# Patient Record
Sex: FEMALE | Race: BLACK OR AFRICAN AMERICAN | Employment: OTHER | URBAN - METROPOLITAN AREA
[De-identification: names, ages, dates, MRNs, and addresses within clinical notes are randomized per-mention and may not be internally consistent; named-entity substitution may affect disease eponyms.]

---

## 2017-01-17 RX ORDER — CARVEDILOL 25 MG/1
TABLET ORAL
Qty: 60 TABLET | Refills: 4 | Status: SHIPPED | OUTPATIENT
Start: 2017-01-17 | End: 2017-06-19 | Stop reason: SDUPTHER

## 2017-02-06 ENCOUNTER — OFFICE VISIT (OUTPATIENT)
Dept: CARDIOLOGY CLINIC | Age: 82
End: 2017-02-06

## 2017-02-06 VITALS
OXYGEN SATURATION: 96 % | SYSTOLIC BLOOD PRESSURE: 130 MMHG | BODY MASS INDEX: 39.86 KG/M2 | HEART RATE: 90 BPM | DIASTOLIC BLOOD PRESSURE: 80 MMHG | HEIGHT: 66 IN | WEIGHT: 248 LBS

## 2017-02-06 DIAGNOSIS — I48.19 PERSISTENT ATRIAL FIBRILLATION (HCC): Primary | Chronic | ICD-10-CM

## 2017-02-06 DIAGNOSIS — I27.20 PULMONARY HTN (HCC): ICD-10-CM

## 2017-02-06 DIAGNOSIS — I10 ESSENTIAL HYPERTENSION: Chronic | ICD-10-CM

## 2017-02-06 DIAGNOSIS — I35.0 NONRHEUMATIC AORTIC VALVE STENOSIS: Chronic | ICD-10-CM

## 2017-02-06 DIAGNOSIS — I50.42 CHRONIC COMBINED SYSTOLIC AND DIASTOLIC HEART FAILURE (HCC): Chronic | ICD-10-CM

## 2017-02-06 PROCEDURE — G8400 PT W/DXA NO RESULTS DOC: HCPCS | Performed by: INTERNAL MEDICINE

## 2017-02-06 PROCEDURE — G8419 CALC BMI OUT NRM PARAM NOF/U: HCPCS | Performed by: INTERNAL MEDICINE

## 2017-02-06 PROCEDURE — 4040F PNEUMOC VAC/ADMIN/RCVD: CPT | Performed by: INTERNAL MEDICINE

## 2017-02-06 PROCEDURE — G8484 FLU IMMUNIZE NO ADMIN: HCPCS | Performed by: INTERNAL MEDICINE

## 2017-02-06 PROCEDURE — 1036F TOBACCO NON-USER: CPT | Performed by: INTERNAL MEDICINE

## 2017-02-06 PROCEDURE — 93000 ELECTROCARDIOGRAM COMPLETE: CPT | Performed by: INTERNAL MEDICINE

## 2017-02-06 PROCEDURE — G8427 DOCREV CUR MEDS BY ELIG CLIN: HCPCS | Performed by: INTERNAL MEDICINE

## 2017-02-06 PROCEDURE — 1090F PRES/ABSN URINE INCON ASSESS: CPT | Performed by: INTERNAL MEDICINE

## 2017-02-06 PROCEDURE — 99214 OFFICE O/P EST MOD 30 MIN: CPT | Performed by: INTERNAL MEDICINE

## 2017-02-06 PROCEDURE — 1123F ACP DISCUSS/DSCN MKR DOCD: CPT | Performed by: INTERNAL MEDICINE

## 2017-02-28 RX ORDER — MOEXIPRIL HCL 15 MG
TABLET ORAL
Qty: 60 TABLET | Refills: 4 | Status: SHIPPED | OUTPATIENT
Start: 2017-02-28 | End: 2017-07-28 | Stop reason: SDUPTHER

## 2017-04-17 RX ORDER — DILTIAZEM HYDROCHLORIDE 240 MG/1
CAPSULE, EXTENDED RELEASE ORAL
Qty: 90 CAPSULE | Refills: 3 | Status: SHIPPED | OUTPATIENT
Start: 2017-04-17 | End: 2018-04-03 | Stop reason: SDUPTHER

## 2017-06-19 RX ORDER — CARVEDILOL 25 MG/1
TABLET ORAL
Qty: 60 TABLET | Refills: 3 | Status: SHIPPED | OUTPATIENT
Start: 2017-06-19 | End: 2018-01-02 | Stop reason: SDUPTHER

## 2017-07-28 RX ORDER — MOEXIPRIL HCL 15 MG
TABLET ORAL
Qty: 60 TABLET | Refills: 3 | Status: SHIPPED | OUTPATIENT
Start: 2017-07-28 | End: 2017-11-24 | Stop reason: SDUPTHER

## 2017-08-07 ENCOUNTER — OFFICE VISIT (OUTPATIENT)
Dept: CARDIOLOGY CLINIC | Age: 82
End: 2017-08-07

## 2017-08-07 VITALS
BODY MASS INDEX: 41.78 KG/M2 | HEIGHT: 66 IN | SYSTOLIC BLOOD PRESSURE: 122 MMHG | DIASTOLIC BLOOD PRESSURE: 60 MMHG | OXYGEN SATURATION: 98 % | WEIGHT: 260 LBS | HEART RATE: 70 BPM

## 2017-08-07 DIAGNOSIS — I10 ESSENTIAL HYPERTENSION: Chronic | ICD-10-CM

## 2017-08-07 DIAGNOSIS — I48.19 PERSISTENT ATRIAL FIBRILLATION (HCC): Primary | Chronic | ICD-10-CM

## 2017-08-07 DIAGNOSIS — I35.0 NONRHEUMATIC AORTIC VALVE STENOSIS: Chronic | ICD-10-CM

## 2017-08-07 DIAGNOSIS — E78.2 MIXED HYPERLIPIDEMIA: ICD-10-CM

## 2017-08-07 DIAGNOSIS — I27.20 PULMONARY HTN (HCC): Chronic | ICD-10-CM

## 2017-08-07 DIAGNOSIS — I50.42 CHRONIC COMBINED SYSTOLIC AND DIASTOLIC HEART FAILURE (HCC): Chronic | ICD-10-CM

## 2017-08-07 PROCEDURE — 1036F TOBACCO NON-USER: CPT | Performed by: INTERNAL MEDICINE

## 2017-08-07 PROCEDURE — 1090F PRES/ABSN URINE INCON ASSESS: CPT | Performed by: INTERNAL MEDICINE

## 2017-08-07 PROCEDURE — G8427 DOCREV CUR MEDS BY ELIG CLIN: HCPCS | Performed by: INTERNAL MEDICINE

## 2017-08-07 PROCEDURE — 99214 OFFICE O/P EST MOD 30 MIN: CPT | Performed by: INTERNAL MEDICINE

## 2017-08-07 PROCEDURE — 4040F PNEUMOC VAC/ADMIN/RCVD: CPT | Performed by: INTERNAL MEDICINE

## 2017-08-07 PROCEDURE — G8400 PT W/DXA NO RESULTS DOC: HCPCS | Performed by: INTERNAL MEDICINE

## 2017-08-07 PROCEDURE — G8417 CALC BMI ABV UP PARAM F/U: HCPCS | Performed by: INTERNAL MEDICINE

## 2017-08-07 PROCEDURE — 1123F ACP DISCUSS/DSCN MKR DOCD: CPT | Performed by: INTERNAL MEDICINE

## 2017-08-14 ENCOUNTER — HOSPITAL ENCOUNTER (OUTPATIENT)
Dept: NON INVASIVE DIAGNOSTICS | Age: 82
Discharge: HOME | End: 2017-08-14
Attending: INTERNAL MEDICINE | Admitting: INTERNAL MEDICINE

## 2017-08-14 DIAGNOSIS — I35.0 NONRHEUMATIC AORTIC VALVE STENOSIS: ICD-10-CM

## 2017-08-14 LAB
LV EF: 35 %
LVEF MODALITY: NORMAL

## 2017-09-11 ENCOUNTER — TELEPHONE (OUTPATIENT)
Dept: CARDIOLOGY CLINIC | Age: 82
End: 2017-09-11

## 2017-09-11 DIAGNOSIS — I10 ESSENTIAL HYPERTENSION: Chronic | ICD-10-CM

## 2017-09-11 DIAGNOSIS — E78.2 MIXED HYPERLIPIDEMIA: Primary | ICD-10-CM

## 2017-09-11 DIAGNOSIS — I48.19 PERSISTENT ATRIAL FIBRILLATION (HCC): Chronic | ICD-10-CM

## 2017-09-11 DIAGNOSIS — E78.2 MIXED HYPERLIPIDEMIA: ICD-10-CM

## 2017-09-11 LAB
A/G RATIO: 0.9 (ref 1.1–2.2)
ALBUMIN SERPL-MCNC: 3.4 G/DL (ref 3.4–5)
ALP BLD-CCNC: 94 U/L (ref 40–129)
ALT SERPL-CCNC: 7 U/L (ref 10–40)
ANION GAP SERPL CALCULATED.3IONS-SCNC: 12 MMOL/L (ref 3–16)
AST SERPL-CCNC: 12 U/L (ref 15–37)
BILIRUB SERPL-MCNC: 0.5 MG/DL (ref 0–1)
BUN BLDV-MCNC: 30 MG/DL (ref 7–20)
CALCIUM SERPL-MCNC: 9 MG/DL (ref 8.3–10.6)
CHLORIDE BLD-SCNC: 101 MMOL/L (ref 99–110)
CHOLESTEROL, FASTING: 143 MG/DL (ref 0–199)
CO2: 29 MMOL/L (ref 21–32)
CREAT SERPL-MCNC: 1.5 MG/DL (ref 0.6–1.2)
GFR AFRICAN AMERICAN: 40
GFR NON-AFRICAN AMERICAN: 33
GLOBULIN: 3.8 G/DL
GLUCOSE FASTING: 91 MG/DL (ref 70–99)
HDLC SERPL-MCNC: 57 MG/DL (ref 40–60)
LDL CHOLESTEROL CALCULATED: 72 MG/DL
POTASSIUM SERPL-SCNC: 3.8 MMOL/L (ref 3.5–5.1)
SODIUM BLD-SCNC: 142 MMOL/L (ref 136–145)
T4 FREE: 2 NG/DL (ref 0.9–1.8)
TOTAL PROTEIN: 7.2 G/DL (ref 6.4–8.2)
TRIGLYCERIDE, FASTING: 70 MG/DL (ref 0–150)
TSH REFLEX: 0.06 UIU/ML (ref 0.27–4.2)
VLDLC SERPL CALC-MCNC: 14 MG/DL

## 2017-09-12 ENCOUNTER — OFFICE VISIT (OUTPATIENT)
Dept: CARDIOLOGY CLINIC | Age: 82
End: 2017-09-12

## 2017-09-12 VITALS
OXYGEN SATURATION: 98 % | HEART RATE: 84 BPM | HEIGHT: 66 IN | SYSTOLIC BLOOD PRESSURE: 122 MMHG | DIASTOLIC BLOOD PRESSURE: 80 MMHG | BODY MASS INDEX: 41.72 KG/M2 | WEIGHT: 259.6 LBS

## 2017-09-12 DIAGNOSIS — I10 ESSENTIAL HYPERTENSION: Chronic | ICD-10-CM

## 2017-09-12 DIAGNOSIS — I48.19 PERSISTENT ATRIAL FIBRILLATION (HCC): Primary | Chronic | ICD-10-CM

## 2017-09-12 DIAGNOSIS — I27.20 PULMONARY HTN (HCC): Chronic | ICD-10-CM

## 2017-09-12 DIAGNOSIS — I35.0 NONRHEUMATIC AORTIC VALVE STENOSIS: Chronic | ICD-10-CM

## 2017-09-12 DIAGNOSIS — E78.2 MIXED HYPERLIPIDEMIA: ICD-10-CM

## 2017-09-12 DIAGNOSIS — E05.90 HYPERTHYROIDISM: ICD-10-CM

## 2017-09-12 DIAGNOSIS — I50.42 CHRONIC COMBINED SYSTOLIC AND DIASTOLIC HEART FAILURE (HCC): Chronic | ICD-10-CM

## 2017-09-12 PROCEDURE — 99214 OFFICE O/P EST MOD 30 MIN: CPT | Performed by: INTERNAL MEDICINE

## 2017-09-12 PROCEDURE — 1123F ACP DISCUSS/DSCN MKR DOCD: CPT | Performed by: INTERNAL MEDICINE

## 2017-09-12 PROCEDURE — G8400 PT W/DXA NO RESULTS DOC: HCPCS | Performed by: INTERNAL MEDICINE

## 2017-09-12 PROCEDURE — 1090F PRES/ABSN URINE INCON ASSESS: CPT | Performed by: INTERNAL MEDICINE

## 2017-09-12 PROCEDURE — G8427 DOCREV CUR MEDS BY ELIG CLIN: HCPCS | Performed by: INTERNAL MEDICINE

## 2017-09-12 PROCEDURE — G8417 CALC BMI ABV UP PARAM F/U: HCPCS | Performed by: INTERNAL MEDICINE

## 2017-09-12 PROCEDURE — 1036F TOBACCO NON-USER: CPT | Performed by: INTERNAL MEDICINE

## 2017-09-12 PROCEDURE — 4040F PNEUMOC VAC/ADMIN/RCVD: CPT | Performed by: INTERNAL MEDICINE

## 2017-10-18 ENCOUNTER — HOSPITAL ENCOUNTER (OUTPATIENT)
Dept: OTHER | Age: 82
Discharge: HOME | End: 2017-10-18
Attending: INTERNAL MEDICINE | Admitting: INTERNAL MEDICINE

## 2017-10-18 DIAGNOSIS — M25.551 RIGHT HIP PAIN: ICD-10-CM

## 2017-11-24 RX ORDER — MOEXIPRIL HCL 15 MG
TABLET ORAL
Qty: 60 TABLET | Refills: 2 | Status: SHIPPED | OUTPATIENT
Start: 2017-11-24 | End: 2018-02-18 | Stop reason: SDUPTHER

## 2018-01-02 RX ORDER — CARVEDILOL 25 MG/1
TABLET ORAL
Qty: 60 TABLET | Refills: 2 | Status: SHIPPED | OUTPATIENT
Start: 2018-01-02 | End: 2018-04-16 | Stop reason: SDUPTHER

## 2018-02-21 RX ORDER — MOEXIPRIL HCL 15 MG
TABLET ORAL
Qty: 180 TABLET | Refills: 3 | Status: SHIPPED | OUTPATIENT
Start: 2018-02-21 | End: 2019-02-11 | Stop reason: SDUPTHER

## 2018-03-22 ENCOUNTER — OFFICE VISIT (OUTPATIENT)
Dept: CARDIOLOGY CLINIC | Age: 83
End: 2018-03-22

## 2018-03-22 VITALS
DIASTOLIC BLOOD PRESSURE: 64 MMHG | HEIGHT: 66 IN | SYSTOLIC BLOOD PRESSURE: 120 MMHG | BODY MASS INDEX: 42.11 KG/M2 | HEART RATE: 76 BPM | WEIGHT: 262 LBS | OXYGEN SATURATION: 98 %

## 2018-03-22 DIAGNOSIS — I48.19 PERSISTENT ATRIAL FIBRILLATION (HCC): Primary | Chronic | ICD-10-CM

## 2018-03-22 DIAGNOSIS — I10 ESSENTIAL HYPERTENSION: Chronic | ICD-10-CM

## 2018-03-22 DIAGNOSIS — I35.0 NONRHEUMATIC AORTIC VALVE STENOSIS: Chronic | ICD-10-CM

## 2018-03-22 DIAGNOSIS — I27.20 PULMONARY HTN (HCC): Chronic | ICD-10-CM

## 2018-03-22 DIAGNOSIS — I50.42 CHRONIC COMBINED SYSTOLIC AND DIASTOLIC HEART FAILURE (HCC): Chronic | ICD-10-CM

## 2018-03-22 DIAGNOSIS — E78.2 MIXED HYPERLIPIDEMIA: ICD-10-CM

## 2018-03-22 DIAGNOSIS — E11.9 TYPE 2 DIABETES MELLITUS WITHOUT COMPLICATION, WITHOUT LONG-TERM CURRENT USE OF INSULIN (HCC): ICD-10-CM

## 2018-03-22 PROCEDURE — 1090F PRES/ABSN URINE INCON ASSESS: CPT | Performed by: INTERNAL MEDICINE

## 2018-03-22 PROCEDURE — G8400 PT W/DXA NO RESULTS DOC: HCPCS | Performed by: INTERNAL MEDICINE

## 2018-03-22 PROCEDURE — 4040F PNEUMOC VAC/ADMIN/RCVD: CPT | Performed by: INTERNAL MEDICINE

## 2018-03-22 PROCEDURE — G8484 FLU IMMUNIZE NO ADMIN: HCPCS | Performed by: INTERNAL MEDICINE

## 2018-03-22 PROCEDURE — G8427 DOCREV CUR MEDS BY ELIG CLIN: HCPCS | Performed by: INTERNAL MEDICINE

## 2018-03-22 PROCEDURE — 1123F ACP DISCUSS/DSCN MKR DOCD: CPT | Performed by: INTERNAL MEDICINE

## 2018-03-22 PROCEDURE — 1036F TOBACCO NON-USER: CPT | Performed by: INTERNAL MEDICINE

## 2018-03-22 PROCEDURE — 99214 OFFICE O/P EST MOD 30 MIN: CPT | Performed by: INTERNAL MEDICINE

## 2018-03-22 PROCEDURE — G8417 CALC BMI ABV UP PARAM F/U: HCPCS | Performed by: INTERNAL MEDICINE

## 2018-03-23 DIAGNOSIS — I48.19 PERSISTENT ATRIAL FIBRILLATION (HCC): Primary | Chronic | ICD-10-CM

## 2018-03-26 ENCOUNTER — TELEPHONE (OUTPATIENT)
Dept: PHARMACY | Facility: CLINIC | Age: 83
End: 2018-03-26

## 2018-03-26 NOTE — TELEPHONE ENCOUNTER
Left message for patient to please call back to schedule an appointment in [facility] for Anticoagulation.     Grace Sam   Anticoagulation Service / Heart Failure  [phone]

## 2018-04-01 ENCOUNTER — HOSPITAL ENCOUNTER (OUTPATIENT)
Dept: OTHER | Age: 83
Discharge: HOME | End: 2018-04-30
Attending: INTERNAL MEDICINE | Admitting: INTERNAL MEDICINE

## 2018-04-03 RX ORDER — DILTIAZEM HYDROCHLORIDE 240 MG/1
CAPSULE, EXTENDED RELEASE ORAL
Qty: 90 CAPSULE | Refills: 3 | Status: SHIPPED | OUTPATIENT
Start: 2018-04-03 | End: 2019-04-01 | Stop reason: SDUPTHER

## 2018-04-06 ENCOUNTER — HOSPITAL ENCOUNTER (OUTPATIENT)
Dept: INFUSION THERAPY | Age: 83
Discharge: HOME | End: 2018-04-30
Admitting: INTERNAL MEDICINE

## 2018-04-06 ENCOUNTER — OFFICE VISIT (OUTPATIENT)
Dept: PHARMACY | Facility: CLINIC | Age: 83
End: 2018-04-06

## 2018-04-06 ENCOUNTER — ANTI-COAG VISIT (OUTPATIENT)
Dept: PHARMACY | Facility: CLINIC | Age: 83
End: 2018-04-06

## 2018-04-06 ENCOUNTER — HOSPITAL ENCOUNTER (OUTPATIENT)
Dept: OTHER | Age: 83
Discharge: HOME | End: 2018-04-06
Attending: INTERNAL MEDICINE | Admitting: INTERNAL MEDICINE

## 2018-04-06 VITALS
HEART RATE: 72 BPM | DIASTOLIC BLOOD PRESSURE: 81 MMHG | SYSTOLIC BLOOD PRESSURE: 157 MMHG | OXYGEN SATURATION: 94 % | WEIGHT: 265.6 LBS | BODY MASS INDEX: 42.87 KG/M2

## 2018-04-06 DIAGNOSIS — I48.19 PERSISTENT ATRIAL FIBRILLATION (HCC): ICD-10-CM

## 2018-04-06 DIAGNOSIS — I50.42 CHRONIC COMBINED SYSTOLIC AND DIASTOLIC HEART FAILURE (HCC): Primary | ICD-10-CM

## 2018-04-06 LAB — INTERNATIONAL NORMALIZATION RATIO, POC: 2.6

## 2018-04-06 RX ORDER — ASCORBIC ACID 500 MG
500 TABLET ORAL DAILY
COMMUNITY
End: 2021-07-29

## 2018-04-06 RX ORDER — CETIRIZINE HYDROCHLORIDE 10 MG/1
10 TABLET ORAL DAILY
Status: ON HOLD | COMMUNITY
End: 2021-04-29 | Stop reason: HOSPADM

## 2018-04-06 RX ORDER — WARFARIN SODIUM 5 MG/1
2.5 TABLET ORAL EVERY EVENING
Status: ON HOLD | COMMUNITY
End: 2021-04-29 | Stop reason: HOSPADM

## 2018-04-06 RX ORDER — FUROSEMIDE 40 MG/1
40 TABLET ORAL 2 TIMES DAILY
COMMUNITY
End: 2019-04-16

## 2018-04-06 RX ORDER — ACETAMINOPHEN 500 MG
1000 TABLET ORAL EVERY 6 HOURS PRN
COMMUNITY
End: 2021-03-26

## 2018-04-16 RX ORDER — CARVEDILOL 25 MG/1
TABLET ORAL
Qty: 180 TABLET | Refills: 3 | Status: SHIPPED | OUTPATIENT
Start: 2018-04-16 | End: 2019-05-07 | Stop reason: SDUPTHER

## 2018-05-01 ENCOUNTER — HOSPITAL ENCOUNTER (OUTPATIENT)
Dept: OTHER | Age: 83
Discharge: HOME | End: 2018-05-31
Attending: INTERNAL MEDICINE | Admitting: INTERNAL MEDICINE

## 2018-05-04 ENCOUNTER — ANTI-COAG VISIT (OUTPATIENT)
Dept: PHARMACY | Facility: CLINIC | Age: 83
End: 2018-05-04

## 2018-05-04 DIAGNOSIS — I48.19 PERSISTENT ATRIAL FIBRILLATION (HCC): ICD-10-CM

## 2018-05-04 LAB — INTERNATIONAL NORMALIZATION RATIO, POC: 2.7

## 2018-06-01 ENCOUNTER — ANTI-COAG VISIT (OUTPATIENT)
Dept: PHARMACY | Facility: CLINIC | Age: 83
End: 2018-06-01

## 2018-06-01 ENCOUNTER — HOSPITAL ENCOUNTER (OUTPATIENT)
Dept: OTHER | Age: 83
Discharge: HOME | End: 2018-06-30
Attending: INTERNAL MEDICINE | Admitting: INTERNAL MEDICINE

## 2018-06-01 DIAGNOSIS — I48.19 PERSISTENT ATRIAL FIBRILLATION (HCC): ICD-10-CM

## 2018-06-01 LAB — INTERNATIONAL NORMALIZATION RATIO, POC: 3.9

## 2018-06-29 ENCOUNTER — ANTI-COAG VISIT (OUTPATIENT)
Dept: PHARMACY | Facility: CLINIC | Age: 83
End: 2018-06-29

## 2018-06-29 DIAGNOSIS — I48.19 PERSISTENT ATRIAL FIBRILLATION (HCC): ICD-10-CM

## 2018-06-29 LAB — INR BLD: 1.9

## 2018-07-01 ENCOUNTER — HOSPITAL ENCOUNTER (OUTPATIENT)
Dept: OTHER | Age: 83
Discharge: HOME | End: 2018-07-31
Attending: INTERNAL MEDICINE | Admitting: INTERNAL MEDICINE

## 2018-07-27 ENCOUNTER — ANTI-COAG VISIT (OUTPATIENT)
Dept: PHARMACY | Facility: CLINIC | Age: 83
End: 2018-07-27

## 2018-07-27 DIAGNOSIS — I48.19 PERSISTENT ATRIAL FIBRILLATION (HCC): ICD-10-CM

## 2018-07-27 LAB — INR BLD: 2

## 2018-07-27 NOTE — PROGRESS NOTES
Ms. Cristofer Santana is a 80 y.o.  female with history of Afib who presents today for anticoagulation monitoring and adjustment. Patient verifies current dosing regimen  Patient denies s/s bleeding/bruising/swelling/SOB  No blood in urine or stool. No dietary changes. No changes in medication/OTC agents/Herbals. No change in alcohol use. No missed doses. No Procedures scheduled in the future at this time. Lab Results   Component Value Date    INR 2.00 07/27/2018    INR 1.90 06/29/2018    INR 3.9 06/01/2018       Pertinent findings: Patient appears well. Denies changes to medications or diet. Denies any issues with bruising or bleeding. INR is therapeutic today. Was slightly subtherapeutic at last visit due to ashish greens which she does not regularly eat. Will continue current dose and recheck in 3 weeks due to patient preference as they are going on vacation the following week. Warfarin dosing: Continue Warfarin 5mg daily       After visit summary printed and reviewed with patient.

## 2018-08-01 ENCOUNTER — HOSPITAL ENCOUNTER (OUTPATIENT)
Dept: OTHER | Age: 83
Discharge: HOME | End: 2018-08-31
Attending: INTERNAL MEDICINE | Admitting: INTERNAL MEDICINE

## 2018-08-15 ENCOUNTER — OFFICE VISIT (OUTPATIENT)
Dept: ENDOCRINOLOGY | Age: 83
End: 2018-08-15

## 2018-08-15 VITALS
OXYGEN SATURATION: 97 % | WEIGHT: 263 LBS | TEMPERATURE: 98.9 F | SYSTOLIC BLOOD PRESSURE: 171 MMHG | HEART RATE: 78 BPM | RESPIRATION RATE: 14 BRPM | DIASTOLIC BLOOD PRESSURE: 94 MMHG | BODY MASS INDEX: 43.82 KG/M2 | HEIGHT: 65 IN

## 2018-08-15 DIAGNOSIS — T14.8XXA FRACTURE: Primary | ICD-10-CM

## 2018-08-15 DIAGNOSIS — E05.90 HYPERTHYROIDISM: ICD-10-CM

## 2018-08-15 PROCEDURE — G8427 DOCREV CUR MEDS BY ELIG CLIN: HCPCS | Performed by: INTERNAL MEDICINE

## 2018-08-15 PROCEDURE — 1101F PT FALLS ASSESS-DOCD LE1/YR: CPT | Performed by: INTERNAL MEDICINE

## 2018-08-15 PROCEDURE — 99204 OFFICE O/P NEW MOD 45 MIN: CPT | Performed by: INTERNAL MEDICINE

## 2018-08-15 PROCEDURE — 1090F PRES/ABSN URINE INCON ASSESS: CPT | Performed by: INTERNAL MEDICINE

## 2018-08-15 PROCEDURE — G8417 CALC BMI ABV UP PARAM F/U: HCPCS | Performed by: INTERNAL MEDICINE

## 2018-08-15 PROCEDURE — 4040F PNEUMOC VAC/ADMIN/RCVD: CPT | Performed by: INTERNAL MEDICINE

## 2018-08-15 PROCEDURE — 1036F TOBACCO NON-USER: CPT | Performed by: INTERNAL MEDICINE

## 2018-08-15 PROCEDURE — G8400 PT W/DXA NO RESULTS DOC: HCPCS | Performed by: INTERNAL MEDICINE

## 2018-08-15 PROCEDURE — 1123F ACP DISCUSS/DSCN MKR DOCD: CPT | Performed by: INTERNAL MEDICINE

## 2018-08-16 ENCOUNTER — ANTI-COAG VISIT (OUTPATIENT)
Dept: PHARMACY | Facility: CLINIC | Age: 83
End: 2018-08-16

## 2018-08-16 DIAGNOSIS — I48.19 PERSISTENT ATRIAL FIBRILLATION (HCC): ICD-10-CM

## 2018-08-16 LAB — INR BLD: 1.9

## 2018-08-16 NOTE — PROGRESS NOTES
Ms. Amol Robles is a 80 y.o.  female with history of Afib who presents today for anticoagulation monitoring and adjustment. Patient verifies current dosing regimen  Patient denies s/s bleeding/bruising/swelling/SOB  No blood in urine or stool. No dietary changes. No changes in medication/OTC agents/Herbals. No change in alcohol use. No missed doses. No Procedures scheduled in the future at this time. Lab Results   Component Value Date    INR 1.90 08/16/2018    INR 2.00 07/27/2018    INR 1.90 06/29/2018       Pertinent findings: No changes or problems noted. Patient states that she had some spinach over the weekend. Her INR is subtherapeutic and has been hanging around this area for the past few visits. Will increase dose by 7.1% today and follow-up in 3 weeks to see how INR adjusts. Advised her to keep her vitamin K consistent from week to week. Warfarin dosing: NEW DOSE: Take warfarin 5mg daily, except 7.5 mg (one and a half tablets) on Thursdays. After visit summary printed and reviewed with patient.         Warfarin dose updated on patient home medication list: Yes

## 2018-08-29 ENCOUNTER — TELEPHONE (OUTPATIENT)
Dept: ENDOCRINOLOGY | Age: 83
End: 2018-08-29

## 2018-08-29 ENCOUNTER — HOSPITAL ENCOUNTER (OUTPATIENT)
Dept: ULTRASOUND IMAGING | Age: 83
Discharge: HOME | End: 2018-08-29
Attending: INTERNAL MEDICINE | Admitting: INTERNAL MEDICINE

## 2018-08-29 DIAGNOSIS — E05.90 THYROTOXICOSIS WITHOUT THYROID STORM: ICD-10-CM

## 2018-08-29 DIAGNOSIS — E05.90 HYPERTHYROIDISM: ICD-10-CM

## 2018-08-30 ENCOUNTER — TELEPHONE (OUTPATIENT)
Dept: ENDOCRINOLOGY | Age: 83
End: 2018-08-30

## 2018-09-01 ENCOUNTER — HOSPITAL ENCOUNTER (OUTPATIENT)
Dept: OTHER | Age: 83
Discharge: HOME | End: 2018-09-01
Attending: INTERNAL MEDICINE | Admitting: INTERNAL MEDICINE

## 2018-09-04 ENCOUNTER — HOSPITAL ENCOUNTER (OUTPATIENT)
Dept: NUCLEAR MEDICINE | Age: 83
Discharge: HOME | End: 2018-09-11
Attending: INTERNAL MEDICINE | Admitting: INTERNAL MEDICINE

## 2018-09-04 DIAGNOSIS — E05.90 THYROTOXICOSIS WITHOUT THYROID STORM: ICD-10-CM

## 2018-09-04 DIAGNOSIS — E05.90 HYPERTHYROIDISM: ICD-10-CM

## 2018-09-04 DIAGNOSIS — E05.80 OTHER THYROTOXICOSIS WITHOUT THYROTOXIC CRISIS OR STORM: ICD-10-CM

## 2018-09-05 ENCOUNTER — HOSPITAL ENCOUNTER (OUTPATIENT)
Dept: NUCLEAR MEDICINE | Age: 83
Discharge: HOME | End: 2018-09-05
Attending: INTERNAL MEDICINE | Admitting: INTERNAL MEDICINE

## 2018-09-05 DIAGNOSIS — E05.90 THYROTOXICOSIS WITHOUT THYROID STORM: ICD-10-CM

## 2018-09-07 DIAGNOSIS — E05.90 HYPERTHYROIDISM: ICD-10-CM

## 2018-09-07 LAB
T3 FREE: 2.8 PG/ML (ref 2.3–4.2)
T4 FREE: 1.8 NG/DL (ref 0.9–1.8)
TSH REFLEX: 0.19 UIU/ML (ref 0.27–4.2)

## 2018-09-08 LAB — TSH RECEPTOR AB: <0.9 IU/L

## 2018-09-11 ENCOUNTER — TELEPHONE (OUTPATIENT)
Dept: ENDOCRINOLOGY | Age: 83
End: 2018-09-11

## 2018-09-11 NOTE — TELEPHONE ENCOUNTER
Patient needs FNA prior to LEYVA. I called patient and discussed. Can we please schedule her.           Left message for patient to call and make appt

## 2018-09-11 NOTE — PROGRESS NOTES
9/18 TSH 0.19 FT4 1.8 FT3 2.8
Right thyroid lobe:  7.8 x 5.0 x 4.2 cm  Left thyroid lobe:  8.2 x 4.0 x 4.2 cm  Isthmus:  2 cm  Thyroid Gland:  Heterogeneous echotexture of the thyroid gland  Size: 47 x 46 x 40 mm  Location: Mid right lobe  NODULE: 2, 4th listed on the tech sheet  Size: 35 x 35 x 28 mm  NODULE: 3  Size: 32 x 30 x 26 mm  Location: Mid left lobe    Thyroid Scan:  Thyroid uptake at 24 hours measured 14.5%.  Normal range at 24 hours is   10-35%.     Activity within right and left thyroid lobes is diffusely heterogeneous. Areas of nodular increased and decreased activity are seen throughout right   and left thyroid lobes. Reviewed images right 4.6cm , isthmus 1.87cm and left 3.3cm nodule  Likely has toxic nodule. Will check TFT as uptake not that high    TSH 0.19 Free level normal  Discuss with radiology, the dominant large nodules are not hot. Scan does have appearance of toxic nodule, scattered area of hyperactivity. Will do FNA of the right and left dominant nodule.  Discussed with patient
lower neck  Respiratory: clear to auscultation bilaterally and breathing is unlabored  Cardiovascular: regular rate and rhythm, S1, S2, regular rate and rhythm, ES murmur  Skin: No obvious rashes or lesions present. Skin and hair texture normal  Psychiatric: Judgement and Insight:  judgement and insight appear normal  Neuro: Normal without focal findings, speech is normal normal, speech is spontaneous    Lab Review  Lab Results   Component Value Date    TSH 0.05 03/22/2018     No results found for: FREET4      Assessment: 1. Hyperthyroidism : She has it for years, mild, discussed possible causes. Will get thyroid scan. Discussed treatment with antithyroid medication and LEYVA. 2. Compression Fracture : Reports normal Dexa. She is followed by PCP     Plan: 1. Check TFT, Trab  2. Thyroid scan and USG  3. Treatment with LEYVA or Antithyroid medication based on results  4. F/u in 3 months after treatment

## 2018-09-12 ENCOUNTER — ANTI-COAG VISIT (OUTPATIENT)
Dept: PHARMACY | Facility: CLINIC | Age: 83
End: 2018-09-12

## 2018-09-12 LAB — INTERNATIONAL NORMALIZATION RATIO, POC: 2.2

## 2018-10-10 ENCOUNTER — ANTI-COAG VISIT (OUTPATIENT)
Dept: PHARMACY | Age: 83
End: 2018-10-10
Payer: MEDICARE

## 2018-10-10 DIAGNOSIS — I48.19 PERSISTENT ATRIAL FIBRILLATION (HCC): ICD-10-CM

## 2018-10-10 LAB — INTERNATIONAL NORMALIZATION RATIO, POC: 2.7

## 2018-10-10 PROCEDURE — 85610 PROTHROMBIN TIME: CPT

## 2018-10-10 PROCEDURE — 99211 OFF/OP EST MAY X REQ PHY/QHP: CPT

## 2018-10-16 ENCOUNTER — OFFICE VISIT (OUTPATIENT)
Dept: CARDIOLOGY CLINIC | Age: 83
End: 2018-10-16
Payer: MEDICARE

## 2018-10-16 VITALS
DIASTOLIC BLOOD PRESSURE: 80 MMHG | OXYGEN SATURATION: 94 % | SYSTOLIC BLOOD PRESSURE: 134 MMHG | HEIGHT: 66 IN | HEART RATE: 66 BPM | BODY MASS INDEX: 42.59 KG/M2 | WEIGHT: 265 LBS

## 2018-10-16 DIAGNOSIS — I50.42 CHRONIC COMBINED SYSTOLIC AND DIASTOLIC HEART FAILURE (HCC): Chronic | ICD-10-CM

## 2018-10-16 DIAGNOSIS — I10 ESSENTIAL HYPERTENSION: Chronic | ICD-10-CM

## 2018-10-16 DIAGNOSIS — I48.19 PERSISTENT ATRIAL FIBRILLATION (HCC): Primary | Chronic | ICD-10-CM

## 2018-10-16 DIAGNOSIS — I35.0 NONRHEUMATIC AORTIC VALVE STENOSIS: Chronic | ICD-10-CM

## 2018-10-16 DIAGNOSIS — E78.2 MIXED HYPERLIPIDEMIA: ICD-10-CM

## 2018-10-16 DIAGNOSIS — I27.20 PULMONARY HTN (HCC): Chronic | ICD-10-CM

## 2018-10-16 PROCEDURE — G8417 CALC BMI ABV UP PARAM F/U: HCPCS | Performed by: INTERNAL MEDICINE

## 2018-10-16 PROCEDURE — G8400 PT W/DXA NO RESULTS DOC: HCPCS | Performed by: INTERNAL MEDICINE

## 2018-10-16 PROCEDURE — 1101F PT FALLS ASSESS-DOCD LE1/YR: CPT | Performed by: INTERNAL MEDICINE

## 2018-10-16 PROCEDURE — 1036F TOBACCO NON-USER: CPT | Performed by: INTERNAL MEDICINE

## 2018-10-16 PROCEDURE — 1090F PRES/ABSN URINE INCON ASSESS: CPT | Performed by: INTERNAL MEDICINE

## 2018-10-16 PROCEDURE — G8427 DOCREV CUR MEDS BY ELIG CLIN: HCPCS | Performed by: INTERNAL MEDICINE

## 2018-10-16 PROCEDURE — 4040F PNEUMOC VAC/ADMIN/RCVD: CPT | Performed by: INTERNAL MEDICINE

## 2018-10-16 PROCEDURE — G8484 FLU IMMUNIZE NO ADMIN: HCPCS | Performed by: INTERNAL MEDICINE

## 2018-10-16 PROCEDURE — 1123F ACP DISCUSS/DSCN MKR DOCD: CPT | Performed by: INTERNAL MEDICINE

## 2018-10-16 PROCEDURE — 99214 OFFICE O/P EST MOD 30 MIN: CPT | Performed by: INTERNAL MEDICINE

## 2018-10-16 PROCEDURE — 93000 ELECTROCARDIOGRAM COMPLETE: CPT | Performed by: INTERNAL MEDICINE

## 2018-10-16 NOTE — PROGRESS NOTES
mEq by mouth daily.  lovastatin (MEVACOR) 10 MG tablet Take 10 mg by mouth nightly.  hydrALAZINE (APRESOLINE) 10 MG tablet Take 10 mg by mouth 2 times daily.  lansoprazole (PREVACID) 15 MG capsule Take 15 mg by mouth daily. No current facility-administered medications for this visit. Review of Systems:  · Constitutional: no unanticipated weight loss. There's been no change in energy level, sleep pattern, or activity level. No fevers, chills. · Eyes: No visual changes or diplopia. No scleral icterus. · ENT: No Headaches, hearing loss or vertigo. No mouth sores or sore throat. · Cardiovascular: as reviewed in HPI  · Respiratory: No cough or wheezing, no sputum production. No hematemesis. · Gastrointestinal: No abdominal pain, appetite loss, blood in stools. No change in bowel or bladder habits. · Genitourinary: No dysuria, trouble voiding, or hematuria. · Musculoskeletal:  No gait disturbance, no joint complaints. · Integumentary: No rash or pruritis. · Neurological: No headache, diplopia, change in muscle strength, numbness or tingling. · Psychiatric: No anxiety or depression. · Endocrine: No temperature intolerance. No excessive thirst, fluid intake, or urination. No tremor. · Hematologic/Lymphatic: No abnormal bruising or bleeding, blood clots or swollen lymph nodes. · Allergic/Immunologic: No nasal congestion or hives. Physical Exam:   /80 (Site: Left Upper Arm, Position: Sitting)   Pulse 66   Ht 5' 6\" (1.676 m)   Wt 265 lb (120.2 kg)   SpO2 94%   BMI 42.77 kg/m²    Constitutional: She is an obese female who is oriented to person, place, and time. In no acute distress. Head: Normocephalic and atraumatic. PERRL  Neck: Neck supple. No JVP or carotid bruit appreciated. No mass and no thyromegaly present. No lymphadenopathy present. Cardiovascular: Irreg irreg with a normal rate. S2 audible. Exam reveals no gallop and no friction rub. III/VI DANUTA.

## 2018-10-30 ENCOUNTER — PROCEDURE VISIT (OUTPATIENT)
Dept: ENDOCRINOLOGY | Age: 83
End: 2018-10-30
Payer: MEDICARE

## 2018-10-30 VITALS
HEART RATE: 70 BPM | SYSTOLIC BLOOD PRESSURE: 110 MMHG | DIASTOLIC BLOOD PRESSURE: 72 MMHG | RESPIRATION RATE: 16 BRPM | WEIGHT: 265 LBS | OXYGEN SATURATION: 99 % | BODY MASS INDEX: 42.77 KG/M2

## 2018-10-30 DIAGNOSIS — E04.2 MULTIPLE THYROID NODULES: ICD-10-CM

## 2018-10-30 DIAGNOSIS — E07.89 OTHER SPECIFIED DISORDERS OF THYROID: ICD-10-CM

## 2018-10-30 DIAGNOSIS — E05.90 HYPERTHYROIDISM: Primary | ICD-10-CM

## 2018-10-30 DIAGNOSIS — Z92.29 HISTORY OF COUMADIN THERAPY: ICD-10-CM

## 2018-10-30 PROCEDURE — 1123F ACP DISCUSS/DSCN MKR DOCD: CPT | Performed by: INTERNAL MEDICINE

## 2018-10-30 PROCEDURE — 1090F PRES/ABSN URINE INCON ASSESS: CPT | Performed by: INTERNAL MEDICINE

## 2018-10-30 PROCEDURE — 1036F TOBACCO NON-USER: CPT | Performed by: INTERNAL MEDICINE

## 2018-10-30 PROCEDURE — G8427 DOCREV CUR MEDS BY ELIG CLIN: HCPCS | Performed by: INTERNAL MEDICINE

## 2018-10-30 PROCEDURE — G8484 FLU IMMUNIZE NO ADMIN: HCPCS | Performed by: INTERNAL MEDICINE

## 2018-10-30 PROCEDURE — G8400 PT W/DXA NO RESULTS DOC: HCPCS | Performed by: INTERNAL MEDICINE

## 2018-10-30 PROCEDURE — 4040F PNEUMOC VAC/ADMIN/RCVD: CPT | Performed by: INTERNAL MEDICINE

## 2018-10-30 PROCEDURE — G8417 CALC BMI ABV UP PARAM F/U: HCPCS | Performed by: INTERNAL MEDICINE

## 2018-10-30 PROCEDURE — 1101F PT FALLS ASSESS-DOCD LE1/YR: CPT | Performed by: INTERNAL MEDICINE

## 2018-10-30 PROCEDURE — 99213 OFFICE O/P EST LOW 20 MIN: CPT | Performed by: INTERNAL MEDICINE

## 2018-10-30 NOTE — PROGRESS NOTES
Subjective:      81 y/o AAF who is here for thyroid evaluation. Interim:  Here for FNA  Taking coumadin last INR 2.7    Abnormal TFT were noted  She has hyperthyroidism for years    Mild, uncontrolled    She was on Synthroid in the past- 10 yrs ago , stopped it . Helped the goiter    She is not sure if she had a biopsy of thyroid    Labs:    3/18 TSH 0.05 FT4 2.0  9/17 TSH 0.06 FT4 2.0  6/14 TSH <0.01  2/10 TSH 0.01 FT4 1.89    Current complaints:low appetite, hoarse voice, no weight loss, tremors  History of obstructive symptoms:  difficulty swallowing No, changes in voice/hoarseness  No.    History of radiation to patient's neck:   No  Recent iodine exposure:  No  Family history includes no thyroid abnormalities. Family history of thyroid cancer:  No    Right thyroid lobe:  7.8 x 5.0 x 4.2 cm  Left thyroid lobe:  8.2 x 4.0 x 4.2 cm  Isthmus:  2 cm  Thyroid Gland:  Heterogeneous echotexture of the thyroid gland  Size: 47 x 46 x 40 mm  Location: Mid right lobe  NODULE: 2, 4th listed on the tech sheet  Size: 35 x 35 x 28 mm  NODULE: 3  Size: 32 x 30 x 26 mm  Location: Mid left lobe    Thyroid Scan:  Thyroid uptake at 24 hours measured 14.5%.  Normal range at 24 hours is   10-35%.     Activity within right and left thyroid lobes is diffusely heterogeneous. Areas of nodular increased and decreased activity are seen throughout right   and left thyroid lobes. Reviewed images right 4.6cm , isthmus 1.87cm and left 3.3cm nodule  Likely has toxic nodule. TSH 0.19 Free level normal Trab negative    Discuss with radiology, the dominant large nodules are not hot. Scan does have appearance of toxic nodule, scattered area of hyperactivity. Will do FNA of the right and left dominant nodule. Discussed with patient    She has a h/o CHF, atrial fibrillation    She has a h/o L1 compression fracture , on 5/16, had a fall  1.  Acute compression fracture of L1 with approximately 40% loss of height of   the vertebral

## 2018-11-07 ENCOUNTER — ANTI-COAG VISIT (OUTPATIENT)
Dept: PHARMACY | Age: 83
End: 2018-11-07
Payer: MEDICARE

## 2018-11-07 DIAGNOSIS — I48.91 ATRIAL FIBRILLATION, UNSPECIFIED TYPE (HCC): Primary | ICD-10-CM

## 2018-11-07 LAB
INR BLD: 2.52 (ref 0.86–1.14)
PROTHROMBIN TIME: 28.7 SEC (ref 9.8–13)

## 2018-11-07 PROCEDURE — 36415 COLL VENOUS BLD VENIPUNCTURE: CPT

## 2018-11-07 PROCEDURE — 99211 OFF/OP EST MAY X REQ PHY/QHP: CPT

## 2018-11-07 PROCEDURE — 85610 PROTHROMBIN TIME: CPT

## 2018-12-04 ENCOUNTER — ANTI-COAG VISIT (OUTPATIENT)
Dept: PHARMACY | Age: 83
End: 2018-12-04
Payer: MEDICARE

## 2018-12-04 LAB — INTERNATIONAL NORMALIZATION RATIO, POC: 1.3

## 2018-12-04 PROCEDURE — 99211 OFF/OP EST MAY X REQ PHY/QHP: CPT

## 2018-12-04 PROCEDURE — 85610 PROTHROMBIN TIME: CPT

## 2018-12-05 ENCOUNTER — OFFICE VISIT (OUTPATIENT)
Dept: ENDOCRINOLOGY | Age: 83
End: 2018-12-05
Payer: MEDICARE

## 2018-12-05 VITALS
DIASTOLIC BLOOD PRESSURE: 72 MMHG | RESPIRATION RATE: 16 BRPM | OXYGEN SATURATION: 98 % | WEIGHT: 246 LBS | HEART RATE: 81 BPM | SYSTOLIC BLOOD PRESSURE: 146 MMHG | BODY MASS INDEX: 39.71 KG/M2

## 2018-12-05 DIAGNOSIS — E04.2 MULTIPLE THYROID NODULES: Primary | ICD-10-CM

## 2018-12-05 PROCEDURE — 10022 PR FINE NEEDLE ASP;W/IMAGING GUIDANCE: CPT | Performed by: INTERNAL MEDICINE

## 2018-12-05 NOTE — PROGRESS NOTES
Ultrasound Guided Fine Needle Aspiration    Indication: Thyroid Nodule  :   Meme Garber  Assistant   Yesenia Marker  Procedure:  Procedure was explained to patient and consent was obtained. Skin was prepped in semi-sterile manner and local anasthesia (ethyl Chloride)was administered. right 4.7 cm nodule was biopsied under ultrasound guidance. 27-Gauge needle was used for aspiration under capillary technique. 5 passes were made. Three were used for cytolyte tube and 2 for genetic testing if needed. One pass made in isthmus 1.8cm nodule  4 passes made in left 3.5cm nodule, 2 for cytolyte and 2 for genetic testing if needed  Patient had no immediate complications.    Will call patient with results

## 2018-12-11 ENCOUNTER — TELEPHONE (OUTPATIENT)
Dept: ENDOCRINOLOGY | Age: 83
End: 2018-12-11

## 2018-12-12 ENCOUNTER — ANTI-COAG VISIT (OUTPATIENT)
Dept: PHARMACY | Age: 83
End: 2018-12-12
Payer: MEDICARE

## 2018-12-12 DIAGNOSIS — I48.19 PERSISTENT ATRIAL FIBRILLATION (HCC): ICD-10-CM

## 2018-12-12 LAB — INR BLD: 1.7

## 2018-12-12 PROCEDURE — 85610 PROTHROMBIN TIME: CPT

## 2018-12-12 PROCEDURE — 99211 OFF/OP EST MAY X REQ PHY/QHP: CPT

## 2018-12-12 NOTE — PROGRESS NOTES
Ms. Lisa Barriga is a 80 y.o.  female with history of Afib who presents today for anticoagulation monitoring and adjustment. Patient verifies current dosing regimen  Patient denies s/s bleeding/bruising/swelling/SOB  No blood in urine or stool. No dietary changes. No changes in medication/OTC agents/Herbals. No change in alcohol use. No missed doses. No Procedures scheduled in the future at this time. Lab Results   Component Value Date    INR 1.7 12/12/2018    INR 1.3 12/04/2018    INR 2.52 (H) 11/07/2018       Pertinent findings: Patient is 1 week post operation that required her to hold her warfarin for 4 days prior. Her INR the day of the procedure was 1.3 after holding it for 3 days. After restarting her warfarin for 6 days her INR was 1.7. Instructed the patient to take an extra 1/2 tablet with your normal 5 mg tablet (total of 7.5 mg). Then continue her normal warfarin regimen of 5mg (1 tablet) daily. Will check next INR in 4 weeks. Warfarin dosing:   TODAY take an extra 1/2 tablet with your normal 5 mg tablet (total of 7.5 mg)  Then   Continue warfarin 5mg(1 tablet) daily    After visit summary printed and reviewed with patient.       Warfarin dose updated on patient home medication list: Yes

## 2019-01-09 ENCOUNTER — ANTI-COAG VISIT (OUTPATIENT)
Dept: PHARMACY | Age: 84
End: 2019-01-09
Payer: MEDICARE

## 2019-01-09 DIAGNOSIS — I48.19 PERSISTENT ATRIAL FIBRILLATION (HCC): ICD-10-CM

## 2019-01-09 LAB — INR BLD: 3.1

## 2019-01-09 PROCEDURE — 99211 OFF/OP EST MAY X REQ PHY/QHP: CPT

## 2019-01-09 PROCEDURE — 85610 PROTHROMBIN TIME: CPT

## 2019-02-06 ENCOUNTER — ANTI-COAG VISIT (OUTPATIENT)
Dept: PHARMACY | Age: 84
End: 2019-02-06
Payer: MEDICARE

## 2019-02-06 LAB — INTERNATIONAL NORMALIZATION RATIO, POC: 2.9

## 2019-02-06 PROCEDURE — 99211 OFF/OP EST MAY X REQ PHY/QHP: CPT

## 2019-02-06 PROCEDURE — 85610 PROTHROMBIN TIME: CPT

## 2019-02-11 RX ORDER — MOEXIPRIL HCL 15 MG
TABLET ORAL
Qty: 60 TABLET | Refills: 2 | Status: SHIPPED | OUTPATIENT
Start: 2019-02-11 | End: 2019-05-07 | Stop reason: SDUPTHER

## 2019-03-07 ENCOUNTER — ANTI-COAG VISIT (OUTPATIENT)
Dept: PHARMACY | Age: 84
End: 2019-03-07
Payer: MEDICARE

## 2019-03-07 DIAGNOSIS — I48.19 PERSISTENT ATRIAL FIBRILLATION (HCC): ICD-10-CM

## 2019-03-07 LAB — INTERNATIONAL NORMALIZATION RATIO, POC: 3.2

## 2019-03-07 PROCEDURE — 99211 OFF/OP EST MAY X REQ PHY/QHP: CPT

## 2019-03-07 PROCEDURE — 85610 PROTHROMBIN TIME: CPT

## 2019-04-01 RX ORDER — DILTIAZEM HYDROCHLORIDE 240 MG/1
CAPSULE, EXTENDED RELEASE ORAL
Qty: 90 CAPSULE | Refills: 2 | Status: SHIPPED | OUTPATIENT
Start: 2019-04-01 | End: 2020-01-03

## 2019-04-04 ENCOUNTER — APPOINTMENT (OUTPATIENT)
Dept: PHARMACY | Age: 84
End: 2019-04-04
Payer: MEDICARE

## 2019-04-10 ENCOUNTER — ANTI-COAG VISIT (OUTPATIENT)
Dept: PHARMACY | Age: 84
End: 2019-04-10
Payer: MEDICARE

## 2019-04-10 LAB — INTERNATIONAL NORMALIZATION RATIO, POC: 2.3

## 2019-04-10 PROCEDURE — 85610 PROTHROMBIN TIME: CPT

## 2019-04-10 PROCEDURE — 99211 OFF/OP EST MAY X REQ PHY/QHP: CPT

## 2019-04-15 NOTE — PROGRESS NOTES
[address]      Cardiology Consult    Imer Herrera  1934 April 16, 2019    Primary Physician: Dr. Cj Riley  Reason for Visit: CHF and atrial fibrillation    CC: \"legs are more swollen\"     HPI:  The patient is 80 y.o. female with a past medical history significant for combined systolic and diastolic heart failure, HTN, and atrial fibrillation presents today for follow-up. She was admitted in 6/2013 for a HF exacerbation likely from poorly controlled blood pressure. While in the hospital she had an episode of atrial fibrillation which spontaneously converted to sinus rhythm. Then in sinus rhythm, she would have pauses of up to 3 seconds but no clear symptoms. She was hyperthyroid at the time. On follow-up at the office, she has been in atrial fibrillation since. Echocardiogram from 9/2015 showed that the EF had dropped to around 40-45% and 8/2017 dropped again to 35%. Today, she has no specific new cardiac complaints but notes her LE edema will wax and wane. She is accompanied by her daughter. She reports her breathing is comfortable. She notes shortness of breath in the mornings but improves with using her inhalers. Her daughter feels the LE edema is progressively worsening. She reports her INR remains therapeutic and she follows at the anticoagulation clinic. She denies any abnormal bruising or bleeding. She has a poor functional status and is in a wheelchair but uses a walker for ambulation. She denies exertional chest pain/pressure, dyspnea at rest, PND, orthopnea, palpitations, lightheadedness, worsening LE edema, and syncope. She is compliant with her medications. In general, she wants only conservative/medical therapy. She denies recurrent falls, excessive bruising, or unusual bleeding.      Past Medical History:   Diagnosis Date    Anemia     Chronic kidney disease     Combined systolic and diastolic congestive heart failure (HCC)     Diabetes mellitus ([facility] [address]     DVT (deep venous thrombosis) (HCC)     Hyperlipidemia     Hypertension     Osteoarthritis     PAF (paroxysmal atrial fibrillation) (HCC)     Pulmonary embolism (HCC)     Sarcoidosis     Thyroid disease     Hypothyroidism    Type II or unspecified type diabetes mellitus without mention of complication, not stated as uncontrolled      Past Surgical History:   Procedure Laterality Date    HYSTERECTOMY      KNEE SURGERY      bilateral total knees     Family History   Problem Relation Age of Onset    Heart Failure Mother     Cancer Brother     Asthma Neg Hx     Diabetes Neg Hx     Emphysema Neg Hx     Hypertension Neg Hx       Social History     Tobacco Use    Smoking status: Never Smoker    Smokeless tobacco: Never Used    Tobacco comment: Never smoked   Substance Use Topics    Alcohol use: No    Drug use: No     Allergies   Allergen Reactions    Bactrim [Sulfamethoxazole-Trimethoprim]     Levofloxacin     Pcn [Penicillins]     Sulfa Antibiotics      Current Outpatient Medications   Medication Sig Dispense Refill    CARTIA  MG extended release capsule TAKE ONE CAPSULE BY MOUTH DAILY 90 capsule 2    moexipril (UNIVASC) 15 MG tablet TAKE TWO TABLETS BY MOUTH ONCE NIGHTLY 60 tablet 2    carvedilol (COREG) 25 MG tablet TAKE ONE TABLET BY MOUTH TWICE A DAY WITH A MEAL 180 tablet 3    warfarin (COUMADIN) 5 MG tablet Take 5 mg by mouth daily Except 2.5mg every Thursday or as directed by [facility]mad[facility] [phone]      furosemide (LASIX) 40 MG tablet Take 40 mg by mouth 2 times daily      cetirizine (ZYRTEC) 10 MG tablet Take 10 mg by mouth daily      vitamin C (ASCORBIC ACID) 500 MG tablet Take 500 mg by mouth daily      acetaminophen (TYLENOL) 500 MG tablet Take 1,000 mg by mouth every 6 hours as needed for Pain      Calcium Carb-Cholecalciferol (CALTRATE 600+D) 600-800 MG-UNIT TABS Take 1 tablet by mouth daily       gabapentin (NEURONTIN) 100 MG capsule Take 100-200 mg by mouth bruit appreciated. No mass and no thyromegaly present. No lymphadenopathy present. Cardiovascular: Irreg irreg with a normal rate. S2 audible. Exam reveals no gallop and no friction rub. III/VI DANUTA. Pulmonary/Chest: Effort normal and breath sounds normal. No respiratory distress. She has no wheezes, rhonchi or rales. Abdominal: Soft, non-tender. Bowel sounds are normal. She exhibits no organomegaly, mass or bruit. Extremities: 1-2+ BLE edema. No cyanosis or clubbing. Pulses are 2+ radial bilaterally. B-TKR. Neurological: She is alert and oriented to person, place, and time. No gross cranial nerve deficit. Coordination normal.   Skin: Skin is warm and dry. There is no rash or diaphoresis. Psychiatric: She has a normal mood and affect. Her speech is normal and behavior is normal.     Lab Review:   Lab Results   Component Value Date    TRIG 70 07/18/2016    HDL 57 09/11/2017    HDL 55 09/09/2011    LDLCALC 72 09/11/2017    LABVLDL 14 09/11/2017      Lab Results   Component Value Date    BUN 33 03/22/2018    CREATININE 1.2 03/22/2018     EKG Interpretation: 7/10/13 Atrial fibrillation w RVR  8/7/14 Atrial fibrillation. Voltage criteria for LVH. Nonspecific ST-T abnormality. 9/28/15 Atrial fibrillation. Low voltage in precordial leads. Incomplete RBBB. Left axis -anterior fascicular block. Poor R-wave progression. 10/16/18 Atrial fibrillation. Left axis. LVH  with QRS widening and repolarization abnormality. 4/16/19 Atrial fibrillation. IVCD. Left anterior fascicular block. Poor R-wave progression. Nonspecific T-abnormality. Image Review:     ECHO 8/14/17  Summary  Normal left ventricular size and wall thickness. Global systolic function is moderate decreased with an estimated LVEF estimated at 35%. The right ventricle is not well visualized but appears to have normal size and mild to moderately reduced function. Severe biatrial enlargement appreciated. Moderate mitral regurgitation is present.   The aortic valve leaflets are calcified and restricted in appearance. The aortic valve area is calculated at 0.7 cm2 with a maximum pressure gradient of 34 mmHg and a mean pressure gradient of 17 mmHg. This is consistent with at least mild aortic stenosis but gradient but is probably underestimated due to left ventricular systolic dysfunction. Echo 9/20/15   Left ventricle size is normal. Ejection fraction is visually estimated to be 40-45% with diffuse hypokinesis. The aortic valve appears thickened/calcified with restricted movement and a mean pressure gradient of 18 mmHg. Trivial aortic regurgitation is present. The right ventricle is enlarged and moderately depressed. Biatrial enlargement. RVSP estimated at 45 mmHg. Holter 7/25/13   Sinus HR . 263 PVCs. 496 supraventricular ectopic beats. Angeli Ordonez 10/2015  There is no reversible ischemia or scar appreciated on this study. There is   a slight decrease in tracer uptake on the stress imaging that appears more   consistent with breast attenuation. Assessment/Plan:   1. Persistent atrial fibrillation. Rate controlled today. Continue Cardizem 240 mg and Coreg 25mg BID. Continue chronic anticoagulation. 2. Chronic combined systolic and diastolic heart failure. EF 35%. No ischemia noted on nuclear stress testing (2015) and patient is not interested in invasive testing. Worsening LE edema but denies any worsening shortness of breath and weight remains relatively stable. Pt with limited functional status partially related to orthopaedic issues. Will increase Lasix to 80 in the morning and 40 mg in the evening. Will repeat BMP in 2 weeks. If creatinine stable then, will increase Lasix again to 80mg BID. Continue medical management with ACE-I and B-blocker. 3. Pulmonary hypertension on echo. Multiple etiologies possible including prior multiple PE, RUTH, systolic/diastolic dysfunction. She is not interested in pursuing a sleep study.     4. Essential hypertension. Controlled. Goal BP <130/80 with CHF and advanced age. 5. Aortic stenosis. S2 seems audible. Mean gradient may be underestimated from reduced EF. Regardless, patient wants medical management only. 6. Hyperlipidemia. On statin. Patient to follow in 6 months. Thank you very much for allowing me to participate in the care of your patient. Please do not hesitate to contact me if you have any questions. Sincerely,  Lyndsay Owens. Kayce Cortes, [address] [place] [address]  Ph: [phone]  Fax: [phone]    This note was scribed in the presence of Dr Kayce Cortes MD by Jatin Gallardo RN. Physician Attestation: The scribes documentation has been prepared under my direction and personally reviewed by me in its entirety. I confirm that the note above accurately reflects all work, treatment, procedures, and medical decision making performed by me.

## 2019-04-16 ENCOUNTER — OFFICE VISIT (OUTPATIENT)
Dept: CARDIOLOGY CLINIC | Age: 84
End: 2019-04-16
Payer: MEDICARE

## 2019-04-16 VITALS
SYSTOLIC BLOOD PRESSURE: 124 MMHG | BODY MASS INDEX: 43.23 KG/M2 | DIASTOLIC BLOOD PRESSURE: 72 MMHG | WEIGHT: 269 LBS | OXYGEN SATURATION: 99 % | HEIGHT: 66 IN | HEART RATE: 68 BPM

## 2019-04-16 DIAGNOSIS — I50.42 CHRONIC COMBINED SYSTOLIC AND DIASTOLIC HEART FAILURE (HCC): ICD-10-CM

## 2019-04-16 DIAGNOSIS — I10 ESSENTIAL HYPERTENSION: ICD-10-CM

## 2019-04-16 DIAGNOSIS — I48.19 PERSISTENT ATRIAL FIBRILLATION (HCC): Primary | ICD-10-CM

## 2019-04-16 DIAGNOSIS — E78.2 MIXED HYPERLIPIDEMIA: ICD-10-CM

## 2019-04-16 DIAGNOSIS — I27.20 PULMONARY HTN (HCC): ICD-10-CM

## 2019-04-16 DIAGNOSIS — I35.0 NONRHEUMATIC AORTIC VALVE STENOSIS: ICD-10-CM

## 2019-04-16 PROCEDURE — G8427 DOCREV CUR MEDS BY ELIG CLIN: HCPCS | Performed by: INTERNAL MEDICINE

## 2019-04-16 PROCEDURE — G8417 CALC BMI ABV UP PARAM F/U: HCPCS | Performed by: INTERNAL MEDICINE

## 2019-04-16 PROCEDURE — 99214 OFFICE O/P EST MOD 30 MIN: CPT | Performed by: INTERNAL MEDICINE

## 2019-04-16 PROCEDURE — 1090F PRES/ABSN URINE INCON ASSESS: CPT | Performed by: INTERNAL MEDICINE

## 2019-04-16 PROCEDURE — 1123F ACP DISCUSS/DSCN MKR DOCD: CPT | Performed by: INTERNAL MEDICINE

## 2019-04-16 PROCEDURE — 4040F PNEUMOC VAC/ADMIN/RCVD: CPT | Performed by: INTERNAL MEDICINE

## 2019-04-16 PROCEDURE — G8400 PT W/DXA NO RESULTS DOC: HCPCS | Performed by: INTERNAL MEDICINE

## 2019-04-16 PROCEDURE — 93000 ELECTROCARDIOGRAM COMPLETE: CPT | Performed by: INTERNAL MEDICINE

## 2019-04-16 PROCEDURE — 1036F TOBACCO NON-USER: CPT | Performed by: INTERNAL MEDICINE

## 2019-04-16 RX ORDER — FUROSEMIDE 40 MG/1
TABLET ORAL
Qty: 90 TABLET | Refills: 3 | Status: SHIPPED | OUTPATIENT
Start: 2019-04-16 | End: 2019-08-15 | Stop reason: SDUPTHER

## 2019-04-16 NOTE — PATIENT INSTRUCTIONS
Patient Education        Learning About Heart Failure Zones  What are heart failure zones? Heart failure zones give you an easy way to see changes in your heart failure symptoms. They also tell you when you need to get help. Check every day to see which zone you are in. Green zone. You are doing well. This is where you want to be. · Your weight is stable. This means it is not going up or down. · You breathe easily. · You are sleeping well. You are able to lie flat without shortness of breath. · You can do your usual activities. Yellow zone. Be careful. Your symptoms are changing. Call your doctor. · You have new or increased shortness of breath. · You are dizzy or lightheaded, or you feel like you may faint. · You have sudden weight gain, such as more than 2 to 3 pounds in a day or 5 pounds in a week. (Your doctor may suggest a different range of weight gain.)  · You have increased swelling in your legs, ankles, or feet. · You are so tired or weak that you cannot do your usual activities. · You are not sleeping well. Shortness of breath wakes you up at night. You need extra pillows. Your doctor's name: ____________________________________________________________  Your doctor's contact information: _________________________________________________  Red zone. This is an emergency. Call 911. You have symptoms of sudden heart failure, such as:  · You have severe trouble breathing. · You cough up pink, foamy mucus. · You have a new irregular or fast heartbeat. You have symptoms of a heart attack. These may include:  · Chest pain or pressure, or a strange feeling in the chest.  · Sweating. · Shortness of breath. · Nausea or vomiting. · Pain, pressure, or a strange feeling in the back, neck, jaw, or upper belly or in one or both shoulders or arms. · Lightheadedness or sudden weakness. · A fast or irregular heartbeat.   If you have symptoms of a heart attack: After you call 911, the  may tell you to chew 1 adult-strength or 2 to 4 low-dose aspirin. Wait for an ambulance. Do not try to drive yourself. Follow-up care is a key part of your treatment and safety. Be sure to make and go to all appointments, and call your doctor if you are having problems. It's also a good idea to know your test results and keep a list of the medicines you take. Where can you learn more? Go to https://[employer]. org and sign in to your [employer] account. Enter T174 in the WHILL box to learn more about \"Learning About Heart Failure Zones. \"     If you do not have an account, please click on the \"Sign Up Now\" link. Current as of: July 22, 2018  Content Version: 11.9  © 3889-1389 [employer]. Care instructions adapted under license by [facility] ([facility]). If you have questions about a medical condition or this instruction, always ask your healthcare professional. [place] [address] any warranty or liability for your use of this information.

## 2019-05-02 DIAGNOSIS — I50.42 CHRONIC COMBINED SYSTOLIC AND DIASTOLIC HEART FAILURE (HCC): ICD-10-CM

## 2019-05-02 DIAGNOSIS — I27.20 PULMONARY HTN (HCC): ICD-10-CM

## 2019-05-02 LAB
ANION GAP SERPL CALCULATED.3IONS-SCNC: 13 MMOL/L (ref 3–16)
BUN BLDV-MCNC: 34 MG/DL (ref 7–20)
CALCIUM SERPL-MCNC: 8.7 MG/DL (ref 8.3–10.6)
CHLORIDE BLD-SCNC: 101 MMOL/L (ref 99–110)
CO2: 28 MMOL/L (ref 21–32)
CREAT SERPL-MCNC: 2 MG/DL (ref 0.6–1.2)
GFR AFRICAN AMERICAN: 29
GFR NON-AFRICAN AMERICAN: 24
GLUCOSE BLD-MCNC: 144 MG/DL (ref 70–99)
POTASSIUM SERPL-SCNC: 4 MMOL/L (ref 3.5–5.1)
SODIUM BLD-SCNC: 142 MMOL/L (ref 136–145)

## 2019-05-06 DIAGNOSIS — I50.42 CHRONIC COMBINED SYSTOLIC AND DIASTOLIC HEART FAILURE (HCC): Primary | ICD-10-CM

## 2019-05-07 RX ORDER — MOEXIPRIL HCL 15 MG
TABLET ORAL
Qty: 60 TABLET | Refills: 1 | Status: SHIPPED | OUTPATIENT
Start: 2019-05-07 | End: 2019-07-08 | Stop reason: SDUPTHER

## 2019-05-07 RX ORDER — CARVEDILOL 25 MG/1
TABLET ORAL
Qty: 180 TABLET | Refills: 2 | Status: SHIPPED | OUTPATIENT
Start: 2019-05-07 | End: 2020-01-31

## 2019-05-08 ENCOUNTER — APPOINTMENT (OUTPATIENT)
Dept: PHARMACY | Age: 84
End: 2019-05-08
Payer: MEDICARE

## 2019-05-10 ENCOUNTER — ANTI-COAG VISIT (OUTPATIENT)
Dept: PHARMACY | Age: 84
End: 2019-05-10
Payer: MEDICARE

## 2019-05-10 DIAGNOSIS — I50.42 CHRONIC COMBINED SYSTOLIC AND DIASTOLIC HEART FAILURE (HCC): ICD-10-CM

## 2019-05-10 DIAGNOSIS — I48.19 PERSISTENT ATRIAL FIBRILLATION (HCC): ICD-10-CM

## 2019-05-10 DIAGNOSIS — I48.91 ATRIAL FIBRILLATION, UNSPECIFIED TYPE (HCC): ICD-10-CM

## 2019-05-10 LAB
ANION GAP SERPL CALCULATED.3IONS-SCNC: 13 MMOL/L (ref 3–16)
BUN BLDV-MCNC: 28 MG/DL (ref 7–20)
CALCIUM SERPL-MCNC: 8.8 MG/DL (ref 8.3–10.6)
CHLORIDE BLD-SCNC: 105 MMOL/L (ref 99–110)
CO2: 26 MMOL/L (ref 21–32)
CREAT SERPL-MCNC: 1.4 MG/DL (ref 0.6–1.2)
GFR AFRICAN AMERICAN: 43
GFR NON-AFRICAN AMERICAN: 36
GLUCOSE BLD-MCNC: 97 MG/DL (ref 70–99)
INR BLD: 2.26 (ref 0.86–1.14)
INTERNATIONAL NORMALIZATION RATIO, POC: 2.2
POTASSIUM SERPL-SCNC: 4.8 MMOL/L (ref 3.5–5.1)
PROTHROMBIN TIME: 25.8 SEC (ref 9.8–13)
SODIUM BLD-SCNC: 144 MMOL/L (ref 136–145)

## 2019-05-10 PROCEDURE — 99211 OFF/OP EST MAY X REQ PHY/QHP: CPT

## 2019-05-10 PROCEDURE — 85610 PROTHROMBIN TIME: CPT

## 2019-05-10 NOTE — PROGRESS NOTES
Ms. Joe Conroy is a 80 y.o.  female with history of Afib who presents today for anticoagulation monitoring and adjustment. Patient verifies current dosing regimen. Patient denies s/s bleeding/bruising/swelling/SOB. No blood in urine or stool. No dietary changes. No change in alcohol use. No missed doses. No Procedures scheduled in the future at this time. Lab Results   Component Value Date    INR 2.2 05/10/2019    INR 2.3 04/10/2019    INR 3.2 03/07/2019       Patient appears well. She reports that her doctor increased her Furosemide dose for two weeks. This should not affect her INR. Coumadin Dose :   Continue : Warfarin 5mg(1 tablet) daily EXCEPT 2.5MG (1/2 TABLET) every Thursday. Return in 4 weeks. Patient reminded to call the Anticoagulation Clinic with any medication changes. Patient given instructions utilizing the teach back method. After visit summary printed and reviewed with patient. Warfarin dose updated.

## 2019-06-07 ENCOUNTER — ANTI-COAG VISIT (OUTPATIENT)
Dept: PHARMACY | Age: 84
End: 2019-06-07
Payer: MEDICARE

## 2019-06-07 DIAGNOSIS — I48.19 PERSISTENT ATRIAL FIBRILLATION (HCC): ICD-10-CM

## 2019-06-07 LAB — INTERNATIONAL NORMALIZATION RATIO, POC: 2.2

## 2019-06-07 PROCEDURE — 85610 PROTHROMBIN TIME: CPT

## 2019-06-07 PROCEDURE — 99211 OFF/OP EST MAY X REQ PHY/QHP: CPT

## 2019-06-07 NOTE — PROGRESS NOTES
Ms. Joe Conroy is a 80 y.o.  female with history of Afib who presents today for anticoagulation monitoring and adjustment. Patient verifies current dosing regimen  Patient denies s/s bleeding/bruising/swelling/SOB  No blood in urine or stool. No changes in medication/OTC agents/Herbals. No change in alcohol use. No missed doses. No Procedures scheduled in the future at this time. Lab Results   Component Value Date    INR 2.2 06/07/2019    INR 2.26 (H) 05/10/2019    INR 2.2 05/10/2019       Pertinent findings: Patient reports that she ate some turnips last night. Warfarin dosing:   Continue : Warfarin 5mg(1 tablet) daily EXCEPT 2.5MG (1/2 TABLET) every Thursday. After visit summary printed and reviewed with patient. Medications reviewed and updated on home medication list: Yes, patient denied any changes.   Warfarin dose updated on patient home medication list: Yes

## 2019-07-08 RX ORDER — MOEXIPRIL HCL 15 MG
TABLET ORAL
Qty: 60 TABLET | Refills: 1 | Status: SHIPPED | OUTPATIENT
Start: 2019-07-08 | End: 2019-09-03 | Stop reason: SDUPTHER

## 2019-07-12 ENCOUNTER — ANTI-COAG VISIT (OUTPATIENT)
Dept: PHARMACY | Age: 84
End: 2019-07-12
Payer: MEDICARE

## 2019-07-12 DIAGNOSIS — I48.19 PERSISTENT ATRIAL FIBRILLATION (HCC): ICD-10-CM

## 2019-07-12 LAB — INTERNATIONAL NORMALIZATION RATIO, POC: 2.2

## 2019-07-12 PROCEDURE — 85610 PROTHROMBIN TIME: CPT

## 2019-07-12 PROCEDURE — 99211 OFF/OP EST MAY X REQ PHY/QHP: CPT

## 2019-08-09 ENCOUNTER — ANTI-COAG VISIT (OUTPATIENT)
Dept: PHARMACY | Age: 84
End: 2019-08-09
Payer: MEDICARE

## 2019-08-09 DIAGNOSIS — I48.19 PERSISTENT ATRIAL FIBRILLATION (HCC): ICD-10-CM

## 2019-08-09 LAB — INTERNATIONAL NORMALIZATION RATIO, POC: 2.3

## 2019-08-09 PROCEDURE — 99211 OFF/OP EST MAY X REQ PHY/QHP: CPT

## 2019-08-09 PROCEDURE — 85610 PROTHROMBIN TIME: CPT

## 2019-08-15 RX ORDER — FUROSEMIDE 40 MG/1
TABLET ORAL
Qty: 90 TABLET | Refills: 2 | Status: SHIPPED | OUTPATIENT
Start: 2019-08-15 | End: 2019-10-17 | Stop reason: SDUPTHER

## 2019-09-04 RX ORDER — MOEXIPRIL HCL 15 MG
TABLET ORAL
Qty: 180 TABLET | Refills: 1 | Status: SHIPPED | OUTPATIENT
Start: 2019-09-04 | End: 2020-03-02

## 2019-09-11 ENCOUNTER — ANTI-COAG VISIT (OUTPATIENT)
Dept: PHARMACY | Age: 84
End: 2019-09-11
Payer: MEDICARE

## 2019-09-11 DIAGNOSIS — I48.19 PERSISTENT ATRIAL FIBRILLATION (HCC): ICD-10-CM

## 2019-09-11 LAB — INTERNATIONAL NORMALIZATION RATIO, POC: 2.8

## 2019-09-11 PROCEDURE — 99211 OFF/OP EST MAY X REQ PHY/QHP: CPT

## 2019-09-11 PROCEDURE — 85610 PROTHROMBIN TIME: CPT

## 2019-10-09 ENCOUNTER — APPOINTMENT (OUTPATIENT)
Dept: PHARMACY | Age: 84
End: 2019-10-09
Payer: MEDICARE

## 2019-10-14 ENCOUNTER — ANTI-COAG VISIT (OUTPATIENT)
Dept: PHARMACY | Age: 84
End: 2019-10-14
Payer: MEDICARE

## 2019-10-14 DIAGNOSIS — I48.19 PERSISTENT ATRIAL FIBRILLATION (HCC): ICD-10-CM

## 2019-10-14 LAB — INTERNATIONAL NORMALIZATION RATIO, POC: 2.4

## 2019-10-14 PROCEDURE — 85610 PROTHROMBIN TIME: CPT

## 2019-10-14 PROCEDURE — 99211 OFF/OP EST MAY X REQ PHY/QHP: CPT

## 2019-10-17 ENCOUNTER — OFFICE VISIT (OUTPATIENT)
Dept: CARDIOLOGY CLINIC | Age: 84
End: 2019-10-17
Payer: MEDICARE

## 2019-10-17 VITALS
SYSTOLIC BLOOD PRESSURE: 132 MMHG | HEIGHT: 66 IN | BODY MASS INDEX: 42.43 KG/M2 | WEIGHT: 264 LBS | HEART RATE: 74 BPM | DIASTOLIC BLOOD PRESSURE: 60 MMHG | OXYGEN SATURATION: 100 %

## 2019-10-17 DIAGNOSIS — I50.42 CHRONIC COMBINED SYSTOLIC AND DIASTOLIC HEART FAILURE (HCC): ICD-10-CM

## 2019-10-17 DIAGNOSIS — I10 ESSENTIAL HYPERTENSION: ICD-10-CM

## 2019-10-17 DIAGNOSIS — I48.19 PERSISTENT ATRIAL FIBRILLATION (HCC): Primary | ICD-10-CM

## 2019-10-17 DIAGNOSIS — E78.2 MIXED HYPERLIPIDEMIA: ICD-10-CM

## 2019-10-17 DIAGNOSIS — I35.0 NONRHEUMATIC AORTIC VALVE STENOSIS: ICD-10-CM

## 2019-10-17 DIAGNOSIS — I27.20 PULMONARY HTN (HCC): ICD-10-CM

## 2019-10-17 PROCEDURE — G8417 CALC BMI ABV UP PARAM F/U: HCPCS | Performed by: INTERNAL MEDICINE

## 2019-10-17 PROCEDURE — 99214 OFFICE O/P EST MOD 30 MIN: CPT | Performed by: INTERNAL MEDICINE

## 2019-10-17 PROCEDURE — 4040F PNEUMOC VAC/ADMIN/RCVD: CPT | Performed by: INTERNAL MEDICINE

## 2019-10-17 PROCEDURE — 93000 ELECTROCARDIOGRAM COMPLETE: CPT | Performed by: INTERNAL MEDICINE

## 2019-10-17 PROCEDURE — G8400 PT W/DXA NO RESULTS DOC: HCPCS | Performed by: INTERNAL MEDICINE

## 2019-10-17 PROCEDURE — G8427 DOCREV CUR MEDS BY ELIG CLIN: HCPCS | Performed by: INTERNAL MEDICINE

## 2019-10-17 PROCEDURE — G8484 FLU IMMUNIZE NO ADMIN: HCPCS | Performed by: INTERNAL MEDICINE

## 2019-10-17 PROCEDURE — 1036F TOBACCO NON-USER: CPT | Performed by: INTERNAL MEDICINE

## 2019-10-17 PROCEDURE — 1123F ACP DISCUSS/DSCN MKR DOCD: CPT | Performed by: INTERNAL MEDICINE

## 2019-10-17 PROCEDURE — 1090F PRES/ABSN URINE INCON ASSESS: CPT | Performed by: INTERNAL MEDICINE

## 2019-10-17 RX ORDER — FUROSEMIDE 80 MG
80 TABLET ORAL 2 TIMES DAILY
Qty: 60 TABLET | Refills: 3 | Status: SHIPPED | OUTPATIENT
Start: 2019-10-17 | End: 2020-03-02

## 2019-11-11 ENCOUNTER — TELEPHONE (OUTPATIENT)
Dept: CARDIOLOGY CLINIC | Age: 84
End: 2019-11-11

## 2019-11-11 DIAGNOSIS — I50.42 CHRONIC COMBINED SYSTOLIC AND DIASTOLIC HEART FAILURE (HCC): ICD-10-CM

## 2019-11-11 DIAGNOSIS — I48.19 PERSISTENT ATRIAL FIBRILLATION (HCC): ICD-10-CM

## 2019-11-11 DIAGNOSIS — I48.19 PERSISTENT ATRIAL FIBRILLATION (HCC): Primary | Chronic | ICD-10-CM

## 2019-11-11 LAB
ANION GAP SERPL CALCULATED.3IONS-SCNC: 12 MMOL/L (ref 3–16)
BUN BLDV-MCNC: 37 MG/DL (ref 7–20)
CALCIUM SERPL-MCNC: 8.8 MG/DL (ref 8.3–10.6)
CHLORIDE BLD-SCNC: 104 MMOL/L (ref 99–110)
CO2: 27 MMOL/L (ref 21–32)
CREAT SERPL-MCNC: 1.6 MG/DL (ref 0.6–1.2)
GFR AFRICAN AMERICAN: 37
GFR NON-AFRICAN AMERICAN: 31
GLUCOSE BLD-MCNC: 138 MG/DL (ref 70–99)
HCT VFR BLD CALC: 31 % (ref 36–48)
HEMOGLOBIN: 10.1 G/DL (ref 12–16)
INR BLD: 2.79 (ref 0.86–1.14)
MCH RBC QN AUTO: 27.9 PG (ref 26–34)
MCHC RBC AUTO-ENTMCNC: 32.7 G/DL (ref 31–36)
MCV RBC AUTO: 85.2 FL (ref 80–100)
PDW BLD-RTO: 15.9 % (ref 12.4–15.4)
PLATELET # BLD: 146 K/UL (ref 135–450)
PMV BLD AUTO: 8.3 FL (ref 5–10.5)
POTASSIUM SERPL-SCNC: 3.7 MMOL/L (ref 3.5–5.1)
PROTHROMBIN TIME: 31.8 SEC (ref 9.8–13)
RBC # BLD: 3.64 M/UL (ref 4–5.2)
SODIUM BLD-SCNC: 143 MMOL/L (ref 136–145)
WBC # BLD: 4.3 K/UL (ref 4–11)

## 2019-12-18 ENCOUNTER — ANTI-COAG VISIT (OUTPATIENT)
Dept: PHARMACY | Age: 84
End: 2019-12-18
Payer: MEDICARE

## 2019-12-18 LAB — INTERNATIONAL NORMALIZATION RATIO, POC: 2.4

## 2019-12-18 PROCEDURE — 99211 OFF/OP EST MAY X REQ PHY/QHP: CPT

## 2019-12-18 PROCEDURE — 85610 PROTHROMBIN TIME: CPT

## 2020-01-03 RX ORDER — DILTIAZEM HYDROCHLORIDE 240 MG/1
CAPSULE, EXTENDED RELEASE ORAL
Qty: 90 CAPSULE | Refills: 1 | Status: SHIPPED | OUTPATIENT
Start: 2020-01-03 | End: 2020-06-29

## 2020-01-16 ENCOUNTER — APPOINTMENT (OUTPATIENT)
Dept: PHARMACY | Age: 85
End: 2020-01-16
Payer: MEDICARE

## 2020-01-23 ENCOUNTER — ANTI-COAG VISIT (OUTPATIENT)
Dept: PHARMACY | Age: 85
End: 2020-01-23
Payer: MEDICARE

## 2020-01-23 LAB — INTERNATIONAL NORMALIZATION RATIO, POC: 3.9

## 2020-01-23 PROCEDURE — 85610 PROTHROMBIN TIME: CPT

## 2020-01-23 PROCEDURE — 99211 OFF/OP EST MAY X REQ PHY/QHP: CPT

## 2020-01-31 RX ORDER — CARVEDILOL 25 MG/1
TABLET ORAL
Qty: 180 TABLET | Refills: 5 | Status: ON HOLD
Start: 2020-01-31 | End: 2020-09-09 | Stop reason: HOSPADM

## 2020-02-20 ENCOUNTER — ANTI-COAG VISIT (OUTPATIENT)
Dept: PHARMACY | Age: 85
End: 2020-02-20
Payer: MEDICARE

## 2020-02-20 LAB — INTERNATIONAL NORMALIZATION RATIO, POC: 2.3

## 2020-02-20 PROCEDURE — 85610 PROTHROMBIN TIME: CPT

## 2020-02-20 PROCEDURE — 99211 OFF/OP EST MAY X REQ PHY/QHP: CPT

## 2020-02-20 NOTE — PROGRESS NOTES
Ms. Ana María Washburn is a 80 y.o.  female with history of persistent A-Fib who presents today for anticoagulation monitoring and adjustment. Patient verifies current dosing regimen  Patient denies s/s bleeding/bruising/swelling/SOB  No blood in urine or stool. No dietary changes. No changes in medication/OTC agents/Herbals. No change in alcohol use. No missed doses. No Procedures scheduled in the future at this time. Lab Results   Component Value Date    INR 2.3 02/20/2020    INR 3.9 01/23/2020    INR 2.4 12/18/2019       Pertinent findings:   Patient states doing well. No complaints regarding warfarin therapy. No change to warfarin weekly dosing. Next INR in 4 weeks. Warfarin dosing:   CONTINUE: Warfarin 5mg(1 tablet) daily EXCEPT 2.5MG (1/2 tablet) every Thursday. After visit summary printed and reviewed with patient.       Medications reviewed and updated on home medication list: Yes  Warfarin dose updated on patient home medication list: Yes

## 2020-03-02 RX ORDER — MOEXIPRIL HCL 15 MG
TABLET ORAL
Qty: 180 TABLET | Refills: 0 | Status: SHIPPED | OUTPATIENT
Start: 2020-03-02 | End: 2020-05-28

## 2020-03-02 RX ORDER — FUROSEMIDE 80 MG
TABLET ORAL
Qty: 60 TABLET | Refills: 2 | Status: SHIPPED | OUTPATIENT
Start: 2020-03-02 | End: 2020-04-23

## 2020-03-18 ENCOUNTER — TELEPHONE (OUTPATIENT)
Dept: PHARMACY | Age: 85
End: 2020-03-18

## 2020-04-13 ENCOUNTER — TELEPHONE (OUTPATIENT)
Dept: PHARMACY | Age: 85
End: 2020-04-13

## 2020-04-13 NOTE — TELEPHONE ENCOUNTER
Instructed patient's daughter to bring her to lab [address] for her inr check on 4/16/20. We will call her with results.     Standing order placed

## 2020-04-21 ENCOUNTER — TELEPHONE (OUTPATIENT)
Dept: PHARMACY | Age: 85
End: 2020-04-21

## 2020-04-21 NOTE — TELEPHONE ENCOUNTER
Spoke with Gianna Soto to reschedule their missed anticoagulation visit 4/16/20. She reports she didn't feel right going to the lab given the coronavirus pandemic. Any current signs or symptoms of bleeding: No    Any current medication / diet changes:  No    Any other changes that may affect your INR: No    Lab Results   Component Value Date    INR 2.3 02/20/2020    INR 3.9 01/23/2020    INR 2.4 12/18/2019    INR 2.79 (H) 11/11/2019    INR 2.4 10/14/2019    INR 2.8 09/11/2019       Patient on warfarin for the following diagnosis: Atrial Fibrillation    Patient continues to refuse to come to lab as recommended. Follow up plan: Will follow up via telephone in 2 weeks. Advised patients to call with any medication or diet changes or any other changes that may affect INR. Advised patient to call with any signs or symptoms of bleeding or if these are significant to please seek immediate medical attention.        [address] [place] [address]  Anticoagaultion Service  [phone]

## 2020-04-23 RX ORDER — FUROSEMIDE 80 MG
TABLET ORAL
Qty: 60 TABLET | Refills: 6 | Status: ON HOLD
Start: 2020-04-23 | End: 2020-09-11 | Stop reason: HOSPADM

## 2020-04-27 NOTE — PROGRESS NOTES
hydrALAZINE (APRESOLINE) 10 MG tablet Take 10 mg by mouth 2 times daily.  lansoprazole (PREVACID) 15 MG capsule Take 15 mg by mouth daily. No current facility-administered medications for this visit. Review of Systems:  · Constitutional: no unanticipated weight loss. There's been no change in energy level, sleep pattern, or activity level. No fevers, chills. · Eyes: No visual changes or diplopia. No scleral icterus. · ENT: No Headaches, hearing loss or vertigo. No mouth sores or sore throat. · Cardiovascular: as reviewed in HPI  · Respiratory: No cough or wheezing, no sputum production. No hematemesis. · Gastrointestinal: No abdominal pain, appetite loss, blood in stools. No change in bowel or bladder habits. · Genitourinary: No dysuria, trouble voiding, or hematuria. · Musculoskeletal:  No gait disturbance, no joint complaints. · Integumentary: No rash or pruritis. · Neurological: No headache, diplopia, change in muscle strength, numbness or tingling. · Psychiatric: No anxiety or depression. · Endocrine: No temperature intolerance. No excessive thirst, fluid intake, or urination. No tremor. · Hematologic/Lymphatic: No abnormal bruising or bleeding, blood clots or swollen lymph nodes. · Allergic/Immunologic: No nasal congestion or hives. Physical Exam:   Ht 5' 6\" (1.676 m)   LMP  (LMP Unknown)   BMI 42.61 kg/m²      No physical exam completed due to phone visit. Lab Review:   Lab Results   Component Value Date    TRIG 70 07/18/2016    HDL 57 09/11/2017    HDL 55 09/09/2011    LDLCALC 72 09/11/2017    LABVLDL 14 09/11/2017      Lab Results   Component Value Date    BUN 37 11/11/2019    CREATININE 1.6 11/11/2019     EKG Interpretation: 7/10/13 Atrial fibrillation w RVR  8/7/14 Atrial fibrillation. Voltage criteria for LVH. Nonspecific ST-T abnormality. 9/28/15 Atrial fibrillation. Low voltage in precordial leads. Incomplete RBBB. Left axis -anterior fascicular block.  Poor

## 2020-04-28 ENCOUNTER — OFFICE VISIT (OUTPATIENT)
Dept: CARDIOLOGY CLINIC | Age: 85
End: 2020-04-28
Payer: MEDICARE

## 2020-04-28 VITALS — BODY MASS INDEX: 42.61 KG/M2 | HEIGHT: 66 IN

## 2020-04-28 PROCEDURE — 99441 PR PHYS/QHP TELEPHONE EVALUATION 5-10 MIN: CPT | Performed by: INTERNAL MEDICINE

## 2020-05-28 RX ORDER — MOEXIPRIL HCL 15 MG
TABLET ORAL
Qty: 180 TABLET | Refills: 0 | Status: SHIPPED | OUTPATIENT
Start: 2020-05-28 | End: 2020-08-24

## 2020-06-03 ENCOUNTER — ANTI-COAG VISIT (OUTPATIENT)
Dept: PHARMACY | Age: 85
End: 2020-06-03
Payer: MEDICARE

## 2020-06-03 ENCOUNTER — TELEPHONE (OUTPATIENT)
Dept: PHARMACY | Age: 85
End: 2020-06-03

## 2020-06-03 DIAGNOSIS — I48.19 PERSISTENT ATRIAL FIBRILLATION (HCC): ICD-10-CM

## 2020-06-03 LAB
A/G RATIO: 1 (ref 1.1–2.2)
ALBUMIN SERPL-MCNC: 3.7 G/DL (ref 3.4–5)
ALP BLD-CCNC: 94 U/L (ref 40–129)
ALT SERPL-CCNC: 10 U/L (ref 10–40)
ANION GAP SERPL CALCULATED.3IONS-SCNC: 11 MMOL/L (ref 3–16)
AST SERPL-CCNC: 14 U/L (ref 15–37)
BILIRUB SERPL-MCNC: 0.4 MG/DL (ref 0–1)
BUN BLDV-MCNC: 42 MG/DL (ref 7–20)
CALCIUM SERPL-MCNC: 8.8 MG/DL (ref 8.3–10.6)
CHLORIDE BLD-SCNC: 101 MMOL/L (ref 99–110)
CHOLESTEROL, TOTAL: 153 MG/DL (ref 0–199)
CO2: 27 MMOL/L (ref 21–32)
CREAT SERPL-MCNC: 1.6 MG/DL (ref 0.6–1.2)
CREATININE URINE: 75.6 MG/DL (ref 28–259)
ESTIMATED AVERAGE GLUCOSE: 151.3 MG/DL
GFR AFRICAN AMERICAN: 37
GFR NON-AFRICAN AMERICAN: 31
GLOBULIN: 3.8 G/DL
GLUCOSE BLD-MCNC: 108 MG/DL (ref 70–99)
HBA1C MFR BLD: 6.9 %
HCT VFR BLD CALC: 31.2 % (ref 36–48)
HDLC SERPL-MCNC: 51 MG/DL (ref 40–60)
HEMOGLOBIN: 10 G/DL (ref 12–16)
INR BLD: 2.59 (ref 0.86–1.14)
LDL CHOLESTEROL CALCULATED: 84 MG/DL
MCH RBC QN AUTO: 27.7 PG (ref 26–34)
MCHC RBC AUTO-ENTMCNC: 32.2 G/DL (ref 31–36)
MCV RBC AUTO: 86 FL (ref 80–100)
MICROALBUMIN UR-MCNC: <1.2 MG/DL
MICROALBUMIN/CREAT UR-RTO: NORMAL MG/G (ref 0–30)
PDW BLD-RTO: 16.5 % (ref 12.4–15.4)
PLATELET # BLD: 165 K/UL (ref 135–450)
PMV BLD AUTO: 8.4 FL (ref 5–10.5)
POTASSIUM SERPL-SCNC: 3.5 MMOL/L (ref 3.5–5.1)
PROTHROMBIN TIME: 30.3 SEC (ref 10–13.2)
RBC # BLD: 3.63 M/UL (ref 4–5.2)
SODIUM BLD-SCNC: 139 MMOL/L (ref 136–145)
T3 TOTAL: 0.89 NG/ML (ref 0.8–2)
T4 TOTAL: 9.4 UG/DL (ref 4.5–10.9)
TOTAL PROTEIN: 7.5 G/DL (ref 6.4–8.2)
TRIGL SERPL-MCNC: 91 MG/DL (ref 0–150)
TSH SERPL DL<=0.05 MIU/L-ACNC: 0.11 UIU/ML (ref 0.27–4.2)
VLDLC SERPL CALC-MCNC: 18 MG/DL
WBC # BLD: 4.8 K/UL (ref 4–11)

## 2020-06-04 PROCEDURE — 99211 OFF/OP EST MAY X REQ PHY/QHP: CPT

## 2020-06-04 NOTE — PROGRESS NOTES
Ms. Larry Christensen is a 80 y.o.  female with history of Persistent A-Fib. Ms. Larry Christensen had an INR test today. Results were reviewed and appropriate warfarin management was completed. THIS VISIT WAS COMPLETED AS:  [x]   A VIRTUAL VISIT VIA TELEPHONE IN EFFORTS TO REDUCE THE SPREAD OF COVID-19.  []   A DRIVE-THRU VISIT IN EFFORTS TO REDUCE THE SPREAD OF COVID-19.  []   AN IN PERSON VISIT. PROTOCOLS WERE FOLLOWED WITH PRECAUTIONS TO REDUCE THE SPREAD OF COVID-19. Patient verifies current dosing regimen  Patient denies s/s bleeding/bruising/swelling/SOB  No blood in urine or stool. No dietary changes. No changes in medication/OTC agents/Herbals. No change in alcohol use. No missed doses. No Procedures scheduled in the future at this time. Lab Results   Component Value Date    INR 2.59 (H) 06/03/2020    INR 2.3 02/20/2020    INR 3.9 01/23/2020       Pertinent findings:   Patient states doing well. No complaints regarding warfarin therapy. No change to warfarin weekly dosing. Warfarin dosing:   CONTINUE: Warfarin 5mg(1 tablet) daily EXCEPT 2.5MG (1/2 tablet) every Thursday.     Next INR: 6 weeks -Lab 170 standing orders placed      Medications reviewed and updated on home medication list: No:     Warfarin dose updated on patient home medication list: No:        CLINICAL PHARMACY CONSULT: MED RECONCILIATION/REVIEW ADDENDUM    For Pharmacy Admin Tracking Only    PHSO: Yes  Total # of Interventions Recommended: 0      Total Interventions Accepted: 0  Time Spent (min): [address], Maximo, OBBY 6/4/2020  12:32 PM

## 2020-06-29 RX ORDER — DILTIAZEM HYDROCHLORIDE 240 MG/1
CAPSULE, EXTENDED RELEASE ORAL
Qty: 90 CAPSULE | Refills: 2 | Status: ON HOLD
Start: 2020-06-29 | End: 2020-09-09 | Stop reason: HOSPADM

## 2020-07-21 DIAGNOSIS — I48.19 PERSISTENT ATRIAL FIBRILLATION (HCC): ICD-10-CM

## 2020-07-21 LAB
INR BLD: 2.8 (ref 0.86–1.14)
PROTHROMBIN TIME: 32.8 SEC (ref 10–13.2)

## 2020-07-22 ENCOUNTER — ANTI-COAG VISIT (OUTPATIENT)
Dept: PHARMACY | Age: 85
End: 2020-07-22
Payer: MEDICARE

## 2020-07-22 PROCEDURE — 99211 OFF/OP EST MAY X REQ PHY/QHP: CPT

## 2020-07-22 PROCEDURE — 85610 PROTHROMBIN TIME: CPT

## 2020-07-22 NOTE — PROGRESS NOTES
Ms. Heather Burns is a 80 y.o.  female. Ms. Heather uBrns had an INR test today. Results were reviewed and appropriate warfarin management was completed. THIS VISIT WAS COMPLETED AS:   [x]    A VIRTUAL VISIT VIA TELEPHONE IN EFFORTS TO REDUCE THE SPREAD OF COVID-19.  []    A DRIVE-THRU VISIT IN EFFORTS TO REDUCE THE SPREAD OF COVID-19.  []    AN IN PERSON VISIT. PROTOCOLS WERE FOLLOWED WITH PRECAUTIONS TO REDUCE THE SPREAD OF COVID-19. Patient verifies current dosing regimen: Yes     Medications reviewed and updated on the patient 's home medication list: No: no change     Lab Results   Component Value Date    INR 2.80 (H) 2020    INR 2.59 (H) 2020    INR 2.3 2020               Anticoagulation Summary  As of 2020    INR goal:   2.0-3.0   TTR:   75.6 % (2.3 y)   INR used for dosin.80 (2020)   Warfarin maintenance plan:   2.5 mg (5 mg x 0.5) every Thu; 5 mg (5 mg x 1) all other days   Weekly warfarin total:   32.5 mg   Plan last modified: Torito Daly [facility] (3/7/2019)   Next INR check:   2020   Priority:   Maintenance   Target end date: Indefinite    Indications    Persistent atrial fibrillation [I48.19]             Anticoagulation Episode Summary     INR check location:       Preferred lab:       Send INR reminders to:   [facility] STAFF    Comments:   EPIC - declined FLU VAX 10/14/19      Anticoagulation Care Providers     Provider Role Specialty Phone number    Nolan Brown MD Referring Cardiology [phone]    Jerome Thomas MD  Internal Medicine [phone]          Warfarin plan:       Description    CONTINUE: Warfarin 5mg(1 tablet) daily EXCEPT 2.5MG (1/2 tablet) every Thursday. Keep the number of servings of Vitamin K (dark green vegetables) consistent from week to week. A green vegetable 1-2 times a week. A dark leafy vegetable only occasion.     Call with medications changes, especially antibiotics and steroids and including any over-the-counter medications or herbal products. Call if you stop any medications. Please arrive 15 minutes prior to your appointment           Reviewed AVS with patient / caregiver.       CLINICAL PHARMACY CONSULT: MED RECONCILIATION/REVIEW ADDENDUM    For Pharmacy Admin Tracking Only    PHSO: No  Total # of Interventions Recommended: 0    Total Interventions Accepted: 0  Time Spent (min): 15

## 2020-08-20 ENCOUNTER — ANTI-COAG VISIT (OUTPATIENT)
Dept: PHARMACY | Age: 85
End: 2020-08-20
Payer: MEDICARE

## 2020-08-20 DIAGNOSIS — I48.19 PERSISTENT ATRIAL FIBRILLATION (HCC): ICD-10-CM

## 2020-08-20 LAB
INR BLD: 2.61 (ref 0.86–1.14)
PROTHROMBIN TIME: 30.6 SEC (ref 10–13.2)

## 2020-08-21 PROCEDURE — 85610 PROTHROMBIN TIME: CPT

## 2020-08-21 PROCEDURE — 99211 OFF/OP EST MAY X REQ PHY/QHP: CPT

## 2020-08-21 NOTE — PROGRESS NOTES
Ms. Genaro Ashford is a 80 y.o.  female. Ms. Genaro Ashford had an INR test today. Results were reviewed and appropriate warfarin management was completed. THIS VISIT WAS COMPLETED AS:   [x]    A VIRTUAL VISIT VIA TELEPHONE IN EFFORTS TO REDUCE THE SPREAD OF COVID-19.  []    A DRIVE-THRU VISIT IN EFFORTS TO REDUCE THE SPREAD OF COVID-19.  []    AN IN PERSON VISIT. PROTOCOLS WERE FOLLOWED WITH PRECAUTIONS TO REDUCE THE SPREAD OF COVID-19. Patient verifies current dosing regimen: Yes     Warfarin medication reviewed and updated on the patient 's home medication list: No: no change   All other medications reviewed and updated on the patient 's home medication list: No: no change     Lab Results   Component Value Date    INR 2.61 (H) 2020    INR 2.80 (H) 2020    INR 2.59 (H) 2020           Anticoagulation Summary  As of 2020    INR goal:   2.0-3.0   TTR:   76.5 % (2.3 y)   INR used for dosin.61 (2020)   Warfarin maintenance plan:   2.5 mg (5 mg x 0.5) every Thu; 5 mg (5 mg x 1) all other days   Weekly warfarin total:   32.5 mg   Plan last modified: Akash Greco, [facility] (3/7/2019)   Next INR check:   2020   Priority:   Maintenance   Target end date: Indefinite    Indications    Persistent atrial fibrillation [I48.19]             Anticoagulation Episode Summary     INR check location:       Preferred lab:       Send INR reminders to:   [facility] STAFF    Comments:   EPIC - declined FLU VAX 10/14/19      Anticoagulation Care Providers     Provider Role Specialty Phone number    Patricia Mancera MD Referring Cardiology [phone]    Ryan Kim MD  Internal Medicine [phone]          Warfarin plan:       Description    CONTINUE: Warfarin 5mg(1 tablet) daily EXCEPT 2.5MG (1/2 tablet) every Thursday. Keep the number of servings of Vitamin K (dark green vegetables) consistent from week to week. A green vegetable 1-2 times a week. A dark leafy vegetable only occasion. Call with medications changes, especially antibiotics and steroids and including any over-the-counter medications or herbal products. Call if you stop any medications. Please arrive 15 minutes prior to your appointment           Reviewed AVS with patient / caregiver.       CLINICAL PHARMACY CONSULT: MED RECONCILIATION/REVIEW ADDENDUM    For Pharmacy Admin Tracking Only    PHSO: No  Total # of Interventions Recommended: 0    Total Interventions Accepted: 0  Time Spent (min): 15

## 2020-08-24 RX ORDER — MOEXIPRIL HCL 15 MG
TABLET ORAL
Qty: 180 TABLET | Refills: 1 | Status: ON HOLD
Start: 2020-08-24 | End: 2020-09-09 | Stop reason: HOSPADM

## 2020-09-03 ENCOUNTER — HOSPITAL ENCOUNTER (OUTPATIENT)
Dept: GENERAL RADIOLOGY | Age: 85
Discharge: HOME | DRG: 682 | End: 2020-09-03
Payer: MEDICARE

## 2020-09-03 ENCOUNTER — HOSPITAL ENCOUNTER (OUTPATIENT)
Age: 85
Discharge: HOME | DRG: 682 | End: 2020-09-03
Payer: MEDICARE

## 2020-09-03 LAB
A/G RATIO: 0.9 (ref 1.1–2.2)
ALBUMIN SERPL-MCNC: 3.4 G/DL (ref 3.4–5)
ALP BLD-CCNC: 83 U/L (ref 40–129)
ALT SERPL-CCNC: 12 U/L (ref 10–40)
ANION GAP SERPL CALCULATED.3IONS-SCNC: 11 MMOL/L (ref 3–16)
AST SERPL-CCNC: 14 U/L (ref 15–37)
BASOPHILS ABSOLUTE: 0 K/UL (ref 0–0.2)
BASOPHILS RELATIVE PERCENT: 0.2 %
BILIRUB SERPL-MCNC: 0.5 MG/DL (ref 0–1)
BUN BLDV-MCNC: 58 MG/DL (ref 7–20)
CALCIUM SERPL-MCNC: 8.9 MG/DL (ref 8.3–10.6)
CHLORIDE BLD-SCNC: 89 MMOL/L (ref 99–110)
CO2: 25 MMOL/L (ref 21–32)
CREAT SERPL-MCNC: 2.9 MG/DL (ref 0.6–1.2)
EOSINOPHILS ABSOLUTE: 0.1 K/UL (ref 0–0.6)
EOSINOPHILS RELATIVE PERCENT: 1.4 %
GFR AFRICAN AMERICAN: 19
GFR NON-AFRICAN AMERICAN: 15
GLOBULIN: 3.9 G/DL
GLUCOSE BLD-MCNC: 113 MG/DL (ref 70–99)
HCT VFR BLD CALC: 27.5 % (ref 36–48)
HEMOGLOBIN: 9.1 G/DL (ref 12–16)
LYMPHOCYTES ABSOLUTE: 0.9 K/UL (ref 1–5.1)
LYMPHOCYTES RELATIVE PERCENT: 18.4 %
MCH RBC QN AUTO: 28.4 PG (ref 26–34)
MCHC RBC AUTO-ENTMCNC: 33.2 G/DL (ref 31–36)
MCV RBC AUTO: 85.6 FL (ref 80–100)
MONOCYTES ABSOLUTE: 0.5 K/UL (ref 0–1.3)
MONOCYTES RELATIVE PERCENT: 10.2 %
NEUTROPHILS ABSOLUTE: 3.5 K/UL (ref 1.7–7.7)
NEUTROPHILS RELATIVE PERCENT: 69.8 %
PDW BLD-RTO: 15.6 % (ref 12.4–15.4)
PLATELET # BLD: 168 K/UL (ref 135–450)
PMV BLD AUTO: 7.9 FL (ref 5–10.5)
POTASSIUM SERPL-SCNC: 4.4 MMOL/L (ref 3.5–5.1)
RBC # BLD: 3.22 M/UL (ref 4–5.2)
SODIUM BLD-SCNC: 125 MMOL/L (ref 136–145)
TOTAL PROTEIN: 7.3 G/DL (ref 6.4–8.2)
TSH SERPL DL<=0.05 MIU/L-ACNC: 0.08 UIU/ML (ref 0.27–4.2)
WBC # BLD: 5 K/UL (ref 4–11)

## 2020-09-03 PROCEDURE — 84443 ASSAY THYROID STIM HORMONE: CPT

## 2020-09-03 PROCEDURE — 80053 COMPREHEN METABOLIC PANEL: CPT

## 2020-09-03 PROCEDURE — 85025 COMPLETE CBC W/AUTO DIFF WBC: CPT

## 2020-09-03 PROCEDURE — 36415 COLL VENOUS BLD VENIPUNCTURE: CPT

## 2020-09-03 PROCEDURE — 71046 X-RAY EXAM CHEST 2 VIEWS: CPT

## 2020-09-04 ENCOUNTER — HOSPITAL ENCOUNTER (INPATIENT)
Age: 85
LOS: 7 days | Discharge: HOME HEALTH | DRG: 682 | End: 2020-09-11
Attending: EMERGENCY MEDICINE | Admitting: INTERNAL MEDICINE
Payer: MEDICARE

## 2020-09-04 ENCOUNTER — APPOINTMENT (OUTPATIENT)
Dept: GENERAL RADIOLOGY | Age: 85
DRG: 682 | End: 2020-09-04
Payer: MEDICARE

## 2020-09-04 PROBLEM — I35.0 NONRHEUMATIC AORTIC (VALVE) STENOSIS: Status: ACTIVE | Noted: 2017-02-06

## 2020-09-04 PROBLEM — N17.9 AKI (ACUTE KIDNEY INJURY) (HCC): Status: ACTIVE | Noted: 2020-09-04

## 2020-09-04 PROBLEM — N17.9 ACUTE KIDNEY INJURY SUPERIMPOSED ON CHRONIC KIDNEY DISEASE (HCC): Status: ACTIVE | Noted: 2020-09-04

## 2020-09-04 PROBLEM — N18.9 ACUTE KIDNEY INJURY SUPERIMPOSED ON CHRONIC KIDNEY DISEASE (HCC): Status: ACTIVE | Noted: 2020-09-04

## 2020-09-04 PROBLEM — E87.1 HYPONATREMIA: Status: ACTIVE | Noted: 2020-09-04

## 2020-09-04 PROBLEM — I50.40 COMBINED SYSTOLIC AND DIASTOLIC CONGESTIVE HEART FAILURE (HCC): Chronic | Status: ACTIVE | Noted: 2020-09-04

## 2020-09-04 PROBLEM — J96.01 ACUTE RESPIRATORY FAILURE WITH HYPOXIA (HCC): Status: ACTIVE | Noted: 2020-09-04

## 2020-09-04 PROBLEM — N18.9 ANEMIA DUE TO CHRONIC KIDNEY DISEASE: Status: ACTIVE | Noted: 2020-09-04

## 2020-09-04 PROBLEM — D63.1 ANEMIA DUE TO CHRONIC KIDNEY DISEASE: Status: ACTIVE | Noted: 2020-09-04

## 2020-09-04 LAB
A/G RATIO: 0.8 (ref 1.1–2.2)
ALBUMIN SERPL-MCNC: 3.3 G/DL (ref 3.4–5)
ALP BLD-CCNC: 87 U/L (ref 40–129)
ALT SERPL-CCNC: 12 U/L (ref 10–40)
ANION GAP SERPL CALCULATED.3IONS-SCNC: 14 MMOL/L (ref 3–16)
AST SERPL-CCNC: 17 U/L (ref 15–37)
BASOPHILS ABSOLUTE: 0 K/UL (ref 0–0.2)
BASOPHILS RELATIVE PERCENT: 0.5 %
BILIRUB SERPL-MCNC: 0.5 MG/DL (ref 0–1)
BILIRUBIN URINE: NEGATIVE
BLOOD, URINE: NEGATIVE
BUN BLDV-MCNC: 59 MG/DL (ref 7–20)
C DIFF TOXIN/ANTIGEN: NORMAL
CALCIUM SERPL-MCNC: 8.9 MG/DL (ref 8.3–10.6)
CHLORIDE BLD-SCNC: 85 MMOL/L (ref 99–110)
CLARITY: CLEAR
CO2: 24 MMOL/L (ref 21–32)
COLOR: YELLOW
CREAT SERPL-MCNC: 2.6 MG/DL (ref 0.6–1.2)
CREATININE URINE: 22.8 MG/DL (ref 28–259)
EKG ATRIAL RATE: 53 BPM
EKG DIAGNOSIS: NORMAL
EKG Q-T INTERVAL: 402 MS
EKG QRS DURATION: 134 MS
EKG QTC CALCULATION (BAZETT): 440 MS
EKG R AXIS: -41 DEGREES
EKG T AXIS: 97 DEGREES
EKG VENTRICULAR RATE: 72 BPM
EOSINOPHILS ABSOLUTE: 0 K/UL (ref 0–0.6)
EOSINOPHILS RELATIVE PERCENT: 0.6 %
GFR AFRICAN AMERICAN: 21
GFR NON-AFRICAN AMERICAN: 17
GLOBULIN: 4 G/DL
GLUCOSE BLD-MCNC: 119 MG/DL (ref 70–99)
GLUCOSE BLD-MCNC: 122 MG/DL (ref 70–99)
GLUCOSE BLD-MCNC: 167 MG/DL (ref 70–99)
GLUCOSE URINE: NEGATIVE MG/DL
HCT VFR BLD CALC: 27.6 % (ref 36–48)
HEMOGLOBIN: 9.1 G/DL (ref 12–16)
INR BLD: 4.34 (ref 0.86–1.14)
KETONES, URINE: NEGATIVE MG/DL
LEUKOCYTE ESTERASE, URINE: NEGATIVE
LYMPHOCYTES ABSOLUTE: 0.8 K/UL (ref 1–5.1)
LYMPHOCYTES RELATIVE PERCENT: 14.9 %
MCH RBC QN AUTO: 28.5 PG (ref 26–34)
MCHC RBC AUTO-ENTMCNC: 33 G/DL (ref 31–36)
MCV RBC AUTO: 86.4 FL (ref 80–100)
MICROSCOPIC EXAMINATION: NORMAL
MONOCYTES ABSOLUTE: 0.7 K/UL (ref 0–1.3)
MONOCYTES RELATIVE PERCENT: 11.6 %
NEUTROPHILS ABSOLUTE: 4.1 K/UL (ref 1.7–7.7)
NEUTROPHILS RELATIVE PERCENT: 72.4 %
NITRITE, URINE: NEGATIVE
PDW BLD-RTO: 15.7 % (ref 12.4–15.4)
PERFORMED ON: ABNORMAL
PERFORMED ON: ABNORMAL
PH UA: 5 (ref 5–8)
PLATELET # BLD: 173 K/UL (ref 135–450)
PMV BLD AUTO: 7.8 FL (ref 5–10.5)
POTASSIUM SERPL-SCNC: 4.3 MMOL/L (ref 3.5–5.1)
PRO-BNP: 6759 PG/ML (ref 0–449)
PROTEIN UA: NEGATIVE MG/DL
PROTHROMBIN TIME: 51.1 SEC (ref 10–13.2)
RBC # BLD: 3.19 M/UL (ref 4–5.2)
SARS-COV-2, NAAT: NOT DETECTED
SODIUM BLD-SCNC: 123 MMOL/L (ref 136–145)
SODIUM URINE: 68 MMOL/L
SPECIFIC GRAVITY UA: 1.01 (ref 1–1.03)
TOTAL PROTEIN: 7.3 G/DL (ref 6.4–8.2)
TROPONIN: 0.02 NG/ML
UREA NITROGEN, UR: 180 MG/DL (ref 800–1666)
URINE REFLEX TO CULTURE: NORMAL
URINE TYPE: NORMAL
UROBILINOGEN, URINE: 0.2 E.U./DL
WBC # BLD: 5.7 K/UL (ref 4–11)

## 2020-09-04 PROCEDURE — 85610 PROTHROMBIN TIME: CPT

## 2020-09-04 PROCEDURE — 87324 CLOSTRIDIUM AG IA: CPT

## 2020-09-04 PROCEDURE — 2060000000 HC ICU INTERMEDIATE R&B

## 2020-09-04 PROCEDURE — 87449 NOS EACH ORGANISM AG IA: CPT

## 2020-09-04 PROCEDURE — 85025 COMPLETE CBC W/AUTO DIFF WBC: CPT

## 2020-09-04 PROCEDURE — 36415 COLL VENOUS BLD VENIPUNCTURE: CPT

## 2020-09-04 PROCEDURE — 84300 ASSAY OF URINE SODIUM: CPT

## 2020-09-04 PROCEDURE — 81003 URINALYSIS AUTO W/O SCOPE: CPT

## 2020-09-04 PROCEDURE — 93010 ELECTROCARDIOGRAM REPORT: CPT | Performed by: INTERNAL MEDICINE

## 2020-09-04 PROCEDURE — 94761 N-INVAS EAR/PLS OXIMETRY MLT: CPT

## 2020-09-04 PROCEDURE — 84540 ASSAY OF URINE/UREA-N: CPT

## 2020-09-04 PROCEDURE — 99285 EMERGENCY DEPT VISIT HI MDM: CPT

## 2020-09-04 PROCEDURE — 83036 HEMOGLOBIN GLYCOSYLATED A1C: CPT

## 2020-09-04 PROCEDURE — 71045 X-RAY EXAM CHEST 1 VIEW: CPT

## 2020-09-04 PROCEDURE — 83880 ASSAY OF NATRIURETIC PEPTIDE: CPT

## 2020-09-04 PROCEDURE — 93005 ELECTROCARDIOGRAM TRACING: CPT | Performed by: EMERGENCY MEDICINE

## 2020-09-04 PROCEDURE — 2580000003 HC RX 258: Performed by: INTERNAL MEDICINE

## 2020-09-04 PROCEDURE — 82570 ASSAY OF URINE CREATININE: CPT

## 2020-09-04 PROCEDURE — U0002 COVID-19 LAB TEST NON-CDC: HCPCS

## 2020-09-04 PROCEDURE — 6370000000 HC RX 637 (ALT 250 FOR IP): Performed by: INTERNAL MEDICINE

## 2020-09-04 PROCEDURE — 99223 1ST HOSP IP/OBS HIGH 75: CPT | Performed by: INTERNAL MEDICINE

## 2020-09-04 PROCEDURE — 84484 ASSAY OF TROPONIN QUANT: CPT

## 2020-09-04 PROCEDURE — 80053 COMPREHEN METABOLIC PANEL: CPT

## 2020-09-04 RX ORDER — GABAPENTIN 100 MG/1
100 CAPSULE ORAL NIGHTLY
Status: DISCONTINUED | OUTPATIENT
Start: 2020-09-04 | End: 2020-09-11 | Stop reason: HOSPADM

## 2020-09-04 RX ORDER — INDAPAMIDE 2.5 MG/1
2 TABLET, FILM COATED ORAL DAILY
Status: ON HOLD | COMMUNITY
Start: 2020-09-02 | End: 2020-09-09 | Stop reason: HOSPADM

## 2020-09-04 RX ORDER — TRAMADOL HYDROCHLORIDE 50 MG/1
100 TABLET ORAL EVERY 6 HOURS PRN
Status: DISCONTINUED | OUTPATIENT
Start: 2020-09-04 | End: 2020-09-11 | Stop reason: HOSPADM

## 2020-09-04 RX ORDER — WARFARIN SODIUM 5 MG/1
5 TABLET ORAL DAILY
Status: DISCONTINUED | OUTPATIENT
Start: 2020-09-04 | End: 2020-09-04

## 2020-09-04 RX ORDER — ACETAMINOPHEN 500 MG
1000 TABLET ORAL EVERY 6 HOURS PRN
Status: DISCONTINUED | OUTPATIENT
Start: 2020-09-04 | End: 2020-09-11 | Stop reason: HOSPADM

## 2020-09-04 RX ORDER — DEXTROSE MONOHYDRATE 50 MG/ML
100 INJECTION, SOLUTION INTRAVENOUS PRN
Status: DISCONTINUED | OUTPATIENT
Start: 2020-09-04 | End: 2020-09-11 | Stop reason: HOSPADM

## 2020-09-04 RX ORDER — LOPERAMIDE HYDROCHLORIDE 2 MG/1
2 CAPSULE ORAL 4 TIMES DAILY PRN
Status: DISCONTINUED | OUTPATIENT
Start: 2020-09-04 | End: 2020-09-11 | Stop reason: HOSPADM

## 2020-09-04 RX ORDER — INSULIN GLARGINE 100 [IU]/ML
0.25 INJECTION, SOLUTION SUBCUTANEOUS NIGHTLY
Status: DISCONTINUED | OUTPATIENT
Start: 2020-09-04 | End: 2020-09-06

## 2020-09-04 RX ORDER — CARVEDILOL 25 MG/1
1 TABLET ORAL 2 TIMES DAILY WITH MEALS
Status: CANCELLED | OUTPATIENT
Start: 2020-09-04

## 2020-09-04 RX ORDER — PROMETHAZINE HYDROCHLORIDE 25 MG/1
12.5 TABLET ORAL EVERY 6 HOURS PRN
Status: DISCONTINUED | OUTPATIENT
Start: 2020-09-04 | End: 2020-09-11 | Stop reason: HOSPADM

## 2020-09-04 RX ORDER — POLYETHYLENE GLYCOL 3350 17 G/17G
17 POWDER, FOR SOLUTION ORAL DAILY PRN
Status: DISCONTINUED | OUTPATIENT
Start: 2020-09-04 | End: 2020-09-11 | Stop reason: HOSPADM

## 2020-09-04 RX ORDER — ONDANSETRON 2 MG/ML
4 INJECTION INTRAMUSCULAR; INTRAVENOUS EVERY 6 HOURS PRN
Status: DISCONTINUED | OUTPATIENT
Start: 2020-09-04 | End: 2020-09-11 | Stop reason: HOSPADM

## 2020-09-04 RX ORDER — GABAPENTIN 100 MG/1
100-200 CAPSULE ORAL NIGHTLY
Status: CANCELLED | OUTPATIENT
Start: 2020-09-04

## 2020-09-04 RX ORDER — HYDRALAZINE HYDROCHLORIDE 10 MG/1
10 TABLET, FILM COATED ORAL 2 TIMES DAILY
Status: DISCONTINUED | OUTPATIENT
Start: 2020-09-04 | End: 2020-09-10

## 2020-09-04 RX ORDER — TRAMADOL HYDROCHLORIDE 50 MG/1
50 TABLET ORAL EVERY 6 HOURS PRN
Status: DISCONTINUED | OUTPATIENT
Start: 2020-09-04 | End: 2020-09-11 | Stop reason: HOSPADM

## 2020-09-04 RX ORDER — SODIUM CHLORIDE 9 MG/ML
INJECTION, SOLUTION INTRAVENOUS CONTINUOUS
Status: DISCONTINUED | OUTPATIENT
Start: 2020-09-04 | End: 2020-09-04

## 2020-09-04 RX ORDER — POTASSIUM CHLORIDE 1500 MG/1
20 TABLET, EXTENDED RELEASE ORAL DAILY
COMMUNITY
Start: 2020-06-28 | End: 2021-03-26

## 2020-09-04 RX ORDER — LATANOPROST 50 UG/ML
1 SOLUTION/ DROPS OPHTHALMIC NIGHTLY
Status: DISCONTINUED | OUTPATIENT
Start: 2020-09-04 | End: 2020-09-11 | Stop reason: HOSPADM

## 2020-09-04 RX ORDER — LATANOPROST 50 UG/ML
1 SOLUTION/ DROPS OPHTHALMIC NIGHTLY
COMMUNITY
Start: 2020-07-07

## 2020-09-04 RX ORDER — PANTOPRAZOLE SODIUM 40 MG/1
40 TABLET, DELAYED RELEASE ORAL DAILY
COMMUNITY
Start: 2020-08-23

## 2020-09-04 RX ORDER — DIAZEPAM 2 MG/1
2 TABLET ORAL EVERY 6 HOURS PRN
Status: DISCONTINUED | OUTPATIENT
Start: 2020-09-04 | End: 2020-09-11 | Stop reason: HOSPADM

## 2020-09-04 RX ORDER — DEXTROSE MONOHYDRATE 25 G/50ML
12.5 INJECTION, SOLUTION INTRAVENOUS PRN
Status: DISCONTINUED | OUTPATIENT
Start: 2020-09-04 | End: 2020-09-11 | Stop reason: HOSPADM

## 2020-09-04 RX ORDER — CLINDAMYCIN HYDROCHLORIDE 150 MG/1
150 CAPSULE ORAL 3 TIMES DAILY
Status: ON HOLD | COMMUNITY
Start: 2020-09-01 | End: 2020-09-09 | Stop reason: HOSPADM

## 2020-09-04 RX ORDER — NICOTINE POLACRILEX 4 MG
15 LOZENGE BUCCAL PRN
Status: DISCONTINUED | OUTPATIENT
Start: 2020-09-04 | End: 2020-09-11 | Stop reason: HOSPADM

## 2020-09-04 RX ORDER — POTASSIUM CHLORIDE 20 MEQ/1
20 TABLET, EXTENDED RELEASE ORAL 2 TIMES DAILY WITH MEALS
Status: DISCONTINUED | OUTPATIENT
Start: 2020-09-04 | End: 2020-09-11 | Stop reason: HOSPADM

## 2020-09-04 RX ORDER — ACETAMINOPHEN 650 MG/1
650 SUPPOSITORY RECTAL EVERY 6 HOURS PRN
Status: DISCONTINUED | OUTPATIENT
Start: 2020-09-04 | End: 2020-09-11 | Stop reason: HOSPADM

## 2020-09-04 RX ORDER — SODIUM CHLORIDE 0.9 % (FLUSH) 0.9 %
10 SYRINGE (ML) INJECTION PRN
Status: DISCONTINUED | OUTPATIENT
Start: 2020-09-04 | End: 2020-09-11 | Stop reason: HOSPADM

## 2020-09-04 RX ORDER — ACETAMINOPHEN 325 MG/1
650 TABLET ORAL EVERY 6 HOURS PRN
Status: DISCONTINUED | OUTPATIENT
Start: 2020-09-04 | End: 2020-09-04 | Stop reason: SDUPTHER

## 2020-09-04 RX ORDER — PANTOPRAZOLE SODIUM 40 MG/1
40 TABLET, DELAYED RELEASE ORAL
Status: DISCONTINUED | OUTPATIENT
Start: 2020-09-05 | End: 2020-09-11 | Stop reason: HOSPADM

## 2020-09-04 RX ORDER — FUROSEMIDE 10 MG/ML
40 INJECTION INTRAMUSCULAR; INTRAVENOUS 2 TIMES DAILY
Status: DISCONTINUED | OUTPATIENT
Start: 2020-09-04 | End: 2020-09-04

## 2020-09-04 RX ORDER — ATORVASTATIN CALCIUM 10 MG/1
10 TABLET, FILM COATED ORAL NIGHTLY
Status: DISCONTINUED | OUTPATIENT
Start: 2020-09-04 | End: 2020-09-11 | Stop reason: HOSPADM

## 2020-09-04 RX ORDER — LOPERAMIDE HCL 1 MG/7.5ML
2 SOLUTION ORAL 4 TIMES DAILY PRN
Status: DISCONTINUED | OUTPATIENT
Start: 2020-09-04 | End: 2020-09-04 | Stop reason: ALTCHOICE

## 2020-09-04 RX ORDER — ONDANSETRON 4 MG/1
4 TABLET, FILM COATED ORAL EVERY 6 HOURS PRN
COMMUNITY
Start: 2020-09-01 | End: 2021-02-07

## 2020-09-04 RX ORDER — CARVEDILOL 6.25 MG/1
6.25 TABLET ORAL 2 TIMES DAILY WITH MEALS
Status: DISCONTINUED | OUTPATIENT
Start: 2020-09-04 | End: 2020-09-07

## 2020-09-04 RX ORDER — SODIUM CHLORIDE 0.9 % (FLUSH) 0.9 %
10 SYRINGE (ML) INJECTION EVERY 12 HOURS SCHEDULED
Status: DISCONTINUED | OUTPATIENT
Start: 2020-09-04 | End: 2020-09-11 | Stop reason: HOSPADM

## 2020-09-04 RX ORDER — CETIRIZINE HYDROCHLORIDE 10 MG/1
10 TABLET ORAL DAILY
Status: DISCONTINUED | OUTPATIENT
Start: 2020-09-05 | End: 2020-09-08

## 2020-09-04 RX ADMIN — GABAPENTIN 100 MG: 100 CAPSULE ORAL at 21:12

## 2020-09-04 RX ADMIN — LOPERAMIDE HYDROCHLORIDE 2 MG: 2 CAPSULE ORAL at 21:12

## 2020-09-04 RX ADMIN — HYDRALAZINE HYDROCHLORIDE 10 MG: 10 TABLET, FILM COATED ORAL at 21:12

## 2020-09-04 RX ADMIN — POTASSIUM CHLORIDE 20 MEQ: 1500 TABLET, EXTENDED RELEASE ORAL at 17:48

## 2020-09-04 RX ADMIN — ATORVASTATIN CALCIUM 10 MG: 10 TABLET, FILM COATED ORAL at 21:12

## 2020-09-04 RX ADMIN — LATANOPROST 1 DROP: 50 SOLUTION OPHTHALMIC at 21:21

## 2020-09-04 RX ADMIN — CARVEDILOL 6.25 MG: 6.25 TABLET, FILM COATED ORAL at 17:48

## 2020-09-04 RX ADMIN — INSULIN GLARGINE 31 UNITS: 100 INJECTION, SOLUTION SUBCUTANEOUS at 21:12

## 2020-09-04 RX ADMIN — SODIUM CHLORIDE, PRESERVATIVE FREE 10 ML: 5 INJECTION INTRAVENOUS at 21:12

## 2020-09-04 ASSESSMENT — PAIN SCALES - GENERAL
PAINLEVEL_OUTOF10: 0
PAINLEVEL_OUTOF10: 0

## 2020-09-04 ASSESSMENT — ENCOUNTER SYMPTOMS
COUGH: 0
NAUSEA: 0
DIARRHEA: 1
SHORTNESS OF BREATH: 0
VOMITING: 0

## 2020-09-04 NOTE — ED NOTES
ED SBAR report provider to Milan Muro, [address] [facility]. Patient to be transported to Room [facility] via stretcher by transport tech. Patient transported with bedside cardiac monitor. IV site clean, dry, and intact. MEWS score and pain assessed as 0 and documented. Updated patient and family on plan of care.        Ward Montgomery RN  09/04/20 [id number]

## 2020-09-04 NOTE — CONSULTS
Nephrology ([facility]) Consult Note    Maile Romero is a 80 y.o. female whom we were asked to see for hyponatremia and DAVION on CKD. She presents with not feeling well with nausea and diarrhea and malaise. She has chronic edema, which was increased recently, and per Dr. Mayela Ramírez note, her weight is up 5 lbs from 10/19. Past Medical History:  CSDHF  DM2  HTN  HLP  OA  PAF  sarcoidosis      Review of System:  Otherwise unremarkable    Allergies:  Bactrim [sulfamethoxazole-trimethoprim]; Levofloxacin; Pcn [penicillins]; and Sulfa antibiotics    Medications:  Current medications reviewed. Social History:  no tobacco  Family Medical History:  Negative for kidney disase    Physical Exam:  Blood pressure (!) 136/92, pulse 61, temperature 97.5 °F (36.4 °C), temperature source Oral, resp. rate 18, height 5' 6\" (1.676 m), weight 269 lb 2.9 oz (122.1 kg), SpO2 98 %, not currently breastfeeding. General:  NAD, A+Ox3, ill-appearing, obese body habitus  HEENT:  deferred due to pandemic  Neck:  deferred  Chest:  deferred  CV:  deferred  Abdomen:  deferred  Extremities:  1+ peripheral edema  Neurological:  deferred  Lymphatics:  deferred  Skin:  No rash, no jaundice  Psychiatric:  moderate insight and judgement, good recall    Laboratory Studies:  Lab Results   Component Value Date/Time     (L) 09/04/2020 11:57 AM    K 4.3 09/04/2020 11:57 AM    CL 85 (L) 09/04/2020 11:57 AM    CO2 24 09/04/2020 11:57 AM    BUN 59 (H) 09/04/2020 11:57 AM    CREATININE 2.6 (H) 09/04/2020 11:57 AM    CALCIUM 8.9 09/04/2020 11:57 AM    MG 2.0 06/26/2013 04:29 AM     Lab Results   Component Value Date/Time    WBC 5.7 09/04/2020 11:57 AM    HGB 9.1 (L) 09/04/2020 11:57 AM    HCT 27.6 (L) 09/04/2020 11:57 AM     09/04/2020 11:57 AM       Assessment/Plan:  Reviewed old records and labs.     1) hyponatremia   - assessed as hypervolemic hyponatremia   - check urine osmolality   - will d/c IVF    2) CSDHF   -

## 2020-09-04 NOTE — ED PROVIDER NOTES
light-headedness. Psychiatric/Behavioral: Negative for behavioral problems and confusion. Except as noted above the remainder of the review of systems was reviewed and negative. PAST MEDICAL HISTORY         Diagnosis Date    Anemia     Chronic kidney disease     Combined systolic and diastolic congestive heart failure (HCC)     Diabetes mellitus (HCC)     DVT (deep venous thrombosis) (HCC)     Hyperlipidemia     Hypertension     Osteoarthritis     PAF [facility]oxysmal atrial fibrillation) [facility]     Pulmonary embolism (HCC)     Sarcoidosis     Thyroid disease     Hypothyroidism    Type II or unspecified type diabetes mellitus without mention of complication, not stated as uncontrolled        SURGICAL HISTORY           Procedure Laterality Date    HYSTERECTOMY      KNEE SURGERY      bilateral total knees       CURRENT MEDICATIONS     [unfilled]    ALLERGIES     Bactrim [sulfamethoxazole-trimethoprim]; Levofloxacin; Pcn [penicillins]; and Sulfa antibiotics    FAMILY HISTORY           Problem Relation Age of Onset    Heart Failure Mother     Cancer Brother     Asthma Neg Hx     Diabetes Neg Hx     Emphysema Neg Hx     Hypertension Neg Hx      Family Status   Relation Name Status    Mother  (Not Specified)    Brother  (Not Specified)    Neg Hx  (Not Specified)        SOCIAL HISTORY      reports that she has never smoked. She has never used smokeless tobacco. She reports that she does not drink alcohol or use drugs. PHYSICAL EXAM    (up to 7 for level 4, 8 or more for level 5)     ED Triage Vitals [09/04/20 1143]   Enc Vitals Group      BP (!) 118/49      Pulse 70      Resp 24      Temp 98.4 °F (36.9 °C)      Temp Source Oral      SpO2 (!) 85 %      Weight 295 lb 6.7 oz (134 kg)      Height 5' 6\" (1.676 m)      Head Circumference       Peak Flow       Pain Score       Pain Loc       Pain Edu? Excl. in [address]? Physical Exam  Vitals signs reviewed.    Constitutional: Appearance: She is well-developed. HENT:      Head: Normocephalic and atraumatic. Neck:      Musculoskeletal: Normal range of motion and neck supple. Cardiovascular:      Rate and Rhythm: Normal rate. Rhythm irregular. Pulmonary:      Effort: Pulmonary effort is normal. No respiratory distress. Abdominal:      Palpations: Abdomen is soft. Tenderness: There is no abdominal tenderness. There is no guarding or rebound. Musculoskeletal:      Comments: Spontaneously moving all 4 extremities   Skin:     General: Skin is warm. Neurological:      General: No focal deficit present. Mental Status: She is alert and oriented to person, place, and time.    Psychiatric:         Mood and Affect: Mood normal.         Behavior: Behavior normal.           DIAGNOSTIC RESULTS     EKG: All EKG's are interpreted by the Emergency Department Physician who either signs or Co-signs this chart in the absence of a cardiologist.    RADIOLOGY:   Non-plain film images such as CT, Ultrasound and MRI are read by the radiologist. Reinaldo Dobbins radiographic images are visualized and preliminarilyinterpreted by the emergency physician with the below findings:    Interpretation per the Radiologist below,if available at the time of this note:    XR CHEST PORTABLE   Final Result   Interval increase in pulmonary vascular markings and interstitial markings   development of blunting of right costophrenic angle suspicious for developing   effusion               LABS:  Labs Reviewed   CBC WITH AUTO DIFFERENTIAL - Abnormal; Notable for the following components:       Result Value    RBC 3.19 (*)     Hemoglobin 9.1 (*)     Hematocrit 27.6 (*)     RDW 15.7 (*)     Lymphocytes Absolute 0.8 (*)     All other components within normal limits    Narrative:     Performed at:  [place] [address]   Phone [phone]   COMPREHENSIVE METABOLIC PANEL - Abnormal; Notable for the following components:    Sodium 123 (*)     Chloride 85 (*)     Glucose 167 (*)     BUN 59 (*)     CREATININE 2.6 (*)     GFR Non- 17 (*)     GFR  21 (*)     Alb 3.3 (*)     Albumin/Globulin Ratio 0.8 (*)     All other components within normal limits    Narrative:     Performed at:  [address] [place] [facility]   Phone [phone]   TROPONIN - Abnormal; Notable for the following components:    Troponin 0.02 (*)     All other components within normal limits    Narrative:     Performed at:  [address] [place] [facility]   Phone [phone]   BRAIN NATRIURETIC PEPTIDE - Abnormal; Notable for the following components:    Pro-BNP 6,759 (*)     All other components within normal limits    Narrative:     Performed at:  [address] [place] [facility]   Phone [phone]   PROTIME-INR - Abnormal; Notable for the following components:    Protime 51.1 (*)     INR 4.34 (*)     All other components within normal limits    Narrative:     Performed at:  [place] [address]   Phone [phone]   POCT GLUCOSE - Abnormal; Notable for the following components:    POC Glucose 119 (*)     All other components within normal limits    Narrative:     Performed at:  [address] [place] [address]   Phone [phone]   C DIFF TOXIN/ANTIGEN    Narrative:     ORDER#: [id number]                          ORDERED BY: JUDITH [address] [place] [address] Drive: Stool                              COLLECTED:  09/04/20 13:36  ANTIBIOTICS AT JERRELL.:                      RECEIVED :  09/04/20 13:47  Collect White vial (sterile container)  Performed at:  [facility]  [address] [place] [address] Phone [phone]   URINE RT REFLEX TO CULTURE    Narrative:     Performed at:  [facility]  [address] [place] [address]   Phone [phone]    Narrative:     Performed at:  [facility]  [address] [place] [address]   Phone [phone]   BLOOD OCCULT STOOL DIAGNOSTIC   HEMOGLOBIN A1C   POCT GLUCOSE   POCT GLUCOSE       All other labs were within normal range or not returned as of this dictation. EMERGENCY DEPARTMENT COURSE and DIFFERENTIAL DIAGNOSIS/MDM:   Vitals:    Vitals:    09/04/20 1359 09/04/20 1414 09/04/20 1615 09/04/20 1625   BP: 130/73 116/62  (!) 136/92   Pulse: 62 59  61   Resp: 21 18  18   Temp:    97.5 °F (36.4 °C)   TempSrc:    Oral   SpO2: 100% 100%  98%   Weight:   269 lb 2.9 oz (122.1 kg)    Height:   5' 6\" (1.676 m)        MDM     Patient presents ED with HPI noted above. She is hemodynamically stable, afebrile and nontoxic-appearing. She is hypoxic with oxygen saturation of 100% on room air. Physical exam as above. Basic labs obtained. CBC showed no leukocytosis. No anemia with hemoglobin of 9.1. Hemoglobin stable from yesterday, previous hemoglobin on 6/3/2020 was 10.0. CMP showed DAVION with a creatinine of 2.6 previous creatinine in 6/2020 was 1.6, GFR 17. BUN 59, patient given IV fluid in the ED. Patient with hyponatremia with a sodium of 123, glucose 167. Given diarrhea C. difficile obtained and negative. Also consider COVID, this was obtained and negative as well. Urine showed no infection. Chest x-ray showed interval increase in pulmonary vascular markings interstitial markings, findings suspicious for developing effusion. No distinct infection identified. Abdomen soft nontender throughout. No further imaging or work-up indicated emergently in the ED. Given chest x-ray findings troponin and BNP obtained. Troponin elevated 0.02, BNP 6759.   EKG per my attending. Patient be admitted for DAVION and hyponatremia. Dr. Cristiano Cruz kindly admitted patient. The patient tolerated their visit well. They were seen and evaluated by the attending physician, Dr. González Armas who agreed with the assessment and plan. The patient and / or the family were informed of the results of any tests, a time was given to answer questions, a plan was proposed and they agreed with plan. CONSULTS:  IP CONSULT TO PRIMARY CARE PROVIDER  IP CONSULT TO CARDIOLOGY  IP CONSULT TO NEPHROLOGY  IP CONSULT TO CASE MANAGEMENT  IP CONSULT TO HEART FAILURE NURSE/COORDINATOR  IP CONSULT TO SOCIAL WORK  IP CONSULT TO PHARMACY    PROCEDURES:  Procedures    FINAL IMPRESSION      1. Hyponatremia    2. Acute renal insufficiency    3.  Acute on chronic congestive heart failure, unspecified heart failure type [facility])          DISPOSITION/PLAN   [unfilled]    PATIENT REFERRED TO:  Patrice Meyesr MD  [address] [place], [place] [address] [phone]            DISCHARGE MEDICATIONS:  Current Discharge Medication List          (Please note that portions of this note were completed with a voice recognition program.  Efforts were made to edit the dictations but occasionally words are mis-transcribed.)    [address] [facility]          [address] [place] [address]  09/04/20 4017

## 2020-09-04 NOTE — CONSULTS
Clinical Pharmacy Note  Warfarin Consult    Ventura Hodgkin is a 80 y.o. female receiving warfarin managed by pharmacy. Warfarin Indication: Afib  Target INR range: 2-3   Dose prior to admission: 5 mg daily, EXCEPT 2.5 mg every Thursday    Current warfarin drug-drug interactions: none    Recent Labs     09/03/20  1248 09/04/20  1157   HGB 9.1* 9.1*   HCT 27.5* 27.6*   INR  --  4.34*       Assessment/Plan:    No Warfarin tonight. INR = 4.34. Daily PT/INR until stable within therapeutic range. Thank you for the consult. Will continue to follow.   Heather Temple [facility],9/4/2020,5:42 PM

## 2020-09-04 NOTE — ED TRIAGE NOTES
Patient admitted to ED via EMS with complaints of SOB with exertion. Patient states that it has been going on for awhile, along with her BLE edema. Patient's BLE are extremely swollen and weeping. Patient went to her PCP who changed her medications and increased her Lasix. History of CHF, diabetes, PE, and CKD. Patient was 82-84% on room air upon arrival. With 3L/min via nasal cannula, patient increased to 100%. Other VS are WNL. Patient is alert and oriented x4.

## 2020-09-04 NOTE — PROGRESS NOTES
Aware of HF RN consult. Pt was just arriving to floor. Cardiology consult completed and indicates pt wants medical management only. Echo is pending. HF measures have been implemented : daily weights, I/O, and sodium / fluid restricted diet. HF careplan is current. HF instructions have been added to AVS.  A 7 day follow up appt was arranged in case pt would discharge over the long holiday weekend. HF RN will complete full consult if pt remains in house over the weekend.

## 2020-09-04 NOTE — ED PROVIDER NOTES
[facility]  eMERGENCY dEPARTMENT eNCOUnter   Physician Attestation    Pt Name: Tiffani Leal  MRN: [id number]  Jodeegfsoila 1934  Date of evaluation: 9/4/20        Physician Note:    I havepersonally performed and/or participated in the history, exam and medical decision making and agree with all pertinent clinical information. I have also reviewed and agree with the past medical, family and social historyunless otherwise noted. I have personally performed a face to face diagnostic evaluation onthis patient. I have reviewed the mid-levels findings and agree. History: This is an 22-year-old female presents with generalized weakness and swelling. Patient first became ill last week with dry heaves nausea and anorexia. Also started having diarrhea. She saw her primary care provider who changed some of her medications and added clindamycin for possible infection. She had outpatient labs yesterday that showed that she was in acute renal insufficiency and hyponatremic. She was told to come to the hospital by the primary care provider      Physical Exam  Constitutional:       Appearance: She is well-developed. She is not diaphoretic. HENT:      Head: Normocephalic and atraumatic. Right Ear: External ear normal.      Left Ear: External ear normal.   Eyes:      General: No scleral icterus. Right eye: No discharge. Left eye: No discharge. Neck:      Musculoskeletal: Normal range of motion. Thyroid: No thyromegaly. Vascular: No JVD. Trachea: No tracheal deviation. Cardiovascular:      Rate and Rhythm: Normal rate. Rhythm irregularly irregular. Heart sounds: No murmur. No friction rub. No gallop. Pulmonary:      Effort: Pulmonary effort is normal. No respiratory distress. Breath sounds: No stridor. Decreased breath sounds present. No wheezing or rales. Abdominal:      General: There is no distension. Palpations: Abdomen is soft. Tenderness: There is no abdominal tenderness. There is no guarding or rebound. Musculoskeletal:         General: No tenderness. Right lower le+ Pitting Edema present. Left lower le+ Pitting Edema present. Skin:     General: Skin is warm and dry. Findings: No rash (On exposed body surfaces). Neurological:      Mental Status: She is alert and oriented to person, place, and time. Coordination: Coordination normal.   Psychiatric:         Behavior: Behavior normal.         Thought Content: Thought content normal.       EKG visualized preliminary interpreted by myself shows atrial fibrillation with a controlled ventricular spots of 72. Left axis deviation of -41 with a left bundle branch block. These are unchanged compared to previous    1. Hyponatremia    2. Acute renal insufficiency    3.  Acute on chronic congestive heart failure, unspecified heart failure type [facility])          DISPOSITION/PLAN  PATIENT REFERRED TO:  Monse Navarrete MD  [place] [id number] [facility] [address] [facility]          DISCHARGE MEDICATIONS:  New Prescriptions    No medications on file         MD Nayeli Ayers MD  [phone]

## 2020-09-04 NOTE — CONSULTS
knee surgery. Social History:   reports that she has never smoked. She has never used smokeless tobacco. She reports that she does not drink alcohol or use drugs. Family History:  family history includes Cancer in her brother; Heart Failure in her mother. Home Medications:  Were reviewed and are listed in nursing record and/or below  Prior to Admission medications    Medication Sig Start Date End Date Taking?  Authorizing Provider   indapamide (LOZOL) 2.5 MG tablet 2 tablets daily 9/2/20  Yes Historical Provider, MD   clindamycin (CLEOCIN) 150 MG capsule Take 150 mg by mouth 3 times daily 9/1/20  Yes Historical Provider, MD   latanoprost (XALATAN) 0.005 % ophthalmic solution Place 1 drop into the right eye nightly 7/7/20  Yes Historical Provider, MD   KLOR-CON M20 20 MEQ extended release tablet Take 20 mEq by mouth daily 6/28/20  Yes Historical Provider, MD   pantoprazole (PROTONIX) 40 MG tablet Take 40 mg by mouth daily 8/23/20  Yes Historical Provider, MD   ondansetron (ZOFRAN) 4 MG tablet Take 4 mg by mouth every 6 hours as needed 9/1/20  Yes Historical Provider, MD   moexipril (UNIVASC) 15 MG tablet TAKE TWO TABLETS BY MOUTH ONCE NIGHTLY 8/24/20  Yes Jonathan Hernandez MD   CARTIA  MG extended release capsule TAKE ONE CAPSULE BY MOUTH DAILY 6/29/20  Yes Jonathan Hernandez MD   furosemide (LASIX) 80 MG tablet TAKE ONE TABLET BY MOUTH TWICE A DAY 4/23/20  Yes Jonathan Hernandez MD   carvedilol (COREG) 25 MG tablet TAKE ONE TABLET BY MOUTH TWICE A DAY WITH MEALS 1/31/20  Yes Jonathan Hernandez MD   warfarin (COUMADIN) 5 MG tablet Take 5 mg by mouth daily Except 2.5mg every Thursday or as directed by [facility] [phone]   Yes Historical Provider, MD   cetirizine (ZYRTEC) 10 MG tablet Take 10 mg by mouth daily   Yes Historical Provider, MD   vitamin C (ASCORBIC ACID) 500 MG tablet Take 500 mg by mouth daily   Yes Historical Provider, MD   acetaminophen (TYLENOL) 500 MG tablet Take 1,000 mg by mg, Q6H PRN  diazePAM (VALIUM) tablet 2 mg, Q6H PRN  carvedilol (COREG) tablet 6.25 mg, BID WC  gabapentin (NEURONTIN) capsule 100 mg, Nightly        Allergies:  Bactrim [sulfamethoxazole-trimethoprim]; Levofloxacin; Pcn [penicillins]; and Sulfa antibiotics     Review of Systems:   · Constitutional: no unanticipated weight loss. There's been no change in energy level, sleep pattern, or activity level. No fevers, chills. · Eyes: No visual changes or diplopia. No scleral icterus. · ENT: No Headaches, hearing loss or vertigo. No mouth sores or sore throat. · Cardiovascular: as reviewed in HPI  · Respiratory: No cough or wheezing, no sputum production. No hemoptysis. · Gastrointestinal: No abdominal pain, appetite loss, blood in stools. No change in bowel or bladder habits. · Genitourinary: No dysuria, trouble voiding, or hematuria. · Musculoskeletal:  No gait disturbance, no joint complaints. · Integumentary: No rash or pruritis. · Neurological: No headache, diplopia, change in muscle strength, numbness or tingling. · Psychiatric: No anxiety or depression. · Endocrine: No temperature intolerance. No excessive thirst, fluid intake, or urination. No tremor. · Hematologic/Lymphatic: No abnormal bruising or bleeding, blood clots or swollen lymph nodes. · Allergic/Immunologic: No nasal congestion or hives. Objective:   PHYSICAL EXAM:    Vitals:    09/04/20 1414   BP: 116/62   Pulse: 59   Resp: 18   Temp: 97.5   SpO2: 100% on 3 liters    Weight: 295 lb 6.7 oz (134 kg)       General Appearance:  Alert, cooperative, no distress, appears stated age. Obese. Head:  Normocephalic, without obvious abnormality, atraumatic. Eyes:  Pupils equal and round. No scleral icterus. Mouth: Moist mucosa, no pharyngeal erythema. Nose: Nares normal. No drainage or sinus tenderness. Neck: Supple, symmetrical, trachea midline. No adenopathy. No tenderness/mass/nodules. No carotid bruit or elevated JVD.    Lungs: Clear to auscultation bilaterally, respirations unlabored. No wheeze, rales, or rhonchi. Chest Wall:  No tenderness or deformity. Heart:  Regular rate. S2 audible. III/VI DANUTA. Abdomen:   Soft, non-tender, bowel sounds active. Musculoskeletal: No muscle wasting or digital clubbing. Extremities: Extremities normal, atraumatic. No cyanosis. 2+ BLE edema. Pulses: 2+ radial and carotid pulses, symmetric. Skin: No rashes or lesions. Pysch: Normal mood and affect. Alert and oriented x 4. Neurologic: Normal gross motor and sensory exam.       Labs     CBC:   Lab Results   Component Value Date    WBC 5.7 2020    RBC 3.19 2020    HGB 9.1 2020    HCT 27.6 2020    MCV 86.4 2020    RDW 15.7 2020     2020     CMP:  Lab Results   Component Value Date     2020    K 4.3 2020    CL 85 2020    CO2 24 2020    BUN 59 2020    CREATININE 2.6 2020    GFRAA 21 2020    GFRAA 51 2012    AGRATIO 0.8 2020    LABGLOM 17 2020    GLUCOSE 167 2020    PROT 7.3 2020    PROT 7.5 2012    CALCIUM 8.9 2020    BILITOT 0.5 2020    ALKPHOS 87 2020    AST 17 2020    ALT 12 2020     PT/INR:  No results found for: PTINR  HgBA1c:  Lab Results   Component Value Date    LABA1C 6.9 2020     Lab Results   Component Value Date    TROPONINI 0.02 (H) 2020       Cardiac Data     EK2020. Atrial fibrillation. Left axis deviation. Left bundle branch block. Echo: 2017  Normal left ventricular size and wall thickness. Global systolic function is moderate decreased with an estimated LVEF estimated at 35%. The right ventricle is not well visualized but appears to have normal size and mild to moderately reduced function. Severe biatrial enlargement appreciated. Moderate mitral regurgitation is present.   The aortic valve leaflets are calcified and restricted in appearance. The aortic valve area is calculated at 0.7 cm2 with a maximum pressure gradient of 34 mmHg and a mean pressure gradient of 17 mmHg. This is consistent with at least mild aortic stenosis but gradient but is probably underestimated due to left ventricular systolic dysfunction. Assessment and Plan   1) Chronic combined systolic and diastolic heart failure. EF 35% in 2017. NYHA III. No ischemia noted on nuclear stress testing (2015) and patient is not interested in invasive testing. Pt with limited functional status partially related to orthopaedic issues and edema in part related to venous stasis. Intravascular volume has been difficult to assess in the past with resulting DAVION by titrating diuretics. No ACE-I/ARB/Aldactone with DAVION. Continue B-blocker. Patient not an ICD candidate with advanced age. 2) Persistent atrial fibrillation. Rate controlled today. Continue CCB and beta-blocker. Continue chronic anticoagulation and INR remains therapeutic. 3) DAVION on CKD. Nephrology consulted. Previous attempts at diuresis have resulted in DAVION although she has significant lower extremity edema. Currently IV fluid and IV Lasix ordered. Will opt for gentle hydration overnight to see if creatinine improves and discontinue Lasix.    4) Pulmonary hypertension on echo. Multiple etiologies possible including prior multiple PE, RUTH, systolic/diastolic dysfunction. She is not interested in pursuing a sleep study.    5) Essential hypertension. Controlled. Goal BP <130/80 with CHF and advanced age.      6) Aortic stenosis. S2 seems audible. Mean gradient may be underestimated from reduced EF. Regardless, patient wants medical management only.       Overall, the problems requiring hospitalization are high in severity. Thank you for allowing us to participate in the care of Colleen Liaou. Homer Umanzor.  Rica Benjamin, [address] [place] [address] Road  9/4/2020 4:07 PM

## 2020-09-04 NOTE — ED NOTES
Bed: A-17  Expected date:   Expected time:   Means of arrival: [facility]  Comments:  RAO Olvera RN  09/04/20 0895

## 2020-09-05 LAB
ANION GAP SERPL CALCULATED.3IONS-SCNC: 11 MMOL/L (ref 3–16)
BUN BLDV-MCNC: 59 MG/DL (ref 7–20)
CALCIUM SERPL-MCNC: 9 MG/DL (ref 8.3–10.6)
CHLORIDE BLD-SCNC: 90 MMOL/L (ref 99–110)
CO2: 28 MMOL/L (ref 21–32)
CREAT SERPL-MCNC: 2.7 MG/DL (ref 0.6–1.2)
ESTIMATED AVERAGE GLUCOSE: 151.3 MG/DL
FERRITIN: 480 NG/ML (ref 15–150)
FOLATE: 13.37 NG/ML (ref 4.78–24.2)
GFR AFRICAN AMERICAN: 20
GFR NON-AFRICAN AMERICAN: 17
GLUCOSE BLD-MCNC: 115 MG/DL (ref 70–99)
GLUCOSE BLD-MCNC: 123 MG/DL (ref 70–99)
GLUCOSE BLD-MCNC: 147 MG/DL (ref 70–99)
GLUCOSE BLD-MCNC: 61 MG/DL (ref 70–99)
GLUCOSE BLD-MCNC: 70 MG/DL (ref 70–99)
HBA1C MFR BLD: 6.9 %
HCT VFR BLD CALC: 25.6 % (ref 36–48)
HEMOGLOBIN: 8.6 G/DL (ref 12–16)
INR BLD: 3.94 (ref 0.86–1.14)
IRON SATURATION: 25 % (ref 15–50)
IRON: 37 UG/DL (ref 37–145)
LV EF: 55 %
LVEF MODALITY: NORMAL
MCH RBC QN AUTO: 28.7 PG (ref 26–34)
MCHC RBC AUTO-ENTMCNC: 33.5 G/DL (ref 31–36)
MCV RBC AUTO: 85.7 FL (ref 80–100)
PDW BLD-RTO: 15.5 % (ref 12.4–15.4)
PERFORMED ON: ABNORMAL
PERFORMED ON: NORMAL
PLATELET # BLD: 161 K/UL (ref 135–450)
PMV BLD AUTO: 7.9 FL (ref 5–10.5)
POTASSIUM REFLEX MAGNESIUM: 4.4 MMOL/L (ref 3.5–5.1)
PROTHROMBIN TIME: 46.3 SEC (ref 10–13.2)
RBC # BLD: 2.99 M/UL (ref 4–5.2)
SODIUM BLD-SCNC: 129 MMOL/L (ref 136–145)
TOTAL IRON BINDING CAPACITY: 147 UG/DL (ref 260–445)
TRANSFERRIN: 129 MG/DL (ref 200–360)
VITAMIN B-12: 635 PG/ML (ref 211–911)
WBC # BLD: 5.4 K/UL (ref 4–11)

## 2020-09-05 PROCEDURE — 6370000000 HC RX 637 (ALT 250 FOR IP): Performed by: INTERNAL MEDICINE

## 2020-09-05 PROCEDURE — 97166 OT EVAL MOD COMPLEX 45 MIN: CPT

## 2020-09-05 PROCEDURE — 83540 ASSAY OF IRON: CPT

## 2020-09-05 PROCEDURE — 85610 PROTHROMBIN TIME: CPT

## 2020-09-05 PROCEDURE — 97530 THERAPEUTIC ACTIVITIES: CPT

## 2020-09-05 PROCEDURE — 6360000002 HC RX W HCPCS: Performed by: INTERNAL MEDICINE

## 2020-09-05 PROCEDURE — 85027 COMPLETE CBC AUTOMATED: CPT

## 2020-09-05 PROCEDURE — 99232 SBSQ HOSP IP/OBS MODERATE 35: CPT | Performed by: INTERNAL MEDICINE

## 2020-09-05 PROCEDURE — 2060000000 HC ICU INTERMEDIATE R&B

## 2020-09-05 PROCEDURE — 97116 GAIT TRAINING THERAPY: CPT

## 2020-09-05 PROCEDURE — 97162 PT EVAL MOD COMPLEX 30 MIN: CPT

## 2020-09-05 PROCEDURE — 2580000003 HC RX 258: Performed by: INTERNAL MEDICINE

## 2020-09-05 PROCEDURE — 36415 COLL VENOUS BLD VENIPUNCTURE: CPT

## 2020-09-05 PROCEDURE — 82728 ASSAY OF FERRITIN: CPT

## 2020-09-05 PROCEDURE — 80048 BASIC METABOLIC PNL TOTAL CA: CPT

## 2020-09-05 PROCEDURE — 82607 VITAMIN B-12: CPT

## 2020-09-05 PROCEDURE — 93306 TTE W/DOPPLER COMPLETE: CPT

## 2020-09-05 PROCEDURE — 84466 ASSAY OF TRANSFERRIN: CPT

## 2020-09-05 PROCEDURE — 82746 ASSAY OF FOLIC ACID SERUM: CPT

## 2020-09-05 RX ORDER — TRIAMCINOLONE ACETONIDE 1 MG/G
CREAM TOPICAL DAILY
Status: DISCONTINUED | OUTPATIENT
Start: 2020-09-05 | End: 2020-09-11 | Stop reason: HOSPADM

## 2020-09-05 RX ORDER — ISOSORBIDE MONONITRATE 30 MG/1
30 TABLET, EXTENDED RELEASE ORAL DAILY
Status: DISCONTINUED | OUTPATIENT
Start: 2020-09-05 | End: 2020-09-07

## 2020-09-05 RX ORDER — FUROSEMIDE 10 MG/ML
60 INJECTION INTRAMUSCULAR; INTRAVENOUS 2 TIMES DAILY
Status: DISCONTINUED | OUTPATIENT
Start: 2020-09-05 | End: 2020-09-06

## 2020-09-05 RX ORDER — MULTIVIT-MIN/FERROUS GLUCONATE 9 MG/15 ML
15 LIQUID (ML) ORAL DAILY
Status: DISCONTINUED | OUTPATIENT
Start: 2020-09-05 | End: 2020-09-05

## 2020-09-05 RX ORDER — FUROSEMIDE 10 MG/ML
100 INJECTION INTRAMUSCULAR; INTRAVENOUS 2 TIMES DAILY
Status: DISCONTINUED | OUTPATIENT
Start: 2020-09-05 | End: 2020-09-05

## 2020-09-05 RX ORDER — VIT B COMPLEX/ZINC/MANGANESE 3.6-.75/15
15 LIQUID (ML) ORAL DAILY
Status: DISCONTINUED | OUTPATIENT
Start: 2020-09-05 | End: 2020-09-06 | Stop reason: SDUPTHER

## 2020-09-05 RX ADMIN — CARVEDILOL 6.25 MG: 6.25 TABLET, FILM COATED ORAL at 18:18

## 2020-09-05 RX ADMIN — DIAZEPAM 2 MG: 2 TABLET ORAL at 20:38

## 2020-09-05 RX ADMIN — FUROSEMIDE 60 MG: 10 INJECTION, SOLUTION INTRAMUSCULAR; INTRAVENOUS at 18:18

## 2020-09-05 RX ADMIN — GABAPENTIN 100 MG: 100 CAPSULE ORAL at 20:38

## 2020-09-05 RX ADMIN — ISOSORBIDE MONONITRATE 30 MG: 30 TABLET, EXTENDED RELEASE ORAL at 13:11

## 2020-09-05 RX ADMIN — TRIAMCINOLONE ACETONIDE: 1 CREAM TOPICAL at 15:06

## 2020-09-05 RX ADMIN — INSULIN GLARGINE 31 UNITS: 100 INJECTION, SOLUTION SUBCUTANEOUS at 20:38

## 2020-09-05 RX ADMIN — FUROSEMIDE 100 MG: 10 INJECTION, SOLUTION INTRAMUSCULAR; INTRAVENOUS at 11:35

## 2020-09-05 RX ADMIN — POTASSIUM CHLORIDE 20 MEQ: 1500 TABLET, EXTENDED RELEASE ORAL at 10:27

## 2020-09-05 RX ADMIN — CETIRIZINE HYDROCHLORIDE 10 MG: 10 TABLET, FILM COATED ORAL at 10:27

## 2020-09-05 RX ADMIN — POTASSIUM CHLORIDE 20 MEQ: 1500 TABLET, EXTENDED RELEASE ORAL at 18:18

## 2020-09-05 RX ADMIN — LATANOPROST 1 DROP: 50 SOLUTION OPHTHALMIC at 20:38

## 2020-09-05 RX ADMIN — ATORVASTATIN CALCIUM 10 MG: 10 TABLET, FILM COATED ORAL at 20:38

## 2020-09-05 RX ADMIN — SODIUM CHLORIDE, PRESERVATIVE FREE 10 ML: 5 INJECTION INTRAVENOUS at 20:38

## 2020-09-05 RX ADMIN — PANTOPRAZOLE SODIUM 40 MG: 40 TABLET, DELAYED RELEASE ORAL at 06:03

## 2020-09-05 RX ADMIN — HYDRALAZINE HYDROCHLORIDE 10 MG: 10 TABLET, FILM COATED ORAL at 20:38

## 2020-09-05 RX ADMIN — SODIUM CHLORIDE, PRESERVATIVE FREE 10 ML: 5 INJECTION INTRAVENOUS at 10:28

## 2020-09-05 RX ADMIN — Medication 15 ML: at 15:07

## 2020-09-05 ASSESSMENT — PAIN SCALES - GENERAL: PAINLEVEL_OUTOF10: 0

## 2020-09-05 NOTE — PROGRESS NOTES
Patient given lasix as ordered by Dr. Kwabena Huntley. Patient's son is at bedside. He has been updated on pt's status. Also spoke to patient's daughter earlier in the shift and gave her an update as well.

## 2020-09-05 NOTE — PROGRESS NOTES
Nephrology ([facility]) Progress Note    CC: DAVION on CKD    Subjective:    HPI:  Breathing comfortably. No CP. Serum sodium level better. O > I.  ROS:  In bed. No fever. [address]:  medications reivewed. Objective:  Blood pressure 113/66, pulse 87, temperature 98 °F (36.7 °C), temperature source Oral, resp. rate 17, height 5' 6\" (1.676 m), weight 264 lb 5.3 oz (119.9 kg), SpO2 90 %, not currently breastfeeding. Intake/Output Summary (Last 24 hours) at 9/5/2020 1556  Last data filed at 9/5/2020 1523  Gross per 24 hour   Intake 500 ml   Output 3900 ml   Net -3400 ml     General:  NAD, A+Ox3  Chest:  deferred due to pandemic  CVS:  deferred  Abdominal:  deferred  Extremities:  1+ edema  Skin:  no rash    Labs:  Renal panel:  Lab Results   Component Value Date/Time     (L) 09/05/2020 05:55 AM    K 4.4 09/05/2020 05:55 AM    CO2 28 09/05/2020 05:55 AM    BUN 59 (H) 09/05/2020 05:55 AM    CREATININE 2.7 (H) 09/05/2020 05:55 AM    CALCIUM 9.0 09/05/2020 05:55 AM    MG 2.0 06/26/2013 04:29 AM     CBC:  Lab Results   Component Value Date/Time    WBC 5.4 09/05/2020 05:55 AM    HGB 8.6 (L) 09/05/2020 05:55 AM    HCT 25.6 (L) 09/05/2020 05:55 AM     09/05/2020 05:55 AM       Assessment/Plan:  Reviewed old records and labs.     1) hyponatremia              - assessed as hypervolemic hyponatremia              - no urine osmolality   - better     [facility]              - discussed concept of fluid balance              - fluid restriction 1.5 liters/day              - I appreciate the lasix 100 mg IV bid started; however, I think the patient will diurese on her own like she was yesterday when I saw her, and I really do think that she is drinking way more fluid than her daughter thought she was, and her problem is likely her fluid intake is exceeding her kidney's capacity for urine production (without diuretics), so will decrease lasix to 60 mg IV bid as she is also starting to develop contraction alkalosis     3) DAVION on CKD              - baseline 06/03/20 Cr 1.6              - ddx:  CRS              - renal function stable     4) anemia              - iron studies adequate              - will consider ZARINA     5) pulmonary HTN              - suspect RUTH and obesity-related hypoventilation syndrome              - patient is not interested in sleep study

## 2020-09-05 NOTE — PLAN OF CARE
Problem: OXYGENATION/RESPIRATORY FUNCTION  Goal: Patient will maintain patent airway  Outcome: Ongoing  Goal: Patient will achieve/maintain normal respiratory rate/effort  Description: Respiratory rate and effort will be within normal limits for the patient  Outcome: Ongoing     Problem: HEMODYNAMIC STATUS  Goal: Patient has stable vital signs and fluid balance  Outcome: Ongoing     Problem: FLUID AND ELECTROLYTE IMBALANCE  Goal: Fluid and electrolyte balance are achieved/maintained  Outcome: Ongoing

## 2020-09-05 NOTE — PROGRESS NOTES
Bilateral lower extremities wrapped with kerlix and ace wraps. Patient tolerated well. Patient remains up in chair with legs elevated. Patient's son is in room visiting. Call light within reach. Will monitor.

## 2020-09-05 NOTE — PROGRESS NOTES
Cardiology Progress Note     Admit Date: 2020     Reason for follow up: dyspnea, Cr elevated (2.6 from baseline 1.6)    HPI and Interval History:   Patient seen and examined. Clinical notes reviewed. Telemetry reviewed - Afib 70's bpm. No new complaint today. No major events overnight. Denies having angina. Dyspnea unchanged. Review of System:  All other systems reviewed except for that noted above. Pertinent negatives and positives are:     · General: negative for fever, chills   · Ophthalmic ROS: negative for - eye pain or loss of vision  · ENT ROS: negative for - headaches, sore throat   · Respiratory: negative for - cough, sputum  · Cardiovascular: Reviewed in HPI  · Gastrointestinal: negative for - abdominal pain, diarrhea, N/V  · Hematology: negative for - bleeding, blood clots, bruising or jaundice  · Genito-Urinary:  negative for - Dysuria or incontinence  · Musculoskeletal: negative for - Joint swelling, muscle pain  · Neurological: negative for - confusion, dizziness, headaches   · Psychiatric: No anxiety, no depression. · Dermatological: negative for - rash      Physical Examination:  Vitals:    20 0745   BP: 96/60   Pulse: 72   Resp: 18   Temp: 99.2 °F (37.3 °C)   SpO2: 90%        Intake/Output Summary (Last 24 hours) at 2020 1023  Last data filed at 2020 5293  Gross per 24 hour   Intake 380 ml   Output 2175 ml   Net -1795 ml     In: 380 [P.O.:380]  Out: 2175    Wt Readings from Last 3 Encounters:   20 264 lb 5.3 oz (119.9 kg)   10/17/19 264 lb (119.7 kg)   19 269 lb (122 kg)     Temp  Av.2 °F (36.8 °C)  Min: 97.5 °F (36.4 °C)  Max: 99.2 °F (37.3 °C)  Pulse  Av.4  Min: 56  Max: 82  BP  Min: 96/60  Max: 147/79  SpO2  Av.8 %  Min: 85 %  Max: 100 %    · Telemetry: afib 70's bpm   · Constitutional: Alert. Oriented to person, place, and time. No distress. · Head: Normocephalic and atraumatic.    · Mouth/Throat: Lips appear moist. Oropharynx is clear and moist.  · Eyes: Conjunctivae normal. EOM are normal.   · Neck: Neck supple. No lymphadenopathy. No rigidity. 17cm JVD present. · Cardiovascular: Normal rate, regular rhythm. Normal S1&S2. Carotid pulse 2+ bilaterally. · Pulmonary/Chest: Bilateral respiratory sounds present. No respiratory accessory muscle use. No wheezes, No rhonchi. Bibasilar rales. · Abdominal: Soft. Normal bowel sounds present. No distension, No tenderness. No splenomegaly. No hernia. · Musculoskeletal: No tenderness. 1+ pitting BLE edema    · Lymphadenopathy: Has no cervical adenopathy. · Neurological: Alert and oriented. Cranial nerve II-XII grossly intact, No gross deficit to touch. · Skin: Skin is warm and dry. No rash, lesions, ulcerations noted. · Psychiatric: No anxiety nor agitation. Labs, diagnostic and imaging results reviewed. Reviewed. Recent Labs     09/03/20  1248 09/04/20  1157 09/05/20  0555   * 123* 129*   K 4.4 4.3 4.4   CL 89* 85* 90*   CO2 25 24 28   BUN 58* 59* 59*   CREATININE 2.9* 2.6* 2.7*     Recent Labs     09/03/20  1248 09/04/20  1157 09/05/20  0555   WBC 5.0 5.7 5.4   HGB 9.1* 9.1* 8.6*   HCT 27.5* 27.6* 25.6*   MCV 85.6 86.4 85.7    173 161     Lab Results   Component Value Date    TROPONINI 0.02 09/04/2020     Estimated Creatinine Clearance: 20 mL/min (A) (based on SCr of 2.7 mg/dL (H)).    Lab Results   Component Value Date    BNP 54 07/31/2013     06/24/2013     06/23/2013     Lab Results   Component Value Date    PROTIME 46.3 09/05/2020    PROTIME 51.1 09/04/2020    PROTIME 30.6 08/20/2020    PROTIME 26.4 12/14/2009    INR 3.94 09/05/2020    INR 4.34 09/04/2020    INR 2.61 08/20/2020     Lab Results   Component Value Date    CHOL 153 06/03/2020    HDL 51 06/03/2020    HDL 55 09/09/2011    TRIG 91 06/03/2020       Scheduled Meds:   cetirizine  10 mg Oral Daily    hydrALAZINE  10 mg Oral BID    pantoprazole  40 mg Oral QAM AC    atorvastatin  10 mg Oral Nightly    Nonrheumatic aortic (valve) stenosis [I35.0] 02/06/2017       Assessment and Plan:     Systolic and diastolic CHF, acute on chronic  -hypervolemic  -also manifesting cardiorenal syndrome  -will aggressively diurese and closely monitor Cr. Will anticipate that Cr will transiently increase but would also anticipate down-trending of Cr to her baseline of 1.6 after 1-2 days.  -Lasix 100mg IV BID  -strict I/O, daily weights  -monitor and replete electrolytes as needed    Will follow with you. Thank you for allowing me to participate in the care of this patient. If you have any questions, please do not hesitate to contact me.     Bambi Mcneil MD, MS, [address] [facility]  Cardiac Electrophysiology  [address]  [address] [facility], [address] [place] [id number]  [phone]

## 2020-09-05 NOTE — PROGRESS NOTES
Physical Therapy    Facility/Department: [address] PROGRESSIVE CARE  Initial Assessment  If patient discharges prior to next session this note will serve as a discharge summary. Please see below for the latest assessment towards goals. NAME: Lucio Landeros  : 1934  MRN: [id number]    Date of Service: 2020    Discharge Recommendations:  Patient would benefit from continued therapy after discharge, 3-5 sessions per week, Continue to assess pending progress   Lucio Landeros scored a  on the AM-PAC short mobility form. Current research shows that an AM-PAC score of 17 or less is typically not associated with a discharge to the patient's home setting. Based on the patient's AM-PAC score and their current functional mobility deficits, it is recommended that the patient have 3-5 sessions per week of Physical Therapy at d/c to increase the patient's independence. Please see assessment section for further patient specific details. PT Equipment Recommendations  Other: will monitor    Assessment   Assessment: The pt is an 81 yo female who came to the ED due to abnormal labs. The pt recently saw her PCP due to having nausea and vomiting; was started on an abx and increased Lasix. The pt reports she lives on the 1st floor of a house with her  but that she has 10 children who come over to assist with homemaking. The pt reports she was indep in ambulation with a wheeled walker and with self-care PTA. PMHx: anemia, CKD, CHF,DM, DVT, HTN, OA, a-fib, PE, sarcoidosis,DM,thyroid disease, knee sx    Today, the pt presents with significantly decreased B LE strength; she required mod A for bed mobility, min/mod A of 2 for sit <> stand transfers and was only able to take a couple steps from the bed > chair with a wlaker with min/mod A of 1. She demonstrated difficulty advancing B feet to take a step with the R foot being the worse. The pt was unable to walk further today.  Discussed d/c plans with the pt and her son and their goal is for the pt to go home with family asisst. The pt is agreeable to home PT. After today's eval, the pt appears to be functioning well below her baseline and would benefit from con't skilled moderate PT services at d/c. Will con't to follow. Prognosis: Good  Decision Making: Medium Complexity  History: see above  Exam: see above  Clinical Presentation: evolving  PT Education: PT Role;General Safety  Barriers to Learning: vision  REQUIRES PT FOLLOW UP: Yes  Activity Tolerance  Activity Tolerance: Patient limited by fatigue       Patient Diagnosis(es): The primary encounter diagnosis was Hyponatremia. Diagnoses of Acute renal insufficiency and Acute on chronic congestive heart failure, unspecified heart failure type [facility]) were also pertinent to this visit. has a past medical history of Anemia, Chronic kidney disease, Combined systolic and diastolic congestive heart failure ([address] Diabetes mellitus ([address] DVT (deep venous thrombosis) ([facility] [address] Hyperlipidemia, Hypertension, Osteoarthritis, PAF (paroxysmal atrial fibrillation) ([address] Pulmonary embolism ([address] Sarcoidosis, Thyroid disease, and Type II or unspecified type diabetes mellitus without mention of complication, not stated as uncontrolled. has a past surgical history that includes Hysterectomy and knee surgery. 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 Delano Alvarez on 9-4-2020: The pt is an 81 yo female who came to the ED due to abnormal labs.  The pt recently saw her PCP due to having nausea and vomiting; was started on an abx and increased Lasix. PMHx: anemia, CKD, CHF,DM, DVT, HTN, OA, a-fib, PE, sarcoidosis,DM,thyroid disease, knee sx  Response To Previous Treatment: Not applicable  Family / Caregiver Present: Yes(son)  Referring Practitioner: Freddie Conte MD  Referral Date : 09/04/20  Diagnosis: CHF, DAVION  Follows Commands: Within Functional Limits  Subjective  Subjective: the pt was found to be in the bed; she was agreeable to therapy and denied pain  Pain Screening  Patient Currently in Pain: Denies          Orientation  Orientation  Overall Orientation Status: Within Functional Limits  Social/Functional History  Social/Functional History  Lives With: Spouse  Type of Home: House  Home Layout: Two level, Able to Live on Main level with bedroom/bathroom, Laundry in basement  Home Access: Stairs to enter with rails  Entrance Stairs - Number of Steps: 1 + 1 with rail, pt does not go to other levels of house  Bathroom Shower/Tub: Walk-in shower, Shower chair with back  [employer]: Bedside commode(RTS over comfort height toilet)  Bathroom Equipment: Grab bars in shower, Shower chair  Bathroom Accessibility: (leaves walker outside bathroom)  Home Equipment: Rolling walker, [address] Help From: Family  ADL Assistance: Independent  Homemaking Assistance: (family assist with all household tasks)  Ambulation Assistance: Independent(with RW, w/c when going to doctors)  Transfer Assistance: Independent  Active : No  Patient's  Info: daughter drives  Occupation: Retired  IADL Comments: sleeps in regular bed  Additional Comments: 10 children- someone stops over in morning and evenings for meals.  Pt denies falls  Cognition   Cognition  Overall Cognitive Status: Exceptions  Insights: Decreased awareness of deficits    Objective  AROM RLE (degrees)  RLE General AROM: ankle DF and knee extension functiona; knee flexion to 90 and limited hip flexion  AROM LLE (degrees)  LLE General AROM: ankle DF and knee extension functiona; knee flexion to 90 and limited hip flexion  Strength RLE  Comment: hip flexion 3-/5, knee extemsion and ankle DF 3/5  Strength LLE  Comment: hip flexion 3-/5, knee extemsion and ankle DF 3/5        Bed mobility  Supine to Sit: Moderate assistance(HOB elevated, use of bed rail)  Sit to Supine: Unable to assess(pt in chair at end of session)  Transfers  Sit to Stand: Minimal Assistance; Moderate Assistance;2 Person Assistance(min A of 1 and mod A of 1 from the EOB and mod A of 2 from the chair with rocking method)  Stand to sit: Minimal Assistance(the pt plopped into the chair without control)  Ambulation  Ambulation?: Yes  More Ambulation?: Yes  Ambulation 1  Surface: level tile  Device: Rolling Walker  Assistance: Minimal assistance; Moderate assistance;2 Person assistance  Quality of Gait: very limited B foot clearance with the R being the worse; B limited step length with increased difficulty advancing the R foot, needing weight shift assist to advance the R foot and walker assist as she tends to push it too far ahead  Distance: 4-5 small steps from bed > chair     Balance  Sitting - Static: Good;-  Sitting - Dynamic: Fair  Standing - Static: Fair;+(with walker)  Standing - Dynamic: Fair;-(with walker)  Comments: the pt sat on the EOB with CGA with a frequent lean to the R; static standing at the walker with at least min A        Plan   Plan  Times per week: 3-5x/week  Current Treatment Recommendations: Functional Mobility Training  Safety Devices  Type of devices: Call light within reach, Patient at risk for falls, Left in chair, Chair alarm in place, Nurse notified, Gait belt(LETICIA Thompson in the room at end of session)      AM-PAC Score  AM-PAC Inpatient Mobility Raw Score : 12 (09/05/20 1315)  AM-PAC Inpatient T-Scale Score : 35.33 (09/05/20 1315)  Mobility Inpatient CMS 0-100% Score: 68.66 (09/05/20 1315)  Mobility Inpatient CMS G-Code Modifier : CL (09/05/20 1315) Goals  Short term goals  Time Frame for Short term goals: upon d/c  Short term goal 1: Bed mobility with min A  Short term goal 2: Transfers sit <> stand with min A  Short term goal 3: Ambulate with wheeled walker 10 feet with min A  Patient Goals   Patient goals : to go home       Therapy Time   Individual Concurrent Group Co-treatment   Time In 1240         Time Out 1318         Minutes 38         Timed Code Treatment Minutes: 23 Minutes     Electronically signed by Chantell Soto, PT [id number] on 9/5/2020 at 1:29 PM

## 2020-09-05 NOTE — PROGRESS NOTES
Occupational Therapy   Occupational Therapy Initial Assessment  Date: 2020   Patient Name: Whitley Loera  MRN: [id number]     : 1934    Date of Service: 2020    Discharge Recommendations:  Patient would benefit from continued therapy after discharge, 3-5 sessions per week  OT Equipment Recommendations  Other: defer to [address] scored a 13/24 on the AM-PAC ADL Inpatient form. Current research shows that an AM-PAC score of 17 or less is typically not associated with a discharge to the patient's home setting. Based on the patient's AM-PAC score and their current ADL deficits, it is recommended that the patient have 3-5 sessions per week of Occupational Therapy at d/c to increase the patient's independence. Please see assessment section for further patient specific details. If patient discharges prior to next session this note will serve as a discharge summary. Please see below for the latest assessment towards goals. Assessment   Performance deficits / Impairments: Decreased functional mobility ; Decreased safe awareness;Decreased balance;Decreased ADL status; Decreased endurance;Decreased high-level IADLs;Decreased strength  Assessment: 81 y/o female admitted  with nausea, diarrhea, and malaise. Pt was sent to hospital after PCP visit revealed abnormal lab values. Pt being treated for hyponatremia, DAVION, and CHF. PTA pt lives at home with spouse and was independent with ADLs and functional mobility with RW. Pt has 10 children who assist with household tasks and stop by daily. Today, pt required min/mod A x 2 to stand and take steps to chair with RW. Pt with difficulty weight shifting to advance foot and easily fatigued with transfer. Anticipate pt will require max A for LB ADLs at this time. Pt is functioning below baseline and will benefit from skilled therapy. Pt is hopeful she can return home at discharge.   Prognosis: Good  Decision Making: Medium Complexity  Assistance / Modification: RW  OT Education: OT Role;Plan of Care  REQUIRES OT FOLLOW UP: Yes  Activity Tolerance  Activity Tolerance: Patient Tolerated treatment well  Safety Devices  Safety Devices in place: Yes  Type of devices: Gait belt;Call light within reach; Chair alarm in place; Left in chair;Nurse notified           Patient Diagnosis(es): The primary encounter diagnosis was Hyponatremia. Diagnoses of Acute renal insufficiency and Acute on chronic congestive heart failure, unspecified heart failure type [facility]) were also pertinent to this visit. has a past medical history of Anemia, Chronic kidney disease, Combined systolic and diastolic congestive heart failure ([facility] Diabetes mellitus ([facility] DVT (deep venous thrombosis) ([facility] Hyperlipidemia, Hypertension, Osteoarthritis, PAF (paroxysmal atrial fibrillation) ([facility] Pulmonary embolism ([facility] Sarcoidosis, Thyroid disease, and Type II or unspecified type diabetes mellitus without mention of complication, not stated as uncontrolled. has a past surgical history that includes Hysterectomy and knee surgery. Restrictions  Restrictions/Precautions  Restrictions/Precautions: Fall Risk  Position Activity Restriction  Other position/activity restrictions: general diet with fluid restriction 1500mL    Subjective   General  Chart Reviewed: Yes  Patient assessed for rehabilitation services?: Yes  Additional Pertinent Hx: 81 y/o female admitted 9/4 with nausea, diarrhea, and malaise. Pt was sent to hospital after PCP visit revealed abnormal lab values. Pt being treated for hyponatremia, DAVION, and CHF. Family / Caregiver Present: Yes(son)  Referring Practitioner: Sara Fisher MD  Subjective  Subjective: Pt seen bedside and agreeable to therapy.   General Comment  Comments: Per RN ok for therapy    Social/Functional History  Social/Functional History  Lives With: Spouse  Type of Home: House  Home Layout: Two level, Able to Live on Main level with bedroom/bathroom, Laundry in basement  Home Access: Stairs to enter with rails  Entrance Stairs - Number of Steps: 1 + 1 with rail, pt does not go to other levels of house  Bathroom Shower/Tub: Walk-in shower, Shower chair with back  [employer]: Bedside commode(RTS over comfort height toilet)  Bathroom Equipment: Grab bars in shower, Shower chair  Bathroom Accessibility: (leaves walker outside bathroom)  Home Equipment: Rolling walker, [employer]  Receives Help From: Family  ADL Assistance: Independent  Homemaking Assistance: (family assist with all household tasks)  Ambulation Assistance: Independent(with RW, w/c when going to doctors)  Transfer Assistance: Independent  Active : No  Patient's  Info: daughter drives  Occupation: Retired  IADL Comments: sleeps in regular bed  Additional Comments: 10 children- someone stops over in morning and evenings for meals. 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 Required assist to weightshift to advance foot and steer RW.      ADL  Toileting: Dependent/Total(weeks)  Additional Comments: Anticipate pt will require max A for LB bathing/dressing, min A for UB bathing/dressing and grooming based on balance, strength and ROM observed        Bed mobility  Supine to Sit: Moderate assistance(HOB elevated, use of bed rail)  Sit to Supine: (pt in chair at end of session)  Transfers  Sit to stand: 2 Person assistance  Stand to sit: 2 Person assistance  Transfer Comments: min/mod A x 2 to stand from slightly elevated bed to RW. Pt required mod A x 2 to stand from chair with rocking. Cognition  Overall Cognitive Status: Exceptions  Insights: Decreased awareness of deficits  Perception  Overall Perceptual Status: WFL     Sensation  Overall Sensation Status: WFL        LUE AROM (degrees)  LUE AROM : WFL  LUE General AROM: slight decreased shoulder ROM but functional  Left Hand AROM (degrees)  Left Hand AROM: WFL  RUE AROM (degrees)  RUE AROM : WFL  Right Hand AROM (degrees)  Right Hand AROM: WFL        Plan   Plan  Times per week: 3-5  Current Treatment Recommendations: Strengthening, Endurance Training, Self-Care / ADL, Balance Training, Gait Training, Functional Mobility Training    AM-PAC Score  AM-PAC Inpatient Daily Activity Raw Score: 13 (09/05/20 1318)  AM-PAC Inpatient ADL T-Scale Score : 32.03 (09/05/20 1318)  ADL Inpatient CMS 0-100% Score: 63.03 (09/05/20 1318)  ADL Inpatient CMS G-Code Modifier : CL (09/05/20 1318)    Goals  Short term goals  Time Frame for Short term goals: Prior to DC: Short term goal 1: Pt will complete ADL transfers/mobility with SBA  Short term goal 2: Pt will tolerate standing > 5 min for functional task with SBA  Short term goal 3: Pt will complete toileting with SBA  Short term goal 4: Pt will complete LB dressing with SBA  Short term goal 5: Pt will complete bed mobility with SBA  Long term goals  Time Frame for Long term goals : stgs=ltgs  Patient Goals   Patient goals : to return home       Therapy Time   Individual Concurrent Group Co-treatment   Time In 1240         Time Out 1318         Minutes 38         Timed Code Treatment Minutes: 23 Minutes     This note to serve as OT d/c summary if pt is d/c-ed prior to next therapy session.     Corby Dan, OTR/L

## 2020-09-05 NOTE — H&P
[address] [address]                              HISTORY AND PHYSICAL    PATIENT NAME: Eduardo Chavez                    :        1934  MED REC NO:   [id number]                          ROOM:       5275  ACCOUNT NO:   [de-identified]                           ADMIT DATE: 2020  PROVIDER:     Rosa Isela Hernández MD    CHIEF COMPLAINT:  Fatigue. HISTORY OF PRESENT ILLNESS:  The patient is a chronically ill  26-year-old -American  female who lives in her own home  with her elderly  and is looked after by family. The patient  presents with a history of malaise, increased lower leg swelling,  decreased appetite, diarrhea for about four to five days following  admission. The patient was seen in the office two days prior to this  admission. Outpatient lab work revealed hyponatremia with a sodium of  125, BUN was elevated to 59, creatinine was elevated to 2.7. The  patient's usual BUN is in the 30s and creatinine around 1.6 to 1.7. The  patient was also anemic with a hemoglobin about 9. The patient was  begun on oral clindamycin in case she had respiratory infection. The  patient's family contacted me on the day of admission. The patient was  too weak to even turn over in bed. Consequently, 911 was called. She  was brought to the emergency room at [facility].  Emergency room  evaluation revealed sodium 123, BUN 59, creatinine 2.6 which were  elevated values in this patient. ProBNP was elevated to 6759. Troponin  was slightly elevated to 0.02. Albumin was low at 3.3. Liver enzymes  were normal.  Random glucose was 167. A1c was 6.9%. White count 5900,  hemoglobin 9.1, hematocrit 27.6. INR was elevated to 4.34 with the  patient being on 5 mg of warfarin daily. EKG showed her chronic atrial  fibrillation with a ventricular rate of 53 as well as left axis  deviation.   Because of the multiplicity of her problems as well as her  generalized weakness, admission was felt necessary. I was contacted,  agreed to admit the patient to PCU on a monitored bed with appropriate  consultation. PAST MEDICAL HISTORY:  The patient has an extensive past medical history  including chronic atrial fibrillation, previous DVT, chronic warfarin  use, hypertension, type 2 diabetes mellitus, chronic kidney disease  stage III, chronic congestive heart failure, chronic anemia mostly due  to chronic kidney disease, previous left total knee replacement,  generalized osteoarthritis. REVIEW OF SYSTEMS:  The patient denies fever, chills. She denies any  signs of upper or lower gastrointestinal or urinary bleeding. She  admits to some chronic generalized osteoarthritic pain. No new  nephrotoxic drugs. No falls with significant injury. FAMILY HISTORY:  Positive for hypertension, type 2 diabetes mellitus,  chronic kidney disease, and osteoarthritis. SOCIAL HISTORY:  The patient is , lives in her own home with her  elderly . Does not smoke or consume alcohol. No illicit drugs. She is looked after by family. PHYSICAL EXAMINATION:  VITAL SIGNS:  Following admission, temperature 99.2, respirations 18,  pulse 72, blood pressure 96/60. GENERAL:  The patient is a well-developed, well-nourished overweight  -American female lying comfortably in bed in no acute distress. She denies chest pain, palpitation, shortness of breath. SKIN:  Some chronic stasis dermatitis of both lower legs. HEENT:  Head was normal.  Eyes showed intraocular lens implants. She  wore dentures. NECK:  Arthritic stiffness. Thyroid revealed her chronic goiter about  two to three times normal size. LUNGS:  Clear to auscultation and percussion. CARDIAC:  Examination revealed an irregularly regular rate and rhythm  with a grade II aortic systolic murmur. ABDOMEN:  No mass, organomegaly or tenderness.   Bowel sounds were  normal.  PELVIC/RECTAL/BREASTS:  Exams were deferred. EXTREMITIES:  Stasis dermatitis of both lower legs. Left total knee  replacement. Warm pulseless feet and 1+ bilateral foot and lower leg  edema. NEUROLOGICAL:  Examination with the patient lying in bed revealed that  she was generally weak, required assistance to sit up. Did not appear  to have any focal weakness. Station and gait could not be tested. IMPRESSION:  1. Congestive heart failure. 2.  Hypervolemic hyponatremia. 3.  Diarrhea of undetermined etiology. 4.  Chronic kidney disease stage IV. 5.  Acute kidney injury. 6.  Dependent edema. 7.  Stasis dermatitis. 8.  Nontoxic goiter. 9.  Hypertension. 10.  Type 2 diabetes mellitus, controlled. 11.  Anemia. PLAN:  Diuresis. Fluid restriction. Cardiology consultation. Nephrology consultation. At the present time, the patient is agreeable  to begin dialysis if Nephrology thinks this is indicated. Anticipated  length of stay greater than two midnights. Plan was discussed with the  patient and her son who was visiting. They are in agreement with the  plan.         Jayro Benson MD    D: 09/05/2020 11:55:29       T: 09/05/2020 12:08:03     [email or web address]  Job#: [id number]     Doc#: [id number]    CC:

## 2020-09-05 NOTE — PLAN OF CARE
Problem: OXYGENATION/RESPIRATORY FUNCTION  Goal: Patient will maintain patent airway  9/5/2020 1618 by Jayson Hidalgo RN  Outcome: Ongoing  Note: Patient's airway is patent. Patient does have a moist cough, occasionally productive. Cough is strong. Problem: HEMODYNAMIC STATUS  Goal: Patient has stable vital signs and fluid balance  9/5/2020 1618 by Jayson Hidalgo RN  Outcome: Ongoing  Note: VSS. Labs being monitored. Weights checked daily. Patient continues to have edema to bilateral lower extremities. Bilateral lower extremities are wrapped with kerlix and ace wraps. Problem: FLUID AND ELECTROLYTE IMBALANCE  Goal: Fluid and electrolyte balance are achieved/maintained  9/5/2020 1618 by Jayson Hidalgo RN  Outcome: Ongoing  Note: Labs being monitored. I/O being monitored. Weights checked daily. Problem: ACTIVITY INTOLERANCE/IMPAIRED MOBILITY  Goal: Mobility/activity is maintained at optimum level for patient  9/5/2020 1618 by Jayson Hidalgo RN  Outcome: Ongoing  Note: Patient is working with therapy and is able to get up to the chair with therapy. Problem: Skin Integrity:  Goal: Will show no infection signs and symptoms  Description: Will show no infection signs and symptoms  9/5/2020 1618 by Jayson Hidalgo RN  Outcome: Ongoing  Note: Skin assessment completed every shift. Pt assessed for incontinence, appropriate barrier cream applied prn. Pt encouraged to turn/rotate every 2 hours. Assistance provided if pt unable to do so themselves. Problem: Skin Integrity:  Goal: Absence of new skin breakdown  Description: Absence of new skin breakdown  9/5/2020 1618 by Jayson Hidalgo RN  Outcome: Ongoing  Note: No signs of skin breakdown. Problem: Falls - Risk of:  Goal: Will remain free from falls  Description: Will remain free from falls  9/5/2020 1618 by Jayson Hidalgo RN  Outcome: Ongoing  Note: Fall risk assessment completed every shift. All precautions in place.  Pt has call light

## 2020-09-05 NOTE — CONSULTS
Clinical Pharmacy Note  Warfarin Consult    Jessica Orona is a 80 y.o. female receiving warfarin managed by pharmacy. Warfarin Indication: Afib  Target INR range: 2-3   Dose prior to admission: 5 mg daily, EXCEPT 2.5 mg every Thursday    Current warfarin drug-drug interactions: none    Recent Labs     09/03/20  1248 09/04/20  1157 09/05/20  0555   HGB 9.1* 9.1* 8.6*   HCT 27.5* 27.6* 25.6*   INR  --  4.34* 3.94*       Assessment/Plan:    Hold warfarin tonight for INR of 3.94. INR elevated on admission, possibly due to diarrhea from antibiotic or exacerbation of CHF. INR has been stable over past few months. Daily PT/INR until stable within therapeutic range. Thank you for the consult. Will continue to follow.   Tyra Kelsey RPH,9/5/2020,10:20 AM

## 2020-09-06 LAB
ALBUMIN SERPL-MCNC: 3.2 G/DL (ref 3.4–5)
ANION GAP SERPL CALCULATED.3IONS-SCNC: 12 MMOL/L (ref 3–16)
BASOPHILS ABSOLUTE: 0 K/UL (ref 0–0.2)
BASOPHILS RELATIVE PERCENT: 0.5 %
BUN BLDV-MCNC: 60 MG/DL (ref 7–20)
CALCIUM SERPL-MCNC: 9.3 MG/DL (ref 8.3–10.6)
CHLORIDE BLD-SCNC: 90 MMOL/L (ref 99–110)
CO2: 31 MMOL/L (ref 21–32)
CREAT SERPL-MCNC: 2.9 MG/DL (ref 0.6–1.2)
EOSINOPHILS ABSOLUTE: 0 K/UL (ref 0–0.6)
EOSINOPHILS RELATIVE PERCENT: 0.5 %
GFR AFRICAN AMERICAN: 19
GFR NON-AFRICAN AMERICAN: 15
GLUCOSE BLD-MCNC: 135 MG/DL (ref 70–99)
GLUCOSE BLD-MCNC: 138 MG/DL (ref 70–99)
GLUCOSE BLD-MCNC: 166 MG/DL (ref 70–99)
GLUCOSE BLD-MCNC: 43 MG/DL (ref 70–99)
GLUCOSE BLD-MCNC: 75 MG/DL (ref 70–99)
HCT VFR BLD CALC: 28 % (ref 36–48)
HEMOGLOBIN: 9.3 G/DL (ref 12–16)
INR BLD: 2.12 (ref 0.86–1.14)
LYMPHOCYTES ABSOLUTE: 1 K/UL (ref 1–5.1)
LYMPHOCYTES RELATIVE PERCENT: 15.5 %
MCH RBC QN AUTO: 28.3 PG (ref 26–34)
MCHC RBC AUTO-ENTMCNC: 33.1 G/DL (ref 31–36)
MCV RBC AUTO: 85.5 FL (ref 80–100)
MONOCYTES ABSOLUTE: 0.8 K/UL (ref 0–1.3)
MONOCYTES RELATIVE PERCENT: 12.6 %
NEUTROPHILS ABSOLUTE: 4.6 K/UL (ref 1.7–7.7)
NEUTROPHILS RELATIVE PERCENT: 70.9 %
PDW BLD-RTO: 16.1 % (ref 12.4–15.4)
PERFORMED ON: ABNORMAL
PERFORMED ON: NORMAL
PHOSPHORUS: 3.9 MG/DL (ref 2.5–4.9)
PLATELET # BLD: 212 K/UL (ref 135–450)
PMV BLD AUTO: 7.2 FL (ref 5–10.5)
POTASSIUM SERPL-SCNC: 3.6 MMOL/L (ref 3.5–5.1)
PROTHROMBIN TIME: 24.8 SEC (ref 10–13.2)
RBC # BLD: 3.27 M/UL (ref 4–5.2)
SODIUM BLD-SCNC: 133 MMOL/L (ref 136–145)
WBC # BLD: 6.4 K/UL (ref 4–11)

## 2020-09-06 PROCEDURE — 99232 SBSQ HOSP IP/OBS MODERATE 35: CPT | Performed by: INTERNAL MEDICINE

## 2020-09-06 PROCEDURE — 2580000003 HC RX 258: Performed by: INTERNAL MEDICINE

## 2020-09-06 PROCEDURE — 6370000000 HC RX 637 (ALT 250 FOR IP): Performed by: INTERNAL MEDICINE

## 2020-09-06 PROCEDURE — 85025 COMPLETE CBC W/AUTO DIFF WBC: CPT

## 2020-09-06 PROCEDURE — 6360000002 HC RX W HCPCS: Performed by: INTERNAL MEDICINE

## 2020-09-06 PROCEDURE — 2060000000 HC ICU INTERMEDIATE R&B

## 2020-09-06 PROCEDURE — 80069 RENAL FUNCTION PANEL: CPT

## 2020-09-06 PROCEDURE — 36415 COLL VENOUS BLD VENIPUNCTURE: CPT

## 2020-09-06 PROCEDURE — 85610 PROTHROMBIN TIME: CPT

## 2020-09-06 PROCEDURE — 6370000000 HC RX 637 (ALT 250 FOR IP)

## 2020-09-06 RX ORDER — WARFARIN SODIUM 2.5 MG/1
2.5 TABLET ORAL
Status: COMPLETED | OUTPATIENT
Start: 2020-09-06 | End: 2020-09-06

## 2020-09-06 RX ORDER — INSULIN GLARGINE 100 [IU]/ML
15 INJECTION, SOLUTION SUBCUTANEOUS NIGHTLY
Status: DISCONTINUED | OUTPATIENT
Start: 2020-09-06 | End: 2020-09-07

## 2020-09-06 RX ORDER — PEDIATRIC MULTIPLE VITAMIN LIQ
5 LIQUID ORAL DAILY
Status: DISCONTINUED | OUTPATIENT
Start: 2020-09-06 | End: 2020-09-09 | Stop reason: SDUPTHER

## 2020-09-06 RX ADMIN — ISOSORBIDE MONONITRATE 30 MG: 30 TABLET, EXTENDED RELEASE ORAL at 10:00

## 2020-09-06 RX ADMIN — CARVEDILOL 6.25 MG: 6.25 TABLET, FILM COATED ORAL at 17:43

## 2020-09-06 RX ADMIN — INSULIN GLARGINE 15 UNITS: 100 INJECTION, SOLUTION SUBCUTANEOUS at 21:19

## 2020-09-06 RX ADMIN — FUROSEMIDE 60 MG: 10 INJECTION, SOLUTION INTRAMUSCULAR; INTRAVENOUS at 10:00

## 2020-09-06 RX ADMIN — ACETAMINOPHEN 1000 MG: 500 TABLET, FILM COATED ORAL at 21:26

## 2020-09-06 RX ADMIN — CETIRIZINE HYDROCHLORIDE 10 MG: 10 TABLET, FILM COATED ORAL at 10:00

## 2020-09-06 RX ADMIN — ATORVASTATIN CALCIUM 10 MG: 10 TABLET, FILM COATED ORAL at 21:28

## 2020-09-06 RX ADMIN — WARFARIN SODIUM 2.5 MG: 2.5 TABLET ORAL at 17:43

## 2020-09-06 RX ADMIN — POTASSIUM CHLORIDE 20 MEQ: 1500 TABLET, EXTENDED RELEASE ORAL at 17:43

## 2020-09-06 RX ADMIN — POTASSIUM CHLORIDE 20 MEQ: 1500 TABLET, EXTENDED RELEASE ORAL at 08:40

## 2020-09-06 RX ADMIN — CARVEDILOL 6.25 MG: 6.25 TABLET, FILM COATED ORAL at 08:40

## 2020-09-06 RX ADMIN — SODIUM CHLORIDE, PRESERVATIVE FREE 10 ML: 5 INJECTION INTRAVENOUS at 10:01

## 2020-09-06 RX ADMIN — PANTOPRAZOLE SODIUM 40 MG: 40 TABLET, DELAYED RELEASE ORAL at 08:40

## 2020-09-06 RX ADMIN — LATANOPROST 1 DROP: 50 SOLUTION OPHTHALMIC at 21:26

## 2020-09-06 RX ADMIN — HYDRALAZINE HYDROCHLORIDE 10 MG: 10 TABLET, FILM COATED ORAL at 10:00

## 2020-09-06 RX ADMIN — TRIAMCINOLONE ACETONIDE: 1 CREAM TOPICAL at 10:01

## 2020-09-06 RX ADMIN — Medication 5 ML: at 10:00

## 2020-09-06 RX ADMIN — GABAPENTIN 100 MG: 100 CAPSULE ORAL at 21:28

## 2020-09-06 RX ADMIN — SODIUM CHLORIDE, PRESERVATIVE FREE 10 ML: 5 INJECTION INTRAVENOUS at 21:20

## 2020-09-06 ASSESSMENT — PAIN SCALES - GENERAL
PAINLEVEL_OUTOF10: 7
PAINLEVEL_OUTOF10: 0

## 2020-09-06 ASSESSMENT — PAIN DESCRIPTION - PROGRESSION: CLINICAL_PROGRESSION: GRADUALLY WORSENING

## 2020-09-06 ASSESSMENT — PAIN DESCRIPTION - ONSET: ONSET: ON-GOING

## 2020-09-06 ASSESSMENT — PAIN DESCRIPTION - ORIENTATION: ORIENTATION: MID

## 2020-09-06 ASSESSMENT — PAIN DESCRIPTION - DESCRIPTORS: DESCRIPTORS: ACHING

## 2020-09-06 ASSESSMENT — PAIN DESCRIPTION - PAIN TYPE: TYPE: ACUTE PAIN

## 2020-09-06 ASSESSMENT — PAIN - FUNCTIONAL ASSESSMENT: PAIN_FUNCTIONAL_ASSESSMENT: ACTIVITIES ARE NOT PREVENTED

## 2020-09-06 ASSESSMENT — PAIN DESCRIPTION - FREQUENCY: FREQUENCY: CONTINUOUS

## 2020-09-06 ASSESSMENT — PAIN DESCRIPTION - LOCATION: LOCATION: HEAD

## 2020-09-06 NOTE — PLAN OF CARE
Problem: Urinary Elimination:  Goal: Signs and symptoms of infection will decrease  Description: Signs and symptoms of infection will decrease  Outcome: Ongoing  Note: Patient has weeks catheter in place to monitor accurate urine output. Patient is recieving large amounts of IV lasix so monitoring output is important. Problem: Urinary Elimination:  Goal: Complications related to the disease process, condition or treatment will be avoided or minimized  Description: Complications related to the disease process, condition or treatment will be avoided or minimized  Outcome: Ongoing  Note: Porfirio Clark

## 2020-09-06 NOTE — PROGRESS NOTES
Cardiology Progress Note     Admit Date: 2020     Reason for follow up: sys and diast CHF, acute on chronic    HPI and Interval History:   Patient seen and examined. Clinical notes reviewed. Telemetry reviewed - Afib 80's. No new complaint today. No major events overnight. Denies having angina, shortness of breath, dyspnea on exertion, Orthopnea, PND at the time of this visit. Able to lie flat and breathe comfortably. Review of System:  All other systems reviewed except for that noted above. Pertinent negatives and positives are:     · General: negative for fever, chills   · Ophthalmic ROS: negative for - eye pain or loss of vision  · ENT ROS: negative for - headaches, sore throat   · Respiratory: negative for - cough, sputum  · Cardiovascular: Reviewed in HPI  · Gastrointestinal: negative for - abdominal pain, diarrhea, N/V  · Hematology: negative for - bleeding, blood clots, bruising or jaundice  · Genito-Urinary:  negative for - Dysuria or incontinence  · Musculoskeletal: negative for - Joint swelling, muscle pain  · Neurological: negative for - confusion, dizziness, headaches   · Psychiatric: No anxiety, no depression. · Dermatological: negative for - rash      Physical Examination:  Vitals:    20 0819   BP: 107/65   Pulse: 71   Resp: 18   Temp: 97.6 °F (36.4 °C)   SpO2: 97%        Intake/Output Summary (Last 24 hours) at 2020 0955  Last data filed at 2020 0705  Gross per 24 hour   Intake 540 ml   Output 4775 ml   Net -4235 ml     In: 420 [P.O.:420]  Out: 3800    Wt Readings from Last 3 Encounters:   20 256 lb 13.4 oz (116.5 kg)   10/17/19 264 lb (119.7 kg)   19 269 lb (122 kg)     Temp  Av.2 °F (36.8 °C)  Min: 97.6 °F (36.4 °C)  Max: 98.7 °F (37.1 °C)  Pulse  Av.3  Min: 62  Max: 87  BP  Min: 102/59  Max: 130/78  SpO2  Av.3 %  Min: 90 %  Max: 97 %    · Telemetry: afib 80's bpm    · Constitutional: Alert. Oriented to person, place, and time. No distress. · Head: Normocephalic and atraumatic. · Mouth/Throat: Lips appear moist. Oropharynx is clear and moist.  · Eyes: Conjunctivae normal. EOM are normal.   · Neck: Neck supple. No lymphadenopathy. No rigidity. 16cm JVD present. · Cardiovascular: Normal rate, irregular rhythm. Normal S1&S2. Carotid pulse 2+ bilaterally. · Pulmonary/Chest: Bilateral respiratory sounds present. No respiratory accessory muscle use. No wheezes, No rhonchi. + bibasilar rales. · Abdominal: Soft. Normal bowel sounds present. No distension, No tenderness. No splenomegaly. No hernia. · Musculoskeletal: No tenderness. 2+ pitting BLE edema    · Lymphadenopathy: Has no cervical adenopathy. · Neurological: Alert and oriented. Cranial nerve II-XII grossly intact, No gross deficit to touch. · Skin: Skin is warm and dry. No rash, lesions, ulcerations noted. · Psychiatric: No anxiety nor agitation. Labs, diagnostic and imaging results reviewed. Reviewed. Recent Labs     09/04/20 1157 09/05/20 0555 09/06/20  0553   * 129* 133*   K 4.3 4.4 3.6   CL 85* 90* 90*   CO2 24 28 31   PHOS  --   --  3.9   BUN 59* 59* 60*   CREATININE 2.6* 2.7* 2.9*     Recent Labs     09/04/20 1157 09/05/20  0555 09/06/20  0553   WBC 5.7 5.4 6.4   HGB 9.1* 8.6* 9.3*   HCT 27.6* 25.6* 28.0*   MCV 86.4 85.7 85.5    161 212     Lab Results   Component Value Date    TROPONINI 0.02 09/04/2020     Estimated Creatinine Clearance: 18 mL/min (A) (based on SCr of 2.9 mg/dL (H)).    Lab Results   Component Value Date    BNP 54 07/31/2013     06/24/2013     06/23/2013     Lab Results   Component Value Date    PROTIME 24.8 09/06/2020    PROTIME 46.3 09/05/2020    PROTIME 51.1 09/04/2020    PROTIME 26.4 12/14/2009    INR 2.12 09/06/2020    INR 3.94 09/05/2020    INR 4.34 09/04/2020     Lab Results   Component Value Date    CHOL 153 06/03/2020    HDL 51 06/03/2020    HDL 55 09/09/2011    TRIG 91 06/03/2020       Scheduled Meds:   multivitamin 5 mL Oral Daily    triamcinolone   Topical Daily    isosorbide mononitrate  30 mg Oral Daily    furosemide  60 mg Intravenous BID    cetirizine  10 mg Oral Daily    hydrALAZINE  10 mg Oral BID    pantoprazole  40 mg Oral QAM AC    atorvastatin  10 mg Oral Nightly    potassium chloride  20 mEq Oral BID WC    sodium chloride flush  10 mL Intravenous 2 times per day    insulin glargine  0.25 Units/kg Subcutaneous Nightly    insulin lispro  0-6 Units Subcutaneous TID WC    insulin lispro  0-3 Units Subcutaneous Nightly    carvedilol  6.25 mg Oral BID WC    gabapentin  100 mg Oral Nightly    latanoprost  1 drop Right Eye Nightly    warfarin (COUMADIN) daily dosing (placeholder)   Other RX Placeholder     Continuous Infusions:   dextrose       PRN Meds:acetaminophen, sodium chloride flush, [DISCONTINUED] acetaminophen **OR** acetaminophen, polyethylene glycol, promethazine **OR** ondansetron, glucose, dextrose, glucagon (rDNA), dextrose, traMADol **OR** traMADol, diazePAM, loperamide     Patient Active Problem List    Diagnosis Date Noted    Acute kidney injury superimposed on chronic kidney disease ([facility] 09/04/2020    Hyponatremia 09/04/2020    Combined systolic and diastolic congestive heart failure ([facility] 09/04/2020    Anemia due to chronic kidney disease 09/04/2020    Acute respiratory failure with hypoxia ([facility] 09/04/2020    [facility] 09/04/2020    Hyperthyroidism 08/15/2018    Nonrheumatic aortic (valve) stenosis 02/06/2017    Primary localized osteoarthrosis of shoulder region 01/21/2015    Pulmonary HTN ([facility] 02/18/2014    Hyperlipidemia 02/18/2014    Chronic combined systolic and diastolic heart failure (HCC)     HTN (hypertension)     Persistent atrial fibrillation       Active Hospital Problems    Diagnosis Date Noted    Acute kidney injury superimposed on chronic kidney disease ([facility] [N17.9, N18.9] 09/04/2020    Hyponatremia [E87.1] 09/04/2020    Combined systolic and diastolic congestive heart failure ([facility] [I50.40] 09/04/2020    Anemia due to chronic kidney disease [N18.9, D63.1] 09/04/2020    Acute respiratory failure with hypoxia ([facility] [J96.01] 09/04/2020    DAVION (acute kidney injury) ([facility] [N17.9] 09/04/2020    Nonrheumatic aortic (valve) stenosis [I35.0] 02/06/2017       Assessment and Plan:     Systolic and diastolic CHF, acute on chronic  -hypervolemic but diuresed well over the last 24hrs (4355cc net negative)  -cardiorenal syndrome and Creatinine slightly increased to 2.9 today from 2.7 yesterday. Would anticipate that as CHF resolves, creatinine should down-trend. Obviously hypervolemic still and needs further aggressive diuresis. -cont Lasix 100mg IV BID  -strict I/O, daily wts  -monitor and replete electrolytes PRN    Will follow with you. Thank you for allowing me to participate in the care of this patient. If you have any questions, please do not hesitate to contact me.     Claude Racer, MD, MS, [facility], [facility]  Cardiac Electrophysiology  [address]  [address] [facility], [facility] [place] [address]  [phone]

## 2020-09-06 NOTE — PLAN OF CARE
Problem: OXYGENATION/RESPIRATORY FUNCTION  Goal: Patient will maintain patent airway  Outcome: Ongoing  Note: Patient's airway is patent. Occasional cough noted, no sputum seen. Problem: OXYGENATION/RESPIRATORY FUNCTION  Goal: Patient will achieve/maintain normal respiratory rate/effort  Description: Respiratory rate and effort will be within normal limits for the patient  Outcome: Ongoing  Note: Respirations easy even no distress. Patient said she does become short of breath with activity but this nurse has not noted shortness of breath. Problem: HEMODYNAMIC STATUS  Goal: Patient has stable vital signs and fluid balance  Outcome: Ongoing  Note: VSS. Labs being monitored. Weights checked daily. Patient has shown a significant decrease in weight indicating good diurisis. Problem: FLUID AND ELECTROLYTE IMBALANCE  Goal: Fluid and electrolyte balance are achieved/maintained  Outcome: Ongoing  Note: I/O being monitored. Labs being monitored. Weights checked daily. Patient continues to have edema but it is much less than yesterday. Problem: ACTIVITY INTOLERANCE/IMPAIRED MOBILITY  Goal: Mobility/activity is maintained at optimum level for patient  Outcome: Ongoing  Note: Patient has tolerated being transfered from bed to bathroom with stedy. Problem: Skin Integrity:  Goal: Will show no infection signs and symptoms  Description: Will show no infection signs and symptoms  Outcome: Ongoing  Note: Skin assessment completed every shift. Pt assessed for incontinence, appropriate barrier cream applied prn. Pt encouraged to turn/rotate every 2 hours. Assistance provided if pt unable to do so themselves. Problem: Skin Integrity:  Goal: Absence of new skin breakdown  Description: Absence of new skin breakdown  Outcome: Ongoing  Note: No signs of new skin breakdown noted.      Problem: Falls - Risk of:  Goal: Will remain free from falls  Description: Will remain free from falls  Outcome: Ongoing  Note: Fall risk assessment completed every shift. All precautions in place. Pt has call light within reach at all times. Room clear of clutter. Pt aware to call for assistance when getting up. Problem: Falls - Risk of:  Goal: Absence of physical injury  Description: Absence of physical injury  Outcome: Ongoing  Note: No signs of physical injury noted.

## 2020-09-06 NOTE — PROGRESS NOTES
Nephrology ([facility]) Progress Note    CC: DAVION on CKD    Subjective:    HPI:  Breathing comfortably. No CP. Serum sodium level better. O > I. Renal function worse. ROS:  In bed. No fever. [address]:  medications reivewed. Objective:  Blood pressure 108/69, pulse 93, temperature 97.5 °F (36.4 °C), temperature source Oral, resp. rate 16, height 5' 6\" (1.676 m), weight 256 lb 13.4 oz (116.5 kg), SpO2 95 %, not currently breastfeeding. Intake/Output Summary (Last 24 hours) at 9/6/2020 1605  Last data filed at 9/6/2020 1257  Gross per 24 hour   Intake 540 ml   Output 3550 ml   Net -3010 ml     General:  NAD, A+Ox3, obese   Chest:  deferred due to pandemic  CVS:  deferred  Abdominal:  deferred  Extremities:  1+ edema  Skin:  no rash    Labs:  Renal panel:  Lab Results   Component Value Date/Time     (L) 09/06/2020 05:53 AM    K 3.6 09/06/2020 05:53 AM    K 4.4 09/05/2020 05:55 AM    CO2 31 09/06/2020 05:53 AM    BUN 60 (H) 09/06/2020 05:53 AM    CREATININE 2.9 (H) 09/06/2020 05:53 AM    CALCIUM 9.3 09/06/2020 05:53 AM    PHOS 3.9 09/06/2020 05:53 AM    MG 2.0 06/26/2013 04:29 AM     CBC:  Lab Results   Component Value Date/Time    WBC 6.4 09/06/2020 05:53 AM    HGB 9.3 (L) 09/06/2020 05:53 AM    HCT 28.0 (L) 09/06/2020 05:53 AM     09/06/2020 05:53 AM       Assessment/Plan:  Reviewed old records and labs.     1) hyponatremia              - assessed as hypervolemic hyponatremia              - no urine osmolality   - better   - goal Na > = 130     2) CSDF              - discussed concept of fluid balance              - fluid restriction 1.5 liters/day              - will hold lasix as renal function and contraction alkalosis worse, and *I* will decide about diuretics tomorrow     3) DAVINO on CKD              - baseline 06/03/20 Cr 1.6              - ddx:  CRS              - renal function worsre     4) anemia              - iron studies adequate              - will consider ZARINA     5) pulmonary HTN              - suspect RUTH and obesity-related hypoventilation syndrome              - patient is not interested in sleep study

## 2020-09-06 NOTE — PLAN OF CARE
Problem: OXYGENATION/RESPIRATORY FUNCTION  Goal: Patient will maintain patent airway  9/6/2020 0043 by Jodie Simon RN  Outcome: Ongoing  9/5/2020 1618 by Jay Jay Leyva RN  Outcome: Ongoing  Note: Patient's airway is patent. Patient does have a moist cough, occasionally productive. Cough is strong. Problem: HEMODYNAMIC STATUS  Goal: Patient has stable vital signs and fluid balance  9/6/2020 0043 by Jodie Simon RN  Outcome: Ongoing  9/5/2020 1618 by Jay Jay Leyva RN  Outcome: Ongoing  Note: VSS. Labs being monitored. Weights checked daily. Patient continues to have edema to bilateral lower extremities. Bilateral lower extremities are wrapped with kerlix and ace wraps. Problem: FLUID AND ELECTROLYTE IMBALANCE  Goal: Fluid and electrolyte balance are achieved/maintained  9/6/2020 0043 by Jodie Simon RN  Outcome: Ongoing  9/5/2020 1618 by Jay Jay Leyva RN  Outcome: Ongoing  Note: Labs being monitored. I/O being monitored. Weights checked daily. Problem: ACTIVITY INTOLERANCE/IMPAIRED MOBILITY  Goal: Mobility/activity is maintained at optimum level for patient  9/6/2020 0043 by Jodie Simon RN  Outcome: Ongoing  9/5/2020 1618 by Jay Jay Leyva RN  Outcome: Ongoing  Note: Patient is working with therapy and is able to get up to the chair with therapy.

## 2020-09-07 LAB
ALBUMIN SERPL-MCNC: 3 G/DL (ref 3.4–5)
ANION GAP SERPL CALCULATED.3IONS-SCNC: 9 MMOL/L (ref 3–16)
BASOPHILS ABSOLUTE: 0 K/UL (ref 0–0.2)
BASOPHILS RELATIVE PERCENT: 0.6 %
BILIRUBIN URINE: NEGATIVE
BLOOD, URINE: ABNORMAL
BUN BLDV-MCNC: 58 MG/DL (ref 7–20)
CALCIUM SERPL-MCNC: 9.2 MG/DL (ref 8.3–10.6)
CHLORIDE BLD-SCNC: 91 MMOL/L (ref 99–110)
CLARITY: ABNORMAL
CO2: 34 MMOL/L (ref 21–32)
COLOR: ABNORMAL
COMMENT UA: ABNORMAL
CREAT SERPL-MCNC: 2.1 MG/DL (ref 0.6–1.2)
EOSINOPHILS ABSOLUTE: 0.1 K/UL (ref 0–0.6)
EOSINOPHILS RELATIVE PERCENT: 1.6 %
GFR AFRICAN AMERICAN: 27
GFR NON-AFRICAN AMERICAN: 22
GLUCOSE BLD-MCNC: 110 MG/DL (ref 70–99)
GLUCOSE BLD-MCNC: 140 MG/DL (ref 70–99)
GLUCOSE BLD-MCNC: 201 MG/DL (ref 70–99)
GLUCOSE BLD-MCNC: 53 MG/DL (ref 70–99)
GLUCOSE BLD-MCNC: 54 MG/DL (ref 70–99)
GLUCOSE BLD-MCNC: 64 MG/DL (ref 70–99)
GLUCOSE BLD-MCNC: 83 MG/DL (ref 70–99)
GLUCOSE BLD-MCNC: 88 MG/DL (ref 70–99)
GLUCOSE URINE: NEGATIVE MG/DL
HCT VFR BLD CALC: 28.1 % (ref 36–48)
HEMOGLOBIN: 9.3 G/DL (ref 12–16)
INR BLD: 1.68 (ref 0.86–1.14)
KETONES, URINE: NEGATIVE MG/DL
LEUKOCYTE ESTERASE, URINE: ABNORMAL
LYMPHOCYTES ABSOLUTE: 1.6 K/UL (ref 1–5.1)
LYMPHOCYTES RELATIVE PERCENT: 24.5 %
MCH RBC QN AUTO: 28.2 PG (ref 26–34)
MCHC RBC AUTO-ENTMCNC: 32.9 G/DL (ref 31–36)
MCV RBC AUTO: 85.5 FL (ref 80–100)
MICROSCOPIC EXAMINATION: YES
MONOCYTES ABSOLUTE: 0.8 K/UL (ref 0–1.3)
MONOCYTES RELATIVE PERCENT: 12.3 %
NEUTROPHILS ABSOLUTE: 3.9 K/UL (ref 1.7–7.7)
NEUTROPHILS RELATIVE PERCENT: 61 %
NITRITE, URINE: NEGATIVE
PDW BLD-RTO: 15.9 % (ref 12.4–15.4)
PERFORMED ON: ABNORMAL
PERFORMED ON: NORMAL
PERFORMED ON: NORMAL
PH UA: 7.5 (ref 5–8)
PHOSPHORUS: 3.5 MG/DL (ref 2.5–4.9)
PLATELET # BLD: 237 K/UL (ref 135–450)
PMV BLD AUTO: 7.7 FL (ref 5–10.5)
POTASSIUM SERPL-SCNC: 3.9 MMOL/L (ref 3.5–5.1)
PROTEIN UA: 100 MG/DL
PROTHROMBIN TIME: 19.6 SEC (ref 10–13.2)
RBC # BLD: 3.28 M/UL (ref 4–5.2)
RBC UA: >100 /HPF (ref 0–4)
SODIUM BLD-SCNC: 134 MMOL/L (ref 136–145)
SPECIFIC GRAVITY UA: 1.01 (ref 1–1.03)
URINE REFLEX TO CULTURE: ABNORMAL
URINE TYPE: ABNORMAL
UROBILINOGEN, URINE: 0.2 E.U./DL
WBC # BLD: 6.5 K/UL (ref 4–11)
WBC UA: ABNORMAL /HPF (ref 0–5)

## 2020-09-07 PROCEDURE — 85610 PROTHROMBIN TIME: CPT

## 2020-09-07 PROCEDURE — 85025 COMPLETE CBC W/AUTO DIFF WBC: CPT

## 2020-09-07 PROCEDURE — 94760 N-INVAS EAR/PLS OXIMETRY 1: CPT

## 2020-09-07 PROCEDURE — 6370000000 HC RX 637 (ALT 250 FOR IP): Performed by: INTERNAL MEDICINE

## 2020-09-07 PROCEDURE — 2060000000 HC ICU INTERMEDIATE R&B

## 2020-09-07 PROCEDURE — 6360000002 HC RX W HCPCS: Performed by: INTERNAL MEDICINE

## 2020-09-07 PROCEDURE — 99232 SBSQ HOSP IP/OBS MODERATE 35: CPT | Performed by: INTERNAL MEDICINE

## 2020-09-07 PROCEDURE — 80069 RENAL FUNCTION PANEL: CPT

## 2020-09-07 PROCEDURE — 81001 URINALYSIS AUTO W/SCOPE: CPT

## 2020-09-07 PROCEDURE — 36415 COLL VENOUS BLD VENIPUNCTURE: CPT

## 2020-09-07 PROCEDURE — 2580000003 HC RX 258: Performed by: INTERNAL MEDICINE

## 2020-09-07 RX ORDER — FUROSEMIDE 10 MG/ML
120 INJECTION INTRAMUSCULAR; INTRAVENOUS 2 TIMES DAILY
Status: DISCONTINUED | OUTPATIENT
Start: 2020-09-07 | End: 2020-09-09

## 2020-09-07 RX ORDER — CARVEDILOL 12.5 MG/1
12.5 TABLET ORAL 2 TIMES DAILY WITH MEALS
Status: DISCONTINUED | OUTPATIENT
Start: 2020-09-07 | End: 2020-09-11 | Stop reason: HOSPADM

## 2020-09-07 RX ORDER — WARFARIN SODIUM 5 MG/1
5 TABLET ORAL
Status: COMPLETED | OUTPATIENT
Start: 2020-09-07 | End: 2020-09-07

## 2020-09-07 RX ADMIN — POTASSIUM CHLORIDE 20 MEQ: 1500 TABLET, EXTENDED RELEASE ORAL at 16:39

## 2020-09-07 RX ADMIN — GABAPENTIN 100 MG: 100 CAPSULE ORAL at 21:08

## 2020-09-07 RX ADMIN — CARVEDILOL 12.5 MG: 12.5 TABLET, FILM COATED ORAL at 16:39

## 2020-09-07 RX ADMIN — HYDRALAZINE HYDROCHLORIDE 10 MG: 10 TABLET, FILM COATED ORAL at 21:08

## 2020-09-07 RX ADMIN — SODIUM CHLORIDE, PRESERVATIVE FREE 10 ML: 5 INJECTION INTRAVENOUS at 08:33

## 2020-09-07 RX ADMIN — INSULIN LISPRO 1 UNITS: 100 INJECTION, SOLUTION INTRAVENOUS; SUBCUTANEOUS at 21:07

## 2020-09-07 RX ADMIN — CETIRIZINE HYDROCHLORIDE 10 MG: 10 TABLET, FILM COATED ORAL at 08:32

## 2020-09-07 RX ADMIN — WARFARIN SODIUM 5 MG: 5 TABLET ORAL at 18:27

## 2020-09-07 RX ADMIN — CARVEDILOL 6.25 MG: 6.25 TABLET, FILM COATED ORAL at 08:32

## 2020-09-07 RX ADMIN — TRIAMCINOLONE ACETONIDE: 1 CREAM TOPICAL at 09:45

## 2020-09-07 RX ADMIN — ATORVASTATIN CALCIUM 10 MG: 10 TABLET, FILM COATED ORAL at 21:08

## 2020-09-07 RX ADMIN — SODIUM CHLORIDE, PRESERVATIVE FREE 10 ML: 5 INJECTION INTRAVENOUS at 21:08

## 2020-09-07 RX ADMIN — PANTOPRAZOLE SODIUM 40 MG: 40 TABLET, DELAYED RELEASE ORAL at 05:29

## 2020-09-07 RX ADMIN — FUROSEMIDE 120 MG: 10 INJECTION, SOLUTION INTRAMUSCULAR; INTRAVENOUS at 16:38

## 2020-09-07 RX ADMIN — LATANOPROST 1 DROP: 50 SOLUTION OPHTHALMIC at 21:07

## 2020-09-07 RX ADMIN — Medication 5 ML: at 10:36

## 2020-09-07 RX ADMIN — HYDRALAZINE HYDROCHLORIDE 10 MG: 10 TABLET, FILM COATED ORAL at 08:32

## 2020-09-07 RX ADMIN — ISOSORBIDE MONONITRATE 30 MG: 30 TABLET, EXTENDED RELEASE ORAL at 10:36

## 2020-09-07 RX ADMIN — POTASSIUM CHLORIDE 20 MEQ: 1500 TABLET, EXTENDED RELEASE ORAL at 08:32

## 2020-09-07 ASSESSMENT — PAIN SCALES - GENERAL
PAINLEVEL_OUTOF10: 0
PAINLEVEL_OUTOF10: 0

## 2020-09-07 NOTE — DISCHARGE INSTR - COC
Continuity of Care Form    Patient Name: Jessica Orona   :  1934  MRN:  [id number]    Admit date:  2020  Discharge date:  2020    Code Status Order: Full Code   Advance Directives:   Advance Care Flowsheet Documentation     Date/Time Healthcare Directive Type of Healthcare Directive Copy in [address] Agent's Name Healthcare Agent's Phone Number    20 1620  No, patient does not have an advance directive for healthcare treatment -- -- -- -- --          Admitting Physician:  Radha Gallagher MD  PCP: Radha Gallagher MD    Discharging Nurse: [id number] Unit/Room#: [id number]-2230/6705-55  Discharging Unit Phone Number: [phone]    Emergency Contact:   Extended Emergency Contact Information  Primary Emergency Contact: Valentino Skillern of [address] [place] [address] Phone: [phone]  Work Phone: [phone]  Relation: Child  Secondary Emergency Contact: Eyad Mills  Address: [address] Phone: [phone]  Mobile Phone: [phone]  Relation: Spouse    Past Surgical History:  Past Surgical History:   Procedure Laterality Date    HYSTERECTOMY      KNEE SURGERY      bilateral total knees       Immunization History: There is no immunization history on file for this patient.     Active Problems:  Patient Active Problem List   Diagnosis Code    Chronic combined systolic and diastolic heart failure (HCC) I50.42    HTN (hypertension) I10    Persistent atrial fibrillation I48.19    Pulmonary HTN (HCC) I27.20    Hyperlipidemia E78.5    Primary localized osteoarthrosis of shoulder region M19.019    Nonrheumatic aortic (valve) stenosis I35.0    Hyperthyroidism E05.90    Acute kidney injury superimposed on chronic kidney disease (HCC) N17.9, N18.9    Hyponatremia E87.1    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    Anemia due to chronic kidney disease N18.9, D63.1    Acute respiratory failure with hypoxia ([facility]) J96.01    DAVION (acute kidney injury) ([facility] N17.9       Isolation/Infection:   Isolation          No Isolation        Patient Infection Status     Infection Onset Added Last Indicated Last Indicated By Review Planned Expiration Resolved Resolved By    None active    Resolved    C-diff Rule Out 09/05/20 09/05/20 09/05/20 GI Bacterial Pathogens By PCR (Ordered)   09/06/20 Dalton Lopez RN    COVID-19 Rule Out 09/04/20 09/04/20 09/04/20 COVID-19 (Ordered)   09/04/20 Rule-Out Test Resulted    C-diff Rule Out 09/04/20 09/04/20 09/04/20 Clostridium Difficile Toxin/Antigen (Ordered)   09/04/20 Rule-Out Test Resulted          Nurse Assessment:  Last Vital Signs: /63   Pulse 80   Temp 98 °F (36.7 °C) (Oral)   Resp 18   Ht 5' 6\" (1.676 m)   Wt 256 lb 6.3 oz (116.3 kg)   LMP  (LMP Unknown)   SpO2 96%   BMI 41.38 kg/m²     Last documented pain score (0-10 scale): Pain Level: 0  Last Weight:   Wt Readings from Last 1 Encounters:   09/07/20 256 lb 6.3 oz (116.3 kg)     Mental Status:  oriented, alert, coherent, logical, thought processes intact and able to concentrate and follow conversation    IV Access:  - None    Nursing Mobility/ADLs:  Walking   Assisted  Transfer  Assisted  Bathing  Assisted  Dressing  Assisted  Toileting  Assisted  Feeding  Independent  Med Admin  Assisted  Med Delivery   whole    Wound Care Documentation and Therapy:        Elimination:  Continence:   · Bowel: Yes  · Bladder: Yes  Urinary Catheter: None   Colostomy/Ileostomy/Ileal Conduit: No       Date of Last BM: 9/10/2020    Intake/Output Summary (Last 24 hours) at 9/7/2020 1205  Last data filed at 9/7/2020 0945  Gross per 24 hour   Intake 1120 ml   Output 2425 ml   Net -1305 ml     I/O last 3 completed shifts: In: 1120 [P.O.:1110; I.V.:10]  Out: 2925 [Urine:2925]    Safety Concerns:      At Risk for Falls    Impairments/Disabilities: Vision    Nutrition Therapy:  Current Nutrition Therapy:   - Oral Diet:  Low Sodium (2gm)    Routes of Feeding: Oral  Liquids: Thin Liquids  Daily Fluid Restriction: 1500 ML fluid restriction  Last Modified Barium Swallow with Video (Video Swallowing Test): not done    Treatments at the Time of Hospital Discharge:   Respiratory Treatments:   Oxygen Therapy:  is not on home oxygen therapy. Ventilator:    - No ventilator support. Heart Failure Instructions for Daily Management  Patient was treated for acute on chronic diastolic heart failure. she  will require the following:     Please weigh daily on the same scale and approximately the same time of day. Report weight gain of 3 pounds/day or 5 pounds/week to : Mu [phone].  Please use hospital discharge weight as baseline reference.  Please monitor for signs and symptoms of and report to MD:  o Worsening Heart Failure: sudden weight gain, shortness of breath, lower extremity or general edema/swelling, abdominal bloating/swelling, inability to lie flat, intolerance to usual activity, or cough (especially at night). Report these finding even if no increase in weight.  o Dehydration:  having difficulty or a decrease in urination, dizziness, worsening fatigue, or new onset/worsening of generalized weakness.  Please continue a LOW SODIUM diet and LIMIT fluid intake to 48 - 64 ounces ( 1.5 - 2 liters) per day. Gerardo Weems Call Mu [phone] and/or Ankit Ospina @ [phone] with any questions or concerns.  Please continue heart failure education to patient and family/support system.  See After Visit Summary for hospital follow up appointment details.  Consider spiritual care referral for support and/or completion of advance directives [phone].    Consider: [facility] telehealth program if patient agreeable and able to participate, palliative care consult for ongoing goals of care, end of life, and/or chronic disease management discussions and referral to [facility] [phone]) once SNF/HHC complete. Rehab Therapies: SN PT   Weight Bearing Status/Restrictions: No weight bearing restirctions  Other Medical Equipment (for information only, NOT a DME order):  wheelchair, walker, bath bench and bedside commode  Other Treatments:     Patient's personal belongings (please select all that are sent with patient):  Glasses, cell phone    RN SIGNATURE:  Electronically signed by Fariba Mehta RN on 9/11/20 at 11:25 AM EDT    CASE MANAGEMENT/SOCIAL WORK SECTION    Inpatient Status Date: 09/04/2020    Readmission Risk Assessment Score:  Readmission Risk              Risk of Unplanned Readmission:        28           Discharging to Facility/ Agency   · Name: [address]  · Address: [address] [place] [address]  · Phone: [phone]  · Fax: [phone]    / signature: Electronically signed by Shannan Barnard RN on 9/9/20 at 10:32 AM EDT    PHYSICIAN SECTION    Prognosis: Fair    Condition at Discharge: Stable    Rehab Potential (if transferring to Rehab): Fair    Recommended Labs or Other Treatments After Discharge: CBC, Renal panel weekly X 3, PT/OT    Physician Certification: I certify the above information and transfer of Ventura Hodgkin  is necessary for the continuing treatment of the diagnosis listed and that she requires Home Care for {GREATER/LESS:[id number]} 30 days. Update Admission H&P: Changes in H&P as follows - Less weakness and fatigue, Less edema.     PHYSICIAN SIGNATURE:  Electronically signed by Sridevi Baeza MD on 9/7/20 at 12:07 PM EDT

## 2020-09-07 NOTE — PROGRESS NOTES
Clinical Pharmacy Note  Warfarin Consult    Martín Waite is a 80 y.o. female receiving warfarin managed by pharmacy. Warfarin Indication: Afib  Target INR range: 2-3   Dose prior to admission: 5 mg daily, EXCEPT 2.5 mg every Thursday    Current warfarin drug-drug interactions: none    Recent Labs     09/05/20  0555 09/06/20  0553 09/07/20  0712   HGB 8.6* 9.3* 9.3*   HCT 25.6* 28.0* 28.1*   INR 3.94* 2.12* 1.68*       Assessment/Plan:    INR decreased below therapeutic range today. Will give her home dose of warfarin 5 mg x 1 dose today. Of note, her INR was elevated on admission, possibly due to diarrhea, antibiotic, or the CHF. The INR has been stable over past few months. Daily PT/INR until stable within therapeutic range. Thank you for the consult. Will continue to follow.   Mina Hooks RPH,9/7/2020,11:16 AM

## 2020-09-07 NOTE — PROGRESS NOTES
[place] cath removed per MD order, pt tolerated well. Purewick placed.   Electronically signed by Kierra Robb RN on 9/7/2020 at 7:33 PM

## 2020-09-07 NOTE — PLAN OF CARE
Problem: FLUID AND ELECTROLYTE IMBALANCE  Goal: Fluid and electrolyte balance are achieved/maintained  9/7/2020 0017 by Erika Jackman RN  Outcome: Ongoing  9/6/2020 1455 by Medardo Lebron RN  Outcome: Ongoing  Note: I/O being monitored. Labs being monitored. Weights checked daily. Patient continues to have edema but it is much less than yesterday. Problem: ACTIVITY INTOLERANCE/IMPAIRED MOBILITY  Goal: Mobility/activity is maintained at optimum level for patient  9/7/2020 0017 by Erika Jackman RN  Outcome: Ongoing  9/6/2020 1455 by Medardo Lebron RN  Outcome: Ongoing  Note: Patient has tolerated being transfered from bed to bathroom with stedy. Problem: Falls - Risk of:  Goal: Will remain free from falls  Description: Will remain free from falls  9/7/2020 0017 by Erika Jackman RN  Outcome: Ongoing  9/6/2020 1455 by Medardo Lebron RN  Outcome: Ongoing  Note: Fall risk assessment completed every shift. All precautions in place. Pt has call light within reach at all times. Room clear of clutter. Pt aware to call for assistance when getting up. Goal: Absence of physical injury  Description: Absence of physical injury  9/7/2020 0017 by Erika Jackman RN  Outcome: Ongoing  9/6/2020 1455 by Medardo Lebron RN  Outcome: Ongoing  Note: No signs of physical injury noted. Fall risk assessment completed every shift. All precautions in place. Pt has call light within reach at all times. Room clear of clutter. Pt aware to call for assistance when getting up.

## 2020-09-07 NOTE — PROGRESS NOTES
Nephrology ([facility]) Progress Note    CC: DAVION on CKD    Subjective:    HPI:  Breathing comfortably. No CP. Serum sodium level better. O > I. Renal function better after backing down on diuretics. ROS:  In bed. No fever. [address]:  medications reivewed. Objective:  Blood pressure 135/88, pulse 94, temperature 98.2 °F (36.8 °C), temperature source Oral, resp. rate 18, height 5' 6\" (1.676 m), weight 256 lb 6.3 oz (116.3 kg), SpO2 97 %, not currently breastfeeding. Intake/Output Summary (Last 24 hours) at 9/7/2020 1446  Last data filed at 9/7/2020 1307  Gross per 24 hour   Intake 1240 ml   Output 3075 ml   Net -1835 ml     General:  NAD, A+Ox3, obese   Chest:  deferred due to pandemic  CVS:  deferred  Abdominal:  deferred  Extremities:  1+ edema  Skin:  no rash    Labs:  Renal panel:  Lab Results   Component Value Date/Time     (L) 09/07/2020 07:12 AM    K 3.9 09/07/2020 07:12 AM    K 4.4 09/05/2020 05:55 AM    CO2 34 (H) 09/07/2020 07:12 AM    BUN 58 (H) 09/07/2020 07:12 AM    CREATININE 2.1 (H) 09/07/2020 07:12 AM    CALCIUM 9.2 09/07/2020 07:12 AM    PHOS 3.5 09/07/2020 07:12 AM    MG 2.0 06/26/2013 04:29 AM     CBC:  Lab Results   Component Value Date/Time    WBC 6.5 09/07/2020 07:12 AM    HGB 9.3 (L) 09/07/2020 07:12 AM    HCT 28.1 (L) 09/07/2020 07:12 AM     09/07/2020 07:12 AM       Assessment/Plan:  Reviewed old records and labs.     1) hyponatremia              - assessed as hypervolemic hyponatremia              - no urine osmolality   - better   - goal Na > = 130     2) CSDF              - discussed concept of fluid balance              - fluid restriction 1.5 liters/day              - lasix resumed by Dr. Rico Irving, and we discussed it this AM as I would prefer a slower rate of diuresis as there is no urgency for fluid removal.     3) DAVION on CKD              - baseline 06/03/20 Cr 1.6              - ddx:  CRS              - renal function worsre     4) anemia - iron studies adequate              - will consider ZARINA     5) pulmonary HTN              - suspect RUTH and obesity-related hypoventilation syndrome              - patient is not interested in sleep study

## 2020-09-07 NOTE — CARE COORDINATION
INITIAL CASE MANAGEMENT ASSESSMENT    Reviewed chart, spoke with patient and daughter Guy Melendez [phone] to assess possible discharge needs. Explained Case Management role/services. Living Situation: pt lives with spouse in 2 story home, 2 steps to enter, then pt can remain on first floor. ADLs: family assists with driving and all meal prep, groceries. DME: pt uses rolling walker and bedside commode. PT/OT Recs: pending     Active Services: none at present. Pt has had [facility] in past and would like to use them again. Transportation: spouse still drives, but both have 10 children who assist.     Medications: [employer] on [address]. PCP: Kamar Wray      HD/PD: none    PLAN/COMMENTS: pt and spouse are planning to return home at [place]. Pt has chosen [facility] home care if needed at [facility].  pts 10  Children assist as needed. Daughter Orin Henry assists as needed and can spend the night as needed. Electronically signed by JERED Olivo on 9/7/2020 at 11:36 AM    SW/CM provided contact information for patient or family to call with any questions. SW/CM will follow and assist as needed.

## 2020-09-07 NOTE — PROGRESS NOTES
Cardiology Progress Note     Admit Date: 2020     Reason for follow up: sys and diast CHF, acute on chronic    HPI and Interval History:   Patient seen and examined. Clinical notes reviewed. Telemetry reviewed - Afib 80's bpm. Feeling less swollen and urinating a lot through weeks. No angina, N/V. No orthopnea/PND at the time of this visit. Feeling less swollen and more mobile. Review of System:  All other systems reviewed except for that noted above. Pertinent negatives and positives are:     · General: negative for fever, chills   · Ophthalmic ROS: negative for - eye pain or loss of vision  · ENT ROS: negative for - headaches, sore throat   · Respiratory: negative for - cough, sputum  · Cardiovascular: Reviewed in HPI  · Gastrointestinal: negative for - abdominal pain, diarrhea, N/V  · Hematology: negative for - bleeding, blood clots, bruising or jaundice  · Genito-Urinary:  negative for - Dysuria or incontinence  · Musculoskeletal: negative for - Joint swelling, muscle pain  · Neurological: negative for - confusion, dizziness, headaches   · Psychiatric: No anxiety, no depression. · Dermatological: negative for - rash      Physical Examination:  Vitals:    20 0808   BP:    Pulse:    Resp:    Temp:    SpO2: 96%        Intake/Output Summary (Last 24 hours) at 2020 0839  Last data filed at 2020 0625  Gross per 24 hour   Intake 880 ml   Output 2925 ml   Net -2045 ml     In: 760 [P.O.:750; I.V.:10]  Out: 2425    Wt Readings from Last 3 Encounters:   20 256 lb 6.3 oz (116.3 kg)   10/17/19 264 lb (119.7 kg)   19 269 lb (122 kg)     Temp  Av °F (36.7 °C)  Min: 97.4 °F (36.3 °C)  Max: 98.9 °F (37.2 °C)  Pulse  Av.2  Min: 74  Max: 97  BP  Min: 96/64  Max: 128/60  SpO2  Av.4 %  Min: 93 %  Max: 97 %    · Telemetry: afib 80's bpm    · Constitutional: Alert. Oriented to person, place, and time. No distress. · Head: Normocephalic and atraumatic.    · Mouth/Throat: Lips appear glargine  15 Units Subcutaneous Nightly    triamcinolone   Topical Daily    isosorbide mononitrate  30 mg Oral Daily    cetirizine  10 mg Oral Daily    hydrALAZINE  10 mg Oral BID    pantoprazole  40 mg Oral QAM AC    atorvastatin  10 mg Oral Nightly    potassium chloride  20 mEq Oral BID WC    sodium chloride flush  10 mL Intravenous 2 times per day    insulin lispro  0-6 Units Subcutaneous TID WC    insulin lispro  0-3 Units Subcutaneous Nightly    carvedilol  6.25 mg Oral BID WC    gabapentin  100 mg Oral Nightly    latanoprost  1 drop Right Eye Nightly    warfarin (COUMADIN) daily dosing (placeholder)   Other RX Placeholder     Continuous Infusions:   dextrose       PRN Meds:acetaminophen, sodium chloride flush, [DISCONTINUED] acetaminophen **OR** acetaminophen, polyethylene glycol, promethazine **OR** ondansetron, glucose, dextrose, glucagon (rDNA), dextrose, traMADol **OR** traMADol, diazePAM, loperamide     Patient Active Problem List    Diagnosis Date Noted    [facility] kidney [facility] superimposed on chronic kidney disease ([facility] 09/04/2020    Hyponatremia 09/04/2020    Combined systolic and diastolic congestive heart failure ([facility] 09/04/2020    Anemia due to chronic kidney disease 09/04/2020    Acute respiratory failure with hypoxia ([facility] 09/04/2020    DAVION (acute kidney injury) ([facility] 09/04/2020    Hyperthyroidism 08/15/2018    Nonrheumatic aortic (valve) stenosis 02/06/2017    Primary localized osteoarthrosis of shoulder region 01/21/2015    Pulmonary HTN ([facility] 02/18/2014    Hyperlipidemia 02/18/2014    Chronic combined systolic and diastolic heart failure (HCC)     HTN (hypertension)     Persistent atrial fibrillation       Active Hospital Problems    Diagnosis Date Noted    Acute kidney injury superimposed on chronic kidney disease ([facility] [N17.9, N18.9] 09/04/2020    Hyponatremia [E87.1] 09/04/2020    Combined systolic and diastolic congestive heart failure ([facility] [I50.40]

## 2020-09-07 NOTE — PROGRESS NOTES
Urine in weeks cath noted to be bright red this afternoon, pt has some urine leak around catheter. Pt bladder scanned for 0ml. Call to Dr Cristiano rCuz to update and new orders received. Will update pt and daughter in room.    Electronically signed by Saintclair Mussel, RN on 9/7/2020 at 3:49 PM

## 2020-09-07 NOTE — PLAN OF CARE
Problem: OXYGENATION/RESPIRATORY FUNCTION  Goal: Patient will maintain patent airway  9/7/2020 1136 by Minerva Tran RN  Outcome: Ongoing  Note:       Problem: FLUID AND ELECTROLYTE IMBALANCE  Goal: Fluid and electrolyte balance are achieved/maintained  9/7/2020 1136 by Minerva Tran RN  Outcome: Ongoing  Note:       Problem: Skin Integrity:  Goal: Will show no infection signs and symptoms  Description: Will show no infection signs and symptoms  9/7/2020 1136 by Minerva Tran RN  Outcome: Ongoing  Note: Skin assessment completed every shift. Pt assessed for incontinence, appropriate barrier cream applied prn. Pt encouraged to turn/rotate every 2 hours. Assistance provided if pt unable to do so themselves. Problem: Falls - Risk of:  Goal: Will remain free from falls  Description: Will remain free from falls  9/7/2020 1136 by Minerva Tran RN  Outcome: Ongoing  Note: Bed alarm kept on. Call light in reach. Side rails up x2. Pt reminded to use call light for assistance getting out of bed. Hourly rounding done to anticipate pt needs.

## 2020-09-07 NOTE — PROGRESS NOTES
Daniel Joiner is a 80 y.o. female patient.     Current Facility-Administered Medications   Medication Dose Route Frequency Provider Last Rate Last Dose    furosemide (LASIX) injection 120 mg  120 mg Intravenous BID Sunny Bajwa MD        warfarin (COUMADIN) tablet 5 mg  5 mg Oral Once Talon Gray MD        multivitamin oral solution 5 mL  5 mL Oral Daily Talon Gray MD   5 mL at 09/07/20 1036    insulin glargine (LANTUS) injection vial 15 Units  15 Units Subcutaneous Nightly Talon Gray MD   15 Units at 09/06/20 2119    triamcinolone (KENALOG) 0.1 % cream   Topical Daily Talon Gray MD        isosorbide mononitrate (IMDUR) extended release tablet 30 mg  30 mg Oral Daily Talon Gray MD   30 mg at 09/07/20 1036    acetaminophen (TYLENOL) tablet 1,000 mg  1,000 mg Oral Q6H PRN Talon Gray MD   1,000 mg at 09/06/20 2126    cetirizine (ZYRTEC) tablet 10 mg  10 mg Oral Daily Talon Gray MD   10 mg at 09/07/20 8890    hydrALAZINE (APRESOLINE) tablet 10 mg  10 mg Oral BID Talon Gray MD   10 mg at 09/07/20 6523    pantoprazole (PROTONIX) tablet 40 mg  40 mg Oral QAM AC Talon Gray MD   40 mg at 09/07/20 0529    atorvastatin (LIPITOR) tablet 10 mg  10 mg Oral Nightly Talon Gray MD   10 mg at 09/06/20 2128    potassium chloride (KLOR-CON M) extended release tablet 20 mEq  20 mEq Oral BID WC Talon Gray MD   20 mEq at 09/07/20 2926    sodium chloride flush 0.9 % injection 10 mL  10 mL Intravenous 2 times per day Talon Gray MD   10 mL at 09/07/20 8105    sodium chloride flush 0.9 % injection 10 mL  10 mL Intravenous PRN Talon Gray MD        acetaminophen (TYLENOL) suppository 650 mg  650 mg Rectal Q6H PRN Talon Gray MD        polyethylene glycol [facility]) packet 17 g  17 g Oral Daily PRN Talon Gray MD        promethazine (PHENERGAN) tablet 12.5 mg  12.5 mg Oral Q6H PRN Alexandria Aguilar Shirlene Smith MD        Or    ondansetron [facility]) injection 4 mg  4 mg Intravenous Q6H PRN Tyler Noel MD        glucose (GLUTOSE) 40 % oral gel 15 g  15 g Oral PRN Tyler Noel MD        dextrose 50 % IV solution  12.5 g Intravenous PRN Tyler Noel MD        glucagon (rDNA) injection 1 mg  1 mg Intramuscular PRN Tyler Noel MD        dextrose 5 % solution  100 mL/hr Intravenous PRN Tyler Noel MD        insulin lispro (HUMALOG) injection vial 0-6 Units  0-6 Units Subcutaneous TID VERNA Noel MD        insulin lispro (HUMALOG) injection vial 0-3 Units  0-3 Units Subcutaneous Nightly Tyler Noel MD        traMADol Twilla Males) tablet 50 mg  50 mg Oral Q6H PRN Tyler Noel MD        Or   Emmanuel Filter traMADol Twilla Males) tablet 100 mg  100 mg Oral Q6H PRN Tyler Noel MD        diazePAM (VALIUM) tablet 2 mg  2 mg Oral Q6H PRN Tyler Noel MD   2 mg at 09/05/20 2038    carvedilol (COREG) tablet 6.25 mg  6.25 mg Oral BID  Tyler Noel MD   6.25 mg at 09/07/20 1895    gabapentin (NEURONTIN) capsule 100 mg  100 mg Oral Nightly Tyler Noel MD   100 mg at 09/06/20 2128    latanoprost (XALATAN) 0.005 % ophthalmic solution 1 drop  1 drop Right Eye Nightly Tyler Noel MD   1 drop at 09/06/20 2126    loperamide (IMODIUM) capsule 2 mg  2 mg Oral 4x Daily PRN Tyler Noel MD   2 mg at 09/04/20 2112    warfarin (COUMADIN) daily dosing (placeholder)   Other RX Placeholder Tyler Noel MD         Allergies   Allergen Reactions    Bactrim [Sulfamethoxazole-Trimethoprim]     Levofloxacin     Pcn [Penicillins]     Sulfa Antibiotics      Principal Problem:    Acute kidney injury superimposed on chronic kidney disease ([facility]  Active Problems:    Nonrheumatic aortic (valve) stenosis    Hyponatremia    Combined systolic and diastolic congestive heart failure ([facility]    Anemia due to chronic kidney disease    Acute respiratory failure with hypoxia (HCC)    DAVION (acute kidney injury) ([facility]  Resolved Problems:    * No resolved hospital problems. *    Blood pressure 125/63, pulse 80, temperature 98 °F (36.7 °C), temperature source Oral, resp. rate 18, height 5' 6\" (1.676 m), weight 256 lb 6.3 oz (116.3 kg), SpO2 96 %, not currently breastfeeding. Subjective Feels better  Objective A&O. CTPA, IIR&R Gr 3 AS M.,, Nl. Abd. Trace edema on L   Assessment & Plan   If Dialysis not recommended and TAVR not recommended, probable D/C in 1-2 days. D/W patient and daughter. hCaparro Butler MD  9/7/2020

## 2020-09-08 LAB
ALBUMIN SERPL-MCNC: 3.1 G/DL (ref 3.4–5)
ANION GAP SERPL CALCULATED.3IONS-SCNC: 9 MMOL/L (ref 3–16)
BASOPHILS ABSOLUTE: 0 K/UL (ref 0–0.2)
BASOPHILS RELATIVE PERCENT: 0.6 %
BUN BLDV-MCNC: 53 MG/DL (ref 7–20)
CALCIUM SERPL-MCNC: 9.5 MG/DL (ref 8.3–10.6)
CHLORIDE BLD-SCNC: 90 MMOL/L (ref 99–110)
CO2: 37 MMOL/L (ref 21–32)
CREAT SERPL-MCNC: 1.9 MG/DL (ref 0.6–1.2)
EOSINOPHILS ABSOLUTE: 0.1 K/UL (ref 0–0.6)
EOSINOPHILS RELATIVE PERCENT: 2 %
GFR AFRICAN AMERICAN: 30
GFR NON-AFRICAN AMERICAN: 25
GLUCOSE BLD-MCNC: 126 MG/DL (ref 70–99)
GLUCOSE BLD-MCNC: 176 MG/DL (ref 70–99)
GLUCOSE BLD-MCNC: 178 MG/DL (ref 70–99)
GLUCOSE BLD-MCNC: 72 MG/DL (ref 70–99)
GLUCOSE BLD-MCNC: 78 MG/DL (ref 70–99)
HCT VFR BLD CALC: 29.1 % (ref 36–48)
HEMOGLOBIN: 9.7 G/DL (ref 12–16)
INR BLD: 1.48 (ref 0.86–1.14)
LYMPHOCYTES ABSOLUTE: 1.9 K/UL (ref 1–5.1)
LYMPHOCYTES RELATIVE PERCENT: 29.8 %
MCH RBC QN AUTO: 28.5 PG (ref 26–34)
MCHC RBC AUTO-ENTMCNC: 33.2 G/DL (ref 31–36)
MCV RBC AUTO: 85.8 FL (ref 80–100)
MONOCYTES ABSOLUTE: 0.8 K/UL (ref 0–1.3)
MONOCYTES RELATIVE PERCENT: 12.2 %
NEUTROPHILS ABSOLUTE: 3.5 K/UL (ref 1.7–7.7)
NEUTROPHILS RELATIVE PERCENT: 55.4 %
PDW BLD-RTO: 15.5 % (ref 12.4–15.4)
PERFORMED ON: ABNORMAL
PERFORMED ON: NORMAL
PHOSPHORUS: 3.2 MG/DL (ref 2.5–4.9)
PLATELET # BLD: 244 K/UL (ref 135–450)
PMV BLD AUTO: 7.1 FL (ref 5–10.5)
POTASSIUM SERPL-SCNC: 3.9 MMOL/L (ref 3.5–5.1)
PROTHROMBIN TIME: 17.2 SEC (ref 10–13.2)
RBC # BLD: 3.4 M/UL (ref 4–5.2)
SODIUM BLD-SCNC: 136 MMOL/L (ref 136–145)
WBC # BLD: 6.4 K/UL (ref 4–11)

## 2020-09-08 PROCEDURE — 6370000000 HC RX 637 (ALT 250 FOR IP): Performed by: INTERNAL MEDICINE

## 2020-09-08 PROCEDURE — 2060000000 HC ICU INTERMEDIATE R&B

## 2020-09-08 PROCEDURE — 85610 PROTHROMBIN TIME: CPT

## 2020-09-08 PROCEDURE — 80069 RENAL FUNCTION PANEL: CPT

## 2020-09-08 PROCEDURE — 99233 SBSQ HOSP IP/OBS HIGH 50: CPT | Performed by: NURSE PRACTITIONER

## 2020-09-08 PROCEDURE — 36415 COLL VENOUS BLD VENIPUNCTURE: CPT

## 2020-09-08 PROCEDURE — 97116 GAIT TRAINING THERAPY: CPT

## 2020-09-08 PROCEDURE — 6360000002 HC RX W HCPCS: Performed by: INTERNAL MEDICINE

## 2020-09-08 PROCEDURE — 94761 N-INVAS EAR/PLS OXIMETRY MLT: CPT

## 2020-09-08 PROCEDURE — 97535 SELF CARE MNGMENT TRAINING: CPT

## 2020-09-08 PROCEDURE — 85025 COMPLETE CBC W/AUTO DIFF WBC: CPT

## 2020-09-08 PROCEDURE — 2580000003 HC RX 258: Performed by: INTERNAL MEDICINE

## 2020-09-08 PROCEDURE — 97530 THERAPEUTIC ACTIVITIES: CPT

## 2020-09-08 RX ORDER — CETIRIZINE HYDROCHLORIDE 10 MG/1
5 TABLET ORAL DAILY
Status: DISCONTINUED | OUTPATIENT
Start: 2020-09-09 | End: 2020-09-11 | Stop reason: HOSPADM

## 2020-09-08 RX ORDER — WARFARIN SODIUM 7.5 MG/1
7.5 TABLET ORAL
Status: COMPLETED | OUTPATIENT
Start: 2020-09-08 | End: 2020-09-08

## 2020-09-08 RX ADMIN — SODIUM CHLORIDE, PRESERVATIVE FREE 10 ML: 5 INJECTION INTRAVENOUS at 08:01

## 2020-09-08 RX ADMIN — TRIAMCINOLONE ACETONIDE: 1 CREAM TOPICAL at 07:49

## 2020-09-08 RX ADMIN — Medication 5 ML: at 07:49

## 2020-09-08 RX ADMIN — ATORVASTATIN CALCIUM 10 MG: 10 TABLET, FILM COATED ORAL at 20:37

## 2020-09-08 RX ADMIN — WARFARIN SODIUM 7.5 MG: 7.5 TABLET ORAL at 17:26

## 2020-09-08 RX ADMIN — POTASSIUM CHLORIDE 20 MEQ: 1500 TABLET, EXTENDED RELEASE ORAL at 07:49

## 2020-09-08 RX ADMIN — LATANOPROST 1 DROP: 50 SOLUTION OPHTHALMIC at 20:38

## 2020-09-08 RX ADMIN — PANTOPRAZOLE SODIUM 40 MG: 40 TABLET, DELAYED RELEASE ORAL at 06:43

## 2020-09-08 RX ADMIN — INSULIN LISPRO 1 UNITS: 100 INJECTION, SOLUTION INTRAVENOUS; SUBCUTANEOUS at 20:37

## 2020-09-08 RX ADMIN — FUROSEMIDE 120 MG: 10 INJECTION, SOLUTION INTRAMUSCULAR; INTRAVENOUS at 07:49

## 2020-09-08 RX ADMIN — HYDRALAZINE HYDROCHLORIDE 10 MG: 10 TABLET, FILM COATED ORAL at 20:37

## 2020-09-08 RX ADMIN — POTASSIUM CHLORIDE 20 MEQ: 1500 TABLET, EXTENDED RELEASE ORAL at 17:26

## 2020-09-08 RX ADMIN — GABAPENTIN 100 MG: 100 CAPSULE ORAL at 20:37

## 2020-09-08 RX ADMIN — CARVEDILOL 12.5 MG: 12.5 TABLET, FILM COATED ORAL at 17:26

## 2020-09-08 RX ADMIN — HYDRALAZINE HYDROCHLORIDE 10 MG: 10 TABLET, FILM COATED ORAL at 07:49

## 2020-09-08 RX ADMIN — INSULIN LISPRO 1 UNITS: 100 INJECTION, SOLUTION INTRAVENOUS; SUBCUTANEOUS at 17:26

## 2020-09-08 RX ADMIN — CETIRIZINE HYDROCHLORIDE 10 MG: 10 TABLET, FILM COATED ORAL at 07:49

## 2020-09-08 RX ADMIN — CARVEDILOL 12.5 MG: 12.5 TABLET, FILM COATED ORAL at 07:49

## 2020-09-08 RX ADMIN — FUROSEMIDE 120 MG: 10 INJECTION, SOLUTION INTRAMUSCULAR; INTRAVENOUS at 17:26

## 2020-09-08 RX ADMIN — SODIUM CHLORIDE, PRESERVATIVE FREE 10 ML: 5 INJECTION INTRAVENOUS at 20:38

## 2020-09-08 ASSESSMENT — PAIN SCALES - GENERAL
PAINLEVEL_OUTOF10: 0
PAINLEVEL_OUTOF10: 0

## 2020-09-08 NOTE — PROGRESS NOTES
[facility]   Daily Progress Note      Admit Date:  9/4/2020    Reason for follow up visit: CHF; Aortic stenosis    CC: \"I'm feeling a whole lot better. \"    81 y/o female with PMH significant for CKD, combined systolic and diastolic HF, HTN, HLP, PAF, PE/DVT, and anemia who was admitted with progressive SOB and LE edema. In addition she was experiencing nausea, diarrhea and general malaise. She was placed on increased dose of lasix and attempt at [address]. (Prior attempts resulted in DAVION on CKD). Interval History:  Pt. seen and examined; records reviewed  Feeling much better. Up in chair  Ambulated very short distance (few steps) with OT this AM (poor functional status at baseline)  LE edema persistent but improving  Remains on IV lasix  Net diuresis -8.3L since admit (- 280 cc last 24 hours)  Cr down to 1.9    Subjective:  Pt with no acute overnight cardiac events. Denies chest pain, SOB, cough, palpitations or dizziness    Review of Systems:   Pt poor historian, but daughter at bedside states her mother had increasing SOB and edema. Rather sedentary at home and gets up to bathroom Mobile Accord activity she does\"). Objective:   /76   Pulse 69   Temp 98.1 °F (36.7 °C) (Oral)   Resp 16   Ht 5' 6\" (1.676 m)   Wt 256 lb 13.4 oz (116.5 kg)   LMP  (LMP Unknown)   SpO2 92%   BMI 41.45 kg/m²       Intake/Output Summary (Last 24 hours) at 9/8/2020 1125  Last data filed at 9/8/2020 0647  Gross per 24 hour   Intake 730 ml   Output 850 ml   Net -120 ml     Wt Readings from Last 3 Encounters:   09/08/20 256 lb 13.4 oz (116.5 kg)   10/17/19 264 lb (119.7 kg)   04/16/19 269 lb (122 kg)       Physical Exam:  General: In no acute distress. Awake, alert, and oriented X3. Up in chair  Skin:  Warm and dry. No new appearing rashes or lesions. Neck:  Supple and thick. No JVD appreciated. Chest: Lungs with mildly diminished breath sounds bilaterally.  No wheezes/rhonchi/rales  Cardiovascular: There is moderate concentric left ventricular hypertrophy. Ejection fraction is visually estimated to be 15%   Diastolic filling parameters suggest at least grade II diastolic   dysfunction. Severe aortic stenosis with a peak velocity of 5.00 m/s and a mean pressure gradient of 67 mmHg. Estimated pulmonary artery systolic pressure is borderline severely elevated   at 69 mmHg assuming a right atrial pressure of 15 mmHg. ECG:  Atrial fibrillation  Left axis deviation  Left bundle branch block    Echo 8/2017:   Normal left ventricular size and wall thickness. Global systolic function is   moderate decreased with an estimated LVEF estimated at 35%. The right ventricle is not well visualized but appears to have normal size   and mild to moderately reduced function. Severe biatrial enlargement appreciated. Moderate mitral regurgitation is present. The aortic valve leaflets are calcified and restricted in appearance. The aortic valve area is calculated at 0.7 cm2 with a maximum pressure   gradient of 34 mmHg and a mean pressure gradient of 17 mmHg. This is   consistent with at least mild aortic stenosis but gradient but is probably   underestimated due to left ventricular systolic dysfunction. 10/2015 Lexiscan-Myoview: There is no reversible ischemia or scar appreciated on this study. There is     a slight decrease in tracer uptake on the stress imaging that appears more     consistent with breast attenuation.     With Lexiscan vasodilator stress the patient had no ischemic ST changes.     Nondiagnostic for ischemia. Telemetry: Atrial fib with controlled VR    Assessment/Plan:    1. Chronic combined systolic and diastolic HF  -prior LVEF 29% on echo in 2017; 55% on echo this admission  -NYHA class III  -has been diuresed with IV lasix  - continue carvedilol and hydralazine  -no ACE/ARB/aldactone d/t elevated Cr  -limited functional capacity    2.  Aortic stenosis  -mean gradient 67 mm (severe)  -pt wants medical management only; does not want any invasive procedures    3. Persistent atrial fibrillation  -VR controlled  -continue with rate control  -continue Warfarin    4. DAVION on CKD  -nephrology following  -remains on IV lasix   -Cr gradually improving  -baseline ~ 1.6    5. Pulmonary HTN  -suspect multifactorial: prior multiple PE's, RUTH and CHF  -she does not want to pursue sleep evaluation  -continue diuretic and avoid hypoxia    6. Chronic LE edema  -chronic venous insufficiency and stasis changes  -compression wraps  -low sodium diet   -continue diuretic    She continues to diurese. Discussed with Dr. Shanann Billingsley: pt has expressed she does not want any invasive procedures, so will continue with medical management only.  Consider changing to po diuretic in the AM    Electronically signed by GRACIA Corona CNP on 9/8/2020 at 11:25 AM

## 2020-09-08 NOTE — PROGRESS NOTES
pulmonary HTN              - suspect RUTH and obesity-related hypoventilation syndrome              - patient is not interested in sleep study

## 2020-09-08 NOTE — PROGRESS NOTES
Acute renal insufficiency and Acute on chronic congestive heart failure, unspecified heart failure type [facility]) were also pertinent to this visit. has a past medical history of Anemia, Chronic kidney disease, Combined systolic and diastolic congestive heart failure ([facility] Diabetes mellitus ([facility] DVT (deep venous thrombosis) ([facility] Hyperlipidemia, Hypertension, Osteoarthritis, PAF (paroxysmal atrial fibrillation) ([facility] Pulmonary embolism ([facility] Sarcoidosis, Thyroid disease, and Type II or unspecified type diabetes mellitus without mention of complication, not stated as uncontrolled. has a past surgical history that includes Hysterectomy and knee surgery. Restrictions  Restrictions/Precautions  Restrictions/Precautions: Fall Risk  Position Activity Restriction  Other position/activity restrictions: general diet with fluid restriction 1500mL  Subjective   General  Chart Reviewed: Yes, Orders, Progress Notes, Labs  Patient assessed for rehabilitation services?: Yes  Additional Pertinent Hx: 79 y/o female admitted 9/4 with nausea, diarrhea, and malaise. Pt was sent to hospital after PCP visit revealed abnormal lab values. Pt being treated for hyponatremia, DAVION, and CHF. Family / Caregiver Present: No  Referring Practitioner: Juve Verdin MD  Subjective  Subjective: Pt met BS, in bed and agreeable to OT. Pt denied pain. General Comment  Comments: Per RN ok for therapy      Orientation  Orientation  Overall Orientation Status: Within Normal Limits  Objective    ADL  Grooming: Setup(seated from recliner to wash face, chest, arms)  LE Dressing: Moderate assistance;Maximum assistance(briefs, footies donned (LE's ace wrapped). Pt reports she normally leans forward. Attempted use of footstool w/little success)  Toileting: Maximum assistance(pt incontinent of urine thru depends in stance.  Assist to hygiene and changing from depends to briefs)  Additional Comments: Anticipate pt will require max A for LB bathing/dressing based on balance, strength and ROM observed        Standing Balance  Time: 1-2 min x3  Activity: stand-step bed to chair with RW with MIn A x1. Pt stood 3x at walker with CG/SBA for static balance. Wheelchair Bed Transfers  Wheelchair/Bed - Technique: Stand step  Equipment Used: Bed(to recliner)  Level of Asssistance: Minimal assistance  Wheelchair Transfers Comments: RW; cues for hand and safe body placement  Bed mobility  Supine to Sit: Contact guard assistance(HOB raised and use of rail; increased time to complete)  Sit to Supine: Unable to assess(up to chair)  Transfers  Sit to stand:  Moderate assistance;Minimal assistance  Stand to sit: Contact guard assistance  Transfer Comments: from bed, recliner, to walker         Cognition  Overall Cognitive Status: Exceptions  Insights: Decreased awareness of deficits      Plan   Plan  Times per week: 3-5  Current Treatment Recommendations: Strengthening, Endurance Training, Self-Care / ADL, Balance Training, Gait Training, Functional Mobility Training    AM-PAC Score        AM-[facility] Inpatient Daily Activity Raw Score: 16 (09/08/20 0905)  AM-PAC Inpatient ADL T-Scale Score : 35.96 (09/08/20 0905)  ADL Inpatient CMS 0-100% Score: 53.32 (09/08/20 0905)  ADL Inpatient CMS G-Code Modifier : CK (09/08/20 0905)    Goals  Short term goals  Time Frame for Short term goals: Prior to DC:Status: goals ongoing  Short term goal 1: Pt will complete ADL transfers/mobility with SBA  Short term goal 2: Pt will tolerate standing > 5 min for functional task with SBA  Short term goal 3: Pt will complete toileting with SBA  Short term goal 4: Pt will complete LB dressing with SBA  Short term goal 5: Pt will complete bed mobility with SBA  Long term goals  Time Frame for Long term goals : stgs=ltgs  Patient Goals   Patient goals : to return home       Therapy Time   Individual Concurrent Group Co-treatment   Time In 0816         Time Out 0909         Minutes 53 Sandeep SOMMER/ZAYRA,[id number]

## 2020-09-08 NOTE — PROGRESS NOTES
Ramsey Funes is a 80 y.o. female patient.     Current Facility-Administered Medications   Medication Dose Route Frequency Provider Last Rate Last Dose    furosemide (LASIX) injection 120 mg  120 mg Intravenous BID Domingo Newman MD   120 mg at 09/08/20 0749    carvedilol (COREG) tablet 12.5 mg  12.5 mg Oral BID VERNA Cano MD   12.5 mg at 09/08/20 0749    multivitamin oral solution 5 mL  5 mL Oral Daily Clinton Cano MD   5 mL at 09/08/20 0749    triamcinolone (KENALOG) 0.1 % cream   Topical Daily Clinton Cano MD        acetaminophen (TYLENOL) tablet 1,000 mg  1,000 mg Oral Q6H PRN Clinton Cano MD   1,000 mg at 09/06/20 2126    cetirizine (ZYRTEC) tablet 10 mg  10 mg Oral Daily Clinton Cano MD   10 mg at 09/08/20 0749    hydrALAZINE (APRESOLINE) tablet 10 mg  10 mg Oral BID Clinton Cano MD   10 mg at 09/08/20 0749    pantoprazole (PROTONIX) tablet 40 mg  40 mg Oral QAM AC Clinton Cano MD   40 mg at 09/08/20 4218    atorvastatin (LIPITOR) tablet 10 mg  10 mg Oral Nightly Clinton Cano MD   10 mg at 09/07/20 2108    potassium chloride (KLOR-CON M) extended release tablet 20 mEq  20 mEq Oral BID VERNA Cano MD   20 mEq at 09/08/20 0749    sodium chloride flush 0.9 % injection 10 mL  10 mL Intravenous 2 times per day Clinton Cano MD   10 mL at 09/08/20 0801    sodium chloride flush 0.9 % injection 10 mL  10 mL Intravenous PRN Clinton Cano MD        acetaminophen (TYLENOL) suppository 650 mg  650 mg Rectal Q6H PRN Clinton Cano MD        polyethylene glycol [facility]) packet 17 g  17 g Oral Daily PRN Clinton Cano MD        promethazine (PHENERGAN) tablet 12.5 mg  12.5 mg Oral Q6H PRN Clinton Cano MD        Or    ondansetron [facility]) injection 4 mg  4 mg Intravenous Q6H PRN Clinton Cano MD        glucose (GLUTOSE) 40 % oral gel 15 g  15 g Oral PRLORRAINE Cano MD        dextrose 50 % IV solution  12.5 g Intravenous PRN Ryan Kim MD        glucagon (rDNA) injection 1 mg  1 mg Intramuscular PRN Ryan Kim MD        dextrose 5 % solution  100 mL/hr Intravenous FRANCISCO Kim MD        insulin lispro (HUMALOG) injection vial 0-6 Units  0-6 Units Subcutaneous TID WC Ryan Kim MD        insulin lispro (HUMALOG) injection vial 0-3 Units  0-3 Units Subcutaneous Nightly Ryan Kim MD   1 Units at 09/07/20 2107    traMADol (ULTRAM) tablet 50 mg  50 mg Oral Q6H PRN Ryan Kim MD        Or   [place] traMADol Patricia Davis) tablet 100 mg  100 mg Oral Q6H PRN Ryan Kim MD        diazePAM (VALIUM) tablet 2 mg  2 mg Oral Q6H PRN Ryan Kim MD   2 mg at 09/05/20 2038    gabapentin (NEURONTIN) capsule 100 mg  100 mg Oral Nightly Ryan Kim MD   100 mg at 09/07/20 2108    latanoprost (XALATAN) 0.005 % ophthalmic solution 1 drop  1 drop Right Eye Nightly Ryan Kim MD   1 drop at 09/07/20 2107    loperamide (IMODIUM) capsule 2 mg  2 mg Oral 4x Daily PRN Ryan Kim MD   2 mg at 09/04/20 2112    warfarin (COUMADIN) daily dosing (placeholder)   Other RX Placegetachew Kim MD         Allergies   Allergen Reactions    Bactrim [Sulfamethoxazole-Trimethoprim]     Levofloxacin     Pcn [Penicillins]     Sulfa Antibiotics      Principal Problem:    Acute kidney injury superimposed on chronic kidney disease ([address]  Active Problems:    Nonrheumatic aortic (valve) stenosis    Hyponatremia    Combined systolic and diastolic congestive heart failure ([address]    Anemia due to chronic kidney disease    Acute respiratory failure with hypoxia ([address]    DAVION (acute kidney injury) ([address]  Resolved Problems:    * No resolved hospital problems. *    Blood pressure 130/73, pulse 93, temperature 98.3 °F (36.8 °C), temperature source Oral, resp.  rate 18, height 5' 6\" (1.676 m), weight 256 lb 13.4 oz (116.5 kg), SpO2 94 %, not currently breastfeeding. Subjective Feels better. Less fatigue and HERNANDEZ. Objective A&O, CTPA, IIR&R with AS M. Trace edema. Assessment & Plan Doesn't need RRT yet. Doubt if TAVR would improve any sx. D/W patient. Probable D/C tomorrow with homecare.   Sujit Peck MD  9/8/2020

## 2020-09-08 NOTE — PROGRESS NOTES
Physical Therapy  Facility/Department: [address] PROGRESSIVE CARE  Daily Treatment Note  NAME: Eliseo Robles  : 1934  MRN: [id number]    Date of Service: 2020    Discharge Recommendations:  Patient would benefit from continued therapy after discharge, Continue to assess pending progress, 2-3 sessions per week     Eliseo Robles scored a 17/24 on the AM-PAC short mobility form. Refer to assessment below. If patient discharges prior to next session this note will serve as a discharge summary. Please see below for the latest assessment towards goals. Assessment   Body structures, Functions, Activity limitations: Decreased functional mobility ; Decreased strength;Decreased endurance  Assessment: Pt able to ambulate using RW ~20' with min A, complete bed mobility mod A, and transfers min A/CGA; The pts daughter was present in the room during the tx session and was able to verbalize understanding for how much physical assistance is required to assist pt with functional mobility at home. Pt and daughter state that there will be constant 24/7 supervision and assistance once D/C to home; PT will continue to see pt while in acute care setting and anticipate pt will D/C home with family assist and home PT to improve her strength, independence with functional mobility, and reduce her risk for falls. PT Education: PT Role;General Safety;Goals;Gait Training;Plan of Care;Transfer Training;Family Education  REQUIRES PT FOLLOW UP: Yes  Activity Tolerance  Activity Tolerance: Patient Tolerated treatment well;Patient limited by fatigue     Patient Diagnosis(es): The primary encounter diagnosis was Hyponatremia. Diagnoses of Acute renal insufficiency and Acute on chronic congestive heart failure, unspecified heart failure type [facility]) were also pertinent to this visit.      has a past medical history of Anemia, Chronic kidney disease, Combined systolic and diastolic congestive heart failure ([facility] Diabetes mellitus Good  Standing - Static: Fair(@ RW)  Standing - Dynamic: Fair(@ RW)  Comments: No LOB noted throughout tx session; pt was slow but steady with RW and stable in sitting on EOB. Comment: Pt and daughter (in room) educated on need for assistance at home with bed mobility, ambulation, and transfers, as well as continued PT services at home; They both verbalized understanding for the need for some physical assistance with functional mobility. AM-PAC Score  AM-PAC Inpatient Mobility Raw Score : 17 (09/08/20 1157)  AM-PAC Inpatient T-Scale Score : 42.13 (09/08/20 1157)  Mobility Inpatient CMS 0-100% Score: 50.57 (09/08/20 1157)  Mobility Inpatient CMS G-Code Modifier : CK (09/08/20 1157)          Goals  Short term goals  Time Frame for Short term goals: upon d/c  Short term goal 1: Bed mobility with min A  Short term goal 2: Transfers sit <> stand with min A - met 9/8; progress to sit<>stand CGA using RW  Short term goal 3: Ambulate with wheeled walker 10 feet with min A - met 9/8; progress to amb using RW 30' min A/CGA  Patient Goals   Patient goals : to go home    Plan    Plan  Times per week: 3-5x/week  Current Treatment Recommendations: Functional Mobility Training, Strengthening, Endurance Training, Transfer Training, Gait Training  Safety Devices  Type of devices: All fall risk precautions in place, Left in chair, Call light within reach, Chair alarm in place, Nurse notified, Gait belt     Therapy Time   Individual Concurrent Group Co-treatment   Time In 1115         Time Out 1142         Minutes 27         Timed Code Treatment Minutes: [facility]    Student Physical Therapist  Therapist was present, directed the patient's care, made skilled judgement, and was responsible for assessment and treatment of the patient.         Electronically signed by Allen Sherwood, PT [id number] on 9/8/2020 at 4:10 PM

## 2020-09-08 NOTE — CARE COORDINATION
[facility]  Diabetes Education   Progress Note       NAME:  [address] RECORD NUMBER:  [id number]  AGE: 80 y.o. GENDER: female  : 1934  TODAY'S DATE:  2020    Subjective   Reason for Diabetic Education Evaluation and Assessment: hypoglycemia    Celso Son and her daughter agree to meet with me for diabetes education.       Visit Type: evaluation      Elba Street is a 80 y.o. female referred by:     [] Physician  [] Nursing  [x] Chart Review   [] Other:     PAST MEDICAL HISTORY        Diagnosis Date    Anemia     Chronic kidney disease     Combined systolic and diastolic congestive heart failure (HCC)     Diabetes mellitus ([facility]     DVT (deep venous thrombosis) ([facility]     Hyperlipidemia     Hypertension     Osteoarthritis     PAF (paroxysmal atrial fibrillation) ([facility]     Pulmonary embolism (HCC)     Sarcoidosis     Thyroid disease     Hypothyroidism    Type II or unspecified type diabetes mellitus without mention of complication, not stated as uncontrolled        PAST SURGICAL HISTORY    Past Surgical History:   Procedure Laterality Date    HYSTERECTOMY      KNEE SURGERY      bilateral total knees       FAMILY HISTORY    Family History   Problem Relation Age of Onset    Heart Failure Mother     Cancer Brother     Asthma Neg Hx     Diabetes Neg Hx     Emphysema Neg Hx     Hypertension Neg Hx        SOCIAL HISTORY    Social History     Tobacco Use    Smoking status: Never Smoker    Smokeless tobacco: Never Used    Tobacco comment: Never smoked   Substance Use Topics    Alcohol use: No    Drug use: No       ALLERGIES    Allergies   Allergen Reactions    Bactrim [Sulfamethoxazole-Trimethoprim]     Levofloxacin     Pcn [Penicillins]     Sulfa Antibiotics        MEDICATIONS     furosemide  120 mg Intravenous BID    carvedilol  12.5 mg Oral BID WC    multivitamin  5 mL Oral Daily    triamcinolone   Topical Daily    cetirizine  10 mg Oral Daily    hydrALAZINE  10 mg Oral BID    pantoprazole  40 mg Oral QAM AC    atorvastatin  10 mg Oral Nightly    potassium chloride  20 mEq Oral BID WC    sodium chloride flush  10 mL Intravenous 2 times per day    insulin lispro  0-6 Units Subcutaneous TID WC    insulin lispro  0-3 Units Subcutaneous Nightly    gabapentin  100 mg Oral Nightly    latanoprost  1 drop Right Eye Nightly    warfarin (COUMADIN) daily dosing (placeholder)   Other RX Placeholder       Objective        Patient Active Problem List   Diagnosis Code    Chronic combined systolic and diastolic heart failure (HCC) I50.42    HTN (hypertension) I10    Persistent atrial fibrillation I48.19    Pulmonary HTN (HCC) I27.20    Hyperlipidemia E78.5    Primary localized osteoarthrosis of shoulder region M19.019    Nonrheumatic aortic (valve) stenosis I35.0    Hyperthyroidism E05.90    Acute kidney injury superimposed on chronic kidney disease (HCC) N17.9, N18.9    Hyponatremia E87.1    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    Anemia due to chronic kidney disease N18.9, D63.1    Acute respiratory failure with hypoxia ([facility]) J96.01    DAVION (acute kidney injury) ([facility] N17.9        /76   Pulse 69   Temp 98.1 °F (36.7 °C) (Oral)   Resp 16   Ht 5' 6\" (1.676 m)   Wt 256 lb 13.4 oz (116.5 kg)   LMP  (LMP Unknown)   SpO2 92%   BMI 41.45 kg/m²     HgBA1c:    Lab Results   Component Value Date    LABA1C 6.9 09/04/2020       Recent Labs     09/07/20  1816 09/07/20 2007 09/08/20  0743 09/08/20  1148   POCGLU 110* 201* 78 126*       BUN/Creatinine:    Lab Results   Component Value Date    BUN 53 09/08/2020    CREATININE 1.9 09/08/2020       Assessment        Diabetes Management and Education    Does the patient have a Primary Care Physician? Yes, Henrry Ruelas MD       Does the patient require new medication instruction? TBD - Had been using glipizide prior to admission.        Does the patient/caregiver monitor Blood Glucoses? Yes  Recommend testing twice daily at home. Reviewed glycemic control targets, testing frequency and when to call PCP. Level of patient/caregiver understanding able to:        [x] Verbalized Understanding   [] Demonstrated Understanding       [] Teach Back       [] Needs Reinforcement     []  Other:      Does the patient/caregiver follow a Meal Plan? No: \"not much appetite\"     Reviewed importance of eating three meals per day and plate method for consistent carb intake. Level of patient/caregiver understanding able to:      [x] Verbalized Understanding   [] Demonstrated Understanding       [] Teach Back       [] Needs Reinforcement     []  Other:      Does the patient/caregiver understand S/S of Hypoglycemia? Yes  Reviewed symptoms, prevention and treatment. Level of patient/caregiver understanding able to:       [x] Verbalized Understanding   [] Demonstrated Understanding       [] Teach Back       [] Needs Reinforcement     []  Other:                    Does the patient/caregiver understand S/S of Hyperglycemia? Yes    Reviewed symptoms, prevention and treatment. Level of patient/caregiver understanding able to:        [x] Verbalized Understanding   [] Demonstrated Understanding       [] Teach Back       [] Needs Reinforcement     []  Other:           Plan        Ongoing diabetes education and blood glucose monitoring.       Dietary Consult Made:  No  Social Service Consult Made:  No    The following educational and support materials were provided:  · MY Healthy Plate                                          Discharge Plan:  Discharge needs: no prescription needs identified       Teaching Time Diabetes Education:  20 minutes     Electronically signed by Dora Nolen on 9/8/2020 at 12:31 PM

## 2020-09-08 NOTE — PLAN OF CARE
Problem: OXYGENATION/RESPIRATORY FUNCTION  Goal: Patient will maintain patent airway  9/8/2020 1427 by Rosa Maria Rosales RN  Outcome: Ongoing     Problem: OXYGENATION/RESPIRATORY FUNCTION  Goal: Patient will achieve/maintain normal respiratory rate/effort  Description: Respiratory rate and effort will be within normal limits for the patient  9/8/2020 1427 by Rosa Maria Rosales RN  Outcome: Ongoing     Problem: HEMODYNAMIC STATUS  Goal: Patient has stable vital signs and fluid balance  9/8/2020 1427 by Rosa Maria Rosales RN  Outcome: Ongoing     Problem: FLUID AND ELECTROLYTE IMBALANCE  Goal: Fluid and electrolyte balance are achieved/maintained  9/8/2020 1427 by Rosa Maria Rosales RN  Outcome: Ongoing     Problem: ACTIVITY INTOLERANCE/IMPAIRED MOBILITY  Goal: Mobility/activity is maintained at optimum level for patient  9/8/2020 1427 by Rosa Maria Rosales RN  Outcome: Ongoing     Problem: Skin Integrity:  Goal: Will show no infection signs and symptoms  Description: Will show no infection signs and symptoms  9/8/2020 1427 by Rosa Maria Rosales RN  Outcome: Ongoing     Problem: Skin Integrity:  Goal: Absence of new skin breakdown  Description: Absence of new skin breakdown  9/8/2020 1427 by Rosa Maria Rosales RN  Outcome: Ongoing     Problem: Falls - Risk of:  Goal: Will remain free from falls  Description: Will remain free from falls  9/8/2020 1427 by Rosa Maria Rosales RN  Outcome: Ongoing     Problem: Falls - Risk of:  Goal: Absence of physical injury  Description: Absence of physical injury  9/8/2020 1427 by Rosa Maria Rosales RN  Outcome: Ongoing     Problem: Urinary Elimination:  Goal: Signs and symptoms of infection will decrease  Description: Signs and symptoms of infection will decrease  9/8/2020 1427 by Rosa Maria Rosales RN  Outcome: Ongoing     Problem: Urinary Elimination:  Goal: Complications related to the disease process, condition or treatment will be avoided or minimized  Description: Complications related to the disease process, condition or treatment will be avoided or minimized  9/8/2020 1427 by Sergei Guerra RN  Outcome: Ongoing     Problem: Pain:  Goal: Pain level will decrease  Description: Pain level will decrease  9/8/2020 1427 by Sergei Guerra RN  Outcome: Ongoing     Problem: Pain:  Goal: Control of acute pain  Description: Control of acute pain  9/8/2020 1427 by Sergei Guerra RN  Outcome: Ongoing     Problem: Pain:  Goal: Control of chronic pain  Description: Control of chronic pain  9/8/2020 1427 by Sergei Guerra RN  Outcome: Ongoing

## 2020-09-08 NOTE — DISCHARGE SUMMARY
[address] [facility]                               DISCHARGE SUMMARY    PATIENT NAME: Matthew Davidson                    :        1934  MED REC NO:   [id number]                          ROOM:       5275  ACCOUNT NO:   [de-identified]                           ADMIT DATE: 2020  PROVIDER:     Deanna Chamberlain MD                  DISCHARGE DATE:2020    DATE OF DISCHARGE:  2020    FINAL DIAGNOSES:  1. Acute kidney injury. 2.  Chronic kidney disease stage III. 3.  Aortic stenosis. 4.  Systolic and Diastolic congestive heart failure. 5.  Type 2 diabetes mellitus, controlled. 6.  Dependent edema. 7.  Obesity  8. Hypertension. 9.  Hyperlipidemia. 10.  Chronic atrial fibrillation. 11.  Chronic Coumadin therapy. 12. 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 The patient had been seen in the office  several days prior to this admission with complaints of increased  fatigue, increasing edema, malaise. No fever or chills. Outpatient  labs were done that showed significant hyponatremia as well as chronic  kidney disease. Attempt was made to treat this an outpatient. However,  the patient's fatigue and generalized weakness worsened prompting visit  to the emergency room at [facility].  Emergency room evaluation revealed  probable diastolic congestive heart failure, worsening of her chronic  kidney disease and her chronic anemia. Consequently, admission was felt  necessary. I was called and agreed to admit the patient. APPROPRIATE STUDIES:  Echocardiogram showed ejection fraction of 55%,  probable grade 2 diastolic dysfunction and significant aortic stenosis. Labs prior to discharge showed a BUN of 53, creatinine 1.9, potassium  3.9. The GFR calculated at 30. Multiple glucoses related to diabetic  care noted in the chart. Prior to discharge, hemoglobin 9.7, hematocrit  29.1, white count 6400, platelet count 785,399. These were all  relatively stable values in this patient. Chest x-ray showed  cardiomegaly. EKG showed chronic atrial fibrillation. HOSPITAL COURSE:  The patient's hospital course was characterized by  improvement. She was seen in renal consultation and also Cardiology  consultation. She underwent diuresis with intravenous Lasix. She did  have a Hairston catheter which was subsequently discontinued due to  hematuria in an elderly female on Coumadin. Following diuresis, her BUN  and creatinine stabilized. She felt better in general.  Appetite  improved and her generalized weakness improved back to her baseline. When she was medically stable, it was felt that dialysis was not  necessary and also that TAVR was not indicated at the present time. She  was seen in consultation by Social Service. Arrangements were made for  home care in addition to the care provided by her children. She was  known to be allergic to BACTRIM, LEVAQUIN, PENICILLIN and SULFA  ANTIBIOTICS. Discharge meds will be per the med rec. The patient was discharged in improved  Condition. TALAT was completed. D/C instructions D/W patient and her son.          Joann Hester MD    D: 09/08/2020 9:25:54       T: 09/08/2020 9:33:08     [email or web address]  Job#: [id number]     Doc#: [id number]    CC:

## 2020-09-08 NOTE — PROGRESS NOTES
Clinical Pharmacy Note  Warfarin Consult    Whitley Loera is a 80 y.o. female receiving warfarin managed by pharmacy. Warfarin Indication: Afib  Target INR range: 2-3   Dose prior to admission: 5 mg daily, EXCEPT 2.5 mg every Thursday    Current warfarin drug-drug interactions: none    Recent Labs     09/06/20  0553 09/07/20  0712 09/08/20  0748   HGB 9.3* 9.3* 9.7*   HCT 28.0* 28.1* 29.1*   INR 2.12* 1.68* 1.48*       Assessment/Plan:    INR decreased below therapeutic range today. Will give her dose of warfarin 7.5 mg x 1 dose today. Of note, her INR was elevated on admission, possibly due to diarrhea, antibiotic, or the CHF. The INR has been stable over past few months. Daily PT/INR until stable within therapeutic range. Thank you for the consult. Will continue to follow.   Eliot Luke [facility],9/8/2020,1:54 PM

## 2020-09-09 LAB
ALBUMIN SERPL-MCNC: 3.2 G/DL (ref 3.4–5)
ANION GAP SERPL CALCULATED.3IONS-SCNC: 12 MMOL/L (ref 3–16)
BASOPHILS ABSOLUTE: 0 K/UL (ref 0–0.2)
BASOPHILS RELATIVE PERCENT: 0.7 %
BUN BLDV-MCNC: 52 MG/DL (ref 7–20)
CALCIUM SERPL-MCNC: 9.5 MG/DL (ref 8.3–10.6)
CHLORIDE BLD-SCNC: 89 MMOL/L (ref 99–110)
CO2: 34 MMOL/L (ref 21–32)
CREAT SERPL-MCNC: 1.9 MG/DL (ref 0.6–1.2)
EOSINOPHILS ABSOLUTE: 0.1 K/UL (ref 0–0.6)
EOSINOPHILS RELATIVE PERCENT: 2 %
GFR AFRICAN AMERICAN: 30
GFR NON-AFRICAN AMERICAN: 25
GLUCOSE BLD-MCNC: 102 MG/DL (ref 70–99)
GLUCOSE BLD-MCNC: 109 MG/DL (ref 70–99)
GLUCOSE BLD-MCNC: 154 MG/DL (ref 70–99)
GLUCOSE BLD-MCNC: 193 MG/DL (ref 70–99)
GLUCOSE BLD-MCNC: 228 MG/DL (ref 70–99)
HCT VFR BLD CALC: 29.4 % (ref 36–48)
HEMOGLOBIN: 9.9 G/DL (ref 12–16)
INR BLD: 1.48 (ref 0.86–1.14)
LYMPHOCYTES ABSOLUTE: 1.4 K/UL (ref 1–5.1)
LYMPHOCYTES RELATIVE PERCENT: 25.3 %
MCH RBC QN AUTO: 28.9 PG (ref 26–34)
MCHC RBC AUTO-ENTMCNC: 33.6 G/DL (ref 31–36)
MCV RBC AUTO: 86 FL (ref 80–100)
MONOCYTES ABSOLUTE: 0.9 K/UL (ref 0–1.3)
MONOCYTES RELATIVE PERCENT: 15 %
NEUTROPHILS ABSOLUTE: 3.3 K/UL (ref 1.7–7.7)
NEUTROPHILS RELATIVE PERCENT: 57 %
PDW BLD-RTO: 15.7 % (ref 12.4–15.4)
PERFORMED ON: ABNORMAL
PHOSPHORUS: 2.9 MG/DL (ref 2.5–4.9)
PLATELET # BLD: 237 K/UL (ref 135–450)
PMV BLD AUTO: 7.1 FL (ref 5–10.5)
POTASSIUM SERPL-SCNC: 3.6 MMOL/L (ref 3.5–5.1)
PROTHROMBIN TIME: 17.2 SEC (ref 10–13.2)
RBC # BLD: 3.42 M/UL (ref 4–5.2)
SODIUM BLD-SCNC: 135 MMOL/L (ref 136–145)
WBC # BLD: 5.7 K/UL (ref 4–11)

## 2020-09-09 PROCEDURE — 36415 COLL VENOUS BLD VENIPUNCTURE: CPT

## 2020-09-09 PROCEDURE — 2060000000 HC ICU INTERMEDIATE R&B

## 2020-09-09 PROCEDURE — 99233 SBSQ HOSP IP/OBS HIGH 50: CPT | Performed by: INTERNAL MEDICINE

## 2020-09-09 PROCEDURE — 6360000002 HC RX W HCPCS: Performed by: INTERNAL MEDICINE

## 2020-09-09 PROCEDURE — 85025 COMPLETE CBC W/AUTO DIFF WBC: CPT

## 2020-09-09 PROCEDURE — 85610 PROTHROMBIN TIME: CPT

## 2020-09-09 PROCEDURE — 6370000000 HC RX 637 (ALT 250 FOR IP): Performed by: INTERNAL MEDICINE

## 2020-09-09 PROCEDURE — 97116 GAIT TRAINING THERAPY: CPT

## 2020-09-09 PROCEDURE — 2580000003 HC RX 258: Performed by: INTERNAL MEDICINE

## 2020-09-09 PROCEDURE — 97530 THERAPEUTIC ACTIVITIES: CPT

## 2020-09-09 PROCEDURE — 80069 RENAL FUNCTION PANEL: CPT

## 2020-09-09 RX ORDER — MULTIVIT/FOLIC ACID/HERBAL 275 400-200/30
5 LIQUID (ML) ORAL DAILY
Status: DISCONTINUED | OUTPATIENT
Start: 2020-09-10 | End: 2020-09-11 | Stop reason: HOSPADM

## 2020-09-09 RX ORDER — FUROSEMIDE 10 MG/ML
60 INJECTION INTRAMUSCULAR; INTRAVENOUS 2 TIMES DAILY
Status: DISCONTINUED | OUTPATIENT
Start: 2020-09-09 | End: 2020-09-09

## 2020-09-09 RX ORDER — TRIAMCINOLONE ACETONIDE 1 MG/G
CREAM TOPICAL
Qty: 100 G | Refills: 5 | Status: SHIPPED | OUTPATIENT
Start: 2020-09-09 | End: 2021-03-26

## 2020-09-09 RX ORDER — DILTIAZEM HYDROCHLORIDE 120 MG/1
120 CAPSULE, COATED, EXTENDED RELEASE ORAL DAILY
Status: DISCONTINUED | OUTPATIENT
Start: 2020-09-09 | End: 2020-09-10

## 2020-09-09 RX ORDER — CARVEDILOL 12.5 MG/1
12.5 TABLET ORAL 2 TIMES DAILY WITH MEALS
Qty: 60 TABLET | Refills: 3 | Status: ON HOLD | OUTPATIENT
Start: 2020-09-09 | End: 2021-02-12 | Stop reason: SDUPTHER

## 2020-09-09 RX ORDER — FUROSEMIDE 40 MG/1
80 TABLET ORAL 2 TIMES DAILY
Status: DISCONTINUED | OUTPATIENT
Start: 2020-09-10 | End: 2020-09-10

## 2020-09-09 RX ORDER — WARFARIN SODIUM 7.5 MG/1
7.5 TABLET ORAL
Status: COMPLETED | OUTPATIENT
Start: 2020-09-09 | End: 2020-09-09

## 2020-09-09 RX ADMIN — HYDRALAZINE HYDROCHLORIDE 10 MG: 10 TABLET, FILM COATED ORAL at 09:29

## 2020-09-09 RX ADMIN — WARFARIN SODIUM 7.5 MG: 7.5 TABLET ORAL at 17:53

## 2020-09-09 RX ADMIN — CETIRIZINE HYDROCHLORIDE 5 MG: 10 TABLET, FILM COATED ORAL at 09:29

## 2020-09-09 RX ADMIN — INSULIN LISPRO 1 UNITS: 100 INJECTION, SOLUTION INTRAVENOUS; SUBCUTANEOUS at 17:53

## 2020-09-09 RX ADMIN — Medication 5 ML: at 09:32

## 2020-09-09 RX ADMIN — INSULIN LISPRO 1 UNITS: 100 INJECTION, SOLUTION INTRAVENOUS; SUBCUTANEOUS at 23:01

## 2020-09-09 RX ADMIN — SODIUM CHLORIDE, PRESERVATIVE FREE 10 ML: 5 INJECTION INTRAVENOUS at 23:02

## 2020-09-09 RX ADMIN — INSULIN LISPRO 1 UNITS: 100 INJECTION, SOLUTION INTRAVENOUS; SUBCUTANEOUS at 12:56

## 2020-09-09 RX ADMIN — PANTOPRAZOLE SODIUM 40 MG: 40 TABLET, DELAYED RELEASE ORAL at 05:57

## 2020-09-09 RX ADMIN — GABAPENTIN 100 MG: 100 CAPSULE ORAL at 22:56

## 2020-09-09 RX ADMIN — FUROSEMIDE 120 MG: 10 INJECTION, SOLUTION INTRAMUSCULAR; INTRAVENOUS at 09:29

## 2020-09-09 RX ADMIN — POTASSIUM CHLORIDE 20 MEQ: 1500 TABLET, EXTENDED RELEASE ORAL at 09:29

## 2020-09-09 RX ADMIN — TRIAMCINOLONE ACETONIDE: 1 CREAM TOPICAL at 09:50

## 2020-09-09 RX ADMIN — CARVEDILOL 12.5 MG: 12.5 TABLET, FILM COATED ORAL at 09:29

## 2020-09-09 RX ADMIN — HYDRALAZINE HYDROCHLORIDE 10 MG: 10 TABLET, FILM COATED ORAL at 22:56

## 2020-09-09 RX ADMIN — CARVEDILOL 12.5 MG: 12.5 TABLET, FILM COATED ORAL at 17:53

## 2020-09-09 RX ADMIN — ATORVASTATIN CALCIUM 10 MG: 10 TABLET, FILM COATED ORAL at 22:56

## 2020-09-09 RX ADMIN — DILTIAZEM HYDROCHLORIDE 120 MG: 120 CAPSULE, COATED, EXTENDED RELEASE ORAL at 12:07

## 2020-09-09 RX ADMIN — POTASSIUM CHLORIDE 20 MEQ: 1500 TABLET, EXTENDED RELEASE ORAL at 17:53

## 2020-09-09 RX ADMIN — SODIUM CHLORIDE, PRESERVATIVE FREE 10 ML: 5 INJECTION INTRAVENOUS at 09:50

## 2020-09-09 ASSESSMENT — PAIN SCALES - GENERAL
PAINLEVEL_OUTOF10: 0

## 2020-09-09 NOTE — DISCHARGE INSTR - DIET

## 2020-09-09 NOTE — CONSULTS
[facility]  HEART FAILURE PROGRAM      NAME:  [address] RECORD NUMBER:  [id number]  AGE: 80 y.o.    GENDER: female  : 1934  TODAY'S DATE:  2020    Subjective:     VISIT TYPE: evaluation     ADMITTING PHYSICIAN:  Verner Austria, MD    PAST MEDICAL HISTORY        Diagnosis Date    Anemia     Chronic kidney disease     Combined systolic and diastolic congestive heart failure (HCC)     Diabetes mellitus ([address]     DVT (deep venous thrombosis) (HCC)     Hyperlipidemia     Hypertension     Osteoarthritis     PAF (paroxysmal atrial fibrillation) (HCC)     Pulmonary embolism (HCC)     Sarcoidosis     Thyroid disease     Hypothyroidism    Type II or unspecified type diabetes mellitus without mention of complication, not stated as uncontrolled        SOCIAL HISTORY    Social History     Tobacco Use    Smoking status: Never Smoker    Smokeless tobacco: Never Used    Tobacco comment: Never smoked   Substance Use Topics    Alcohol use: No    Drug use: No       ALLERGIES    Allergies   Allergen Reactions    Bactrim [Sulfamethoxazole-Trimethoprim]     Levofloxacin     Pcn [Penicillins]     Sulfa Antibiotics        MEDICATIONS  Scheduled Meds:   [START ON 9/10/2020] furosemide  80 mg Oral BID    dilTIAZem  120 mg Oral Daily    cetirizine  5 mg Oral Daily    carvedilol  12.5 mg Oral BID WC    multivitamin  5 mL Oral Daily    triamcinolone   Topical Daily    hydrALAZINE  10 mg Oral BID    pantoprazole  40 mg Oral QAM AC    atorvastatin  10 mg Oral Nightly    potassium chloride  20 mEq Oral BID WC    sodium chloride flush  10 mL Intravenous 2 times per day    insulin lispro  0-6 Units Subcutaneous TID WC    insulin lispro  0-3 Units Subcutaneous Nightly    gabapentin  100 mg Oral Nightly    latanoprost  1 drop Right Eye Nightly    warfarin (COUMADIN) daily dosing (placeholder)   Other RX Placeholder       ADMIT DATE: 2020      Objective: ADMISSION DIAGNOSIS:   DAVION (acute kidney injury) ([facility] [N17.9]  DAVION (acute kidney injury) ([facility] [N17.9]     /78   Pulse 76   Temp 98.8 °F (37.1 °C) (Oral)   Resp 20   Ht 5' 6\" (1.676 m)   Wt 245 lb 6 oz (111.3 kg)   LMP  (LMP Unknown)   SpO2 95%   BMI 39.60 kg/m²     ADMIT:  Weight: 295 lb 6.7 oz (134 kg)    TODAY: Weight: 245 lb 6 oz (111.3 kg)    Wt Readings from Last 25 Encounters:   09/09/20 245 lb 6 oz (111.3 kg)   10/17/19 264 lb (119.7 kg)   04/16/19 269 lb (122 kg)   12/05/18 246 lb (111.6 kg)   10/30/18 265 lb (120.2 kg)   10/16/18 265 lb (120.2 kg)   08/15/18 263 lb (119.3 kg)   04/06/18 265 lb 9.6 oz (120.5 kg)   03/22/18 262 lb (118.8 kg)   09/12/17 259 lb 9.6 oz (117.8 kg)   08/07/17 260 lb (117.9 kg)   02/06/17 248 lb (112.5 kg)   07/07/16 229 lb (103.9 kg)   06/29/16 231 lb 0.7 oz (104.8 kg)   06/08/16 231 lb 0.7 oz (104.8 kg)   05/11/16 231 lb (104.8 kg)   04/26/16 234 lb (106.1 kg)   02/22/16 227 lb 9.6 oz (103.2 kg)   11/09/15 270 lb (122.5 kg)   10/12/15 269 lb 9.6 oz (122.3 kg)   09/28/15 256 lb (116.1 kg)   03/05/15 248 lb 6.4 oz (112.7 kg)   03/03/15 264 lb (119.7 kg)   01/20/15 264 lb (119.7 kg)   08/07/14 274 lb (124.3 kg)          Intake/Output Summary (Last 24 hours) at 9/9/2020 1029  Last data filed at 9/9/2020 0414  Gross per 24 hour   Intake 420 ml   Output 801 ml   Net -381 ml       LABS  BMP:   Lab Results   Component Value Date     09/09/2020    K 3.6 09/09/2020    K 4.4 09/05/2020    CL 89 09/09/2020    CO2 34 09/09/2020    BUN 52 09/09/2020    LABALBU 3.2 09/09/2020    CREATININE 1.9 09/09/2020    CALCIUM 9.5 09/09/2020    GFRAA 30 09/09/2020    GFRAA 51 12/20/2012    LABGLOM 25 09/09/2020    GLUCOSE 102 09/09/2020     CBC:   Recent Labs     09/07/20  0712 09/08/20  0748 09/09/20  0627   WBC 6.5 6.4 5.7   HGB 9.3* 9.7* 9.9*   HCT 28.1* 29.1* 29.4*   MCV 85.5 85.8 86.0    244 237     BNP:   Lab Results   Component Value Date    BNP 54 07/31/2013 ECHOCARDIOGRAM: 09/25/2020  Summary   Suboptimal image quality. Patient appears to be in atrial fibrillation. Left ventricular cavity size is normal.   There is moderate concentric left ventricular hypertrophy. Ejection fraction is visually estimated to be 13%   Diastolic filling parameters suggest at least grade II diastolic   dysfunction. Severe aortic stenosis with a peak velocity of 5.00 m/s and a mean pressure   gradient of 67 mmHg. Estimated pulmonary artery systolic pressure is borderline severely elevated   at 69 mmHg assuming a right atrial pressure of 15 mmHg. Assessment:     CONSULTS:   IP CONSULT TO PRIMARY CARE PROVIDER  IP CONSULT TO CARDIOLOGY  IP CONSULT TO NEPHROLOGY  IP CONSULT TO CASE MANAGEMENT  IP CONSULT TO HEART FAILURE NURSE/COORDINATOR  IP CONSULT TO SOCIAL WORK  IP CONSULT TO PHARMACY  IP CONSULT TO HOME CARE NEEDS  IP CONSULT TO PALLIATIVE CARE    Patient has a CARDIOLOGY CONSULT: Yes      Patient taking an ACEI/ARB:  No: CKD       Patient taking a BETA BLOCKER:  Yes    SCALE AVAILABLE:  Yes     EDUCATION STATUS: Patient and Patient's daughter present / granddaughter Chandler Palma joined conversation by phone  [x]  Provided both written and verbal education on Heart Failure signs/symptoms. [x]  Provided instructions on daily medications. [x]  Provided instructions to monitor and record weight daily. [x]  Provided instructions to call if weight increases 3 lbs in one day or 5 lbs in one week. [x]  Received verbal acknowledgment/understanding of Heart Failure related causes. [x]  Provided instructions on how to maintain a low sodium diet. []  Provided recommendations for smoking cessation programs  [x]  Provided recommendations on activity and exercise    [x]  Other: HF RN consult received from Dr Clare Sung as part of HF order set. Chart reviewed. Pt presented to ED via EMS at the direction of her PCP d/t abnormal labs showing DAVION and hyponatremia.   Pt c/o SOB, generalized weakness, and edema. Pt carries a history of combined systolic / diastolic HF / aortic stenosis and follows with [facility]. She was last seen in office by Dr Temi Tinsley on 4/28/2020. At that time she felt well and continued with her wishes of conservative care. No weight was obtained at that visit. Pt was 264# at a prior visit Oct 2019. ProBNP on admission was 6759 with Cr 2.6. CXR showed increased interstitial markings and suspected developing effusion. Weight was 295# (ED) and 269# (floor). Pt was treated with IV bolus Lasix. Cardiology was consulted. Pt has diuresed well -- she is negative 9 liters to date. I met with patient in her room. She is up to chair with legs elevated. Bedside RN was just finishing wrapping legs with ACE bandages. Legs remain edematous but, per daughter, they are greatly improved. Pt is on room air and appears comfortable at rest and with light conversation. She reports she is feeling much better. Pt is aware of her HF diagnosis and \"valve trouble\". We discussed her echo results. While I was present, Dr Temi Tinsley rounded and attempted to discuss goals of care with patient / family. Pt's daughter called granddaughter, Vikas Young, who is a pediatric anesthesiologist, to discuss case. Vikas Hector had many questions and voiced concern of pt being discharged today. Dr Temi Tinsley attempted to explain HR is fairly well controlled and he would be restarting medication that had been previously held. He explained the intent of not overshooting BP parameters as that could be detrimental in severe AS. Pt's daughter also asked if pt could stay one additional night \"to be more stable\". Message was relayed to PCP / attending who cancelled the discharge for today. Pt reports she has a scale at home but does not routinely weigh herself. We discussed the importance of weight monitoring as a early s/sx of HF flare. She is agreeable to start doing so.   I provided weight log and corresponding AHA HF zones sheet. We discussed the 3/5 rule and s/sx of worsening HF. I urged pt / family to report concerns EARLY to most easily address issues. Daughter states her mother Rony Chakraborty wants to bother the doctors\". We discussed small issues being able to be fixed usually over the phone. I urged pt to not delay notifying office as it increases her risk of being admitted to hospital.  She voices understanding. Pt admits to being very sedentary. Her only activity, per family, is going to the bathroom and getting in and out of bed. Pt's meals are prepared for her and brought to her chair. Dr Xuan Garduno attempted to explain earlier pt would have to be a good candidate to consider work up / treatment such as TAVR. Daughter reports pt's  / her father had TAVR at age 80. They feel pt would be appropriate but I cautioned that all minimally invasive surgeries carry the risk to become an \"open\" surgery. Pt / daughter agrees pt would not be a good candidate for traditional open heart surgery in her current status. Pt was noncommital to treatment plan with Dr Xuan Garduno. He urged pt to discuss with family and reminded her she would have to be willing to participate in therapy, etc to improve her candidacy. She agrees to discuss and let him know at upcoming visits. We discussed the rationale of sodium and fluid restriction. Pt does not follow any particular diet at this time. Pt reports she like \"a good breakfast\" and shares it generally consists of eggs, grits, toast and \"once slice of so thin you can see through it lewis\" or country ham.  She admits she likely gets the bulk of her sodium intake with morning meal.  She reports she typically skips lunch and then \"eats whatever they fix\". Daughter states they do not use added salt as pt's  also has heart disease. She states they typically grill or bake meats and generally have two sides.   She states they raise a garden and enjoy fresh vegetables and purchase frozen vegetables in the off season. We reviewed how to read a food label and I encouraged them to try to avoid known high sodium foods (ham) if pt is in or leaning toward yellow zone. They voice understanding. Pt states Dr Xin Shafer has discussed a sleep study in the past but she has declined. She seems to still lean toward very conservative care but family is leaning toward intervention. Pt / daughter was familiar with medications rationale. She reports she is always compliant with meds. Praise given. Pt / family are aware of 7 day follow up requirement. They voice no barrier to attending 9/16 appt arranged for pt. HF RNs will continue to follow and assist with transition of care at discharge. CURRENT DIET: DIET GENERAL; Low Sodium (2 GM); Daily Fluid Restriction: 1500 ml    EDUCATIONAL PACKETS PROVIDED- PRINTED FROM [employer]. Titles and material given:  Yes   [x]  What is Heart Failure? [x]  Heart Failure: Warning Signs of a Flare-Up  [x]  Heart Failure: Making Changes to Your Diet  [x]  Heart Failure: Medications to Help Your Heart   [x]  Other: weight log, AHA HF zones sheet, [employer], sodium Content in Foods, [employer] HF Nutrition guide    PATIENT/CAREGIVER TEACHING:    Level of patient/caregiver understanding able to:   [x] Verbalize understanding   [] Demonstrate understanding       [x] Teach back        [x] Needs reinforcement     []  Other:      TEACHING TIME:  70 minutes       Plan:       DISCHARGE PLAN:  Placement for patient upon discharge: home with support   Hospice Care:  no  Code Status: Full Code  Discharge appointment scheduled: Yes     RECOMMENDATIONS:   [x]  Encourage to call Heart Failure Resource Line with any questions or concerns. [x]  Educate further Ms. Mills's on fluid restriction 48 oz- 64 oz during inpatient stay so she can understand how to measure intake at home. [x]  Continue to educate on S/S of Heart Failure.   [x] Emphasize daily weights, diet, and if changes, to call Heart Failure Resource Line  [x]  Other: Wellness center referral : pt declines -- states she feels she does not need that. She is aware she could enroll at any time and is eligible for infusion services there if needed.               Electronically signed by Katya Myrick RN, BSN CHFN  on 9/9/2020 at 10:29 AM

## 2020-09-09 NOTE — PROGRESS NOTES
Referring Physician: Dr. Bernardo Muro  Reason for Consultation: CHF  Chief Complaint: Nausea, diarrhea, and felt sick    Subjective:   History of Present Illness:  [address] is a 80 y.o. patient who presented to the hospital secondary to abnormal labs from PCP office visit. For the past week, the patient has generally been feeling unwell. She complained of nausea, diarrhea, and generalized malaise. The patient has a relatively poor functional status at baseline but is able to ambulate. She has chronic lower extremity edema which waxes and wanes. The edema was worse approximately a week ago but also improved over the last few days. She has chronic dyspnea on exertion but denies acute changes in the shortness of breath. The patient denies associated chest pains, PND, or orthopnea. Patient reports compliance to her medications. The patient saw her PCP for the nausea and diarrhea. Patient's daughter is also present bedside. Per their report, the PCP started on antibiotic and increased her Lasix. Patient is well-known to me from multiple office visits. Her last encounter was a phone interview secondary to COVID-19 restrictions. Her last an office appointment was in October 2019. Her weight at that time was 264 pounds. Her admission bed weight was 269 pounds. She has chronic lower extremity edema and prior attempts at increased diuresis resulted in DAVION. Interval History:  No acute overnight cardiac events. Care discussed with family bedside and over phone. Grand-daughter over phone has concerns about patient being discharged. The patient in general is feeling better since time of admission. She is less short of breath. LE edema improving. Denies associated chest pain.      Past Medical History:   has a past medical history of Anemia, Chronic kidney disease, Combined systolic and diastolic congestive heart failure ([address] Diabetes mellitus ([address] DVT (deep venous thrombosis) ([address] Hyperlipidemia, Hypertension, Osteoarthritis, PAF (paroxysmal atrial fibrillation) ([facility] Pulmonary embolism ([facility] Sarcoidosis, Thyroid disease, and Type II or unspecified type diabetes mellitus without mention of complication, not stated as uncontrolled. Surgical History:   has a past surgical history that includes Hysterectomy and knee surgery. Social History:   reports that she has never smoked. She has never used smokeless tobacco. She reports that she does not drink alcohol or use drugs. Family History:  family history includes Cancer in her brother; Heart Failure in her mother. Home Medications:  Were reviewed and are listed in nursing record and/or below  Prior to Admission medications    Medication Sig Start Date End Date Taking? Authorizing Provider   carvedilol (COREG) 12.5 MG tablet Take 1 tablet by mouth 2 times daily (with meals) 9/9/20  Yes Radha Gallagher MD   triamcinolone (KENALOG) 0.1 % cream Apply topically 2 times daily.  9/9/20  Yes Radha Gallagher MD   latanoprost (XALATAN) 0.005 % ophthalmic solution Place 1 drop into the right eye nightly 7/7/20  Yes Historical Provider, MD   KLOR-CON M20 20 MEQ extended release tablet Take 20 mEq by mouth daily 6/28/20  Yes Historical Provider, MD   pantoprazole (PROTONIX) 40 MG tablet Take 40 mg by mouth daily 8/23/20  Yes Historical Provider, MD   ondansetron (ZOFRAN) 4 MG tablet Take 4 mg by mouth every 6 hours as needed 9/1/20  Yes Historical Provider, MD   furosemide (LASIX) 80 MG tablet TAKE ONE TABLET BY MOUTH TWICE A DAY 4/23/20  Yes Carmen Richmond MD   warfarin (COUMADIN) 5 MG tablet Take 5 mg by mouth daily Except 2.5mg every Thursday or as directed by [facility] [phone]   Yes Historical Provider, MD   cetirizine (ZYRTEC) 10 MG tablet Take 10 mg by mouth daily   Yes Historical Provider, MD   vitamin C (ASCORBIC ACID) 500 MG tablet Take 500 mg by mouth daily   Yes Historical Provider, MD   acetaminophen (TYLENOL) 500 MG tablet Take 1,000 mg by mouth every 6 hours as needed for Pain   Yes Historical Provider, MD   Calcium Carb-Cholecalciferol (CALTRATE 600+D) 600-800 MG-UNIT TABS Take 1 tablet by mouth daily    Yes Historical Provider, MD   gabapentin (NEURONTIN) 100 MG capsule Take 200 mg by mouth nightly. Yes Historical Provider, MD   glipiZIDE (GLUCOTROL) 5 MG tablet Take 5 mg by mouth 2 times daily (before meals). Yes Historical Provider, MD   lovastatin (MEVACOR) 10 MG tablet Take 10 mg by mouth nightly. Yes Historical Provider, MD   hydrALAZINE (APRESOLINE) 10 MG tablet Take 10 mg by mouth 2 times daily.    Yes Historical Provider, MD        CURRENT Medications:  cetirizine (ZYRTEC) tablet 5 mg, Daily  furosemide (LASIX) injection 120 mg, BID  carvedilol (COREG) tablet 12.5 mg, BID WC  multivitamin oral solution 5 mL, Daily  triamcinolone (KENALOG) 0.1 % cream, Daily  acetaminophen (TYLENOL) tablet 1,000 mg, Q6H PRN  hydrALAZINE (APRESOLINE) tablet 10 mg, BID  pantoprazole (PROTONIX) tablet 40 mg, QAM AC  atorvastatin (LIPITOR) tablet 10 mg, Nightly  potassium chloride (KLOR-CON M) extended release tablet 20 mEq, BID WC  sodium chloride flush 0.9 % injection 10 mL, 2 times per day  sodium chloride flush 0.9 % injection 10 mL, PRN  acetaminophen (TYLENOL) suppository 650 mg, Q6H PRN  polyethylene glycol (GLYCOLAX) packet 17 g, Daily PRN  promethazine (PHENERGAN) tablet 12.5 mg, Q6H PRN    Or  ondansetron (ZOFRAN) injection 4 mg, Q6H PRN  glucose (GLUTOSE) 40 % oral gel 15 g, PRN  dextrose 50 % IV solution, PRN  glucagon (rDNA) injection 1 mg, PRN  dextrose 5 % solution, PRN  insulin lispro (HUMALOG) injection vial 0-6 Units, TID WC  insulin lispro (HUMALOG) injection vial 0-3 Units, Nightly  traMADol (ULTRAM) tablet 50 mg, Q6H PRN    Or  traMADol (ULTRAM) tablet 100 mg, Q6H PRN  diazePAM (VALIUM) tablet 2 mg, Q6H PRN  gabapentin (NEURONTIN) capsule 100 mg, Nightly  latanoprost (XALATAN) 0.005 % ophthalmic to auscultation bilaterally, respirations unlabored. No wheeze, rales, or rhonchi. Chest Wall:  No tenderness or deformity. Heart:  Irreg irreg and mildly tachycardic ~110. III/VI DANUTA. Abdomen:   Soft, non-tender, bowel sounds active. Musculoskeletal: No muscle wasting or digital clubbing. Extremities: Extremities normal, atraumatic. No cyanosis. 1+ BLE edema. ACE wraps. Pulses: 2+ radial and carotid pulses, symmetric. Skin: No rashes or lesions. Pysch: Normal mood and affect. Alert and oriented x 4. Neurologic: Moves all extremities. Follows commands. Labs     CBC:   Lab Results   Component Value Date    WBC 5.7 2020    RBC 3.42 2020    HGB 9.9 2020    HCT 29.4 2020    MCV 86.0 2020    RDW 15.7 2020     2020     CMP:  Lab Results   Component Value Date     2020    K 3.6 2020    K 4.4 2020    CL 89 2020    CO2 34 2020    BUN 52 2020    CREATININE 1.9 2020    GFRAA 30 2020    GFRAA 51 2012    AGRATIO 0.8 2020    LABGLOM 25 2020    GLUCOSE 102 2020    PROT 7.3 2020    PROT 7.5 2012    CALCIUM 9.5 2020    BILITOT 0.5 2020    ALKPHOS 87 2020    AST 17 2020    ALT 12 2020     PT/INR:  No results found for: PTINR  HgBA1c:  Lab Results   Component Value Date    LABA1C 6.9 2020     Lab Results   Component Value Date    TROPONINI 0.02 (H) 2020       Cardiac Data     EK2020. Atrial fibrillation. Left axis deviation. Left bundle branch block. Echo: 2017  Normal left ventricular size and wall thickness. Global systolic function is moderate decreased with an estimated LVEF estimated at 35%. The right ventricle is not well visualized but appears to have normal size and mild to moderately reduced function. Severe biatrial enlargement appreciated. Moderate mitral regurgitation is present.   The aortic valve leaflets are calcified and restricted in appearance. The aortic valve area is calculated at 0.7 cm2 with a maximum pressure gradient of 34 mmHg and a mean pressure gradient of 17 mmHg. This is consistent with at least mild aortic stenosis but gradient but is probably underestimated due to left ventricular systolic dysfunction. Echo: 9/5/20  Suboptimal image quality. Patient appears to be in atrial fibrillation. Left ventricular cavity size is normal. There is moderate concentric left ventricular hypertrophy. Ejection fraction is visually estimated to be 55%. Diastolic filling parameters suggest at least grade II diastolic dysfunction. Severe aortic stenosis with a peak velocity of 5.00 m/s and a mean pressure  gradient of 67 mmHg. Estimated pulmonary artery systolic pressure is borderline severely elevated at 69 mmHg assuming a right atrial pressure of 15 mmHg. Assessment and Plan   1) Acute on chronic diastolic heart failure. EF 35% in 2017 but improved to 55%. NYHA III. No ischemia noted on nuclear stress testing (2015) and patient was not interested in invasive testing. Continue B-blocker. No ACE-I/ARB/Aldactone with DAVION on CKD but was tolerating an ACE-I as an outpatient. Will continue to re-address. Patient not an ICD candidate with advanced age and as EF improved. Received IV lasix today but will transition to po for tomorrow. 2) Persistent atrial fibrillation. Rate elevated at times over past day. Continue beta-blocker. Will restart low dose Cardizem CD. Continue chronic anticoagulation. 3) DAVION on CKD. Nephrology following.        4) Pulmonary hypertension on echo. Multiple etiologies possible including prior multiple PE, RUTH, systolic/diastolic dysfunction, AS. She was not interested in pursuing a sleep study.    5) Essential hypertension. Controlled. Goal BP <130/80 with CHF and advanced age.      6) Aortic stenosis, severe.  Patient has refused invasive studies in the past and has requested essentially comfort care/medical management only but now she seems less certain. 7) Code status: currently listed as full code but DNR/DNI-CCA would be more appropriate. Will continue to readdress as outpatient. Many times patient has voiced her wishes for conservative care. Now I feel the patient may be feeling some degree of pressure from family and I did not want to push the issue.      Overall, the problems requiring hospitalization are high in severity. Will continue to follow while patient hospitalized. Thank you for allowing us to participate in the care of Jesus Zelaya. Cata Olvera.  Gianni Espinoza, [address] [place] [address]  9/9/2020 9:40 AM

## 2020-09-09 NOTE — PROGRESS NOTES
Physician Progress Note      PATIENTAdmichelle Chamorro  CSN #:                  [id number]  :                       1934  ADMIT DATE:       2020 11:39 AM  [address] [place] DATE:  RESPONDING  PROVIDER #:        Yamilex Polk MD          QUERY TEXT:    Dear Dr Cheryle Guidry,  Pt admitted with DAVION. Noted documentation of Systolic and diastolic CHF,   acute on chronic by ordered Cardiology consultant. If possible, please   document in progress notes and discharge summary:    The medical record reflects the following:  Risk Factors: ckd, chf, htn,  Clinical Indicators: Documentation per Cardio consult of Systolic and   diastolic CHF, acute on chronic, also manifesting cardiorenal syndrome. [id number],  CXR-Interval increase in pulmonary vascular markings and   interstitial markings  effusion . Documentation per H&P- Congestive heart failure. Treatment: lasiv iv bid, strict I/o, daily wts, Cardio consult, monitor labs   ,fluid restriction, 2gm na diet    Thank You, Bobo Head Work RN CDS CRCR  [email or web address]  [phone]  Options provided:  -- Acute on Chronic Systolic and Diastolic CHF confirmed POA  -- Acute Diastolic and Systolic CHF ruled out  -- Chronic Diastolic and Systolic CHF confirmed  -- Other - I will add my own diagnosis  -- Disagree - Not applicable / Not valid  -- Disagree - Clinically unable to determine / Unknown  -- Refer to Clinical Documentation Reviewer    PROVIDER RESPONSE TEXT:    The diagnosis of Acute on Chronic Systolic and Diastolic CHF was confirmed as   POA. Query created by:  Kamilla Tran Ask on 2020 9:15 AM      Electronically signed by:  Yamilex Polk MD 2020 7:37 AM

## 2020-09-09 NOTE — PROGRESS NOTES
Dr. Lulu Burnett said it was okay to dc the discharge on the patient as she is running a little tachy and not feeling safe to go home.

## 2020-09-09 NOTE — PROGRESS NOTES
Pt and family are hesistant for patient to be discharged today as they feel that she needs an extra day here with her heart rate still a little high and she still isnt feeling completely back to herself. Paged sent to dr Manjeet Camarena. Pt also back to bed to take a nap.

## 2020-09-09 NOTE — PROGRESS NOTES
Clinical Pharmacy Note  Warfarin Consult    Derek Graves is a 80 y.o. female receiving warfarin managed by pharmacy. Warfarin Indication: Afib  Target INR range: 2-3   Dose prior to admission: 5 mg daily, EXCEPT 2.5 mg every Thursday    Current warfarin drug-drug interactions: none    Recent Labs     09/07/20  0712 09/08/20  0748 09/09/20  0627   HGB 9.3* 9.7* 9.9*   HCT 28.1* 29.1* 29.4*   INR 1.68* 1.48* 1.48*       Assessment/Plan:    INR decreased below therapeutic range today. Will give her dose of warfarin 7.5 mg x 1 dose today. Of note, her INR was elevated on admission, possibly due to diarrhea, antibiotic, or the CHF. The INR has been stable over past few months. Daily PT/INR until stable within therapeutic range. Thank you for the consult. Will continue to follow.   Geni Vallecillo RPH,9/9/2020,1:07 PM

## 2020-09-09 NOTE — PROGRESS NOTES
Physical Therapy  Facility/Department: [address] PROGRESSIVE CARE  Daily Treatment Note  NAME: Heather Burns  : 1934  MRN: [id number]    Date of Service: 2020    Discharge Recommendations:  Patient would benefit from continued therapy after discharge, Continue to assess pending progress, 2-3 sessions per week, S Level 3   PT Equipment Recommendations  Equipment Needed: No    Assessment   Body structures, Functions, Activity limitations: Decreased functional mobility ; Decreased strength;Decreased endurance  Assessment: Pt demonstrating improvement with transfers and ambulation this session. Pt able to ambulate 25' with CGA and RW. Continue to recommend 24hr supervision and home PT at discharge. Pt's family reports they are able to provide the necessary assist and supervision. Prognosis: Good  PT Education: PT Role;General Safety;Goals;Gait Training;Plan of Care;Transfer Training;Family Education  REQUIRES PT FOLLOW UP: Yes  Activity Tolerance  Activity Tolerance: Patient Tolerated treatment well     Patient Diagnosis(es): The primary encounter diagnosis was Hyponatremia. Diagnoses of Acute renal insufficiency and Acute on chronic congestive heart failure, unspecified heart failure type [facility]) were also pertinent to this visit. has a past medical history of Anemia, Chronic kidney disease, Combined systolic and diastolic congestive heart failure ([address] Diabetes mellitus ([address] DVT (deep venous thrombosis) ([address] Hyperlipidemia, Hypertension, Osteoarthritis, PAF (paroxysmal atrial fibrillation) ([address] Pulmonary embolism ([address] Sarcoidosis, Thyroid disease, and Type II or unspecified type diabetes mellitus without mention of complication, not stated as uncontrolled. has a past surgical history that includes Hysterectomy and knee surgery.     Restrictions  Restrictions/Precautions  Restrictions/Precautions: Fall Risk  Position Activity Restriction  Other position/activity restrictions: general diet with fluid restriction 1500mL  Subjective   General  Chart Reviewed: Yes  Additional Pertinent Hx: Per cardiology consult of Dr. Sonia Camejo on 9-4-2020: The pt is an 81 yo female who came to the ED due to abnormal labs. The pt recently saw her PCP due to having nausea and vomiting; was started on an abx and increased Lasix. PMHx: anemia, CKD, CHF,DM, DVT, HTN, OA, a-fib, PE, sarcoidosis,DM,thyroid disease, knee sx  Family / Caregiver Present: Yes(daughter)  Referring Practitioner: Patience Barrios MD  Subjective  Subjective: Pt supine in bed upon arrival. Agreeable to therapy. Reports needing to use the bathroom. No c/o pain. Objective   Bed mobility  Supine to Sit: Stand by assistance(cues for technique)  Sit to Supine: Unable to assess(up in chair at end of session)  Transfers  Sit to Stand: Contact guard assistance;Minimal Assistance(CGA from EOB; min A from commode; cues for hand placement)  Stand to sit: Contact guard assistance  Ambulation  Ambulation?: Yes  Ambulation 1  Surface: level tile  Device: Rolling Walker  Assistance: Contact guard assistance  Quality of Gait: decreased shania, shortened step lengths bilaterally, cues for keeping TARIQ within RW and upright posture  Distance: 10' and 25'  Comments: pt fatigued at end of ambulation distance     Balance  Posture: Good  Sitting - Static: Good  Sitting - Dynamic: Good  Standing - Static: Fair(with RW)  Standing - Dynamic: Fair(with RW)            Comment: Educated patient's daughter on pt's current level of function, assist level, recommendations for equipment and assist at home. Also educated on continued therapy at home.  Verbalized understanding    AM-PAC Score  AM-PAC Inpatient Mobility Raw Score : 18 (09/09/20 0922)  AM-PAC Inpatient T-Scale Score : 43.63 (09/09/20 0922)  Mobility Inpatient CMS 0-100% Score: 46.58 (09/09/20 0846)  Mobility Inpatient CMS G-Code Modifier : CK (09/09/20 2032)        Goals  Short term goals  Time Frame for Short term goals: upon d/c  Short term goal 1: Bed mobility with min A  Short term goal 2: Transfers sit <> stand with min A - met 9/8; progress to sit<>stand CGA using RW  Short term goal 3: Ambulate with wheeled walker 10 feet with min A - met 9/8; progress to amb using RW 30' min A/CGA  Patient Goals   Patient goals : to go home    Plan    Plan  Times per week: 3-5x/week  Current Treatment Recommendations: Functional Mobility Training, Strengthening, Endurance Training, Transfer Training, Gait Training  Safety Devices  Type of devices:  All fall risk precautions in place, Left in chair, Call light within reach, Chair alarm in place, Nurse notified, Gait belt     Therapy Time   Individual Concurrent Group Co-treatment   Time In 0840         Time Out 0919         Minutes 39         Timed Code Treatment Minutes: [facility],   [facility]

## 2020-09-09 NOTE — PROGRESS NOTES
Nephrology ([facility]) Progress Note    CC: DAVION on CKD    Subjective:    HPI:  Breathing comfortably. Has been tachycardic  Voiding OK  No CP. Net negative 8.7 L so far  ROS:  In bed. No fever. [address]:  medications reivewed. Daughter at bedside    Objective:  Blood pressure 112/70, pulse (!) 42, temperature 98.9 °F (37.2 °C), temperature source Oral, resp. rate 20, height 5' 6\" (1.676 m), weight 245 lb 6 oz (111.3 kg), SpO2 95 %, not currently breastfeeding. Intake/Output Summary (Last 24 hours) at 9/9/2020 1228  Last data filed at 9/9/2020 0414  Gross per 24 hour   Intake 420 ml   Output 801 ml   Net -381 ml     General:  NAD, A+Ox3, obese   Chest:  Diminished BS at bases  CVS:  Irregular tachycardic  Abdominal:  Soft non tender  Extremities: trace edema Ace wraps in place  Skin:  no rash    Labs:  Renal panel:  Lab Results   Component Value Date/Time     (L) 09/09/2020 06:27 AM    K 3.6 09/09/2020 06:27 AM    K 4.4 09/05/2020 05:55 AM    CO2 34 (H) 09/09/2020 06:27 AM    BUN 52 (H) 09/09/2020 06:27 AM    CREATININE 1.9 (H) 09/09/2020 06:27 AM    CALCIUM 9.5 09/09/2020 06:27 AM    PHOS 2.9 09/09/2020 06:27 AM    MG 2.0 06/26/2013 04:29 AM     CBC:  Lab Results   Component Value Date/Time    WBC 5.7 09/09/2020 06:27 AM    HGB 9.9 (L) 09/09/2020 06:27 AM    HCT 29.4 (L) 09/09/2020 06:27 AM     09/09/2020 06:27 AM       Assessment/Plan:  Reviewed old records and labs.     1) hyponatremia              - assessed as hypervolemic hyponatremia              - improved 135 meq today     2) CSDHF              - fluid restriction 1.5 liters/day appears well diuresed now               - lasix changed to PO      3) DAVION on CKD              - baseline 06/03/20 Cr 1.6              - ddx:  CRS              - renal function stable Cr 1.9 mg Monitor Agree with oral Lasix     4) anemia              - iron studies adequate              - HGB stable at 9.9 gm     5) pulmonary HTN              - suspect RUTH and obesity-related hypoventilation syndrome              - patient is not interested in sleep study    6) A fib cardiology following Cardizem started

## 2020-09-09 NOTE — CARE COORDINATION
Spoke with Sarah with [facility], she will check availability and call back if they can accept. Electronically signed by Noam Reece RN on 9/9/2020 at 9:05 AM     Spoke with Danielle Humphreys from [facility], they cannot accept the patient's insurance. She will try to find a provider that can accept her insurance for Sonja Currie. She will update CM. Electronically signed by Noam Reece RN on 9/9/2020 at 10:15 AM     Received a call from Danielle Humphreys with [facility], [facility] can accept the patient. Brandon Bailey with [employer] will pull the patient's information and orders from [employer].     Electronically signed by Noam Reece RN on 9/9/2020 at 10:30 AM

## 2020-09-09 NOTE — PROGRESS NOTES
Intravenous PRN Fransico Nixon MD        glucagon (rDNA) injection 1 mg  1 mg Intramuscular PRN Fransico Nixon MD        dextrose 5 % solution  100 mL/hr Intravenous PRN Fransico Nixon MD        insulin lispro (HUMALOG) injection vial 0-6 Units  0-6 Units Subcutaneous TID WC Fransico Nixon MD   1 Units at 09/08/20 1726    insulin lispro (HUMALOG) injection vial 0-3 Units  0-3 Units Subcutaneous Nightly Fransico Nixon MD   1 Units at 09/08/20 2037    traMADol (ULTRAM) tablet 50 mg  50 mg Oral Q6H PRN Fransico Nixon MD        Or   Fay Langton traMADol Klarissa Settles) tablet 100 mg  100 mg Oral Q6H PRN Fransico Nixon MD        diazePAM (VALIUM) tablet 2 mg  2 mg Oral Q6H PRN Fransico Nixon MD   2 mg at 09/05/20 2038    gabapentin (NEURONTIN) capsule 100 mg  100 mg Oral Nightly Fransico Nixon MD   100 mg at 09/08/20 2037    latanoprost (XALATAN) 0.005 % ophthalmic solution 1 drop  1 drop Right Eye Nightly Fransico Nixon MD   1 drop at 09/08/20 2038    loperamide (IMODIUM) capsule 2 mg  2 mg Oral 4x Daily PRN Fransico Nixon MD   2 mg at 09/04/20 2112    warfarin (COUMADIN) daily dosing (placeholder)   Other RX Placeholder Fransico Nixon MD         Allergies   Allergen Reactions    Bactrim [Sulfamethoxazole-Trimethoprim]     Levofloxacin     Pcn [Penicillins]     Sulfa Antibiotics      Principal Problem:    Acute kidney injury superimposed on chronic kidney disease ([address]  Active Problems:    Nonrheumatic aortic (valve) stenosis    Hyponatremia    Combined systolic and diastolic congestive heart failure ([address]    Anemia due to chronic kidney disease    Acute respiratory failure with hypoxia ([address]    DAVION (acute kidney injury) ([address]  Resolved Problems:    * No resolved hospital problems. *    Blood pressure 131/78, pulse 76, temperature 98.8 °F (37.1 °C), temperature source Oral, resp.  rate 20, height 5' 6\" (1.676 m), weight 245 lb 6 oz (111.3 kg), SpO2 95 %, not currently breastfeeding. Subjective Feels the same. Stable HERNANDEZ. Objective A&O, CTPA, IIR&R with M, no edema. Walking with her walker. Assessment & Plan OK for D/C to home with PO meds and homecare. D/W patient.     Sridevi Baeza MD  9/9/2020

## 2020-09-09 NOTE — PLAN OF CARE
Problem: OXYGENATION/RESPIRATORY FUNCTION  Goal: Patient will maintain patent airway  Outcome: Ongoing     Problem: OXYGENATION/RESPIRATORY FUNCTION  Goal: Patient will achieve/maintain normal respiratory rate/effort  Description: Respiratory rate and effort will be within normal limits for the patient  Outcome: Ongoing     Problem: HEMODYNAMIC STATUS  Goal: Patient has stable vital signs and fluid balance  Outcome: Ongoing     Problem: FLUID AND ELECTROLYTE IMBALANCE  Goal: Fluid and electrolyte balance are achieved/maintained  Outcome: Ongoing     Problem: ACTIVITY INTOLERANCE/IMPAIRED MOBILITY  Goal: Mobility/activity is maintained at optimum level for patient  Outcome: Ongoing     Problem: Skin Integrity:  Goal: Will show no infection signs and symptoms  Description: Will show no infection signs and symptoms  Outcome: Ongoing     Problem: Falls - Risk of:  Goal: Will remain free from falls  Description: Will remain free from falls  Outcome: Ongoing     Problem: Falls - Risk of:  Goal: Absence of physical injury  Description: Absence of physical injury  Outcome: Ongoing     Problem: Urinary Elimination:  Goal: Signs and symptoms of infection will decrease  Description: Signs and symptoms of infection will decrease  Outcome: Ongoing     Problem: Urinary Elimination:  Goal: Signs and symptoms of infection will decrease  Description: Signs and symptoms of infection will decrease  Outcome: Ongoing

## 2020-09-10 LAB
ALBUMIN SERPL-MCNC: 3.2 G/DL (ref 3.4–5)
ANION GAP SERPL CALCULATED.3IONS-SCNC: 12 MMOL/L (ref 3–16)
BASOPHILS ABSOLUTE: 0 K/UL (ref 0–0.2)
BASOPHILS RELATIVE PERCENT: 0.6 %
BILIRUBIN URINE: NEGATIVE
BLOOD, URINE: ABNORMAL
BUN BLDV-MCNC: 51 MG/DL (ref 7–20)
CALCIUM SERPL-MCNC: 9.1 MG/DL (ref 8.3–10.6)
CHLORIDE BLD-SCNC: 89 MMOL/L (ref 99–110)
CLARITY: ABNORMAL
CO2: 35 MMOL/L (ref 21–32)
COLOR: ABNORMAL
COMMENT UA: ABNORMAL
CREAT SERPL-MCNC: 2 MG/DL (ref 0.6–1.2)
EOSINOPHILS ABSOLUTE: 0.1 K/UL (ref 0–0.6)
EOSINOPHILS RELATIVE PERCENT: 1.6 %
EPITHELIAL CELLS, UA: ABNORMAL /HPF (ref 0–5)
GFR AFRICAN AMERICAN: 29
GFR NON-AFRICAN AMERICAN: 24
GLUCOSE BLD-MCNC: 129 MG/DL (ref 70–99)
GLUCOSE BLD-MCNC: 136 MG/DL (ref 70–99)
GLUCOSE BLD-MCNC: 162 MG/DL (ref 70–99)
GLUCOSE BLD-MCNC: 168 MG/DL (ref 70–99)
GLUCOSE BLD-MCNC: 216 MG/DL (ref 70–99)
GLUCOSE URINE: NEGATIVE MG/DL
HCT VFR BLD CALC: 30.7 % (ref 36–48)
HEMOGLOBIN: 10.1 G/DL (ref 12–16)
INR BLD: 1.71 (ref 0.86–1.14)
KETONES, URINE: NEGATIVE MG/DL
LEUKOCYTE ESTERASE, URINE: ABNORMAL
LYMPHOCYTES ABSOLUTE: 1.3 K/UL (ref 1–5.1)
LYMPHOCYTES RELATIVE PERCENT: 21.2 %
MAGNESIUM: 1.5 MG/DL (ref 1.8–2.4)
MCH RBC QN AUTO: 28.5 PG (ref 26–34)
MCHC RBC AUTO-ENTMCNC: 33.1 G/DL (ref 31–36)
MCV RBC AUTO: 86.1 FL (ref 80–100)
MICROSCOPIC EXAMINATION: YES
MONOCYTES ABSOLUTE: 0.9 K/UL (ref 0–1.3)
MONOCYTES RELATIVE PERCENT: 14.1 %
NEUTROPHILS ABSOLUTE: 3.8 K/UL (ref 1.7–7.7)
NEUTROPHILS RELATIVE PERCENT: 62.5 %
NITRITE, URINE: NEGATIVE
PDW BLD-RTO: 15.9 % (ref 12.4–15.4)
PERFORMED ON: ABNORMAL
PH UA: 7 (ref 5–8)
PHOSPHORUS: 3.1 MG/DL (ref 2.5–4.9)
PLATELET # BLD: 211 K/UL (ref 135–450)
PMV BLD AUTO: 7 FL (ref 5–10.5)
POTASSIUM SERPL-SCNC: 3.5 MMOL/L (ref 3.5–5.1)
PROTEIN UA: 100 MG/DL
PROTHROMBIN TIME: 20 SEC (ref 10–13.2)
RBC # BLD: 3.57 M/UL (ref 4–5.2)
RBC UA: >100 /HPF (ref 0–4)
SODIUM BLD-SCNC: 136 MMOL/L (ref 136–145)
SPECIFIC GRAVITY UA: 1.02 (ref 1–1.03)
URINE TYPE: ABNORMAL
UROBILINOGEN, URINE: 0.2 E.U./DL
WBC # BLD: 6.1 K/UL (ref 4–11)
WBC UA: >100 /HPF (ref 0–5)

## 2020-09-10 PROCEDURE — 97530 THERAPEUTIC ACTIVITIES: CPT

## 2020-09-10 PROCEDURE — 97116 GAIT TRAINING THERAPY: CPT

## 2020-09-10 PROCEDURE — 87077 CULTURE AEROBIC IDENTIFY: CPT

## 2020-09-10 PROCEDURE — 36415 COLL VENOUS BLD VENIPUNCTURE: CPT

## 2020-09-10 PROCEDURE — 97535 SELF CARE MNGMENT TRAINING: CPT

## 2020-09-10 PROCEDURE — 94760 N-INVAS EAR/PLS OXIMETRY 1: CPT

## 2020-09-10 PROCEDURE — 6370000000 HC RX 637 (ALT 250 FOR IP): Performed by: INTERNAL MEDICINE

## 2020-09-10 PROCEDURE — 2060000000 HC ICU INTERMEDIATE R&B

## 2020-09-10 PROCEDURE — 87186 SC STD MICRODIL/AGAR DIL: CPT

## 2020-09-10 PROCEDURE — 2580000003 HC RX 258: Performed by: INTERNAL MEDICINE

## 2020-09-10 PROCEDURE — 97110 THERAPEUTIC EXERCISES: CPT

## 2020-09-10 PROCEDURE — 81001 URINALYSIS AUTO W/SCOPE: CPT

## 2020-09-10 PROCEDURE — 85025 COMPLETE CBC W/AUTO DIFF WBC: CPT

## 2020-09-10 PROCEDURE — 83735 ASSAY OF MAGNESIUM: CPT

## 2020-09-10 PROCEDURE — 99232 SBSQ HOSP IP/OBS MODERATE 35: CPT | Performed by: NURSE PRACTITIONER

## 2020-09-10 PROCEDURE — 80069 RENAL FUNCTION PANEL: CPT

## 2020-09-10 PROCEDURE — 87086 URINE CULTURE/COLONY COUNT: CPT

## 2020-09-10 PROCEDURE — 85610 PROTHROMBIN TIME: CPT

## 2020-09-10 RX ORDER — WARFARIN SODIUM 5 MG/1
5 TABLET ORAL
Status: COMPLETED | OUTPATIENT
Start: 2020-09-10 | End: 2020-09-10

## 2020-09-10 RX ORDER — DILTIAZEM HYDROCHLORIDE 120 MG/1
120 CAPSULE, COATED, EXTENDED RELEASE ORAL EVERY 12 HOURS
Status: DISCONTINUED | OUTPATIENT
Start: 2020-09-10 | End: 2020-09-11 | Stop reason: HOSPADM

## 2020-09-10 RX ORDER — NITROFURANTOIN 25; 75 MG/1; MG/1
100 CAPSULE ORAL 2 TIMES DAILY
Status: DISCONTINUED | OUTPATIENT
Start: 2020-09-10 | End: 2020-09-11 | Stop reason: HOSPADM

## 2020-09-10 RX ORDER — FUROSEMIDE 20 MG/1
20 TABLET ORAL 2 TIMES DAILY
Status: DISCONTINUED | OUTPATIENT
Start: 2020-09-10 | End: 2020-09-11 | Stop reason: HOSPADM

## 2020-09-10 RX ORDER — LANOLIN ALCOHOL/MO/W.PET/CERES
400 CREAM (GRAM) TOPICAL DAILY
Status: DISCONTINUED | OUTPATIENT
Start: 2020-09-10 | End: 2020-09-11 | Stop reason: HOSPADM

## 2020-09-10 RX ORDER — FUROSEMIDE 40 MG/1
40 TABLET ORAL 2 TIMES DAILY
Status: DISCONTINUED | OUTPATIENT
Start: 2020-09-10 | End: 2020-09-10

## 2020-09-10 RX ADMIN — ATORVASTATIN CALCIUM 10 MG: 10 TABLET, FILM COATED ORAL at 21:21

## 2020-09-10 RX ADMIN — TRIAMCINOLONE ACETONIDE: 1 CREAM TOPICAL at 09:40

## 2020-09-10 RX ADMIN — POTASSIUM CHLORIDE 20 MEQ: 1500 TABLET, EXTENDED RELEASE ORAL at 17:11

## 2020-09-10 RX ADMIN — GABAPENTIN 100 MG: 100 CAPSULE ORAL at 21:22

## 2020-09-10 RX ADMIN — NITROFURANTOIN MONOHYDRATE/MACROCRYSTALLINE 100 MG: 25; 75 CAPSULE ORAL at 21:22

## 2020-09-10 RX ADMIN — CARVEDILOL 12.5 MG: 12.5 TABLET, FILM COATED ORAL at 09:36

## 2020-09-10 RX ADMIN — POTASSIUM CHLORIDE 20 MEQ: 1500 TABLET, EXTENDED RELEASE ORAL at 09:37

## 2020-09-10 RX ADMIN — SODIUM CHLORIDE, PRESERVATIVE FREE 10 ML: 5 INJECTION INTRAVENOUS at 21:21

## 2020-09-10 RX ADMIN — CETIRIZINE HYDROCHLORIDE 5 MG: 10 TABLET, FILM COATED ORAL at 09:31

## 2020-09-10 RX ADMIN — Medication 400 MG: at 18:46

## 2020-09-10 RX ADMIN — INSULIN LISPRO 2 UNITS: 100 INJECTION, SOLUTION INTRAVENOUS; SUBCUTANEOUS at 13:22

## 2020-09-10 RX ADMIN — INSULIN LISPRO 1 UNITS: 100 INJECTION, SOLUTION INTRAVENOUS; SUBCUTANEOUS at 21:21

## 2020-09-10 RX ADMIN — DILTIAZEM HYDROCHLORIDE 120 MG: 120 CAPSULE, COATED, EXTENDED RELEASE ORAL at 21:21

## 2020-09-10 RX ADMIN — Medication 5 ML: at 09:43

## 2020-09-10 RX ADMIN — DILTIAZEM HYDROCHLORIDE 120 MG: 120 CAPSULE, COATED, EXTENDED RELEASE ORAL at 09:31

## 2020-09-10 RX ADMIN — SODIUM CHLORIDE, PRESERVATIVE FREE 10 ML: 5 INJECTION INTRAVENOUS at 09:43

## 2020-09-10 RX ADMIN — LATANOPROST 1 DROP: 50 SOLUTION OPHTHALMIC at 00:05

## 2020-09-10 RX ADMIN — FUROSEMIDE 20 MG: 20 TABLET ORAL at 17:11

## 2020-09-10 RX ADMIN — NITROFURANTOIN MONOHYDRATE/MACROCRYSTALLINE 100 MG: 25; 75 CAPSULE ORAL at 16:15

## 2020-09-10 RX ADMIN — FUROSEMIDE 80 MG: 40 TABLET ORAL at 09:37

## 2020-09-10 RX ADMIN — WARFARIN SODIUM 5 MG: 5 TABLET ORAL at 17:11

## 2020-09-10 RX ADMIN — LATANOPROST 1 DROP: 50 SOLUTION OPHTHALMIC at 21:21

## 2020-09-10 RX ADMIN — PANTOPRAZOLE SODIUM 40 MG: 40 TABLET, DELAYED RELEASE ORAL at 05:52

## 2020-09-10 ASSESSMENT — PAIN SCALES - GENERAL
PAINLEVEL_OUTOF10: 0
PAINLEVEL_OUTOF10: 0

## 2020-09-10 NOTE — PROGRESS NOTES
Nahum Corado MD        glucagon (rDNA) injection 1 mg  1 mg Intramuscular PRN Nahum Corado MD        dextrose 5 % solution  100 mL/hr Intravenous PRN Nahum Corado MD        insulin lispro (HUMALOG) injection vial 0-6 Units  0-6 Units Subcutaneous TID WC Nahum Corado MD   1 Units at 09/09/20 1753    insulin lispro (HUMALOG) injection vial 0-3 Units  0-3 Units Subcutaneous Nightly Nahum Corado MD   1 Units at 09/09/20 2301    traMADol (ULTRAM) tablet 50 mg  50 mg Oral Q6H PRN Nahum Corado MD        Or   Shellie Smith traMADol Jettie Victor Manuel) tablet 100 mg  100 mg Oral Q6H PRN Nahum Corado MD        diazePAM (VALIUM) tablet 2 mg  2 mg Oral Q6H PRN Nahum Corado MD   2 mg at 09/05/20 2038    gabapentin (NEURONTIN) capsule 100 mg  100 mg Oral Nightly Nahum Corado MD   100 mg at 09/09/20 2256    latanoprost (XALATAN) 0.005 % ophthalmic solution 1 drop  1 drop Right Eye Nightly Nahum Corado MD   1 drop at 09/10/20 0005    loperamide (IMODIUM) capsule 2 mg  2 mg Oral 4x Daily PRN Nahum Corado MD   2 mg at 09/04/20 2112    warfarin (COUMADIN) daily dosing (placeholder)   Other RX Placeholder Nahum Corado MD         Allergies   Allergen Reactions    Bactrim [Sulfamethoxazole-Trimethoprim]     Levofloxacin     Pcn [Penicillins]     Sulfa Antibiotics      Principal Problem:    Acute kidney injury superimposed on chronic kidney disease ([address]  Active Problems:    Nonrheumatic aortic (valve) stenosis    Hyponatremia    Combined systolic and diastolic congestive heart failure ([address]    Anemia due to chronic kidney disease    Acute respiratory failure with hypoxia ([address]    DAVION (acute kidney injury) ([address]  Resolved Problems:    * No resolved hospital problems. *    Blood pressure 109/81, pulse 147, temperature 99.4 °F (37.4 °C), temperature source Oral, resp.  rate 18, height 5' 6\" (1.676 m), weight 245 lb 13 oz (111.5 kg), SpO2 90 %, not currently breastfeeding. Subjective Feels ok , no CP, SOB or palp. Objective A&O, CTPA, IIR&R at 100, no edema. Temp 99+ will check U/A. Assessment & Plan  Will D/C dischage per cardiology to improve rate control with diltiazem. D/W patient and her daughter.   Opal Villeda MD  9/10/2020

## 2020-09-10 NOTE — PLAN OF CARE
Problem: OXYGENATION/RESPIRATORY FUNCTION  Goal: Patient will maintain patent airway  Outcome: Ongoing  Goal: Patient will achieve/maintain normal respiratory rate/effort  Description: Respiratory rate and effort will be within normal limits for the patient  Outcome: Ongoing     Problem: HEMODYNAMIC STATUS  Goal: Patient has stable vital signs and fluid balance  Outcome: Ongoing     Problem: FLUID AND ELECTROLYTE IMBALANCE  Goal: Fluid and electrolyte balance are achieved/maintained  Outcome: Ongoing     Problem: ACTIVITY INTOLERANCE/IMPAIRED MOBILITY  Goal: Mobility/activity is maintained at optimum level for patient  Outcome: Ongoing     Problem: Skin Integrity:  Goal: Will show no infection signs and symptoms  Description: Will show no infection signs and symptoms  Outcome: Ongoing  Goal: Absence of new skin breakdown  Description: Absence of new skin breakdown  Outcome: Ongoing     Problem: Falls - Risk of:  Goal: Will remain free from falls  Description: Will remain free from falls  Outcome: Ongoing  Goal: Absence of physical injury  Description: Absence of physical injury  Outcome: Ongoing     Problem: Urinary Elimination:  Goal: Signs and symptoms of infection will decrease  Description: Signs and symptoms of infection will decrease  Outcome: Ongoing  Goal: Complications related to the disease process, condition or treatment will be avoided or minimized  Description: Complications related to the disease process, condition or treatment will be avoided or minimized  Outcome: Ongoing     Problem: Pain:  Goal: Pain level will decrease  Description: Pain level will decrease  Outcome: Ongoing  Goal: Control of acute pain  Description: Control of acute pain  Outcome: Ongoing  Goal: Control of chronic pain  Description: Control of chronic pain  Outcome: Ongoing

## 2020-09-10 NOTE — PROGRESS NOTES
Nephrology ([facility]) Progress Note    CC: DAVION on CKD    Subjective:    HPI:  Breathing comfortably. Has been tachycardic this AM  Notes mild dysuria Running low grade fever today   BP running lower  No CP. Net negative 8.7 L so far  ROS:  In bed. No fever. [address]:  medications reivewed. Daughter at bedside    Objective:  Blood pressure 95/66, pulse 100, temperature 99.2 °F (37.3 °C), temperature source Oral, resp. rate 18, height 5' 6\" (1.676 m), weight 245 lb 13 oz (111.5 kg), SpO2 98 %, not currently breastfeeding. Intake/Output Summary (Last 24 hours) at 9/10/2020 1432  Last data filed at 9/10/2020 1112  Gross per 24 hour   Intake 420 ml   Output 300 ml   Net 120 ml     General:  NAD, A+Ox3, obese   Chest:  Diminished BS at bases  CVS:  Irregular tachycardic  Abdominal:  Soft non tender  Extremities: trace edema Ace wraps in place  Skin:  no rash    Labs:  Renal panel:  Lab Results   Component Value Date/Time     09/10/2020 06:11 AM    K 3.5 09/10/2020 06:11 AM    K 4.4 09/05/2020 05:55 AM    CO2 35 (H) 09/10/2020 06:11 AM    BUN 51 (H) 09/10/2020 06:11 AM    CREATININE 2.0 (H) 09/10/2020 06:11 AM    CALCIUM 9.1 09/10/2020 06:11 AM    PHOS 3.1 09/10/2020 06:11 AM    MG 1.50 (L) 09/10/2020 06:11 AM     CBC:  Lab Results   Component Value Date/Time    WBC 6.1 09/10/2020 06:12 AM    HGB 10.1 (L) 09/10/2020 06:12 AM    HCT 30.7 (L) 09/10/2020 06:12 AM     09/10/2020 06:12 AM       Assessment/Plan:  Reviewed old records and labs.     1) hyponatremia              - assessed as hypervolemic hyponatremia              - improved 136 meq today     2) CSDHF              - fluid restriction 1.5 liters/day appears well diuresed now               - lasix changed to PO , decrease dose to 20 mg bid Running low BP today      3) DAVION on CKD              - baseline 06/03/20 Cr 1.6              - ddx:  CRS              - renal function stable Cr 2  mg Decrease Lasix dose Check urine CX start Macrobid ( Allergic to PCN, Levaquin and Bactrim)     4) anemia              - iron studies adequate              - HGB stable at 10.1 gm     5) pulmonary HTN              - suspect RUTH and obesity-related hypoventilation syndrome              - patient is not interested in sleep study    6) A fib cardiology following Cardizem Remains tachycardic

## 2020-09-10 NOTE — PROGRESS NOTES
Physical Therapy  Facility/Department: [address] PROGRESSIVE CARE  Daily Treatment Note  NAME: Tiffani Leal  : 1934  MRN: [id number]    Date of Service: 9/10/2020    Discharge Recommendations:  Continue to assess pending progress   PT Equipment Recommendations  Other: Will monitor for potential equipt needs. Tiffani Leal scored a 18/24 on the AM-PAC short mobility form. Current research shows that an AM-PAC score of 18 or greater is typically associated with a discharge to the patient's home setting. Based on the patient's AM-PAC score and their current functional mobility deficits, it is recommended that the patient have 2-3 sessions per week of Physical Therapy at d/c to increase the patient's independence. At this time, this patient demonstrates the endurance and safety to discharge home with Home PT Evaluation  and a follow up treatment frequency of 2-3x/wk. Please see assessment section for further patient specific details. If patient discharges prior to next session this note will serve as a discharge summary. Please see below for the latest assessment towards goals. Assessment   Body structures, Functions, Activity limitations: Decreased functional mobility ; Decreased strength;Decreased endurance  Assessment: Pt regulo Transfers/Amb Short distances with Walker (25' x 2) although cont Close CGA/Min assist.  Cues and assist needed to ensure safe functional mobility. Pt/dtr acknowledge need for ongoing hands on assist upon d/c and are aware of ongoing fall concerns/risks. Pt/family report ongoing 24/7 assist/support available upon d/c. Pt would benefit from cont PT Services upon d/c. Prognosis: Good  Decision Making: Medium Complexity  Patient Education: Role of PT, POC, Need to call for assist, Safe use of Walker.   REQUIRES PT FOLLOW UP: Yes  Activity Tolerance  Activity Tolerance: Patient Tolerated treatment well;Patient limited by endurance     Patient Diagnosis(es): The primary encounter diagnosis was Hyponatremia. Diagnoses of Acute renal insufficiency and Acute on chronic congestive heart failure, unspecified heart failure type [facility]) were also pertinent to this visit. has a past medical history of Anemia, Chronic kidney disease, Combined systolic and diastolic congestive heart failure ([address] Diabetes mellitus ([facility] [address] DVT (deep venous thrombosis) ([facility] [address] Hyperlipidemia, Hypertension, Osteoarthritis, PAF (paroxysmal atrial fibrillation) ([facility] [address] Pulmonary embolism ([facility] [address] Sarcoidosis, Thyroid disease, and Type II or unspecified type diabetes mellitus without mention of complication, not stated as uncontrolled. has a past surgical history that includes Hysterectomy and knee surgery. Restrictions  Restrictions/Precautions  Restrictions/Precautions: Fall Risk  Position Activity Restriction  Other position/activity restrictions: Fluid Restrictions 1500 ml/day. Subjective   General  Chart Reviewed: Yes  Additional Pertinent Hx: Per cardiology consult of Dr. Paramjit Groves on 9-4-2020: The pt is an 81 yo female who came to the ED due to abnormal labs. The pt recently saw her PCP due to having nausea and vomiting; was started on an abx and increased Lasix. PMHx: anemia, CKD, CHF,DM, DVT, HTN, OA, a-fib, PE, sarcoidosis,DM,thyroid disease, knee sx  Response To Previous Treatment: Patient with no complaints from previous session. Family / Caregiver Present: Yes(Dtr.)  Referring Practitioner: Dr. Jeet Olivarez  Subjective  Subjective: Pt agreeable to PT Rx. Requests use of bathroom.           Orientation  Orientation  Overall Orientation Status: Within Functional Limits  Cognition   Cognition  Overall Cognitive Status: Exceptions  Safety Judgement: Decreased awareness of need for safety  Problem Solving: Assistance required to identify errors made;Assistance required to correct errors made  Insights: Decreased awareness of deficits  Sequencing: Requires cues for some  Objective   Bed mobility  Supine to Sit: Stand by assistance(HOB elevated. Use of Bedrail. Slow/effort.)  Transfers  Sit to Stand: Contact guard assistance;Minimal Assistance(With Walker. Cues for safe hand placement. CGA from Chair/EOB. Min assist from Toilet (lower surface). )  Stand to sit: Contact guard assistance(With Walker. Cues for safe hand placement.)  Ambulation  Ambulation?: Yes  Ambulation 1  Surface: level tile  Device: Rolling Walker  Distance: Pt amb to/from bathroom 25' x 2 with Rolling Walker Close CGA/Min assist due to increase assist for changes in direction and in prep transitional mvts. Flexed posture, very diminished step length/clearance. Cues for more upright posture and to remain close/safe distance to Carley Sayer. Increase cues as transition to chair. Tendency to reach for armrest/bedrail prior directly in front/close to chair. Comment: Assist with pericare and don/doff clean depends. Dtr present and given suggestions re assist level and proper/safe functional mobility with use of Walker. AM-PAC Score  -PAC Inpatient Mobility Raw Score : 18 (09/10/20 1424)  -PAC Inpatient T-Scale Score : 43.63 (09/10/20 1424)  Mobility Inpatient CMS 0-100% Score: 46.58 (09/10/20 1424)  Mobility Inpatient CMS G-Code Modifier : CK (09/10/20 1424)          Goals  Short term goals  Time Frame for Short term goals: upon d/c  Short term goal 1: Bed mobility with min A  Short term goal 2: Transfers sit <> stand with min A - met 9/8; progress to sit<>stand CGA using RW  Short term goal 3: Ambulate with wheeled walker 10 feet with min A - met 9/8; progress to amb using RW 30' min A/CGA  Patient Goals   Patient goals : to go home    Plan    Plan  Times per week: 3-5x/week while in acute care setting.   Current Treatment Recommendations: Functional Mobility Training, Transfer Training, Gait Training, Safety Education & Training, Patient/Caregiver Education & Training  Safety Devices  Type of devices: Call light

## 2020-09-10 NOTE — PROGRESS NOTES
notified(up to chair d/t bed malfunction-to be replaced soon)         Patient Diagnosis(es): The primary encounter diagnosis was Hyponatremia. Diagnoses of Acute renal insufficiency and Acute on chronic congestive heart failure, unspecified heart failure type [facility]) were also pertinent to this visit. has a past medical history of Anemia, Chronic kidney disease, Combined systolic and diastolic congestive heart failure ([facility] [address] Diabetes mellitus ([facility] [address] DVT (deep venous thrombosis) ([facility] [address] Hyperlipidemia, Hypertension, Osteoarthritis, PAF (paroxysmal atrial fibrillation) ([facility] [address] Pulmonary embolism ([facility] [address] Sarcoidosis, Thyroid disease, and Type II or unspecified type diabetes mellitus without mention of complication, not stated as uncontrolled. has a past surgical history that includes Hysterectomy and knee surgery. Restrictions  Restrictions/Precautions  Restrictions/Precautions: Fall Risk  Position Activity Restriction  Other position/activity restrictions: Fluid Restrictions 1500 ml/day. Subjective   General  Chart Reviewed: Yes, Orders, Progress Notes, Labs  Patient assessed for rehabilitation services?: Yes  Additional Pertinent Hx: 81 y/o female admitted 9/4 with nausea, diarrhea, and malaise. Pt was sent to hospital after PCP visit revealed abnormal lab values. Pt being treated for hyponatremia, DAVION, and CHF. Family / Caregiver Present: Yes(daughter)  Referring Practitioner: Brandt Melgoza MD  Subjective  Subjective: Pt met BS, in bed. Pt agreeable to OT. Pt denied pain  General Comment  Comments: Per RN ok for therapy      Orientation  Orientation  Overall Orientation Status: Within Normal Limits  Objective    ADL  Grooming: Setup(washed hands seated, with bath wipe)  LE Dressing: Moderate assistance;Maximum assistance(to don depends)  Toileting: Moderate assistance;Maximum assistance(voided urine. Pt managed perineal hygiene while seated (pt unable to reach buttocks to trial wiping).  Assist to manage depends)        Balance  Sitting Balance: Stand by assistance(at EOB for BUE ex's)  Standing Balance  Time: 1-3 min  Activity: ADL's, CG/SBA. Pt amb short distance between bed, BR (recliner broght close to BR door), with CGA, cues for safety (feet in walker, not prematurely reach for chair, grab bar etc.)  Toilet Transfers  Toilet - Technique: Ambulating  Equipment Used: Grab bars  Toilet Transfer: Moderate assistance  Toilet Transfers Comments: RW; cues for hand placement. Pt uses higher RTS at home.   Wheelchair Bed Transfers  Wheelchair/Bed - Technique: Ambulating  Equipment Used: Bed(recliner)  Level of Asssistance: Contact guard assistance  Bed mobility  Supine to Sit: Stand by assistance(HOB fully raised, use of rail)  Sit to Supine: Unable to assess(up to chair)         Cognition  Overall Cognitive Status: Exceptions  Safety Judgement: Decreased awareness of need for safety  Problem Solving: Assistance required to identify errors made;Assistance required to correct errors made  Insights: Decreased awareness of deficits  Sequencing: Requires cues for some            Type of ROM/Therapeutic Exercise  Type of ROM/Therapeutic Exercise: AROM  Comment: BUE  Exercises  Shoulder Flexion: x10  Horizontal ABduction: x10  Horizontal ADduction: x10  Elbow Flexion: x10  Elbow Extension: x10         Plan   Plan  Times per week: 3-5  Current Treatment Recommendations: Strengthening, Endurance Training, Self-Care / ADL, Balance Training, Gait Training, Functional Mobility Training    AM-[facility] Score        -[facility] Inpatient Daily Activity Raw Score: 16 (09/10/20 1428)  AM-PAC Inpatient ADL T-Scale Score : 35.96 (09/10/20 1428)  ADL Inpatient CMS 0-100% Score: 53.32 (09/10/20 1428)  ADL Inpatient CMS G-Code Modifier : CK (09/10/20 1428)    Goals  Short term goals  Time Frame for Short term goals: Prior to DC:Status: goals ongoing  Short term goal 1: Pt will complete ADL transfers/mobility with SBA  Short term goal 2: Pt will

## 2020-09-10 NOTE — PROGRESS NOTES
Clinical Pharmacy Note  Warfarin Consult    Leesa Rod is a 80 y.o. female receiving warfarin managed by pharmacy. Warfarin Indication: Afib  Target INR range: 2-3   Dose prior to admission: 5 mg daily, EXCEPT 2.5 mg every Thursday    Current warfarin drug-drug interactions: none    Recent Labs     09/08/20  0748 09/09/20  0627 09/10/20  0611 09/10/20  0612   HGB 9.7* 9.9*  --  10.1*   HCT 29.1* 29.4*  --  30.7*   INR 1.48* 1.48* 1.71*  --        Assessment/Plan:  Will give Warfarin 5mg tonight due to low INR. Of note, her INR was elevated on admission, possibly due to diarrhea, antibiotic, or the CHF. The INR has been stable over past few months. Daily PT/INR until stable within therapeutic range. Thank you for the consult. Will continue to follow.   Paloma Navarro [facility],9/10/2020,11:14 AM

## 2020-09-10 NOTE — PLAN OF CARE
Problem: OXYGENATION/RESPIRATORY FUNCTION  Goal: Patient will maintain patent airway  9/10/2020 1656 by Melani Myles RN  Outcome: Ongoing  Note: Patient's airway is patent. No cough noted. Problem: OXYGENATION/RESPIRATORY FUNCTION  Goal: Patient will achieve/maintain normal respiratory rate/effort  Description: Respiratory rate and effort will be within normal limits for the patient  9/10/2020 1656 by Melani Myles RN  Outcome: Ongoing  Note: Respirations easy even no distress. No shortness of breath noted. Patient denies shortness of breath. Problem: HEMODYNAMIC STATUS  Goal: Patient has stable vital signs and fluid balance  9/10/2020 1656 by Melani Myles RN  Outcome: Ongoing  Note: VSS. Labs being monitored. Edema to bilateral lower extremities is improved from when this nurse took care of her over the weekend. Problem: FLUID AND ELECTROLYTE IMBALANCE  Goal: Fluid and electrolyte balance are achieved/maintained  9/10/2020 1656 by Melani Myles RN  Outcome: Ongoing  Note: Labs being monitored. Weight checked daily. Attempting to monitor I/O it can be difficulty because pt can be incontinent. Problem: ACTIVITY INTOLERANCE/IMPAIRED MOBILITY  Goal: Mobility/activity is maintained at optimum level for patient  9/10/2020 1656 by Melani Myles RN  Outcome: Ongoing  Note: Patient has been up to the chair several times this shift. She was able to ambulate from the chair to the bathroom and tolerated fairly well. Problem: Skin Integrity:  Goal: Will show no infection signs and symptoms  Description: Will show no infection signs and symptoms  9/10/2020 1656 by Melani Myles RN  Outcome: Ongoing  Note: Skin assessment completed every shift. Pt assessed for incontinence, appropriate barrier cream applied prn. Pt encouraged to turn/rotate every 2 hours. Assistance provided if pt unable to do so themselves.         Problem: Skin Integrity:  Goal: Absence of new skin breakdown  Description: Absence of new skin breakdown  9/10/2020 1656 by Kelly Zuñiga RN  Outcome: Ongoing  Note: No signs of skin breakdown noted. Problem: Falls - Risk of:  Goal: Will remain free from falls  Description: Will remain free from falls  9/10/2020 1656 by Kelly Zuñiga RN  Outcome: Ongoing  Note: Fall risk assessment completed every shift. All precautions in place. Pt has call light within reach at all times. Room clear of clutter. Pt aware to call for assistance when getting up. Patient makes no attempts to get up on her own. Problem: Falls - Risk of:  Goal: Absence of physical injury  Description: Absence of physical injury  9/10/2020 1656 by Kelly Zuñiga RN  Outcome: Ongoing  Note: No signs of physical injury noted. Problem: Pain:  Goal: Pain level will decrease  Description: Pain level will decrease  9/10/2020 1656 by Kelly Zuñiga RN  Outcome: Ongoing  Note: patient has had no complaints of pain this shift. Problem: Pain:  Goal: Control of acute pain  Description: Control of acute pain  9/10/2020 1656 by Kelly Zuñiga RN  Outcome: Ongoing  Note: Pain/discomfort being managed with PRN analgesics per MD orders. Pt able to express presence and absence of pain and rate pain appropriately using numerical scale. Problem: Pain:  Goal: Control of chronic pain  Description: Control of chronic pain  9/10/2020 1656 by Kelly Zuñiga RN  Outcome: Ongoing  Note: .        Problem: Urinary Elimination:  Goal: Signs and symptoms of infection will decrease  Description: Signs and symptoms of infection will decrease  9/10/2020 1656 by Kelly Zuñiga RN  Outcome: Completed     Problem: Urinary Elimination:  Goal: Complications related to the disease process, condition or treatment will be avoided or minimized  Description: Complications related to the disease process, condition or treatment will be avoided or minimized  9/10/2020 1656 by Kelly Zuñiga RN  Outcome: Completed

## 2020-09-10 NOTE — PROGRESS NOTES
pulses    Medications:    furosemide  80 mg Oral BID    dilTIAZem  120 mg Oral Daily    multivitamin+  5 mL Oral Daily    cetirizine  5 mg Oral Daily    carvedilol  12.5 mg Oral BID WC    triamcinolone   Topical Daily    hydrALAZINE  10 mg Oral BID    pantoprazole  40 mg Oral QAM AC    atorvastatin  10 mg Oral Nightly    potassium chloride  20 mEq Oral BID WC    sodium chloride flush  10 mL Intravenous 2 times per day    insulin lispro  0-6 Units Subcutaneous TID WC    insulin lispro  0-3 Units Subcutaneous Nightly    gabapentin  100 mg Oral Nightly    latanoprost  1 drop Right Eye Nightly    warfarin (COUMADIN) daily dosing (placeholder)   Other RX Placeholder      dextrose       acetaminophen, sodium chloride flush, [DISCONTINUED] acetaminophen **OR** acetaminophen, polyethylene glycol, promethazine **OR** ondansetron, glucose, dextrose, glucagon (rDNA), dextrose, traMADol **OR** traMADol, diazePAM, loperamide    Lab Data:  CBC:   Recent Labs     09/08/20  0748 09/09/20  0627 09/10/20  0612   WBC 6.4 5.7 6.1   HGB 9.7* 9.9* 10.1*    237 211     BMP:    Recent Labs     09/08/20  0748 09/09/20  0627 09/10/20  0611    135* 136   K 3.9 3.6 3.5   CO2 37* 34* 35*   BUN 53* 52* 51*   CREATININE 1.9* 1.9* 2.0*     LIVR: No results for input(s): AST, ALT in the last 72 hours.   INR:    Recent Labs     09/08/20  0748 09/09/20 0627 09/10/20  0611   INR 1.48* 1.48* 1.71*     9/4/2020 Echo:  Suboptimal image quality.   Patient appears to be in atrial fibrillation.   Left ventricular cavity size is normal.   There is moderate concentric left ventricular hypertrophy.   Ejection fraction is visually estimated to be 07%   Diastolic filling parameters suggest at least grade II diastolic   dysfunction.   Severe aortic stenosis with a peak velocity of 5.00 m/s and a mean pressure gradient of 67 mmHg.   Estimated pulmonary artery systolic pressure is borderline severely elevated   at 69 mmHg assuming a right atrial pressure of 15 mmHg.     ECG:  Atrial fibrillation  Left axis deviation  Left bundle branch block     Echo 8/2017:   Normal left ventricular size and wall thickness. Global systolic function is   moderate decreased with an estimated LVEF estimated at 35%.   The right ventricle is not well visualized but appears to have normal size   and mild to moderately reduced function.   Severe biatrial enlargement appreciated.   Moderate mitral regurgitation is present.   The aortic valve leaflets are calcified and restricted in appearance.   The aortic valve area is calculated at 0.7 cm2 with a maximum pressure   gradient of 34 mmHg and a mean pressure gradient of 17 mmHg. This is   consistent with at least mild aortic stenosis but gradient but is probably   underestimated due to left ventricular systolic dysfunction.     10/2015 Lexiscan-Myoview: There is no reversible ischemia or scar appreciated on this study. There is     a slight decrease in tracer uptake on the stress imaging that appears more     consistent with breast attenuation.     With Lexiscan vasodilator stress the patient had no ischemic ST changes.     Nondiagnostic for ischemia. Telemetry: Atrial fib with increased VR    Assessment/Plan:    1. Acute on chronic diastolic HF  -prior LVEF 24% on echo in 2017; 55% on echo this admission  -NYHA class III  -continue carvedilol  -continue lasix  -no ACE/ARB/aldactone d/t elevated Cr     2. Aortic stenosis  -mean gradient 67 mm (severe)  -will continue with conservative medical management for now     3. Persistent atrial fibrillation  -increased VR  -continue carvedilol and diltiazem added for rate control  -BP soft, so will monitor for hypotension   -continue Warfarin     4. DAVION on CKD  -nephrology following  -now on po lasix  -Cr today 2.0  -baseline ~ 1.6     5.  Pulmonary HTN  -suspect multifactorial: prior multiple PE's, RUTH and CHF  -she does not want to pursue sleep evaluation  -continue

## 2020-09-10 NOTE — PROGRESS NOTES
Called Dr. Oscar Jose office and spoke to Amos Bennett. Left message informing her of urinalysis and that Dr. Sherly Valentine ordered macrobid.

## 2020-09-11 VITALS
OXYGEN SATURATION: 90 % | RESPIRATION RATE: 18 BRPM | TEMPERATURE: 98.2 F | HEART RATE: 89 BPM | WEIGHT: 244.27 LBS | HEIGHT: 66 IN | BODY MASS INDEX: 39.26 KG/M2 | DIASTOLIC BLOOD PRESSURE: 64 MMHG | SYSTOLIC BLOOD PRESSURE: 110 MMHG

## 2020-09-11 LAB
ALBUMIN SERPL-MCNC: 2.9 G/DL (ref 3.4–5)
ANION GAP SERPL CALCULATED.3IONS-SCNC: 13 MMOL/L (ref 3–16)
BASOPHILS ABSOLUTE: 0 K/UL (ref 0–0.2)
BASOPHILS RELATIVE PERCENT: 0.4 %
BUN BLDV-MCNC: 46 MG/DL (ref 7–20)
CALCIUM SERPL-MCNC: 9.1 MG/DL (ref 8.3–10.6)
CHLORIDE BLD-SCNC: 92 MMOL/L (ref 99–110)
CO2: 31 MMOL/L (ref 21–32)
CREAT SERPL-MCNC: 2 MG/DL (ref 0.6–1.2)
EOSINOPHILS ABSOLUTE: 0.1 K/UL (ref 0–0.6)
EOSINOPHILS RELATIVE PERCENT: 1.4 %
GFR AFRICAN AMERICAN: 29
GFR NON-AFRICAN AMERICAN: 24
GLUCOSE BLD-MCNC: 118 MG/DL (ref 70–99)
GLUCOSE BLD-MCNC: 121 MG/DL (ref 70–99)
GLUCOSE BLD-MCNC: 248 MG/DL (ref 70–99)
HCT VFR BLD CALC: 30.3 % (ref 36–48)
HEMOGLOBIN: 9.9 G/DL (ref 12–16)
INR BLD: 2 (ref 0.86–1.14)
LYMPHOCYTES ABSOLUTE: 1.9 K/UL (ref 1–5.1)
LYMPHOCYTES RELATIVE PERCENT: 20.6 %
MCH RBC QN AUTO: 28.5 PG (ref 26–34)
MCHC RBC AUTO-ENTMCNC: 32.8 G/DL (ref 31–36)
MCV RBC AUTO: 87.1 FL (ref 80–100)
MONOCYTES ABSOLUTE: 0.9 K/UL (ref 0–1.3)
MONOCYTES RELATIVE PERCENT: 9.4 %
NEUTROPHILS ABSOLUTE: 6.3 K/UL (ref 1.7–7.7)
NEUTROPHILS RELATIVE PERCENT: 68.2 %
PDW BLD-RTO: 15.7 % (ref 12.4–15.4)
PERFORMED ON: ABNORMAL
PERFORMED ON: ABNORMAL
PHOSPHORUS: 3.2 MG/DL (ref 2.5–4.9)
PLATELET # BLD: 192 K/UL (ref 135–450)
PMV BLD AUTO: 6.9 FL (ref 5–10.5)
POTASSIUM SERPL-SCNC: 3.5 MMOL/L (ref 3.5–5.1)
PROTHROMBIN TIME: 23.4 SEC (ref 10–13.2)
RBC # BLD: 3.48 M/UL (ref 4–5.2)
SODIUM BLD-SCNC: 136 MMOL/L (ref 136–145)
WBC # BLD: 9.2 K/UL (ref 4–11)

## 2020-09-11 PROCEDURE — 6370000000 HC RX 637 (ALT 250 FOR IP): Performed by: INTERNAL MEDICINE

## 2020-09-11 PROCEDURE — 97530 THERAPEUTIC ACTIVITIES: CPT

## 2020-09-11 PROCEDURE — 2580000003 HC RX 258: Performed by: INTERNAL MEDICINE

## 2020-09-11 PROCEDURE — 36415 COLL VENOUS BLD VENIPUNCTURE: CPT

## 2020-09-11 PROCEDURE — 85610 PROTHROMBIN TIME: CPT

## 2020-09-11 PROCEDURE — 94760 N-INVAS EAR/PLS OXIMETRY 1: CPT

## 2020-09-11 PROCEDURE — 97116 GAIT TRAINING THERAPY: CPT

## 2020-09-11 PROCEDURE — 85025 COMPLETE CBC W/AUTO DIFF WBC: CPT

## 2020-09-11 PROCEDURE — 80069 RENAL FUNCTION PANEL: CPT

## 2020-09-11 PROCEDURE — 99232 SBSQ HOSP IP/OBS MODERATE 35: CPT | Performed by: NURSE PRACTITIONER

## 2020-09-11 RX ORDER — FUROSEMIDE 20 MG/1
20 TABLET ORAL 2 TIMES DAILY
Qty: 60 TABLET | Refills: 3 | Status: ON HOLD | OUTPATIENT
Start: 2020-09-11 | End: 2021-02-12 | Stop reason: HOSPADM

## 2020-09-11 RX ORDER — LANOLIN ALCOHOL/MO/W.PET/CERES
400 CREAM (GRAM) TOPICAL DAILY
Qty: 30 TABLET | Refills: 0 | Status: SHIPPED | OUTPATIENT
Start: 2020-09-11 | End: 2020-10-12

## 2020-09-11 RX ORDER — DILTIAZEM HYDROCHLORIDE 120 MG/1
120 CAPSULE, COATED, EXTENDED RELEASE ORAL EVERY 12 HOURS
Qty: 30 CAPSULE | Refills: 3 | Status: ON HOLD | OUTPATIENT
Start: 2020-09-11 | End: 2021-02-12 | Stop reason: HOSPADM

## 2020-09-11 RX ORDER — NITROFURANTOIN 25; 75 MG/1; MG/1
100 CAPSULE ORAL 2 TIMES DAILY
Qty: 20 CAPSULE | Refills: 0 | Status: SHIPPED | OUTPATIENT
Start: 2020-09-11 | End: 2020-09-16 | Stop reason: ALTCHOICE

## 2020-09-11 RX ADMIN — PANTOPRAZOLE SODIUM 40 MG: 40 TABLET, DELAYED RELEASE ORAL at 06:06

## 2020-09-11 RX ADMIN — INSULIN LISPRO 2 UNITS: 100 INJECTION, SOLUTION INTRAVENOUS; SUBCUTANEOUS at 12:59

## 2020-09-11 RX ADMIN — CETIRIZINE HYDROCHLORIDE 5 MG: 10 TABLET, FILM COATED ORAL at 10:08

## 2020-09-11 RX ADMIN — SODIUM CHLORIDE, PRESERVATIVE FREE 10 ML: 5 INJECTION INTRAVENOUS at 10:07

## 2020-09-11 RX ADMIN — Medication 5 ML: at 10:58

## 2020-09-11 RX ADMIN — TRIAMCINOLONE ACETONIDE: 1 CREAM TOPICAL at 11:00

## 2020-09-11 RX ADMIN — DILTIAZEM HYDROCHLORIDE 120 MG: 120 CAPSULE, COATED, EXTENDED RELEASE ORAL at 10:08

## 2020-09-11 RX ADMIN — FUROSEMIDE 20 MG: 20 TABLET ORAL at 10:08

## 2020-09-11 RX ADMIN — POTASSIUM CHLORIDE 20 MEQ: 1500 TABLET, EXTENDED RELEASE ORAL at 10:07

## 2020-09-11 RX ADMIN — Medication 400 MG: at 10:07

## 2020-09-11 RX ADMIN — NITROFURANTOIN MONOHYDRATE/MACROCRYSTALLINE 100 MG: 25; 75 CAPSULE ORAL at 10:07

## 2020-09-11 ASSESSMENT — PAIN SCALES - GENERAL
PAINLEVEL_OUTOF10: 0
PAINLEVEL_OUTOF10: 0

## 2020-09-11 NOTE — PROGRESS NOTES
Physical Therapy  Facility/Department: [address] PROGRESSIVE CARE  Daily Treatment Note  NAME: Aquilino Abdullahi  : 1934  MRN: [id number]    Date of Service: 2020    Discharge Recommendations:  24 hour supervision or assist, Home with Home health PT, S Level 1, Patient would benefit from continued therapy after discharge   PT Equipment Recommendations  Equipment Needed: No     Aquilino Abdullahi scored a 18/24 on the AM-PAC short mobility form. Current research shows that an AM-PAC score of 18 or greater is typically associated with a discharge to the patient's home setting. Based on the patient's AM-PAC score and their current functional mobility deficits, it is recommended that the patient have 2-3 sessions per week of Physical Therapy at d/c to increase the patient's independence. At this time, this patient demonstrates the endurance and safety to discharge home with home PT and a follow up treatment frequency of 2-3x/wk. Please see assessment section for further patient specific details. If patient discharges prior to next session this note will serve as a discharge summary. Please see below for the latest assessment towards goals. HOME HEALTH CARE: LEVEL 1 STANDARD     -Initial home health evaluation to occur within 24-48 hours, in patient home    -Home health agency to establish plan of care for patient over 60 day period    -Medication Reconciliation    -PCP Visit scheduled within seven days of discharge    -PT/OT to evaluate with goal of regaining prior level of functioning    -OT to evaluate if patient has [address] needs for personal care       Assessment   Body structures, Functions, Activity limitations: Decreased functional mobility ; Decreased strength;Decreased endurance  Assessment: Today, pt required up to [place] for transfers and able to ambulate short distances with CGA.   Family present and reports they can assist pt with transfers upon d/c.  Pt/family report ongoing  assist/support available upon d/c. Pt would benefit from cont PT Services upon d/c. Recommend home health PT to improve safety and independence with functional mobility. Prognosis: Good  PT Education: PT Role;General Safety;Goals;Gait Training;Plan of Care;Transfer Training;Family Education  REQUIRES PT FOLLOW UP: Yes  Activity Tolerance  Activity Tolerance: Patient Tolerated treatment well;Patient limited by endurance     Patient Diagnosis(es): The primary encounter diagnosis was Hyponatremia. Diagnoses of Acute renal insufficiency and Acute on chronic congestive heart failure, unspecified heart failure type [facility]) were also pertinent to this visit. has a past medical history of Anemia, Chronic kidney disease, Combined systolic and diastolic congestive heart failure ([facility] Diabetes mellitus ([facility] [address] DVT (deep venous thrombosis) ([facility] [address] Hyperlipidemia, Hypertension, Osteoarthritis, PAF (paroxysmal atrial fibrillation) ([facility] [address] Pulmonary embolism ([facility] [address] Sarcoidosis, Thyroid disease, and Type II or unspecified type diabetes mellitus without mention of complication, not stated as uncontrolled. has a past surgical history that includes Hysterectomy and knee surgery. Restrictions  Restrictions/Precautions  Restrictions/Precautions: Fall Risk  Position Activity Restriction  Other position/activity restrictions: Fluid Restrictions 1500 ml/day.      Social/Functional History  Lives With: Spouse  Type of Home: House  Home Layout: Two level, Able to Live on Main level with bedroom/bathroom, Laundry in basement  Home Access: Stairs to enter with rails  Entrance Stairs - Number of Steps: 1 + 1 with rail, pt does not go to other levels of house  Bathroom Shower/Tub: Walk-in shower, Shower chair with back  [employer]: Bedside commode(RTS over comfort height toilet)  Bathroom Equipment: Grab bars in shower, Shower chair  Bathroom Accessibility: (leaves walker outside bathroom)  Home Equipment: Rolling walker, [address] Help From: Family  ADL Assistance: Independent  Homemaking Assistance: (family assist with all household tasks)  Ambulation Assistance: Independent(with RW, w/c when going to doctors)  Transfer Assistance: Independent  Active : No  Patient's  Info: daughter drives  Occupation: Retired  IADL Comments: sleeps in regular bed  Additional Comments: 10 children- someone stops over in morning and evenings for meals. Pt denies falls    Subjective   General  Chart Reviewed: Yes  Additional Pertinent Hx: Per cardiology consult of Dr. Kim Britton on 9-4-2020: The pt is an 79 yo female who came to the ED due to abnormal labs. The pt recently saw her PCP due to having nausea and vomiting; was started on an abx and increased Lasix. PMHx: anemia, CKD, CHF,DM, DVT, HTN, OA, a-fib, PE, sarcoidosis,DM,thyroid disease, knee sx  Response To Previous Treatment: Patient with no complaints from previous session. Family / Caregiver Present: Yes  Referring Practitioner: Dr. Alber Brenner  Subjective  Subjective: Pt agreeable to PT Rx. Requests use of bathroom and requesting to get dressed because she is leaving today.                 Objective   Bed mobility  Comment: not tested- pt up in chair at beginning and end of session     Transfers  Sit to Stand: Minimal Assistance  Stand to sit: Contact guard assistance;Minimal Assistance  Comment: cues for hand placement and to rock for momentum for sit to stand transfer     Ambulation  Ambulation?: Yes  More Ambulation?: No  Ambulation 1  Surface: level tile  Device: Rolling Walker  Assistance: Contact guard assistance  Quality of Gait: decreased shania, shortened step lengths bilaterally, cues for keeping TARIQ within RW and upright posture  Distance: 25'x2 to/from restroom  Stairs/Curb  Stairs?: No     Balance  Posture: Fair(forward flexed posture)  Comments: pt stood at sink washing hands with SBA-CGA for balance and was CGA for standing balance while she performed her own pericare            Other Activities: Other (see comment)  Comment: pt used restroom during session and urinated; performed her own pericare standing; assisted pt with changing out of hospital gown and brief and then assisted with donning her own clothing to prepare for d/c            AM-PAC Score  AM-PAC Inpatient Mobility Raw Score : 18 (09/11/20 1344)  AM-PAC Inpatient T-Scale Score : 43.63 (09/11/20 1344)  Mobility Inpatient CMS 0-100% Score: 46.58 (09/11/20 1344)  Mobility Inpatient CMS G-Code Modifier : CK (09/11/20 1344)          Goals  Short term goals  Time Frame for Short term goals: upon d/c (goals ongoing as of 9/11)  Short term goal 1: Bed mobility with min A  Short term goal 2: Transfers sit <> stand with min A - met 9/8; progress to sit<>stand CGA using RW  Short term goal 3: Ambulate with wheeled walker 10 feet with min A - met 9/8; progress to amb using RW 30' min A/CGA  Patient Goals   Patient goals : to go home    Plan    Plan  Times per week: 3-5x/week while in acute care setting.   Current Treatment Recommendations: Functional Mobility Training, Transfer Training, Gait Training, Safety Education & Training, Patient/Caregiver Education & Training  Safety Devices  Type of devices: Call light within reach, Gait belt, Left in chair, Nurse notified(LETICIA Thompson notified; no chair alarm on at beginning of session; did not put chair alarm on at end of session but family in the room)  Restraints  Initially in place: No     Therapy Time   Individual Concurrent Group Co-treatment   Time In 1305         Time Out 1345         Minutes 40         Timed Code Treatment Minutes: 40 Minutes         Electronically signed by Herrera Fry on 9/11/2020 at 1:47 PM

## 2020-09-11 NOTE — PROGRESS NOTES
Nephrology ([facility]) Progress Note    CC: DAVION on CKD    Subjective:    HPI:  Breathing comfortably. BP stable HR controlled  No CP. Net negative 8.7 L so far  ROS:  In bed. [address]:  medications reivewed. Family  at bedside    Objective:  Blood pressure 110/64, pulse 89, temperature 98.2 °F (36.8 °C), temperature source Oral, resp. rate 18, height 5' 6\" (1.676 m), weight 244 lb 4.3 oz (110.8 kg), SpO2 90 %, not currently breastfeeding. Intake/Output Summary (Last 24 hours) at 9/11/2020 1414  Last data filed at 9/11/2020 1005  Gross per 24 hour   Intake 832 ml   Output 800 ml   Net 32 ml     General:  NAD, A+Ox3, obese   Chest:  Diminished BS at bases  CVS:  Irregular   Abdominal:  Soft non tender  Extremities: trace edema Ace wraps in place  Skin:  no rash    Labs:  Renal panel:  Lab Results   Component Value Date/Time     09/11/2020 04:37 AM    K 3.5 09/11/2020 04:37 AM    K 4.4 09/05/2020 05:55 AM    CO2 31 09/11/2020 04:37 AM    BUN 46 (H) 09/11/2020 04:37 AM    CREATININE 2.0 (H) 09/11/2020 04:37 AM    CALCIUM 9.1 09/11/2020 04:37 AM    PHOS 3.2 09/11/2020 04:37 AM    MG 1.50 (L) 09/10/2020 06:11 AM     CBC:  Lab Results   Component Value Date/Time    WBC 9.2 09/11/2020 04:37 AM    HGB 9.9 (L) 09/11/2020 04:37 AM    HCT 30.3 (L) 09/11/2020 04:37 AM     09/11/2020 04:37 AM       Assessment/Plan:  Reviewed old records and labs.     1) hyponatremia              - assessed as hypervolemic hyponatremia              - improved  Stable at 136 meq today     2) CSDHF              - fluid restriction 1.5 liters/day appears well diuresed now               - lasix changed to PO 20 mg bid on discharge     3) DAVION on CKD              - baseline 06/03/20 Cr 1.6              - ddx:  CRS              - renal function stable Cr 2  mg Decreased Lasix dose URINE cx PENDING  ( Allergic to PCN, Levaquin and Bactrim)     4) anemia              - iron studies adequate              - HGB stable at 9.9 gm     5) pulmonary HTN              - suspect RUTH and obesity-related hypoventilation syndrome              - patient is not interested in sleep study    6) A fib rate controlled    OK for discharge from renal SP Follow up in office 1-2 weeks

## 2020-09-11 NOTE — PLAN OF CARE
Problem: OXYGENATION/RESPIRATORY FUNCTION  Goal: Patient will maintain patent airway  9/11/2020 0210 by Eduardo Wells RN  Outcome: Ongoing  9/10/2020 1656 by Caitlin Patel RN  Outcome: Ongoing  Note: Patient's airway is patent. No cough noted. Goal: Patient will achieve/maintain normal respiratory rate/effort  Description: Respiratory rate and effort will be within normal limits for the patient  9/11/2020 0210 by Eduardo Wells RN  Outcome: Ongoing  9/10/2020 1656 by Caitlin Patel RN  Outcome: Ongoing  Note: Respirations easy even no distress. No shortness of breath noted. Patient denies shortness of breath. Problem: HEMODYNAMIC STATUS  Goal: Patient has stable vital signs and fluid balance  9/11/2020 0210 by Eduardo Wells RN  Outcome: Ongoing  9/10/2020 1656 by Caitlin Patel RN  Outcome: Ongoing  Note: VSS. Labs being monitored. Edema to bilateral lower extremities is improved from when this nurse took care of her over the weekend. Problem: FLUID AND ELECTROLYTE IMBALANCE  Goal: Fluid and electrolyte balance are achieved/maintained  9/11/2020 0210 by Eduardo Wells RN  Outcome: Ongoing  9/10/2020 1656 by Caitlin Patel RN  Outcome: Ongoing  Note: Labs being monitored. Weight checked daily. Attempting to monitor I/O it can be difficulty because pt can be incontinent. Problem: ACTIVITY INTOLERANCE/IMPAIRED MOBILITY  Goal: Mobility/activity is maintained at optimum level for patient  9/11/2020 0210 by Eduardo Wells RN  Outcome: Ongoing  9/10/2020 1656 by Caitlin Patel RN  Outcome: Ongoing  Note: Patient has been up to the chair several times this shift. She was able to ambulate from the chair to the bathroom and tolerated fairly well.      Problem: Skin Integrity:  Goal: Will show no infection signs and symptoms  Description: Will show no infection signs and symptoms  9/11/2020 0210 by Eduardo Wells RN  Outcome: Ongoing  9/10/2020 1656 by Caitlin Patel RN  Outcome: Ongoing  Note: Skin assessment completed every shift. Pt assessed for incontinence, appropriate barrier cream applied prn. Pt encouraged to turn/rotate every 2 hours. Assistance provided if pt unable to do so themselves. Goal: Absence of new skin breakdown  Description: Absence of new skin breakdown  9/11/2020 0210 by Thermon Councilman, RN  Outcome: Ongoing  Note: Skin assessment completed every shift. Pt assessed for incontinence, appropriate barrier cream applied prn. Pt encouraged to turn/rotate every 2 hours. Assistance provided if pt unable to do so themselves. 9/10/2020 1656 by Rhianna Aquino RN  Outcome: Ongoing  Note: No signs of skin breakdown noted. Problem: Falls - Risk of:  Goal: Will remain free from falls  Description: Will remain free from falls  9/11/2020 0210 by Thermon Councilman, RN  Outcome: Ongoing  9/10/2020 1656 by Rhianna Aquino RN  Outcome: Ongoing  Note: Fall risk assessment completed every shift. All precautions in place. Pt has call light within reach at all times. Room clear of clutter. Pt aware to call for assistance when getting up. Patient makes no attempts to get up on her own. Goal: Absence of physical injury  Description: Absence of physical injury  9/11/2020 0210 by Thermon Councilman, RN  Outcome: Ongoing  Note: No falls this shift. PT in bed with non-skid footwear on and alarm applied. Belongings and call light are placed within reach. PT calls appropriately for needs and is rounded on frequently. Bed placed in lowest position with siderails up and wheels locked. Will continue to monitor. 9/10/2020 1656 by Rhianna Aquino RN  Outcome: Ongoing  Note: No signs of physical injury noted. Problem: Pain:  Goal: Pain level will decrease  Description: Pain level will decrease  9/11/2020 0210 by Thermon Councilman, RN  Outcome: Ongoing  9/10/2020 1656 by Rhianna Aquino RN  Outcome: Ongoing  Note: patient has had no complaints of pain this shift.   Goal: Control of acute pain  Description: Control of acute pain  9/11/2020 0210 by Madhu Peterson RN  Outcome: Ongoing  9/10/2020 1656 by Rj Her RN  Outcome: Ongoing  Note: Pain/discomfort being managed with PRN analgesics per MD orders. Pt able to express presence and absence of pain and rate pain appropriately using numerical scale. Goal: Control of chronic pain  Description: Control of chronic pain  9/11/2020 0210 by Madhu Peterson RN  Outcome: Ongoing  Note: Pain/discomfort being managed with PRN analgesics per MD orders. Pt able to express presence and absence of pain and rate pain appropriately using numerical scale. 9/10/2020 1656 by Rj Her RN  Outcome: Ongoing  Note: Arnold Gurrola

## 2020-09-11 NOTE — PROGRESS NOTES
Laurie Root is a 80 y.o. female patient.     Current Facility-Administered Medications   Medication Dose Route Frequency Provider Last Rate Last Dose    dilTIAZem (CARDIZEM CD) extended release capsule 120 mg  120 mg Oral Q12H Fausto Velasco MD   120 mg at 09/10/20 2121    furosemide (LASIX) tablet 20 mg  20 mg Oral BID Caitie Phan MD   20 mg at 09/10/20 1711    nitrofurantoin (macrocrystal-monohydrate) (MACROBID) capsule 100 mg  100 mg Oral BID Caitie Phan MD   100 mg at 09/10/20 2122    magnesium oxide (MAG-OX) tablet 400 mg  400 mg Oral Daily Fausto Velasco MD   400 mg at 09/10/20 1846    multivitamin+ solution 5 mL  5 mL Oral Daily Fausto Velasco MD   5 mL at 09/10/20 7631    cetirizine (ZYRTEC) tablet 5 mg  5 mg Oral Daily Fausto Velasco MD   5 mg at 09/10/20 0931    carvedilol (COREG) tablet 12.5 mg  12.5 mg Oral BID  Caitie Phan MD   12.5 mg at 09/10/20 3382    triamcinolone (KENALOG) 0.1 % cream   Topical Daily Fausto Velasco MD        acetaminophen (TYLENOL) tablet 1,000 mg  1,000 mg Oral Q6H PRN Fausto Velasco MD   1,000 mg at 09/06/20 2126    pantoprazole (PROTONIX) tablet 40 mg  40 mg Oral QAM AC Fausto Velasco MD   40 mg at 09/11/20 0606    atorvastatin (LIPITOR) tablet 10 mg  10 mg Oral Nightly Fausto Velasco MD   10 mg at 09/10/20 2121    potassium chloride (KLOR-CON M) extended release tablet 20 mEq  20 mEq Oral BID  Fausto Velasco MD   20 mEq at 09/10/20 1711    sodium chloride flush 0.9 % injection 10 mL  10 mL Intravenous 2 times per day Fausto Velasco MD   10 mL at 09/10/20 2121    sodium chloride flush 0.9 % injection 10 mL  10 mL Intravenous PRN Fausto Velasco MD        acetaminophen (TYLENOL) suppository 650 mg  650 mg Rectal Q6H PRN Fausto Velasco MD        polyethylene glycol [facility]) packet 17 g  17 g Oral Daily PRN Fausto Velasco MD        promethazine (PHENERGAN) tablet 12.5 mg  12.5 mg Oral Q6H PRN Millie Martinez MD        Or    ondansetron [facility]) injection 4 mg  4 mg Intravenous Q6H PRN Millie Martinez MD        glucose (GLUTOSE) 40 % oral gel 15 g  15 g Oral PRN Millie Martinez MD        dextrose 50 % IV solution  12.5 g Intravenous PRN Millie Martinez MD        glucagon (rDNA) injection 1 mg  1 mg Intramuscular PRN Millie Martinez MD        dextrose 5 % solution  100 mL/hr Intravenous PRN Millie Martinez MD        insulin lispro (HUMALOG) injection vial 0-6 Units  0-6 Units Subcutaneous TID WC Millie Martinez MD   2 Units at 09/10/20 1322    insulin lispro (HUMALOG) injection vial 0-3 Units  0-3 Units Subcutaneous Nightly Millie Martinez MD   1 Units at 09/10/20 2121    traMADol (ULTRAM) tablet 50 mg  50 mg Oral Q6H PRN Millie Martinez MD        Or   Shankar Lamas traMADol [facility]) tablet 100 mg  100 mg Oral Q6H PRN Millie Martinez MD        diazePAM (VALIUM) tablet 2 mg  2 mg Oral Q6H PRN Millie Martinez MD   2 mg at 09/05/20 2038    gabapentin (NEURONTIN) capsule 100 mg  100 mg Oral Nightly Millie Martinez MD   100 mg at 09/10/20 2122    latanoprost (XALATAN) 0.005 % ophthalmic solution 1 drop  1 drop Right Eye Nightly Millie Martinez MD   1 drop at 09/10/20 2121    loperamide (IMODIUM) capsule 2 mg  2 mg Oral 4x Daily PRN Millie Martinez MD   2 mg at 09/04/20 2112    warfarin (COUMADIN) daily dosing (placeholder)   Other RX Placeholder Millie Martinez MD         Allergies   Allergen Reactions    Bactrim [Sulfamethoxazole-Trimethoprim]     Levofloxacin     Pcn [Penicillins]     Sulfa Antibiotics      Principal Problem:    Acute kidney injury superimposed on chronic kidney disease ([address]  Active Problems:    Nonrheumatic aortic (valve) stenosis    Hyponatremia    Combined systolic and diastolic congestive heart failure ([address]    Anemia due to chronic kidney disease    Acute respiratory failure with hypoxia ([address]    DAVION (acute kidney injury) [facility])  Resolved Problems:    * No resolved hospital problems. *    Blood pressure 110/64, pulse 89, temperature 98.2 °F (36.8 °C), temperature source Oral, resp. rate 18, height 5' 6\" (1.676 m), weight 244 lb 4.3 oz (110.8 kg), SpO2 90 %, not currently breastfeeding. Subjective Feels fine. No CP, palp. Or increased HERNANDEZ  Objective A&O,  CTPA, IIR&R at 100, no edema, no fev er. ASSESSMENT: Ok for D/C to home with homecare. D/W patient and her son. farhad Douglas MD  9/11/2020

## 2020-09-11 NOTE — CONSULTS
M20 20 MEQ extended release tablet Take 20 mEq by mouth daily      pantoprazole (PROTONIX) 40 MG tablet Take 40 mg by mouth daily      ondansetron (ZOFRAN) 4 MG tablet Take 4 mg by mouth every 6 hours as needed      warfarin (COUMADIN) 5 MG tablet Take 5 mg by mouth daily Except 2.5mg every Thursday or as directed by [facility] Coumadin Service [phone]      cetirizine (ZYRTEC) 10 MG tablet Take 10 mg by mouth daily      vitamin C (ASCORBIC ACID) 500 MG tablet Take 500 mg by mouth daily      acetaminophen (TYLENOL) 500 MG tablet Take 1,000 mg by mouth every 6 hours as needed for Pain      Calcium Carb-Cholecalciferol (CALTRATE 600+D) 600-800 MG-UNIT TABS Take 1 tablet by mouth daily       gabapentin (NEURONTIN) 100 MG capsule Take 200 mg by mouth nightly.  glipiZIDE (GLUCOTROL) 5 MG tablet Take 5 mg by mouth 2 times daily (before meals).  lovastatin (MEVACOR) 10 MG tablet Take 10 mg by mouth nightly. Objective         /64   Pulse 89   Temp 98.2 °F (36.8 °C) (Oral)   Resp 18   Ht 5' 6\" (1.676 m)   Wt 244 lb 4.3 oz (110.8 kg)   LMP  (LMP Unknown)   SpO2 90%   BMI 39.43 kg/m²     Code Status: Full Code    Advanced Directives: ***     Assessment        Management and Education    Persons available for education: ***       [] Self     [] Caregiver       [] Spouse       [] Other Family Member   []  Other    Spiritual History:  notified: {CAMILLE/NO:73974}    Does the patient have a Primary Care Physician?   {Yes No N/A:[id number]}     SUBJECTIVE: The initial comprehensive symptom assessment is as follows:     Symptom Ratings:     Pain  0 (none) - 10 (worst ever)       Current     Worst in last 24 h     Best in last 24 h     Nausea {Yes/No/Not asked:9010}    Anorexia {Yes/No/Not asked:9010}    Anxiety {Yes/No/Not asked:9010}    Fatigue {Yes/No/Not asked:9010}    Weakness {Yes/No/Not asked:9010}    Dyspnea {Yes/No/Not asked:9010}    Insomnia {Yes/No/Not asked:9010}    Fever {Yes/No/Not asked:9010}    Constipation {Yes/No/Not asked:9010} Date of last BM: ***   Diarrhea {Yes/No/Not asked:9010}    Vomiting {Yes/No/Not asked:9010}            Palliative Performance Scale:  60% ? Ambulation reduced; Significant disease; Can't do hobbies/housework; intake normal or reduced; occasional assist; LOC full/confusion  50% ? Mainly sit/lie; Extensive disease; Can't do any work; Considerable assist; intake normal or reduced; LOC full/confusion  40% ? Mainly in bed; Extensive disease; Mainly assist; intake normal or reduced; occasional assist; LOC full/confusion  30% ? Bed Bound; Extensive disease; Total care; intake reduced; LOC full/confusion  20% ? Bed Bound; Extensive disease; Total care; intake minimal; Drowsy/coma  10% ? Bed Bound; Extensive disease; Total care; Mouth care only; Drowsy/coma  0 ? Death    Palliative Performance Scale 2 weeks prior to hospitalization: %      Level of patient/caregiver understanding able to:      ***  [] Verbalize Understanding   [] Demonstrate Understanding       [] Teach Back       [] Needs Reinforcement     []  Other:      Teaching Time:  {NUMBERS 0-10:26385}hours  {NUMBERS; 1-100 BY 10:67801} minutes     Plan        Social Service Consult Made:  {Yes No Y/J:[id number]}  Assistance filling out Living Will/HPOA:  {Yes No J/Y:[id number]}      Discharge Plan:  {PC plan:21334}    Discharge Environment:  [] Hospice Consult Agency:  [] Inpatient Hospice    [] Home with Hospice Care   [] ECF with Hospice  [] ECF skilled care with Hospice to follow   [] Other:    Discussion:    I will continue to follow Ms. Mills's care as needed. Thank you for allowing me to participate in the care of Ms. Zeke Villarreal .      Electronically signed by Aaron Gutierrez RN, [address] [place] [address] [place] on 9/11/2020 at 12:19 PM  [address] [place] [address]  Office: [phone]

## 2020-09-11 NOTE — PROGRESS NOTES
Pharmacy Heart Failure Medication Reconciliation Note    Pt discharged from [facility] today after admission for acute kidney injury. Erica Celyaa has a diagnosis of diastolic heart failure (last EF = 55% on 9-5-20). Pertinent Labs:  BMP:   Lab Results   Component Value Date     09/11/2020    K 3.5 09/11/2020    K 4.4 09/05/2020    CL 92 09/11/2020    CO2 31 09/11/2020    BUN 46 09/11/2020    CREATININE 2.0 09/11/2020     BNP:   Lab Results   Component Value Date    PROBNP 6,759 09/04/2020       Patient taking an ACEI / ARB / Entresto:  No: kidney fxn     Patient taking a BETA BLOCKER:  Yes  Patient taking a LOOP DIURETIC: Yes  Patient taking a ALDOSTERONE RECEPTOR ANTAGONIST: No: kidney fxn    Patient has a diagnosis of Atrial Fibrillation: Yes. If yes, patient is on appropriate anticoagulation: Yes    Patient has a diagnosis of type 2 diabetes:  Yes. If yes, patient prescribed the following anti-hyperglycemic medication at discharge: Glipizide      Corrections to discharge medications include:  None        Discharge Medications:         Medication List      START taking these medications    magnesium oxide 400 (240 Mg) MG tablet  Commonly known as:  MAG-OX  Take 1 tablet by mouth daily     nitrofurantoin (macrocrystal-monohydrate) 100 MG capsule  Commonly known as:  MACROBID  Take 1 capsule by mouth 2 times daily for 10 days  Notes to patient:  Take in the morning and at night     triamcinolone 0.1 % cream  Commonly known as:  KENALOG  Apply topically 2 times daily. Notes to patient:  Apply to bilateral lower legs in the morning and at night        CHANGE how you take these medications    carvedilol 12.5 MG tablet  Commonly known as:  COREG  Take 1 tablet by mouth 2 times daily (with meals)  What changed:    · medication strength  · See the new instructions.   Notes to patient:  Take with breakfast and dinner     dilTIAZem 120 MG extended release capsule  Commonly known as:  CARDIZEM CD  Take 1 capsule by mouth every 12 hours  What changed:    · medication strength  · See the new instructions. furosemide 20 MG tablet  Commonly known as:  LASIX  Take 1 tablet by mouth 2 times daily  What changed:    · medication strength  · See the new instructions. Notes to patient:  Take in the morning and dinner time        CONTINUE taking these medications    acetaminophen 500 MG tablet  Commonly known as:  TYLENOL     Caltrate 600+D 600-800 MG-UNIT Tabs  Generic drug:  Calcium Carb-Cholecalciferol     cetirizine 10 MG tablet  Commonly known as:  ZYRTEC     gabapentin 100 MG capsule  Commonly known as:  NEURONTIN     glipiZIDE 5 MG tablet  Commonly known as:  GLUCOTROL  Notes to patient:  Take in the morning before breakfast and then again before dinner     Klor-Con M20 20 MEQ extended release tablet  Generic drug:  potassium chloride     latanoprost 0.005 % ophthalmic solution  Commonly known as:  XALATAN     lovastatin 10 MG tablet  Commonly known as:  MEVACOR     ondansetron 4 MG tablet  Commonly known as:  ZOFRAN     pantoprazole 40 MG tablet  Commonly known as:  PROTONIX     vitamin C 500 MG tablet  Commonly known as:  ASCORBIC ACID     warfarin 5 MG tablet  Commonly known as:  COUMADIN  Take as directed. If you are unsure how to take this medication, talk to your nurse or doctor.         STOP taking these medications    clindamycin 150 MG capsule  Commonly known as:  CLEOCIN     hydrALAZINE 10 MG tablet  Commonly known as:  APRESOLINE     indapamide 2.5 MG tablet  Commonly known as:  LOZOL     KCL-20 PO     moexipril 15 MG tablet  Commonly known as:  UNIVASC           Where to Get Your Medications      These medications were sent to [facility] [address], [address]  [place] [facility], [address] [address]    Phone:  [phone]   · carvedilol 12.5 MG tablet  · dilTIAZem 120 MG extended release capsule  · furosemide 20 MG tablet  · magnesium oxide 400 (240 Mg) MG tablet  · nitrofurantoin (macrocrystal-monohydrate) 100 MG capsule  · triamcinolone 0.1 % cream         Vesna Melendez, [address] [facility]  Heart Failure Discharge Medication Reconciliation Program  [phone]

## 2020-09-11 NOTE — PROGRESS NOTES
Clinical Pharmacy Note  Warfarin Consult    Benedict Mims is a 80 y.o. female receiving warfarin managed by pharmacy. Warfarin Indication: Afib  Target INR range: 2-3   Dose prior to admission: 5 mg daily, EXCEPT 2.5 mg every Thursday    Current warfarin drug-drug interactions: none    Recent Labs     09/09/20  0627 09/10/20  0611 09/10/20  0612 09/11/20  0437   HGB 9.9*  --  10.1* 9.9*   HCT 29.4*  --  30.7* 30.3*   INR 1.48* 1.71*  --  2.00*       Assessment/Plan:  Patient going home on same dose as PTA   Of note, her INR was elevated on admission, possibly due to diarrhea, antibiotic, or the CHF. The INR has been stable over past few months. Daily PT/INR until stable within therapeutic range. Thank you for the consult. Will continue to follow.   Isa Roper [facility],9/11/2020,1:39 PM

## 2020-09-11 NOTE — PROGRESS NOTES
[address]   Daily Progress Note      Admit Date:  9/4/2020    Reason for follow up visit:  CHF; Aortic stenosis    CC: \"I feel okay. I'm ready to go home if you think I'm ready. \"    81 y/o female with PMH significant for CKD, combined systolic and diastolic HF, HTN, HLP, PAF, PE/DVT, and anemia who was admitted with progressive SOB and LE edema. In addition she was experiencing nausea, diarrhea and general malaise.  She was placed on increased dose of lasix and attempt at diuresing. (Prior attempts resulted in DAVION on CKD). Developed tachycardia with -140's and medications adjusted with improved VR. Interval History:  Pt. seen and examined; records reviewed  VR improved today  (VR 60's during sleep; 's during waking hours)  Now on BB + CCB for rate control  BP stable. Wt today 244# (wt loss ~ 20-25#)  Net diuresis -8.5L since admission  Cr 2.0  K+ 3.5 today  Hgb stable at 9.9  Denies chest pain. SOB much improved    Subjective:  Pt with no acute overnight cardiac events. Denies chest pain, SOB, cough, palpitations or dizziness  Poor historian    Review of Systems:   Lia Clemens is a poor historian and not obtained    Objective:   /64   Pulse 89   Temp 98.2 °F (36.8 °C) (Oral)   Resp 18   Ht 5' 6\" (1.676 m)   Wt 244 lb 4.3 oz (110.8 kg)   LMP  (LMP Unknown)   SpO2 90%   BMI 39.43 kg/m²       Intake/Output Summary (Last 24 hours) at 9/11/2020 0928  Last data filed at 9/11/2020 0901  Gross per 24 hour   Intake 832 ml   Output 1100 ml   Net -268 ml     Wt Readings from Last 3 Encounters:   09/11/20 244 lb 4.3 oz (110.8 kg)   10/17/19 264 lb (119.7 kg)   04/16/19 269 lb (122 kg)       Physical Exam:  General: In no acute distress. Awake, alert, and oriented X3. Resting comfortably in bed  Skin:  Warm and dry. No new appearing rashes or lesions. Neck:  Supple. + mild JVD  Chest:Lungs clear to auscultation.  No wheezes/rhonchi/rales  Cardiovascular:  Irreg; normal S1 and S2; III/VI AS murmur radiates to carotids   Abdomen:  Obese, soft, nontender, nondistended, +bowel sounds. Extremities:Trace bilateral ankle and very lower pretibial edema; chronic stasis changes noted bilaterally; 2+ bilateral radial pulses and 1+ bilateral DP/PT pulses. Cap refill brisk. Medications:    dilTIAZem  120 mg Oral Q12H    furosemide  20 mg Oral BID    nitrofurantoin (macrocrystal-monohydrate)  100 mg Oral BID    magnesium oxide  400 mg Oral Daily    multivitamin+  5 mL Oral Daily    cetirizine  5 mg Oral Daily    carvedilol  12.5 mg Oral BID WC    triamcinolone   Topical Daily    pantoprazole  40 mg Oral QAM AC    atorvastatin  10 mg Oral Nightly    potassium chloride  20 mEq Oral BID WC    sodium chloride flush  10 mL Intravenous 2 times per day    insulin lispro  0-6 Units Subcutaneous TID WC    insulin lispro  0-3 Units Subcutaneous Nightly    gabapentin  100 mg Oral Nightly    latanoprost  1 drop Right Eye Nightly    warfarin (COUMADIN) daily dosing (placeholder)   Other RX Placeholder      dextrose       acetaminophen, sodium chloride flush, [DISCONTINUED] acetaminophen **OR** acetaminophen, polyethylene glycol, promethazine **OR** ondansetron, glucose, dextrose, glucagon (rDNA), dextrose, traMADol **OR** traMADol, diazePAM, loperamide    Lab Data:  CBC:   Recent Labs     09/09/20  0627 09/10/20  0612 09/11/20  0437   WBC 5.7 6.1 9.2   HGB 9.9* 10.1* 9.9*    211 192     BMP:    Recent Labs     09/09/20  0627 09/10/20  0611 09/11/20  0437   * 136 136   K 3.6 3.5 3.5   CO2 34* 35* 31   BUN 52* 51* 46*   CREATININE 1.9* 2.0* 2.0*     LIVR: No results for input(s): AST, ALT in the last 72 hours.   INR:    Recent Labs     09/09/20  0627 09/10/20  0611 09/11/20  0437   INR 1.48* 1.71* 2.00*       9/4/2020 Echo:  Suboptimal image quality.   Patient appears to be in atrial fibrillation.   Left ventricular cavity size is normal.   There is moderate concentric left ventricular as outpt     3. Persistent atrial fibrillation  -VR much better controlled  -no further significant bradycardia or tachycardia noted  -continue carvedilol and diltiazem  -continue warfarin  -maintain K+ > 4   -[id number] Vasc score: 7     4. DAVION on CKD  -nephrology following  -back on po lasix  -Cr 2.0 and stable  -baseline ~ 1.6  -suspect we will need to accept increase to keep her compensated     5. Pulmonary HTN  -suspect multifactorial: prior multiple PE's, RUTH and CHF  -continue diuretic and hypoxia  -does not want to pursue sleep evaluation     6. Chronic LE edema  -chronic venous insufficiency and stasis changes  -improved  -continue to monitor given addition of Diltiazem  -continue diuretic and compression wraps  -low sodium diet and elevation      Stable and okay for discharge from cardiac standpoint    Follow up with cardiology on 2020 @ 2 PM with D. Enzweiler, CNP    Emphasized and discussed low-fat/low sodium diet, monitoring of daily weights, fluid restriction, worsening signs and symptoms of heart failure and when to call, and the importance of regular activity.     Discharge Cardiac Meds:  Atorvastatin 10 mg nightly  Carvedilol 12.5 mg BID  Diltiazem 120 mg BID  Lasix 20 mg BID  Mag Ox 400 mg daily  Klor Con 20 Meq BID  Warfarin as directed (followed by PCP)    Electronically signed by Katy Apgar, APRN - CNP on 2020 at 9:28 AM

## 2020-09-11 NOTE — PROGRESS NOTES
Call placed to Dr. Vani Griffin to verify if pt can be discharged from his stand point. Explained to office staff that her PCP has discharged her but wanted to verify with him. Waiting for return call.

## 2020-09-11 NOTE — CARE COORDINATION
D/C order noted. Per patient's nurse, she is waiting for nephrology clearance. [facility] can accept the patient and orders are in chart. Will call tomoguides once D/C is finalized for today.     Electronically signed by Milton Casarez RN on 9/11/2020 at 9:41 AM

## 2020-09-12 ENCOUNTER — CARE COORDINATION (OUTPATIENT)
Dept: CASE MANAGEMENT | Age: 85
End: 2020-09-12

## 2020-09-12 LAB
ORGANISM: ABNORMAL
ORGANISM: ABNORMAL
URINE CULTURE, ROUTINE: ABNORMAL

## 2020-09-12 NOTE — CARE COORDINATION
[facility] Transitions Initial Follow Up Call    Call within 2 business days of discharge: Yes    Patient:  Sangita Yee Patient :  1934  MRN:  <[id number]>   Reason for Admission:  covid 19 monitoring   Discharge Date:  20  RARS:  [place]      CTC attempt to reach Pt regarding recent hospital discharge. CTC unable to leave voice recording with call back number requesting a call back / no answer. Follow up appointments:    Future Appointments   Date Time Provider Radha Lopezi   2020  2:00 PM GRACIA Menard - CNP [facility]   2020  4:00 PM [facility] [address]   10/30/2020 10:30 AM Dinora Austin MD [facility]       Sheala V. Isa Kayser, BSN, RN  Care Transition Coordinator  Contact Number:  [phone]

## 2020-09-14 ENCOUNTER — CARE COORDINATION (OUTPATIENT)
Dept: CASE MANAGEMENT | Age: 85
End: 2020-09-14

## 2020-09-14 NOTE — CARE COORDINATION
Patient contacted regarding recent discharge and COVID-19 risk. Discussed COVID-19 related testing which was available at this time. Test results were negative. Patient informed of results, if available? Yes     Care Transition Nurse/ Ambulatory Care Manager contacted the patient by telephone to perform post discharge assessment. Verified name and  with patient as identifiers. Patient has following risk factors of: heart failure. CTN/ACM reviewed discharge instructions, medical action plan and red flags related to discharge diagnosis. Reviewed and educated them on any new and changed medications related to discharge diagnosis. Advised obtaining a 90-day supply of all daily and as-needed medications. Education provided regarding infection prevention, and signs and symptoms of COVID-19 and when to seek medical attention with patient who verbalized understanding. Discussed exposure protocols and quarantine from [address] you at higher risk for severe illness  and given an opportunity for questions and concerns. The patient agrees to contact  PCP office for questions related to their healthcare. CTN/ACM provided contact information for future reference. From CDC: Are you at higher risk for severe illness?  Wash your hands often.  Avoid close contact (6 feet, which is about two arm lengths) with people who are sick.  Put distance between yourself and other people if COVID-19 is spreading in your community.  Clean and disinfect frequently touched surfaces.  Avoid all cruise travel and non-essential air travel.  Call your healthcare professional if you have concerns about COVID-19 and your underlying condition or if you are sick. Patient, Duglas Babin stated she is doing good, stated no SOB, no edema and weight this morning was 242.6#. She stated has not done BG this morning but last night it was 113. She stated follows low sodium diet and fluid restrictions.   She is aware of appt

## 2020-09-16 ENCOUNTER — OFFICE VISIT (OUTPATIENT)
Dept: CARDIOLOGY CLINIC | Age: 85
End: 2020-09-16
Payer: MEDICARE

## 2020-09-16 VITALS
HEART RATE: 64 BPM | WEIGHT: 244 LBS | BODY MASS INDEX: 39.21 KG/M2 | DIASTOLIC BLOOD PRESSURE: 88 MMHG | HEIGHT: 66 IN | SYSTOLIC BLOOD PRESSURE: 130 MMHG | TEMPERATURE: 97.1 F | OXYGEN SATURATION: 92 %

## 2020-09-16 DIAGNOSIS — I50.33 ACUTE ON CHRONIC DIASTOLIC HF (HEART FAILURE) (HCC): ICD-10-CM

## 2020-09-16 DIAGNOSIS — I48.19 PERSISTENT ATRIAL FIBRILLATION (HCC): ICD-10-CM

## 2020-09-16 LAB
ANION GAP SERPL CALCULATED.3IONS-SCNC: 11 MMOL/L (ref 3–16)
BUN BLDV-MCNC: 43 MG/DL (ref 7–20)
CALCIUM SERPL-MCNC: 9.6 MG/DL (ref 8.3–10.6)
CHLORIDE BLD-SCNC: 97 MMOL/L (ref 99–110)
CO2: 32 MMOL/L (ref 21–32)
CREAT SERPL-MCNC: 2 MG/DL (ref 0.6–1.2)
GFR AFRICAN AMERICAN: 29
GFR NON-AFRICAN AMERICAN: 24
GLUCOSE BLD-MCNC: 124 MG/DL (ref 70–99)
INR BLD: 2.84 (ref 0.86–1.14)
POTASSIUM SERPL-SCNC: 4.8 MMOL/L (ref 3.5–5.1)
PROTHROMBIN TIME: 33.3 SEC (ref 10–13.2)
SODIUM BLD-SCNC: 140 MMOL/L (ref 136–145)

## 2020-09-16 PROCEDURE — 99214 OFFICE O/P EST MOD 30 MIN: CPT | Performed by: NURSE PRACTITIONER

## 2020-09-16 NOTE — PROGRESS NOTES
[address]  Office Visit    Colleen Khan  1934 September 16, 2020    CC:   Chief Complaint   Patient presents with    Follow-Up from Hospital     edema bilateral     HPI:  The patient is 80 y.o. female with a past medical history significant for CKD, combined systolic and diastolic HF, HTN, HLP, PAF, PE/DVT and anemia who is here for hospital follow up. She was hospitalized from 9/4/2020-9/11/2020 after presenting with progressive SOB and LE edema. She was placed on IV lasix and diuresed. Developed tachycardia with -140's and meds adjusted. Net diuresis -8.5L and wt at discharge 244#. Overall feeling well since discharge. SOB much improved. Edema much improved (has some chronic LE edema). Wt. Stable at home. Monitoring sodium and fluid intake closely. Denies chest pain/discomfort,  orthopnea/PND, cough, palpitations, dizziness, syncope or claudication. Trying to increase her activity (limited mobility). Review of Systems:  Constitutional: Denies  fatigue,  night sweats or fever. + improving weakness  HEENT: Denies new visual changes, ringing in ears, nosebleeds, nasal congestion  Respiratory: Denies new or change in SOB, PND, orthopnea or cough. Cardiovascular: see HPI  GI: Denies N/V, diarrhea, constipation, abdominal pain, change in bowel habits, melena or hematochezia  : Denies urinary frequency, urgency, incontinence, hematuria or dysuria. Skin: Denies rash, hives, or cyanosis  Musculoskeletal: Denies joint or muscle aches/pain  Neurological: Denies syncope or TIA-like symptoms.   Psychiatric: Denies anxiety, insomnia or depression     Past Medical History:   Diagnosis Date    Anemia     Chronic kidney disease     Combined systolic and diastolic congestive heart failure (HCC)     Diabetes mellitus (HCC)     DVT (deep venous thrombosis) (HCC)     Hyperlipidemia     Hypertension     Osteoarthritis     [facility]par[facility] [address]     Pulmonary embolism [facility] [address]     Sarcoidosis     Thyroid disease     Hypothyroidism    Type II or unspecified type diabetes mellitus without mention of complication, not stated as uncontrolled      Past Surgical History:   Procedure Laterality Date    HYSTERECTOMY      KNEE SURGERY      bilateral total knees     Family History   Problem Relation Age of Onset    Heart Failure Mother     Cancer Brother     Asthma Neg Hx     Diabetes Neg Hx     Emphysema Neg Hx     Hypertension Neg Hx      Social History     Tobacco Use    Smoking status: Never Smoker    Smokeless tobacco: Never Used    Tobacco comment: Never smoked   Substance Use Topics    Alcohol use: No    Drug use: No       Allergies   Allergen Reactions    Bactrim [Sulfamethoxazole-Trimethoprim]     Levofloxacin     Pcn [Penicillins]     Sulfa Antibiotics      Current Outpatient Medications   Medication Sig Dispense Refill    CIPROFLOXACIN PO Take by mouth daily      dilTIAZem (CARDIZEM CD) 120 MG extended release capsule Take 1 capsule by mouth every 12 hours 30 capsule 3    furosemide (LASIX) 20 MG tablet Take 1 tablet by mouth 2 times daily 60 tablet 3    magnesium oxide (MAG-OX) 400 (240 Mg) MG tablet Take 1 tablet by mouth daily 30 tablet 0    carvedilol (COREG) 12.5 MG tablet Take 1 tablet by mouth 2 times daily (with meals) 60 tablet 3    triamcinolone (KENALOG) 0.1 % cream Apply topically 2 times daily.  100 g 5    latanoprost (XALATAN) 0.005 % ophthalmic solution Place 1 drop into the right eye nightly      KLOR-CON M20 20 MEQ extended release tablet Take 20 mEq by mouth daily      pantoprazole (PROTONIX) 40 MG tablet Take 40 mg by mouth daily      ondansetron (ZOFRAN) 4 MG tablet Take 4 mg by mouth every 6 hours as needed      warfarin (COUMADIN) 5 MG tablet Take 5 mg by mouth daily Except 2.5mg every Thursday or as directed by [facility]madin [facility] [phone]      cetirizine (ZYRTEC) 10 MG tablet Take 10 mg by mouth daily 46 09/11/2020    CREATININE 2.0 09/11/2020    GLUCOSE 118 09/11/2020    CALCIUM 9.1 09/11/2020      Lab Results   Component Value Date    WBC 9.2 09/11/2020    HGB 9.9 (L) 09/11/2020    HCT 30.3 (L) 09/11/2020    MCV 87.1 09/11/2020     09/11/2020 9/4/2020 Echo:  Suboptimal image quality.   Patient appears to be in atrial fibrillation.   Left ventricular cavity size is normal.   There is moderate concentric left ventricular hypertrophy.   Ejection fraction is visually estimated to be 95%   Diastolic filling parameters suggest at least grade II diastolic   dysfunction.   Severe aortic stenosis with a peak velocity of 5.00 m/s and a mean pressure gradient of 67 mmHg.   Estimated pulmonary artery systolic pressure is borderline severely elevated   at 69 mmHg assuming a right atrial pressure of 15 mmHg.     Echo 8/2017:  Normal left ventricular size and wall thickness. Global systolic function is  moderate decreased with an estimated LVEF estimated at 35%.   The right ventricle is not well visualized but appears to have normal size  and mild to moderately reduced function.   Severe biatrial enlargement appreciated.   Moderate mitral regurgitation is present.   The aortic valve leaflets are calcified and restricted in appearance.   The aortic valve area is calculated at 0.7 cm2 with a maximum pressure  gradient of 34 mmHg and a mean pressure gradient of 17 mmHg. This is  consistent with at least mild aortic stenosis but gradient but is probably   underestimated due to left ventricular systolic dysfunction.     10/2015 Lexiscan-Myoview: There is no reversible ischemia or scar appreciated on this study. There is     a slight decrease in tracer uptake on the stress imaging that appears more     consistent with breast attenuation.     With Lexiscan vasodilator stress the patient had no ischemic ST changes.     Nondiagnostic for ischemia.          Assessment:    1.  Acute on chronic diastolic HF (heart failure) [facility])  -currently much better compensated; NYHA class II-III  -initial LVEF 35% and improved to 55% on echo in 9/2020  -continue carvedilol and lasix  -not on ACE/ARB/aldactone d/t elevated Cr  -not an ICD candidate given LVEF    2. Nonrheumatic aortic valve stenosis  -mean gradient 67mm (severe)  -continue medical management for now  -she wants to discuss with Dr. Shanique Woodruff further at her follow up visit in October 2020    3. Persistent atrial fibrillation  -VR controlled on BB and CCB  -on chronic anticoagulation with warfarin  -[id number] Vasc score 7    4. Chronic venous insufficiency  -chronic LE edema  -chronic venous insufficiency and stasis changes  -wears compression wraps  -continue diuretic  -continue low sodium diet     5. Pulmonary HTN  -suspect multifactorial and secondary to prior multiple PE's, RUTH (declines sleep eval) and CHF  -continue diuretic  -avoid hypoxia    6. DAVION on CKD  -last Cr 2.0  -will follow up Cr  -baseline Cr ~ 1.6  -accept some increase in Cr to keep her compensated       Plan:  Continue carvedilol, diltiazem, lasix, Mag Ox, statin and warfarin  Emphasized and discussed low-fat/low sodium diet, monitoring of daily weights, fluid restriction, worsening signs and symptoms of heart failure and when to call, and the importance of regular exercise and activity. Check BMP  Approximately 25-30 minutes spent with patient and > 50% of time spent on pt education, answering questions and coordinating care. Follow up with Dr. Shanique Woodruff in October 2020 as scheduled or sooner if needed    Return for as scheduled with Dr. Shanique Woodruff in OCtober 2020. Thanks for allowing me to participate in the care of this patient.       SUSHANT Roque  [facility], [address] [place] [address] [address] [facility] [address], [address] [address]  Office: [phone]  Fax: [phone]      Electronically signed by GRACIA Mahajan CNP on 9/16/2020 at 2:16 PM

## 2020-09-17 ENCOUNTER — ANTI-COAG VISIT (OUTPATIENT)
Dept: PHARMACY | Age: 85
End: 2020-09-17
Payer: MEDICARE

## 2020-09-17 PROCEDURE — 99211 OFF/OP EST MAY X REQ PHY/QHP: CPT | Performed by: PHARMACIST

## 2020-09-17 RX ORDER — CIPROFLOXACIN 500 MG/1
1 TABLET, FILM COATED ORAL 2 TIMES DAILY
Status: ON HOLD | COMMUNITY
Start: 2020-09-14 | End: 2021-02-13 | Stop reason: HOSPADM

## 2020-09-17 NOTE — PROGRESS NOTES
Rufina Calvo  Ms. Rehan De Leon is a 80 y.o.  female. Ms. Rehan De Leon had an INR test today. Results were reviewed and appropriate warfarin management was completed. THIS VISIT WAS COMPLETED AS:   [x]    A VIRTUAL VISIT VIA TELEPHONE IN EFFORTS TO REDUCE THE SPREAD OF COVID-19.  []    A DRIVE-THRU VISIT IN EFFORTS TO REDUCE THE SPREAD OF COVID-19.  []    AN IN PERSON VISIT. PROTOCOLS WERE FOLLOWED WITH PRECAUTIONS TO REDUCE THE SPREAD OF COVID-19. Patient verifies current dosing regimen: Yes     Warfarin medication reviewed and updated on the patient 's home medication list: Yes   All other medications reviewed and updated on the patient 's home medication list: Yes     Lab Results   Component Value Date    INR 2.84 (H) 2020    INR 2.00 (H) 2020    INR 1.71 (H) 09/10/2020       Patient Findings     Positives:   Hospital admission    Negatives:   Signs/symptoms of thrombosis, Signs/symptoms of bleeding, Laboratory test error suspected, Change in health, Change in alcohol use, Change in activity, Upcoming invasive procedure, Emergency department visit, Upcoming dental procedure, Missed doses, Extra doses, Change in medications, Change in diet/appetite, Bruising, Other complaints    Comments:   Discharge home on Cipro (last dose 20)          Anticoagulation Summary  As of 2020    INR goal:   2.0-3.0   TTR:   75.5 % (2.4 y)   INR used for dosin.84 (2020)   Warfarin maintenance plan:   2.5 mg (5 mg x 0.5) every Thu; 5 mg (5 mg x 1) all other days   Weekly warfarin total:   32.5 mg   Plan last modified: [facility] (3/7/2019)   Next INR check:   2020   Priority:   Maintenance   Target end date:    Indefinite    Indications    Persistent atrial fibrillation [I48.19]             Anticoagulation Episode Summary     INR check location:       Preferred lab:       Send INR reminders to:   [facility]    Comments:   [facility] - declined FLU VAX 10/14/19      Anticoagulation Care Providers     Provider Role Specialty Phone number    Jose Juan Maldonado MD Referring Cardiology [phone]    Cr Archer MD  Internal Medicine [phone]          Warfarin plan:     Description    CONTINUE: Warfarin 5mg(1 tablet) daily EXCEPT 2.5MG (1/2 tablet) every Thursday. Keep the number of servings of Vitamin K (dark green vegetables) consistent from week to week. A green vegetable 1-2 times a week. A dark leafy vegetable only occasion. Call with medications changes, especially antibiotics and steroids and including any over-the-counter medications or herbal products. Call if you stop any medications. Please arrive 15 minutes prior to your appointment           Reviewed AVS with patient / caregiver.

## 2020-10-06 ENCOUNTER — TELEPHONE (OUTPATIENT)
Dept: PHARMACY | Age: 85
End: 2020-10-06

## 2020-10-06 NOTE — TELEPHONE ENCOUNTER
Spoke to patient's daughter regarding INR that was due last week. She states that Chava Osorio has other labs due and will go to the lab sometime in the next week. We will watch for results.     [address]  Anticoagulation Service  [phone]

## 2020-10-12 NOTE — PROGRESS NOTES
C-Difficile admission screening and protocol:     * Admitted with diarrhea? no     *Prior history of C-Diff. In last 3 months? yes     *Antibiotic use in the past 6-8 weeks? Yes for fluid      *Prior hospitalization or nursing home in the last month?    no

## 2020-10-12 NOTE — PROGRESS NOTES
[facility] [facility] time___0625_________        Surgery time____________  1747  Take the following medications with a sip of water: carvedilol, Diltiazem, pantoprazole     Do not eat or drink anything after 12:00 midnight prior to your surgery. This includes water chewing gum, mints and ice chips. You may brush your teeth and gargle the morning of your surgery, but do not swallow the water     Please see your family doctor/pediatrician for a history and physical and/or concerning medications. Bring any test results/reports from your physicians office. If you are under the care of a heart doctor or specialist doctor, please be aware that you may be asked to them for clearance    You may be asked to stop blood thinners such as Coumadin, Plavix, Fragmin, Lovenox, etc., or any anti-inflammatories such as:  Aspirin, Ibuprofen, Advil, Naproxen prior to your surgery. We also ask that you stop any OTC medications such as fish oil, vitamin E, glucosamine, garlic, Multivitamins, COQ 10, etc.    We ask that you do not smoke 24 hours prior to surgery  We ask that you do not  drink any alcoholic beverages 24 hours prior to surgery      You must make arrangements for a responsible adult to take you home after your surgery. For your safety you will not be allowed to leave alone or drive yourself home. Your surgery will be cancelled if you do not have a ride home. Also for your safety, it is strongly suggested that someone stay with you the first 24 hours after your surgery. A parent or legal guardian must accompany a child scheduled for surgery and plan to stay at the hospital until the child is discharged. Please do not bring other children with you. For your comfort, please wear simple loose fitting clothing to the hospital.  Please do not bring valuables. Wear short sleeve button down shirt or loose fitting shirt. Bring eye drops or ointment.     Do not wear any make-up or nail polish on your fingers or toes      For your safety, please do not wear any jewelry or body piercing's on the day of surgery. All jewelry must be removed. If you have dentures, they will be removed before going to operating room. For your convenience, we will provide you with a container. If you wear contact lenses or glasses, they will be removed, please bring a case for them. If you have a living will and a durable power of  for healthcare, please bring in a copy. As part of our patient safety program to minimize surgical site infections, we ask you to do the following:    · Please notify your surgeon if you develop any illness between         now and the  day of your surgery. · This includes a cough, cold, fever, sore throat, nausea,         or vomiting, and diarrhea, etc.  ·  Please notify your surgeon if you experience dizziness, shortness         of breath or blurred vision between now and the time of your surgery. Do not shave your operative site 96 hours prior to surgery. For face and neck surgery, men may use an electric razor 48 hours   prior to surgery. You may shower the night before surgery or the morning of   your surgery with an antibacterial soap. You will need to bring a photo ID and insurance card    [facility] has an onsite pharmacy, would you like to utilize our pharmacy     If you will be staying overnight and use a C-pap machine, please bring   your C-pap to hospital     Our goal is to provide you with excellent care, therefore, visitors will be limited to two(2) in the room at a time so that we may focus on providing this care for you. Please contact pre-admission testing if you have any further questions. [facility] phone number:  [phone]  Please note these are generalized instructions for all surgical cases, you may be provided with more specific instructions according to your surgery.

## 2020-10-14 ENCOUNTER — ANESTHESIA EVENT (OUTPATIENT)
Dept: SURGERY | Age: 85
End: 2020-10-14
Payer: MEDICARE

## 2020-10-15 ENCOUNTER — PREP FOR PROCEDURE (OUTPATIENT)
Dept: OPHTHALMOLOGY | Age: 85
End: 2020-10-15

## 2020-10-15 RX ORDER — TETRACAINE HYDROCHLORIDE 5 MG/ML
1 SOLUTION OPHTHALMIC ONCE
Status: CANCELLED | OUTPATIENT
Start: 2020-10-15 | End: 2020-10-15

## 2020-10-15 RX ORDER — SODIUM CHLORIDE 0.9 % (FLUSH) 0.9 %
10 SYRINGE (ML) INJECTION PRN
Status: CANCELLED | OUTPATIENT
Start: 2020-10-15

## 2020-10-15 RX ORDER — SODIUM CHLORIDE 0.9 % (FLUSH) 0.9 %
10 SYRINGE (ML) INJECTION EVERY 12 HOURS SCHEDULED
Status: CANCELLED | OUTPATIENT
Start: 2020-10-15

## 2020-10-15 NOTE — PROGRESS NOTES
[address] [facility]  [phone]        Pre-Op Phone Call:     Patient Name: Live Green     Telephone Information:   Mobile [phone]     Home phone:  [phone]    Surgery Time:    7:55 AM     Arrival Time:  3328     Left extended Message:  Yes     Message left with:     Recent change in health status:  No     Advised of transportation/ policy:  Yes     NPO policy reviewed:  Yes Dorothy Mendoza daughter     Roseanna Ospina to take morning heart/blood pressure medications with sips of water morning of surgery? Yes     Instructed to bring eye drops, photo identification, and insurance card day of surgery? Yes     Advised to wear short sleeved button down shirt (no T-shirt underneath):  Yes     Advised not to wear jewelry, hairpins, or pantyhose day of surgery? Yes     Advised not to wear make-up and to wash face day of surgery?   Yes    Remarks:        Electronically signed by:  Angie Baugh RN at 10/15/2020 2:41 PM

## 2020-10-16 ENCOUNTER — HOSPITAL ENCOUNTER (OUTPATIENT)
Age: 85
Setting detail: OUTPATIENT SURGERY
Discharge: HOME | End: 2020-10-16
Attending: OPHTHALMOLOGY | Admitting: OPHTHALMOLOGY
Payer: MEDICARE

## 2020-10-16 ENCOUNTER — ANESTHESIA (OUTPATIENT)
Dept: SURGERY | Age: 85
End: 2020-10-16
Payer: MEDICARE

## 2020-10-16 VITALS
HEART RATE: 75 BPM | BODY MASS INDEX: 41.14 KG/M2 | HEIGHT: 66 IN | TEMPERATURE: 96.5 F | OXYGEN SATURATION: 95 % | SYSTOLIC BLOOD PRESSURE: 148 MMHG | DIASTOLIC BLOOD PRESSURE: 86 MMHG | WEIGHT: 256 LBS | RESPIRATION RATE: 20 BRPM

## 2020-10-16 VITALS
DIASTOLIC BLOOD PRESSURE: 86 MMHG | SYSTOLIC BLOOD PRESSURE: 156 MMHG | RESPIRATION RATE: 16 BRPM | OXYGEN SATURATION: 94 %

## 2020-10-16 LAB
GLUCOSE BLD-MCNC: 127 MG/DL (ref 70–99)
GLUCOSE BLD-MCNC: 135 MG/DL (ref 70–99)
PERFORMED ON: ABNORMAL
PERFORMED ON: ABNORMAL

## 2020-10-16 PROCEDURE — 7100000010 HC PHASE II RECOVERY - FIRST 15 MIN: Performed by: OPHTHALMOLOGY

## 2020-10-16 PROCEDURE — 2709999900 HC NON-CHARGEABLE SUPPLY: Performed by: OPHTHALMOLOGY

## 2020-10-16 PROCEDURE — 3600000014 HC SURGERY LEVEL 4 ADDTL 15MIN: Performed by: OPHTHALMOLOGY

## 2020-10-16 PROCEDURE — 3700000000 HC ANESTHESIA ATTENDED CARE: Performed by: OPHTHALMOLOGY

## 2020-10-16 PROCEDURE — 2580000003 HC RX 258: Performed by: ANESTHESIOLOGY

## 2020-10-16 PROCEDURE — 6370000000 HC RX 637 (ALT 250 FOR IP): Performed by: OPHTHALMOLOGY

## 2020-10-16 PROCEDURE — 3700000001 HC ADD 15 MINUTES (ANESTHESIA): Performed by: OPHTHALMOLOGY

## 2020-10-16 PROCEDURE — 3600000004 HC SURGERY LEVEL 4 BASE: Performed by: OPHTHALMOLOGY

## 2020-10-16 PROCEDURE — 2500000003 HC RX 250 WO HCPCS: Performed by: OPHTHALMOLOGY

## 2020-10-16 PROCEDURE — V2632 POST CHMBR INTRAOCULAR LENS: HCPCS | Performed by: OPHTHALMOLOGY

## 2020-10-16 PROCEDURE — 7100000011 HC PHASE II RECOVERY - ADDTL 15 MIN: Performed by: OPHTHALMOLOGY

## 2020-10-16 DEVICE — LENS INTOCU +17.5 DIOPT L13MM DIA6MM 0DEG HAPTIC ANG A: Type: IMPLANTABLE DEVICE | Site: EYE | Status: FUNCTIONAL

## 2020-10-16 RX ORDER — ONDANSETRON 2 MG/ML
4 INJECTION INTRAMUSCULAR; INTRAVENOUS
Status: DISCONTINUED | OUTPATIENT
Start: 2020-10-16 | End: 2020-10-16 | Stop reason: HOSPADM

## 2020-10-16 RX ORDER — SODIUM CHLORIDE 0.9 % (FLUSH) 0.9 %
10 SYRINGE (ML) INJECTION EVERY 12 HOURS SCHEDULED
Status: DISCONTINUED | OUTPATIENT
Start: 2020-10-16 | End: 2020-10-16 | Stop reason: SDUPTHER

## 2020-10-16 RX ORDER — PHENYLEPHRINE HCL 2.5 %
1 DROPS OPHTHALMIC (EYE) SEE ADMIN INSTRUCTIONS
Status: DISCONTINUED | OUTPATIENT
Start: 2020-10-16 | End: 2020-10-16 | Stop reason: HOSPADM

## 2020-10-16 RX ORDER — BALANCED SALT SOLUTION 6.4; .75; .48; .3; 3.9; 1.7 MG/ML; MG/ML; MG/ML; MG/ML; MG/ML; MG/ML
SOLUTION OPHTHALMIC
Status: COMPLETED | OUTPATIENT
Start: 2020-10-16 | End: 2020-10-16

## 2020-10-16 RX ORDER — SODIUM CHLORIDE 0.9 % (FLUSH) 0.9 %
10 SYRINGE (ML) INJECTION PRN
Status: DISCONTINUED | OUTPATIENT
Start: 2020-10-16 | End: 2020-10-16 | Stop reason: HOSPADM

## 2020-10-16 RX ORDER — TETRACAINE HYDROCHLORIDE 5 MG/ML
SOLUTION OPHTHALMIC
Status: COMPLETED | OUTPATIENT
Start: 2020-10-16 | End: 2020-10-16

## 2020-10-16 RX ORDER — TETRACAINE HYDROCHLORIDE 5 MG/ML
1 SOLUTION OPHTHALMIC SEE ADMIN INSTRUCTIONS
Status: DISCONTINUED | OUTPATIENT
Start: 2020-10-16 | End: 2020-10-16 | Stop reason: HOSPADM

## 2020-10-16 RX ORDER — SODIUM CHLORIDE 0.9 % (FLUSH) 0.9 %
10 SYRINGE (ML) INJECTION EVERY 12 HOURS SCHEDULED
Status: DISCONTINUED | OUTPATIENT
Start: 2020-10-16 | End: 2020-10-16 | Stop reason: HOSPADM

## 2020-10-16 RX ORDER — LIDOCAINE HYDROCHLORIDE 10 MG/ML
INJECTION, SOLUTION EPIDURAL; INFILTRATION; INTRACAUDAL; PERINEURAL
Status: COMPLETED | OUTPATIENT
Start: 2020-10-16 | End: 2020-10-16

## 2020-10-16 RX ORDER — TROPICAMIDE 10 MG/ML
1 SOLUTION/ DROPS OPHTHALMIC SEE ADMIN INSTRUCTIONS
Status: DISCONTINUED | OUTPATIENT
Start: 2020-10-16 | End: 2020-10-16 | Stop reason: HOSPADM

## 2020-10-16 RX ORDER — SODIUM CHLORIDE 0.9 % (FLUSH) 0.9 %
10 SYRINGE (ML) INJECTION PRN
Status: DISCONTINUED | OUTPATIENT
Start: 2020-10-16 | End: 2020-10-16 | Stop reason: SDUPTHER

## 2020-10-16 RX ORDER — BRIMONIDINE TARTRATE 2 MG/ML
SOLUTION/ DROPS OPHTHALMIC
Status: COMPLETED | OUTPATIENT
Start: 2020-10-16 | End: 2020-10-16

## 2020-10-16 RX ORDER — SODIUM CHLORIDE 9 MG/ML
INJECTION, SOLUTION INTRAVENOUS CONTINUOUS
Status: DISCONTINUED | OUTPATIENT
Start: 2020-10-16 | End: 2020-10-16 | Stop reason: HOSPADM

## 2020-10-16 RX ORDER — OFLOXACIN 3 MG/ML
SOLUTION/ DROPS OPHTHALMIC
Status: COMPLETED | OUTPATIENT
Start: 2020-10-16 | End: 2020-10-16

## 2020-10-16 RX ORDER — CYCLOPENTOLATE HYDROCHLORIDE 10 MG/ML
1 SOLUTION/ DROPS OPHTHALMIC SEE ADMIN INSTRUCTIONS
Status: DISCONTINUED | OUTPATIENT
Start: 2020-10-16 | End: 2020-10-16 | Stop reason: HOSPADM

## 2020-10-16 RX ORDER — KETOROLAC TROMETHAMINE 5 MG/ML
1 SOLUTION OPHTHALMIC SEE ADMIN INSTRUCTIONS
Status: DISCONTINUED | OUTPATIENT
Start: 2020-10-16 | End: 2020-10-16 | Stop reason: HOSPADM

## 2020-10-16 RX ADMIN — TROPICAMIDE 1 DROP: 10 SOLUTION/ DROPS OPHTHALMIC at 06:55

## 2020-10-16 RX ADMIN — POVIDONE-IODINE 0.1 ML: 5 SOLUTION OPHTHALMIC at 06:55

## 2020-10-16 RX ADMIN — KETOROLAC TROMETHAMINE 1 DROP: 5 SOLUTION/ DROPS OPHTHALMIC at 07:23

## 2020-10-16 RX ADMIN — TETRACAINE HYDROCHLORIDE 1 DROP: 5 SOLUTION OPHTHALMIC at 06:55

## 2020-10-16 RX ADMIN — PHENYLEPHRINE HYDROCHLORIDE 1 DROP: 25 SOLUTION/ DROPS OPHTHALMIC at 06:55

## 2020-10-16 RX ADMIN — SODIUM CHLORIDE: 9 INJECTION, SOLUTION INTRAVENOUS at 07:10

## 2020-10-16 RX ADMIN — PHENYLEPHRINE HYDROCHLORIDE 1 DROP: 25 SOLUTION/ DROPS OPHTHALMIC at 07:00

## 2020-10-16 RX ADMIN — CYCLOPENTOLATE HYDROCHLORIDE 1 DROP: 10 SOLUTION OPHTHALMIC at 07:00

## 2020-10-16 RX ADMIN — TROPICAMIDE 1 DROP: 10 SOLUTION/ DROPS OPHTHALMIC at 07:00

## 2020-10-16 RX ADMIN — KETOROLAC TROMETHAMINE 1 DROP: 5 SOLUTION/ DROPS OPHTHALMIC at 07:18

## 2020-10-16 RX ADMIN — CYCLOPENTOLATE HYDROCHLORIDE 1 DROP: 10 SOLUTION OPHTHALMIC at 06:55

## 2020-10-16 RX ADMIN — SODIUM CHLORIDE: 9 INJECTION, SOLUTION INTRAVENOUS at 07:18

## 2020-10-16 ASSESSMENT — PAIN SCALES - GENERAL
PAINLEVEL_OUTOF10: 0
PAINLEVEL_OUTOF10: 0

## 2020-10-16 ASSESSMENT — PAIN - FUNCTIONAL ASSESSMENT: PAIN_FUNCTIONAL_ASSESSMENT: 0-10

## 2020-10-16 NOTE — OP NOTE
found.  The patient had ofloxacin and Alphagan solutions placed on the eye. The patient went to the PACU in excellent condition, having tolerated the procedure well.

## 2020-10-16 NOTE — ANESTHESIA PRE PROCEDURE
[facility] Department of Anesthesiology  Pre-Anesthesia Evaluation/Consultation       Name:  Tim Deluca  : 1934  Age:  80 y.o.                                            MRN:  [id number]  Date: 10/16/2020           Surgeon: Surgeon(s):  Dandre Topete MD    Procedure: Procedure(s):  PHACOEMULSIFICATION WITH INTRAOCULAR LENS IMPLANT - LEFT EYE     Allergies   Allergen Reactions    Naproxen Itching    Bactrim [Sulfamethoxazole-Trimethoprim]     Levofloxacin     Pcn [Penicillins]     Sulfa Antibiotics      Patient Active Problem List   Diagnosis    Chronic combined systolic and diastolic heart failure ([address]    HTN (hypertension)    Persistent atrial fibrillation (HCC)    Pulmonary HTN ([address]    Hyperlipidemia    Primary localized osteoarthrosis of shoulder region    Nonrheumatic aortic (valve) stenosis    Hyperthyroidism    Acute kidney injury superimposed on chronic kidney disease ([address]    Hyponatremia    Combined systolic and diastolic congestive heart failure (HCC)    Anemia due to chronic kidney disease    Acute respiratory failure with hypoxia ([address]    DAVION (acute kidney injury) ([address]     Past Medical History:   Diagnosis Date    Anemia     Chronic kidney disease     Combined systolic and diastolic congestive heart failure ([address]     Diabetes mellitus ([address]     DVT (deep venous thrombosis) (HCC)     Hyperlipidemia     Hypertension     Osteoarthritis     PAF (paroxysmal atrial fibrillation) ([address]     Pulmonary embolism ([address]     Sarcoidosis     in remission     Thyroid disease     Hypothyroidism    Type II or unspecified type diabetes mellitus without mention of complication, not stated as uncontrolled      Past Surgical History:   Procedure Laterality Date    HYSTERECTOMY      JOINT REPLACEMENT Bilateral     TKT    JOINT REPLACEMENT Bilateral     THR    KNEE SURGERY      bilateral total knees     Social History     Tobacco Use    Smoking status: Never Smoker    Smokeless tobacco: Never Used    Tobacco comment: Never smoked   Substance Use Topics    Alcohol use: No    Drug use: No     Medications  No current facility-administered medications on file prior to encounter. Current Outpatient Medications on File Prior to Encounter   Medication Sig Dispense Refill    dilTIAZem (CARDIZEM CD) 120 MG extended release capsule Take 1 capsule by mouth every 12 hours 30 capsule 3    furosemide (LASIX) 20 MG tablet Take 1 tablet by mouth 2 times daily 60 tablet 3    carvedilol (COREG) 12.5 MG tablet Take 1 tablet by mouth 2 times daily (with meals) 60 tablet 3    latanoprost (XALATAN) 0.005 % ophthalmic solution Place 1 drop into the right eye nightly      KLOR-CON M20 20 MEQ extended release tablet Take 20 mEq by mouth daily      pantoprazole (PROTONIX) 40 MG tablet Take 40 mg by mouth daily      ondansetron (ZOFRAN) 4 MG tablet Take 4 mg by mouth every 6 hours as needed      warfarin (COUMADIN) 5 MG tablet Take 5 mg by mouth daily Except 2.5mg every Thursday or as directed by [facility] Coumadin Service [phone]      cetirizine (ZYRTEC) 10 MG tablet Take 10 mg by mouth daily      vitamin C (ASCORBIC ACID) 500 MG tablet Take 500 mg by mouth daily      Calcium Carb-Cholecalciferol (CALTRATE 600+D) 600-800 MG-UNIT TABS Take 1 tablet by mouth daily       gabapentin (NEURONTIN) 100 MG capsule Take 200 mg by mouth nightly.  glipiZIDE (GLUCOTROL) 5 MG tablet Take 5 mg by mouth 2 times daily (before meals).  lovastatin (MEVACOR) 10 MG tablet Take 10 mg by mouth nightly.  ciprofloxacin (CIPRO) 500 MG tablet Take 1 tablet by mouth 2 times daily Indications: no longer taken       triamcinolone (KENALOG) 0.1 % cream Apply topically 2 times daily.  100 g 5    acetaminophen (TYLENOL) 500 MG tablet Take 1,000 mg by mouth every 6 hours as needed for Pain       Current Facility-Administered Medications   Medication Dose Route Frequency Provider Last Rate Last Dose  0.9 % sodium chloride infusion   Intravenous Continuous Ron Jerez MD        sodium chloride flush 0.9 % injection 10 mL  10 mL Intravenous 2 times per day Ron Jerez MD        sodium chloride flush 0.9 % injection 10 mL  10 mL Intravenous PRN Ron Jerez MD        povidone-iodine 5 % ophthalmic solution 0.1 mL  1 drop Left Eye Once Khadijah Burrows MD        tetracaine (TETRAVISC) 0.5 % ophthalmic solution 1 drop  1 drop Left Eye See Admin Instructions Khadijah Burrows MD        cyclopentolate (CYCLOGYL) 1 % ophthalmic solution 1 drop  1 drop Left Eye See Admin Instructions Khadijah Burrows MD        phenylephrine (MYDFRIN) 2.5 % ophthalmic solution 1 drop  1 drop Left Eye See Admin Instructions Khadijah Burrows MD        tropicamide (MYDRIACYL) 1 % ophthalmic solution 1 drop  1 drop Left Eye See Admin Instructions Khadijah Burrows MD        ketorolac (ACULAR) 0.5 % ophthalmic solution 1 drop  1 drop Left Eye See Admin Instructions Khadijah Burrows MD         Vital Signs (Current)   Vitals:    10/12/20 1655 10/16/20 0700   Temp:  97.6 °F (36.4 °C)   TempSrc:  Temporal   Weight: 244 lb (110.7 kg)    Height: 5' 6\" (1.676 m)                                           BP Readings from Last 3 Encounters:   20 130/88   20 110/64   10/17/19 132/60     Vital Signs Statistics (for past 48 hrs)     Temp  Av.6 °F (36.4 °C)  Min: 97.6 °F (36.4 °C)   Min taken time: 10/16/20 0700  Max: 97.6 °F (36.4 °C)   Max taken time: 10/16/20 0700  BP Readings from Last 3 Encounters:   20 130/88   20 110/64   10/17/19 132/60       BMI  Body mass index is 39.38 kg/m². Estimated body mass index is 39.38 kg/m² as calculated from the following:    Height as of this encounter: 5' 6\" (1.676 m). Weight as of this encounter: 244 lb (110.7 kg).     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                 Abdominal:   (+) obese,         Vascular:   + PE. Anesthesia Plan      MAC     ASA 4       Induction: intravenous. MIPS: Prophylactic antiemetics administered. Anesthetic plan and risks discussed with patient. Plan discussed with CRNA. Attending anesthesiologist reviewed and agrees with Pre Eval content              This pre-anesthesia assessment may be used as a history and physical.    DOS STAFF ADDENDUM:    Pt seen and examined, chart reviewed (including anesthesia, drug and allergy history). No interval changes to history and physical examination. Anesthetic plan, risks, benefits, alternatives, and personnel involved discussed with patient. Patient verbalized an understanding and agrees to proceed.       Harley Barrientos MD  October 16, 2020  7:02 AM

## 2020-10-16 NOTE — ANESTHESIA POSTPROCEDURE EVALUATION
Department of Anesthesiology  Postprocedure Note    Patient: Qiana Cole  MRN: [id number]  YOB: 1934  Date of evaluation: 10/16/2020  Time:  8:39 AM     Procedure Summary     Date:  10/16/20 Room / Location:  [facility]    Anesthesia Start:  7342 Anesthesia Stop:  3439    Procedure:  PHACOEMULSIFICATION WITH INTRAOCULAR LENS IMPLANT - LEFT EYE (Left Eye) Diagnosis:       Combined forms of age-related cataract of left eye      (CATARACT LEFT EYE)    Surgeon:  Demetria Hanson MD Responsible Provider:  Arpita Mott MD    Anesthesia Type:  MAC ASA Status:  4          Anesthesia Type: MAC    Michelle Phase I: Michelle Score: 10    Michelle Phase II: Michelle Score: 10    Last vitals: Reviewed and per EMR flowsheets.        Anesthesia Post Evaluation    Patient location during evaluation: bedside  Patient participation: complete - patient participated  Level of consciousness: awake  Pain score: 0  Airway patency: patent  Nausea & Vomiting: no nausea and no vomiting  Complications: no  Cardiovascular status: blood pressure returned to baseline  Respiratory status: acceptable  Hydration status: euvolemic

## 2020-10-28 NOTE — PROGRESS NOTES
[address]      Cardiology Consult        Qiana Cole  1934 October 30, 2020    Primary Physician: Dr. Jody Opitz  Reason for Visit: CHF and atrial fibrillation    CC: \"I feel pretty good. \"     HPI:  The patient is 80 y.o. female with a past medical history significant for combined systolic and diastolic heart failure, HTN, aortic stenosis, and atrial fibrillation presents today for management of CHF and aortic stenosis. She was admitted in 6/2013 for a HF exacerbation likely from poorly controlled blood pressure. While in the hospital she had an episode of atrial fibrillation which spontaneously converted to sinus rhythm. Then in sinus rhythm, she would have pauses of up to 3 seconds but no clear symptoms. She was hyperthyroid at the time. On follow-up at the office, she has been in atrial fibrillation since. Echocardiogram from 9/2015 showed that the EF had dropped to around 40-45% and 8/2017 dropped again to 35%. She was admitted 9/4/20 with worsening shortness of breath and LE edema. She was diuresed with IV lasix. Her repeat echo showed an improved EF but progression of the aortic stenosis to severe. In general, she had previously endorsed wishes for her therapies to be conservative and symptom based. However, she would be interested in hearing more information about TAVR. Today, she is accompanied with family and presents in a wheelchair. Her functional status is limited. She denies any new cardiac complaints and states she is \"good. \" She has chronic dyspnea on exertion but denies any acute or progressive changes. She denies associated chest pains or syncope. She has chronic lower extremity edema but feels the swelling improved after nephrology increased her diuretic dose. She denies recurrent falls, excessive bruising, or unusual bleeding. She reports medication compliance and is tolerating. She continues to follow in the [facility].      Past Medical History: Diagnosis Date    Anemia     Chronic kidney disease     Combined systolic and diastolic congestive heart failure (HCC)     Diabetes mellitus ([facility]     DVT (deep venous thrombosis) (HCC)     Hyperlipidemia     Hypertension     Osteoarthritis     PAF [facility]oxysmal atrial fibrillation) ([facility]     Pulmonary embolism (HCC)     Sarcoidosis     in remission     Thyroid disease     Hypothyroidism    Type II or unspecified type diabetes mellitus without mention of complication, not stated as uncontrolled      Past Surgical History:   Procedure Laterality Date    HYSTERECTOMY      INTRACAPSULAR CATARACT EXTRACTION Left 10/16/2020    PHACOEMULSIFICATION WITH INTRAOCULAR LENS IMPLANT - LEFT EYE performed by Dandre Topete MD at [address] [place] [address] Bilateral     TKT    JOINT REPLACEMENT Bilateral     THR    KNEE SURGERY      bilateral total knees     Family History   Problem Relation Age of Onset    Heart Failure Mother     Cancer Brother     Asthma Neg Hx     Diabetes Neg Hx     Emphysema Neg Hx     Hypertension Neg Hx       Social History     Tobacco Use    Smoking status: Never Smoker    Smokeless tobacco: Never Used    Tobacco comment: Never smoked   Substance Use Topics    Alcohol use: No    Drug use: No     Allergies   Allergen Reactions    Naproxen Itching    Bactrim [Sulfamethoxazole-Trimethoprim]     Levofloxacin     Pcn [Penicillins]     Sulfa Antibiotics      Current Outpatient Medications   Medication Sig Dispense Refill    ciprofloxacin (CIPRO) 500 MG tablet Take 1 tablet by mouth 2 times daily Indications: no longer taken       dilTIAZem (CARDIZEM CD) 120 MG extended release capsule Take 1 capsule by mouth every 12 hours 30 capsule 3    furosemide (LASIX) 20 MG tablet Take 1 tablet by mouth 2 times daily (Patient taking differently: Take 40 mg by mouth 2 times daily ) 60 tablet 3    carvedilol (COREG) 12.5 MG tablet Take 1 tablet by mouth 2 times daily (with meals) 60 tablet 3    triamcinolone (KENALOG) 0.1 % cream Apply topically 2 times daily. 100 g 5    latanoprost (XALATAN) 0.005 % ophthalmic solution Place 1 drop into the right eye nightly      KLOR-CON M20 20 MEQ extended release tablet Take 20 mEq by mouth daily      pantoprazole (PROTONIX) 40 MG tablet Take 40 mg by mouth daily      ondansetron (ZOFRAN) 4 MG tablet Take 4 mg by mouth every 6 hours as needed      warfarin (COUMADIN) 5 MG tablet Take 5 mg by mouth daily Except 2.5mg every Thursday or as directed by [facility] [phone]      cetirizine (ZYRTEC) 10 MG tablet Take 10 mg by mouth daily      vitamin C (ASCORBIC ACID) 500 MG tablet Take 500 mg by mouth daily      acetaminophen (TYLENOL) 500 MG tablet Take 1,000 mg by mouth every 6 hours as needed for Pain      Calcium Carb-Cholecalciferol (CALTRATE 600+D) 600-800 MG-UNIT TABS Take 1 tablet by mouth daily       gabapentin (NEURONTIN) 100 MG capsule Take 200 mg by mouth nightly.  glipiZIDE (GLUCOTROL) 5 MG tablet Take 5 mg by mouth 2 times daily (before meals).  lovastatin (MEVACOR) 10 MG tablet Take 10 mg by mouth nightly. No current facility-administered medications for this visit. Review of Systems:  · Constitutional: no unanticipated weight loss. There's been no change in energy level, sleep pattern, or activity level. No fevers, chills. · Eyes: No visual changes or diplopia. No scleral icterus. · ENT: No Headaches, hearing loss or vertigo. No mouth sores or sore throat. · Cardiovascular: as reviewed in HPI  · Respiratory: No cough or wheezing, no sputum production. No hematemesis. · Gastrointestinal: No abdominal pain, appetite loss, blood in stools. No change in bowel or bladder habits. · Genitourinary: No dysuria, trouble voiding, or hematuria. · Musculoskeletal:  No gait disturbance, no joint complaints. · Integumentary: No rash or pruritis.   · Neurological: No headache, diplopia, change in muscle strength, numbness or tingling. · Psychiatric: No anxiety or depression. · Endocrine: No temperature intolerance. No excessive thirst, fluid intake, or urination. No tremor. · Hematologic/Lymphatic: No abnormal bruising or bleeding, blood clots or swollen lymph nodes. · Allergic/Immunologic: No nasal congestion or hives. Physical Exam:   /76 (Site: Left Upper Arm, Position: Sitting, Cuff Size: Medium Adult)   Pulse 77   Temp 99.1 °F (37.3 °C)   Ht 5' 6\" (1.676 m)   Wt 261 lb (118.4 kg) Comment: with shoes  LMP  (LMP Unknown)   SpO2 99%   BMI 42.13 kg/m²      Physical Exam:   Constitutional: The patient is an obese female who is oriented to person, place, and time. In no acute distress. Elderly. In wheelchair. Head: Normocephalic and atraumatic. Pupils equal and round. Neck: Neck supple. No JVP or carotid bruit appreciated. No mass and no thyromegaly present. No lymphadenopathy present. Cardiovascular: Normal rate. Exam reveals no gallop and no friction rub. III/VI DANUTA. Pulmonary/Chest: Effort normal and breath sounds normal. No respiratory distress. No wheezes, rhonchi or rales. Abdominal: Soft, non-tender. Bowel sounds are normal. Exhibits no organomegaly, mass or bruit. Extremities: Trace-1+ BLE edema. No cyanosis or clubbing. Pulses are 2+ radial and carotid bilaterally. Neurological: No gross cranial nerve deficit. Coordination normal.   Skin: Skin is warm and dry. There is no rash or diaphoresis. Psychiatric: Patient has a normal mood and affect. Speech is normal and behavior is normal.     Lab Review:   Lab Results   Component Value Date    TRIG 91 06/03/2020    HDL 51 06/03/2020    HDL 55 09/09/2011    LDLCALC 84 06/03/2020    LABVLDL 18 06/03/2020      Lab Results   Component Value Date    BUN 43 09/16/2020    CREATININE 2.0 09/16/2020     EKG Interpretation: 7/10/13 Atrial fibrillation w RVR  8/7/14 Atrial fibrillation. Voltage criteria for LVH. Nonspecific ST-T abnormality. 9/28/15 Atrial fibrillation. Low voltage in precordial leads. Incomplete RBBB. Left axis -anterior fascicular block. Poor R-wave progression. 10/16/18 Atrial fibrillation. Left axis. LVH  with QRS widening and repolarization abnormality. 4/16/19 Atrial fibrillation. IVCD. Left anterior fascicular block. Poor R-wave progression. Nonspecific T-abnormality. 10/17/19 Atrial fibrillation. IVCD. Left anterior fascicular block. Poor R-wave progression. Nonspecific T-abnormality    Image Review:     Holter 7/25/13   Sinus HR . 263 PVCs. 496 supraventricular ectopic beats. [facility] 10/2015  There is no reversible ischemia or scar appreciated on this study. There is   a slight decrease in tracer uptake on the stress imaging that appears more   consistent with breast attenuation. Echo 9/20/15   Left ventricle size is normal. Ejection fraction is visually estimated to be 40-45% with diffuse hypokinesis. The aortic valve appears thickened/calcified with restricted movement and a mean pressure gradient of 18 mmHg. Trivial aortic regurgitation is present. The right ventricle is enlarged and moderately depressed. Biatrial enlargement. RVSP estimated at 45 mmHg. Echo 8/14/17  Normal left ventricular size and wall thickness. Global systolic function is moderate decreased with an estimated LVEF estimated at 35%. The right ventricle is not well visualized but appears to have normal size and mild to moderately reduced function. Severe biatrial enlargement appreciated. Moderate mitral regurgitation is present. The aortic valve leaflets are calcified and restricted in appearance. The aortic valve area is calculated at 0.7 cm2 with a maximum pressure gradient of 34 mmHg and a mean pressure gradient of 17 mmHg. This is consistent with at least mild aortic stenosis but gradient but is probably underestimated due to left ventricular systolic dysfunction.     Echo 9/5/20  Suboptimal image quality. Patient appears to be in atrial fibrillation. Left ventricular cavity size is normal.  There is moderate concentric left ventricular hypertrophy. Ejection fraction is visually estimated to be 43%  Diastolic filling parameters suggest at least grade II diastolic dysfunction. Severe aortic stenosis with a peak velocity of 5.00 m/s and a mean pressure gradient of 67 mmHg. Estimated pulmonary artery systolic pressure is borderline severely elevated at 69 mmHg assuming a right atrial pressure of 15 mmHg. Assessment/Plan:   1. Severe aortic stenosis. Symptomatic with CHF. Discussed treatment options including conservative management or referral for TAVR. She previously endorsed her wishes for conservative therapies but now feels that she did not understand the extent of her condition. Will refer to Dr. Ai Gauthier to further discuss TAVR. If felt to be a reasonable candidate, the patient understands she will require additional testing. 2. Chronic combined systolic and diastolic heart failure. EF 35%, repeat echo showed improved EF at 55%. Continue medical management with B-blocker and Lasix. No ACE-I/ARB/Aldactone with CKD. Diuretics managed per nephrology. 3. Persistent atrial fibrillation. Continue rate control strategy with Cardizem 120 mg and Coreg 12.5 mg BID. Continue chronic anticoagulation and INR remains therapeutic. Managed per anticoagulation clinic. 4. Pulmonary hypertension on echo. Multiple etiologies possible including prior multiple PE, RUTH, systolic/diastolic dysfunction. She is not interested in pursuing a sleep study. 5. Essential hypertension. Controlled. Goal BP <130/80. Continue current therapies. 6. Hyperlipidemia. Continue statin. 7. CKD stage IV. Baseline creatinine 1.8-2. Following with nephrology. Patient to follow up with Dr. Ai Gauthier     Thank you very much for allowing me to participate in the care of your patient.  Please do not hesitate to contact me if you have any questions. Sincerely,  Israel Gregory. Kristopher Roche, [address] [facility], [address] [place] [address] [place] [facility]  Ph: [phone]  Fax: [phone]    This note was scribed in the presence of Dr Kristopher Roche MD by Mckenzie Carey RN. Physician Attestation: The scribes documentation has been prepared under my direction and personally reviewed by me in its entirety. I confirm that the note above accurately reflects all work, treatment, procedures, and medical decision making performed by me. All portions of the note including but not limited to the chief complaint, history of present illness, physical exam, assessment and plan/medical decision making were personally reviewed, edited, and updated on the day of the visit.

## 2020-10-28 NOTE — PATIENT INSTRUCTIONS
Patient Education        Aortic Valve Stenosis: Care Instructions  Your Care Instructions     Having aortic valve stenosis means that the valve between your heart and the large blood vessel that carries blood to the body (aorta) has narrowed. That forces the heart to pump harder to get enough blood through the valve. As stenosis gets worse, the valve gets narrower. This can cause symptoms. Symptoms include chest pain, dizziness, fainting, or shortness of breath. If you have mild or moderate stenosis and don't have symptoms, you may not need treatment now. Your doctor will check your heart regularly. You may need treatment if the stenosis gets worse. With severe stenosis, you may need to have the valve replaced. Follow-up care is a key part of your treatment and safety. Be sure to make and go to all appointments, and call your doctor if you are having problems. It's also a good idea to know your test results and keep a list of the medicines you take. How can you care for yourself at home? · Be safe with medicines. Take your medicines exactly as prescribed. Call your doctor if you think you are having a problem with your medicine. You will get more details on the specific medicines your doctor prescribes. · Eat a heart-healthy diet. Limit how much sodium you eat. · Be active. Ask your doctor what type and level of exercise is safe for you. · Do not smoke. Smoking can make aortic valve stenosis worse. If you need help quitting, talk to your doctor about stop-smoking programs and medicines. · Stay at a healthy weight. Lose weight if you need to. · Avoid colds and flu. Get a pneumococcal vaccine shot. If you have had one before, ask your doctor if you need another dose. Get a flu vaccine every year. · Take care of your teeth and gums. Get regular dental checkups. Good dental health is important because bacteria can spread from infected teeth and gums to the heart valves. When should you call for help?    Call 911 anytime you think you may need emergency care. For example, call if:    · You passed out (lost consciousness).     · You have symptoms of a heart attack. These may include:  ? Chest pain or pressure, or a strange feeling in the chest.  ? Sweating. ? Shortness of breath. ? Nausea or vomiting. ? Pain, pressure, or a strange feeling in the back, neck, jaw, or upper belly or in one or both shoulders or arms. ? Lightheadedness or sudden weakness. ? A fast or irregular heartbeat. After you call 911, the  may tell you to chew 1 adult-strength or 2 to 4 low-dose aspirin. Wait for an ambulance. Do not try to drive yourself. Call your doctor now or seek immediate medical care if:    · You develop new symptoms of aortic valve stenosis, such as chest pain, dizziness, or shortness of breath.     · You have new or increased shortness of breath.     · You are dizzy or lightheaded, or you feel like you may faint.     · You have sudden weight gain, such as more than 2 to 3 pounds in a day or 5 pounds in a week. (Your doctor may suggest a different range of weight gain.)     · You have increased swelling in your legs, ankles, or feet. Watch closely for changes in your health, and be sure to contact your doctor if:    · You have trouble making healthy lifestyle changes.     · You want more information about healthy lifestyle changes. Where can you learn more? Go to [email or web address]. org and sign in to your [employer] account. Enter O826 in the [facility] box to learn more about \"Aortic Valve Stenosis: Care Instructions. \"     If you do not have an account, please click on the \"Sign Up Now\" link. Current as of: December 16, 2019               Content Version: 12.6  © 5454-9476 [employer]. Care instructions adapted under license by [facility] ([facility]).  If you have questions about a medical condition or this instruction, always ask your healthcare professional. [employer] disclaims any warranty or liability for your use of this information.

## 2020-10-30 ENCOUNTER — OFFICE VISIT (OUTPATIENT)
Dept: CARDIOLOGY CLINIC | Age: 85
End: 2020-10-30
Payer: MEDICARE

## 2020-10-30 VITALS
HEIGHT: 66 IN | HEART RATE: 77 BPM | WEIGHT: 261 LBS | OXYGEN SATURATION: 99 % | SYSTOLIC BLOOD PRESSURE: 138 MMHG | BODY MASS INDEX: 41.95 KG/M2 | TEMPERATURE: 99.1 F | DIASTOLIC BLOOD PRESSURE: 76 MMHG

## 2020-10-30 DIAGNOSIS — I48.19 PERSISTENT ATRIAL FIBRILLATION (HCC): ICD-10-CM

## 2020-10-30 LAB
INR BLD: 3.31 (ref 0.86–1.14)
PROTHROMBIN TIME: 38.9 SEC (ref 10–13.2)

## 2020-10-30 PROCEDURE — 99215 OFFICE O/P EST HI 40 MIN: CPT | Performed by: INTERNAL MEDICINE

## 2020-10-30 PROCEDURE — 93000 ELECTROCARDIOGRAM COMPLETE: CPT | Performed by: INTERNAL MEDICINE

## 2020-11-18 ENCOUNTER — ANTI-COAG VISIT (OUTPATIENT)
Dept: PHARMACY | Age: 85
End: 2020-11-18
Payer: MEDICARE

## 2020-11-18 DIAGNOSIS — I48.19 PERSISTENT ATRIAL FIBRILLATION (HCC): ICD-10-CM

## 2020-11-18 LAB
INR BLD: 2.91 (ref 0.86–1.14)
PROTHROMBIN TIME: 34.1 SEC (ref 10–13.2)

## 2020-11-18 PROCEDURE — 99211 OFF/OP EST MAY X REQ PHY/QHP: CPT

## 2020-11-18 NOTE — PROGRESS NOTES
Ms. Gerardo Moreland is a 80 y.o.  female. Ms. Gerardo Moreland had an INR test today. Results were reviewed and appropriate warfarin management was completed. THIS VISIT WAS COMPLETED AS:   [x]    A VIRTUAL VISIT VIA TELEPHONE IN EFFORTS TO REDUCE THE SPREAD OF COVID-19.  []    A DRIVE-THRU VISIT IN EFFORTS TO REDUCE THE SPREAD OF COVID-19.  []    AN IN PERSON VISIT. PROTOCOLS WERE FOLLOWED WITH PRECAUTIONS TO REDUCE THE SPREAD OF COVID-19. Patient verifies current dosing regimen: Yes     Warfarin medication reviewed and updated on the patient 's home medication list: Yes   All other medications reviewed and updated on the patient 's home medication list: No: no changes     Lab Results   Component Value Date    INR 2.91 (H) 2020    INR 3.31 (H) 10/30/2020    INR 2.84 (H) 2020       Patient Findings     Negatives:   Signs/symptoms of bleeding, Change in medications, Change in diet/appetite, Bruising          Anticoagulation Summary  As of 2020    INR goal:   2.0-3.0   TTR:   72.5 % (2.6 y)   INR used for dosin.91 (2020)   Warfarin maintenance plan:   2.5 mg (5 mg x 0.5) every Thu; 5 mg (5 mg x 1) all other days   Weekly warfarin total:   32.5 mg   Plan last modified: Boris Person, [facility] (3/7/2019)   Next INR check:   2020   Priority:   Maintenance   Target end date: Indefinite    Indications    Persistent atrial fibrillation (HCC) [I48.19]             Anticoagulation Episode Summary     INR check location:       Preferred lab:       Send INR reminders to:   [facility]    Comments:   EPIC       Anticoagulation Care Providers     Provider Role Specialty Phone number    Eli Baugh MD Referring Cardiology [phone]    Maddie Holm MD  Internal Medicine [phone]          Warfarin plan:   No changes    Description    CONTINUE: Warfarin 5mg(1 tablet) daily EXCEPT 2.5MG (1/2 tablet) every Thursday.     Keep the number of servings of Vitamin K (dark green vegetables) consistent from week to week. A green vegetable 1-2 times a week. A dark leafy vegetable only occasion. Call with medications changes, especially antibiotics and steroids and including any over-the-counter medications or herbal products. Call if you stop any medications. Please arrive 15 minutes prior to your appointment           Reviewed AVS with patient / caregiver.       CLINICAL PHARMACY CONSULT: MED RECONCILIATION/REVIEW ADDENDUM    For Pharmacy Admin Tracking Only    PHSO: No  Total # of Interventions Recommended: 0  - Maintenance Safety Lab Monitoring #: 1  Total Interventions Accepted: 0  Time Spent (min): 15

## 2020-12-16 ENCOUNTER — APPOINTMENT (OUTPATIENT)
Dept: PHARMACY | Age: 85
End: 2020-12-16
Payer: MEDICARE

## 2020-12-18 DIAGNOSIS — I48.19 PERSISTENT ATRIAL FIBRILLATION (HCC): ICD-10-CM

## 2020-12-18 LAB
INR BLD: 3.78 (ref 0.86–1.14)
PROTHROMBIN TIME: 44.4 SEC (ref 10–13.2)

## 2020-12-28 DIAGNOSIS — I48.19 PERSISTENT ATRIAL FIBRILLATION (HCC): ICD-10-CM

## 2020-12-28 LAB
INR BLD: 1.82 (ref 0.86–1.14)
PROTHROMBIN TIME: 21.2 SEC (ref 10–13.2)

## 2020-12-29 ENCOUNTER — ANTI-COAG VISIT (OUTPATIENT)
Dept: PHARMACY | Age: 85
End: 2020-12-29
Payer: MEDICARE

## 2020-12-29 PROCEDURE — 99211 OFF/OP EST MAY X REQ PHY/QHP: CPT

## 2020-12-29 NOTE — PROGRESS NOTES
Ms. Carlos Randhawa is a 80 y.o.  female. Ms. Carlos Randhawa had an INR test today. Results were reviewed and appropriate warfarin management was completed. THIS VISIT WAS COMPLETED AS:   [x]    A VIRTUAL VISIT VIA TELEPHONE IN EFFORTS TO REDUCE THE SPREAD OF COVID-19.  []    A DRIVE-THRU VISIT IN EFFORTS TO REDUCE THE SPREAD OF COVID-19.  []    AN IN PERSON VISIT. PROTOCOLS WERE FOLLOWED WITH PRECAUTIONS TO REDUCE THE SPREAD OF COVID-19. Patient verifies current dosing regimen: Yes     Warfarin medication reviewed and updated on the patient 's home medication list: Yes   All other medications reviewed and updated on the patient 's home medication list: No: no changes     Lab Results   Component Value Date    INR 1.82 (H) 2020    INR 3.78 (H) 2020    INR 2.91 (H) 2020           Anticoagulation Summary  As of 2020    INR goal:  2.0-3.0   TTR:  70.4 % (2.7 y)   INR used for dosin.82 (2020)   Warfarin maintenance plan:  2.5 mg (5 mg x 0.5) every Thu; 5 mg (5 mg x 1) all other days   Weekly warfarin total:  32.5 mg   Plan last modified:  Didi William, [facility] (2020)   Next INR check:  2021   Priority:  Maintenance   Target end date: Indefinite    Indications    Persistent atrial fibrillation (HCC) [I48.19]             Anticoagulation Episode Summary     INR check location:      Preferred lab:      Send INR reminders to:  [facility]    Comments:  EPIC. .... okay to give instructions to daughter Ingris Desir Providers     Provider Role Specialty Phone number    Jayne Adams MD Referring Cardiology [phone]    Tatianna De Anda MD  Internal Medicine [phone]          Warfarin plan:   Last INR was elevated likely due to decreased appetite at that time. She is eating much better per her daughter. I will take her back to her regular maintenance dose which has worked very well for her.      Description NEW DOSE: Warfarin 5mg(1 tablet) daily EXCEPT 2.5MG (1/2 tablet) every Thursday. Keep the number of servings of Vitamin K (dark green vegetables) consistent from week to week. A green vegetable 1-2 times a week. A dark leafy vegetable only occasion. Call with medications changes, especially antibiotics and steroids and including any over-the-counter medications or herbal products. Call if you stop any medications. Please arrive 15 minutes prior to your appointment           Reviewed AVS with patient / caregiver.       CLINICAL PHARMACY CONSULT: MED RECONCILIATION/REVIEW ADDENDUM    For Pharmacy Admin Tracking Only    PHSO (orange banner): No  Total # of Interventions Recommended (warfarin changes): 1  (warfarin changes) - Increased Dose #: 1  (#1 for reviewing INR) - Maintenance Safety Lab Monitoring #: 1  Total Interventions Accepted (warfarin related): 1  Time Spent (min) (round up): 15

## 2020-12-30 PROCEDURE — 99211 OFF/OP EST MAY X REQ PHY/QHP: CPT

## 2021-01-20 ENCOUNTER — TELEPHONE (OUTPATIENT)
Dept: PHARMACY | Age: 86
End: 2021-01-20

## 2021-01-20 NOTE — TELEPHONE ENCOUNTER
Ms. Vincent Mohan, Ms. Dariela Bradshaw daughter, called and is upset that they received a charge for the services we provided for warfarin management for her mother Brigida Sarkar on 12/18/2020. She reports she was charged for ~$300 dollars, her insurance paid a portion and they are being billed for the remainder which is ~$70. Ms. Maldonado had refused to come into our center due to the coronavirus pandemic. At that time, we advised that the alternative was to go to the lab and we could manage the warfarin therapy over the telephone. They agreed to this. I believe that the bill she received is for two visits in December. 12/18 and 12/29. She denies that she had a visit 12/29. I advised that I show documentation of a visit 12/29 where we provided assessment and adjusted her warfarin dose. She was concerned we charged for a lab fee. I advised that we did not. I tried to explain that we provide a service for managing the warfarin which includes assessment of diet and medication changes which can impact her INR. We also assess any problems such as signs and symptoms of bleeding or clotting which can be a concern with warfarin therapy managment especially when the INR is not in goal range. I advised that we have documentation of assessing these factors as well as adjusting her warfarin dose at both of these visits. She feels that we did not provide these services. I advised that I know this is a different process than usual and is due to the coronavirus pandemic that we are experiencing. I apologized for the change and confusion. She advised that she didn't have a problem with our services and the fees when they come into the center for a face to face visit. I offered to make her next appointment here in the outpatient center. She declined and her mother refuses. I could hear her in the back ground refusing this option.      I advised that an alternative would be to go to the lab and have her results sent to her

## 2021-02-01 ENCOUNTER — ANTI-COAG VISIT (OUTPATIENT)
Dept: PHARMACY | Age: 86
End: 2021-02-01

## 2021-02-01 NOTE — PROGRESS NOTES
Ms. Leopold Fetch refuses follow up with our center at this time. Refuses face to face visit due to COVID-19 concerns. Refuses virtual visit due to cost.     She is welcome to return at any time. She was discharged.

## 2021-02-07 ENCOUNTER — APPOINTMENT (OUTPATIENT)
Dept: GENERAL RADIOLOGY | Age: 86
DRG: 319 | End: 2021-02-07
Payer: MEDICARE

## 2021-02-07 ENCOUNTER — HOSPITAL ENCOUNTER (INPATIENT)
Age: 86
LOS: 6 days | Discharge: HOME | DRG: 319 | End: 2021-02-13
Attending: EMERGENCY MEDICINE | Admitting: INTERNAL MEDICINE
Payer: MEDICARE

## 2021-02-07 DIAGNOSIS — I50.43 CHF (CONGESTIVE HEART FAILURE), NYHA CLASS III, ACUTE ON CHRONIC, COMBINED (HCC): ICD-10-CM

## 2021-02-07 DIAGNOSIS — I50.9 ACUTE CONGESTIVE HEART FAILURE, UNSPECIFIED HEART FAILURE TYPE (HCC): Primary | ICD-10-CM

## 2021-02-07 DIAGNOSIS — E87.1 HYPONATREMIA: ICD-10-CM

## 2021-02-07 DIAGNOSIS — I35.0 SEVERE AORTIC STENOSIS: ICD-10-CM

## 2021-02-07 DIAGNOSIS — I50.43 ACUTE ON CHRONIC COMBINED SYSTOLIC AND DIASTOLIC HF (HEART FAILURE) (HCC): ICD-10-CM

## 2021-02-07 DIAGNOSIS — I48.19 PERSISTENT ATRIAL FIBRILLATION (HCC): Chronic | ICD-10-CM

## 2021-02-07 PROBLEM — E11.65 UNCONTROLLED TYPE 2 DIABETES MELLITUS WITH HYPERGLYCEMIA (HCC): Status: ACTIVE | Noted: 2021-02-07

## 2021-02-07 LAB
A/G RATIO: 0.9 (ref 1.1–2.2)
ALBUMIN SERPL-MCNC: 3.7 G/DL (ref 3.4–5)
ALP BLD-CCNC: 74 U/L (ref 40–129)
ALT SERPL-CCNC: 13 U/L (ref 10–40)
ANION GAP SERPL CALCULATED.3IONS-SCNC: 12 MMOL/L (ref 3–16)
AST SERPL-CCNC: 23 U/L (ref 15–37)
BACTERIA: ABNORMAL /HPF
BASE EXCESS VENOUS: 3.2 MMOL/L
BASOPHILS ABSOLUTE: 0.1 K/UL (ref 0–0.2)
BASOPHILS RELATIVE PERCENT: 0.6 %
BILIRUB SERPL-MCNC: 0.9 MG/DL (ref 0–1)
BILIRUBIN URINE: NEGATIVE
BLOOD, URINE: NEGATIVE
BUN BLDV-MCNC: 24 MG/DL (ref 7–20)
CALCIUM SERPL-MCNC: 9.1 MG/DL (ref 8.3–10.6)
CARBOXYHEMOGLOBIN: 1.6 %
CHLORIDE BLD-SCNC: 88 MMOL/L (ref 99–110)
CLARITY: ABNORMAL
CO2: 24 MMOL/L (ref 21–32)
COLOR: YELLOW
COMMENT UA: ABNORMAL
CREAT SERPL-MCNC: 1.5 MG/DL (ref 0.6–1.2)
EOSINOPHILS ABSOLUTE: 0.1 K/UL (ref 0–0.6)
EOSINOPHILS RELATIVE PERCENT: 1 %
EPITHELIAL CELLS, UA: 9 /HPF (ref 0–5)
GFR AFRICAN AMERICAN: 40
GFR NON-AFRICAN AMERICAN: 33
GLOBULIN: 4.1 G/DL
GLUCOSE BLD-MCNC: 289 MG/DL (ref 70–99)
GLUCOSE URINE: NEGATIVE MG/DL
HCO3 VENOUS: 29 MMOL/L (ref 23–29)
HCT VFR BLD CALC: 34.7 % (ref 36–48)
HEMOGLOBIN: 11.1 G/DL (ref 12–16)
HYALINE CASTS: ABNORMAL /LPF (ref 0–2)
INR BLD: 2.57 (ref 0.86–1.14)
KETONES, URINE: NEGATIVE MG/DL
LEUKOCYTE ESTERASE, URINE: NEGATIVE
LYMPHOCYTES ABSOLUTE: 1.6 K/UL (ref 1–5.1)
LYMPHOCYTES RELATIVE PERCENT: 16.7 %
MCH RBC QN AUTO: 28.6 PG (ref 26–34)
MCHC RBC AUTO-ENTMCNC: 32.1 G/DL (ref 31–36)
MCV RBC AUTO: 89 FL (ref 80–100)
METHEMOGLOBIN VENOUS: 0.3 %
MICROSCOPIC EXAMINATION: YES
MONOCYTES ABSOLUTE: 0.7 K/UL (ref 0–1.3)
MONOCYTES RELATIVE PERCENT: 7.7 %
MUCUS: ABNORMAL /LPF
NEUTROPHILS ABSOLUTE: 7 K/UL (ref 1.7–7.7)
NEUTROPHILS RELATIVE PERCENT: 74 %
NITRITE, URINE: NEGATIVE
O2 CONTENT, VEN: 9 ML/DL
O2 SAT, VEN: 56 %
O2 THERAPY: ABNORMAL
PCO2, VEN: 50.3 MMHG (ref 40–50)
PDW BLD-RTO: 18.7 % (ref 12.4–15.4)
PH UA: 5.5 (ref 5–8)
PH VENOUS: 7.37 (ref 7.35–7.45)
PLATELET # BLD: 160 K/UL (ref 135–450)
PMV BLD AUTO: 8.4 FL (ref 5–10.5)
PO2, VEN: 33 MMHG
POTASSIUM REFLEX MAGNESIUM: 4.7 MMOL/L (ref 3.5–5.1)
PRO-BNP: ABNORMAL PG/ML (ref 0–449)
PROTEIN UA: 30 MG/DL
PROTHROMBIN TIME: 30.1 SEC (ref 10–13.2)
RBC # BLD: 3.9 M/UL (ref 4–5.2)
RBC UA: 4 /HPF (ref 0–4)
SARS-COV-2, NAAT: NOT DETECTED
SODIUM BLD-SCNC: 124 MMOL/L (ref 136–145)
SPECIFIC GRAVITY UA: 1.01 (ref 1–1.03)
TCO2 CALC VENOUS: 31 MMOL/L
TOTAL PROTEIN: 7.8 G/DL (ref 6.4–8.2)
TROPONIN: <0.01 NG/ML
URINE REFLEX TO CULTURE: ABNORMAL
URINE TYPE: ABNORMAL
UROBILINOGEN, URINE: 1 E.U./DL
WBC # BLD: 9.4 K/UL (ref 4–11)
WBC UA: 1 /HPF (ref 0–5)

## 2021-02-07 PROCEDURE — 96374 THER/PROPH/DIAG INJ IV PUSH: CPT

## 2021-02-07 PROCEDURE — U0002 COVID-19 LAB TEST NON-CDC: HCPCS

## 2021-02-07 PROCEDURE — 83880 ASSAY OF NATRIURETIC PEPTIDE: CPT

## 2021-02-07 PROCEDURE — 81001 URINALYSIS AUTO W/SCOPE: CPT

## 2021-02-07 PROCEDURE — 6360000002 HC RX W HCPCS: Performed by: PHYSICIAN ASSISTANT

## 2021-02-07 PROCEDURE — 84484 ASSAY OF TROPONIN QUANT: CPT

## 2021-02-07 PROCEDURE — P9612 CATHETERIZE FOR URINE SPEC: HCPCS

## 2021-02-07 PROCEDURE — 99285 EMERGENCY DEPT VISIT HI MDM: CPT

## 2021-02-07 PROCEDURE — 85610 PROTHROMBIN TIME: CPT

## 2021-02-07 PROCEDURE — 82803 BLOOD GASES ANY COMBINATION: CPT

## 2021-02-07 PROCEDURE — 93005 ELECTROCARDIOGRAM TRACING: CPT | Performed by: PHYSICIAN ASSISTANT

## 2021-02-07 PROCEDURE — 71045 X-RAY EXAM CHEST 1 VIEW: CPT

## 2021-02-07 PROCEDURE — 36415 COLL VENOUS BLD VENIPUNCTURE: CPT

## 2021-02-07 PROCEDURE — 80053 COMPREHEN METABOLIC PANEL: CPT

## 2021-02-07 PROCEDURE — 85025 COMPLETE CBC W/AUTO DIFF WBC: CPT

## 2021-02-07 PROCEDURE — 2060000000 HC ICU INTERMEDIATE R&B

## 2021-02-07 RX ORDER — ATORVASTATIN CALCIUM 10 MG/1
10 TABLET, FILM COATED ORAL NIGHTLY
Status: DISCONTINUED | OUTPATIENT
Start: 2021-02-08 | End: 2021-02-13 | Stop reason: HOSPADM

## 2021-02-07 RX ORDER — PANTOPRAZOLE SODIUM 40 MG/1
40 TABLET, DELAYED RELEASE ORAL DAILY
Status: DISCONTINUED | OUTPATIENT
Start: 2021-02-08 | End: 2021-02-13 | Stop reason: HOSPADM

## 2021-02-07 RX ORDER — GABAPENTIN 100 MG/1
200 CAPSULE ORAL NIGHTLY
Status: DISCONTINUED | OUTPATIENT
Start: 2021-02-08 | End: 2021-02-13 | Stop reason: HOSPADM

## 2021-02-07 RX ORDER — DILTIAZEM HYDROCHLORIDE 120 MG/1
120 CAPSULE, COATED, EXTENDED RELEASE ORAL 2 TIMES DAILY
Status: DISCONTINUED | OUTPATIENT
Start: 2021-02-08 | End: 2021-02-11

## 2021-02-07 RX ORDER — LATANOPROST 50 UG/ML
1 SOLUTION/ DROPS OPHTHALMIC NIGHTLY
Status: DISCONTINUED | OUTPATIENT
Start: 2021-02-08 | End: 2021-02-13 | Stop reason: HOSPADM

## 2021-02-07 RX ORDER — LOSARTAN POTASSIUM 25 MG/1
25 TABLET ORAL DAILY
Status: DISCONTINUED | OUTPATIENT
Start: 2021-02-08 | End: 2021-02-10

## 2021-02-07 RX ORDER — ACETAMINOPHEN 325 MG/1
650 TABLET ORAL EVERY 6 HOURS PRN
Status: DISCONTINUED | OUTPATIENT
Start: 2021-02-07 | End: 2021-02-10 | Stop reason: SDUPTHER

## 2021-02-07 RX ORDER — DEXTROSE MONOHYDRATE 50 MG/ML
100 INJECTION, SOLUTION INTRAVENOUS PRN
Status: DISCONTINUED | OUTPATIENT
Start: 2021-02-07 | End: 2021-02-13 | Stop reason: HOSPADM

## 2021-02-07 RX ORDER — LANOLIN ALCOHOL/MO/W.PET/CERES
3 CREAM (GRAM) TOPICAL NIGHTLY PRN
Status: DISCONTINUED | OUTPATIENT
Start: 2021-02-08 | End: 2021-02-13 | Stop reason: HOSPADM

## 2021-02-07 RX ORDER — ACETAMINOPHEN 650 MG/1
650 SUPPOSITORY RECTAL EVERY 6 HOURS PRN
Status: DISCONTINUED | OUTPATIENT
Start: 2021-02-07 | End: 2021-02-13 | Stop reason: HOSPADM

## 2021-02-07 RX ORDER — CETIRIZINE HYDROCHLORIDE 10 MG/1
10 TABLET ORAL DAILY
Status: DISCONTINUED | OUTPATIENT
Start: 2021-02-08 | End: 2021-02-13 | Stop reason: HOSPADM

## 2021-02-07 RX ORDER — SODIUM CHLORIDE 0.9 % (FLUSH) 0.9 %
10 SYRINGE (ML) INJECTION PRN
Status: DISCONTINUED | OUTPATIENT
Start: 2021-02-07 | End: 2021-02-10 | Stop reason: SDUPTHER

## 2021-02-07 RX ORDER — ACETAMINOPHEN 500 MG
1000 TABLET ORAL EVERY 6 HOURS PRN
Status: DISCONTINUED | OUTPATIENT
Start: 2021-02-07 | End: 2021-02-10 | Stop reason: SDUPTHER

## 2021-02-07 RX ORDER — PROMETHAZINE HYDROCHLORIDE 25 MG/1
12.5 TABLET ORAL EVERY 6 HOURS PRN
Status: DISCONTINUED | OUTPATIENT
Start: 2021-02-07 | End: 2021-02-13 | Stop reason: HOSPADM

## 2021-02-07 RX ORDER — TRIAMCINOLONE ACETONIDE 1 MG/G
CREAM TOPICAL 2 TIMES DAILY
Status: DISCONTINUED | OUTPATIENT
Start: 2021-02-09 | End: 2021-02-13 | Stop reason: HOSPADM

## 2021-02-07 RX ORDER — PROCHLORPERAZINE MALEATE 5 MG/1
5 TABLET ORAL EVERY 6 HOURS PRN
COMMUNITY
End: 2021-03-26

## 2021-02-07 RX ORDER — PROCHLORPERAZINE MALEATE 5 MG/1
5 TABLET ORAL EVERY 6 HOURS PRN
Status: DISCONTINUED | OUTPATIENT
Start: 2021-02-07 | End: 2021-02-13 | Stop reason: HOSPADM

## 2021-02-07 RX ORDER — GLIPIZIDE 5 MG/1
5 TABLET ORAL
Status: DISCONTINUED | OUTPATIENT
Start: 2021-02-08 | End: 2021-02-10

## 2021-02-07 RX ORDER — POTASSIUM CHLORIDE 20 MEQ/1
20 TABLET, EXTENDED RELEASE ORAL 2 TIMES DAILY WITH MEALS
Status: DISCONTINUED | OUTPATIENT
Start: 2021-02-08 | End: 2021-02-12

## 2021-02-07 RX ORDER — NICOTINE POLACRILEX 4 MG
15 LOZENGE BUCCAL PRN
Status: DISCONTINUED | OUTPATIENT
Start: 2021-02-07 | End: 2021-02-13 | Stop reason: HOSPADM

## 2021-02-07 RX ORDER — FUROSEMIDE 10 MG/ML
80 INJECTION INTRAMUSCULAR; INTRAVENOUS ONCE
Status: COMPLETED | OUTPATIENT
Start: 2021-02-07 | End: 2021-02-07

## 2021-02-07 RX ORDER — CARVEDILOL 12.5 MG/1
12.5 TABLET ORAL 2 TIMES DAILY WITH MEALS
Status: DISCONTINUED | OUTPATIENT
Start: 2021-02-08 | End: 2021-02-09

## 2021-02-07 RX ORDER — ONDANSETRON 2 MG/ML
4 INJECTION INTRAMUSCULAR; INTRAVENOUS EVERY 6 HOURS PRN
Status: DISCONTINUED | OUTPATIENT
Start: 2021-02-07 | End: 2021-02-10 | Stop reason: SDUPTHER

## 2021-02-07 RX ORDER — CIPROFLOXACIN 250 MG/1
250 TABLET, FILM COATED ORAL EVERY 12 HOURS SCHEDULED
Status: DISCONTINUED | OUTPATIENT
Start: 2021-02-08 | End: 2021-02-13 | Stop reason: HOSPADM

## 2021-02-07 RX ORDER — WARFARIN SODIUM 5 MG/1
5 TABLET ORAL EVERY EVENING
Status: DISCONTINUED | OUTPATIENT
Start: 2021-02-08 | End: 2021-02-08

## 2021-02-07 RX ORDER — POLYETHYLENE GLYCOL 3350 17 G/17G
17 POWDER, FOR SOLUTION ORAL DAILY PRN
Status: DISCONTINUED | OUTPATIENT
Start: 2021-02-07 | End: 2021-02-13 | Stop reason: HOSPADM

## 2021-02-07 RX ORDER — SODIUM CHLORIDE 0.9 % (FLUSH) 0.9 %
10 SYRINGE (ML) INJECTION EVERY 12 HOURS SCHEDULED
Status: DISCONTINUED | OUTPATIENT
Start: 2021-02-08 | End: 2021-02-10 | Stop reason: SDUPTHER

## 2021-02-07 RX ORDER — FUROSEMIDE 10 MG/ML
80 INJECTION INTRAMUSCULAR; INTRAVENOUS 3 TIMES DAILY
Status: DISCONTINUED | OUTPATIENT
Start: 2021-02-08 | End: 2021-02-09

## 2021-02-07 RX ORDER — DEXTROSE MONOHYDRATE 25 G/50ML
12.5 INJECTION, SOLUTION INTRAVENOUS PRN
Status: DISCONTINUED | OUTPATIENT
Start: 2021-02-07 | End: 2021-02-13 | Stop reason: HOSPADM

## 2021-02-07 RX ORDER — INSULIN GLARGINE 100 [IU]/ML
0.15 INJECTION, SOLUTION SUBCUTANEOUS NIGHTLY
Status: DISCONTINUED | OUTPATIENT
Start: 2021-02-08 | End: 2021-02-09

## 2021-02-07 RX ADMIN — FUROSEMIDE 80 MG: 10 INJECTION, SOLUTION INTRAMUSCULAR; INTRAVENOUS at 21:05

## 2021-02-07 ASSESSMENT — PAIN SCALES - GENERAL: PAINLEVEL_OUTOF10: 0

## 2021-02-07 NOTE — ED TRIAGE NOTES
Pt arrives to the ER via EMS with complaints of SOB. Pt states that she started to feel short of breath about 2-3 days ago. Pt states it has not worsened but it has not got better. Pt states bilateral leg swelling as well. NAD noted, respirations even and easy, skin warm and dry.

## 2021-02-07 NOTE — ED PROVIDER NOTES
Attending Supervisory Note/Shared Visit   I have personally performed a face to face diagnostic evaluation on this patient. I have reviewed the mid-levels findings and agree. History and Exam by me shows an alert black female appears mildly dyspneic. She presents with a several day history of increased difficulty breathing, both with exertion and at rest.  She states it \"comes and goes\". She denies chest pain. She admits to some increasing leg edema over the last several days. He does have a history of congestive heart failure. Heart: Bradycardic, irregularly irregular, no murmurs or gallops noted. Lungs: Breath sounds significantly decreased in the bases bilaterally, some rales in the mid lung field. No wheezes. Tachypneic. No retractions. Abdomen: Obese, soft, nontender. No masses organomegaly. Musculoskeletal: 3+ pitting bilateral lower extremity edema below the knees including the ankles and feet. Skin: Warm and dry, good turgor. No cyanosis or diaphoresis. EKG: Atrial fibrillation with a slow ventricular response, rate of 47, left ventricular hypertrophy, nonspecific ST-T wave changes. Rhythm strip shows atrial fibrillation with a rate of 47, QRS of 118 ms with no other ectopy as interpreted by me. This compared to a EKG dated 10/30/2020, the atrial fibrillation is not new, the slow rate is new. Lab reviewed. Urinalysis shows 4+ bacteria, 4 red cells, 1 white cell. COVID-19 negative. Venous blood gas shows a pH 7.37 with a PCO2 of 50. H&H of 11.1 and 34.7. White blood cell count 9400 with 74 neutrophils and 17 lymphs. Sodium 124 with a potassium of 4.7. BUN of 24 with a creatinine of 1.5. Glucose of 289. Liver enzymes are normal.  Troponin less than 0.01. BNP of 17,075. PT of 30.1 with an INR of 2.57. Chest x-ray: Stable cardiomegaly with mild central pulmonary congestion which is more prominent.   Hazy right basilar atelectasis or infiltrate and small right pleural effusion which is more apparent. Patient's clinical exam is consistent with congestive heart failure. Her chest x-ray findings with her elevated BNP of 17,075 are consistent with congestive heart failure. Her Covid test is negative. Clinically I do not think she has pneumonia. She is on 40 of Lasix 2 times daily. She was given 80 mg of IV Lasix in the emergency department. Her primary care physician was consulted for admission.     Diagnosis: Acute congestive heart failure  Hyponatremia    Disposition: Admit    (Please note that portions of this note were completed with a voice recognition program.  Efforts were made to edit the dictations but occasionally words are mis-transcribed.)    Roxanne Adler MD  Attending Emergency Physician        Em Armando MD  02/07/21 2116       Em Armando MD  02/08/21 0004

## 2021-02-07 NOTE — ED PROVIDER NOTES
[address]       Pt Name: Josue Dumont  MRN: [id number]  [place] 1934  Date of evaluation: 2/7/2021  Provider: LUCINA Coyle    Shared Visit or Autonomous Visit:  I have seen and evaluated this patient with my supervising physician Rina Cruz MD.      [address] [place] [address]       Chief Complaint   Patient presents with    Shortness of Breath     For a few days, pt states it has not got any better       HISTORY OF PRESENT ILLNESS  (Location/Symptom, Timing/Onset, Context/Setting, Quality, Duration, Modifying Factors, Severity.)   Josue Dumont is a 80 y.o. female who presents to the emergency department with a complaint of shortness of breath. She says for 2 or 3 days she has had shortness of breath with physical activity or ambulation, not so much at rest, not particularly worse lying down. She says she has a mild nonproductive cough, but no cold symptoms or fever. She denies chest pain or abdominal pain. She says her appetite has been a little bit reduced over the past few days, but no vomiting or diarrhea. Denies any known exposure to COVID-19. Says she has been having some dysuria over the past few days, called her primary care doctor yesterday and was prescribed an antibiotic. She also reports that she has had some increasing swelling in the legs bilaterally, and she does have some chronic edema there, with a history of CHF, but the swelling has been worsening. Denies any numbness. No other complaints. Nursing Notes were reviewed and I agree. REVIEW OF SYSTEMS    (2-9 systems for level 4, 10 or more for level 5)     Constitutional:  Negative for fever, chills, fatigue and unexpected weight change. HENT:  Negative for congestion, ear pain, facial swelling, rhinorrhea, sneezing, sore throat and trouble swallowing. Respiratory: Positive for shortness of breath with exertion.   Negative for cough, wheezing and stridor. Cardiovascular: Positive for bilateral leg edema. Negative for chest pain, palpitations. Gastrointestinal:  Negative for nausea, vomiting, abdominal pain, diarrhea, constipation and blood in stool. Genitourinary:  Negative for dysuria, urgency, hematuria, flank pain, vaginal bleeding, vaginal discharge and pelvic pain. Musculoskeletal:  Negative for myalgias, arthralgias, neck pain and neck stiffness. Neurological:  Negative for dizziness, seizures, syncope, speech difficulty, focal weakness, numbness and headache. Except as noted above the remainder of the review of systems was reviewed and negative.        PAST MEDICAL HISTORY         Diagnosis Date    Anemia     Chronic kidney disease     Combined systolic and diastolic congestive heart failure (HCC)     Diabetes mellitus [facility]     D[facility]deep venous thrombosis) ([facility])     Hyperlipidemia     Hypertension     Osteoarthritis     PAF ([facility]oxysmal atrial fibrillation) ([facility]     Pulmonary embolism ([facility])     Sarcoidosis     in remission     Thyroid disease     Hypothyroidism    Type II or unspecified type diabetes mellitus without mention of complication, not stated as uncontrolled        SURGICAL HISTORY           Procedure Laterality Date    HYSTERECTOMY      INTRACAPSULAR CATARACT EXTRACTION Left 10/16/2020    PHACOEMULSIFICATION WITH INTRAOCULAR LENS IMPLANT - LEFT EYE performed by Javed Kern MD at [address] [place] [address] Bilateral     TKT    JOINT REPLACEMENT Bilateral     THR    KNEE SURGERY      bilateral total knees       CURRENT MEDICATIONS       Previous Medications    ACETAMINOPHEN (TYLENOL) 500 MG TABLET    Take 1,000 mg by mouth every 6 hours as needed for Pain    CALCIUM CARB-CHOLECALCIFEROL (CALTRATE 600+D) 600-800 MG-UNIT TABS    Take 1 tablet by mouth daily     CARVEDILOL (COREG) 12.5 MG TABLET    Take 1 tablet by mouth 2 times daily (with meals)    CETIRIZINE (ZYRTEC) 10 MG TABLET    Take 10 mg by mouth daily    CIPROFLOXACIN (CIPRO) 500 MG TABLET    Take 1 tablet by mouth 2 times daily     DILTIAZEM (CARDIZEM CD) 120 MG EXTENDED RELEASE CAPSULE    Take 1 capsule by mouth every 12 hours    FUROSEMIDE (LASIX) 20 MG TABLET    Take 1 tablet by mouth 2 times daily    GABAPENTIN (NEURONTIN) 100 MG CAPSULE    Take 200 mg by mouth nightly. GLIPIZIDE (GLUCOTROL) 5 MG TABLET    Take 5 mg by mouth 2 times daily (before meals). KLOR-CON M20 20 MEQ EXTENDED RELEASE TABLET    Take 20 mEq by mouth daily    LATANOPROST (XALATAN) 0.005 % OPHTHALMIC SOLUTION    Place 1 drop into the right eye nightly    LOVASTATIN (MEVACOR) 10 MG TABLET    Take 10 mg by mouth nightly. PANTOPRAZOLE (PROTONIX) 40 MG TABLET    Take 40 mg by mouth daily    PROCHLORPERAZINE (COMPAZINE) 5 MG TABLET    Take 5 mg by mouth every 6 hours as needed for Nausea    TRIAMCINOLONE (KENALOG) 0.1 % CREAM    Apply topically 2 times daily. VITAMIN C (ASCORBIC ACID) 500 MG TABLET    Take 500 mg by mouth daily    WARFARIN (COUMADIN) 5 MG TABLET    Take 5 mg by mouth every evening Except 2.5mg every Thursday or as directed by [facility]mad[facility] [phone]       ALLERGIES     Naproxen, Bactrim [sulfamethoxazole-trimethoprim], Levofloxacin, Pcn [penicillins], and Sulfa antibiotics    FAMILY HISTORY           Problem Relation Age of Onset    Heart Failure Mother     Cancer Brother     Asthma Neg Hx     Diabetes Neg Hx     Emphysema Neg Hx     Hypertension Neg Hx      Family Status   Relation Name Status    Mother      Brother  (Not Specified)    Father      Neg Hx  (Not Specified)        SOCIAL HISTORY      reports that she has never smoked. She has never used smokeless tobacco. She reports that she does not drink alcohol or use drugs.     PHYSICAL EXAM    (up to 7 for level 4, 8 or more for level 5)     ED Triage Vitals [21 1731]   BP Temp Temp Source Pulse Resp SpO2 Height Weight   -- 98.7 °F (37.1 °C) Oral 57 18 94 % 5' 6\" (1.676 m) 274 lb 1.6 oz (124.3 kg)       Constitutional:  Appearing well-developed and well-nourished. No distress. HENT:  Normocephalic and atraumatic. Conjunctivae and EOM are normal. Pupils are equal, round, and reactive to light. Cardiovascular: Bradycardic. Rhythm irregularly irregular. Normal heart sounds and intact distal pulses. Pulmonary/Chest: Tachypneic, with shallow breaths. Effort normal. No respiratory distress. No wheezes or rales. Abdominal:  Soft. Bowel sounds are normal. No distension, mass, tenderness, rebound or guarding. Musculoskeletal: Plus pitting edema noted to the lower legs, ankles and feet bilaterally. Normal range of motion. Sensation to light touch grossly intact and capillary refill <3 seconds in the lower extremities bilaterally. Neurological:  Alert and oriented to person, place, and time. No cranial nerve deficit. Skin:  Skin is warm and dry. Not diaphoretic. Psychiatric:  Normal mood, affect, behavior, judgment and thought content. DIAGNOSTIC RESULTS     RADIOLOGY:     Interpretation per the Radiologist below, if available at the time of this note:    XR CHEST PORTABLE   Final Result   Stable cardiomegaly with mild central pulmonary congestion which is more   prominent      Hazy right basilar atelectasis or infiltrate and a small right pleural   effusion which is more apparent.              LABS:  Labs Reviewed   CBC WITH AUTO DIFFERENTIAL - Abnormal; Notable for the following components:       Result Value    RBC 3.90 (*)     Hemoglobin 11.1 (*)     Hematocrit 34.7 (*)     RDW 18.7 (*)     All other components within normal limits    Narrative:     Performed at:  [address] [address]   Phone [phone]   COMPREHENSIVE METABOLIC PANEL W/ REFLEX TO MG FOR LOW K - Abnormal; Notable for the following components:    Sodium 124 (*)     Chloride 88 (*)     Glucose 289 (*)     BUN 24 (*)     CREATININE 1.5 (*)     GFR Non- 33 (*)     GFR African American 40 (*)     Albumin/Globulin Ratio 0.9 (*)     All other components within normal limits    Narrative:     Performed at:  [address] [facility] [employer] [place]   Phone [phone]   BRAIN NATRIURETIC PEPTIDE - Abnormal; Notable for the following components:    Pro-BNP 17,075 (*)     All other components within normal limits    Narrative:     Performed at:  [address] [facility] [employer]   Phone [phone]   URINE RT REFLEX TO CULTURE - Abnormal; Notable for the following components:    Clarity, UA CLOUDY (*)     Protein, UA 30 (*)     All other components within normal limits    Narrative:     Performed at:  [address] [facility] [employer]   Phone [phone]   PROTIME-INR - Abnormal; Notable for the following components:    Protime 30.1 (*)     INR 2.57 (*)     All other components within normal limits    Narrative:     Performed at:  [address] [facility] [employer]   Phone [phone]   BLOOD GAS, VENOUS - Abnormal; Notable for the following components:    pCO2, Eliel 50.3 (*)     All other components within normal limits    Narrative:     Performed at:  [address] [employer]   Phone [phone]   MICROSCOPIC URINALYSIS - Abnormal; Notable for the following components:    Hyaline Casts, UA 11-20 (*)     Mucus, UA 2+ (*)     Bacteria, UA 4+ (*)     Epithelial Cells, UA 9 (*)     All other components within normal limits    Narrative:     Performed at:  [address] [employer]   Phone [phone]   TROPONIN    Narrative:     Performed at:  [facility]  [address] [place] [address]   Phone [phone]    Narrative:     ORDER WAS CANCELLED 02/07/2021 18:06, Cancelled: Sent to Reference Laboratory. Performed at:  [facility]  [address] [place], [place] [address]   Phone [phone]       All other labs were within normal range or not returned as of this dictation. EMERGENCY DEPARTMENT COURSE and DIFFERENTIAL DIAGNOSIS/MDM:   Vitals:    Vitals:    02/07/21 1840 02/07/21 1854 02/07/21 1909 02/07/21 2009   BP: (!) 149/74 (!) 149/73 (!) 143/97 (!) 154/99   Pulse: (!) 49 56 62 62   Resp: (!) 32 30 (!) 32 30   Temp:       TempSrc:       SpO2: 95% 93% 90% 93%   Weight:       Height:           The patient's condition in the ED was stable, the patient was afebrile and nontoxic in appearance, and the patient's physical exam was unremarkable. She was tachypneic but oxygenating adequately on room air. Chest x-ray showed no acute findings, and EKG was nonconcerning. Lab work-up was notable for a highly elevated BNP at greater than 17,000, however, and low serum sodium at 124. Patient is chronic kidney disease, but creatinine, GFR, BUN were all slightly better than baseline. Patient is having exacerbation of CHF, and I consulted her primary care physician, who agreed to admit the patient. PROCEDURES:  None    FINAL IMPRESSION      1. Acute congestive heart failure, unspecified heart failure type [facility])          DISPOSITION/PLAN   DISPOSITION Admitted 02/07/2021 09:35:50 PM      PATIENT REFERRED TO:  No follow-up provider specified.     DISCHARGE MEDICATIONS:  New Prescriptions    No medications on file       (Please note that portions of this note were completed with a voice recognition program.  Efforts were made to edit the dictations but occasionally words are mis-transcribed.)    Mikki Ribera, [address] [place] [address]  02/07/21 7055

## 2021-02-08 LAB
ANION GAP SERPL CALCULATED.3IONS-SCNC: 10 MMOL/L (ref 3–16)
BUN BLDV-MCNC: 25 MG/DL (ref 7–20)
CALCIUM SERPL-MCNC: 9.2 MG/DL (ref 8.3–10.6)
CHLORIDE BLD-SCNC: 91 MMOL/L (ref 99–110)
CHOLESTEROL, TOTAL: 139 MG/DL (ref 0–199)
CO2: 31 MMOL/L (ref 21–32)
CREAT SERPL-MCNC: 1.5 MG/DL (ref 0.6–1.2)
EKG ATRIAL RATE: 20 BPM
EKG DIAGNOSIS: NORMAL
EKG Q-T INTERVAL: 442 MS
EKG QRS DURATION: 118 MS
EKG QTC CALCULATION (BAZETT): 391 MS
EKG R AXIS: -42 DEGREES
EKG T AXIS: 73 DEGREES
EKG VENTRICULAR RATE: 47 BPM
ESTIMATED AVERAGE GLUCOSE: 168.6 MG/DL
GFR AFRICAN AMERICAN: 40
GFR NON-AFRICAN AMERICAN: 33
GLUCOSE BLD-MCNC: 126 MG/DL (ref 70–99)
GLUCOSE BLD-MCNC: 126 MG/DL (ref 70–99)
GLUCOSE BLD-MCNC: 129 MG/DL (ref 70–99)
GLUCOSE BLD-MCNC: 147 MG/DL (ref 70–99)
GLUCOSE BLD-MCNC: 150 MG/DL (ref 70–99)
GLUCOSE BLD-MCNC: 187 MG/DL (ref 70–99)
HBA1C MFR BLD: 7.5 %
HDLC SERPL-MCNC: 61 MG/DL (ref 40–60)
INR BLD: 2.61 (ref 0.86–1.14)
LDL CHOLESTEROL CALCULATED: 66 MG/DL
LV EF: 43 %
LVEF MODALITY: NORMAL
MAGNESIUM: 1.7 MG/DL (ref 1.8–2.4)
PERFORMED ON: ABNORMAL
POTASSIUM SERPL-SCNC: 4.2 MMOL/L (ref 3.5–5.1)
PROTHROMBIN TIME: 30.6 SEC (ref 10–13.2)
SODIUM BLD-SCNC: 132 MMOL/L (ref 136–145)
T4 FREE: 2 NG/DL (ref 0.9–1.8)
TRIGL SERPL-MCNC: 62 MG/DL (ref 0–150)
TSH SERPL DL<=0.05 MIU/L-ACNC: 0.91 UIU/ML (ref 0.27–4.2)
VLDLC SERPL CALC-MCNC: 12 MG/DL

## 2021-02-08 PROCEDURE — 97162 PT EVAL MOD COMPLEX 30 MIN: CPT | Performed by: PHYSICAL THERAPIST

## 2021-02-08 PROCEDURE — 6360000002 HC RX W HCPCS: Performed by: INTERNAL MEDICINE

## 2021-02-08 PROCEDURE — 99223 1ST HOSP IP/OBS HIGH 75: CPT | Performed by: INTERNAL MEDICINE

## 2021-02-08 PROCEDURE — 36415 COLL VENOUS BLD VENIPUNCTURE: CPT

## 2021-02-08 PROCEDURE — 97166 OT EVAL MOD COMPLEX 45 MIN: CPT

## 2021-02-08 PROCEDURE — 83036 HEMOGLOBIN GLYCOSYLATED A1C: CPT

## 2021-02-08 PROCEDURE — 80048 BASIC METABOLIC PNL TOTAL CA: CPT

## 2021-02-08 PROCEDURE — 6370000000 HC RX 637 (ALT 250 FOR IP): Performed by: INTERNAL MEDICINE

## 2021-02-08 PROCEDURE — 93306 TTE W/DOPPLER COMPLETE: CPT

## 2021-02-08 PROCEDURE — 97530 THERAPEUTIC ACTIVITIES: CPT | Performed by: PHYSICAL THERAPIST

## 2021-02-08 PROCEDURE — 84439 ASSAY OF FREE THYROXINE: CPT

## 2021-02-08 PROCEDURE — 93010 ELECTROCARDIOGRAM REPORT: CPT | Performed by: INTERNAL MEDICINE

## 2021-02-08 PROCEDURE — 83735 ASSAY OF MAGNESIUM: CPT

## 2021-02-08 PROCEDURE — 97535 SELF CARE MNGMENT TRAINING: CPT

## 2021-02-08 PROCEDURE — 80061 LIPID PANEL: CPT

## 2021-02-08 PROCEDURE — 84443 ASSAY THYROID STIM HORMONE: CPT

## 2021-02-08 PROCEDURE — 85610 PROTHROMBIN TIME: CPT

## 2021-02-08 PROCEDURE — 2060000000 HC ICU INTERMEDIATE R&B

## 2021-02-08 PROCEDURE — 2580000003 HC RX 258: Performed by: INTERNAL MEDICINE

## 2021-02-08 PROCEDURE — 97530 THERAPEUTIC ACTIVITIES: CPT

## 2021-02-08 PROCEDURE — 97116 GAIT TRAINING THERAPY: CPT | Performed by: PHYSICAL THERAPIST

## 2021-02-08 RX ORDER — WARFARIN SODIUM 5 MG/1
5 TABLET ORAL ONCE
Status: COMPLETED | OUTPATIENT
Start: 2021-02-08 | End: 2021-02-08

## 2021-02-08 RX ORDER — WARFARIN SODIUM 2.5 MG/1
2.5 TABLET ORAL
Status: DISCONTINUED | OUTPATIENT
Start: 2021-02-08 | End: 2021-02-08

## 2021-02-08 RX ORDER — MAGNESIUM SULFATE IN WATER 40 MG/ML
2000 INJECTION, SOLUTION INTRAVENOUS ONCE
Status: COMPLETED | OUTPATIENT
Start: 2021-02-08 | End: 2021-02-08

## 2021-02-08 RX ADMIN — POTASSIUM CHLORIDE 20 MEQ: 1500 TABLET, EXTENDED RELEASE ORAL at 17:10

## 2021-02-08 RX ADMIN — INSULIN LISPRO 1 UNITS: 100 INJECTION, SOLUTION INTRAVENOUS; SUBCUTANEOUS at 01:03

## 2021-02-08 RX ADMIN — CARVEDILOL 12.5 MG: 12.5 TABLET, FILM COATED ORAL at 08:54

## 2021-02-08 RX ADMIN — INSULIN GLARGINE 19 UNITS: 100 INJECTION, SOLUTION SUBCUTANEOUS at 20:33

## 2021-02-08 RX ADMIN — LOSARTAN POTASSIUM 25 MG: 25 TABLET, FILM COATED ORAL at 08:54

## 2021-02-08 RX ADMIN — FUROSEMIDE 80 MG: 10 INJECTION, SOLUTION INTRAMUSCULAR; INTRAVENOUS at 20:28

## 2021-02-08 RX ADMIN — CIPROFLOXACIN HYDROCHLORIDE 250 MG: 250 TABLET, FILM COATED ORAL at 08:54

## 2021-02-08 RX ADMIN — FUROSEMIDE 80 MG: 10 INJECTION, SOLUTION INTRAMUSCULAR; INTRAVENOUS at 08:54

## 2021-02-08 RX ADMIN — LATANOPROST 1 DROP: 50 SOLUTION OPHTHALMIC at 20:28

## 2021-02-08 RX ADMIN — GABAPENTIN 200 MG: 100 CAPSULE ORAL at 20:29

## 2021-02-08 RX ADMIN — PANTOPRAZOLE SODIUM 40 MG: 40 TABLET, DELAYED RELEASE ORAL at 08:54

## 2021-02-08 RX ADMIN — Medication 10 ML: at 20:29

## 2021-02-08 RX ADMIN — GLIPIZIDE 5 MG: 5 TABLET ORAL at 06:33

## 2021-02-08 RX ADMIN — ATORVASTATIN CALCIUM 10 MG: 10 TABLET, FILM COATED ORAL at 20:29

## 2021-02-08 RX ADMIN — GABAPENTIN 200 MG: 100 CAPSULE ORAL at 01:00

## 2021-02-08 RX ADMIN — DILTIAZEM HYDROCHLORIDE 120 MG: 120 CAPSULE, COATED, EXTENDED RELEASE ORAL at 20:29

## 2021-02-08 RX ADMIN — GLIPIZIDE 5 MG: 5 TABLET ORAL at 17:10

## 2021-02-08 RX ADMIN — WARFARIN SODIUM 5 MG: 5 TABLET ORAL at 01:00

## 2021-02-08 RX ADMIN — POTASSIUM CHLORIDE 20 MEQ: 1500 TABLET, EXTENDED RELEASE ORAL at 08:54

## 2021-02-08 RX ADMIN — LATANOPROST 1 DROP: 50 SOLUTION OPHTHALMIC at 01:33

## 2021-02-08 RX ADMIN — CARVEDILOL 12.5 MG: 12.5 TABLET, FILM COATED ORAL at 17:10

## 2021-02-08 RX ADMIN — Medication 10 ML: at 08:54

## 2021-02-08 RX ADMIN — ATORVASTATIN CALCIUM 10 MG: 10 TABLET, FILM COATED ORAL at 01:00

## 2021-02-08 RX ADMIN — INSULIN GLARGINE 19 UNITS: 100 INJECTION, SOLUTION SUBCUTANEOUS at 01:03

## 2021-02-08 RX ADMIN — CIPROFLOXACIN HYDROCHLORIDE 250 MG: 250 TABLET, FILM COATED ORAL at 20:29

## 2021-02-08 RX ADMIN — FUROSEMIDE 80 MG: 10 INJECTION, SOLUTION INTRAMUSCULAR; INTRAVENOUS at 13:02

## 2021-02-08 RX ADMIN — MAGNESIUM SULFATE HEPTAHYDRATE 2000 MG: 40 INJECTION, SOLUTION INTRAVENOUS at 13:59

## 2021-02-08 RX ADMIN — DILTIAZEM HYDROCHLORIDE 120 MG: 120 CAPSULE, COATED, EXTENDED RELEASE ORAL at 01:00

## 2021-02-08 RX ADMIN — DILTIAZEM HYDROCHLORIDE 120 MG: 120 CAPSULE, COATED, EXTENDED RELEASE ORAL at 08:54

## 2021-02-08 RX ADMIN — CETIRIZINE HYDROCHLORIDE 10 MG: 10 TABLET, FILM COATED ORAL at 08:54

## 2021-02-08 RX ADMIN — INSULIN LISPRO 1 UNITS: 100 INJECTION, SOLUTION INTRAVENOUS; SUBCUTANEOUS at 17:10

## 2021-02-08 ASSESSMENT — PAIN SCALES - GENERAL
PAINLEVEL_OUTOF10: 0
PAINLEVEL_OUTOF10: 0

## 2021-02-08 NOTE — ACP (ADVANCE CARE PLANNING)
wishes  [] New Advance Directive completed  [] Portable Do Not Rescitate prepared for Provider review and signature  [] POLST/POST/MOLST/MOST prepared for Provider review and signature      Follow-up plan:    [] Schedule follow-up conversation to continue planning  [] Referred individual to Provider for additional questions/concerns   [] Advised patient/agent/surrogate to review completed ACP document and update if needed with changes in condition, patient preferences or care setting    [x] This note routed to one or more involved healthcare providers  Deepak Farr [facility]  [phone]  Electronically signed by Priscila Abraham on 2/8/2021 at 4:29 PM

## 2021-02-08 NOTE — PROGRESS NOTES
Clinical Pharmacy Note  Warfarin Consult    Laura Pendleton is a 80 y.o. female receiving warfarin managed by pharmacy. Warfarin Indication: Afib  Target INR range: 2-3   Dose prior to admission: 5mg daily except 2.5 mg Thurs    Current warfarin drug-drug interactions: Cipro    Recent Labs     02/07/21  1729 02/08/21  0549   HGB 11.1*  --    HCT 34.7*  --    INR 2.57* 2.61*       Assessment/Plan:    Warfarin 2.5 mg tonight. Daily PT/INR until stable within therapeutic range. Thank you for the consult. Will continue to follow.     Maria E Villanueva, PharmD  2/8/2021 8:29 AM

## 2021-02-08 NOTE — PROGRESS NOTES
Pt arrived to room 5130 via stretcher from ED and transfered to bed. Telemetry activated and confirmed with [facility]. Telemetry box number 7 assigned. VSS. Patient oriented to room and use of call light. Call light and personal items within reach. Admission and assessment initiated. Fall assessment completed and fall contract signed. POC and education initiated and reviewed with patient. Denied further needs or questions at this time. Will continue to monitor.

## 2021-02-08 NOTE — PROGRESS NOTES
4 Eyes Skin Assessment     NAME:  Kee Lot OF BIRTH:  1934  MEDICAL RECORD NUMBER:  [id number]    The patient is being assess for  Admission    I agree that 2 RN's have performed a thorough Head to Toe Skin Assessment on the patient. ALL assessment sites listed below have been assessed. Areas assessed by both nurses:    Head, Face, Ears, Shoulders, Back, Chest, Arms, Elbows, Hands, Sacrum. Buttock, Coccyx, Ischium and Legs. Feet and Heels        Does the Patient have a Wound?  No noted wound(s)       J Carlos Prevention initiated:  Yes   Wound Care Orders initiated:  No    Pressure Injury (Stage 3,4, Unstageable, DTI, NWPT, and Complex wounds) if present place consult order under [de-identified] No    New and Established Ostomies if present place consult order under : No      Nurse 1 eSignature: Electronically signed by Ovidio Arellano RN on 2/8/21 at 2:00 AM EST    **SHARE this note so that the co-signing nurse is able to place an eSignature**    Nurse 2 eSignature: Electronically signed by Travis Miranda RN on 2/8/21 at 6:49 AM EST

## 2021-02-08 NOTE — CONSULTS
[facility]  Cardiology Consult    Terrell Gonzalez  1934 February 8, 2021        CC: Shortness of breath      Subjective:     History of Present Illness:    Terrell Gonzalez is a 80 y.o. patient with a PMH significant for anemia chronic kidney disease heart failure diabetes mellitus presented with complaints of shortness of breath. History available from the chart patient is very somnolent unable to give much history. Complains of shortness of breath 2 to 3 days in onset no Covid exposure. No cough with sputum production noted dry cough has been present. No fever chills or rigors       Past Medical History:   has a past medical history of Anemia, Chronic kidney disease, Combined systolic and diastolic congestive heart failure ([address] Diabetes mellitus ([address] DVT (deep venous thrombosis) ([address] Hyperlipidemia, Hypertension, Osteoarthritis, PAF (paroxysmal atrial fibrillation) ([address] Pulmonary embolism ([address] Sarcoidosis, Thyroid disease, and Type II or unspecified type diabetes mellitus without mention of complication, not stated as uncontrolled. Surgical History:   has a past surgical history that includes Hysterectomy; knee surgery; joint replacement (Bilateral); joint replacement (Bilateral); and Intracapsular cataract extraction (Left, 10/16/2020). Social History:   reports that she has never smoked. She has never used smokeless tobacco. She reports that she does not drink alcohol or use drugs. Family History:  family history includes Cancer in her brother; Heart Failure in her mother. Home Medications:  Were reviewed and are listed in nursing record and/or below  Prior to Admission medications    Medication Sig Start Date End Date Taking?  Authorizing Provider   prochlorperazine (COMPAZINE) 5 MG tablet Take 5 mg by mouth every 6 hours as needed for Nausea   Yes Historical Provider, MD   ciprofloxacin (CIPRO) 500 MG tablet Take 1 tablet by mouth 2 times daily  9/14/20  Yes Historical Provider, MD   dilTIAZem (CARDIZEM CD) 120 MG extended release capsule Take 1 capsule by mouth every 12 hours 9/11/20  Yes Olman Washburn MD   furosemide (LASIX) 20 MG tablet Take 1 tablet by mouth 2 times daily  Patient taking differently: Take 40 mg by mouth 2 times daily  9/11/20  Yes Olman Washburn MD   carvedilol (COREG) 12.5 MG tablet Take 1 tablet by mouth 2 times daily (with meals) 9/9/20  Yes Olman Washburn MD   latanoprost (XALATAN) 0.005 % ophthalmic solution Place 1 drop into the right eye nightly 7/7/20  Yes Historical Provider, MD   KLOR-CON M20 20 MEQ extended release tablet Take 20 mEq by mouth daily 6/28/20  Yes Historical Provider, MD   pantoprazole (PROTONIX) 40 MG tablet Take 40 mg by mouth daily 8/23/20  Yes Historical Provider, MD   warfarin (COUMADIN) 5 MG tablet Take 5 mg by mouth every evening Except 2.5mg every Thursday or as directed by [facility] Coumadin [facility] [phone]   Yes Historical Provider, MD   cetirizine (ZYRTEC) 10 MG tablet Take 10 mg by mouth daily   Yes Historical Provider, MD   vitamin C (ASCORBIC ACID) 500 MG tablet Take 500 mg by mouth daily   Yes Historical Provider, MD   acetaminophen (TYLENOL) 500 MG tablet Take 1,000 mg by mouth every 6 hours as needed for Pain   Yes Historical Provider, MD   Calcium Carb-Cholecalciferol (CALTRATE 600+D) 600-800 MG-UNIT TABS Take 1 tablet by mouth daily    Yes Historical Provider, MD   gabapentin (NEURONTIN) 100 MG capsule Take 200 mg by mouth nightly. Yes Historical Provider, MD   glipiZIDE (GLUCOTROL) 5 MG tablet Take 5 mg by mouth 2 times daily (before meals). Yes Historical Provider, MD   lovastatin (MEVACOR) 10 MG tablet Take 10 mg by mouth nightly. Yes Historical Provider, MD   triamcinolone (KENALOG) 0.1 % cream Apply topically 2 times daily.  9/9/20   Olman Washburn MD        CURRENT Medications:      warfarin (COUMADIN) daily dosing (placeholder), RX Placeholder      warfarin (COUMADIN) tablet 2.5 mg, Once      acetaminophen (TYLENOL) tablet 1,000 mg, Q6H PRN      carvedilol (COREG) tablet 12.5 mg, BID WC      cetirizine (ZYRTEC) tablet 10 mg, Daily      dilTIAZem (CARDIZEM CD) extended release capsule 120 mg, BID      ciprofloxacin (CIPRO) tablet 250 mg, 2 times per day      gabapentin (NEURONTIN) capsule 200 mg, Nightly      glipiZIDE (GLUCOTROL) tablet 5 mg, BID AC      potassium chloride (KLOR-CON M) extended release tablet 20 mEq, BID WC      latanoprost (XALATAN) 0.005 % ophthalmic solution 1 drop, Nightly      atorvastatin (LIPITOR) tablet 10 mg, Nightly      pantoprazole (PROTONIX) tablet 40 mg, Daily      prochlorperazine (COMPAZINE) tablet 5 mg, Q6H PRN      [START ON 2/9/2021] triamcinolone (KENALOG) 0.1 % cream, BID      sodium chloride flush 0.9 % injection 10 mL, 2 times per day      sodium chloride flush 0.9 % injection 10 mL, PRN      promethazine (PHENERGAN) tablet 12.5 mg, Q6H PRN    Or      ondansetron (ZOFRAN) injection 4 mg, Q6H PRN      polyethylene glycol (GLYCOLAX) packet 17 g, Daily PRN      acetaminophen (TYLENOL) tablet 650 mg, Q6H PRN    Or      acetaminophen (TYLENOL) suppository 650 mg, Q6H PRN      furosemide (LASIX) injection 80 mg, TID      perflutren lipid microspheres (DEFINITY) injection 1.65 mg, ONCE PRN      losartan (COZAAR) tablet 25 mg, Daily      melatonin tablet 3 mg, Nightly PRN      glucose (GLUTOSE) 40 % oral gel 15 g, PRN      dextrose 50 % IV solution, PRN      glucagon (rDNA) injection 1 mg, PRN      dextrose 5 % solution, PRN      insulin glargine (LANTUS) injection vial 19 Units, Nightly      insulin lispro (HUMALOG) injection vial 0-6 Units, TID WC      insulin lispro (HUMALOG) injection vial 0-3 Units, Nightly        Allergies:  Naproxen, Bactrim [sulfamethoxazole-trimethoprim], Levofloxacin, Pcn [penicillins], and Sulfa antibiotics           Review of Systems: SEE HPI   · Not available      Objective:     PHYSICAL EXAM:      Vitals:    02/08/21 1025   BP:    Pulse:    Resp:    Temp:    SpO2: 91%    Weight: 259 lb 0.7 oz (117.5 kg)       General Appearance:   Mildly somnolent. Head:  Normocephalic, without obvious abnormality, atraumatic. Eyes:  Pupils equal and round. No scleral icterus. Mouth: Moist mucosa, no pharyngeal erythema. Nose: Nares normal. No drainage or sinus tenderness. Neck: Supple, symmetrical, trachea midline. No adenopathy. No tenderness/mass/nodules. No carotid bruit or elevated JVD. Lungs:   Respiratory Effort: Normal   Auscultation: Clear to auscultation bilaterally, respirations unlabored. No wheeze, rales   Chest Wall:  No tenderness or deformity. Cardiovascular:    Pulses  Palpation: normal   Ascultation: Regular rate, S1/ S2 soft. 3/6 murmur, rub, or gallop. 2+ radial and pedal pulses, symmetric  Carotid  Femoral   Abdomen and Gastrointestinal:   Soft, non-tender, bowel sounds active. Liver and Spleen  Masses   Musculoskeletal: No muscle wasting  Back  Gait   Extremities: Extremities normal, atraumatic. No cyanosis or edema. No cyanosis clubbing       Skin: Inspection and palpation performed, no rashes or lesions.        Neurologic:  Somnolent      Labs     All labs have been reviewed    Lab Results   Component Value Date    WBC 9.4 02/07/2021    RBC 3.90 02/07/2021    HGB 11.1 02/07/2021    HCT 34.7 02/07/2021    MCV 89.0 02/07/2021    RDW 18.7 02/07/2021     02/07/2021     Lab Results   Component Value Date     02/08/2021    K 4.2 02/08/2021    K 4.7 02/07/2021    CL 91 02/08/2021    CO2 31 02/08/2021    BUN 25 02/08/2021    CREATININE 1.5 02/08/2021    GFRAA 40 02/08/2021    GFRAA 51 12/20/2012    AGRATIO 0.9 02/07/2021    LABGLOM 33 02/08/2021    GLUCOSE 147 02/08/2021    PROT 7.8 02/07/2021    PROT 7.5 12/20/2012    CALCIUM 9.2 02/08/2021    BILITOT 0.9 02/07/2021    ALKPHOS 74 02/07/2021    AST 23 02/07/2021    ALT 13 02/07/2021     No results found for: PTINR  Lab Results   Component Value Date    LABA1C 7.5 02/08/2021     Lab Results   Component Value Date    TROPONINI <0.01 02/07/2021       Cardiac, Vascular and Imaging Data all Personally Reviewed in Detail by Myself      EKG: Atrial fibrillation with slow ventricular responseLeft axis deviationLeft ventricular hypertrophy with QRS wideningST & T wave abnormality, consider lateral ischemia or digitalis effectAbnormal ECGWhen compared with ECG of 04-SEP-2020 12:04,Vent. rate has decreased BY  25 BPMLeft bundle branch block is no longer Present    Echocardiogram: Suboptimal image quality. Patient appears to be in atrial fibrillation. Left ventricular cavity size is normal.   There is moderate concentric left ventricular hypertrophy. Ejection fraction is visually estimated to be 56%   Diastolic filling parameters suggest at least grade II diastolic   dysfunction. Severe aortic stenosis with a peak velocity of 5.00 m/s and a mean pressure   gradient of 67 mmHg. Estimated pulmonary artery systolic pressure is borderline severely elevated   at 69 mmHg assuming a right atrial pressure of 15 mmHg. Chest x-ray  Stable cardiomegaly with mild central pulmonary congestion which is more   prominent       Hazy right basilar atelectasis or infiltrate and a small right pleural   effusion which is more apparent. Assessment and Plan     -Severe aortic valve stenosis. Will need to repeat echocardiogram.  Will consider BAV after I have reviewed echocardiogram       Acute diastolic heart failure with acute hypoxemic respiratory failure. Continue diuretic therapy. Persistent atrial fibrillation continue rate control. Hold warfarin for now. Chronic diastolic heart failure. Thank you for allowing us to participate in the care of Jeff Severino. Please do not hesitate to contact me if you have any questions.     Alanis Laurent MD, MPH    [facility], [address] [place] [address]

## 2021-02-08 NOTE — CONSULTS
Clinical Pharmacy Note  Warfarin Consult    Pranay Rowley is a 80 y.o. female receiving warfarin managed by pharmacy. Patient being bridged with none. Warfarin Indication: Afib  Target INR range: 2-3   Dose prior to admission: 5mg daily except 2.5mg Thurs    Current warfarin drug-drug interactions: Cipro    Recent Labs     02/07/21  1729   HGB 11.1*   HCT 34.7*   INR 2.57*       Assessment/Plan:    Warfarin 5 mg tonight. Daily PT/INR until stable within therapeutic range. Thank you for the consult. Will continue to follow.     Shellie Hawk, PharmD  2/8/2021 12:46 AM

## 2021-02-08 NOTE — CONSULTS
[address]                                                                        [address] [address]                                                                 Peri Barnett                                                         Phone Number: [phone]                                                           Fax Number: [phone]                                                             Nephrology Consult Note    Chief Complaint: Shortness of breath/CHF    Reason for Consultation: Volume overload / CKD IV - follows with my partner Dr Verenice Vega on an outpatient basis - Baseline creatinine ~ 1.6 mg/dl. Last seen in the office 1/5/2021    History of Present Illness: Ms Denisse Castro is an 81 yo morbidly obese female with a past medical history significant for A Fib, Pulmonary Hypertension, Diastolic CHF, Hypertension, DM, and Hyperlipidemia that presented to the ED with shortness of breath. CXR revealed mild pulmonary congestion. She was admitted with a working diagnosis of acute decompensated HF. Creatinine stable at baseline. Patients daughter stated mother was having urinary hesitancy prior to admission but now that weeks is in symptom has improved. Patient stated that she was probably consuming more salt than she should    Subjective:    Resting in recliner; NAD; breathing improving    ROS: + worsening LE edema; + shortness of breath - both are improving; + urinary hesitancy;  All other ROS negative    Scheduled Meds:   warfarin (COUMADIN) daily dosing (placeholder)   Other RX Placeholder    warfarin  2.5 mg Oral Once    carvedilol  12.5 mg Oral BID WC    cetirizine  10 mg Oral Daily    dilTIAZem  120 mg Oral BID    ciprofloxacin  250 mg Oral 2 times per day    gabapentin  200 mg Oral Nightly    glipiZIDE  5 mg Oral BID AC    potassium chloride  20 mEq Oral BID WC    swelling, no visible deformity  Neurological: CN intact, no focal motor neurological deficit  Psych: normal affect; AAO x 3  : weeks in place; no hematuria noted    Allergies:   Allergies   Allergen Reactions    Naproxen Itching    Bactrim [Sulfamethoxazole-Trimethoprim]     Levofloxacin     Pcn [Penicillins]     Sulfa Antibiotics       Past Medical History:   Diagnosis Date    Anemia     Chronic kidney disease     Combined systolic and diastolic congestive heart failure (HCC)     Diabetes mellitus (HCC)     DVT (deep venous thrombosis) (HCC)     Hyperlipidemia     Hypertension     Osteoarthritis     PAF (paroxysmal atrial fibrillation) (HCC)     Pulmonary embolism (HCC)     Sarcoidosis     in remission     Thyroid disease     Hypothyroidism    Type II or unspecified type diabetes mellitus without mention of complication, not stated as uncontrolled      Past Surgical History:   Procedure Laterality Date    HYSTERECTOMY      INTRACAPSULAR CATARACT EXTRACTION Left 10/16/2020    PHACOEMULSIFICATION WITH INTRAOCULAR LENS IMPLANT - LEFT EYE performed by Harley Renae [facility] Bilateral     TKT    JOINT REPLACEMENT Bilateral     THR    KNEE SURGERY      bilateral total knees     Family History   Problem Relation Age of Onset    Heart Failure Mother     Cancer Brother     Asthma Neg Hx     Diabetes Neg Hx     Emphysema Neg Hx     Hypertension Neg Hx      Social History     Socioeconomic History    Marital status:      Spouse name: Not on file    Number of children: Not on file    Years of education: Not on file    Highest education level: Not on file   Occupational History    Not on file   Social Needs    Financial resource strain: Not on file    Food insecurity     Worry: Not on file     Inability: Not on file    Transportation needs     Medical: Not on file     Non-medical: Not on file   Tobacco Use    Smoking status: Never Smoker    Smokeless tobacco: Never Used    Tobacco comment: Never smoked   Substance and Sexual Activity    Alcohol use: No    Drug use: No    Sexual activity: Yes     Partners: Male   Lifestyle    Physical activity     Days per week: Not on file     Minutes per session: Not on file    Stress: Not on file   Relationships    Social connections     Talks on phone: Not on file     Gets together: Not on file     Attends Holiness service: Not on file     Active member of club or organization: Not on file     Attends meetings of clubs or organizations: Not on file     Relationship status: Not on file    Intimate partner violence     Fear of current or ex partner: Not on file     Emotionally abused: Not on file     Physically abused: Not on file     Forced sexual activity: Not on file   Other Topics Concern    Not on file   Social History Narrative    Not on file         LAB DATA:    CBC:   Lab Results   Component Value Date    WBC 9.4 02/07/2021    RBC 3.90 02/07/2021    HGB 11.1 02/07/2021    HCT 34.7 02/07/2021    MCV 89.0 02/07/2021    MCH 28.6 02/07/2021    MCHC 32.1 02/07/2021    RDW 18.7 02/07/2021     02/07/2021    MPV 8.4 02/07/2021     BMP:    Lab Results   Component Value Date     02/08/2021    K 4.2 02/08/2021    K 4.7 02/07/2021    CL 91 02/08/2021    CO2 31 02/08/2021    BUN 25 02/08/2021    CREATININE 1.5 02/08/2021    CALCIUM 9.2 02/08/2021    GFRAA 40 02/08/2021    GFRAA 51 12/20/2012    LABGLOM 33 02/08/2021    GLUCOSE 147 02/08/2021     Ionized Calcium:  No results found for: IONCA  Magnesium:    Lab Results   Component Value Date    MG 1.70 02/08/2021     Phosphorus:    Lab Results   Component Value Date    PHOS 3.9 01/04/2021     U/A:    Lab Results   Component Value Date    COLORU YELLOW 02/07/2021    PHUR 5.5 02/07/2021    WBCUA 1 02/07/2021    RBCUA 4 02/07/2021    MUCUS 2+ 02/07/2021    BACTERIA 4+ 02/07/2021    CLARITYU CLOUDY 02/07/2021    SPECGRAV 1.013 02/07/2021    LEUKOCYTESUR Negative 02/07/2021    UROBILINOGEN 1.0 02/07/2021    BILIRUBINUR Negative 02/07/2021    BLOODU Negative 02/07/2021    GLUCOSEU Negative 02/07/2021         IMPRESSION/RECOMMENDATIONS:      Active Problems:    CHF (congestive heart failure), NYHA class III, acute on chronic, combined ([facility]    Uncontrolled type 2 diabetes mellitus with hyperglycemia (HCC)    CHF (congestive heart failure), NYHA class I, acute on chronic, combined ([facility]  Resolved Problems:    * No resolved hospital problems. *    1. CKD - creatinine stable   - below previously reported baseline  - avoid exposure to Nephrotoxic agents    2. Diastolic CHF - continue current dose of Lasix  - monitor kidney function as we diurese    3. Morbid obesity    4. HTN - controlled  - Avoid Hypotension    5. Hypomagnesemia - replace    6. Anemia CKD - Hgb at target    7.  CKD-MBD - monitor phosphorus

## 2021-02-08 NOTE — FLOWSHEET NOTE
02/08/21 1649   Encounter Summary   Services provided to: Patient and family together   Referral/Consult From: Nurse   Support System Spouse; Children   Place of Christianity [facility]   Contact Catholic No   Continue Visiting   (2/8 Support - AD discussion, blessing RADHA)   Complexity of Encounter Low   Length of Encounter 15 minutes   Routine   Type Initial   Assessment Calm;Coping   Intervention Active listening;Explored feelings, thoughts, concerns;[place]   Outcome Engaged in conversation   Advance Directives (For Healthcare)   Healthcare Directive No, patient does not have an advance directive for healthcare treatment   Advance Directives Documents given;Documents explained;Pt. not interested at this time

## 2021-02-08 NOTE — H&P
[address]                              HISTORY AND PHYSICAL    PATIENT NAME: Pastor Rush                    :        1934  MED REC NO:   [id number]                          ROOM:       8618  ACCOUNT NO:   [de-identified]                           ADMIT DATE: 2021  PROVIDER:     Karena Sandoval MD    CHIEF COMPLAINT:  Could not breathe. HISTORY OF PRESENT ILLNESS:  The patient is a chronically ill  55-year-old [employer] female, retired housewife who lives in her  own home with her  and receives assistance from her children. The patient came to the emergency room on  with a history of  progressive dyspnea on exertion and shortness of breath at rest  associated with increased foot and lower leg swelling. This was going  on for about three or four days prior to this admission. The patient  had been compliant with her usual cardiovascular medication. The  patient had no ischemic-sounding chest pain. Emergency room evaluation  revealed her chronic atrial fibrillation. Chest x-ray showed some  pulmonary vascular congestion. Chemistry profile showed BUN 24,  creatinine of 1.5 which were slightly better than the patient's usual  values, random glucose was 209. ProBNP was markedly elevated to over  17,000. Troponin was normal.  Albumin was 3.7. White count 9400,  hemoglobin 11.1, hematocrit 34.7. ProTime with the patient on Coumadin  was 30.1, INR was 2.57. Swab was negative for COVID-19. Urinalysis  showed 4+ bacteria. Venous blood gases showed a pH of 7.37, pCO2 of  50.2, pO2 of 33. These were venous. EKG showed her chronic atrial  fibrillation with a ventricular rate of 47. The patient was begun on  oxygen in the emergency room, begun on intravenous Lasix. Admission was  felt necessary for inpatient treatment and Cardiology consultation.   The  patient and family were agreeable to the admission. The patient was  admitted to PCU on a monitored bed. PAST MEDICAL HISTORY:  Pertinent for chronic kidney disease, stage III;  mild anemia from chronic kidney disease, previous systolic, diastolic  congestive heart failure, current hyponatremia, known aortic valvular  stenosis, hypothyroidism, generalized osteoarthritis, hypertension. PAST SURGICAL HISTORY:  Cataract extraction, intraocular lens implants,  hysterectomy, bilateral knee replacements. SOCIAL HISTORY:  The patient is , lives with her elderly . Does not smoke or consume alcohol. No illicit drugs. Has extensive  family that provides assistance to her. FAMILY HISTORY:  Positive for high blood pressure and diabetes. REVIEW OF SYSTEMS:  The patient denies fever, chills. No upper or lower  GI bleeding. The patient several days ago thought she had a urinary  tract infection by symptoms and was begun on oral Cipro by telephone. PHYSICAL EXAMINATION:  GENERAL:  Following admission, the patient is a well-developed,  overweight black female lying in bed. She appears to be dyspneic at  rest.  VITAL SIGNS:  Temp 97.8, respirations 16, pulse 59, blood pressure  149/90, pulse ox on 2 liters of nasal oxygen was 99. Weight was 259  pounds. HEENT:  She was alert, conversant, cooperative. She had intraocular  lens implants. She had dentures. Mucous membranes were normal.  NECK:  Thyroid was slightly enlarged and nodular-feeling. Carotids were  2+ without bruits. LUNGS:  Examination of the lungs revealed some generalized decreased  breath sounds but lungs sounded clear to auscultation and percussion. CARDIOLOGY:  Examination revealed an irregularly, irregular heart rhythm  and she had a grade 3 aortic systolic murmur. ABDOMEN:  Showed no mass, organomegaly or tenderness. Bowel sounds were  present. PELVIC/RECTAL/BREASTS:  Exams were deferred.   EXTREMITIES:  Revealed about 2+ brawny edema of both feet and lower  legs. No venous cords were felt. She had bilateral total knee  replacements. There were 1+ dorsalis pedis pulses. NEUROLOGICAL:  Examination with the patient lying in bed revealed some  decreased sensation in both feet. She did not appear to have any focal  weakness but was somewhat generally weak. Station and gait could not be  tested. IMPRESSION:  1. Increased systolic, diastolic congestive heart failure. 2.  Atrial fibrillation with controlled rate. 3.  Hemodynamically significant aortic stenosis. 4.  Probable urinary tract infection. 5.  Type 2 diabetes mellitus, uncontrolled. 6.  Hypertension. 7.  Generalized osteoarthritis. 8.  Chronic kidney disease stage III. 9.  Hyponatremia. 10.  Chronic Coumadin therapy. 11.  Status post bilateral total knee replacements. PLAN:  Intravenous Lasix to induce diuresis. Cardiology consult. Echocardiogram.  We will continue her Cipro for probable urinary tract  infection. We will continue her oral diabetic agents and cover with  basal and mealtime insulin as needed. Plan was discussed with the  patient. She is in agreement. Anticipated length of stay is greater  than two midnights. It is anticipated the patient will be able to  return to the home setting with home care at the time of discharge.         Silvia Ureña MD    D: 02/08/2021 8:46:28       T: 02/08/2021 8:53:20     [email or web address]  Job#: [id number]     Doc#: [id number]    CC:

## 2021-02-08 NOTE — PROGRESS NOTES
Patient's daughter at bedside. Daughter expressed concern about patient's bladder habits at home. Daughter stated that sometimes patient urinates at home and it seems like she needs to urinate more and is unable.  Daughter is also concerned about patient's kidney function and is requesting to have her Nephrologist Dr. Paloma Buck see her while she is in the hospital.     Electronically signed by River Pina RN on 2/8/2021 at 12:08 PM

## 2021-02-08 NOTE — PLAN OF CARE
Problem: Falls - Risk of:  Goal: Will remain free from falls  Description: Will remain free from falls  Outcome: Ongoing  Fall assessment completed and fall contract signed. Fall precautions in place. Non skid socks on feet, call light and personal items within reach, bed alarm activated, and bed in the lowest position. Pt educated to call for help before getting out of bed. Pt verbalized understanding. Will continue to monitor.      Problem: Falls - Risk of:  Goal: Absence of physical injury  Description: Absence of physical injury  Outcome: Ongoing     Problem: Infection:  Goal: Will remain free from infection  Description: Will remain free from infection  Outcome: Ongoing     Problem: Safety:  Goal: Free from accidental physical injury  Description: Free from accidental physical injury  Outcome: Ongoing     Problem: Safety:  Goal: Free from intentional harm  Description: Free from intentional harm  Outcome: Ongoing     Problem: Daily Care:  Goal: Daily care needs are met  Description: Daily care needs are met  Outcome: Ongoing     Problem: Pain:  Goal: Patient's pain/discomfort is manageable  Description: Patient's pain/discomfort is manageable  Outcome: Ongoing     Problem: Skin Integrity:  Goal: Skin integrity will stabilize  Description: Skin integrity will stabilize  Outcome: Ongoing     Problem: Discharge Planning:  Goal: Patients continuum of care needs are met  Description: Patients continuum of care needs are met  Outcome: Ongoing

## 2021-02-08 NOTE — CARE COORDINATION
DISCHARGE PLAN: Home with HC. Dgsiddhartha has a [facility] list and wanted to talk with her brother who is a SW at [facility] before making a [facility] decision. ___________________________________  Met w/pt and pts Kaia boyd to address barriers to dc. HOME: Pt resides in a two story home with her spouse,   1+1 IZAIAH. Pt and spouse are able to live on the main level of the home. DME/O2: 3 in 1 commode, grab bar in the shower, shower chair, rolling walker, w/c. Denied the need for additional DME at this time. ACTIVE SERVICES: Kaia Brito stated that this pt has been active with [facility] in the past but is currently not active with any HC at this time. Kaia Brito stated that this pt has 10 children, 7 of which live in the area. Kaia Brito stated that she and her siblings assist with all household tasks and pt is always with someone at home. TRANSPORTATION: Kaia Brito and pts other Tari boyd do most of the transportation but all children are available to help as needed. Family will transport pt home at d/c. PHARMACY: denies difficulty obtaining/taking meds. Pt uses [facility] in [facility]. PCP: Mary Leone    DEMOGRAPHICS: verified address/phone number as correct    INSURANCE: [employer] Medicare    HD/PD: No    THERAPY RECS    PHYSICAL THERAPY  \"Date of Service: 2/8/2021     Discharge Recommendations:  24 hour supervision or assist, 2-3 sessions per week, S Level 1   PT Equipment Recommendations  Equipment Needed: No Lavenia Bernheim scored a 15/24 on the AM-PAC short mobility form. Current research shows that an AM-PAC score of 18 or greater is typically associated with a discharge to the patient's home setting.  Based on the patient's AM-PAC score and their current functional mobility deficits, it is recommended that the patient have 2-3 sessions per week of Physical Therapy at d/c to increase the patient's independence.  At this time, this patient demonstrates the endurance and safety to discharge home with home PT and a follow up treatment frequency of 2-3x/wk. Please see assessment section for further patient specific details.     HOME HEALTH CARE: LEVEL 1 STANDARD\"    OCCUPATIONAL THERAPY  \"Date of Service: 2/8/2021     Discharge Recommendations:  24 hour supervision or assist, Home with Home health OT  OT Equipment Recommendations  Equipment Needed: Floresita Herring scored a 15/24 on the AM-PAC ADL Inpatient form. Current research shows that an AM-PAC score of 18 or greater is typically associated with a discharge to the patient's home setting. Based on the patient's AM-PAC score, and their current ADL deficits, it is recommended that the patient have 2-3 sessions per week of Occupational Therapy at d/c to increase the patient's independence.  At this time, this patient demonstrates the endurance and safety to discharge home with home therapy and a follow up treatment frequency of 2-3x/wk. Please see assessment section for further patient specific details. \"    SW explained to pt and dgtr that [facility] does not contract with [employer]. Emiliano Navarro stated that her brother is a  at [facility] and she will talk with him about other options. SNF lift left for review. The Plan for Transition of Care is related to the following treatment goals: \"Going home. \"    The Patient and dgtr were provided with a choice of provider and agrees   with the discharge plan. [x] Yes [] No    Freedom of choice list was provided with basic dialogue that supports the patient's individualized plan of care/goals, treatment preferences and shares the quality data associated with the providers. [x] Yes [] No    Discharge planning team will remain available for needs. Please consult for any specifics not addressed in this note.     Yaakov Noel, [place]  [phone]  Electronically signed by Mayte Martel on 2/8/2021 at 4:23 PM

## 2021-02-08 NOTE — PROGRESS NOTES
Making: Medium Complexity  PT Education: PT Role;General Safety  Barriers to Learning: none  REQUIRES PT FOLLOW UP: Yes  Activity Tolerance  Activity Tolerance: Patient limited by endurance       Patient Diagnosis(es): The primary encounter diagnosis was Acute congestive heart failure, unspecified heart failure type ([address] A diagnosis of Hyponatremia was also pertinent to this visit. has a past medical history of Anemia, Chronic kidney disease, Combined systolic and diastolic congestive heart failure ([address] Diabetes mellitus ([address] DVT (deep venous thrombosis) ([address] Hyperlipidemia, Hypertension, Osteoarthritis, PAF (paroxysmal atrial fibrillation) ([address] Pulmonary embolism ([address] Sarcoidosis, Thyroid disease, and Type II or unspecified type diabetes mellitus without mention of complication, not stated as uncontrolled. has a past surgical history that includes Hysterectomy; knee surgery; joint replacement (Bilateral); joint replacement (Bilateral); and Intracapsular cataract extraction (Left, 10/16/2020). 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 female who presents to the emergency department with a complaint of shortness of breath. She says for 2 or 3 days she has had shortness of breath with physical activity or ambulation, not so much at rest, not particularly worse lying down. She says she has a mild nonproductive cough, but no cold symptoms or fever.   She denies chest pain or abdominal pain. She says her appetite has been a little bit reduced over the past few days, but no vomiting or diarrhea. Denies any known exposure to COVID-19. Says she has been having some dysuria over the past few days, called her primary care doctor yesterday and was prescribed an antibiotic. She also reports that she has had some increasing swelling in the legs bilaterally, and she does have some chronic edema there, with a history of CHF, but the swelling has been worsening. Denies any numbness.   Response To Previous Treatment: Not applicable  Family / Caregiver Present: Yes(daughter in room)  Follows Commands: Within Functional Limits  Subjective  Subjective: pt very pleasant and agreeable to getting up with therapy; reports her legs \"feel heavy\"; no c/o pain  Pain Screening  Patient Currently in Pain: No          Orientation  Orientation  Overall Orientation Status: Within Functional Limits  Social/Functional History  Social/Functional History  Lives With: Spouse  Type of Home: House  Home Layout: Two level, Able to Live on Main level with bedroom/bathroom, Laundry in basement  Home Access: Stairs to enter with rails  Entrance Stairs - Number of Steps: 1 + 1 with rail, pt does not go to other levels of house  Bathroom Shower/Tub: Walk-in shower, Shower chair with back  [employer]: (RTS over comfort height toilet)  Bathroom Equipment: 3-in-1 commode, Grab bars in shower, Shower chair  Bathroom Accessibility: (leaves walker outside of bathroom)  Home Equipment: Rolling walker, Wheelchair-manual(uses w/c for going out to MD appts)  Khloe Able Help From: Family  ADL Assistance: Independent(family assists as needed)  Homemaking Assistance: (family assist with all household tasks)  Ambulation Assistance: Independent(uses walker at all times)  Transfer Assistance: Independent  Active : No  Patient's  Info: daughter drives  Occupation: Retired  IADL Comments: sleeps in regular bed  Additional Comments: daughter reports someone is with her at all times  Cognition   Cognition  Overall Cognitive Status: WFL    Objective          AROM RLE (degrees)  RLE AROM: WFL  AROM LLE (degrees)  LLE AROM : WFL  Strength RLE  Strength RLE: WFL  Comment: functionally poor  Strength LLE  Strength LLE: WFL  Comment: functionally poor     Sensation  Overall Sensation Status: (N/T)  Bed mobility  Supine to Sit: Moderate assistance  Scooting:  Moderate assistance  Transfers  Sit to Stand: Minimal Assistance;2 Person Assistance  Stand to sit: Minimal Assistance;2 Person Assistance  Ambulation  Ambulation?: Yes  Ambulation 1  Surface: level tile  Device: Rolling Walker  Assistance: Contact guard assistance;Minimal assistance  Quality of Gait: slow small shuffling steps; needed some assist with walker with turning; therapist managed O2 line  Distance: 20'     Balance  Comments: SBA for sitting balance; CGA for static standing with walker        Plan   Plan  Times per week: 3-5  Current Treatment Recommendations: Functional Mobility Training  Safety Devices  Type of devices: Call light within reach, Chair alarm in place, Left in chair, Nurse notified, All fall risk precautions in place(call light and all needs in reach; LEs elevated with pillow under for pressure relief)    AM-PAC Score  AM-PAC Inpatient Mobility Raw Score : 15 (02/08/21 0957)  AM-PAC Inpatient T-Scale Score : 39.45 (02/08/21 0957)  Mobility Inpatient CMS 0-100% Score: 57.7 (02/08/21 0957)  Mobility Inpatient CMS G-Code Modifier : CK (02/08/21 0957)          Goals  Short term goals  Time Frame for Short term goals: by discharge  Short term goal 1: bed mob MI from flat bed  Short term goal 2: transfers SBA  Short term goal 3: amb 48' with RW SBA while maintaining her O2 sats > 90%  Patient Goals   Patient goals : to return home       Therapy Time   Individual Concurrent Group Co-treatment   Time In 0915         Time Out 0959         Minutes 44                 LINDA

## 2021-02-08 NOTE — PROGRESS NOTES
Occupational Therapy   Occupational Therapy Initial Assessment  Date: 2021   Patient Name: Iraj Rush  MRN: [id number]     : 1934    Date of Service: 2021    Discharge Recommendations:  24 hour supervision or assist, Home with Home health OT  OT Equipment Recommendations  Equipment Needed: No    Iraj Rush scored a 15/24 on the AM-PAC ADL Inpatient form. Current research shows that an AM-PAC score of 18 or greater is typically associated with a discharge to the patient's home setting. Based on the patient's AM-PAC score, and their current ADL deficits, it is recommended that the patient have 2-3 sessions per week of Occupational Therapy at d/c to increase the patient's independence. At this time, this patient demonstrates the endurance and safety to discharge home with home therapy and a follow up treatment frequency of 2-3x/wk. Please see assessment section for further patient specific details. If patient discharges prior to next session this note will serve as a discharge summary. Please see below for the latest assessment towards goals. Assessment   Performance deficits / Impairments: Decreased functional mobility ; Decreased strength;Decreased endurance;Decreased ADL status; Decreased safe awareness;Decreased high-level IADLs;Decreased balance  Assessment: 81 y/o female admitted 2021 with CHF exacerbation. Rapid COVID test negative. PTA pt lives at home with spouse. Pt reports she is independent with ADLs and functional mobility with RW. Pt has 10 children who are very involved in care and someone is always with patient at home to assist if needed. Today, pt required min A x 2 to stand from bed and min A to ambulate short distance in room with RW. Pt easily fatigues and reports dorita LE edema limiting mobility. Pt with decreased dorita UE shoulder ROM. ANticipate pt will require up to max A for LB ADLs at this time based on endurance and ROM observed.  Pt will benefit from skilled therapy at while in hospital. Anticipate pt will improve well enough to return home with family support and home OT. Prognosis: Good  Decision Making: Medium Complexity  OT Education: OT Role;Transfer Training;Plan of Care  REQUIRES OT FOLLOW UP: Yes  Activity Tolerance  Activity Tolerance: Patient Tolerated treatment well;Patient limited by fatigue  Activity Tolerance: pt's O2 was 87-88% on RA; call placed to nursing and pt's O2 increased to 90-92% with activity and mid 90's at rest on 1 liter O2  Safety Devices  Safety Devices in place: Yes  Type of devices: Nurse notified;Gait belt;Call light within reach; Chair alarm in place; Left in chair           Patient Diagnosis(es): The primary encounter diagnosis was Acute congestive heart failure, unspecified heart failure type ([address] A diagnosis of Hyponatremia was also pertinent to this visit. has a past medical history of Anemia, Chronic kidney disease, Combined systolic and diastolic congestive heart failure ([address] Diabetes mellitus ([address] DVT (deep venous thrombosis) ([address] Hyperlipidemia, Hypertension, Osteoarthritis, PAF (paroxysmal atrial fibrillation) ([address] Pulmonary embolism ([address] Sarcoidosis, Thyroid disease, and Type II or unspecified type diabetes mellitus without mention of complication, not stated as uncontrolled. has a past surgical history that includes Hysterectomy; knee surgery; joint replacement (Bilateral); joint replacement (Bilateral); and Intracapsular cataract extraction (Left, 10/16/2020).            Restrictions  Restrictions/Precautions  Restrictions/Precautions: Fall Risk  Position Activity Restriction  Other position/activity restrictions: pt's O2 was 87-88% on RA; call placed to nursing and pt's O2 increased to 90-92% with activity and mid 90's at rest on 1 liter O2    Subjective   General  Chart Reviewed: Yes  Patient assessed for rehabilitation services?: Yes  Additional Pertinent Hx: 81 y/o female admitted 2/7/2021 with CHF exacerbation. Rapid COVID test negative. Family / Caregiver Present: Yes(daughter)  Referring Practitioner: Tianna Ledbetter MD  Diagnosis: CHF  Subjective  Subjective: Pt seen bedside and agreeable to therapy. Pt easily fatigues. General Comment  Comments: Per RN ok for therapy. Social/Functional History  Social/Functional History  Lives With: Spouse  Type of Home: House  Home Layout: Two level, Able to Live on Main level with bedroom/bathroom, Laundry in basement  Home Access: Stairs to enter with rails  Entrance Stairs - Number of Steps: 1 + 1 with rail, pt does not go to other levels of house  Bathroom Shower/Tub: Walk-in shower, Shower chair with back  [employer]: (RTS over comfort height toilet)  Bathroom Equipment: 3-in-1 commode, Grab bars in shower, Shower chair  Bathroom Accessibility: (leaves walker outside of bathroom)  Home Equipment: Rolling walker, Wheelchair-manual(uses w/c for going out to MD appts)  [facility] Help From: Family  ADL Assistance: Independent(family assists as needed)  Homemaking Assistance: (family assist with all household tasks)  Ambulation Assistance: Independent(uses walker at all times)  Transfer Assistance: Independent  Active : No  Patient's  Info: daughter drives  Occupation: Retired  IADL Comments: sleeps in regular bed  Additional Comments: daughter reports someone is with her at all times       Objective   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  Assist with navigating walker at times, ranjan when turning     ADL  Grooming: Stand by assistance(brushed teeth seated in chair)  Toileting: Dependent/Total(micky)  Additional Comments: Anticipate pt will require max A for LB bathing/dressing, mod A for UB bathing/dressing based on balance, ROM and endurance observed        Bed mobility  Sit to Supine: (pt in chair at end of session)  Transfers  Sit to stand: Minimal assistance;2 Person assistance  Stand to sit: Minimal assistance  Transfer Comments: to/from RW- cuing for hand placement     Cognition  Overall Cognitive Status: WFL  Perception  Overall Perceptual Status: WFL     Sensation  Overall Sensation Status: (N/T)        LUE AROM (degrees)  LUE General AROM: very limited overhead shoulder ROM  Left Hand AROM (degrees)  Left Hand AROM: WFL  RUE AROM (degrees)  RUE General AROM: very limited overhead shoulder ROM  Right Hand AROM (degrees)  Right Hand AROM: WFL        Plan   Plan  Times per week: 3-5  Current Treatment Recommendations: Strengthening, Endurance Training, ROM, Balance Training, Gait Training, Functional Mobility Training, Positioning, Self-Care / ADL    AM-PAC Score  AM-PAC Inpatient Daily Activity Raw Score: 15 (02/08/21 0959)  AM-PAC Inpatient ADL T-Scale Score : 34.69 (02/08/21 0959)  ADL Inpatient CMS 0-100% Score: 56.46 (02/08/21 0959)  ADL Inpatient CMS G-Code Modifier : CK (02/08/21 0959)    Goals  Short term goals  Time Frame for Short term goals: Prior to DC:   Short term goal 1: Pt will complete ADL transfers with supervision  Short term goal 2: Pt will complete functional mobility with supervision  Short term goal 3: Pt will tolerate standing > 3 min for functional task with supervision  Short term goal 4: Pt will complete toileting with supervision  Short term goal 5: Pt will complete LB dressing with supervision  Patient Goals   Patient goals : to return home       Therapy Time   Individual Concurrent Group Co-treatment   Time In 0915         Time Out 1000         Minutes 45         Timed Code Treatment Minutes: 30 Minutes     This note to serve as OT d/c summary if pt is d/c-ed prior to next therapy session.     Marine Bracket, OTR/L

## 2021-02-09 LAB
ALBUMIN SERPL-MCNC: 3.1 G/DL (ref 3.4–5)
ANION GAP SERPL CALCULATED.3IONS-SCNC: 10 MMOL/L (ref 3–16)
BUN BLDV-MCNC: 28 MG/DL (ref 7–20)
CALCIUM SERPL-MCNC: 8.8 MG/DL (ref 8.3–10.6)
CHLORIDE BLD-SCNC: 97 MMOL/L (ref 99–110)
CO2: 30 MMOL/L (ref 21–32)
CREAT SERPL-MCNC: 1.6 MG/DL (ref 0.6–1.2)
GFR AFRICAN AMERICAN: 37
GFR NON-AFRICAN AMERICAN: 31
GLUCOSE BLD-MCNC: 102 MG/DL (ref 70–99)
GLUCOSE BLD-MCNC: 116 MG/DL (ref 70–99)
GLUCOSE BLD-MCNC: 139 MG/DL (ref 70–99)
GLUCOSE BLD-MCNC: 219 MG/DL (ref 70–99)
GLUCOSE BLD-MCNC: 55 MG/DL (ref 70–99)
GLUCOSE BLD-MCNC: 56 MG/DL (ref 70–99)
GLUCOSE BLD-MCNC: 75 MG/DL (ref 70–99)
INR BLD: 3.23 (ref 0.86–1.14)
MAGNESIUM: 2.1 MG/DL (ref 1.8–2.4)
PERFORMED ON: ABNORMAL
PERFORMED ON: NORMAL
PHOSPHORUS: 3.7 MG/DL (ref 2.5–4.9)
POTASSIUM SERPL-SCNC: 3.8 MMOL/L (ref 3.5–5.1)
PROTHROMBIN TIME: 37.9 SEC (ref 10–13.2)
SODIUM BLD-SCNC: 137 MMOL/L (ref 136–145)
URIC ACID, SERUM: 10.5 MG/DL (ref 2.6–6)

## 2021-02-09 PROCEDURE — 6370000000 HC RX 637 (ALT 250 FOR IP): Performed by: INTERNAL MEDICINE

## 2021-02-09 PROCEDURE — 80069 RENAL FUNCTION PANEL: CPT

## 2021-02-09 PROCEDURE — 2580000003 HC RX 258: Performed by: INTERNAL MEDICINE

## 2021-02-09 PROCEDURE — 2060000000 HC ICU INTERMEDIATE R&B

## 2021-02-09 PROCEDURE — 2700000000 HC OXYGEN THERAPY PER DAY

## 2021-02-09 PROCEDURE — 85610 PROTHROMBIN TIME: CPT

## 2021-02-09 PROCEDURE — 94760 N-INVAS EAR/PLS OXIMETRY 1: CPT

## 2021-02-09 PROCEDURE — 83735 ASSAY OF MAGNESIUM: CPT

## 2021-02-09 PROCEDURE — 6360000002 HC RX W HCPCS: Performed by: INTERNAL MEDICINE

## 2021-02-09 PROCEDURE — 97530 THERAPEUTIC ACTIVITIES: CPT

## 2021-02-09 PROCEDURE — 97535 SELF CARE MNGMENT TRAINING: CPT

## 2021-02-09 PROCEDURE — 36415 COLL VENOUS BLD VENIPUNCTURE: CPT

## 2021-02-09 PROCEDURE — 97116 GAIT TRAINING THERAPY: CPT | Performed by: PHYSICAL THERAPIST

## 2021-02-09 PROCEDURE — 84550 ASSAY OF BLOOD/URIC ACID: CPT

## 2021-02-09 RX ORDER — CARVEDILOL 6.25 MG/1
6.25 TABLET ORAL 2 TIMES DAILY WITH MEALS
Status: DISCONTINUED | OUTPATIENT
Start: 2021-02-09 | End: 2021-02-10

## 2021-02-09 RX ORDER — TORSEMIDE 100 MG/1
50 TABLET ORAL 2 TIMES DAILY
Status: DISCONTINUED | OUTPATIENT
Start: 2021-02-09 | End: 2021-02-10

## 2021-02-09 RX ORDER — FUROSEMIDE 10 MG/ML
80 INJECTION INTRAMUSCULAR; INTRAVENOUS 2 TIMES DAILY
Status: DISCONTINUED | OUTPATIENT
Start: 2021-02-09 | End: 2021-02-09

## 2021-02-09 RX ADMIN — POTASSIUM CHLORIDE 20 MEQ: 1500 TABLET, EXTENDED RELEASE ORAL at 17:25

## 2021-02-09 RX ADMIN — FUROSEMIDE 80 MG: 10 INJECTION, SOLUTION INTRAMUSCULAR; INTRAVENOUS at 08:20

## 2021-02-09 RX ADMIN — GABAPENTIN 200 MG: 100 CAPSULE ORAL at 20:40

## 2021-02-09 RX ADMIN — CIPROFLOXACIN HYDROCHLORIDE 250 MG: 250 TABLET, FILM COATED ORAL at 08:20

## 2021-02-09 RX ADMIN — TORSEMIDE 50 MG: 100 TABLET ORAL at 20:40

## 2021-02-09 RX ADMIN — PANTOPRAZOLE SODIUM 40 MG: 40 TABLET, DELAYED RELEASE ORAL at 08:20

## 2021-02-09 RX ADMIN — DEXTROSE 15 G: 15 GEL ORAL at 06:38

## 2021-02-09 RX ADMIN — Medication 10 ML: at 08:21

## 2021-02-09 RX ADMIN — TRIAMCINOLONE ACETONIDE: 1 CREAM TOPICAL at 08:28

## 2021-02-09 RX ADMIN — GLIPIZIDE 5 MG: 5 TABLET ORAL at 17:25

## 2021-02-09 RX ADMIN — CETIRIZINE HYDROCHLORIDE 10 MG: 10 TABLET, FILM COATED ORAL at 08:20

## 2021-02-09 RX ADMIN — ATORVASTATIN CALCIUM 10 MG: 10 TABLET, FILM COATED ORAL at 20:40

## 2021-02-09 RX ADMIN — CARVEDILOL 12.5 MG: 12.5 TABLET, FILM COATED ORAL at 08:20

## 2021-02-09 RX ADMIN — Medication 10 ML: at 20:40

## 2021-02-09 RX ADMIN — INSULIN LISPRO 2 UNITS: 100 INJECTION, SOLUTION INTRAVENOUS; SUBCUTANEOUS at 12:43

## 2021-02-09 RX ADMIN — LOSARTAN POTASSIUM 25 MG: 25 TABLET, FILM COATED ORAL at 08:20

## 2021-02-09 RX ADMIN — GLIPIZIDE 5 MG: 5 TABLET ORAL at 05:25

## 2021-02-09 RX ADMIN — POTASSIUM CHLORIDE 20 MEQ: 1500 TABLET, EXTENDED RELEASE ORAL at 08:20

## 2021-02-09 RX ADMIN — TRIAMCINOLONE ACETONIDE: 1 CREAM TOPICAL at 20:39

## 2021-02-09 RX ADMIN — LATANOPROST 1 DROP: 50 SOLUTION OPHTHALMIC at 20:39

## 2021-02-09 RX ADMIN — CIPROFLOXACIN HYDROCHLORIDE 250 MG: 250 TABLET, FILM COATED ORAL at 20:40

## 2021-02-09 RX ADMIN — DILTIAZEM HYDROCHLORIDE 120 MG: 120 CAPSULE, COATED, EXTENDED RELEASE ORAL at 08:20

## 2021-02-09 RX ADMIN — Medication 2 TABLET: at 08:20

## 2021-02-09 ASSESSMENT — PAIN SCALES - GENERAL
PAINLEVEL_OUTOF10: 0
PAINLEVEL_OUTOF10: 0

## 2021-02-09 NOTE — PROGRESS NOTES
Sujatha Harding is a 80 y.o. female patient.     Current Facility-Administered Medications   Medication Dose Route Frequency Provider Last Rate Last Admin    magnesium cl-calcium carbonate (SLOW-MAG) tablet 2 tablet  2 tablet Oral Daily Amr Jesi Lan MD   2 tablet at 02/09/21 0820    [START ON 2/8/2022] warfarin (COUMADIN) daily dosing (placeholder)   Other RX Placeholder Raulito Aponte MD        acetaminophen (TYLENOL) tablet 1,000 mg  1,000 mg Oral Q6H PRN Makayla Garcia MD        carvedilol (COREG) tablet 12.5 mg  12.5 mg Oral BID  Makayla Garcia MD   12.5 mg at 02/09/21 0820    cetirizine (ZYRTEC) tablet 10 mg  10 mg Oral Daily Makayla Garcia MD   10 mg at 02/09/21 0820    dilTIAZem (CARDIZEM CD) extended release capsule 120 mg  120 mg Oral BID Makayla Garcia MD   120 mg at 02/09/21 0820    ciprofloxacin (CIPRO) tablet 250 mg  250 mg Oral 2 times per day Makayla Garcia MD   250 mg at 02/09/21 0820    gabapentin (NEURONTIN) capsule 200 mg  200 mg Oral Nightly Makayla Garcia MD   200 mg at 02/08/21 2029    glipiZIDE (GLUCOTROL) tablet 5 mg  5 mg Oral BID AC Makayla Garcia MD   5 mg at 02/09/21 0525    potassium chloride (KLOR-CON M) extended release tablet 20 mEq  20 mEq Oral BID  Makayla Garcia MD   20 mEq at 02/09/21 0820    latanoprost (XALATAN) 0.005 % ophthalmic solution 1 drop  1 drop Right Eye Nightly Makayla Garcia MD   1 drop at 02/08/21 2028    atorvastatin (LIPITOR) tablet 10 mg  10 mg Oral Nightly Makayla Garcia MD   10 mg at 02/08/21 2029    pantoprazole (PROTONIX) tablet 40 mg  40 mg Oral Daily Makayla Garcia MD   40 mg at 02/09/21 0820    prochlorperazine (COMPAZINE) tablet 5 mg  5 mg Oral Q6H PRN Makayla Garcia MD        triamcinolone (KENALOG) 0.1 % cream   Topical BID Makayla Garcia MD   Given at 02/09/21 8404    sodium chloride flush 0.9 % injection 10 mL  10 mL Intravenous 2 times per day Makayla Garcia MD 10 mL at 02/09/21 4223    sodium chloride flush 0.9 % injection 10 mL  10 mL Intravenous PRN Lynn Macias MD        promethazine (PHENERGAN) tablet 12.5 mg  12.5 mg Oral Q6H PRN Lynn Macias MD        Or    ondansetron [facility]) injection 4 mg  4 mg Intravenous Q6H PRN Lynn Macias MD        polyethylene glycol [facility]) packet 17 g  17 g Oral Daily PRN Lynn Macias MD        acetaminophen (TYLENOL) tablet 650 mg  650 mg Oral Q6H PRN Lynn Macias MD        Or    acetaminophen (TYLENOL) suppository 650 mg  650 mg Rectal Q6H PRN Lynn Macias MD        furosemide (LASIX) injection 80 mg  80 mg Intravenous TID Lynn Macias MD   80 mg at 02/09/21 0820    perflutren lipid microspheres (DEFINITY) injection 1.65 mg  1.5 mL Intravenous ONCE PRN Lynn Macias MD        losartan (COZAAR) tablet 25 mg  25 mg Oral Daily Lynn Macias MD   25 mg at 02/09/21 0820    melatonin tablet 3 mg  3 mg Oral Nightly PRN Lynn Macias MD        glucose (GLUTOSE) 40 % oral gel 15 g  15 g Oral PRN Lynn Macias MD   15 g at 02/09/21 3974    dextrose 50 % IV solution  12.5 g Intravenous PRN Lynn Macias MD        glucagon (rDNA) injection 1 mg  1 mg Intramuscular PRN Lynn Macias MD        dextrose 5 % solution  100 mL/hr Intravenous PRN Lynn Macias MD        insulin lispro (HUMALOG) injection vial 0-6 Units  0-6 Units Subcutaneous TID WC Lynn Macias MD   1 Units at 02/08/21 1710    insulin lispro (HUMALOG) injection vial 0-3 Units  0-3 Units Subcutaneous Nightly Lynn Macias MD   1 Units at 02/08/21 0103     Allergies   Allergen Reactions    Naproxen Itching    Bactrim [Sulfamethoxazole-Trimethoprim]     Levofloxacin     Pcn [Penicillins]     Sulfa Antibiotics      Active Problems:    CHF (congestive heart failure), NYHA class III, acute on chronic, combined ([address]    Uncontrolled type 2 diabetes mellitus with hyperglycemia (HCC)    CHF (congestive heart failure), NYHA class I, acute on chronic, combined (HCC)    Acute congestive heart failure (HCC)    Severe aortic stenosis  Resolved Problems:    * No resolved hospital problems. *    Blood pressure 129/65, pulse 76, temperature 97.2 °F (36.2 °C), temperature source Oral, resp. rate 18, height 5' 6\" (1.676 m), weight 255 lb 8.2 oz (115.9 kg), SpO2 (P) 95 %, not currently breastfeeding. Subjective Feels better. Less weak  Objective  A&O, CTPA, IIR&R, nl. Abd. Trace edema, stiull diuresing  Assessment & Plan Continue present treatment. Plan for D/C to home with home care and family.     Gita Raza MD  2/9/2021

## 2021-02-09 NOTE — PROGRESS NOTES
[address]                                                                        [address] [address]                                                                 Peri Barnett                                                         Phone Number: [phone]                                                           Fax Number: [phone]                                                             Nephrology Progress Note    Chief Complaint: Shortness of breath/CHF    Reason for Consultation: Volume overload / CKD IV - follows with my partner Dr Della Francis on an outpatient basis - Baseline creatinine ~ 1.6 mg/dl. Last seen in the office 1/5/2021    History of Present Illness: Ms Laquita Pollard is an 81 yo morbidly obese female with a past medical history significant for A Fib, Pulmonary Hypertension, Diastolic CHF, Hypertension, DM, and Hyperlipidemia that presented to the ED with shortness of breath. CXR revealed mild pulmonary congestion. She was admitted with a working diagnosis of acute decompensated HF. Creatinine stable at baseline. Patients daughter stated mother was having urinary hesitancy prior to admission but now that weeks is in symptom has improved. Patient stated that she was probably consuming more salt than she should    Subjective:    Resting in recliner; NAD; breathing improving    ROS: + worsening LE edema; + shortness of breath - both are improving; + urinary hesitancy;  All other ROS negative    Scheduled Meds:   torsemide  50 mg Oral BID    carvedilol  6.25 mg Oral BID WC    magnesium cl-calcium carbonate  2 tablet Oral Daily    [START ON 2/8/2022] warfarin (COUMADIN) daily dosing (placeholder)   Other RX Placeholder    cetirizine  10 mg Oral Daily    dilTIAZem  120 mg Oral BID    ciprofloxacin  250 mg Oral 2 times per day    gabapentin  200 mg Oral Nightly    glipiZIDE  5 mg Oral BID AC    potassium chloride  20 mEq Oral BID WC    latanoprost  1 drop Right Eye Nightly    atorvastatin  10 mg Oral Nightly    pantoprazole  40 mg Oral Daily    triamcinolone   Topical BID    sodium chloride flush  10 mL Intravenous 2 times per day    losartan  25 mg Oral Daily    insulin lispro  0-6 Units Subcutaneous TID WC    insulin lispro  0-3 Units Subcutaneous Nightly        dextrose         PRN Meds:.acetaminophen, prochlorperazine, sodium chloride flush, promethazine **OR** ondansetron, polyethylene glycol, acetaminophen **OR** acetaminophen, perflutren lipid microspheres, melatonin, glucose, dextrose, glucagon (rDNA), dextrose    Physical Exam:    TEMPERATURE:  Current - Temp: 97.4 °F (36.3 °C); Max - Temp  Av.7 °F (36.5 °C)  Min: 97.2 °F (36.2 °C)  Max: 98.2 °F (36.8 °C)  RESPIRATIONS RANGE: Resp  Av.7  Min: 16  Max: 18  PULSE RANGE: Pulse  Av.8  Min: 39  Max: 76  BLOOD PRESSURE RANGE:  Systolic (32NOP), MWV:117 , Min:107 , OIX:945   ; Diastolic (93HFF), SRJ:41, Min:59, Max:76    24HR INTAKE/OUTPUT:      Intake/Output Summary (Last 24 hours) at 2021 1056  Last data filed at 2021 1051  Gross per 24 hour   Intake 1320 ml   Output 2475 ml   Net -1155 ml         Patient Vitals for the past 96 hrs (Last 3 readings):   Weight   21 0530 255 lb 8.2 oz (115.9 kg)   21 0505 259 lb 0.7 oz (117.5 kg)   21 2355 259 lb 0.7 oz (117.5 kg)       General: Alert, Awake, NAD, Obese  HEENT: Normocephalic, atraumatic, Nose and ears appear externally without, MMM  Neck: No Thyromegaly, Trachea is midline, No Carotid bruit  Eyes: EOMI, KEVEN  Chest: crackles bibasilar, no intercostal retractions  CVS: irregular, no murmur, no rub  Abdomen: soft, non tender, no organomegaly, no bruit appreciated  Extremities: 1/2+ edema, no cyanosis.   Skin: normal texture, normal skin turgor, no rash  Musculoskeletal: normal ROM, no joint swelling, no visible deformity  Neurological: CN intact, no focal motor neurological deficit  Psych: normal affect; AAO x 3  : weeks in place; no hematuria noted    Allergies:   Allergies   Allergen Reactions    Naproxen Itching    Bactrim [Sulfamethoxazole-Trimethoprim]     Levofloxacin     Pcn [Penicillins]     Sulfa Antibiotics       Past Medical History:   Diagnosis Date    Anemia     Chronic kidney disease     Combined systolic and diastolic congestive heart failure (HCC)     Diabetes mellitus (HCC)     DVT (deep venous thrombosis) (HCC)     Hyperlipidemia     Hypertension     Osteoarthritis     PAF (paroxysmal atrial fibrillation) (HCC)     Pulmonary embolism (HCC)     Sarcoidosis     in remission     Thyroid disease     Hypothyroidism    Type II or unspecified type diabetes mellitus without mention of complication, not stated as uncontrolled      Past Surgical History:   Procedure Laterality Date    HYSTERECTOMY      INTRACAPSULAR CATARACT EXTRACTION Left 10/16/2020    PHACOEMULSIFICATION WITH INTRAOCULAR LENS IMPLANT - LEFT EYE performed by Liam Fry MD at [address] [place] [address] Bilateral     TKT    JOINT REPLACEMENT Bilateral     THR    KNEE SURGERY      bilateral total knees     Family History   Problem Relation Age of Onset    Heart Failure Mother     Cancer Brother     Asthma Neg Hx     Diabetes Neg Hx     Emphysema Neg Hx     Hypertension Neg Hx      Social History     Socioeconomic History    Marital status:      Spouse name: Not on file    Number of children: Not on file    Years of education: Not on file    Highest education level: Not on file   Occupational History    Not on file   Social Needs    Financial resource strain: Not on file    Food insecurity     Worry: Not on file     Inability: Not on file    Transportation needs     Medical: Not on file     Non-medical: Not on file   Tobacco Use    Smoking status: Never Smoker    Smokeless tobacco: Never Used    Tobacco comment: Never smoked   Substance and Sexual Activity    Alcohol use: No    Drug use: No    Sexual activity: Yes     Partners: Male   Lifestyle    Physical activity     Days per week: Not on file     Minutes per session: Not on file    Stress: Not on file   Relationships    Social connections     Talks on phone: Not on file     Gets together: Not on file     Attends Mandaeism service: Not on file     Active member of club or organization: Not on file     Attends meetings of clubs or organizations: Not on file     Relationship status: Not on file    Intimate partner violence     Fear of current or ex partner: Not on file     Emotionally abused: Not on file     Physically abused: Not on file     Forced sexual activity: Not on file   Other Topics Concern    Not on file   Social History Narrative    Not on file         LAB DATA:    CBC:   Lab Results   Component Value Date    WBC 9.4 02/07/2021    RBC 3.90 02/07/2021    HGB 11.1 02/07/2021    HCT 34.7 02/07/2021    MCV 89.0 02/07/2021    MCH 28.6 02/07/2021    MCHC 32.1 02/07/2021    RDW 18.7 02/07/2021     02/07/2021    MPV 8.4 02/07/2021     BMP:    Lab Results   Component Value Date     02/09/2021    K 3.8 02/09/2021    K 4.7 02/07/2021    CL 97 02/09/2021    CO2 30 02/09/2021    BUN 28 02/09/2021    CREATININE 1.6 02/09/2021    CALCIUM 8.8 02/09/2021    GFRAA 37 02/09/2021    GFRAA 51 12/20/2012    LABGLOM 31 02/09/2021    GLUCOSE 55 02/09/2021     Ionized Calcium:  No results found for: IONCA  Magnesium:    Lab Results   Component Value Date    MG 2.10 02/09/2021     Phosphorus:    Lab Results   Component Value Date    PHOS 3.7 02/09/2021     U/A:    Lab Results   Component Value Date    COLORU YELLOW 02/07/2021    PHUR 5.5 02/07/2021    WBCUA 1 02/07/2021    RBCUA 4 02/07/2021    MUCUS 2+ 02/07/2021    BACTERIA 4+ 02/07/2021    CLARITYU CLOUDY 02/07/2021    SPECGRAV 1.013 02/07/2021    LEUKOCYTESUR Negative 02/07/2021 UROBILINOGEN 1.0 02/07/2021    BILIRUBINUR Negative 02/07/2021    BLOODU Negative 02/07/2021    GLUCOSEU Negative 02/07/2021         IMPRESSION/RECOMMENDATIONS:      Active Problems:    CHF (congestive heart failure), NYHA class III, acute on chronic, combined ([address]    Uncontrolled type 2 diabetes mellitus with hyperglycemia (HCC)    CHF (congestive heart failure), NYHA class I, acute on chronic, combined (HCC)    Acute congestive heart failure (HCC)    Severe aortic stenosis  Resolved Problems:    * No resolved hospital problems. *    1. CKD - creatinine stable at baseline  - avoid exposure to Nephrotoxic agents    2. Diastolic CHF - Switched to oral Torsemide 2/9/21  - monitor kidney function as [place]    3. Morbid obesity    4. HTN - controlled  - Avoid Hypotension  - Reduced Coreg dose (Bradycardic at times)    5. Hypomagnesemia - replaced - corrected    6. Anemia CKD - Hgb at target    7.  CKD-MBD - phosphorus at target

## 2021-02-09 NOTE — DISCHARGE INSTR - COC
 Primary localized osteoarthrosis of shoulder region M19.019    Nonrheumatic aortic (valve) stenosis I35.0    Hyperthyroidism E05.90    Acute kidney injury superimposed on chronic kidney disease (HCC) N17.9, N18.9    Hyponatremia E87.1    Combined systolic and diastolic congestive heart failure ([facility]) I50.40    Anemia due to chronic kidney disease N18.9, D63.1    Acute respiratory failure with hypoxia ([facility]) J96.01    DAVION (acute kidney injury) ([facility] N17.9    Uncontrolled type 2 diabetes mellitus with hyperglycemia (formerly [facility]) E11.65    CHF (congestive heart failure), NYHA class I, acute on chronic, combined (formerly [facility]) I50.43    Acute congestive heart failure (formerly [facility]) I50.9    Severe aortic stenosis I35.0       Isolation/Infection:   Isolation            No Isolation          Patient Infection Status       Infection Onset Added Last Indicated Last Indicated By Review Planned Expiration Resolved Resolved By    None active    Resolved    COVID-19 Rule Out 02/07/21 02/07/21 02/07/21 COVID-19 (Ordered)   02/07/21 Rule-Out Test Resulted    C-diff Rule Out 09/05/20 09/05/20 09/05/20 GI Bacterial Pathogens By PCR (Ordered)   09/06/20 Nyla Patel RN    COVID-19 Rule Out 09/04/20 09/04/20 09/04/20 COVID-19 (Ordered)   09/04/20 Rule-Out Test Resulted    C-diff Rule Out 09/04/20 09/04/20 09/04/20 Clostridium Difficile Toxin/Antigen (Ordered)   09/04/20 Rule-Out Test Resulted            Nurse Assessment:  Last Vital Signs: /68   Pulse 58   Temp 98.2 °F (36.8 °C) (Oral)   Resp 18   Ht 5' 6\" (1.676 m)   Wt 259 lb 0.7 oz (117.5 kg)   LMP  (LMP Unknown)   SpO2 98%   BMI 41.81 kg/m²     Last documented pain score (0-10 scale): Pain Level: 0  Last Weight:   Wt Readings from Last 1 Encounters:   02/08/21 259 lb 0.7 oz (117.5 kg)     Mental Status:  oriented and alert    IV Access:  - None    Nursing Mobility/ADLs:  Walking   Assisted  Transfer  Assisted  Bathing  Assisted  Dressing  Assisted  Toileting (especially at night). Report these finding even if no increase in weight.  o Dehydration:  having difficulty or a decrease in urination, dizziness, worsening fatigue, or new onset/worsening of generalized weakness.  Please continue a LOW SODIUM diet and LIMIT fluid intake to 48 - 64 ounces ( 1.5 - 2 liters) per day.  Call Jc Jaime [phone] and [address] [phone] and/or Ankit Ibeth Ospina @ [phone] with any questions or concerns.  Please continue heart failure education to patient and family/support system.  See After Visit Summary for hospital follow up appointment details.  Consider spiritual care referral for support and/or completion of advance directives [phone].  Consider: [address] [address] telehealth program if patient agreeable and able to participate, palliative care consult for ongoing goals of care, end of life, and/or chronic disease management discussions and referral to [facility] ([phone]) once SNF/HHC complete. Patient's personal belongings (please select all that are sent with patient):  Mar    RN SIGNATURE:  Electronically signed by Neetu Ramires RN on 2/13/21 at 11:47 AM EST    CASE MANAGEMENT/SOCIAL WORK SECTION    Inpatient Status Date: 02/07/21    Readmission Risk Assessment Score:  Readmission Risk              Risk of Unplanned Readmission:        31           Discharging to Facility/ Agency   · Name: [address]  · Address:  · Phone:[phone]  · Fax:[phone]        / signature:Electronically signed by Rosa Pineda on 2/13/2021 at 2:11 PM      PHYSICIAN SECTION    Prognosis: Fair    Condition at Discharge: Stable    Rehab Potential (if transferring to Rehab):  Fair    Recommended Labs or Other Treatments After Discharge: PT/OT, CBC, RENAL in 1 wbeek    Physician Certification: I certify the above information and transfer of Christiano Hutchins  is necessary for the continuing treatment of the diagnosis listed and that she requires {Admit to Appropriate Level of Care:10764} for less 30 days.      Update Admission H&P: Changes in H&P as follows - Less edema, lessSOB    PHYSICIAN SIGNATURE:  Electronically signed by Tianna Ledbetter MD on 2/12/21 at 7:53 AM EST

## 2021-02-09 NOTE — PROGRESS NOTES
Physical Therapy  Julia Stover  [phone]  [id number]    PCA requesting assist as pt on commode and she was unable to get up. Pt needed mod A for sit->stand transfer from commode. Stood for several minutes while PCA cleansed pt.   Amb 22' with RW with slow small steps CGA/min A with therapist managing O2 line; assisted with positioning in chair for comfort and pressure relief with LEs elevated and all needs in reach  Time spent with pt 20 min  [address] [facility],Third Floor, [address] [facility] [address]

## 2021-02-09 NOTE — PLAN OF CARE
Problem: Falls - Risk of:  Goal: Will remain free from falls  Description: Will remain free from falls  Outcome: Ongoing  Goal: Absence of physical injury  Description: Absence of physical injury  Outcome: Ongoing     Problem: Infection:  Goal: Will remain free from infection  Description: Will remain free from infection  Outcome: Ongoing     Problem: Safety:  Goal: Free from accidental physical injury  Description: Free from accidental physical injury  Outcome: Ongoing  Goal: Free from intentional harm  Description: Free from intentional harm  Outcome: Ongoing     Problem: Daily Care:  Goal: Daily care needs are met  Description: Daily care needs are met  Outcome: Ongoing     Problem: Pain:  Goal: Patient's pain/discomfort is manageable  Description: Patient's pain/discomfort is manageable  Outcome: Ongoing     Problem: Skin Integrity:  Goal: Skin integrity will stabilize  Description: Skin integrity will stabilize  Outcome: Ongoing     Problem: Discharge Planning:  Goal: Patients continuum of care needs are met  Description: Patients continuum of care needs are met  Outcome: Ongoing     Problem: OXYGENATION/RESPIRATORY FUNCTION  Goal: Patient will maintain patent airway  Outcome: Ongoing  Goal: Patient will achieve/maintain normal respiratory rate/effort  Description: Respiratory rate and effort will be within normal limits for the patient  Outcome: Ongoing     Problem: FLUID AND ELECTROLYTE IMBALANCE  Goal: Fluid and electrolyte balance are achieved/maintained  Outcome: Ongoing     Problem: ACTIVITY INTOLERANCE/IMPAIRED MOBILITY  Goal: Mobility/activity is maintained at optimum level for patient  Outcome: Ongoing     Problem: Skin Integrity:  Goal: Will show no infection signs and symptoms  Description: Will show no infection signs and symptoms  Outcome: Ongoing  Goal: Absence of new skin breakdown  Description: Absence of new skin breakdown  Outcome: Ongoing

## 2021-02-09 NOTE — PROGRESS NOTES
Occupational Therapy  Facility/Department: [facility] 5W PROGRESSIVE CARE  Daily Treatment Note  NAME: Magda Martines  : 1934  MRN: [id number]    Date of Service: 2021    Discharge Recommendations:  24 hour supervision or assist, Home with Home health OT  OT Equipment Recommendations  Equipment Needed: No    Magda Martines scored a 15/24 on the AM-PAC ADL Inpatient form. Current research shows that an AM-PAC score of 18 or greater is typically associated with a discharge to the patient's home setting. Based on the patient's AM-PAC score, and their current ADL deficits, it is recommended that the patient have 2-3 sessions per week of Occupational Therapy at d/c to increase the patient's independence. At this time, this patient demonstrates the endurance and safety to discharge home with home therapy and a follow up treatment frequency of 2-3x/wk. Please see assessment section for further patient specific details. If patient discharges prior to next session this note will serve as a discharge summary. Please see below for the latest assessment towards goals. Assessment   Performance deficits / Impairments: Decreased functional mobility ; Decreased strength;Decreased endurance;Decreased ADL status; Decreased safe awareness;Decreased high-level IADLs;Decreased balance  Assessment: 81 y/o female admitted 2021 with CHF exacerbation. Rapid COVID test negative. PTA pt lives at home with spouse. Pt reports she is independent with ADLs and functional mobility with RW. Pt has 10 children who are very involved in care and someone is always with patient at home to assist if needed. Today, pt required min A to stand from chair and bed. Pt required rocking/momentum and increase time to walk feet under pt before transitioning hands to RW. Pt completed functional mobility in room to bed with Rw- easily fatigued and reports dorita LE edema limiting mobility. Pt with decreased dorita UE shoulder ROM.  ANticipate pt will require up to max A for LB ADLs at this time based on endurance and ROM observed. Pt will benefit from skilled therapy at while in hospital. Anticipate pt will improve well enough to return home with family support and home OT. Prognosis: Good  OT Education: OT Role;Plan of Care;Transfer Training  REQUIRES OT FOLLOW UP: Yes  Activity Tolerance  Activity Tolerance: Patient limited by fatigue  Activity Tolerance: fatigues easily  Safety Devices  Safety Devices in place: Yes  Type of devices: Nurse notified;Gait belt;Bed alarm in place; Left in bed;Call light within reach         Patient Diagnosis(es): The primary encounter diagnosis was Acute congestive heart failure, unspecified heart failure type ([facility] A diagnosis of Hyponatremia was also pertinent to this visit. has a past medical history of Anemia, Chronic kidney disease, Combined systolic and diastolic congestive heart failure ([address] Diabetes mellitus ([address] DVT (deep venous thrombosis) ([facility] Hyperlipidemia, Hypertension, Osteoarthritis, PAF (paroxysmal atrial fibrillation) ([facility] [address] Pulmonary embolism ([facility] [address] Sarcoidosis, Thyroid disease, and Type II or unspecified type diabetes mellitus without mention of complication, not stated as uncontrolled. has a past surgical history that includes Hysterectomy; knee surgery; joint replacement (Bilateral); joint replacement (Bilateral); and Intracapsular cataract extraction (Left, 10/16/2020). Restrictions  Restrictions/Precautions  Restrictions/Precautions: Fall Risk  Position Activity Restriction  Other position/activity restrictions: 1L O1 via NC. [place]     Subjective   General  Chart Reviewed: Yes  Patient assessed for rehabilitation services?: Yes  Additional Pertinent Hx: 79 y/o female admitted 2/7/2021 with CHF exacerbation. Rapid COVID test negative.   Family / Caregiver Present: Yes(daughter)  Referring Practitioner: Genna Trinidad MD  Diagnosis: CHF  Subjective  Subjective: Pt seen bedside and Concurrent Group Co-treatment   Time In 1345         Time Out 1415         Minutes 30         Timed Code Treatment Minutes: 30 Minutes     This note to serve as OT d/c summary if pt is d/c-ed prior to next therapy session.     Emerson Flores, OTR/L

## 2021-02-09 NOTE — PROGRESS NOTES
Clinical Pharmacy Note  Warfarin Consult    Huy Berman is a 80 y.o. female receiving warfarin managed by pharmacy. Warfarin Indication: Afib  Target INR range: 2-3   Dose prior to admission: 5mg daily except 2.5 mg Thurs    Current warfarin drug-drug interactions: Cipro    Recent Labs     02/07/21  1729 02/08/21  0549 02/09/21  0508   HGB 11.1*  --   --    HCT 34.7*  --   --    INR 2.57* 2.61* 3.23*       Assessment/Plan:    Hold Warfarin tonight with elevated INR. Daily PT/INR until stable within therapeutic range. Thank you for the consult. Will continue to follow.     Tara Michel, PharmD  2/9/2021 8:21 AM

## 2021-02-09 NOTE — CARE COORDINATION
[facility]  Diabetes Education   Progress Note       NAME:  [address] RECORD NUMBER:  [id number]  AGE: 80 y.o. GENDER: female  : 1934  TODAY'S DATE:  2021    Subjective   Reason for Diabetic Education Evaluation and Assessment: hypoglycemia     Veneda Goodpasture denies having any symptoms of low blood sugar this morning. Daughter is present for education session.       Visit Type: evaluation      Victor Hugo Calvillo is a 80 y.o. female referred by:     [] Physician  [] Nursing  [x] Chart Review   [] Other:     PAST MEDICAL HISTORY        Diagnosis Date    Anemia     Chronic kidney disease     Combined systolic and diastolic congestive heart failure ([address]     [facility] mellitus [facility]     DVT (deep venous thrombosis) [facility]     Hyperlipidemia     Hypertension     Osteoarthritis     PAF (paroxysmal atrial fibrillation) [facility]     Pulmonary embolism (HCC)     Sarcoidosis     in remission     Thyroid disease     Hypothyroidism    Type II or unspecified type diabetes mellitus without mention of complication, not stated as uncontrolled        PAST SURGICAL HISTORY    Past Surgical History:   Procedure Laterality Date    HYSTERECTOMY      INTRACAPSULAR CATARACT EXTRACTION Left 10/16/2020    PHACOEMULSIFICATION WITH INTRAOCULAR LENS IMPLANT - LEFT EYE performed by Nathan Wang MD at [facility] Bilateral     TKT    JOINT REPLACEMENT Bilateral     THR    KNEE SURGERY      bilateral total knees       FAMILY HISTORY    Family History   Problem Relation Age of Onset    Heart Failure Mother     Cancer Brother     Asthma Neg Hx     Diabetes Neg Hx     Emphysema Neg Hx     Hypertension Neg Hx        SOCIAL HISTORY    Social History     Tobacco Use    Smoking status: Never Smoker    Smokeless tobacco: Never Used    Tobacco comment: Never smoked   Substance Use Topics    Alcohol use: No    Drug use: No       ALLERGIES    Allergies   Allergen Reactions    Naproxen Itching    Bactrim [Sulfamethoxazole-Trimethoprim]     Levofloxacin     Pcn [Penicillins]     Sulfa Antibiotics        MEDICATIONS     torsemide  50 mg Oral BID    carvedilol  6.25 mg Oral BID WC    magnesium cl-calcium carbonate  2 tablet Oral Daily    [START ON 2/8/2022] warfarin (COUMADIN) daily dosing (placeholder)   Other RX Placeholder    cetirizine  10 mg Oral Daily    dilTIAZem  120 mg Oral BID    ciprofloxacin  250 mg Oral 2 times per day    gabapentin  200 mg Oral Nightly    glipiZIDE  5 mg Oral BID AC    potassium chloride  20 mEq Oral BID WC    latanoprost  1 drop Right Eye Nightly    atorvastatin  10 mg Oral Nightly    pantoprazole  40 mg Oral Daily    triamcinolone   Topical BID    sodium chloride flush  10 mL Intravenous 2 times per day    losartan  25 mg Oral Daily    insulin lispro  0-6 Units Subcutaneous TID WC    insulin lispro  0-3 Units Subcutaneous Nightly       Objective        Patient Active Problem List   Diagnosis Code    CHF (congestive heart failure), NYHA class III, acute on chronic, combined ([facility]) I50.43    HTN (hypertension) I10    Pulmonary HTN ([facility]) I27.20    Hyperlipidemia E78.5    Primary localized osteoarthrosis of shoulder region M19.019    Nonrheumatic aortic (valve) stenosis I35.0    Hyperthyroidism E05.90    Acute kidney injury superimposed on chronic kidney disease ([facility]) N17.9, N18.9    Hyponatremia E87.1    Combined systolic and diastolic congestive heart failure ([facility]) I50.40    Anemia due to chronic kidney disease N18.9, D63.1    Acute respiratory failure with hypoxia ([facility]) J96.01    DAVION (acute kidney injury) ([facility] N17.9    Uncontrolled type 2 diabetes mellitus with hyperglycemia ([facility]) E11.65    CHF (congestive heart failure), NYHA class I, acute on chronic, combined ([facility]) I50.43    Acute congestive heart failure ([facility]) I50.9    Severe aortic stenosis I35.0        /61   Pulse 55   Temp 97.4 °F (36.3 °C) (Oral)   Resp 18   Ht 5' 6\" (1.676 m)   Wt 255 lb 8.2 oz (115.9 kg)   LMP  (LMP Unknown)   SpO2 97%   BMI 41.24 kg/m²     HgBA1c:    Lab Results   Component Value Date    LABA1C 7.5 02/08/2021       Recent Labs     02/08/21 2015 02/09/21  0636 02/09/21  0703 02/09/21  0748   POCGLU 126* 56* 75 102*       BUN/Creatinine:    Lab Results   Component Value Date    BUN 28 02/09/2021    CREATININE 1.6 02/09/2021       Assessment        Diabetes Management and Education    Does the patient have a Primary Care Physician? Yes, Lawremiyl Rain MD       Does the patient require new medication instruction? No    Does the patient/caregiver monitor Blood Glucoses? Yes. Denies any BG less than 70 at home. Reviewed glycemic control targets, testing frequency and when to call PCP. Level of patient/caregiver understanding able to:        [x] Verbalized Understanding   [] Demonstrated Understanding       [] Teach Back       [] Needs Reinforcement     []  Other:        Does the patient/caregiver follow a Meal Plan? No: Skips meals at home. Reviewed importance of eating three meals per day. Small meal options with modest carbs and lower sodium identified. Level of patient/caregiver understanding able to:       [x] Verbalized Understanding   [] Demonstrated Understanding       [] Teach Back       [] Needs Reinforcement     []  Other:      Does the patient/caregiver understand S/S of Hypoglycemia? No: Denies nay symptoms. Reviewed symptoms, prevention and treatment. Level of patient/caregiver understanding able to:       [x] Verbalized Understanding   [] Demonstrated Understanding       [] Teach Back       [] Needs Reinforcement     [x]  Other:  Agrees to notify staff if she has any symptoms. Plan        Ongoing diabetes education and blood glucose monitoring. Recommend inpatient targets 140 - 180 and outpatient targets 100 - 180. Discharge Plan:  Discharge needs: no prescription needs identified       Teaching Time Diabetes Education:  20 minutes     Electronically signed by Gio Sousa on 2/9/2021 at 11:00 AM

## 2021-02-10 LAB
ALBUMIN SERPL-MCNC: 3.2 G/DL (ref 3.4–5)
ANION GAP SERPL CALCULATED.3IONS-SCNC: 7 MMOL/L (ref 3–16)
BUN BLDV-MCNC: 31 MG/DL (ref 7–20)
CALCIUM SERPL-MCNC: 9 MG/DL (ref 8.3–10.6)
CHLORIDE BLD-SCNC: 95 MMOL/L (ref 99–110)
CO2: 33 MMOL/L (ref 21–32)
CREAT SERPL-MCNC: 1.8 MG/DL (ref 0.6–1.2)
GFR AFRICAN AMERICAN: 32
GFR NON-AFRICAN AMERICAN: 27
GLUCOSE BLD-MCNC: 107 MG/DL (ref 70–99)
GLUCOSE BLD-MCNC: 53 MG/DL (ref 70–99)
GLUCOSE BLD-MCNC: 61 MG/DL (ref 70–99)
GLUCOSE BLD-MCNC: 72 MG/DL (ref 70–99)
GLUCOSE BLD-MCNC: 73 MG/DL (ref 70–99)
GLUCOSE BLD-MCNC: 81 MG/DL (ref 70–99)
GLUCOSE BLD-MCNC: 85 MG/DL (ref 70–99)
GLUCOSE BLD-MCNC: 88 MG/DL (ref 70–99)
GLUCOSE BLD-MCNC: 88 MG/DL (ref 70–99)
GLUCOSE BLD-MCNC: 97 MG/DL (ref 70–99)
HCT VFR BLD CALC: 34 % (ref 36–48)
HEMOGLOBIN: 10.5 G/DL (ref 12–16)
INR BLD: 2.9 (ref 0.86–1.14)
MAGNESIUM: 1.9 MG/DL (ref 1.8–2.4)
MCH RBC QN AUTO: 28.3 PG (ref 26–34)
MCHC RBC AUTO-ENTMCNC: 30.9 G/DL (ref 31–36)
MCV RBC AUTO: 91.6 FL (ref 80–100)
PDW BLD-RTO: 18.3 % (ref 12.4–15.4)
PERFORMED ON: ABNORMAL
PERFORMED ON: ABNORMAL
PERFORMED ON: NORMAL
PHOSPHORUS: 3.4 MG/DL (ref 2.5–4.9)
PLATELET # BLD: 151 K/UL (ref 135–450)
PMV BLD AUTO: 8.7 FL (ref 5–10.5)
POC ACT LR: 184 SEC
POC ACT LR: 254 SEC
POTASSIUM SERPL-SCNC: 4.2 MMOL/L (ref 3.5–5.1)
PRO-BNP: 6166 PG/ML (ref 0–449)
PROTHROMBIN TIME: 34 SEC (ref 10–13.2)
RBC # BLD: 3.71 M/UL (ref 4–5.2)
SODIUM BLD-SCNC: 135 MMOL/L (ref 136–145)
URIC ACID, SERUM: 11.5 MG/DL (ref 2.6–6)
WBC # BLD: 6.3 K/UL (ref 4–11)

## 2021-02-10 PROCEDURE — 83880 ASSAY OF NATRIURETIC PEPTIDE: CPT

## 2021-02-10 PROCEDURE — C1769 GUIDE WIRE: HCPCS

## 2021-02-10 PROCEDURE — 93458 L HRT ARTERY/VENTRICLE ANGIO: CPT | Performed by: INTERNAL MEDICINE

## 2021-02-10 PROCEDURE — 85347 COAGULATION TIME ACTIVATED: CPT

## 2021-02-10 PROCEDURE — 93458 L HRT ARTERY/VENTRICLE ANGIO: CPT

## 2021-02-10 PROCEDURE — 99153 MOD SED SAME PHYS/QHP EA: CPT

## 2021-02-10 PROCEDURE — 2580000003 HC RX 258: Performed by: INTERNAL MEDICINE

## 2021-02-10 PROCEDURE — 83735 ASSAY OF MAGNESIUM: CPT

## 2021-02-10 PROCEDURE — 92986 REVISION OF AORTIC VALVE: CPT

## 2021-02-10 PROCEDURE — 2700000000 HC OXYGEN THERAPY PER DAY

## 2021-02-10 PROCEDURE — 97535 SELF CARE MNGMENT TRAINING: CPT

## 2021-02-10 PROCEDURE — 2580000003 HC RX 258

## 2021-02-10 PROCEDURE — 80069 RENAL FUNCTION PANEL: CPT

## 2021-02-10 PROCEDURE — 2500000003 HC RX 250 WO HCPCS

## 2021-02-10 PROCEDURE — 84550 ASSAY OF BLOOD/URIC ACID: CPT

## 2021-02-10 PROCEDURE — 85027 COMPLETE CBC AUTOMATED: CPT

## 2021-02-10 PROCEDURE — 97530 THERAPEUTIC ACTIVITIES: CPT

## 2021-02-10 PROCEDURE — B2111ZZ FLUOROSCOPY OF MULTIPLE CORONARY ARTERIES USING LOW OSMOLAR CONTRAST: ICD-10-PCS | Performed by: INTERNAL MEDICINE

## 2021-02-10 PROCEDURE — 92986 REVISION OF AORTIC VALVE: CPT | Performed by: INTERNAL MEDICINE

## 2021-02-10 PROCEDURE — 97110 THERAPEUTIC EXERCISES: CPT

## 2021-02-10 PROCEDURE — 94761 N-INVAS EAR/PLS OXIMETRY MLT: CPT

## 2021-02-10 PROCEDURE — 2709999900 HC NON-CHARGEABLE SUPPLY

## 2021-02-10 PROCEDURE — 85610 PROTHROMBIN TIME: CPT

## 2021-02-10 PROCEDURE — 4A023N7 MEASUREMENT OF CARDIAC SAMPLING AND PRESSURE, LEFT HEART, PERCUTANEOUS APPROACH: ICD-10-PCS | Performed by: INTERNAL MEDICINE

## 2021-02-10 PROCEDURE — 6360000002 HC RX W HCPCS

## 2021-02-10 PROCEDURE — 36415 COLL VENOUS BLD VENIPUNCTURE: CPT

## 2021-02-10 PROCEDURE — 6370000000 HC RX 637 (ALT 250 FOR IP): Performed by: INTERNAL MEDICINE

## 2021-02-10 PROCEDURE — C1894 INTRO/SHEATH, NON-LASER: HCPCS

## 2021-02-10 PROCEDURE — 2100000000 HC CCU R&B

## 2021-02-10 PROCEDURE — C1725 CATH, TRANSLUMIN NON-LASER: HCPCS

## 2021-02-10 PROCEDURE — 027F3ZZ DILATION OF AORTIC VALVE, PERCUTANEOUS APPROACH: ICD-10-PCS | Performed by: INTERNAL MEDICINE

## 2021-02-10 PROCEDURE — 97116 GAIT TRAINING THERAPY: CPT

## 2021-02-10 PROCEDURE — 99152 MOD SED SAME PHYS/QHP 5/>YRS: CPT

## 2021-02-10 RX ORDER — TORSEMIDE 100 MG/1
50 TABLET ORAL DAILY
Status: DISCONTINUED | OUTPATIENT
Start: 2021-02-11 | End: 2021-02-11

## 2021-02-10 RX ORDER — SODIUM CHLORIDE 0.9 % (FLUSH) 0.9 %
10 SYRINGE (ML) INJECTION EVERY 12 HOURS SCHEDULED
Status: DISCONTINUED | OUTPATIENT
Start: 2021-02-10 | End: 2021-02-13 | Stop reason: HOSPADM

## 2021-02-10 RX ORDER — CARVEDILOL 3.12 MG/1
3.12 TABLET ORAL 2 TIMES DAILY WITH MEALS
Status: DISCONTINUED | OUTPATIENT
Start: 2021-02-10 | End: 2021-02-11

## 2021-02-10 RX ORDER — ONDANSETRON 2 MG/ML
4 INJECTION INTRAMUSCULAR; INTRAVENOUS EVERY 6 HOURS PRN
Status: DISCONTINUED | OUTPATIENT
Start: 2021-02-10 | End: 2021-02-13 | Stop reason: HOSPADM

## 2021-02-10 RX ORDER — DEXTROSE MONOHYDRATE 50 MG/ML
INJECTION, SOLUTION INTRAVENOUS
Status: COMPLETED
Start: 2021-02-10 | End: 2021-02-10

## 2021-02-10 RX ORDER — ACETAMINOPHEN 325 MG/1
650 TABLET ORAL EVERY 4 HOURS PRN
Status: DISCONTINUED | OUTPATIENT
Start: 2021-02-10 | End: 2021-02-13 | Stop reason: HOSPADM

## 2021-02-10 RX ORDER — SODIUM CHLORIDE 0.9 % (FLUSH) 0.9 %
10 SYRINGE (ML) INJECTION PRN
Status: DISCONTINUED | OUTPATIENT
Start: 2021-02-10 | End: 2021-02-13 | Stop reason: HOSPADM

## 2021-02-10 RX ORDER — WARFARIN SODIUM 2.5 MG/1
2.5 TABLET ORAL
Status: COMPLETED | OUTPATIENT
Start: 2021-02-10 | End: 2021-02-10

## 2021-02-10 RX ORDER — DEXTROSE MONOHYDRATE 50 MG/ML
INJECTION, SOLUTION INTRAVENOUS CONTINUOUS
Status: DISCONTINUED | OUTPATIENT
Start: 2021-02-10 | End: 2021-02-13 | Stop reason: HOSPADM

## 2021-02-10 RX ADMIN — DEXTROSE MONOHYDRATE 12.5 G: 25 INJECTION, SOLUTION INTRAVENOUS at 13:24

## 2021-02-10 RX ADMIN — DEXTROSE MONOHYDRATE: 50 INJECTION, SOLUTION INTRAVENOUS at 07:39

## 2021-02-10 RX ADMIN — GABAPENTIN 200 MG: 100 CAPSULE ORAL at 23:02

## 2021-02-10 RX ADMIN — LATANOPROST 1 DROP: 50 SOLUTION OPHTHALMIC at 23:04

## 2021-02-10 RX ADMIN — ATORVASTATIN CALCIUM 10 MG: 10 TABLET, FILM COATED ORAL at 23:02

## 2021-02-10 RX ADMIN — DEXTROSE MONOHYDRATE 100 ML/HR: 5 INJECTION INTRAVENOUS at 05:19

## 2021-02-10 RX ADMIN — Medication 10 ML: at 23:05

## 2021-02-10 RX ADMIN — WARFARIN SODIUM 2.5 MG: 2.5 TABLET ORAL at 23:02

## 2021-02-10 RX ADMIN — DEXTROSE MONOHYDRATE 12.5 G: 25 INJECTION, SOLUTION INTRAVENOUS at 05:19

## 2021-02-10 RX ADMIN — DILTIAZEM HYDROCHLORIDE 120 MG: 120 CAPSULE, COATED, EXTENDED RELEASE ORAL at 23:03

## 2021-02-10 RX ADMIN — Medication 3 MG: at 23:02

## 2021-02-10 RX ADMIN — CIPROFLOXACIN HYDROCHLORIDE 250 MG: 250 TABLET, FILM COATED ORAL at 23:03

## 2021-02-10 ASSESSMENT — PAIN DESCRIPTION - PAIN TYPE: TYPE: SURGICAL PAIN

## 2021-02-10 ASSESSMENT — PAIN DESCRIPTION - ORIENTATION: ORIENTATION: RIGHT

## 2021-02-10 ASSESSMENT — PAIN DESCRIPTION - LOCATION: LOCATION: GROIN

## 2021-02-10 ASSESSMENT — PAIN SCALES - GENERAL: PAINLEVEL_OUTOF10: 0

## 2021-02-10 NOTE — CONSULTS
[facility]  HEART FAILURE PROGRAM      NAME:   Jessica Arthur RECORD NUMBER:  [id number]  AGE: 80 y.o.    GENDER: female  : 1934  TODAY'S DATE:  2/10/2021    Subjective:     VISIT TYPE: evaluation     ADMITTING PHYSICIAN:  Tianna Ledbetter MD    PAST MEDICAL HISTORY        Diagnosis Date    Anemia     Chronic kidney disease     Combined systolic and diastolic congestive heart failure (HCC)     Diabetes mellitus ([facility]     DVT (deep venous thrombosis) (HCC)     Hyperlipidemia     Hypertension     Osteoarthritis     [facility]     Pulmonary embolism (HCC)     Sarcoidosis     in remission     Thyroid disease     Hypothyroidism    Type II or unspecified type diabetes mellitus without mention of complication, not stated as uncontrolled        SOCIAL HISTORY    Social History     Tobacco Use    Smoking status: Never Smoker    Smokeless tobacco: Never Used    Tobacco comment: Never smoked   Substance Use Topics    Alcohol use: No    Drug use: No       ALLERGIES    Allergies   Allergen Reactions    Naproxen Itching    Bactrim [Sulfamethoxazole-Trimethoprim]     Levofloxacin     Pcn [Penicillins]     Sulfa Antibiotics        MEDICATIONS  Scheduled Meds:   warfarin  2.5 mg Oral Once    carvedilol  3.125 mg Oral BID WC    [START ON 2021] torsemide  50 mg Oral Daily    magnesium cl-calcium carbonate  2 tablet Oral Daily    [START ON 2022] warfarin (COUMADIN) daily dosing (placeholder)   Other RX Placeholder    cetirizine  10 mg Oral Daily    dilTIAZem  120 mg Oral BID    ciprofloxacin  250 mg Oral 2 times per day    gabapentin  200 mg Oral Nightly    potassium chloride  20 mEq Oral BID WC    latanoprost  1 drop Right Eye Nightly    atorvastatin  10 mg Oral Nightly    pantoprazole  40 mg Oral Daily    triamcinolone   Topical BID    sodium chloride flush  10 mL Intravenous 2 times per day    insulin lispro  0-6 MCV 89.0 91.6    151     BNP:   Lab Results   Component Value Date    BNP 54 07/31/2013       ECHOCARDIOGRAM:   2/8/21   Summary   There is concentric left ventricular hypertrophy. Ejection fraction is visually estimated to be 40-45%. Grade III diastolic dysfunction with elevated LV filling pressures. The left atrium is severely dilated. Severe aortic stenosis with a peak velocity of 5.23m/s and a mean pressure   gradient of 66mmHg. No evidence of significant aortic valve regurgitation. Dilated right ventricle. Right ventricular systolic function is mildly reduced . Estimated pulmonary artery systolic pressure is elevated at 56 mmHg assuming   a right atrial pressure of 15 mmHg. A hyperechoic structure is seen in liver pushing on IVC. Assessment:     CONSULTS:   IP CONSULT TO PRIMARY CARE PROVIDER  IP CONSULT TO HEART FAILURE NURSE/COORDINATOR  IP CONSULT TO HEART FAILURE NURSE/COORDINATOR  IP CONSULT TO DIETITIAN  IP CONSULT TO CARDIOLOGY  IP CONSULT TO CASE MANAGEMENT  IP CONSULT TO PHARMACY  IP CONSULT TO SPIRITUAL SERVICES  IP CONSULT TO NEPHROLOGY    Patient has a CARDIOLOGY CONSULT: Yes - Dr Sherry Webb following due to AS. Pt follows with Dr Carlisle Doctor. Patient taking an ACEI/ARB:  Yes- losartan        Patient taking a BETA BLOCKER:  Yes- coreg     SCALE AVAILABLE:  Yes     EDUCATION STATUS: Patient and Caregiver   [x]  Provided both written and verbal education on Heart Failure signs/symptoms. [x]  Provided instructions on daily medications. [x]  Provided instructions to monitor and record weight daily. [x]  Provided instructions to call if weight increases 3 lbs in one day or 5 lbs in one week. [x]  Received verbal acknowledgment/understanding of Heart Failure related causes. [x]  Provided instructions on how to maintain a low sodium diet.    []  Provided recommendations for smoking cessation programs  [x]  Provided recommendations on activity and exercise      [x] Other:    HF RN consulted per attending, chart reviewed. Pt came in with SOB and LE edema and is being treated for acute on chronic combined HF likely due to severe AS. She was diuresed with IV lasix which has since been transitioned to PO torsemide. Frequency of torsemide was decreased today. Both cardiology & nephrology are following. Pt is going to cath lab today per dtr for BAV. Pt was last admitted here back in September for HF. Per chart, dialysis wasn't needed and a TAVR wasn't indicated at that time. My fellow HF RN met with pt and family during that admission for HF education. Pt follows with Dr Alondra Martinez at [facility]. Last OV was 10/30/20. I called into room and introduced myself and my role in her care. Both pt and her dtr Andrey Torres were present. Andrey Torres was agreeable for me to call her cell phone #[phone] so can put on speaker for both her and pt to hear. They both state they are familiar with HF management but were agreeable for me to go over the education. Teaching done on 'What is HF', S&S of HF, HF Zones, and importance of daily wts. Pt states she does weigh herself daily and could speak to her dry weight of 251 lbs. Her dtr did ask at what point should they notify the MD. I reviewed the HF zones individually and the '3/5 rule'. They both verbalized understanding. Dietician has met with both pt and dtr in room. I reviewed that material and they denied any further questions. I briefly reviewed her medications. They state she is compliant with taking them and fill the scripts at [facility]. Therapy is following and are recommending Level 1 home care. Pt lives with her spouse and has 10 children which 7 of them are local. HF dc instructions have been added to AVS as well as to the [address] [place] [address]. They are aware I will get pt a cardiology hospital follow up appt and this will also be on the AVS. Appreciative of time spent.          CURRENT DIET: Diet NPO, After Midnight    EDUCATIONAL PACKETS PROVIDED- PRINTED FROM [facility]. Titles and material given:  Yes   []  What is Heart Failure? []  Heart Failure: Warning Signs of a Flare-Up  []  Heart Failure: Making Changes to Your Diet  []  Heart Failure: Medications to Help Your Heart   [x]  Other: [facility] HF educational material     PATIENT/CAREGIVER TEACHING:    Level of patient/caregiver understanding able to:   [x] Verbalize understanding   [] Demonstrate understanding       [x] Teach back        [] Needs reinforcement     []  Other:      TEACHING TIME:  20 minutes       Plan:       DISCHARGE PLAN:  Placement for patient upon discharge: home with support of spouse and their 10 children. Hospice Care:  no  Code Status: Full Code  Discharge appointment scheduled: This writer will get pt a 7 day hospital f/u appt. RECOMMENDATIONS:   [x]  Encourage to call Heart Failure Resource Line with any questions or concerns. [x]  Educate further Ms. Mills's on fluid restriction 48 oz- 64 oz during inpatient stay so she can understand how to measure intake at home. [x]  Continue to educate on S/S of Heart Failure. [x]  Emphasize daily weights, diet, and if changes, to call Heart Failure Resource Line  [x]  Other: Declines cardiac rehab at this time. Will be getting home care.             Electronically signed by Manisha Izquierdo, RN, BSN CHFN  on 2/10/2021 at 2:44 PM

## 2021-02-10 NOTE — PROGRESS NOTES
Physical Therapy  Facility/Department: [facility] 5W PROGRESSIVE CARE  Daily Treatment Note  NAME: Laura Pendleton  : 1934  MRN: [id number]    Date of Service: 2/10/2021  Discharge Recommendations:  24 hour supervision or assist, 2-3 sessions per week, Home with Home health PT   PT Equipment Recommendations  Equipment Needed: No  Other: pt has RW at home    Assessment   Body structures, Functions, Activity limitations: Decreased functional mobility   Assessment: Pt is an 79 yo female who was adm to hosp with SOB and LE swelling. Pt at baseline is able to amb with a RW without assist.  This date, pt needed Min A for sit<>stand transfers--however, tells therapist that she normally uses a Lift recliner chair at home, and uses to assist her standing. PT limited amb due to NPO status today for proceudre. Pt is blow her baseline, however, has assist at home from family as needed, per report. Will recommend home with 24/7 assist and home PT for discharge needs. Will cont to follow and progress as tolerated, work towards baseline as able. Laura Pendleton scored a 16/24 on the AM-PAC short mobility form. Current research shows that an AM-PAC score of 18 or greater is typically associated with a discharge to the patient's home setting. Based on the patient's AM-PAC score and their current functional mobility deficits, it is recommended that the patient have 2-3 sessions per week of Physical Therapy at d/c to increase the patient's independence. At this time, this patient demonstrates the endurance and safety to discharge home with 24 hr S/A and HOME PT. Please see assessment section for further patient specific details. If patient discharges prior to next session this note will serve as a discharge summary. Please see below for the latest assessment towards goals. Prognosis: Fair;Good  PT Education: General Safety;PT Role;Goals; Functional Mobility Training;Home Exercise Program  Barriers to Learning: none  REQUIRES PT FOLLOW UP: Yes  Activity Tolerance  Activity Tolerance: Patient Tolerated treatment well;Patient limited by fatigue;Patient limited by endurance  Activity Tolerance: limited some of amb due to NPO status this date. Patient Diagnosis(es): The primary encounter diagnosis was Acute congestive heart failure, unspecified heart failure type ([address] A diagnosis of Hyponatremia was also pertinent to this visit. has a past medical history of Anemia, Chronic kidney disease, Combined systolic and diastolic congestive heart failure ([address] Diabetes mellitus ([address] DVT (deep venous thrombosis) ([address] Hyperlipidemia, Hypertension, Osteoarthritis, PAF (paroxysmal atrial fibrillation) ([address] Pulmonary embolism ([address] Sarcoidosis, Thyroid disease, and Type II or unspecified type diabetes mellitus without mention of complication, not stated as uncontrolled. has a past surgical history that includes Hysterectomy; knee surgery; joint replacement (Bilateral); joint replacement (Bilateral); and Intracapsular cataract extraction (Left, 10/16/2020). Restrictions  Restrictions/Precautions  Restrictions/Precautions: Fall Risk  Position Activity Restriction  Other position/activity restrictions: 1L O1 via NC. [place]  Subjective   General  Chart Reviewed: Yes  Additional Pertinent Hx: Reshma Rico is a 80 y.o. female who presents to the emergency department with a complaint of shortness of breath. She says for 2 or 3 days she has had shortness of breath with physical activity or ambulation, not so much at rest, not particularly worse lying down. She says she has a mild nonproductive cough, but no cold symptoms or fever. She denies chest pain or abdominal pain. She says her appetite has been a little bit reduced over the past few days, but no vomiting or diarrhea. Denies any known exposure to COVID-19.   Says she has been having some dysuria over the past few days, called her primary care doctor yesterday and was prescribed an antibiotic. She also reports that she has had some increasing swelling in the legs bilaterally, and she does have some chronic edema there, with a history of CHF, but the swelling has been worsening. Denies any numbness. Response To Previous Treatment: Not applicable  Family / Caregiver Present: Yes(daughter at bedside)  Subjective  Subjective: Pt up in recliner, NPO for procedure sometime this date. Pt denied c/o pain and agreed to PT session. Orientation  Orientation  Overall Orientation Status: Within Functional Limits     Objective   Bed mobility  Supine to Sit: Unable to assess  Sit to Supine: Unable to assess  Comment: nt--pt up in recliner at start and end of session. Transfers  Sit to Stand: Minimal Assistance(with cues, even with momentum. Pt needed light assistance, but reported using and sitting in Lift chair normally at home.)  Stand to sit: Minimal Assistance  Bed to Chair: Minimal assistance  Ambulation  Ambulation?: Yes  Ambulation 1  Surface: level tile  Device: Rolling Walker  Assistance: Contact guard assistance;Minimal assistance  Quality of Gait: continues with very slow small shuffling steps; needed some assist with walker with turning; therapist managed O2 line  Distance: x 10 ft forward/backwards only. Comments: Limited ambulation this date due to pt fatigue and npo status for pending procedure this date. Stairs/Curb  Stairs?: No     Balance  Sitting - Static: Good  Sitting - Dynamic: Good  Standing - Static: Fair;+  Standing - Dynamic: Fair;+  Comments: S for sitting balance up in recliner; CGA for static standing with walker  Exercises  Hip Flexion: standing marches x 20 rep x 2 B LE, with rest between bouts; seated marches x 20 B LE  Knee Long Arc Quad: x 20 B LE  Ankle Pumps: x 20 B LE  Comments: Performed seated and standing ex as noted above.       AM-PAC Score  AM-PAC Inpatient Mobility Raw Score : 16 (02/10/21 3291)  AM-PAC Inpatient T-Scale Score : 40.78 (02/10/21 9606)  Mobility Inpatient CMS 0-100% Score: 54.16 (02/10/21 0939)  Mobility Inpatient CMS G-Code Modifier : CK (02/10/21 0939)     Goals  Short term goals  Time Frame for Short term goals: by discharge  Short term goal 1: bed mob MI from flat bed  Short term goal 2: transfers SBA  Short term goal 3: amb 48' with RW SBA while maintaining her O2 sats > 90%  Patient Goals   Patient goals : to return home    Plan    Plan  Times per week: 3-5x wk in acute setting  Current Treatment Recommendations: Functional Mobility Training  Safety Devices  Type of devices: Call light within reach, Chair alarm in place, Left in chair, Nurse notified, All fall risk precautions in place, Gait belt   Therapy Time   Individual Concurrent Group Co-treatment   Time In 0845         Time Out 0930         Minutes [address] PT Electronically signed by Janice Menon PT on 2/10/2021 at 9:40 AM

## 2021-02-10 NOTE — PROGRESS NOTES
(KENALOG) 0.1 % cream   Topical BID Gita Raza MD   Given at 02/09/21 2039    sodium chloride flush 0.9 % injection 10 mL  10 mL Intravenous 2 times per day Gita Raza MD   10 mL at 02/09/21 2040    sodium chloride flush 0.9 % injection 10 mL  10 mL Intravenous PRN Gita Raza MD        promethazine (PHENERGAN) tablet 12.5 mg  12.5 mg Oral Q6H PRN Gita Raza MD        Or    [facility]ron [facility] injection 4 mg  4 mg Intravenous Q6H PRN Gita Raza MD        polyethylene glycol [facility]) packet 17 g  17 g Oral Daily PRN Gita Raza MD        acetaminophen (TYLENOL) tablet 650 mg  650 mg Oral Q6H PRN Gita Raza MD        Or   Sherl Loge acetaminophen (TYLENOL) suppository 650 mg  650 mg Rectal Q6H PRN Gita Raza MD        perflutren lipid microspheres (DEFINITY) injection 1.65 mg  1.5 mL Intravenous ONCE PRN Gita Raza MD        losartan (COZAAR) tablet 25 mg  25 mg Oral Daily Gita Raza MD   25 mg at 02/09/21 0820    melatonin tablet 3 mg  3 mg Oral Nightly PRN Gita Raza MD        glucose (GLUTOSE) 40 % oral gel 15 g  15 g Oral PRN Gita Raza MD   15 g at 02/09/21 7808    dextrose 50 % IV solution  12.5 g Intravenous PRN Gita Raza MD   12.5 g at 02/10/21 0519    glucagon (rDNA) injection 1 mg  1 mg Intramuscular PRN Gita Raza MD        dextrose 5 % solution  100 mL/hr Intravenous PRN Gita Raza  mL/hr at 02/10/21 0519 100 mL/hr at 02/10/21 0519    insulin lispro (HUMALOG) injection vial 0-6 Units  0-6 Units Subcutaneous TID WC Gita Raza MD   2 Units at 02/09/21 1243    insulin lispro (HUMALOG) injection vial 0-3 Units  0-3 Units Subcutaneous Nightly Gita Raza MD   1 Units at 02/08/21 0103     Allergies   Allergen Reactions    Naproxen Itching    Bactrim [Sulfamethoxazole-Trimethoprim]     Levofloxacin     Pcn [Penicillins]     Sulfa Antibiotics

## 2021-02-10 NOTE — PROGRESS NOTES
Patient to transfer to CVICU room 1311 after procedure. Report called to RN, all questions answered. Patient's daughter Andrey Torres called and updated. Patient's belongings gathered and moved to room 1311.      Electronically signed by River Pina RN on 2/10/2021 at 5:49 PM

## 2021-02-10 NOTE — PROGRESS NOTES
[facility]  Cardiology Consult    Josue Dumont  1934    February 10, 2021        CC: Shortness of breath      Subjective:     History of Present Illness:    Josue Dumont is a 80 y.o. patient with a PMH significant for anemia chronic kidney disease heart failure diabetes mellitus presented with complaints of shortness of breath. History available from the chart patient is very somnolent unable to give much history. Complains of shortness of breath 2 to 3 days in onset no Covid exposure. No cough with sputum production noted dry cough has been present. No fever chills or rigors       Past Medical History:   has a past medical history of Anemia, Chronic kidney disease, Combined systolic and diastolic congestive heart failure ([address] Diabetes mellitus ([address] DVT (deep venous thrombosis) ([address] Hyperlipidemia, Hypertension, Osteoarthritis, PAF (paroxysmal atrial fibrillation) ([address] Pulmonary embolism ([address] Sarcoidosis, Thyroid disease, and Type II or unspecified type diabetes mellitus without mention of complication, not stated as uncontrolled. Surgical History:   has a past surgical history that includes Hysterectomy; knee surgery; joint replacement (Bilateral); joint replacement (Bilateral); and Intracapsular cataract extraction (Left, 10/16/2020). Social History:   reports that she has never smoked. She has never used smokeless tobacco. She reports that she does not drink alcohol or use drugs. Family History:  family history includes Cancer in her brother; Heart Failure in her mother. Home Medications:  Were reviewed and are listed in nursing record and/or below  Prior to Admission medications    Medication Sig Start Date End Date Taking?  Authorizing Provider   prochlorperazine (COMPAZINE) 5 MG tablet Take 5 mg by mouth every 6 hours as needed for Nausea   Yes Historical Provider, MD   ciprofloxacin (CIPRO) 500 MG tablet Take 1 tablet by mouth 2 times daily  9/14/20  Yes Historical Provider, MD   dilTIAZem (CARDIZEM CD) 120 MG extended release capsule Take 1 capsule by mouth every 12 hours 9/11/20  Yes Krysta Sexton MD   furosemide (LASIX) 20 MG tablet Take 1 tablet by mouth 2 times daily  Patient taking differently: Take 40 mg by mouth 2 times daily  9/11/20  Yes Krysta Sexton MD   carvedilol (COREG) 12.5 MG tablet Take 1 tablet by mouth 2 times daily (with meals) 9/9/20  Yes Krysta Sexton MD   latanoprost (XALATAN) 0.005 % ophthalmic solution Place 1 drop into the right eye nightly 7/7/20  Yes Historical Provider, MD   KLOR-CON M20 20 MEQ extended release tablet Take 20 mEq by mouth daily 6/28/20  Yes Historical Provider, MD   pantoprazole (PROTONIX) 40 MG tablet Take 40 mg by mouth daily 8/23/20  Yes Historical Provider, MD   warfarin (COUMADIN) 5 MG tablet Take 5 mg by mouth every evening Except 2.5mg every Thursday or as directed by [facility]mad[facility] [phone]   Yes Historical Provider, MD   cetirizine (ZYRTEC) 10 MG tablet Take 10 mg by mouth daily   Yes Historical Provider, MD   vitamin C (ASCORBIC ACID) 500 MG tablet Take 500 mg by mouth daily   Yes Historical Provider, MD   acetaminophen (TYLENOL) 500 MG tablet Take 1,000 mg by mouth every 6 hours as needed for Pain   Yes Historical Provider, MD   Calcium Carb-Cholecalciferol (CALTRATE 600+D) 600-800 MG-UNIT TABS Take 1 tablet by mouth daily    Yes Historical Provider, MD   gabapentin (NEURONTIN) 100 MG capsule Take 200 mg by mouth nightly. Yes Historical Provider, MD   glipiZIDE (GLUCOTROL) 5 MG tablet Take 5 mg by mouth 2 times daily (before meals). Yes Historical Provider, MD   lovastatin (MEVACOR) 10 MG tablet Take 10 mg by mouth nightly. Yes Historical Provider, MD   triamcinolone (KENALOG) 0.1 % cream Apply topically 2 times daily.  9/9/20   Krysta Sexton MD        CURRENT Medications:      dextrose 5 % solution, Continuous      warfarin (COUMADIN) tablet 2.5 mg, Once     carvedilol (COREG) tablet 3.125 mg, BID WC      [START ON 2/11/2021] torsemide (DEMADEX) tablet 50 mg, Daily      magnesium cl-calcium carbonate (SLOW-MAG) tablet 2 tablet, Daily      [START ON 2/8/2022] warfarin (COUMADIN) daily dosing (placeholder), RX Placeholder      acetaminophen (TYLENOL) tablet 1,000 mg, Q6H PRN      cetirizine (ZYRTEC) tablet 10 mg, Daily      dilTIAZem (CARDIZEM CD) extended release capsule 120 mg, BID      ciprofloxacin (CIPRO) tablet 250 mg, 2 times per day      gabapentin (NEURONTIN) capsule 200 mg, Nightly      potassium chloride (KLOR-CON M) extended release tablet 20 mEq, BID WC      latanoprost (XALATAN) 0.005 % ophthalmic solution 1 drop, Nightly      atorvastatin (LIPITOR) tablet 10 mg, Nightly      pantoprazole (PROTONIX) tablet 40 mg, Daily      prochlorperazine (COMPAZINE) tablet 5 mg, Q6H PRN      triamcinolone (KENALOG) 0.1 % cream, BID      sodium chloride flush 0.9 % injection 10 mL, 2 times per day      sodium chloride flush 0.9 % injection 10 mL, PRN      promethazine (PHENERGAN) tablet 12.5 mg, Q6H PRN    Or      ondansetron (ZOFRAN) injection 4 mg, Q6H PRN      polyethylene glycol (GLYCOLAX) packet 17 g, Daily PRN      acetaminophen (TYLENOL) tablet 650 mg, Q6H PRN    Or      acetaminophen (TYLENOL) suppository 650 mg, Q6H PRN      perflutren lipid microspheres (DEFINITY) injection 1.65 mg, ONCE PRN      melatonin tablet 3 mg, Nightly PRN      glucose (GLUTOSE) 40 % oral gel 15 g, PRN      dextrose 50 % IV solution, PRN      glucagon (rDNA) injection 1 mg, PRN      dextrose 5 % solution, PRN      insulin lispro (HUMALOG) injection vial 0-6 Units, TID WC      insulin lispro (HUMALOG) injection vial 0-3 Units, Nightly        Allergies:  Naproxen, Bactrim [sulfamethoxazole-trimethoprim], Levofloxacin, Pcn [penicillins], and Sulfa antibiotics           Review of Systems: SEE HPI   · Not available      Objective:     PHYSICAL EXAM:      Vitals: 02/10/21 1137   BP: (!) 147/80   Pulse: 71   Resp: 16   Temp: 98.2 °F (36.8 °C)   SpO2: 98%    Weight: 255 lb 8.2 oz (115.9 kg)       General Appearance:   Mildly somnolent. Head:  Normocephalic, without obvious abnormality, atraumatic. Eyes:  Pupils equal and round. No scleral icterus. Mouth: Moist mucosa, no pharyngeal erythema. Nose: Nares normal. No drainage or sinus tenderness. Neck: Supple, symmetrical, trachea midline. No adenopathy. No tenderness/mass/nodules. No carotid bruit or elevated JVD. Lungs:   Respiratory Effort: Normal   Auscultation: Clear to auscultation bilaterally, respirations unlabored. No wheeze, rales   Chest Wall:  No tenderness or deformity. Cardiovascular:    Pulses  Palpation: normal   Ascultation: Regular rate, S1/ S2 soft. 3/6 murmur, rub, or gallop. 2+ radial and pedal pulses, symmetric  Carotid  Femoral   Abdomen and Gastrointestinal:   Soft, non-tender, bowel sounds active. Liver and Spleen  Masses   Musculoskeletal: No muscle wasting  Back  Gait   Extremities: Extremities normal, atraumatic. No cyanosis or edema. No cyanosis clubbing       Skin: Inspection and palpation performed, no rashes or lesions.        Neurologic:  Somnolent      Labs     All labs have been reviewed    Lab Results   Component Value Date    WBC 6.3 02/10/2021    RBC 3.71 02/10/2021    HGB 10.5 02/10/2021    HCT 34.0 02/10/2021    MCV 91.6 02/10/2021    RDW 18.3 02/10/2021     02/10/2021     Lab Results   Component Value Date     02/10/2021    K 4.2 02/10/2021    K 4.7 02/07/2021    CL 95 02/10/2021    CO2 33 02/10/2021    BUN 31 02/10/2021    CREATININE 1.8 02/10/2021    GFRAA 32 02/10/2021    GFRAA 51 12/20/2012    AGRATIO 0.9 02/07/2021    LABGLOM 27 02/10/2021    GLUCOSE 53 02/10/2021    PROT 7.8 02/07/2021    PROT 7.5 12/20/2012    CALCIUM 9.0 02/10/2021    BILITOT 0.9 02/07/2021    ALKPHOS 74 02/07/2021    AST 23 02/07/2021    ALT 13 02/07/2021     No results found for: PTINR  Lab Results   Component Value Date    LABA1C 7.5 02/08/2021     Lab Results   Component Value Date    TROPONINI <0.01 02/07/2021       Cardiac, Vascular and Imaging Data all Personally Reviewed in Detail by Myself      EKG: Atrial fibrillation with slow ventricular responseLeft axis deviationLeft ventricular hypertrophy with QRS wideningST & T wave abnormality, consider lateral ischemia or digitalis effectAbnormal ECGWhen compared with ECG of 04-SEP-2020 12:04,Vent. rate has decreased BY  25 BPMLeft bundle branch block is no longer Present    Echocardiogram: Suboptimal image quality. Patient appears to be in atrial fibrillation. Left ventricular cavity size is normal.   There is moderate concentric left ventricular hypertrophy. Ejection fraction is visually estimated to be 13%   Diastolic filling parameters suggest at least grade II diastolic   dysfunction. Severe aortic stenosis with a peak velocity of 5.00 m/s and a mean pressure   gradient of 67 mmHg. Estimated pulmonary artery systolic pressure is borderline severely elevated   at 69 mmHg assuming a right atrial pressure of 15 mmHg. Chest x-ray  Stable cardiomegaly with mild central pulmonary congestion which is more   prominent       Hazy right basilar atelectasis or infiltrate and a small right pleural   effusion which is more apparent. Assessment and Plan     -Severe aortic valve stenosis. Mild CMP    Reviewed Echocardiogram  Will consider LHC and BAV    I had a detail discussion with the patient and the family members regarding the risk and benefit of the procedures. I explained to them the details of the procedure. I explained to them that the procedure will be performed using moderate sedation and local anaesthesia using lidocaine. I explained any risk of bleeding, perforation of the vessel, stroke, myocardial infarction or death.    Also explained to the patient need for any emergent open heart surgery and CABG to save the patient's life. The patient and the family members understood the risk and benefits and the details of procedure and would like to go ahead with the procedure. The patient gave informed consent. Acute diastolic heart failure with acute hypoxemic respiratory failure. Continue diuretic therapy. Persistent atrial fibrillation continue rate control. Hold warfarin for now. Chronic diastolic heart failure. Thank you for allowing us to participate in the care of Richard Cosme. Please do not hesitate to contact me if you have any questions.     Rony Mix MD, MPH    [address] [place] [address]  Ph: [phone]  Fax: [phone]    2/10/2021 6:23 PM

## 2021-02-10 NOTE — PROGRESS NOTES
Occupational Therapy  Facility/Department: [facility] 5 PROGRESSIVE CARE  Daily Treatment Note and Tentative D/C    NAME: Lavenia Bernheim  : 1934  MRN: [id number]    Date of Service: 2/10/2021    Discharge Recommendations: Lavenia Bernheim scored a 15/24 on the AM-PAC ADL Inpatient form. Current research shows that an AM-PAC score of 18 or greater is typically associated with a discharge to the patient's home setting. Based on the patient's AM-PAC score, and their current ADL deficits, it is recommended that the patient have 2-3 sessions per week of Occupational Therapy at d/c to increase the patient's independence. At this time, this patient demonstrates the endurance and safety to discharge home with [facility] and a follow up treatment frequency of 2-3x/wk. Please see assessment section for further patient specific details. If patient discharges prior to next session this note will serve as a discharge summary. Please see below for the latest assessment towards goals. 24 hour supervision or assist, Home with Home health OT  OT Equipment Recommendations  Equipment Needed: No  Other: pt with needed AD    Assessment   Performance deficits / Impairments: Decreased functional mobility ; Decreased strength;Decreased endurance;Decreased ADL status; Decreased safe awareness;Decreased high-level IADLs;Decreased balance  Assessment: Pt tolerated session well but continues to be limited due to decreased overall strength and endurance in stance. Pt completes bed mobility with Min A. Pt completes functional mobility and transfers with Min A, RW, and increased time. Pt needing cues for RW safety. Pt able to tolerate 1-2 minutes in stance x2 attempts completing steps in place. Pt able to complete grooming seated in chair with setup. Pt reports good support from family at home. Continue with POC.   Prognosis: Good  OT Education: OT Role;Plan of Care;Transfer Training;ADL Adaptive Strategies  REQUIRES OT FOLLOW UP: Yes  Activity Tolerance  Activity Tolerance: Patient Tolerated treatment well;Patient limited by fatigue  Safety Devices  Safety Devices in place: Yes  Type of devices: Nurse notified;Gait belt;Call light within reach; Left in chair;Chair alarm in place         Patient Diagnosis(es): The primary encounter diagnosis was Acute congestive heart failure, unspecified heart failure type ([address] A diagnosis of Hyponatremia was also pertinent to this visit. has a past medical history of Anemia, Chronic kidney disease, Combined systolic and diastolic congestive heart failure ([address] Diabetes mellitus ([address] DVT (deep venous thrombosis) ([address] Hyperlipidemia, Hypertension, Osteoarthritis, PAF (paroxysmal atrial fibrillation) ([address] Pulmonary embolism ([address] Sarcoidosis, Thyroid disease, and Type II or unspecified type diabetes mellitus without mention of complication, not stated as uncontrolled. has a past surgical history that includes Hysterectomy; knee surgery; joint replacement (Bilateral); joint replacement (Bilateral); and Intracapsular cataract extraction (Left, 10/16/2020). Restrictions  Restrictions/Precautions  Restrictions/Precautions: Fall Risk  Position Activity Restriction  Other position/activity restrictions: 1L O1 via NC. Weeks  Subjective   General  Chart Reviewed: Yes  Patient assessed for rehabilitation services?: Yes  Additional Pertinent Hx: 79 y/o female admitted 2/7/2021 with CHF exacerbation. Rapid COVID test negative. Family / Caregiver Present: No  Referring Practitioner: Scotty Carmona MD  Diagnosis: CHF  Subjective  Subjective: Pt agreeable to OT treatment. Pt with no reports of pain. General Comment  Comments: Per RN ok for therapy.       Orientation  Orientation  Overall Orientation Status: Within Functional Limits  Objective    ADL  Grooming: Setup(seated in chair to complete denture care)  Toileting: Dependent/Total(weeks)        Balance  Sitting Balance: Supervision(seated EOB)  Standing Balance: Contact guard assistance  Standing Balance  Time: 1-2 minutes  Activity: static standing with RW and marching x10 reps x2 sets in stance  Comment: no overt LOB  Functional Mobility  Functional - Mobility Device: Rolling Walker  Activity: Other(short household distances in room; ~15ft)  Assist Level: Minimal assistance  Functional Mobility Comments: no overt LOB; pt completing with increased time; cues for staying [place] [facility]  Wheelchair Bed Transfers  Wheelchair/Bed - Technique: Ambulating(RW)  Equipment Used: Bed;Other(bed to chair)  Level of Asssistance: Minimal assistance  Bed mobility  Supine to Sit: Minimal assistance(HOB elevated)  Sit to Supine: Unable to assess  Scooting: Minimal assistance  Transfers  Sit to stand: Minimal assistance  Stand to sit: Minimal assistance  Transfer Comments: to/from RW; completed x1 from EOB; x2 from chair; pt rocking back and forth to assist        Cognition  Overall Cognitive Status: [facility]           Plan   Plan  Times per week: 3-5  Current Treatment Recommendations: Strengthening, Endurance Training, ROM, Balance Training, Gait Training, Functional Mobility Training, Positioning, Self-Care / ADL, Patient/Caregiver Education & Training    AM-PAC Score        AM-[facility] Inpatient Daily Activity Raw Score: 15 (02/10/21 0759)  AM-PAC Inpatient ADL T-Scale Score : 34.69 (02/10/21 0759)  ADL Inpatient CMS 0-100% Score: 56.46 (02/10/21 0759)  ADL Inpatient CMS G-Code Modifier : CK (02/10/21 0759)    Goals  Short term goals  Time Frame for Short term goals: Prior to DC: goals ongoing 2/10  Short term goal 1: Pt will complete ADL transfers with supervision  Short term goal 2: Pt will complete functional mobility with supervision  Short term goal 3: Pt will tolerate standing > 3 min for functional task with supervision  Short term goal 4: Pt will complete toileting with supervision  Short term goal 5: Pt will complete LB dressing with supervision  Patient Goals Patient goals : to return home       Therapy Time   Individual Concurrent Group Co-treatment   Time In 0720         Time Out 0759         Minutes 39         Timed Code Treatment Minutes: [address], OTR/L

## 2021-02-10 NOTE — PROGRESS NOTES
[address]                                                                        [address] [address]                                                                 Peri Barnett                                                         Phone Number: [phone]                                                           Fax Number: [phone]                                                             Nephrology Progress Note    Chief Complaint: Shortness of breath/CHF    Reason for Consultation: Volume overload / CKD IV - follows with my partner Dr Uday Montemayor on an outpatient basis - Baseline creatinine ~ 1.6 mg/dl. Last seen in the office 1/5/2021    History of Present Illness: Ms Nelson is an 79 yo morbidly obese female with a past medical history significant for A Fib, Pulmonary Hypertension, Diastolic CHF, Hypertension, DM, and Hyperlipidemia that presented to the ED with shortness of breath. CXR revealed mild pulmonary congestion. She was admitted with a working diagnosis of acute decompensated HF. Creatinine stable at baseline. Patients daughter stated mother was having urinary hesitancy prior to admission but now that weeks is in symptom has improved. Patient stated that she was probably consuming more salt than she should    Subjective:    Resting in bed; NAD; breathing improving    ROS: + worsening LE edema; + shortness of breath - both are improving; + urinary hesitancy;  All other ROS negative    Scheduled Meds:   warfarin  2.5 mg Oral Once    carvedilol  3.125 mg Oral BID WC    [START ON 2/11/2021] torsemide  50 mg Oral Daily    magnesium cl-calcium carbonate  2 tablet Oral Daily    [START ON 2/8/2022] warfarin (COUMADIN) daily dosing (placeholder)   Other RX Placeholder    cetirizine  10 mg Oral Daily    dilTIAZem  120 mg Oral BID    ciprofloxacin  250 mg Oral 2 times per day    gabapentin  200 mg Oral Nightly    potassium chloride  20 mEq Oral BID WC    latanoprost  1 drop Right Eye Nightly    atorvastatin  10 mg Oral Nightly    pantoprazole  40 mg Oral Daily    triamcinolone   Topical BID    sodium chloride flush  10 mL Intravenous 2 times per day    insulin lispro  0-6 Units Subcutaneous TID WC    insulin lispro  0-3 Units Subcutaneous Nightly        dextrose 50 mL/hr at 02/10/21 0739    dextrose 100 mL/hr (02/10/21 0519)       PRN Meds:.acetaminophen, prochlorperazine, sodium chloride flush, promethazine **OR** ondansetron, polyethylene glycol, acetaminophen **OR** acetaminophen, perflutren lipid microspheres, melatonin, glucose, dextrose, glucagon (rDNA), dextrose    Physical Exam:    TEMPERATURE:  Current - Temp: 98.2 °F (36.8 °C); Max - Temp  Av.1 °F (36.7 °C)  Min: 97.6 °F (36.4 °C)  Max: 98.3 °F (36.8 °C)  RESPIRATIONS RANGE: Resp  Avg: 15.7  Min: 12  Max: 18  PULSE RANGE: Pulse  Av  Min: 52  Max: 81  BLOOD PRESSURE RANGE:  Systolic (46WZT), UCV:841 , Min:111 , SHA:682   ; Diastolic (03JDX), KLD:43, Min:43, Max:85    24HR INTAKE/OUTPUT:      Intake/Output Summary (Last 24 hours) at 2/10/2021 1253  Last data filed at 2/10/2021 1137  Gross per 24 hour   Intake 310 ml   Output 2825 ml   Net -2515 ml         Patient Vitals for the past 96 hrs (Last 3 readings):   Weight   02/10/21 0509 255 lb 8.2 oz (115.9 kg)   21 0530 255 lb 8.2 oz (115.9 kg)   21 0505 259 lb 0.7 oz (117.5 kg)       General: Alert, Awake, NAD, Obese  HEENT: Normocephalic, atraumatic, Nose and ears appear externally without, MMM  Neck: No Thyromegaly, Trachea is midline, No Carotid bruit  Eyes: EOMI, KEVEN  Chest: CTA anteriorly, no intercostal retractions  CVS: irregular, no murmur, no rub  Abdomen: soft, non tender, no organomegaly, no bruit appreciated  Extremities: 1+ edema, no cyanosis.   Skin: normal texture, normal skin turgor, no rash  Musculoskeletal: normal ROM, no joint swelling, no visible deformity  Neurological: CN intact, no focal motor neurological deficit  Psych: normal affect; AAO x 3  : weeks in place; no hematuria noted    Allergies:   Allergies   Allergen Reactions    Naproxen Itching    Bactrim [Sulfamethoxazole-Trimethoprim]     Levofloxacin     Pcn [Penicillins]     Sulfa Antibiotics       Past Medical History:   Diagnosis Date    Anemia     Chronic kidney disease     Combined systolic and diastolic congestive heart failure (HCC)     [facility]     [facility]     Hyperlipidemia     Hypertension     Osteoarthritis     PAF [facility]oxysmal atrial fibrillation) [facility] [facility]     Pulmonary embolism ([facility])     Sarcoidosis     in remission     Thyroid disease     Hypothyroidism    Type II or unspecified type diabetes mellitus without mention of complication, not stated as uncontrolled      Past Surgical History:   Procedure Laterality Date    HYSTERECTOMY      INTRACAPSULAR CATARACT EXTRACTION Left 10/16/2020    PHACOEMULSIFICATION WITH INTRAOCULAR LENS IMPLANT - LEFT EYE performed by Anthony Goldstein MD at [address] [place] [address] Bilateral     TKT    JOINT REPLACEMENT Bilateral     THR    KNEE SURGERY      bilateral total knees         LAB DATA:    CBC:   Lab Results   Component Value Date    WBC 6.3 02/10/2021    RBC 3.71 02/10/2021    HGB 10.5 02/10/2021    HCT 34.0 02/10/2021    MCV 91.6 02/10/2021    MCH 28.3 02/10/2021    MCHC 30.9 02/10/2021    RDW 18.3 02/10/2021     02/10/2021    MPV 8.7 02/10/2021     BMP:    Lab Results   Component Value Date     02/10/2021    K 4.2 02/10/2021    K 4.7 02/07/2021    CL 95 02/10/2021    CO2 33 02/10/2021    BUN 31 02/10/2021    CREATININE 1.8 02/10/2021    CALCIUM 9.0 02/10/2021    GFRAA 32 02/10/2021    GFRAA 51 12/20/2012    LABGLOM 27 02/10/2021    GLUCOSE 53 02/10/2021     Ionized Calcium:  No results found for: IONCA  Magnesium:    Lab Results   Component Value Date    MG 1.90 02/10/2021     Phosphorus:    Lab Results   Component Value Date    PHOS 3.4 02/10/2021     U/A:    Lab Results   Component Value Date    COLORU YELLOW 02/07/2021    PHUR 5.5 02/07/2021    WBCUA 1 02/07/2021    RBCUA 4 02/07/2021    MUCUS 2+ 02/07/2021    BACTERIA 4+ 02/07/2021    CLARITYU CLOUDY 02/07/2021    SPECGRAV 1.013 02/07/2021    LEUKOCYTESUR Negative 02/07/2021    UROBILINOGEN 1.0 02/07/2021    BILIRUBINUR Negative 02/07/2021    BLOODU Negative 02/07/2021    GLUCOSEU Negative 02/07/2021         IMPRESSION/RECOMMENDATIONS:      Active Problems:    CHF (congestive heart failure), NYHA class III, acute on chronic, combined ([address]    Uncontrolled type 2 diabetes mellitus with hyperglycemia (HCC)    CHF (congestive heart failure), NYHA class I, acute on chronic, combined ([address]    Acute congestive heart failure (HCC)    Severe aortic stenosis  Resolved Problems:    * No resolved hospital problems. *    1. CKD - creatinine stable at baseline  - avoid exposure to Nephrotoxic agents    2. Diastolic CHF - Switched to oral Torsemide 2/9/21 - reduced frequency 2/10/21  - monitor kidney function as we jersey    3. Morbid obesity    4. HTN - controlled  - Avoid Hypotension  - Reduced Coreg dose further 2/10/21 (Bradycardic at times)    5. Hypomagnesemia - replaced - corrected    6. Anemia CKD - Hgb at target    7.  CKD-MBD - phosphorus at target

## 2021-02-10 NOTE — PLAN OF CARE
Problem: Falls - Risk of:  Goal: Will remain free from falls  Description: Will remain free from falls  Outcome: Ongoing  Goal: Absence of physical injury  Description: Absence of physical injury  Outcome: Ongoing     Problem: Infection:  Goal: Will remain free from infection  Description: Will remain free from infection  Outcome: Ongoing     Problem: Safety:  Goal: Free from accidental physical injury  Description: Free from accidental physical injury  Outcome: Ongoing  Goal: Free from intentional harm  Description: Free from intentional harm  Outcome: Ongoing     Problem: Daily Care:  Goal: Daily care needs are met  Description: Daily care needs are met  Outcome: Ongoing     Problem: Pain:  Goal: Patient's pain/discomfort is manageable  Description: Patient's pain/discomfort is manageable  Outcome: Ongoing     Problem: Skin Integrity:  Goal: Skin integrity will stabilize  Description: Skin integrity will stabilize  Outcome: Ongoing     Problem: Discharge Planning:  Goal: Patients continuum of care needs are met  Description: Patients continuum of care needs are met  Outcome: Ongoing     Problem: OXYGENATION/RESPIRATORY FUNCTION  Goal: Patient will maintain patent airway  Outcome: Ongoing  Goal: Patient will achieve/maintain normal respiratory rate/effort  Description: Respiratory rate and effort will be within normal limits for the patient  Outcome: Ongoing     Problem: HEMODYNAMIC STATUS  Goal: Patient has stable vital signs and fluid balance  Outcome: Ongoing     Problem: FLUID AND ELECTROLYTE IMBALANCE  Goal: Fluid and electrolyte balance are achieved/maintained  Outcome: Ongoing     Problem: ACTIVITY INTOLERANCE/IMPAIRED MOBILITY  Goal: Mobility/activity is maintained at optimum level for patient  Outcome: Ongoing     Problem: Skin Integrity:  Goal: Will show no infection signs and symptoms  Description: Will show no infection signs and symptoms  Outcome: Ongoing  Goal: Absence of new skin breakdown  Description: Absence of new skin breakdown  Outcome: Ongoing

## 2021-02-10 NOTE — PROGRESS NOTES
0200H - BS 88mg/dL  0300H - BS 72mg/dL  0415H - BS 72mg/dL  0515H - BS 61mg/dL. Given 25ml of D50. Started on d5% solution since pt is on NPO.   0540H - 107mg/dL. 4621E - 85mg/dl. Call placed to Dr Key Blank.  Called back, ordered continuous D5% at 50ml/hour

## 2021-02-10 NOTE — CONSULTS
Nutrition Education    RD consulted for heart failure diet guidelines. Pt and daughter in room during visit. Per both pt and daughter, pt does not eat very much and eats about 2 small meals per day. Discussed importance of eating 3 meals per day in order to help with blood sugar control. Meals are usually made by pt's children and are usually low-sodium. Pt rarely goes out to eat or eats take-out. Discussed how to read a food label in order to choose low-sodium options, how to incorporate more fruit and veggies into diet, and how to reduce saturated fat in diet. Pt and daughter's questions answered at this time. Handout with education left in room. RD will be available if pt or family member has any further questions. · Verbally reviewed information with Patient and Family  · Educated on heart failure diet guidelines  · Written educational materials provided. · Contact name and number provided. · Refer to Patient Education activity for more details.     Electronically signed by Ameena Vera RD, SAM on 2/10/21 at 10:58 AM EST    Contact: [phone]

## 2021-02-11 ENCOUNTER — APPOINTMENT (OUTPATIENT)
Dept: CT IMAGING | Age: 86
DRG: 319 | End: 2021-02-11
Payer: MEDICARE

## 2021-02-11 LAB
ALBUMIN SERPL-MCNC: 3.2 G/DL (ref 3.4–5)
ANION GAP SERPL CALCULATED.3IONS-SCNC: 11 MMOL/L (ref 3–16)
BUN BLDV-MCNC: 31 MG/DL (ref 7–20)
CALCIUM SERPL-MCNC: 9.3 MG/DL (ref 8.3–10.6)
CHLORIDE BLD-SCNC: 96 MMOL/L (ref 99–110)
CO2: 32 MMOL/L (ref 21–32)
CREAT SERPL-MCNC: 1.5 MG/DL (ref 0.6–1.2)
GFR AFRICAN AMERICAN: 40
GFR NON-AFRICAN AMERICAN: 33
GLUCOSE BLD-MCNC: 109 MG/DL (ref 70–99)
GLUCOSE BLD-MCNC: 121 MG/DL (ref 70–99)
GLUCOSE BLD-MCNC: 210 MG/DL (ref 70–99)
GLUCOSE BLD-MCNC: 281 MG/DL (ref 70–99)
GLUCOSE BLD-MCNC: 323 MG/DL (ref 70–99)
HCT VFR BLD CALC: 33.1 % (ref 36–48)
HEMOGLOBIN: 10.5 G/DL (ref 12–16)
INR BLD: 2.08 (ref 0.86–1.14)
MAGNESIUM: 1.7 MG/DL (ref 1.8–2.4)
MCH RBC QN AUTO: 28.7 PG (ref 26–34)
MCHC RBC AUTO-ENTMCNC: 31.8 G/DL (ref 31–36)
MCV RBC AUTO: 90.4 FL (ref 80–100)
PDW BLD-RTO: 19.1 % (ref 12.4–15.4)
PERFORMED ON: ABNORMAL
PHOSPHORUS: 4.5 MG/DL (ref 2.5–4.9)
PLATELET # BLD: 147 K/UL (ref 135–450)
PMV BLD AUTO: 8.4 FL (ref 5–10.5)
POTASSIUM SERPL-SCNC: 4.2 MMOL/L (ref 3.5–5.1)
PROTHROMBIN TIME: 24.3 SEC (ref 10–13.2)
RBC # BLD: 3.66 M/UL (ref 4–5.2)
SODIUM BLD-SCNC: 139 MMOL/L (ref 136–145)
URIC ACID, SERUM: 13.1 MG/DL (ref 2.6–6)
WBC # BLD: 4.7 K/UL (ref 4–11)

## 2021-02-11 PROCEDURE — 80069 RENAL FUNCTION PANEL: CPT

## 2021-02-11 PROCEDURE — 6370000000 HC RX 637 (ALT 250 FOR IP): Performed by: INTERNAL MEDICINE

## 2021-02-11 PROCEDURE — 85027 COMPLETE CBC AUTOMATED: CPT

## 2021-02-11 PROCEDURE — 6360000002 HC RX W HCPCS: Performed by: NURSE PRACTITIONER

## 2021-02-11 PROCEDURE — 93926 LOWER EXTREMITY STUDY: CPT

## 2021-02-11 PROCEDURE — 97116 GAIT TRAINING THERAPY: CPT

## 2021-02-11 PROCEDURE — 94761 N-INVAS EAR/PLS OXIMETRY MLT: CPT

## 2021-02-11 PROCEDURE — 74176 CT ABD & PELVIS W/O CONTRAST: CPT

## 2021-02-11 PROCEDURE — 97530 THERAPEUTIC ACTIVITIES: CPT

## 2021-02-11 PROCEDURE — 97164 PT RE-EVAL EST PLAN CARE: CPT

## 2021-02-11 PROCEDURE — 97168 OT RE-EVAL EST PLAN CARE: CPT

## 2021-02-11 PROCEDURE — 2580000003 HC RX 258: Performed by: INTERNAL MEDICINE

## 2021-02-11 PROCEDURE — 6370000000 HC RX 637 (ALT 250 FOR IP): Performed by: NURSE PRACTITIONER

## 2021-02-11 PROCEDURE — 85610 PROTHROMBIN TIME: CPT

## 2021-02-11 PROCEDURE — 36415 COLL VENOUS BLD VENIPUNCTURE: CPT

## 2021-02-11 PROCEDURE — 2100000000 HC CCU R&B

## 2021-02-11 PROCEDURE — 2500000003 HC RX 250 WO HCPCS: Performed by: NURSE PRACTITIONER

## 2021-02-11 PROCEDURE — 83735 ASSAY OF MAGNESIUM: CPT

## 2021-02-11 PROCEDURE — 51798 US URINE CAPACITY MEASURE: CPT

## 2021-02-11 PROCEDURE — 84550 ASSAY OF BLOOD/URIC ACID: CPT

## 2021-02-11 PROCEDURE — 99233 SBSQ HOSP IP/OBS HIGH 50: CPT | Performed by: NURSE PRACTITIONER

## 2021-02-11 RX ORDER — CARVEDILOL 12.5 MG/1
12.5 TABLET ORAL 2 TIMES DAILY WITH MEALS
Status: DISCONTINUED | OUTPATIENT
Start: 2021-02-11 | End: 2021-02-12

## 2021-02-11 RX ORDER — AMIODARONE HYDROCHLORIDE 200 MG/1
200 TABLET ORAL 2 TIMES DAILY
Status: DISCONTINUED | OUTPATIENT
Start: 2021-02-11 | End: 2021-02-13 | Stop reason: HOSPADM

## 2021-02-11 RX ORDER — TORSEMIDE 20 MG/1
20 TABLET ORAL DAILY
Status: DISCONTINUED | OUTPATIENT
Start: 2021-02-12 | End: 2021-02-13 | Stop reason: HOSPADM

## 2021-02-11 RX ORDER — WARFARIN SODIUM 2.5 MG/1
2.5 TABLET ORAL
Status: COMPLETED | OUTPATIENT
Start: 2021-02-11 | End: 2021-02-11

## 2021-02-11 RX ORDER — MAGNESIUM SULFATE IN WATER 40 MG/ML
2000 INJECTION, SOLUTION INTRAVENOUS ONCE
Status: COMPLETED | OUTPATIENT
Start: 2021-02-11 | End: 2021-02-11

## 2021-02-11 RX ADMIN — CARVEDILOL 12.5 MG: 12.5 TABLET, FILM COATED ORAL at 16:52

## 2021-02-11 RX ADMIN — Medication 2 TABLET: at 09:19

## 2021-02-11 RX ADMIN — AMIODARONE HYDROCHLORIDE 200 MG: 200 TABLET ORAL at 10:21

## 2021-02-11 RX ADMIN — TORSEMIDE 50 MG: 100 TABLET ORAL at 09:19

## 2021-02-11 RX ADMIN — WARFARIN SODIUM 2.5 MG: 2.5 TABLET ORAL at 17:30

## 2021-02-11 RX ADMIN — CIPROFLOXACIN HYDROCHLORIDE 250 MG: 250 TABLET, FILM COATED ORAL at 09:19

## 2021-02-11 RX ADMIN — TRIAMCINOLONE ACETONIDE: 1 CREAM TOPICAL at 20:11

## 2021-02-11 RX ADMIN — CARVEDILOL 3.12 MG: 3.12 TABLET, FILM COATED ORAL at 09:19

## 2021-02-11 RX ADMIN — AMIODARONE HYDROCHLORIDE 200 MG: 200 TABLET ORAL at 20:10

## 2021-02-11 RX ADMIN — PANTOPRAZOLE SODIUM 40 MG: 40 TABLET, DELAYED RELEASE ORAL at 09:19

## 2021-02-11 RX ADMIN — CIPROFLOXACIN HYDROCHLORIDE 250 MG: 250 TABLET, FILM COATED ORAL at 20:10

## 2021-02-11 RX ADMIN — LATANOPROST 1 DROP: 50 SOLUTION OPHTHALMIC at 20:19

## 2021-02-11 RX ADMIN — POTASSIUM CHLORIDE 20 MEQ: 1500 TABLET, EXTENDED RELEASE ORAL at 09:19

## 2021-02-11 RX ADMIN — ATORVASTATIN CALCIUM 10 MG: 10 TABLET, FILM COATED ORAL at 20:10

## 2021-02-11 RX ADMIN — MAGNESIUM SULFATE HEPTAHYDRATE 2000 MG: 40 INJECTION, SOLUTION INTRAVENOUS at 10:43

## 2021-02-11 RX ADMIN — AMIODARONE HYDROCHLORIDE 150 MG: 1.5 INJECTION, SOLUTION INTRAVENOUS at 10:21

## 2021-02-11 RX ADMIN — Medication 10 ML: at 10:21

## 2021-02-11 RX ADMIN — CETIRIZINE HYDROCHLORIDE 10 MG: 10 TABLET, FILM COATED ORAL at 09:19

## 2021-02-11 RX ADMIN — GABAPENTIN 200 MG: 100 CAPSULE ORAL at 20:10

## 2021-02-11 RX ADMIN — INSULIN LISPRO 3 UNITS: 100 INJECTION, SOLUTION INTRAVENOUS; SUBCUTANEOUS at 16:53

## 2021-02-11 RX ADMIN — DILTIAZEM HYDROCHLORIDE 120 MG: 120 CAPSULE, COATED, EXTENDED RELEASE ORAL at 09:18

## 2021-02-11 RX ADMIN — INSULIN LISPRO 2 UNITS: 100 INJECTION, SOLUTION INTRAVENOUS; SUBCUTANEOUS at 12:16

## 2021-02-11 RX ADMIN — INSULIN LISPRO 2 UNITS: 100 INJECTION, SOLUTION INTRAVENOUS; SUBCUTANEOUS at 20:26

## 2021-02-11 ASSESSMENT — PAIN SCALES - GENERAL
PAINLEVEL_OUTOF10: 0

## 2021-02-11 NOTE — PROGRESS NOTES
Physical Therapy  Pt transfer to CVU, await new referral when approp.   Thank  You, Case Bonilla, [address] [facility] [address]

## 2021-02-11 NOTE — PROGRESS NOTES
[address]                                                                        [address] [address]                                                                 Peri Barnett                                                         Phone Number: [phone]                                                           Fax Number: [phone]                                                             Nephrology Progress Note    Chief Complaint: Shortness of breath/CHF    Reason for Consultation: Volume overload / CKD IV - follows with my partner Dr Main Ramírez on an outpatient basis - Baseline creatinine ~ 1.6 mg/dl. Last seen in the office 1/5/2021    History of Present Illness: Ms Caroline Sin is an 79 yo morbidly obese female with a past medical history significant for A Fib, Pulmonary Hypertension, Diastolic CHF, Hypertension, DM, and Hyperlipidemia that presented to the ED with shortness of breath. CXR revealed mild pulmonary congestion. She was admitted with a working diagnosis of acute decompensated HF. Creatinine stable at baseline. Patients daughter stated mother was having urinary hesitancy prior to admission but now that weeks is in symptom has improved. Patient stated that she was probably consuming more salt than she should    Subjective:    Resting in bed; NAD; breathing improving    ROS: + worsening LE edema; + shortness of breath - both are improving; + urinary hesitancy;  All other ROS negative    Scheduled Meds:   amiodarone  200 mg Oral BID    carvedilol  12.5 mg Oral BID WC    magnesium sulfate  2,000 mg Intravenous Once    torsemide  50 mg Oral Daily    sodium chloride flush  10 mL Intravenous 2 times per day    magnesium cl-calcium carbonate  2 tablet Oral Daily    [START ON 2/8/2022] warfarin (COUMADIN) daily dosing (placeholder)   Other RX Placeholder    cetirizine  10 mg Oral Daily    ciprofloxacin  250 mg Oral 2 times per day    gabapentin  200 mg Oral Nightly    potassium chloride  20 mEq Oral BID WC    latanoprost  1 drop Right Eye Nightly    atorvastatin  10 mg Oral Nightly    pantoprazole  40 mg Oral Daily    triamcinolone   Topical BID    insulin lispro  0-6 Units Subcutaneous TID WC    insulin lispro  0-3 Units Subcutaneous Nightly        dextrose Stopped (02/10/21 2030)    dextrose 100 mL/hr (02/10/21 05)       PRN Meds:.sodium chloride flush, acetaminophen, ondansetron, prochlorperazine, promethazine **OR** [DISCONTINUED] ondansetron, polyethylene glycol, [DISCONTINUED] acetaminophen **OR** acetaminophen, perflutren lipid microspheres, melatonin, glucose, dextrose, glucagon (rDNA), dextrose    Physical Exam:    TEMPERATURE:  Current - Temp: 98.1 °F (36.7 °C); Max - Temp  Av.1 °F (36.7 °C)  Min: 98 °F (36.7 °C)  Max: 98.3 °F (36.8 °C)  RESPIRATIONS RANGE: Resp  Av.9  Min: 15  Max: 29  PULSE RANGE: Pulse  Av.8  Min: 75  Max: 106  BLOOD PRESSURE RANGE:  Systolic (28AIV), DJA:856 , Min:113 , LLS:597   ; Diastolic (23POE), RFX:30, Min:55, Max:113    24HR INTAKE/OUTPUT:      Intake/Output Summary (Last 24 hours) at 2021 1147  Last data filed at 2021 0645  Gross per 24 hour   Intake 240 ml   Output 3025 ml   Net -2785 ml         Patient Vitals for the past 96 hrs (Last 3 readings):   Weight   21 0638 259 lb 4.2 oz (117.6 kg)   02/10/21 0509 255 lb 8.2 oz (115.9 kg)   21 0530 255 lb 8.2 oz (115.9 kg)       General: Alert, Awake, NAD, Obese  HEENT: Normocephalic, atraumatic, Nose and ears appear externally without, MMM  Neck: No Thyromegaly, Trachea is midline, No Carotid bruit  Eyes: EOMI, KEVEN  Chest: CTA anteriorly, no intercostal retractions  CVS: irregular, no murmur, no rub  Abdomen: soft, non tender, no organomegaly, no bruit appreciated  Extremities: trace/1+ edema, no cyanosis.   Skin: normal texture, normal skin turgor, no rash  Musculoskeletal: normal ROM, no joint swelling, no visible deformity  Neurological: CN intact, no focal motor neurological deficit  Psych: normal affect; AAO x 3  : weeks in place; no hematuria noted    Allergies:   Allergies   Allergen Reactions    Naproxen Itching    Bactrim [Sulfamethoxazole-Trimethoprim]     Levofloxacin     Pcn [Penicillins]     Sulfa Antibiotics       Past Medical History:   Diagnosis Date    Anemia     Chronic kidney disease     Combined systolic and diastolic congestive heart failure (HCC)     Diabetes mellitus ([facility]     [facility]     Hyperlipidemia     Hypertension     Osteoarthritis     PAF (paroxysmal atrial fibrillation) ([facility] [address] [facility]     Pulmonary embolism (HCC)     Sarcoidosis     in remission     Thyroid disease     Hypothyroidism    Type II or unspecified type diabetes mellitus without mention of complication, not stated as uncontrolled      Past Surgical History:   Procedure Laterality Date    HYSTERECTOMY      INTRACAPSULAR CATARACT EXTRACTION Left 10/16/2020    PHACOEMULSIFICATION WITH INTRAOCULAR LENS IMPLANT - LEFT EYE performed by Clarissa Barton MD at [address] [place] [address] Bilateral     TKT    JOINT REPLACEMENT Bilateral     THR    KNEE SURGERY      bilateral total knees         LAB DATA:    CBC:   Lab Results   Component Value Date    WBC 4.7 02/11/2021    RBC 3.66 02/11/2021    HGB 10.5 02/11/2021    HCT 33.1 02/11/2021    MCV 90.4 02/11/2021    MCH 28.7 02/11/2021    MCHC 31.8 02/11/2021    RDW 19.1 02/11/2021     02/11/2021    MPV 8.4 02/11/2021     BMP:    Lab Results   Component Value Date     02/11/2021    K 4.2 02/11/2021    K 4.7 02/07/2021    CL 96 02/11/2021    CO2 32 02/11/2021    BUN 31 02/11/2021    CREATININE 1.5 02/11/2021    CALCIUM 9.3 02/11/2021    GFRAA 40 02/11/2021    GFRAA 51 12/20/2012    LABGLOM 33 02/11/2021    GLUCOSE 109 02/11/2021     Ionized Calcium:  No results found for: IONCA  Magnesium:    Lab Results   Component Value Date    MG 1.70 02/11/2021     Phosphorus:    Lab Results   Component Value Date    PHOS 4.5 02/11/2021     U/A:    Lab Results   Component Value Date    COLORU YELLOW 02/07/2021    PHUR 5.5 02/07/2021    WBCUA 1 02/07/2021    RBCUA 4 02/07/2021    MUCUS 2+ 02/07/2021    BACTERIA 4+ 02/07/2021    CLARITYU CLOUDY 02/07/2021    SPECGRAV 1.013 02/07/2021    LEUKOCYTESUR Negative 02/07/2021    UROBILINOGEN 1.0 02/07/2021    BILIRUBINUR Negative 02/07/2021    BLOODU Negative 02/07/2021    GLUCOSEU Negative 02/07/2021         IMPRESSION/RECOMMENDATIONS:      Active Problems:    CHF (congestive heart failure), NYHA class III, acute on chronic, combined ([address]    Uncontrolled type 2 diabetes mellitus with hyperglycemia (HCC)    CHF (congestive heart failure), NYHA class I, acute on chronic, combined (HCC)    Acute congestive heart failure (HCC)    Severe aortic stenosis  Resolved Problems:    * No resolved hospital problems. *    1. CKD - creatinine stable at baseline  - avoid exposure to Nephrotoxic agents    2. Diastolic CHF - Switched to oral Torsemide 2/9/21 - reduced frequency 2/10/21 - reduced dose 2/11/21  - monitor kidney function as we [place]    3. Morbid obesity    4. HTN - controlled  - Avoid Hypotension    5. Hypomagnesemia - replaced - corrected    6. Anemia CKD - Hgb at target    7. 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  Severe aortic valve stenosis - s/p Balloon aortic valvuloplasty 2/10/21

## 2021-02-11 NOTE — PROCEDURES
[address] [address]                            CARDIAC CATHETERIZATION    PATIENT NAME: Amrit Levy                    :        1934  MED REC NO:   [id number]                          ROOM:       7991  ACCOUNT NO:   [de-identified]                           ADMIT DATE: 2021  PROVIDER:     Harry Cano MD    DATE OF PROCEDURE:  02/10/2021    PROCEDURES PERFORMED:  1. Left heart catheterization. 2.  Balloon aortic valvuloplasty. INDICATION:  Severe aortic valve stenosis with congestive heart failure,  systolic heart failure with low ejection fraction. Informed consent  obtained. ASA is III. Mallampati score of II. PROCEDURE IN DETAIL:  The patient was brought to the cardiac cath  laboratory. Right radial prepped and draped in the sterile fashion,  gave lidocaine, accessed right radial artery, inserted a 4/5 Slender  sheath. Next, accessed the right common femoral artery, inserted a  6-Irish sheath in the femoral artery. Dilators, wires removed. Sheath  was flushed. Megan Freeman was used to select and engage the left main coronary  artery ostium, performed angiography of the left system. JL removed. JR4 advanced, used to select and engage the right coronary artery,  performed angiography of the right system. JR4 removed. Then a pigtail  was positioned in the aorta from the right radial artery. A 6-Irish  sheath was removed over the Amplatz wire and a 14-Irish sheath was  inserted. Then AL2 was used to cross the aortic valve using a straight  wire and AL2 catheter. A Safari wire was placed. AL2 removed. Pigtail  advanced into the LV cavity crossing the aortic valve. Simultaneous  aortic pressure gradient was recorded. The [facility] MINNE was placed back in. Catheter removed. Pigtail removed.   A 20 mm balloon aortic  valvuloplasty catheter advanced across the aortic valve and performed  balloon

## 2021-02-11 NOTE — PROGRESS NOTES
Pt had a uneventful night with no complications. The pt remained hemodynamically stable throughout the shift. The pt denied pain except for her tender R groin puncture site. The pt did wake up and accidentally pull her L FA PIV out trying to get back under the covers. A new IV was placed in her VÍCTOR with ultrasound, see charting.

## 2021-02-11 NOTE — PROGRESS NOTES
atorvastatin (LIPITOR) tablet 10 mg  10 mg Oral Nightly Dougie Saab MD   10 mg at 02/10/21 2302    pantoprazole (PROTONIX) tablet 40 mg  40 mg Oral Daily Dougie Saab MD   40 mg at 02/11/21 0919    prochlorperazine (COMPAZINE) tablet 5 mg  5 mg Oral Q6H PRN Dougie Saab MD        triamcinolone (KENALOG) 0.1 % cream   Topical BID Dougie Saab MD   Given at 02/09/21 2039    promethazine (PHENERGAN) tablet 12.5 mg  12.5 mg Oral Q6H PRN Dougie Saab MD        polyethylene glycol [facility]) packet 17 g  17 g Oral Daily PRN Dougie Saab MD        acetaminophen (TYLENOL) suppository 650 mg  650 mg Rectal Q6H PRN Dougie Saab MD        perflutren lipid microspheres (DEFINITY) injection 1.65 mg  1.5 mL Intravenous ONCE PRN Dougie Saab MD        melatonin tablet 3 mg  3 mg Oral Nightly PRN Dougie Saab MD   3 mg at 02/10/21 2302    glucose (GLUTOSE) 40 % oral gel 15 g  15 g Oral PRN Dougie Saab MD   15 g at 02/09/21 9896    dextrose 50 % IV solution  12.5 g Intravenous PRN Dougie Saab MD   12.5 g at 02/10/21 1324    glucagon (rDNA) injection 1 mg  1 mg Intramuscular PRN Dougie Saab MD        dextrose 5 % solution  100 mL/hr Intravenous PRN Dougie Saab  mL/hr at 02/10/21 0519 100 mL/hr at 02/10/21 0519    insulin lispro (HUMALOG) injection vial 0-6 Units  0-6 Units Subcutaneous TID WC Dougie Saab MD   2 Units at 02/09/21 1243    insulin lispro (HUMALOG) injection vial 0-3 Units  0-3 Units Subcutaneous Nightly Dougie Saab MD   1 Units at 02/08/21 0103     Allergies   Allergen Reactions    Naproxen Itching    Bactrim [Sulfamethoxazole-Trimethoprim]     Levofloxacin     Pcn [Penicillins]     Sulfa Antibiotics      Active Problems:    CHF (congestive heart failure), NYHA class III, acute on chronic, combined ([facility]    Uncontrolled type 2 diabetes mellitus with hyperglycemia ([facility] [address]    CHF (congestive heart failure), NYHA class I, acute on chronic, combined (HCC)    Acute congestive heart failure (HCC)    Severe aortic stenosis  Resolved Problems:    * No resolved hospital problems. *    Blood pressure (!) 113/55, pulse 83, temperature 98.1 °F (36.7 °C), temperature source Oral, resp. rate 20, height 5' 6\" (1.676 m), weight 259 lb 4.2 oz (117.6 kg), SpO2 96 %, not currently breastfeeding. Subjective Feels fine post procedure. No CP or SOB. Objective Alert and coop. CTA, IIR&R with M,, nl. Abd. No edema  Assessment & Plan Concern about bleeding at puncture site. For CT swcan. D/W patient and son.     Dougie Saab MD  2/11/2021

## 2021-02-11 NOTE — PROGRESS NOTES
[facility]   Daily Progress Note      Admit Date:  2/7/2021    Reason for follow up visit: SOB, S/P BAV    CC: \"I'm feeling okay this morning. \"    81 y/o female with PMH significant for atrial fib,morbid obesity, pulmonary HTN, HTN, HLP, anemia, CKD stage IV, CHF, diabetes mellitus who was admitted with SOB for 2-3 days prior to admit. She was treated for CHF. Noted to have severe AS and underwent BAV. Interval History:  Pt. seen and examined; records reviewed  BP stable this AM  Remains in atrial fib with increased VR this AM (up to 140's)  S/P BAV on 2/10/2021  Issues with groin bleeding post procedure (Hgb stable)  Denies chest pain  + mild SOB (remains on O2)    Subjective:  Pt with no acute overnight cardiac events. Denies chest pain, cough, palpitations or dizziness  + mild SOB  Pt poor historian    Objective:   /76   Pulse 87   Temp 98.1 °F (36.7 °C) (Oral)   Resp 18   Ht 5' 6\" (1.676 m)   Wt 259 lb 4.2 oz (117.6 kg)   LMP  (LMP Unknown)   SpO2 95%   BMI 41.85 kg/m²       Intake/Output Summary (Last 24 hours) at 2/11/2021 0931  Last data filed at 2/11/2021 0645  Gross per 24 hour   Intake 240 ml   Output 3825 ml   Net -3585 ml     Wt Readings from Last 3 Encounters:   02/11/21 259 lb 4.2 oz (117.6 kg)   10/30/20 261 lb (118.4 kg)   10/16/20 256 lb (116.1 kg)       Physical Exam:  General: In no acute distress. Awake, alert, and oriented X 3. Resting in bed  Skin:  Warm and dry. Neck:  Supple and thick. No JVD appreciated. Chest: Lungs clear with diminished breath sounds . No wheezes/rhonchi/rales  Cardiovascular: irreg, irreg; II/VI AS murmur  Abdomen: obese, soft, nontender, nondistended, +bowel sounds. Extremities: 1+ bilateral ankle and lower pretibial edema; 1+ bilateral DP/PT pulses.  R groin site without hematoma or scchymosis    Medications:    carvedilol  3.125 mg Oral BID WC    torsemide  50 mg Oral Daily    sodium chloride flush  10 mL Intravenous 2 mg/dL 62   VLDL Cholesterol Calculated Latest Ref Range: Not Established mg/dL 12     2/10/2021 Cath/BAV:  1. Left main is normal, LORENZO 3 flow, no stenosis. 2.  Left anterior descending artery has LORENZO 3 flow with mild luminal  irregularities. 3.  Left circumflex is patent without significant stenosis. 4.  Right coronary artery is dominant without significant stenosis, LORENZO  3 flow.     HEMODYNAMICS:  Aortic valve gradient was 41.1 mmHg. Post valvuloplasty  aortic valve gradient was 30 mmHg. 2/7/2021 Echo:  There is concentric left ventricular hypertrophy. Ejection fraction is visually estimated to be 40-45%. Grade III diastolic dysfunction with elevated LV filling pressures. The left atrium is severely dilated. Severe aortic stenosis with a peak velocity of 5.23m/s and a mean pressure   gradient of 66mmHg. No evidence of significant aortic valve regurgitation. Dilated right ventricle. Right ventricular systolic function is mildly reduced . Estimated pulmonary artery systolic pressure is elevated at 56 mmHg assuming   a right atrial pressure of 15 mmHg. A hyperechoic structure is seen in liver pushing on IVC. ECG 2/7/2021:  Atrial fibrillation with slow ventricular response  Left axis deviation  Left ventricular hypertrophy with QRS widening  Poor R wave progression  Confirmed by HANNAH SIMON MD    2/11/2021 CT abdomen/pelvis:  1.  No liver lesion is demonstrated on this noncontrast study.  Images   referring to reported \"hyperechoic mass on the liver\" in the provided history   are not available and would be helpful for correlation.  The history of a   hyperechoic liver lesion is most suggestive of a hemangioma which would   likely be occult without IV contrast, as would other potential liver lesions   aside from cysts   2. 9.3 cm lesion arising from the upper pole the right kidney is compatible   with a proteinaceous cyst, as there was a 6.5 cm simple cyst in this area on   the comparison renal ultrasound exam from 2016   3. 4.6 cm right adrenal mass with focal calcification.  Imaging   characteristics are indeterminate.  This would be best assessed with adrenal   MRI   4. Cholelithiasis   5. Colonic diverticulosis   6. Cardiomegaly with pulmonary vascular congestion and pleural effusions     2/11/2021 US RLE:  No pseudoaneurysm or hematoma noted in the right groin. Normal, multiphasic flow noted in the right common femoral and proximal   superficial femoral arteries. Normal, phasic flow noted in the right common femoral and proximal femoral   veins. Telemetry: Atrial fib with VR 's (when up)    Assessment/Plan:    1. Acute on chronic systolic and diastolic heart failure  -LVEF 40-45%  -continue torsemide  -continue BB  -not on aldactone/ACE/ARB d/t elevated Cr    2. Severe aortic stenosis  -S/P BAV with gradient down to 30  -TAVR referral made  -mild cardiomyopathy    3. Persistent atrial fibrillation  -VR has been increased  -will continue BB  -stop diltiazem and begin amiodarone  -on warfarin    4. Essential HTN  -not well controlled  -increase carvedilol  -continue medical management    5. Nonobstructive coronary artery disease  -no chest pain  -continue BB and statin  -not on ASA d/t warfarin therapy    6. Diabetes Mellitus, type II  -Rx per PCP    7. ? Liver mass  -noted on echo  -CT abdomen reviewed  -further evaluation at direction of Dr. Jim Hankins (no acute finding)     8.  CKD, stage IV  -nephrology following    Electronically signed by GRACIA Strange CNP on 2/11/2021 at 9:31 AM

## 2021-02-11 NOTE — PLAN OF CARE
Problem: Nutrition  Goal: Optimal nutrition therapy  Outcome: Ongoing   Nutrition Problem #1: No nutrition diagnosis at this time  Intervention: Food and/or Nutrient Delivery: Continue Current Diet, Continue Oral Nutrition Supplement  Nutritional Goals: consume >50% of meals and supplement during admission

## 2021-02-11 NOTE — PLAN OF CARE
Problem: Falls - Risk of:  Goal: Will remain free from falls  Description: Will remain free from falls  Outcome: Ongoing  Goal: Absence of physical injury  Description: Absence of physical injury  Outcome: Ongoing     Problem: Infection:  Goal: Will remain free from infection  Description: Will remain free from infection  Outcome: Ongoing     Problem: Safety:  Goal: Free from accidental physical injury  Description: Free from accidental physical injury  Outcome: Ongoing  Goal: Free from intentional harm  Description: Free from intentional harm  Outcome: Ongoing     Problem: Daily Care:  Goal: Daily care needs are met  Description: Daily care needs are met  Outcome: Ongoing     Problem: Pain:  Description: Pain management should include both nonpharmacologic and pharmacologic interventions.   Goal: Patient's pain/discomfort is manageable  Description: Patient's pain/discomfort is manageable  Outcome: Ongoing  Goal: Pain level will decrease  Description: Pain level will decrease  Outcome: Ongoing  Goal: Control of acute pain  Description: Control of acute pain  Outcome: Ongoing  Goal: Control of chronic pain  Description: Control of chronic pain  Outcome: Ongoing     Problem: Skin Integrity:  Goal: Skin integrity will stabilize  Description: Skin integrity will stabilize  Outcome: Ongoing     Problem: Discharge Planning:  Goal: Patients continuum of care needs are met  Description: Patients continuum of care needs are met  Outcome: Ongoing     Problem: OXYGENATION/RESPIRATORY FUNCTION  Goal: Patient will maintain patent airway  Outcome: Ongoing  Goal: Patient will achieve/maintain normal respiratory rate/effort  Description: Respiratory rate and effort will be within normal limits for the patient  Outcome: Ongoing     Problem: HEMODYNAMIC STATUS  Goal: Patient has stable vital signs and fluid balance  Outcome: Ongoing     Problem: FLUID AND ELECTROLYTE IMBALANCE  Goal: Fluid and electrolyte balance are achieved/maintained  Outcome: Ongoing     Problem: ACTIVITY INTOLERANCE/IMPAIRED MOBILITY  Goal: Mobility/activity is maintained at optimum level for patient  Outcome: Ongoing     Problem: Skin Integrity:  Goal: Will show no infection signs and symptoms  Description: Will show no infection signs and symptoms  Outcome: Ongoing  Goal: Absence of new skin breakdown  Description: Absence of new skin breakdown  Outcome: Ongoing     Problem: Nutrition  Goal: Optimal nutrition therapy  2/11/2021 1729 by Dewayne Bearden RN  Outcome: Ongoing  2/11/2021 1222 by Karen Dobson RD, LD  Outcome: Ongoing

## 2021-02-11 NOTE — PROGRESS NOTES
Alert and oriented x 4 throughout shift. Afebrile. Heart rhythm Afib, increased into the 140's-160's this morning when ambulating with PT/OT. Arnel Mckeon, cardiology NP at the bedside and aware, PO amiodarone ordered with IV amiodarone bolus ordered also. HR improved throughout shift staying stable with movement and at rest 70-80's. Hairston cath removed at 1200. Patient able to void, 0 ml post void residual per bladder scan. R groin site remains stable with no hematoma or bleeding noted. R groin arterial ultra sound ordered and completed. CT scan of abdomen completed. Up to the chair with walker and x 2 assist. Denies pain. No nausea reported, with good appetite and adequate PO intake. Family at the bedside, updated on plan of care and questions addressed. Call light within reach, able to make own needs known.

## 2021-02-11 NOTE — PROGRESS NOTES
Physical Therapy    Facility/Department: [facility]  Re-Evaluation    NAME: Felton Lucero  : 1934  MRN: [id number]    Date of Service: 2021    Discharge Recommendations:  Continue to assess pending progress   PT Equipment Recommendations  Other: Pt has [facility]. Felton Lucero scored a 17/24 on the AM-PAC short mobility form. Current research shows that an AM-PAC score of 18 or greater is typically associated with a discharge to the patient's home setting. Based on the patient's AM-PAC score and their current functional mobility deficits, it is recommended that the patient have 2-3 sessions per week of Physical Therapy at d/c to increase the patient's independence. At this time, this patient demonstrates the endurance and safety to discharge home with Home PT Evaluation and a follow up treatment frequency of 2-3x/wk. Please see assessment section for further patient specific details. If patient discharges prior to next session this note will serve as a discharge summary. Please see below for the latest assessment towards goals. Assessment   Body structures, Functions, Activity limitations: Decreased functional mobility ; Decreased endurance  Assessment: 81 y/o female admit 2021 with CHF Exac.  2/10/2021 Cardiac Cath, transfer to CVICU. PMH as noted including CHF, PAF, PE, THR, TKR. Pt regulo OOB,Amb short distances with Walker Min assist.  HR did increase briefly 140-150's during functional activities; nursing aware. Pt reports adequate family assist/support for d/c home. Will need at 24/7 assist/support upon return home. Will recommend Home PT. Prognosis: Fair;Good  Decision Making: Medium Complexity  History: 81 y/o female admit 2021 with CHF Exac.  2/10/2021 Cardiac Cath, transfer to CVICU. PMH as noted including CHF, PAF, PE, THR, TKR. Exam: See above. Clinical Presentation: See above.   Patient Education: Role of PT, POC, Need to call for assist, Safe use of Walker. Barriers to Learning: None. REQUIRES PT FOLLOW UP: Yes  Activity Tolerance  Activity Tolerance: Patient limited by endurance  Activity Tolerance: HR briefly 140-150's during amb activities. RA with sats at least 90%. Patient Diagnosis(es): The primary encounter diagnosis was Acute congestive heart failure, unspecified heart failure type ([address] A diagnosis of Hyponatremia was also pertinent to this visit. has a past medical history of Anemia, Chronic kidney disease, Combined systolic and diastolic congestive heart failure ([address] Diabetes mellitus ([address] DVT (deep venous thrombosis) ([address] Hyperlipidemia, Hypertension, Osteoarthritis, PAF (paroxysmal atrial fibrillation) ([address] Pulmonary embolism ([address] Sarcoidosis, Thyroid disease, and Type II or unspecified type diabetes mellitus without mention of complication, not stated as uncontrolled. has a past surgical history that includes Hysterectomy; knee surgery; joint replacement (Bilateral); joint replacement (Bilateral); and Intracapsular cataract extraction (Left, 10/16/2020). 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  2/10/2021 Cardiac Cath (Severe Aortic Stenosis), transfer to CVICU. PMH as noted including CHF, PAF, PE, THR, TKR. Family / Caregiver Present: Yes(Son arrival at bedside.)  Diagnosis: CHF, Severe Aortic Stenosis. Follows Commands: Within Functional Limits  Subjective  Subjective: Pt pleasant, agreeable to PT Eval/Rx.   Pain Screening  Patient Currently in Pain: No Orientation  Orientation  Overall Orientation Status: Within Functional Limits  Social/Functional History  Social/Functional History  Lives With: Spouse  Type of Home: House  Home Layout: Two level, Able to Live on Main level with bedroom/bathroom, Laundry in basement  Home Access: Stairs to enter with rails  Entrance Stairs - Number of Steps: 1 + 1 with rail, pt does not go to other levels of house  Bathroom Shower/Tub: Walk-in shower, Shower chair with back  [employer]: Handicap height(RTS over comfort height toilet)  Bathroom Equipment: 3-in-1 commode, Grab bars in shower, Shower chair  Bathroom Accessibility: (Pt leaves walker outside of bathroom)  Home Equipment: Rolling walker, Wheelchair-manual(Pt uses w/c for going out to MD appts)  [facility]: Family  ADL Assistance: Independent(family assists as needed)  Homemaking Assistance: (Family takes care of homemaking needs.)  Ambulation Assistance: Independent(Pt uses Walker.)  Transfer Assistance: Independent  Active : No(Pt drives.)  Patient's  Info: daughter drives  Occupation: Retired  IADL Comments: Pt sleeps in regular bed. Additional Comments: Daughter reports someone is with her at all times. Cognition   Cognition  Overall Cognitive Status: WFL    Objective          AROM RLE (degrees)  RLE AROM: WFL  AROM LLE (degrees)  LLE AROM : WFL  AROM RUE (degrees)  RUE General AROM: Limited mid/endrange overhead. AROM LUE (degrees)  LUE General AROM: Limited mid/endrange overhead. Strength RLE  Comment: Grossly 3+/5; very weak/limited endurance with functional activities. Strength LLE  Comment: Grossly 3+/5; very weak/limited endurance with functional activities. Bed mobility  Supine to Sit: Minimal assistance(HOB elevated. Use of Bedrail.)  Transfers  Sit to Stand: Minimal Assistance(With Walker. Cues for safe hand placement. Increase effort from lower surfaces.)  Stand to sit: Minimal Assistance(With Walker.   Cues for safe hand placement.)  Ambulation  Ambulation?: Yes  Ambulation 1  Surface: level tile  Assistance: Minimal assistance  Distance: Pt amb within hospital room setting ~ 30' with Green Sherri assist.  Very short/shuffling steps; wide TARIQ and heavy wgt bear on Walker for support. C/O generalized stiffness LEs. Comments: HR increase 140-150's during amb activities; nursing aware. Plan   Plan  Times per week: 3-5x week while in acute care setting. Current Treatment Recommendations: Strengthening, Functional Mobility Training, Transfer Training, Gait Training, Safety Education & Training, Patient/Caregiver Education & Training  Safety Devices  Type of devices: Call light within reach, Chair alarm in place, Left in chair, Nurse notified          AM-PAC Score  AM-PAC Inpatient Mobility Raw Score : 17 (02/11/21 1148)  AM-PAC Inpatient T-Scale Score : 42.13 (02/11/21 1148)  Mobility Inpatient CMS 0-100% Score: 50.57 (02/11/21 1148)  Mobility Inpatient CMS G-Code Modifier : CK (02/11/21 1148)          Goals  Short term goals  Time Frame for Short term goals: Upon d/c acute care setting. Short term goal 1: Bed Mob Supervision. Short term goal 2: Transfers with assist device Supervision. Short term goal 3: Amb with assist device 48' SBA/CGA. Patient Goals   Patient goals : Return home with family assist/support.        Therapy Time   Individual Concurrent Group Co-treatment   Time In 0840         Time Out 0920         Minutes [facility], PT

## 2021-02-11 NOTE — PROGRESS NOTES
Clinical Pharmacy Note  Warfarin Consult    Teresa Mendoza is a 80 y.o. female receiving warfarin managed by pharmacy. Warfarin Indication: Afib  Target INR range: 2-3   Dose prior to admission: 5mg daily except 2.5 mg Thurs    Current warfarin drug-drug interactions: Cipro, Amiodarone     Recent Labs     02/09/21  0508 02/10/21  0518 02/10/21  0824 02/11/21  0435   HGB  --   --  10.5* 10.5*   HCT  --   --  34.0* 33.1*   INR 3.23* 2.90*  --  2.08*       Assessment/Plan:    Warfarin 2.5 mg tonight . Daily PT/INR until stable within therapeutic range. Thank you for the consult. Will continue to follow.     Pramod Salguero PharmD  2/11/2021 1:55 PM

## 2021-02-11 NOTE — PROGRESS NOTES
Occupational Therapy  Pt transferred to CVICU. Will need new orders to resume OT services.      Jasper Castillo, OTR/L

## 2021-02-11 NOTE — PROGRESS NOTES
Occupational Therapy   Occupational Therapy Initial Assessment  Date: 2021   Patient Name: Bam Mail  MRN: [id number]     : 1934    Date of Service: 2021    Discharge Recommendations:  24 hour supervision or assist, Home with Home health OT  OT Equipment Recommendations  Other: pt with needed Radha Boyle scored a 15/24 on the AM-PAC ADL Inpatient form. Current research shows that an AM-PAC score of 18 or greater is typically associated with a discharge to the patient's home setting. Based on the patient's AM-PAC score, and their current ADL deficits, it is recommended that the patient have 2-3 sessions per week of Occupational Therapy at d/c to increase the patient's independence. At this time, this patient demonstrates the endurance and safety to discharge home with home therapy and a follow up treatment frequency of 2-3x/wk. Please see assessment section for further patient specific details. If patient discharges prior to next session this note will serve as a discharge summary. Please see below for the latest assessment towards goals. Assessment   Performance deficits / Impairments: Decreased functional mobility ; Decreased strength;Decreased endurance;Decreased ADL status; Decreased safe awareness;Decreased high-level IADLs;Decreased balance  Assessment: 79 y/o female admitted 2021 with CHF exacerbation. Rapid COVID test negative. Pt s/p heart cath 2/10 and transferred to ICU. PTA pt lives at home with spouse. Pt reports she is independent with ADLs and functional mobility with RW. Pt has 10 children who are very involved in care and someone is always with patient at home to assist if needed. Today, pt required min A to stand and min A to ambulated from far side of bed to chair with RW. Pt slow d/t reported knee stiffness initially but no major LOB. HR up to 150s with activity- RN aware. Pt with decreased endurance and dorita shoulder ROM.  Anticipate pt will require up to max A for LB ADLs at this time. Pt is functioning below baseline and benefit from skilled therapy. Pt reports her plan is to return home with 24 hour assist from family and home OT. Prognosis: Good  OT Education: OT Role;Plan of Care;Transfer Training;ADL Adaptive Strategies  Patient Education: edu pt that family may have to help assist with ADLs/mobility a little more at home- pt agreeable. REQUIRES OT FOLLOW UP: Yes  Activity Tolerance  Activity Tolerance: Patient Tolerated treatment well;Patient limited by fatigue  Activity Tolerance: fatigues easily, HR up to 150s with activity  Safety Devices  Safety Devices in place: Yes  Type of devices: Nurse notified;Gait belt;Call light within reach; Chair alarm in place; Left in chair           Patient Diagnosis(es): The primary encounter diagnosis was Acute congestive heart failure, unspecified heart failure type ([address] A diagnosis of Hyponatremia was also pertinent to this visit. has a past medical history of Anemia, Chronic kidney disease, Combined systolic and diastolic congestive heart failure ([address] Diabetes mellitus ([address] DVT (deep venous thrombosis) ([address] Hyperlipidemia, Hypertension, Osteoarthritis, PAF (paroxysmal atrial fibrillation) ([address] Pulmonary embolism ([address] Sarcoidosis, Thyroid disease, and Type II or unspecified type diabetes mellitus without mention of complication, not stated as uncontrolled. has a past surgical history that includes Hysterectomy; knee surgery; joint replacement (Bilateral); joint replacement (Bilateral); and Intracapsular cataract extraction (Left, 10/16/2020). Restrictions  Restrictions/Precautions  Restrictions/Precautions: Fall Risk  Position Activity Restriction  Other position/activity restrictions: [place] air    Subjective   General  Chart Reviewed: Yes  Patient assessed for rehabilitation services?: Yes  Additional Pertinent Hx: 79 y/o female admitted 2/7/2021 with CHF exacerbation.  Rapid COVID test negative. Pt s/p L heart cath 2/10 and transferred to ICU. Family / Caregiver Present: Yes(son)  Referring Practitioner: Scotty Carmona MD and Radha Chen MD  Diagnosis: CHF  Subjective  Subjective: Pt seen bedside and agreeable to therapy. Pt reports L knee stiffness with ambulation. HR up to 150s with ambulation but recovered with rest. SpO2 above 90% on room air  General Comment  Comments: Per RN ok for therapy. Social/Functional History  Social/Functional History  Lives With: Spouse  Type of Home: House  Home Layout: Two level, Able to Live on Main level with bedroom/bathroom, Laundry in basement  Home Access: Stairs to enter with rails  Entrance Stairs - Number of Steps: 1 + 1 with rail, pt does not go to other levels of house  Bathroom Shower/Tub: Walk-in shower, Shower chair with back  [employer]: (RTS over comfort height toilet)  Bathroom Equipment: 3-in-1 commode, Grab bars in shower, Shower chair  Bathroom Accessibility: (leaves walker outside of bathroom)  Home Equipment: Rolling walker, Wheelchair-manual(uses w/c for going out to MD appts)  Brandy Morales Help From: Family  ADL Assistance: Independent(family assists as needed)  Homemaking Assistance: (family assist with all household tasks)  Ambulation Assistance: Independent(uses walker at all times)  Transfer Assistance: Independent  Active : No  Patient's  Info: daughter drives  Occupation: Retired  IADL Comments: sleeps in regular bed  Additional Comments: daughter reports someone is with her at all times       Objective   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  Pt reporting stiffness in dorita knees and relies heavily on RW for support. HR up to 150 with ambulation. ADL  Toileting: Dependent/Total(weeks)  Additional Comments: Anticipate pt will require max A for LB bathing/dressing, mod A for UB bathing/dressing based on balance, ROM and endurance observed        Bed mobility  Supine to Sit: Minimal assistance(HOB slightly elevated, use of bed rail)  Sit to Supine: (pt in chair at end of session)  Transfers  Sit to stand: Minimal assistance  Stand to sit: Minimal assistance  Transfer Comments: to/from RW     Cognition  Overall Cognitive Status: WFL  Perception  Overall Perceptual Status: WFL              LUE AROM (degrees)  LUE General AROM: very limited overhead shoulder ROM  Left Hand AROM (degrees)  Left Hand AROM: WFL  RUE AROM (degrees)  RUE General AROM: very limited overhead shoulder ROM  Right Hand AROM (degrees)  Right Hand AROM: WFL     Plan   Plan  Times per week: 3-5  Current Treatment Recommendations: Strengthening, Endurance Training, ROM, Balance Training, Gait Training, Functional Mobility Training, Positioning, Self-Care / ADL, Patient/Caregiver Education & Training    AM-PAC Score  AM-[facility] Inpatient Daily Activity Raw Score: 15 (02/11/21 0948)  AM-PAC Inpatient ADL T-Scale Score : 34.69 (02/11/21 0948)  ADL Inpatient CMS 0-100% Score: 56.46 (02/11/21 0948)  ADL Inpatient CMS G-Code Modifier : CK (02/11/21 0948)    Goals  Short term goals  Time Frame for Short term goals: Prior to DC:   Short term goal 1: Pt will complete ADL transfers with supervision  Short term goal 2: Pt will complete functional mobility with supervision  Short term goal 3: Pt will tolerate standing > 3 min for functional task with supervision  Short term goal 4: Pt will complete toileting with supervision  Short term goal 5: Pt will complete LB dressing with supervision  Patient Goals   Patient goals : to return home       Therapy Time   Individual Concurrent Group Co-treatment   Time In 0840         Time Out [phone] Minutes 40         Timed Code Treatment Minutes: 40 Minutes     This note to serve as OT d/c summary if pt is d/c-ed prior to next therapy session.     Stephanie Knight, OTR/L

## 2021-02-11 NOTE — PROGRESS NOTES
Comprehensive Nutrition Assessment    Type and Reason for Visit:  Initial(LOs)    Nutrition Recommendations/Plan:   Continue Cardiac diet with 1500 mL FR. Start Glucerna QD. Nutrition Assessment:  LOS. Pt presented with SOB and lower extremity swelling and PMH of CKD and CHF. Pt was educated with dtgr on CHF diet. Pt s/p L heart cath and ballon aortic valvuloplasty on 2/10. Noted PO intakes were good prior to procedure, >50% but little intakes since then. Added ONS today since pt is favorable to supplements. Malnutrition Assessment:  Malnutrition Status:  No malnutrition      Estimated Daily Nutrient Needs:  Energy (kcal):  1602-2276 kcal (11-14 kcal/kg CBW)  Protein (g):  148 gm (2.5gm/kg IBW)   Fluid (ml/day):  per provider    Nutrition Related Findings:  BM 2/7; +3 BLE edema; Glu 210; -8.2 L fluid; Wounds:  None       Current Nutrition Therapies:    DIET CARDIAC; Daily Fluid Restriction: 1500 ml  Dietary Nutrition Supplements: Diabetic Oral Supplement    Anthropometric Measures:  · Height: 5' 6\" (167.6 cm)  · Current Body Weight: 259 lb (117.5 kg)   · Admission Body Weight: 259 lb (117.5 kg)    · Usual Body Weight: (unknown)     · Ideal Body Weight: 130 lbs; % Ideal Body Weight 199.2 %   · BMI: 41.8  · BMI Categories: Obese Class 3 (BMI 40.0 or greater)       Nutrition Diagnosis:   No nutrition diagnosis at this time    Nutrition Interventions:   Food and/or Nutrient Delivery:  Continue Current Diet, Continue Oral Nutrition Supplement  Nutrition Education/Counseling:  Education completed   Coordination of Nutrition Care:  Continue to monitor while inpatient    Goals:  consume >50% of meals and supplement during admission       Nutrition Monitoring and Evaluation:   Food/Nutrient Intake Outcomes:  Food and Nutrient Intake, Supplement Intake  Physical Signs/Symptoms Outcomes:  Biochemical Data, Skin, Weight, Fluid Status or Edema     Discharge Planning:     Too soon to determine     Electronically signed by Michele Felix RD, LD on 2/11/21 at 12:20 PM EST    Contact: [phone]

## 2021-02-11 NOTE — PROGRESS NOTES
1900 - Pt arrived to unit from cath lab. Pt alert and oriented x 4. Denies pain. Doppler used to heat post tib pulses in right foot. Palpable pulses in right radial. Right groin site not bleeding on arrival. No hematoma felt. Manual pressure was held for 45 minutes. TR bad on right radial with 13 ml of air. 1516 - report given to Una Albarado RN no further questions.

## 2021-02-11 NOTE — PROGRESS NOTES
Late Entry  Pt's daughter was at bedside and was asking about the weeks. She was concerned about her frequent UTI's. I told her that we could take it out, but she would not be getting up during the shift, so with this information, the pt and daughter requested that the catheter remain in until she can get up.

## 2021-02-12 LAB
ALBUMIN SERPL-MCNC: 2.9 G/DL (ref 3.4–5)
ANION GAP SERPL CALCULATED.3IONS-SCNC: 8 MMOL/L (ref 3–16)
BUN BLDV-MCNC: 45 MG/DL (ref 7–20)
CALCIUM SERPL-MCNC: 9 MG/DL (ref 8.3–10.6)
CHLORIDE BLD-SCNC: 95 MMOL/L (ref 99–110)
CO2: 31 MMOL/L (ref 21–32)
CREAT SERPL-MCNC: 1.8 MG/DL (ref 0.6–1.2)
GFR AFRICAN AMERICAN: 32
GFR NON-AFRICAN AMERICAN: 27
GLUCOSE BLD-MCNC: 237 MG/DL (ref 70–99)
GLUCOSE BLD-MCNC: 237 MG/DL (ref 70–99)
GLUCOSE BLD-MCNC: 240 MG/DL (ref 70–99)
GLUCOSE BLD-MCNC: 243 MG/DL (ref 70–99)
GLUCOSE BLD-MCNC: 326 MG/DL (ref 70–99)
HCT VFR BLD CALC: 30.7 % (ref 36–48)
HEMOGLOBIN: 9.8 G/DL (ref 12–16)
INR BLD: 2.15 (ref 0.86–1.14)
MAGNESIUM: 2.2 MG/DL (ref 1.8–2.4)
MCH RBC QN AUTO: 28.2 PG (ref 26–34)
MCHC RBC AUTO-ENTMCNC: 31.8 G/DL (ref 31–36)
MCV RBC AUTO: 88.8 FL (ref 80–100)
PDW BLD-RTO: 18.2 % (ref 12.4–15.4)
PERFORMED ON: ABNORMAL
PHOSPHORUS: 3.6 MG/DL (ref 2.5–4.9)
PLATELET # BLD: 161 K/UL (ref 135–450)
PMV BLD AUTO: 8.4 FL (ref 5–10.5)
POTASSIUM SERPL-SCNC: 5 MMOL/L (ref 3.5–5.1)
PROTHROMBIN TIME: 25.1 SEC (ref 10–13.2)
RBC # BLD: 3.46 M/UL (ref 4–5.2)
SODIUM BLD-SCNC: 134 MMOL/L (ref 136–145)
URIC ACID, SERUM: 13.2 MG/DL (ref 2.6–6)
WBC # BLD: 6.8 K/UL (ref 4–11)

## 2021-02-12 PROCEDURE — 2580000003 HC RX 258: Performed by: INTERNAL MEDICINE

## 2021-02-12 PROCEDURE — 1200000000 HC SEMI PRIVATE

## 2021-02-12 PROCEDURE — 6370000000 HC RX 637 (ALT 250 FOR IP): Performed by: NURSE PRACTITIONER

## 2021-02-12 PROCEDURE — 85610 PROTHROMBIN TIME: CPT

## 2021-02-12 PROCEDURE — 99232 SBSQ HOSP IP/OBS MODERATE 35: CPT | Performed by: NURSE PRACTITIONER

## 2021-02-12 PROCEDURE — 97530 THERAPEUTIC ACTIVITIES: CPT

## 2021-02-12 PROCEDURE — 84550 ASSAY OF BLOOD/URIC ACID: CPT

## 2021-02-12 PROCEDURE — 85027 COMPLETE CBC AUTOMATED: CPT

## 2021-02-12 PROCEDURE — 97535 SELF CARE MNGMENT TRAINING: CPT

## 2021-02-12 PROCEDURE — 6370000000 HC RX 637 (ALT 250 FOR IP): Performed by: INTERNAL MEDICINE

## 2021-02-12 PROCEDURE — 83036 HEMOGLOBIN GLYCOSYLATED A1C: CPT

## 2021-02-12 PROCEDURE — 97110 THERAPEUTIC EXERCISES: CPT

## 2021-02-12 PROCEDURE — 36415 COLL VENOUS BLD VENIPUNCTURE: CPT

## 2021-02-12 PROCEDURE — 94761 N-INVAS EAR/PLS OXIMETRY MLT: CPT

## 2021-02-12 PROCEDURE — 97116 GAIT TRAINING THERAPY: CPT

## 2021-02-12 PROCEDURE — 80069 RENAL FUNCTION PANEL: CPT

## 2021-02-12 PROCEDURE — 83735 ASSAY OF MAGNESIUM: CPT

## 2021-02-12 RX ORDER — GLIPIZIDE 5 MG/1
5 TABLET ORAL
Status: DISCONTINUED | OUTPATIENT
Start: 2021-02-13 | End: 2021-02-13 | Stop reason: HOSPADM

## 2021-02-12 RX ORDER — ALLOPURINOL 100 MG/1
200 TABLET ORAL DAILY
Status: DISCONTINUED | OUTPATIENT
Start: 2021-02-12 | End: 2021-02-13 | Stop reason: HOSPADM

## 2021-02-12 RX ORDER — NICOTINE POLACRILEX 4 MG
15 LOZENGE BUCCAL PRN
Status: DISCONTINUED | OUTPATIENT
Start: 2021-02-12 | End: 2021-02-13 | Stop reason: HOSPADM

## 2021-02-12 RX ORDER — DEXTROSE MONOHYDRATE 50 MG/ML
100 INJECTION, SOLUTION INTRAVENOUS PRN
Status: DISCONTINUED | OUTPATIENT
Start: 2021-02-12 | End: 2021-02-13 | Stop reason: HOSPADM

## 2021-02-12 RX ORDER — TORSEMIDE 20 MG/1
20 TABLET ORAL DAILY
Qty: 30 TABLET | Refills: 3 | Status: SHIPPED | OUTPATIENT
Start: 2021-02-13 | End: 2021-02-23

## 2021-02-12 RX ORDER — CARVEDILOL 12.5 MG/1
6.25 TABLET ORAL 2 TIMES DAILY WITH MEALS
Qty: 60 TABLET | Refills: 3 | Status: ON HOLD
Start: 2021-02-12 | End: 2021-04-05 | Stop reason: HOSPADM

## 2021-02-12 RX ORDER — AMIODARONE HYDROCHLORIDE 200 MG/1
200 TABLET ORAL DAILY
Status: DISCONTINUED | OUTPATIENT
Start: 2021-02-17 | End: 2021-02-13 | Stop reason: HOSPADM

## 2021-02-12 RX ORDER — WARFARIN SODIUM 2.5 MG/1
2.5 TABLET ORAL
Status: COMPLETED | OUTPATIENT
Start: 2021-02-12 | End: 2021-02-12

## 2021-02-12 RX ORDER — INSULIN GLARGINE 100 [IU]/ML
10 INJECTION, SOLUTION SUBCUTANEOUS NIGHTLY
Status: DISCONTINUED | OUTPATIENT
Start: 2021-02-12 | End: 2021-02-13 | Stop reason: HOSPADM

## 2021-02-12 RX ORDER — CARVEDILOL 6.25 MG/1
6.25 TABLET ORAL 2 TIMES DAILY WITH MEALS
Status: DISCONTINUED | OUTPATIENT
Start: 2021-02-12 | End: 2021-02-13 | Stop reason: HOSPADM

## 2021-02-12 RX ORDER — DEXTROSE MONOHYDRATE 25 G/50ML
12.5 INJECTION, SOLUTION INTRAVENOUS PRN
Status: DISCONTINUED | OUTPATIENT
Start: 2021-02-12 | End: 2021-02-13 | Stop reason: HOSPADM

## 2021-02-12 RX ORDER — AMIODARONE HYDROCHLORIDE 200 MG/1
TABLET ORAL
Qty: 40 TABLET | Refills: 1 | Status: SHIPPED | OUTPATIENT
Start: 2021-02-12 | End: 2021-02-23

## 2021-02-12 RX ADMIN — CETIRIZINE HYDROCHLORIDE 10 MG: 10 TABLET, FILM COATED ORAL at 08:13

## 2021-02-12 RX ADMIN — CIPROFLOXACIN HYDROCHLORIDE 250 MG: 250 TABLET, FILM COATED ORAL at 21:20

## 2021-02-12 RX ADMIN — INSULIN LISPRO 4 UNITS: 100 INJECTION, SOLUTION INTRAVENOUS; SUBCUTANEOUS at 18:26

## 2021-02-12 RX ADMIN — ATORVASTATIN CALCIUM 10 MG: 10 TABLET, FILM COATED ORAL at 21:20

## 2021-02-12 RX ADMIN — Medication 10 ML: at 08:14

## 2021-02-12 RX ADMIN — INSULIN LISPRO 2 UNITS: 100 INJECTION, SOLUTION INTRAVENOUS; SUBCUTANEOUS at 08:14

## 2021-02-12 RX ADMIN — CARVEDILOL 6.25 MG: 6.25 TABLET, FILM COATED ORAL at 18:23

## 2021-02-12 RX ADMIN — POLYETHYLENE GLYCOL 3350 17 G: 17 POWDER, FOR SOLUTION ORAL at 12:46

## 2021-02-12 RX ADMIN — Medication 2 TABLET: at 08:19

## 2021-02-12 RX ADMIN — INSULIN LISPRO 1 UNITS: 100 INJECTION, SOLUTION INTRAVENOUS; SUBCUTANEOUS at 21:22

## 2021-02-12 RX ADMIN — TORSEMIDE 20 MG: 20 TABLET ORAL at 08:13

## 2021-02-12 RX ADMIN — GABAPENTIN 200 MG: 100 CAPSULE ORAL at 21:21

## 2021-02-12 RX ADMIN — TRIAMCINOLONE ACETONIDE: 1 CREAM TOPICAL at 17:19

## 2021-02-12 RX ADMIN — WARFARIN SODIUM 2.5 MG: 2.5 TABLET ORAL at 18:23

## 2021-02-12 RX ADMIN — CARVEDILOL 12.5 MG: 12.5 TABLET, FILM COATED ORAL at 08:12

## 2021-02-12 RX ADMIN — INSULIN GLARGINE 10 UNITS: 100 INJECTION, SOLUTION SUBCUTANEOUS at 21:21

## 2021-02-12 RX ADMIN — CIPROFLOXACIN HYDROCHLORIDE 250 MG: 250 TABLET, FILM COATED ORAL at 08:19

## 2021-02-12 RX ADMIN — ALLOPURINOL 200 MG: 100 TABLET ORAL at 12:45

## 2021-02-12 RX ADMIN — PANTOPRAZOLE SODIUM 40 MG: 40 TABLET, DELAYED RELEASE ORAL at 08:12

## 2021-02-12 RX ADMIN — AMIODARONE HYDROCHLORIDE 200 MG: 200 TABLET ORAL at 08:12

## 2021-02-12 RX ADMIN — TRIAMCINOLONE ACETONIDE: 1 CREAM TOPICAL at 08:13

## 2021-02-12 RX ADMIN — AMIODARONE HYDROCHLORIDE 200 MG: 200 TABLET ORAL at 21:20

## 2021-02-12 ASSESSMENT — PAIN SCALES - GENERAL
PAINLEVEL_OUTOF10: 0
PAINLEVEL_OUTOF10: 0

## 2021-02-12 ASSESSMENT — PAIN - FUNCTIONAL ASSESSMENT: PAIN_FUNCTIONAL_ASSESSMENT: ACTIVITIES ARE NOT PREVENTED

## 2021-02-12 NOTE — PROGRESS NOTES
Physical Therapy  Facility/Department: [address] CVICU  Daily Treatment Note  NAME: Wilfrido Corral  : 1934  MRN: [id number]    Date of Service: 2021    Discharge Recommendations:  Continue to assess pending progress   PT Equipment Recommendations  Equipment Needed: No  Other: Pt has Rolling Walker. Wilfrido Corral scored a 16/24 on the AM-PAC short mobility form. Current research shows that an AM-PAC score of 18 or greater is typically associated with a discharge to the patient's home setting. Based on the patient's AM-PAC score and their current functional mobility deficits, it is recommended that the patient have 2-3 sessions per week of Physical Therapy at d/c to increase the patient's independence. At this time, this patient demonstrates the endurance and safety to discharge home with Home PT Evaluation and a follow up treatment frequency of 2-3x/wk. Please see assessment section for further patient specific details. If patient discharges prior to next session this note will serve as a discharge summary. Please see below for the latest assessment towards goals. Assessment   Body structures, Functions, Activity limitations: Decreased functional mobility ; Decreased endurance  Assessment: 81 y/o female admit 2021 with CHF Exac.  2/10/2021 Cardiac Cath, transfer to CVICU. PMH as noted including CHF, PAF, PE, THR, TKR. Pt regulo OOB,Amb short distances with Marrianne Mall assist. Pt reports adequate family assist/support for d/c home. Will need at 24/7 assist/support upon return home. Will recommend Home PT. Prognosis: Fair;Good  Decision Making: Medium Complexity  History: 81 y/o female admit 2021 with CHF Exac.  2/10/2021 Cardiac Cath, transfer to CVICU. PMH as noted including CHF, PAF, PE, THR, TKR. Exam: See above. Clinical Presentation: See above. Patient Education: Role of PT, POC, Need to call for assist, Safe use of Walker. Pt/family instructed in written HEP.   Barriers to Learning: None. REQUIRES PT FOLLOW UP: Yes  Activity Tolerance  Activity Tolerance: Patient limited by endurance  Activity Tolerance: HR 's during activity. RA with sats at least 90%. Patient Diagnosis(es): The primary encounter diagnosis was Acute congestive heart failure, unspecified heart failure type ([address] A diagnosis of Hyponatremia was also pertinent to this visit. has a past medical history of Anemia, Chronic kidney disease, Combined systolic and diastolic congestive heart failure ([address] Diabetes mellitus ([address] DVT (deep venous thrombosis) ([address] Hyperlipidemia, Hypertension, Osteoarthritis, PAF (paroxysmal atrial fibrillation) ([address] Pulmonary embolism ([address] Sarcoidosis, Thyroid disease, and Type II or unspecified type diabetes mellitus without mention of complication, not stated as uncontrolled. has a past surgical history that includes Hysterectomy; knee surgery; joint replacement (Bilateral); joint replacement (Bilateral); and Intracapsular cataract extraction (Left, 10/16/2020). Restrictions  Restrictions/Precautions  Restrictions/Precautions: Fall Risk  Position Activity Restriction  Other position/activity restrictions: [place], room air  Subjective   General  Chart Reviewed: Yes  Additional Pertinent Hx: 81 y/o female admit 2/7/2021 with CHF Exac.  2/10/2021 Cardiac Cath (Severe Aortic Stenosis), transfer to CVICU. PMH as noted including CHF, PAF, PE, THR, TKR. Response To Previous Treatment: Patient with no complaints from previous session. Family / Caregiver Present: Yes(Son)  Subjective  Subjective: Pt pleasant, agreeable to PT Rx. Orientation  Orientation  Overall Orientation Status: Within Functional Limits  Cognition   Cognition  Overall Cognitive Status: WFL  Objective      Transfers  Sit to Stand: Minimal Assistance(With Walker. Cues for safe hand placement. Increase effort from lower surfaces.)  Stand to sit: Minimal Assistance(With Walker.   Cues for safe hand placement.)  Comment: Slow/difficult transfer each hand from chair onto walker. Ambulation  Ambulation?: Yes  Ambulation 1  Device: Rolling Walker  Distance: Pt amb within hospital room setting ~ 25' x 2 with [place] assist.  Very short/shuffling steps; wide TARIQ and heavy wgt bear on Walker for support. C/O generalized stiffness LEs. Comments: HR 's during functional activities. Exercises  Comments: Pt instructed in and issued written HEP : Supine : Glut Sets, Quad Sets, Heelslides, Hip Abd/Adduct, Ankle Pumps. Sitting :  Hip Flex, Knee Ext/Flex, Toe/Heel Raises. Comment: Following 1st amb attempt : pt acknowledges L knee soreness/stiffness;  PT ace wrapped L Knee. Following 2nd amb attempt : pt reports ace wrap did help L knee sxs abit. AM-PAC Score  AM-PAC Inpatient Mobility Raw Score : 16 (02/12/21 1234)  AM-PAC Inpatient T-Scale Score : 40.78 (02/12/21 1234)  Mobility Inpatient CMS 0-100% Score: 54.16 (02/12/21 1234)  Mobility Inpatient CMS G-Code Modifier : CK (02/12/21 1234)          Goals  Short term goals  Time Frame for Short term goals: Upon d/c acute care setting. Short term goal 1: Bed Mob Supervision. Short term goal 2: Transfers with assist device Supervision. Short term goal 3: Amb with assist device 48' SBA/CGA. Patient Goals   Patient goals : Return home with family assist/support. Plan    Plan  Times per week: 3-5x week while in acute care setting.   Current Treatment Recommendations: Strengthening, Functional Mobility Training, Transfer Training, Gait Training, Safety Education & Training, Patient/Caregiver Education & Training  Safety Devices  Type of devices: Call light within reach, Chair alarm in place, Left in chair, Nurse notified     Therapy Time   Individual Concurrent Group Co-treatment   Time In 0930         Time Out 1025         Minutes 216 Franca Garcia, PT

## 2021-02-12 NOTE — PROGRESS NOTES
Alert and oriented x 4. Afebrile. VS stable on room air. Heart rhythm Afib with controlled rate. Denies pain. Right groin site remains stable with neurovasc checks intact. Patient voiding well per bedside commode. 2 assist with walker. Call light within reach.  Able to make own needs known

## 2021-02-12 NOTE — PROGRESS NOTES
Patient transferred off CVU. Son at the bedside, accompanied patient and aware of transfer. All belongings taken with patient, stable at time of transfer. Report already called to receiving RN on PCU.

## 2021-02-12 NOTE — CARE COORDINATION
Discharge Planning:  RAGINI contacted Evens Corado [phone] , pts dgtr to further discuss d/c plans. [place] stated that pt is agreeable to a home RN, but she has not decided on an agency. [place] stated that she will again take a look at the [facility] list and then will f/u with this [address]  Electronically signed by Benjie Fung on 2/12/2021 at 1:51 PM     Addendum:  SW received a call back from pts dgtr, Evens Corado. Evens Corado stated that she has reviewed the [facility] list, but would like this SW to meet with pt and pts son, who is currently in the room. SW met with pt and pts son in the room. Pts son stated that he will talk with his sister, Evens Corado and have her make the decision about the [facility] agency on 2/13.   Loretta Henry [place]  [phone]  Electronically signed by Benjie Fung on 2/12/2021 at 4:02 PM

## 2021-02-12 NOTE — PLAN OF CARE
Problem: Falls - Risk of:  Goal: Will remain free from falls  Description: Will remain free from falls  Outcome: Ongoing     Problem: Falls - Risk of:  Goal: Absence of physical injury  Description: Absence of physical injury  Outcome: Ongoing       Problem: Safety:  Goal: Free from accidental physical injury  Description: Free from accidental physical injury  2/12/2021 0644 by Reginold Goldberg, RN  Outcome: Ongoing     Problem: Safety:  Goal: Free from intentional harm  Description: Free from intentional harm  2/12/2021 0644 by Reginold Goldberg, RN  Outcome: Ongoing    Problem: Pain:  Goal: Patient's pain/discomfort is manageable  Description: Patient's pain/discomfort is manageable  2/12/2021 0644 by Reginold Goldberg, RN  Outcome: Ongoing    Problem: Pain:  Goal: Pain level will decrease  Description: Pain level will decrease  2/12/2021 0644 by Reginold Goldberg, RN  Outcome: Ongoing    Problem: Pain:  Goal: Control of acute pain  Description: Control of acute pain  2/12/2021 0644 by Reginold Goldberg, RN  Outcome: Ongoing    Problem: Pain:  Goal: Control of chronic pain  Description: Control of chronic pain  2/12/2021 0644 by Reginold Goldberg, RN  Outcome: Ongoing    Problem: FLUID AND ELECTROLYTE IMBALANCE  Goal: Fluid and electrolyte balance are achieved/maintained  2/12/2021 0644 by Reginold Goldberg, RN  Outcome: Ongoing  15 min education about importance of watching fluid intake

## 2021-02-12 NOTE — PROGRESS NOTES
[address]                                                                        [address] [address],[address]                                                                 Peri Barnett                                                         Phone Number: [phone]                                                           Fax Number: [phone]                                                             Nephrology Progress Note    Chief Complaint: Shortness of breath/CHF    Reason for Consultation: Volume overload / CKD IV - follows with my partner Dr Nino Coronel on an outpatient basis - Baseline creatinine ~ 1.6 mg/dl. Last seen in the office 1/5/2021    History of Present Illness: Ms Albertina Delgado is an 79 yo morbidly obese female with a past medical history significant for A Fib, Pulmonary Hypertension, Diastolic CHF, Hypertension, DM, and Hyperlipidemia that presented to the ED with shortness of breath. CXR revealed mild pulmonary congestion. She was admitted with a working diagnosis of acute decompensated HF. Creatinine stable at baseline. Patients daughter stated mother was having urinary hesitancy prior to admission but now that weeks is in symptom has improved. Patient stated that she was probably consuming more salt than she should    Subjective:    Resting in recliner; NAD; breathing improving    ROS: + worsening LE edema; + shortness of breath - both resolved; + urinary hesitancy;  All other ROS negative    Scheduled Meds:   [START ON 2/13/2021] glipiZIDE  5 mg Oral QAM AC    [START ON 2/17/2021] amiodarone  200 mg Oral Daily    carvedilol  6.25 mg Oral BID WC    allopurinol  200 mg Oral Daily    amiodarone  200 mg Oral BID    torsemide  20 mg Oral Daily    sodium chloride flush  10 mL Intravenous 2 times per day    magnesium cl-calcium carbonate  2 tablet Oral Daily    [START ON 2022] warfarin (COUMADIN) daily dosing (placeholder)   Other RX Placeholder    cetirizine  10 mg Oral Daily    ciprofloxacin  250 mg Oral 2 times per day    gabapentin  200 mg Oral Nightly    latanoprost  1 drop Right Eye Nightly    atorvastatin  10 mg Oral Nightly    pantoprazole  40 mg Oral Daily    triamcinolone   Topical BID    insulin lispro  0-6 Units Subcutaneous TID WC    insulin lispro  0-3 Units Subcutaneous Nightly        dextrose Stopped (02/10/21 2030)    dextrose 100 mL/hr (02/10/21 0519)       PRN Meds:.sodium chloride flush, acetaminophen, ondansetron, prochlorperazine, promethazine **OR** [DISCONTINUED] ondansetron, polyethylene glycol, [DISCONTINUED] acetaminophen **OR** acetaminophen, perflutren lipid microspheres, melatonin, glucose, dextrose, glucagon (rDNA), dextrose    Physical Exam:    TEMPERATURE:  Current - Temp: 98.1 °F (36.7 °C);  Max - Temp  Av.2 °F (36.8 °C)  Min: 97.9 °F (36.6 °C)  Max: 98.5 °F (36.9 °C)  RESPIRATIONS RANGE: Resp  Av.9  Min: 14  Max: 26  PULSE RANGE: Pulse  Av.1  Min: 69  Max: 90  BLOOD PRESSURE RANGE:  Systolic (10HEV), XFL:704 , Min:108 , LPB:826   ; Diastolic (63AYO), NEI:26, Min:46, Max:110    24HR INTAKE/OUTPUT:      Intake/Output Summary (Last 24 hours) at 2021 1051  Last data filed at 2021 0900  Gross per 24 hour   Intake 555 ml   Output 950 ml   Net -395 ml         Patient Vitals for the past 96 hrs (Last 3 readings):   Weight   21 0600 258 lb 2.5 oz (117.1 kg)   21 0638 259 lb 4.2 oz (117.6 kg)   02/10/21 0509 255 lb 8.2 oz (115.9 kg)       General: Alert, Awake, NAD, Obese  HEENT: Normocephalic, atraumatic, Nose and ears appear externally without, MMM  Neck: No Thyromegaly, Trachea is midline, No Carotid bruit  Eyes: EOMI, KEVEN  Chest: CTA anteriorly, no intercostal retractions  CVS: irregular, no murmur, no rub  Abdomen: soft, non tender, no organomegaly, no bruit appreciated  Extremities: trace/1+ edema, no cyanosis. Skin: normal texture, normal skin turgor, no rash  Musculoskeletal: normal ROM, no joint swelling, no visible deformity  Neurological: CN intact, no focal motor neurological deficit  Psych: normal affect; AAO x 3    Allergies:   Allergies   Allergen Reactions    Naproxen Itching    Bactrim [Sulfamethoxazole-Trimethoprim]     Levofloxacin     Pcn [Penicillins]     Sulfa Antibiotics       Past Medical History:   Diagnosis Date    Anemia     Chronic kidney disease     Combined systolic and diastolic congestive heart failure (HCC)     Diabetes mellitus ([address]     DVT (deep venous thrombosis) (HCC)     Hyperlipidemia     Hypertension     Osteoarthritis     PAF (paroxysmal atrial fibrillation) (HCC)     Pulmonary embolism (HCC)     Sarcoidosis     in remission     Thyroid disease     Hypothyroidism    Type II or unspecified type diabetes mellitus without mention of complication, not stated as uncontrolled      Past Surgical History:   Procedure Laterality Date    HYSTERECTOMY      INTRACAPSULAR CATARACT EXTRACTION Left 10/16/2020    PHACOEMULSIFICATION WITH INTRAOCULAR LENS IMPLANT - LEFT EYE performed by Kathy Gregory MD at [facility], [id number] [facility] Bilateral     TKT    JOINT REPLACEMENT Bilateral     THR    KNEE SURGERY      bilateral total knees         LAB DATA:    CBC:   Lab Results   Component Value Date    WBC 4.7 02/11/2021    RBC 3.66 02/11/2021    HGB 10.5 02/11/2021    HCT 33.1 02/11/2021    MCV 90.4 02/11/2021    MCH 28.7 02/11/2021    MCHC 31.8 02/11/2021    RDW 19.1 02/11/2021     02/11/2021    MPV 8.4 02/11/2021     BMP:    Lab Results   Component Value Date     02/12/2021    K 5.0 02/12/2021    K 4.7 02/07/2021    CL 95 02/12/2021    CO2 31 02/12/2021    BUN 45 02/12/2021    CREATININE 1.8 02/12/2021    CALCIUM 9.0 02/12/2021    GFRAA 32 02/12/2021    GFRAA 51 12/20/2012    LABGLOM 27 02/12/2021    GLUCOSE 237 02/12/2021     Ionized Calcium: No results found for: IONCA  Magnesium:    Lab Results   Component Value Date    MG 2.20 02/12/2021     Phosphorus:    Lab Results   Component Value Date    PHOS 3.6 02/12/2021     U/A:    Lab Results   Component Value Date    COLORU YELLOW 02/07/2021    PHUR 5.5 02/07/2021    WBCUA 1 02/07/2021    RBCUA 4 02/07/2021    MUCUS 2+ 02/07/2021    BACTERIA 4+ 02/07/2021    CLARITYU CLOUDY 02/07/2021    SPECGRAV 1.013 02/07/2021    LEUKOCYTESUR Negative 02/07/2021    UROBILINOGEN 1.0 02/07/2021    BILIRUBINUR Negative 02/07/2021    BLOODU Negative 02/07/2021    GLUCOSEU Negative 02/07/2021         IMPRESSION/RECOMMENDATIONS:      Active Problems:    CHF (congestive heart failure), NYHA class III, acute on chronic, combined ([address]    Persistent atrial fibrillation ([address]    Uncontrolled type 2 diabetes mellitus with hyperglycemia (HCC)    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 *    1. CKD - creatinine stable at baseline  - avoid exposure to Nephrotoxic agents    2. Diastolic CHF - Switched to oral Torsemide 2/9/21 - reduced frequency 2/10/21 - reduced dose 2/11/21 - monitor kidney function as we [place]    3. Morbid obesity    4. HTN - controlled  - Avoid Hypotension    5. Hypomagnesemia - replaced - corrected    6. Anemia CKD - Hgb at target    7. 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  Severe aortic valve stenosis - s/p Balloon aortic valvuloplasty 2/10/21    Ok to d/c from renal standpoint; follow up with Dr Yusuf Kaminski in 1-2 weeks

## 2021-02-12 NOTE — PROGRESS NOTES
[facility]   Daily Progress Note      Admit Date:  2/7/2021    Reason for follow up visit: S/P BAV    CC: \"I feel a lot better. \"    79 y/o female with PMH significant for atrial fib,morbid obesity, pulmonary HTN, HTN, HLP, anemia, CKD stage IV, CHF, diabetes mellitus who was admitted with SOB for 2-3 days prior to admit. She was treated for CHF. Noted to have severe AS and underwent BAV on 2/10/2021. Interval History:  Pt. seen and examined; records reviewed  A-fib with controlled VR (110's with activity)  S/P BAV on 2/10/2021  BP stable. Now off O2  Denies chest pain or SOB  Net diuresis -8.3L since admit  Wt 258# (wt loss 16# since admit)    Subjective:  Pt with no acute overnight cardiac events. Denies chest pain, SOB, cough, palpitations or dizziness  Pt poor historian    Objective:   /60   Pulse 81   Temp 98.1 °F (36.7 °C) (Oral)   Resp 21   Ht 5' 6\" (1.676 m)   Wt 258 lb 2.5 oz (117.1 kg)   LMP  (LMP Unknown)   SpO2 99%   BMI 41.67 kg/m²       Intake/Output Summary (Last 24 hours) at 2/12/2021 1013  Last data filed at 2/12/2021 0900  Gross per 24 hour   Intake 655 ml   Output 950 ml   Net -295 ml     Wt Readings from Last 3 Encounters:   02/12/21 258 lb 2.5 oz (117.1 kg)   10/30/20 261 lb (118.4 kg)   10/16/20 256 lb (116.1 kg)       Physical Exam:  General: In no acute distress. Awake, alert, and oriented X4. Up in chair with assist of PT  Skin:  Warm and dry. Neck:  Supple. No JVD  appreciated. Chest: Lungs with mildly diminished breath sounds bilaterally. No wheezes/rhonchi/rales  Cardiovascular:  Irreg, irreg; II/VI AS murmur   Abdomen:  soft, nontender, nondistended, +bowel sounds. Extremities: 1+ bilateral ankle and very lower pretibial edema. No cyanosis. 1+ bilateral DP/PT pulses.      Medications:    [START ON 2/13/2021] glipiZIDE  5 mg Oral QAM AC    amiodarone  200 mg Oral BID    carvedilol  12.5 mg Oral BID WC    torsemide  20 mg Oral Daily    sodium chloride flush  10 mL Intravenous 2 times per day    magnesium cl-calcium carbonate  2 tablet Oral Daily    [START ON 2/8/2022] warfarin (COUMADIN) daily dosing (placeholder)   Other RX Placeholder    cetirizine  10 mg Oral Daily    ciprofloxacin  250 mg Oral 2 times per day    gabapentin  200 mg Oral Nightly    potassium chloride  20 mEq Oral BID WC    latanoprost  1 drop Right Eye Nightly    atorvastatin  10 mg Oral Nightly    pantoprazole  40 mg Oral Daily    triamcinolone   Topical BID    insulin lispro  0-6 Units Subcutaneous TID WC    insulin lispro  0-3 Units Subcutaneous Nightly      dextrose Stopped (02/10/21 2030)    dextrose 100 mL/hr (02/10/21 0519)     sodium chloride flush, acetaminophen, ondansetron, prochlorperazine, promethazine **OR** [DISCONTINUED] ondansetron, polyethylene glycol, [DISCONTINUED] acetaminophen **OR** acetaminophen, perflutren lipid microspheres, melatonin, glucose, dextrose, glucagon (rDNA), dextrose    Lab Data:  CBC:   Recent Labs     02/10/21  0824 02/11/21 0435   WBC 6.3 4.7   HGB 10.5* 10.5*    147     BMP:    Recent Labs     02/10/21  0518 02/11/21 0435 02/12/21  0420   * 139 134*   K 4.2 4.2 5.0   CO2 33* 32 31   BUN 31* 31* 45*   CREATININE 1.8* 1.5* 1.8*     LIVR: No results for input(s): AST, ALT in the last 72 hours. INR:    Recent Labs     02/10/21  0518 02/11/21 0435 02/12/21  0419   INR 2.90* 2.08* 2.15*     Results for Amrit Levy (MRN [id number]) as of 2/12/2021 10:30   Ref.  Range 2/7/2021 17:29 2/8/2021 05:49 2/10/2021 05:18   Pro-BNP Latest Ref Range: 0 - 449 pg/mL 17,075 (H)  6,166 (H)   Troponin Latest Ref Range: <0.01 ng/mL <0.01     Cholesterol, Total Latest Ref Range: 0 - 199 mg/dL  139    HDL Cholesterol Latest Ref Range: 40 - 60 mg/dL  61 (H)    LDL Calculated Latest Ref Range: <100 mg/dL  66    Triglycerides Latest Ref Range: 0 - 150 mg/dL  62    VLDL Cholesterol Calculated Latest Ref Range: Not Established mg/dL  12 2/12/2021: Magnesium 2.20    2/10/2021 Cath/BAV:  1.  Left main is normal, LORENZO 3 flow, no stenosis. 2.  Left anterior descending artery has LORENZO 3 flow with mild luminal  irregularities. 3.  Left circumflex is patent without significant stenosis. 4.  Right coronary artery is dominant without significant stenosis, LORENZO  3 flow.     HEMODYNAMICS:  Aortic valve gradient was 41.1 mmHg.  Post valvuloplasty  aortic valve gradient was 30 mmHg.     2/7/2021 Echo:  There is concentric left ventricular hypertrophy.   Ejection fraction is visually estimated to be 40-45%.   Grade III diastolic dysfunction with elevated LV filling pressures.   The left atrium is severely dilated.   Severe aortic stenosis with a peak velocity of 5.23m/s and a mean pressure   gradient of 66mmHg.   No evidence of significant aortic valve regurgitation.   Dilated right ventricle.   Right ventricular systolic function is mildly reduced .   Estimated pulmonary artery systolic pressure is elevated at 56 mmHg assuming   a right atrial pressure of 15 mmHg.   A hyperechoic structure is seen in liver pushing on IVC.     ECG 2/7/2021:  Atrial fibrillation with slow ventricular response  Left axis deviation  Left ventricular hypertrophy with QRS widening  Poor R wave progression  Confirmed by HANNAH SIMON MD     2/11/2021 CT abdomen/pelvis:  1.  No liver lesion is demonstrated on this noncontrast study.  Images   referring to reported \"hyperechoic mass on the liver\" in the provided history   are not available and would be helpful for correlation.  The history of a   hyperechoic liver lesion is most suggestive of a hemangioma which would   likely be occult without IV contrast, as would other potential liver lesions   aside from cysts   2. 9.3 cm lesion arising from the upper pole the right kidney is compatible   with a proteinaceous cyst, as there was a 6.5 cm simple cyst in this area on   the comparison renal ultrasound exam from 2016   3. 4.6 cm right adrenal mass with focal calcification.  Imaging   characteristics are indeterminate.  This would be best assessed with adrenal   MRI   4. Cholelithiasis   5. Colonic diverticulosis   6. Cardiomegaly with pulmonary vascular congestion and pleural effusions      2/11/2021 US RLE:  No pseudoaneurysm or hematoma noted in the right groin. Normal, multiphasic flow noted in the right common femoral and proximal   superficial femoral arteries. Normal, phasic flow noted in the right common femoral and proximal femoral   veins. Telemetry: Atrial fib with controlled VR; no further RVR noted    Assessment/Plan:    1. Acute on chronic systolic and diastolic heart failure/Cardiomyopathy  -LVEF 40-45%  -continue torsemide and carvedilol  -not on aldactone/ACE/ARB d/t elevated Cr  -not an ICD candidate d/t LVEF > 40%     2. Severe aortic stenosis  -S/P BAV with gradient down to 30 (pre BAV 41)  -TAVR evaluation scheduled for later this month     3. Persistent atrial fibrillation  -VR controlled on carvedilol and amiodarone  -continue warfarin     4. Essential HTN  -much better controlled and @ goal  =continue medical management     5. Nonobstructive coronary artery disease  -S/P cath on 2/10/2021  -no c/o angina  -continue medical management with BB and statin  -not on ASA d/t warfarin therapy    6. Diabetes Mellitus, type II  -Rx per PCP     7. ? Liver mass  -noted on echo  -CT abdomen reviewed (no acute finding)  -defer any further evaluation to Dr. Yannick Bell    8. CKD, stage IV  -nephrology following (Dr. Barb Delgado)    Stable from cardiac standpoint and okay to discharge when okay with others. Follow up with Dr. Zully Daniels as scheduled on 2/23/2021 and Dr. Yuval Rousseau on 2/25/2021 (TAVR consult)    Emphasized and discussed low-fat/low sodium diet, monitoring of daily weights, fluid restriction, worsening signs and symptoms of heart failure and when to call, and the importance of regular exercise and activity.     Discharge

## 2021-02-12 NOTE — PROGRESS NOTES
mg  10 mg Oral Nightly Arianna Orozco MD   10 mg at 02/11/21 2010    pantoprazole (PROTONIX) tablet 40 mg  40 mg Oral Daily Arianna Orozco MD   40 mg at 02/12/21 0757    prochlorperazine (COMPAZINE) tablet 5 mg  5 mg Oral Q6H PRN Arianna Orozco MD        triamcinolone (KENALOG) 0.1 % cream   Topical BID Arianna Orozco MD   Given at 02/12/21 0813    promethazine (PHENERGAN) tablet 12.5 mg  12.5 mg Oral Q6H PRN Arianna Orozco MD        polyethylene glycol [facility]) packet 17 g  17 g Oral Daily PRN Arianna Orozco MD        acetaminophen (TYLENOL) suppository 650 mg  650 mg Rectal Q6H PRN Arianna Orozco MD        perflutren lipid microspheres (DEFINITY) injection 1.65 mg  1.5 mL Intravenous ONCE PRN Arianna Orozco MD        melatonin tablet 3 mg  3 mg Oral Nightly PRN Arianna Orozco MD   3 mg at 02/10/21 2302    glucose (GLUTOSE) 40 % oral gel 15 g  15 g Oral PRN Arianna Orozco MD   15 g at 02/09/21 5594    dextrose 50 % IV solution  12.5 g Intravenous PRN Arianna Orozco MD   12.5 g at 02/10/21 1324    glucagon (rDNA) injection 1 mg  1 mg Intramuscular PRN Arianna Orozco MD        dextrose 5 % solution  100 mL/hr Intravenous PRN Arianna Orozco  mL/hr at 02/10/21 0519 100 mL/hr at 02/10/21 0519    insulin lispro (HUMALOG) injection vial 0-6 Units  0-6 Units Subcutaneous TID WC Arianna Orozco MD   2 Units at 02/12/21 0452    insulin lispro (HUMALOG) injection vial 0-3 Units  0-3 Units Subcutaneous Nightly Arianna Orozco MD   2 Units at 02/11/21 2026     Allergies   Allergen Reactions    Naproxen Itching    Bactrim [Sulfamethoxazole-Trimethoprim]     Levofloxacin     Pcn [Penicillins]     Sulfa Antibiotics      Active Problems:    CHF (congestive heart failure), NYHA class III, acute on chronic, combined ([address]    Persistent atrial fibrillation ([facility]    Uncontrolled type 2 diabetes mellitus with hyperglycemia ([facility]    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 *    Blood pressure 132/60, pulse 81, temperature 98.1 °F (36.7 °C), temperature source Oral, resp. rate 21, height 5' 6\" (1.676 m), weight 258 lb 2.5 oz (117.1 kg), SpO2 99 %, not currently breastfeeding. Subjective Feels better  Objective A&O, better appetite with hyperglycemia. Assessment & Plan Resume glipizide, D/W patient. D/C soon.   Dougie Saab MD  2/12/2021

## 2021-02-12 NOTE — CARE COORDINATION
MIKAW reviewed chart. LSW spoke with bedside RN who reports possible DC today. Home Care order is written for RN home care only. LSW spoke with patient over the phone to discuss dc planning. She is alert and oriented. She reports she is agreeable to Home RN only but does not want any therapies at home. She gives permission for siddhartha Hooks to choose the agency. SHe states she is waiting to hear if she will be going home today.   Hayden Osborn, [employer]     Case Management   [phone]    2/12/2021  9:44 AM

## 2021-02-13 VITALS
DIASTOLIC BLOOD PRESSURE: 68 MMHG | RESPIRATION RATE: 15 BRPM | HEART RATE: 62 BPM | BODY MASS INDEX: 41.81 KG/M2 | HEIGHT: 66 IN | WEIGHT: 260.14 LBS | OXYGEN SATURATION: 94 % | SYSTOLIC BLOOD PRESSURE: 124 MMHG | TEMPERATURE: 98.5 F

## 2021-02-13 LAB
ALBUMIN SERPL-MCNC: 3 G/DL (ref 3.4–5)
ANION GAP SERPL CALCULATED.3IONS-SCNC: 6 MMOL/L (ref 3–16)
BUN BLDV-MCNC: 50 MG/DL (ref 7–20)
CALCIUM SERPL-MCNC: 8.9 MG/DL (ref 8.3–10.6)
CHLORIDE BLD-SCNC: 97 MMOL/L (ref 99–110)
CO2: 36 MMOL/L (ref 21–32)
CREAT SERPL-MCNC: 1.9 MG/DL (ref 0.6–1.2)
ESTIMATED AVERAGE GLUCOSE: 168.6 MG/DL
GFR AFRICAN AMERICAN: 30
GFR NON-AFRICAN AMERICAN: 25
GLUCOSE BLD-MCNC: 123 MG/DL (ref 70–99)
GLUCOSE BLD-MCNC: 135 MG/DL (ref 70–99)
GLUCOSE BLD-MCNC: 156 MG/DL (ref 70–99)
HBA1C MFR BLD: 7.5 %
INR BLD: 2.01 (ref 0.86–1.14)
MAGNESIUM: 2.1 MG/DL (ref 1.8–2.4)
PERFORMED ON: ABNORMAL
PERFORMED ON: ABNORMAL
PHOSPHORUS: 2.5 MG/DL (ref 2.5–4.9)
POTASSIUM SERPL-SCNC: 3.9 MMOL/L (ref 3.5–5.1)
PRO-BNP: 4917 PG/ML (ref 0–449)
PROTHROMBIN TIME: 23.5 SEC (ref 10–13.2)
SODIUM BLD-SCNC: 139 MMOL/L (ref 136–145)
URIC ACID, SERUM: 13.2 MG/DL (ref 2.6–6)

## 2021-02-13 PROCEDURE — 85610 PROTHROMBIN TIME: CPT

## 2021-02-13 PROCEDURE — 36415 COLL VENOUS BLD VENIPUNCTURE: CPT

## 2021-02-13 PROCEDURE — 6370000000 HC RX 637 (ALT 250 FOR IP): Performed by: INTERNAL MEDICINE

## 2021-02-13 PROCEDURE — 80069 RENAL FUNCTION PANEL: CPT

## 2021-02-13 PROCEDURE — 6370000000 HC RX 637 (ALT 250 FOR IP): Performed by: NURSE PRACTITIONER

## 2021-02-13 PROCEDURE — 83880 ASSAY OF NATRIURETIC PEPTIDE: CPT

## 2021-02-13 PROCEDURE — 83735 ASSAY OF MAGNESIUM: CPT

## 2021-02-13 PROCEDURE — 2580000003 HC RX 258: Performed by: INTERNAL MEDICINE

## 2021-02-13 PROCEDURE — 84550 ASSAY OF BLOOD/URIC ACID: CPT

## 2021-02-13 RX ORDER — INSULIN GLARGINE 100 [IU]/ML
10 INJECTION, SOLUTION SUBCUTANEOUS NIGHTLY
Qty: 1 VIAL | Refills: 3 | Status: SHIPPED | OUTPATIENT
Start: 2021-02-13 | End: 2021-03-26

## 2021-02-13 RX ORDER — POLYETHYLENE GLYCOL 3350 17 G/17G
17 POWDER, FOR SOLUTION ORAL DAILY PRN
Qty: 527 G | Refills: 1 | Status: SHIPPED | OUTPATIENT
Start: 2021-02-13 | End: 2021-03-15

## 2021-02-13 RX ORDER — ALLOPURINOL 100 MG/1
200 TABLET ORAL DAILY
Qty: 30 TABLET | Refills: 3 | Status: ON HOLD | OUTPATIENT
Start: 2021-02-14 | End: 2021-04-05 | Stop reason: HOSPADM

## 2021-02-13 RX ORDER — WARFARIN SODIUM 7.5 MG/1
3.75 TABLET ORAL
Status: DISCONTINUED | OUTPATIENT
Start: 2021-02-13 | End: 2021-02-13 | Stop reason: HOSPADM

## 2021-02-13 RX ADMIN — Medication 10 ML: at 00:01

## 2021-02-13 RX ADMIN — Medication 2 TABLET: at 09:04

## 2021-02-13 RX ADMIN — Medication 10 ML: at 09:06

## 2021-02-13 RX ADMIN — ALLOPURINOL 200 MG: 100 TABLET ORAL at 09:04

## 2021-02-13 RX ADMIN — CARVEDILOL 6.25 MG: 6.25 TABLET, FILM COATED ORAL at 09:05

## 2021-02-13 RX ADMIN — TRIAMCINOLONE ACETONIDE: 1 CREAM TOPICAL at 00:00

## 2021-02-13 RX ADMIN — GLIPIZIDE 5 MG: 5 TABLET ORAL at 09:05

## 2021-02-13 RX ADMIN — PANTOPRAZOLE SODIUM 40 MG: 40 TABLET, DELAYED RELEASE ORAL at 09:04

## 2021-02-13 RX ADMIN — TRIAMCINOLONE ACETONIDE: 1 CREAM TOPICAL at 09:06

## 2021-02-13 RX ADMIN — LATANOPROST 1 DROP: 50 SOLUTION OPHTHALMIC at 00:00

## 2021-02-13 RX ADMIN — TORSEMIDE 20 MG: 20 TABLET ORAL at 09:04

## 2021-02-13 RX ADMIN — AMIODARONE HYDROCHLORIDE 200 MG: 200 TABLET ORAL at 09:05

## 2021-02-13 RX ADMIN — CETIRIZINE HYDROCHLORIDE 10 MG: 10 TABLET, FILM COATED ORAL at 09:05

## 2021-02-13 RX ADMIN — CIPROFLOXACIN HYDROCHLORIDE 250 MG: 250 TABLET, FILM COATED ORAL at 09:04

## 2021-02-13 ASSESSMENT — PAIN SCALES - GENERAL
PAINLEVEL_OUTOF10: 0
PAINLEVEL_OUTOF10: 0

## 2021-02-13 NOTE — PROGRESS NOTES
[address]                                                                        [address] [address],[address]                                                                 Peri Barnett                                                         Phone Number: [phone]                                                           Fax Number: [phone]                                                             Nephrology Progress Note    Chief Complaint: Shortness of breath/CHF    Reason for Consultation: Volume overload / CKD IV - follows with my partner Dr Rada Phoenix on an outpatient basis - Baseline creatinine ~ 1.6 mg/dl. Last seen in the office 1/5/2021    History of Present Illness: Ms Gail Hogue is an 81 yo morbidly obese female with a past medical history significant for A Fib, Pulmonary Hypertension, Diastolic CHF, Hypertension, DM, and Hyperlipidemia that presented to the ED with shortness of breath. CXR revealed mild pulmonary congestion. She was admitted with a working diagnosis of acute decompensated HF. Creatinine stable at baseline. Patients daughter stated mother was having urinary hesitancy prior to admission but now that weeks is in symptom has improved. Patient stated that she was probably consuming more salt than she should    Subjective:    Resting in recliner; NAD; breathing improving    ROS: breathing better  + urinary hesitancy;  All other ROS negative    Scheduled Meds:   warfarin  3.75 mg Oral Once    glipiZIDE  5 mg Oral QAM AC    [START ON 2/17/2021] amiodarone  200 mg Oral Daily    carvedilol  6.25 mg Oral BID     allopurinol  200 mg Oral Daily    insulin glargine  10 Units Subcutaneous Nightly    insulin lispro  0-6 Units Subcutaneous TID WC    insulin lispro  0-3 Units Subcutaneous Nightly    amiodarone  200 mg Oral BID    torsemide  20 mg Oral Daily    sodium chloride flush  10 mL Intravenous 2 times per day    magnesium cl-calcium carbonate  2 tablet Oral Daily    [START ON 2022] warfarin (COUMADIN) daily dosing (placeholder)   Other RX Placeholder    cetirizine  10 mg Oral Daily    ciprofloxacin  250 mg Oral 2 times per day    gabapentin  200 mg Oral Nightly    latanoprost  1 drop Right Eye Nightly    atorvastatin  10 mg Oral Nightly    pantoprazole  40 mg Oral Daily    triamcinolone   Topical BID        dextrose      dextrose Stopped (02/10/21 2030)    dextrose 100 mL/hr (02/10/21 0519)       PRN Meds:.glucose, dextrose, glucagon (rDNA), dextrose, sodium chloride flush, acetaminophen, ondansetron, prochlorperazine, promethazine **OR** [DISCONTINUED] ondansetron, polyethylene glycol, [DISCONTINUED] acetaminophen **OR** acetaminophen, perflutren lipid microspheres, melatonin, glucose, dextrose, glucagon (rDNA), dextrose    Physical Exam:    TEMPERATURE:  Current - Temp: 98.5 °F (36.9 °C);  Max - Temp  Av.2 °F (36.8 °C)  Min: 98 °F (36.7 °C)  Max: 98.5 °F (36.9 °C)  RESPIRATIONS RANGE: Resp  Av  Min: 15  Max: 22  PULSE RANGE: Pulse  Av.6  Min: 62  Max: 90  BLOOD PRESSURE RANGE:  Systolic (28ZBJ), KBE:620 , Min:107 , OJM:774   ; Diastolic (00GYD), JVE:86, Min:55, Max:84    24HR INTAKE/OUTPUT:      Intake/Output Summary (Last 24 hours) at 2021 1341  Last data filed at 2021 1119  Gross per 24 hour   Intake 600 ml   Output --   Net 600 ml         Patient Vitals for the past 96 hrs (Last 3 readings):   Weight   21 0421 260 lb 2.3 oz (118 kg)   21 0600 258 lb 2.5 oz (117.1 kg)   21 0638 259 lb 4.2 oz (117.6 kg)       General: Alert, Awake, NAD, Obese  HEENT: Normocephalic, atraumatic, Nose and ears appear externally without, MMM  Neck: No Thyromegaly, Trachea is midline, No Carotid bruit  Eyes: EOMI, KEVEN  Chest: CTA anteriorly, no intercostal retractions  CVS: irregular, + systolic murmur, no rub  Abdomen: soft, non tender, no organomegaly, no bruit appreciated  Extremities: trace edema, no cyanosis. Skin: normal texture, normal skin turgor, no rash  Musculoskeletal: normal ROM, no joint swelling, no visible deformity  Neurological: CN intact, no focal motor neurological deficit  Psych: normal affect; AAO x 3    Allergies:   Allergies   Allergen Reactions    Naproxen Itching    Bactrim [Sulfamethoxazole-Trimethoprim]     Levofloxacin     Pcn [Penicillins]     Sulfa Antibiotics       Past Medical History:   Diagnosis Date    Anemia     Chronic kidney disease     Combined systolic and diastolic congestive heart failure (HCC)     Diabetes mellitus ([address]     DVT (deep venous thrombosis) (HCC)     Hyperlipidemia     Hypertension     Osteoarthritis     PAF (paroxysmal atrial fibrillation) ([address]     Pulmonary embolism (HCC)     Sarcoidosis     in remission     Thyroid disease     Hypothyroidism    Type II or unspecified type diabetes mellitus without mention of complication, not stated as uncontrolled      Past Surgical History:   Procedure Laterality Date    HYSTERECTOMY      INTRACAPSULAR CATARACT EXTRACTION Left 10/16/2020    PHACOEMULSIFICATION WITH INTRAOCULAR LENS IMPLANT - LEFT EYE performed by Jr Ardon [facility] at [facility] Bilateral     TKT    JOINT REPLACEMENT Bilateral     THR    KNEE SURGERY      bilateral total knees         LAB DATA:    CBC:   Lab Results   Component Value Date    WBC 6.8 02/12/2021    RBC 3.46 02/12/2021    HGB 9.8 02/12/2021    HCT 30.7 02/12/2021    MCV 88.8 02/12/2021    MCH 28.2 02/12/2021    MCHC 31.8 02/12/2021    RDW 18.2 02/12/2021     02/12/2021    MPV 8.4 02/12/2021     BMP:    Lab Results   Component Value Date     02/13/2021    K 3.9 02/13/2021    K 4.7 02/07/2021    CL 97 02/13/2021    CO2 36 02/13/2021    BUN 50 02/13/2021    CREATININE 1.9 02/13/2021    CALCIUM 8.9 02/13/2021    GFRAA 30 02/13/2021    GFRAA 51 12/20/2012    LABGLOM 25 02/13/2021    GLUCOSE 156 02/13/2021     Ionized Calcium:  No results found for: IONCA  Magnesium:    Lab Results   Component Value Date    MG 2.10 02/13/2021     Phosphorus:    Lab Results   Component Value Date    PHOS 2.5 02/13/2021     U/A:    Lab Results   Component Value Date    COLORU YELLOW 02/07/2021    PHUR 5.5 02/07/2021    WBCUA 1 02/07/2021    RBCUA 4 02/07/2021    MUCUS 2+ 02/07/2021    BACTERIA 4+ 02/07/2021    CLARITYU CLOUDY 02/07/2021    SPECGRAV 1.013 02/07/2021    LEUKOCYTESUR Negative 02/07/2021    UROBILINOGEN 1.0 02/07/2021    BILIRUBINUR Negative 02/07/2021    BLOODU Negative 02/07/2021    GLUCOSEU Negative 02/07/2021         IMPRESSION/RECOMMENDATIONS:      Active Problems:    CHF (congestive heart failure), NYHA class III, acute on chronic, combined ([address]    Persistent atrial fibrillation ([address]    Uncontrolled type 2 diabetes mellitus with hyperglycemia (HCC)    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 *    1. CKD - creatinine stable at baseline 1.9 mg   - avoid exposure to Nephrotoxic agents    2. Diastolic CHF - Switched to oral Torsemide 2/9/21 -  reduced dose 2/11/21 - monitor kidney function as [place]    3. Morbid obesity    4. HTN - controlled  - Avoid Hypotension    5. Hypomagnesemia -  corrected 2.1 mg    6. Anemia CKD - Hgb at target    7. CKD-MBD - phosphorus at target 2.5 mg    8.  Severe aortic valve stenosis - s/p Balloon aortic valvuloplasty 2/10/21    Ok to d/c from renal standpoint; follow up with Dr Tu Galan in 1-2 weeks

## 2021-02-13 NOTE — PROGRESS NOTES
Reshma Rico is a 80 y.o. female patient.     Current Facility-Administered Medications   Medication Dose Route Frequency Provider Last Rate Last Admin    warfarin (COUMADIN) tablet 3.75 mg  3.75 mg Oral Once Dougie Saab MD        glipiZIDE (GLUCOTROL) tablet 5 mg  5 mg Oral QAM AC Dougie Saab MD   5 mg at 02/13/21 0905    [START ON 2/17/2021] amiodarone (CORDARONE) tablet 200 mg  200 mg Oral Daily Diane Ulysses Cosier, APRN - PAPO        carvedilol (COREG) tablet 6.25 mg  6.25 mg Oral BID  Lovett Harada, APRN - CNP   6.25 mg at 02/13/21 3885    allopurinol (ZYLOPRIM) tablet 200 mg  200 mg Oral Daily Riaz Vasquez MD   200 mg at 02/13/21 0904    glucose (GLUTOSE) 40 % oral gel 15 g  15 g Oral PRN Dougie Saab MD        dextrose 50 % IV solution  12.5 g Intravenous PRN Dougie Saab MD        glucagon (rDNA) injection 1 mg  1 mg Intramuscular PRN Dougie Saab MD        dextrose 5 % solution  100 mL/hr Intravenous PRN Dougie Saab MD        insulin glargine (LANTUS) injection vial 10 Units  10 Units Subcutaneous Nightly Dougie Saab MD   10 Units at 02/12/21 2121    insulin lispro (HUMALOG) injection vial 0-6 Units  0-6 Units Subcutaneous TID  Dougie Saab MD   4 Units at 02/12/21 1826    insulin lispro (HUMALOG) injection vial 0-3 Units  0-3 Units Subcutaneous Nightly Dougie Saab MD   1 Units at 02/12/21 2122    amiodarone (CORDARONE) tablet 200 mg  200 mg Oral BID Lovett Harada, APRN - CNP   200 mg at 02/13/21 0905    torsemide (DEMADEX) tablet 20 mg  20 mg Oral Daily Riaz Vasquez MD   20 mg at 02/13/21 4749    dextrose 5 % solution   Intravenous Continuous Dougie Saab MD   Stopped at 02/10/21 2030    sodium chloride flush 0.9 % injection 10 mL  10 mL Intravenous 2 times per day Kingsley Sommer MD   10 mL at 02/13/21 0906    sodium chloride flush 0.9 % injection 10 mL  10 mL Intravenous PRN Kingsley Sommer MD       [employer] acetaminophen (TYLENOL) tablet 650 mg  650 mg Oral Q4H PRN Raine Yi MD        ondansetron [facility]) injection 4 mg  4 mg Intravenous Q6H PRN Raine Yi MD        magnesium cl-calcium carbonate (SLOW-MAG) tablet 2 tablet  2 tablet Oral Daily Amr Naun Blackmon MD   2 tablet at 02/13/21 0904    [START ON 2/8/2022] warfarin (COUMADIN) daily dosing (placeholder)   Other RX Placeholder Raine Yi MD        cetirizine (ZYRTEC) tablet 10 mg  10 mg Oral Daily Lynn Macias MD   10 mg at 02/13/21 4763    ciprofloxacin (CIPRO) tablet 250 mg  250 mg Oral 2 times per day Lynn Macias MD   250 mg at 02/13/21 7404    gabapentin (NEURONTIN) capsule 200 mg  200 mg Oral Nightly Lynn Macias MD   200 mg at 02/12/21 2121    latanoprost (XALATAN) 0.005 % ophthalmic solution 1 drop  1 drop Right Eye Nightly Lynn Macias MD   1 drop at 02/13/21 0000    atorvastatin (LIPITOR) tablet 10 mg  10 mg Oral Nightly Lynn Macias MD   10 mg at 02/12/21 2120    pantoprazole (PROTONIX) tablet 40 mg  40 mg Oral Daily Lynn Macias MD   40 mg at 02/13/21 5457    prochlorperazine (COMPAZINE) tablet 5 mg  5 mg Oral Q6H PRN Lynn Macias MD        triamcinolone (KENALOG) 0.1 % cream   Topical BID Lynn Macias MD   Given at 02/13/21 8251    promethazine (PHENERGAN) tablet 12.5 mg  12.5 mg Oral Q6H PRN Lynn Macias MD        polyethylene glycol [facility]) packet 17 g  17 g Oral Daily PRN Lynn Macias MD   17 g at 02/12/21 1246    acetaminophen (TYLENOL) suppository 650 mg  650 mg Rectal Q6H PRN Lynn Macias MD        perflutren lipid microspheres (DEFINITY) injection 1.65 mg  1.5 mL Intravenous ONCE PRN Lynn Macias MD        melatonin tablet 3 mg  3 mg Oral Nightly PRN Lynn Macias MD   3 mg at 02/10/21 2302    glucose (GLUTOSE) 40 % oral gel 15 g  15 g Oral PRN Lynn Macias MD   15 g at 02/09/21 0638    dextrose 50 % IV solution  12.5 g Intravenous PRN Jordana Blood MD   12.5 g at 02/10/21 1324    glucagon (rDNA) injection 1 mg  1 mg Intramuscular PRN Jordana Blood MD        dextrose 5 % solution  100 mL/hr Intravenous PRN Jordana Blood  mL/hr at 02/10/21 0519 100 mL/hr at 02/10/21 0417     Allergies   Allergen Reactions    Naproxen Itching    Bactrim [Sulfamethoxazole-Trimethoprim]     Levofloxacin     Pcn [Penicillins]     Sulfa Antibiotics      Active Problems:    CHF (congestive heart failure), NYHA class III, acute on chronic, combined ([facility])    Persistent atrial fibrillation ([facility]    Uncontrolled type 2 diabetes mellitus with hyperglycemia ([facility]    Acute on chronic combined systolic and diastolic HF (heart failure) ([facility])    Acute congestive heart failure ([facility])    Severe aortic stenosis  Resolved Problems:    * No resolved hospital problems. *    Blood pressure 122/84, pulse 80, temperature 98.2 °F (36.8 °C), temperature source Oral, resp. rate 15, height 5' 6\" (1.676 m), weight 260 lb 2.3 oz (118 kg), SpO2 92 %, not currently breastfeeding. Subjective feels fine  Objective VSS, adequate glucose control, CTA, IIR&R withM, groins ok, no edema. [address] for D/C family provides 24 hr. Care.     Jordana Blood MD  2/13/2021

## 2021-02-13 NOTE — PLAN OF CARE
Fall risk assessment completed every shift. All precautions in place. Pt has call light within reach at all times. Room clear of clutter. Pt aware to call for assistance when getting up. Patient assessed for fall risk; fall precautions initiated. Patient and family instructed about safety devices. Environment kept free of clutter and adequate lighting provided. Bed locked and in lowest position. Call light within reach. Will continue to monitor. Intake/Output Summary (Last 24 hours) at 2/13/2021 0925  Last data filed at 2/12/2021 1500  Gross per 24 hour   Intake 240 ml   Output --   Net 240 ml     Vitals:    02/13/21 0720   BP:    Pulse: 80   Resp:    Temp:    SpO2: 92%     Skin assessment completed every shift. Pt assessed for incontinence, appropriate barrier cream applied prn. Pt encouraged to turn/rotate every 2 hours. Assistance provided if pt unable to do so themselves.

## 2021-02-13 NOTE — DISCHARGE SUMMARY
[facility]           [address]                               DISCHARGE SUMMARY    PATIENT NAME: Radha Espinoza                    :        1934  MED REC NO:   [id number]                          ROOM:       8230  ACCOUNT NO:   [de-identified]                           ADMIT DATE: 2021  PROVIDER:     Jessie Smith MD                  DISCHARGE DATE:      DISCHARGE DATE:  2021    FINAL DIAGNOSES:  1. Acute systolic and diastolic congestive heart failure. 2.  Severe aortic stenosis. 3.  Type 2 diabetes mellitus, uncontrolled. 4.  Acute hypoxic respiratory failure. 5.  Generalized osteoarthritis. 6.  Chronic atrial fibrillation with rapid ventricular response. 7.  Chronic kidney disease stage III. HISTORY:  The patient was a chronically ill 26-year-old [place] American   female who lives in her own home with her  and was looked  after by her children. The patient came to the emergency room on the  day of admission because of increasing shortness of breath, increasing  foot and ankle edema, and nausea. Emergency room evaluation was  consistent with acute hypoxic respiratory failure as well as worsening  congestive heart failure. Admission was felt necessary. I was  contacted, agreed to admit the patient with consultation. APPROPRIATE STUDIES:  Prior to discharge, electrolytes were normal.   Creatinine was 1.9, BUN was 50. Multiple glucoses related to diabetes  care are noted on the chart. ProBNP prior to discharge was 4917. Albumin was 3.0. INR prior to discharge was 2.01. Echocardiogram showed left ventricular hypertrophy, ejection fraction  40% to 83%, grade 3 diastolic dysfunction, severe aortic stenosis, mild  reduction in right ventricular systolic function, dilated right  ventricle, elevated pulmonary artery systolic pressure.   A hyperechoic  structure was seen in the liver pushing on the inferior vena cava. CT of the abdomen and pelvis did not show any liver lesion. The CT was  performed without contrast due to her chronic kidney disease. There was  a 4-inch cyst on the upper pole of the right kidney. Gallstones were  noted. Colonic diverticulosis was noted. Cardiomegaly with pulmonary  vascular congestion and pleural effusions were noted on the CT scan. HOSPITAL COURSE:  The patient's hospital course was characterized by  improvement. Her diabetes was managed with correction insulin and basal  insulin. She was given intravenous Lasix and had a very good diuresis. She was seen in Cardiology consultation and on 02/11 underwent  transcatheter dilatation of the aortic valve with improvement. She did  have some bleeding from the groin insertion site but ultrasound did not  show any AV fistula or pseudoaneurysm. The patient tolerated the  procedure well. Subsequently, she was ambulating with physical therapy  and on 02/13 was felt to be well enough for discharge home in improved  condition. ALLERGIES:  She was known to be allergic or intolerant to NAPROSYN,  BACTRIM, LEVAQUIN, PENICILLIN, and SULFA. DISCHARGE MEDICATIONS:  Per the med rec. She will receive PT and OT and visiting nurse at home following  discharge. TALAT was completed. FOLLOWUP:  Followup will be on an as necessary basis due to the  patient's difficulty leaving her home.         Rianna Ayala MD    D: 02/13/2021 11:56:19       T: 02/13/2021 12:08:28     [email or web address]  Job#: [id number]     Doc#: [id number]    CC:

## 2021-02-13 NOTE — CARE COORDINATION
Discharge Planning:  RAGINI spoke with pts dgtr who stated that she would like PT and Rn services for this pt but has no agency preference. RAGINI contacted Chase at [facility]. [phone]. Chase confirmed that this agency will be able to accept this pt for home care services. Facesheet faxed to [employer] [phone] and Chase stated that she will pull the rest of the information from [employer].   [place]  [phone]  Electronically signed by Denver Longest on 2/13/2021 at 2:19 PM

## 2021-02-13 NOTE — CONSULTS
Full note dictated. Nails x 10 thick yellow crumbly and elongated with subungual debris. Nails x 10 debrided in length and thickness. Recommend follow up with podiatry for routine DM foot care.     NASREEN, [place]  [phone]

## 2021-02-13 NOTE — PLAN OF CARE
is maintained at optimum level for patient  Outcome: Ongoing     Problem: Skin Integrity:  Goal: Will show no infection signs and symptoms  Description: Will show no infection signs and symptoms  Outcome: Ongoing  Goal: Absence of new skin breakdown  Description: Absence of new skin breakdown  Outcome: Ongoing     Problem: Nutrition  Goal: Optimal nutrition therapy  Outcome: Ongoing

## 2021-02-13 NOTE — PROGRESS NOTES
Clinical Pharmacy Note  Warfarin Consult    Norma Romero is a 80 y.o. female receiving warfarin managed by pharmacy. Warfarin Indication: Afib  Target INR range: 2-3   Dose prior to admission: 5 mg daily except 2.5 mg Thurs    Current warfarin drug-drug interactions: Ciprofloxacin, Amiodarone     Recent Labs     02/10/21  0824 02/11/21  0435 02/12/21  0419 02/12/21  1030 02/13/21  0425   HGB 10.5* 10.5*  --  9.8*  --    HCT 34.0* 33.1*  --  30.7*  --    INR  --  2.08* 2.15*  --  2.01*       Assessment/Plan:    Patient has been requiring lower doses of Warfarin in the setting of multiple drug-drug interactions. Will order Warfarin 3.75 mg po x 1 tonight and reassess the PT/INR in the AM.      Thank you for the consult. Will continue to follow.     Gorge Asencio PharmDEVON  2/13/2021 7:09 AM

## 2021-02-14 NOTE — CONSULTS
[address] [facility]                                  CONSULTATION    PATIENT NAME: Luiz Mccarty                    :        1934  MED REC NO:   [id number]                          ROOM:       8418  ACCOUNT NO:   [de-identified]                           ADMIT DATE: 2021  PROVIDER:     Stefano Maxwell DPM    PODIATRY CONSULTATION    CONSULT DATE:  2021    CHIEF COMPLAINT/HISTORY OF PRESENT ILLNESS:  This 80-year-old female was  admitted through the emergency department at [facility] with a  chief complaint of shortness of breath. She was admitted with  acute-one-chronic diastolic heart failure with acute hypoxic respiratory  failure. Podiatry was consulted for diabetic foot care. The patient  admits to numbness and tingling in her bilateral lower extremities. ALLERGIES:  Include NAPROXEN, BACTRIM, LEVOFLOXACIN, PENICILLIN and  SULFA ANTIBIOTICS. MEDICATIONS:  Please see the medical record. PAST MEDICAL HISTORY:  Significant for hypertension, hypothyroidism,  history of DVT, sarcoidosis that is currently in remission, anemia of  chronic disease, chronic kidney disease, diabetes mellitus,  osteoarthritis, history of pulmonary embolism, systolic and diastolic  congestive heart failure, paroxysmal atrial fibrillation,  hyperlipidemia, and aortic stenosis. PAST SURGICAL HISTORY:  Significant for hysterectomy, bilateral total  knee replacement, bilateral total hip replacement, left cataract  extraction. FAMILY HISTORY:  Significant for heart failure and cancer. SOCIAL HISTORY:  The patient denies tobacco, alcohol or drug use. REVIEW OF SYSTEMS:  Unremarkable at this time. The patient specifically  denies nausea, fever, vomiting, chills, shortness of breath or pain in  her chest.    PHYSICAL EXAMINATION:  VITAL SIGNS:  The patient's vital signs are stable.   She is currently  afebrile. EXTREMITIES:  The patient has nonpalpable pedal pulses noted  bilaterally. She has mild pitting edema noted on her bilateral lower  extremities that per nursing has improved since admission. The patient  has a biphasic dorsalis pedis and posterior tibial pulse on handheld  Doppler. Her cap refill time is intact to all digits. She has absent  pedal hair growth noted on her bilateral lower extremities. The patient's protective sensation assessed with a monofilament is  absent at all sites tested on the bilateral lower extremities. The patient's nails x10 are thick, yellow, crumbly, and elongated with  subungual debris. There are no other open lesions, nodules, or  ulcerations noted. The patient's muscle strength is intact and symmetrical for all muscle  groups tested bilaterally. She has semirigid hammertoe contractures  noted on her bilateral digits. LABORATORY DATA:  Patient's labs were reviewed. ASSESSMENT:  1. Onychomycosis x10.  2.  Diabetes mellitus with peripheral neuropathy. TREATMENT:  1. The patient was seen and evaluated at bedside. 2.  I discussed with the patient the importance of diabetic foot care in  order to prevent complications such as ulcerations, infections, and/or  amputations. 3.  Recommend the patient to follow up with Podiatry as an outpatient  for routine diabetic foot care. 4.  Patient's nail x10 were debrided in length and thickness. 5.  No further treatment is necessary at this time from Podiatry's point  of view. Thank you for allowing me to participate in the care of this patient.         Carmelita Fitzgerald DPM    D: 02/13/2021 9:18:01       T: 02/13/2021 22:09:06     [email or web address]  Job#: [id number]     Doc#: [id number]    CC:

## 2021-02-16 ENCOUNTER — FOLLOWUP TELEPHONE ENCOUNTER (OUTPATIENT)
Dept: INPATIENT UNIT | Age: 86
End: 2021-02-16

## 2021-02-17 NOTE — PROGRESS NOTES
This writer has attempted x3 seventy-two hour hospital follow up calls throughout today (10:30 am, 3:30 & again just now). I was unable to leave a secure message. No futher attempts will be made at this time. Pt does have a scheduled hospital follow up appt that I made her and she was informed of for 2/19 with PCP Dr Brenna Cuba and a cardiology f/u appt on 2/23 with Dr Michael Batista that I placed on her AVS for time of discharge.

## 2021-02-22 PROBLEM — I48.0 PAROXYSMAL ATRIAL FIBRILLATION (HCC): Status: ACTIVE | Noted: 2021-02-22

## 2021-02-22 NOTE — PROGRESS NOTES
[facility]    H+P // CONSULT // OUTPATIENT VISIT // FOLLOWUP VISIT     Referring Doctor Hebert Cat MD   Encounter Type Followup     CHIEF COMPLAINT     Visit Type Chronic   Symptoms None   Problems AS, AFIB, HTN     HISTORY OF PRESENT ILLNESS     GEN - New patient from Dr. Chuck Stewart for AS. Recent cath with nonobstructive disease. SOB with any activity but fairly sedentary and uses walker. Declines OHS. CKD with creatinine mid to high 1s. Wants procedure for AS. Orthopnea and pnd noted. CAD - Nonobstructive. Denies cp, sob, dizziness, syncope, palpitations. HTN - Ambulatory BP readings in good range. No HA or dizziness. MED - Compliant with CV meds listed below without notable side effects. HISTORY/ALLERGIES/ROS     MedHx:   has a past medical history of Anemia, Chronic kidney disease, Combined systolic and diastolic congestive heart failure ([address] Diabetes mellitus ([address] DVT (deep venous thrombosis) ([address] Hyperlipidemia, Hypertension, Osteoarthritis, PAF (paroxysmal atrial fibrillation) ([address] Pulmonary embolism ([address] Sarcoidosis, Thyroid disease, and Type II or unspecified type diabetes mellitus without mention of complication, not stated as uncontrolled. SurgHx:  has a past surgical history that includes Hysterectomy; knee surgery; joint replacement (Bilateral); joint replacement (Bilateral); and Intracapsular cataract extraction (Left, 10/16/2020). SocHx:   reports that she has never smoked. She has never used smokeless tobacco. She reports that she does not drink alcohol or use drugs. FamHx:  family history includes Cancer in her brother; Heart Failure in her mother.    Allergies: Naproxen, Bactrim [sulfamethoxazole-trimethoprim], Levofloxacin, Pcn [penicillins], and Sulfa antibiotics   ROS:  [x]Full ROS obtained and negative except as mentioned in HPI     MEDICATIONS      Current Outpatient Medications   Medication Sig Dispense Refill    insulin glargine (LANTUS) 100 UNIT/ML injection vial Inject 10 Units into the skin nightly 1 vial 3    polyethylene glycol (GLYCOLAX) 17 g packet Take 17 g by mouth daily as needed for Constipation 527 g 1    blood glucose monitor kit and supplies Dispense sufficient amount for indicated testing frequency plus additional to accommodate PRN testing needs. Dispense all needed supplies to include: monitor, strips, lancing device, lancets, control solutions, alcohol swabs. 1 kit 0    allopurinol (ZYLOPRIM) 100 MG tablet Take 2 tablets by mouth daily 30 tablet 3    amiodarone (CORDARONE) 200 MG tablet Take 200 mg tablet twice a day through 2/16/2021 and on 2/17/2021 decrease to 200 mg tablet once a day 40 tablet 1    carvedilol (COREG) 12.5 MG tablet Take 0.5 tablets by mouth 2 times daily (with meals) 60 tablet 3    torsemide (DEMADEX) 20 MG tablet Take 1 tablet by mouth daily 30 tablet 3    prochlorperazine (COMPAZINE) 5 MG tablet Take 5 mg by mouth every 6 hours as needed for Nausea      triamcinolone (KENALOG) 0.1 % cream Apply topically 2 times daily. 100 g 5    latanoprost (XALATAN) 0.005 % ophthalmic solution Place 1 drop into the right eye nightly      KLOR-CON M20 20 MEQ extended release tablet Take 20 mEq by mouth daily      pantoprazole (PROTONIX) 40 MG tablet Take 40 mg by mouth daily      warfarin (COUMADIN) 5 MG tablet Take 5 mg by mouth every evening Except 2.5mg every Thursday or as directed by [facility]madin [facility] [phone]      cetirizine (ZYRTEC) 10 MG tablet Take 10 mg by mouth daily      vitamin C (ASCORBIC ACID) 500 MG tablet Take 500 mg by mouth daily      acetaminophen (TYLENOL) 500 MG tablet Take 1,000 mg by mouth every 6 hours as needed for Pain      Calcium Carb-Cholecalciferol (CALTRATE 600+D) 600-800 MG-UNIT TABS Take 1 tablet by mouth daily       gabapentin (NEURONTIN) 100 MG capsule Take 200 mg by mouth nightly.        glipiZIDE (GLUCOTROL) 5 MG tablet Take 5 mg by mouth 2 times daily (before meals).  lovastatin (MEVACOR) 10 MG tablet Take 10 mg by mouth nightly. No current facility-administered medications for this visit.       Reviewed with patient and will remain unchanged except as mentioned in A/P  PHYSICAL EXAM     Vitals:    02/25/21 1038   BP: 136/80   Pulse: (!) 45   SpO2: 99%      Gen Alert, coop, no distress Heart  irreg 4/6   Head NC, AT, no abnorm Abd  Soft, NT, +BS, no mass, no OM   Eyes PER, conj/corn clear Ext  Ext nl, AT, no C/C, +edema bilat   Nose Nares nl, no drain, NT Pulse decr   Throat Lips, mucosa, tongue nl Skin Col/text/turg nl, no vis rash/les   Neck S/S, TM, NT, no bruit/JVD Psych Nl mood and affect   Lung CTA-B, unlabored, no DTP Lymph   No cervical or axillary LA   Ch wall NT, no deform Neuro  Nl gross M/S exam     ASSESSMENT AND PLAN     *AS    Date EF Detail   Sx     Sob, edema, orthopnea, pnd   DATA   Most recent TTE, C reviewed personally   NYHA   III   Hx 2/21  BAV post MG 30 Oc Mercedes)   TTE 9/15  8/17  9/20  2/21 45%  35%  55%  45% Mild AS MG 18, RV is enlarged and moderately depressed  Mild AS MG 17, severe biatrial enlargement, mod MR  Severe AS MG 67  Severe AS MG 66, hyperechoic structure is seen in liver pushing on IVC   LHC 2/21  Nonobstructive CAD Michelle Garay)   Plan     TAVR workup   *AFIB  Status persistent, most recent TTE, monitors, EP procedures reviewed personally   Plan Continue ac, per dr. Adrian Segovia  *HTN  Status Controlled, vitals and available ambulatory monitoring logs reviewed personally  Plan Counseled on diet/salt/exercise/weight, continue meds at doses above  *CKD  Creat 1.6-1.9  Plan Cautious use of contrast, discussed increased risk of procedures and contrast  *COMPLIANCE  Status Compliant  Plan Discussed importance of compliance with meds/diet/salt/exercise; avoid tob/alc/drugs; patient verbalized understanding  *FOLLOWUP  After testing    Scribe Attestation  SONI, Javier Lizarraga, efrain scribing for and in the presence of Caroline Snow MD. Signed, Sera Collier 02/22/21 3:22 PM   Provider Rocky Silva is working as a scribe for and in the presence of me Benigno Waterman MD). Working as a scribe, Sera Collier may have prepopulated components of this note with my historical  intellectual property under my direct supervision. Any additions to this intellectual property were performed in my presence and at my direction. Furthermore, the content and accuracy of this note have been reviewed by me Benigno Waterman MD).  2/25/2021 10:57 AM  CODING     Category Diagnosis   Stable chronic illness  (90371/79228 - 2 or more) AS, AFIB, HTN, CKD   Chronic illness w/: Exac, progr or SA of Tx  (41456/54900 - 1 or more)    Undiagnosed new problem w/: uncertain prognosis  (10842/76089 - 1 or more)    Acute illness with systemic Sx  (87272/05118 - 1 or more)    Acute, complicated injury  (85351/38527 - 1 or more)    45292 1 or more chronic illness with exacerbation, progression or SA of treatment    Time  30-39 minutes spent preparing to see patient including reviewing patient history/prior tests/prior consults, performing a medical exam, counseling and educating patient/family/caregiver, ordering medications/tests/procedures, referring and communicating with PCPs and other pertinent consultants, documenting information in the EMR, independently interpreting results and communicating to family and coordination of patient care.

## 2021-02-23 ENCOUNTER — OFFICE VISIT (OUTPATIENT)
Dept: CARDIOLOGY CLINIC | Age: 86
End: 2021-02-23
Payer: MEDICARE

## 2021-02-23 VITALS
BODY MASS INDEX: 39.89 KG/M2 | HEIGHT: 66 IN | WEIGHT: 248.2 LBS | TEMPERATURE: 96.6 F | DIASTOLIC BLOOD PRESSURE: 76 MMHG | HEART RATE: 83 BPM | SYSTOLIC BLOOD PRESSURE: 122 MMHG | OXYGEN SATURATION: 96 %

## 2021-02-23 DIAGNOSIS — I48.19 PERSISTENT ATRIAL FIBRILLATION (HCC): Primary | Chronic | ICD-10-CM

## 2021-02-23 DIAGNOSIS — I50.43 CHF (CONGESTIVE HEART FAILURE), NYHA CLASS III, ACUTE ON CHRONIC, COMBINED (HCC): ICD-10-CM

## 2021-02-23 PROCEDURE — 93000 ELECTROCARDIOGRAM COMPLETE: CPT | Performed by: INTERNAL MEDICINE

## 2021-02-23 PROCEDURE — 99024 POSTOP FOLLOW-UP VISIT: CPT | Performed by: INTERNAL MEDICINE

## 2021-02-23 RX ORDER — AMIODARONE HYDROCHLORIDE 200 MG/1
200 TABLET ORAL DAILY
Status: ON HOLD | COMMUNITY
End: 2021-04-05 | Stop reason: HOSPADM

## 2021-02-23 RX ORDER — TORSEMIDE 20 MG/1
40 TABLET ORAL DAILY
Qty: 60 TABLET | Refills: 3 | Status: ON HOLD
Start: 2021-02-23 | End: 2021-04-05 | Stop reason: HOSPADM

## 2021-02-23 NOTE — PROGRESS NOTES
[facility]  Cardiology Consult    Mikael Palomo  1934 February 23, 2021      Reason for Referral: Severe aortic stenosis    CC: \"I still have shortness of breath. \"      Subjective:     History of Present Illness:    Mikael Palomo is a 80 y.o. patient with a PMH significant for chronic kidney disease, combined systolic and diastolic heart failure. Hypertension, hyperlipidemia, atrial fibrillation and PE and DVT. She was admitted to [facility] on 2/7/2021 with increased shortness of breath. Today, she is here for hospital follow up. She says that she does get shortness of breath with exertion at times. She will see Dr Mandy Pittman on Thursday. She does have increased swelling in bilateral lower extremities. She is in a wheelchair today. Patient denies exertional chest pain/pressure,  palpitations, lightheadedness, weight changes, and syncope. Patient reports compliance to her medications. Past Medical History:   has a past medical history of Anemia, Chronic kidney disease, Combined systolic and diastolic congestive heart failure ([address] Diabetes mellitus ([address] DVT (deep venous thrombosis) ([address] Hyperlipidemia, Hypertension, Osteoarthritis, PAF (paroxysmal atrial fibrillation) ([address] Pulmonary embolism ([address] Sarcoidosis, Thyroid disease, and Type II or unspecified type diabetes mellitus without mention of complication, not stated as uncontrolled. Surgical History:   has a past surgical history that includes Hysterectomy; knee surgery; joint replacement (Bilateral); joint replacement (Bilateral); and Intracapsular cataract extraction (Left, 10/16/2020). Social History:   reports that she has never smoked. She has never used smokeless tobacco. She reports that she does not drink alcohol or use drugs. Family History:  family history includes Cancer in her brother; Heart Failure in her mother.     Home Medications:  Were reviewed and are listed in nursing record and/or below  Prior to Admission medications    Medication Sig Start Date End Date Taking? Authorizing Provider   amiodarone (CORDARONE) 200 MG tablet Take 200 mg by mouth daily   Yes Historical Provider, MD   insulin glargine (LANTUS) 100 UNIT/ML injection vial Inject 10 Units into the skin nightly 2/13/21  Yes Arianna Orozco MD   polyethylene glycol (GLYCOLAX) 17 g packet Take 17 g by mouth daily as needed for Constipation 2/13/21 3/15/21 Yes Arianna Orozco MD   blood glucose monitor kit and supplies Dispense sufficient amount for indicated testing frequency plus additional to accommodate PRN testing needs. Dispense all needed supplies to include: monitor, strips, lancing device, lancets, control solutions, alcohol swabs. 2/13/21  Yes Arianna Orozco MD   allopurinol (ZYLOPRIM) 100 MG tablet Take 2 tablets by mouth daily 2/14/21  Yes Arianna Orozco MD   carvedilol (COREG) 12.5 MG tablet Take 0.5 tablets by mouth 2 times daily (with meals) 2/12/21  Yes Ibeth Arce APRN - CNP   torsemide (DEMADEX) 20 MG tablet Take 1 tablet by mouth daily 2/13/21  Yes Adriana Roy APRN - CNP   prochlorperazine (COMPAZINE) 5 MG tablet Take 5 mg by mouth every 6 hours as needed for Nausea   Yes Historical Provider, MD   triamcinolone (KENALOG) 0.1 % cream Apply topically 2 times daily.  9/9/20  Yes Arianna Orozco MD   latanoprost (XALATAN) 0.005 % ophthalmic solution Place 1 drop into the right eye nightly 7/7/20  Yes Historical Provider, MD   KLOR-CON M20 20 MEQ extended release tablet Take 20 mEq by mouth daily 6/28/20  Yes Historical Provider, MD   pantoprazole (PROTONIX) 40 MG tablet Take 40 mg by mouth daily 8/23/20  Yes Historical Provider, MD   warfarin (COUMADIN) 5 MG tablet Take 5 mg by mouth every evening Except 2.5mg every Thursday or as directed by [facility] Coumadin [facility] [phone]   Yes Historical Provider, MD   cetirizine (ZYRTEC) 10 MG tablet Take 10 mg by mouth daily   Yes Historical Provider, MD   vitamin C (ASCORBIC ACID) 500 MG tablet Take 500 mg by mouth daily   Yes Historical Provider, MD   acetaminophen (TYLENOL) 500 MG tablet Take 1,000 mg by mouth every 6 hours as needed for Pain   Yes Historical Provider, MD   Calcium Carb-Cholecalciferol (CALTRATE 600+D) 600-800 MG-UNIT TABS Take 1 tablet by mouth daily    Yes Historical Provider, MD   gabapentin (NEURONTIN) 100 MG capsule Take 200 mg by mouth nightly. Yes Historical Provider, MD   glipiZIDE (GLUCOTROL) 5 MG tablet Take 5 mg by mouth 2 times daily (before meals). Yes Historical Provider, MD   lovastatin (MEVACOR) 10 MG tablet Take 10 mg by mouth nightly. Yes Historical Provider, MD        CURRENT Medications:  No current facility-administered medications for this visit. Allergies:  Naproxen, Bactrim [sulfamethoxazole-trimethoprim], Levofloxacin, Pcn [penicillins], and Sulfa antibiotics           Review of Systems: SEE HPI   · Constitutional: no unanticipated weight loss. There's been no change in energy level, sleep pattern, or activity level. No fevers, chills. · Eyes: No visual changes or diplopia. No scleral icterus. · ENT: No Headaches, hearing loss or vertigo. No mouth sores or sore throat. · Cardiovascular: No Chest pain, tightness or discomfort.  No Shortness of breath. No Dyspnea on exertion, Orthopnea, Paroxysmal nocturnal dyspnea or breathlessness at rest.   No Palpitations.  No Syncope ('blackouts', 'faints', 'collapse') or dizziness. · Respiratory: No cough or wheezing, no sputum production. No hematemesis. · Gastrointestinal: No abdominal pain, appetite loss, blood in stools. No change in bowel or bladder habits. · Genitourinary: No dysuria, trouble voiding, or hematuria. · Musculoskeletal:  No gait disturbance, no joint complaints. · Integumentary: No rash or pruritis. · Neurological: No headache, diplopia, change in muscle strength, numbness or tingling.    · Psychiatric: No anxiety or depression. · Endocrine: No temperature intolerance. No excessive thirst, fluid intake, or urination. No tremor. · Hematologic/Lymphatic: No abnormal bruising or bleeding, blood clots or swollen lymph nodes. · Allergic/Immunologic: No nasal congestion or hives. Objective:     PHYSICAL EXAM:      Vitals:    02/23/21 1420   BP: 122/76   Pulse: 83   Temp: 96.6 °F (35.9 °C)   SpO2: 96%    Weight: 248 lb 3.2 oz (112.6 kg)       General Appearance:  Alert, cooperative, no distress, appears stated age. Head:  Normocephalic, without obvious abnormality, atraumatic. Eyes:  Pupils equal and round. No scleral icterus. Mouth: Moist mucosa, no pharyngeal erythema. Nose: Nares normal. No drainage or sinus tenderness. Neck: Supple, symmetrical, trachea midline. No adenopathy. No tenderness/mass/nodules. No carotid bruit or elevated JVD. Lungs:   Respiratory Effort: Normal   Auscultation: Clear to auscultation bilaterally, respirations unlabored. No wheeze, rales   Chest Wall:  No tenderness or deformity. Cardiovascular:    Pulses  Palpation: normal   Ascultation: Regular rate, S1/ S2 normal. No murmur, rub, or gallop. 2+ radial and pedal pulses, symmetric  Carotid  Femoral   Abdomen and Gastrointestinal:   Soft, non-tender, bowel sounds active. Liver and Spleen  Masses   Musculoskeletal: No muscle wasting  Back  Gait   Extremities: Extremities normal, atraumatic. No cyanosis or edema. No cyanosis clubbing       Skin: Inspection and palpation performed, no rashes or lesions. Pysch: Normal mood and affect.  Alert and oriented to time place person   Neurologic: Normal gross motor and sensory exam.       Labs     All labs have been reviewed    Lab Results   Component Value Date    WBC 6.8 02/12/2021    RBC 3.46 02/12/2021    HGB 9.8 02/12/2021    HCT 30.7 02/12/2021    MCV 88.8 02/12/2021    RDW 18.2 02/12/2021     02/12/2021     Lab Results   Component Value Date     02/13/2021    K 3.9 02/13/2021    K 4.7 02/07/2021    CL 97 02/13/2021    CO2 36 02/13/2021    BUN 50 02/13/2021    CREATININE 1.9 02/13/2021    GFRAA 30 02/13/2021    GFRAA 51 12/20/2012    AGRATIO 0.9 02/07/2021    LABGLOM 25 02/13/2021    GLUCOSE 156 02/13/2021    PROT 7.8 02/07/2021    PROT 7.5 12/20/2012    CALCIUM 8.9 02/13/2021    BILITOT 0.9 02/07/2021    ALKPHOS 74 02/07/2021    AST 23 02/07/2021    ALT 13 02/07/2021     No results found for: PTINR  Lab Results   Component Value Date    LABA1C 7.5 02/12/2021     Lab Results   Component Value Date    TROPONINI <0.01 02/07/2021       Cardiac, Vascular and Imaging Data all Personally Reviewed in Detail by Myself      EKG:     Echocardiogram:   ECHO 2/7/2021  There is concentric left ventricular hypertrophy. Ejection fraction is visually estimated to be 40-45%. Grade III diastolic dysfunction with elevated LV filling pressures. The left atrium is severely dilated. Severe aortic stenosis with a peak velocity of 5.23m/s and a mean pressure  gradient of 66mmHg. No evidence of significant aortic valve regurgitation. Dilated right ventricle. Right ventricular systolic function is mildly reduced . Estimated pulmonary artery systolic pressure is elevated at 56 mmHg assuming  a right atrial pressure of 15 mmHg. A hyperechoic structure is seen in liver pushing on IVC. ECHO 9/5/2020  Suboptimal image quality. Patient appears to be in atrial fibrillation. Left ventricular cavity size is normal.  There is moderate concentric left ventricular hypertrophy. Ejection fraction is visually estimated to be 87%  Diastolic filling parameters suggest at least grade II diastolic  dysfunction. Severe aortic stenosis with a peak velocity of 5.00 m/s and a mean pressure  gradient of 67 mmHg. Estimated pulmonary artery systolic pressure is borderline severely elevated  at 69 mmHg assuming a right atrial pressure of 15 mmHg.     Stress Test:     Cath:  Cardiac cath/Aortic valvuloplasty 2/10/2021  ANGIOGRAPHIC FINDINGS:  1. Left main is normal, LORENZO 3 flow, no stenosis. 2.  Left anterior descending artery has LORENZO 3 flow with mild luminal  irregularities. 3.  Left circumflex is patent without significant stenosis. 4.  Right coronary artery is dominant without significant stenosis, LORENZO  3 flow.     HEMODYNAMICS:  Aortic valve gradient was 41.1 mmHg. Post valvuloplasty  aortic valve gradient was 30 mmHg.     SUMMARY:  1. Successful balloon aortic valvuloplasty. 2.  Nonobstructive coronary artery disease. Other imaging:     Assessment and Plan     Severe aortic stenosis  Scheduled to follow up with Dr Anna Marcial on 2/25/2021. Continues to have shortness of breath with exertion. Bilateral lower extremity edema  +3 pitting lower extremity edema. Increase Torsemide to 40 mg daily. Dr Maame Antoine primary cardiologist scheduled 5/18/2021    Follow up in 3 months. Thank you for allowing us to participate in the care of Bell Castillo. Please do not hesitate to contact me if you have any questions. Kimberly Watson MD, MPH    [place] [address]  Ph: [phone]  Fax: [phone]    This note was scribed in the presence of Dr Rey Drake, by Lulu Horn RN. Physician Attestation:  The scribes documentation has been prepared under my direction and personally reviewed by me in its entirety. I confirm that the note above accurately reflects all work, treatment, procedures, and medical decision making performed by me.

## 2021-02-23 NOTE — PATIENT INSTRUCTIONS
Patient Education        Aortic Valve Stenosis: Care Instructions  Your Care Instructions     Having aortic valve stenosis means that the valve between your heart and the large blood vessel that carries blood to the body (aorta) has narrowed. That forces the heart to pump harder to get enough blood through the valve. As stenosis gets worse, the valve gets narrower. This can cause symptoms. Symptoms include chest pain, dizziness, fainting, or shortness of breath. If you have mild or moderate stenosis and don't have symptoms, you may not need treatment now. Your doctor will check your heart regularly. You may need treatment if the stenosis gets worse. With severe stenosis, you may need to have the valve replaced. Follow-up care is a key part of your treatment and safety. Be sure to make and go to all appointments, and call your doctor if you are having problems. It's also a good idea to know your test results and keep a list of the medicines you take. How can you care for yourself at home? · Be safe with medicines. Take your medicines exactly as prescribed. Call your doctor if you think you are having a problem with your medicine. You will get more details on the specific medicines your doctor prescribes. · Eat a heart-healthy diet. Limit how much sodium you eat. · Be active. Ask your doctor what type and level of exercise is safe for you. · Do not smoke. Smoking can make aortic valve stenosis worse. If you need help quitting, talk to your doctor about stop-smoking programs and medicines. · Stay at a healthy weight. Lose weight if you need to. · Avoid colds and flu. Get a pneumococcal vaccine shot. If you have had one before, ask your doctor if you need another dose. Get a flu vaccine every year. · Take care of your teeth and gums. Get regular dental checkups. Good dental health is important because bacteria can spread from infected teeth and gums to the heart valves. When should you call for help? Call 911 anytime you think you may need emergency care. For example, call if:    · You passed out (lost consciousness).     · You have symptoms of a heart attack. These may include:  ? Chest pain or pressure, or a strange feeling in the chest.  ? Sweating. ? Shortness of breath. ? Nausea or vomiting. ? Pain, pressure, or a strange feeling in the back, neck, jaw, or upper belly or in one or both shoulders or arms. ? Lightheadedness or sudden weakness. ? A fast or irregular heartbeat. After you call 911, the  may tell you to chew 1 adult-strength or 2 to 4 low-dose aspirin. Wait for an ambulance. Do not try to drive yourself. Call your doctor now or seek immediate medical care if:    · You develop new symptoms of aortic valve stenosis, such as chest pain, dizziness, or shortness of breath.     · You have new or increased shortness of breath.     · You are dizzy or lightheaded, or you feel like you may faint.     · You have sudden weight gain, such as more than 2 to 3 pounds in a day or 5 pounds in a week. (Your doctor may suggest a different range of weight gain.)     · You have increased swelling in your legs, ankles, or feet. Watch closely for changes in your health, and be sure to contact your doctor if:    · You have trouble making healthy lifestyle changes.     · You want more information about healthy lifestyle changes. Where can you learn more? Go to https://InsuritaspascualCollabspot.[employer]. org and sign in to your [employer] account. Enter I262 in the [facility] box to learn more about \"Aortic Valve Stenosis: Care Instructions. \"     If you do not have an account, please click on the \"Sign Up Now\" link. Current as of: December 16, 2019               Content Version: 12.6  © 5620-0834 [employer].

## 2021-02-25 ENCOUNTER — OFFICE VISIT (OUTPATIENT)
Dept: CARDIOLOGY CLINIC | Age: 86
End: 2021-02-25
Payer: MEDICARE

## 2021-02-25 VITALS
SYSTOLIC BLOOD PRESSURE: 136 MMHG | WEIGHT: 259 LBS | HEIGHT: 66 IN | DIASTOLIC BLOOD PRESSURE: 80 MMHG | BODY MASS INDEX: 41.62 KG/M2 | HEART RATE: 45 BPM | OXYGEN SATURATION: 99 %

## 2021-02-25 DIAGNOSIS — R42 DIZZINESS: ICD-10-CM

## 2021-02-25 DIAGNOSIS — I35.0 NONRHEUMATIC AORTIC VALVE STENOSIS: Primary | ICD-10-CM

## 2021-02-25 DIAGNOSIS — I48.0 PAROXYSMAL ATRIAL FIBRILLATION (HCC): ICD-10-CM

## 2021-02-25 DIAGNOSIS — E78.00 HYPERCHOLESTEREMIA: ICD-10-CM

## 2021-02-25 DIAGNOSIS — I10 ESSENTIAL HYPERTENSION: ICD-10-CM

## 2021-02-25 PROCEDURE — 99214 OFFICE O/P EST MOD 30 MIN: CPT | Performed by: INTERNAL MEDICINE

## 2021-02-25 NOTE — LETTER
[address] Cardiology [place] [address] Deni Ospina [address] [address]  Phone: [phone]  Fax: [phone]    Whit Yu MD        March 2, 2021     Precious Jauregui, [address] [facility] [address]    Patient: Felton Lucero  MR Number: <[id number]>  YOB: 1934  Date of Visit: 2/25/2021    Dear Dr. Lang Heading:    [facility]    H+P // CONSULT // Corie Lipscomb // Jenene Councilman     Referring Doctor Precious Jauregui MD   Encounter Type Followup     CHIEF COMPLAINT     Visit Type Chronic   Symptoms None   Problems AS, AFIB, HTN     HISTORY OF PRESENT ILLNESS     ? GEN - New patient from Dr. Mary Rizvi for AS. Recent cath with nonobstructive disease. SOB with any activity but fairly sedentary and uses walker. Declines OHS. CKD with creatinine mid to high 1s. Wants procedure for AS. Orthopnea and pnd noted. ? CAD - Nonobstructive. Denies cp, sob, dizziness, syncope, palpitations. ? HTN - Ambulatory BP readings in good range. No HA or dizziness. ? MED - Compliant with CV meds listed below without notable side effects. HISTORY/ALLERGIES/ROS     MedHx:   has a past medical history of Anemia, Chronic kidney disease, Combined systolic and diastolic congestive heart failure ([address] Diabetes mellitus ([address] DVT (deep venous thrombosis) ([address] Hyperlipidemia, Hypertension, Osteoarthritis, PAF (paroxysmal atrial fibrillation) ([address] Pulmonary embolism ([address] Sarcoidosis, Thyroid disease, and Type II or unspecified type diabetes mellitus without mention of complication, not stated as uncontrolled. SurgHx:  has a past surgical history that includes Hysterectomy; knee surgery; joint replacement (Bilateral); joint replacement (Bilateral); and Intracapsular cataract extraction (Left, 10/16/2020). SocHx:   reports that she has never smoked. She has never used smokeless tobacco. She reports that she does not drink alcohol or use drugs. FamHx:  family history includes Cancer in her brother; Heart Failure in her mother. Allergies: Naproxen, Bactrim [sulfamethoxazole-trimethoprim], Levofloxacin, Pcn [penicillins], and Sulfa antibiotics   ROS:  [x]Full ROS obtained and negative except as mentioned in HPI     MEDICATIONS      Current Outpatient Medications   Medication Sig Dispense Refill    insulin glargine (LANTUS) 100 UNIT/ML injection vial Inject 10 Units into the skin nightly 1 vial 3    polyethylene glycol (GLYCOLAX) 17 g packet Take 17 g by mouth daily as needed for Constipation 527 g 1    blood glucose monitor kit and supplies Dispense sufficient amount for indicated testing frequency plus additional to accommodate PRN testing needs. Dispense all needed supplies to include: monitor, strips, lancing device, lancets, control solutions, alcohol swabs. 1 kit 0    allopurinol (ZYLOPRIM) 100 MG tablet Take 2 tablets by mouth daily 30 tablet 3    amiodarone (CORDARONE) 200 MG tablet Take 200 mg tablet twice a day through 2/16/2021 and on 2/17/2021 decrease to 200 mg tablet once a day 40 tablet 1    carvedilol (COREG) 12.5 MG tablet Take 0.5 tablets by mouth 2 times daily (with meals) 60 tablet 3    torsemide (DEMADEX) 20 MG tablet Take 1 tablet by mouth daily 30 tablet 3    prochlorperazine (COMPAZINE) 5 MG tablet Take 5 mg by mouth every 6 hours as needed for Nausea      triamcinolone (KENALOG) 0.1 % cream Apply topically 2 times daily.  100 g 5    latanoprost (XALATAN) 0.005 % ophthalmic solution Place 1 drop into the right eye nightly      KLOR-CON M20 20 MEQ extended release tablet Take 20 mEq by mouth daily      pantoprazole (PROTONIX) 40 MG tablet Take 40 mg by mouth daily  warfarin (COUMADIN) 5 MG tablet Take 5 mg by mouth every evening Except 2.5mg every Thursday or as directed by [facility] Coumadin Service [phone]      cetirizine (ZYRTEC) 10 MG tablet Take 10 mg by mouth daily      vitamin C (ASCORBIC ACID) 500 MG tablet Take 500 mg by mouth daily      acetaminophen (TYLENOL) 500 MG tablet Take 1,000 mg by mouth every 6 hours as needed for Pain      Calcium Carb-Cholecalciferol (CALTRATE 600+D) 600-800 MG-UNIT TABS Take 1 tablet by mouth daily       gabapentin (NEURONTIN) 100 MG capsule Take 200 mg by mouth nightly.  glipiZIDE (GLUCOTROL) 5 MG tablet Take 5 mg by mouth 2 times daily (before meals).  lovastatin (MEVACOR) 10 MG tablet Take 10 mg by mouth nightly. No current facility-administered medications for this visit.       Reviewed with patient and will remain unchanged except as mentioned in A/P  PHYSICAL EXAM     Vitals:    02/25/21 1038   BP: 136/80   Pulse: (!) 45   SpO2: 99%      Gen Alert, coop, no distress Heart  irreg 4/6   Head NC, AT, no abnorm Abd  Soft, NT, +BS, no mass, no OM   Eyes PER, conj/corn clear Ext  Ext nl, AT, no C/C, +edema bilat   Nose Nares nl, no drain, NT Pulse decr   Throat Lips, mucosa, tongue nl Skin Col/text/turg nl, no vis rash/les   Neck S/S, TM, NT, no bruit/JVD Psych Nl mood and affect   Lung CTA-B, unlabored, no DTP Lymph   No cervical or axillary LA   Ch wall NT, no deform Neuro  Nl gross M/S exam     ASSESSMENT AND PLAN     *AS    Date EF Detail   Sx     Sob, edema, orthopnea, pnd   DATA   Most recent TTE, LHC reviewed personally   NYHA   III   Hx 2/21  BAV post MG 30 Seng Senters)   TTE 9/15  8/17  9/20  2/21 45%  35%  55%  45% Mild AS MG 18, RV is enlarged and moderately depressed  Mild AS MG 17, severe biatrial enlargement, mod MR  Severe AS MG 67  Severe AS MG 66, hyperechoic structure is seen in liver pushing on IVC   LHC 2/21  Nonobstructive CAD Aria Grayson)   Plan     TAVR workup   *AFIB Status persistent, most recent TTE, monitors, EP procedures reviewed personally   Plan Continue ac, per dr. Mojica March  *HTN  Status Controlled, vitals and available ambulatory monitoring logs reviewed personally  Plan Counseled on diet/salt/exercise/weight, continue meds at doses above  *CKD  Creat 1.6-1.9  Plan Cautious use of contrast, discussed increased risk of procedures and contrast  *COMPLIANCE  Status Compliant  Plan Discussed importance of compliance with meds/diet/salt/exercise; avoid tob/alc/drugs; patient verbalized understanding  *FOLLOWUP  After testing    [address] Javier Bell, am scribing for and in the presence of Stefano Solis MD.   SignedJavier 02/22/21 3:22 PM   Provider Danni Villa is working as a scribe for and in the presence of me (Stefano Solis MD). Working as a scribe, Javier Alexandra may have prepopulated components of this note with my historical  intellectual property under my direct supervision. Any additions to this intellectual property were performed in my presence and at my direction.   Furthermore, the content and accuracy of this note have been reviewed by me Stefano Solis MD).  2/25/2021 10:57 AM  CODING     Category Diagnosis   Stable chronic illness  (37757/04728 - 2 or more) AS, AFIB, HTN, CKD   Chronic illness w/: Exac, progr or SA of Tx  (02842/57603 - 1 or more)    Undiagnosed new problem w/: uncertain prognosis  (75895/69256 - 1 or more)    Acute illness with systemic Sx  (56939/27659 - 1 or more)    Acute, complicated injury  (08855/75838 - 1 or more)    06806 1 or more chronic illness with exacerbation, progression or SA of treatment    Time 30-39 minutes spent preparing to see patient including reviewing patient history/prior tests/prior consults, performing a medical exam, counseling and educating patient/family/caregiver, ordering medications/tests/procedures, referring and communicating with PCPs and other pertinent consultants, documenting information in the EMR, independently interpreting results and communicating to family and coordination of patient care. If you have questions, please do not hesitate to call me. I look forward to following Cathleen along with you.     Sincerely,        Vikram Amado MD

## 2021-02-28 NOTE — TELEPHONE ENCOUNTER
[facility] at this [facility]. She is welcome to return to our services at any time.     Alisa Padilla, PharmD, [address] [place] [address]  Anticoagulation Service  [phone] no

## 2021-03-03 ENCOUNTER — TELEPHONE (OUTPATIENT)
Dept: PHARMACY | Age: 86
End: 2021-03-03

## 2021-03-03 NOTE — TELEPHONE ENCOUNTER
Fariba Pace called from [employer] [phone]. They are setting her up with home care and wanted to know if Ms. Woodrow Bhatia was getting her INRs checked here. I advised that as of 1/20/21, they were looking to have her physician manage her warfarin. Ms. Woodrow Bhatia refused to physically come to our outpatient center due to [place] and did not want to pay for our services for a telephone visit. She will verify with her daughter.      Silver Gilford, PharmD, [address] [place] [address]  Anticoagulation Service  [phone]

## 2021-03-08 ENCOUNTER — ANTI-COAG VISIT (OUTPATIENT)
Dept: PHARMACY | Age: 86
End: 2021-03-08

## 2021-03-08 ENCOUNTER — TELEPHONE (OUTPATIENT)
Dept: CARDIOLOGY CLINIC | Age: 86
End: 2021-03-08

## 2021-03-08 DIAGNOSIS — I48.19 PERSISTENT ATRIAL FIBRILLATION (HCC): ICD-10-CM

## 2021-03-08 LAB — INR BLD: 3.3

## 2021-03-08 NOTE — TELEPHONE ENCOUNTER
IB message sent to Dr. Verenice Vega on 3/1/21 and office called today for advice on renal protection for upcoming TAVR protocol CTA. Office states they will give Dr. Verenice Vega message to call back.  Allie KELLY TAVR Coordinator

## 2021-03-11 ENCOUNTER — HOSPITAL ENCOUNTER (OUTPATIENT)
Age: 86
Setting detail: SPECIMEN
Discharge: HOME | End: 2021-03-11
Payer: MEDICARE

## 2021-03-11 LAB
BACTERIA: ABNORMAL /HPF
BILIRUBIN URINE: NEGATIVE
BLOOD, URINE: NEGATIVE
CLARITY: ABNORMAL
COLOR: YELLOW
EPITHELIAL CELLS, UA: 4 /HPF (ref 0–5)
GLUCOSE URINE: NEGATIVE MG/DL
HYALINE CASTS: 10 /LPF (ref 0–8)
KETONES, URINE: NEGATIVE MG/DL
LEUKOCYTE ESTERASE, URINE: ABNORMAL
MICROSCOPIC EXAMINATION: YES
NITRITE, URINE: NEGATIVE
PH UA: 6 (ref 5–8)
PROTEIN UA: 30 MG/DL
RBC UA: 4 /HPF (ref 0–4)
SPECIFIC GRAVITY UA: 1.01 (ref 1–1.03)
URINE TYPE: ABNORMAL
UROBILINOGEN, URINE: 1 E.U./DL
WBC UA: 28 /HPF (ref 0–5)

## 2021-03-11 PROCEDURE — 81001 URINALYSIS AUTO W/SCOPE: CPT

## 2021-03-15 ENCOUNTER — TELEPHONE (OUTPATIENT)
Dept: CARDIOLOGY CLINIC | Age: 86
End: 2021-03-15

## 2021-03-15 NOTE — TELEPHONE ENCOUNTER
Spoke to pts daughter who states pt has an appt with Dr. Tu Galan this Wednesday (3/1721).  Jesse KELLY

## 2021-03-16 ENCOUNTER — ANTI-COAG VISIT (OUTPATIENT)
Dept: PHARMACY | Age: 86
End: 2021-03-16
Payer: MEDICARE

## 2021-03-16 DIAGNOSIS — I48.19 PERSISTENT ATRIAL FIBRILLATION (HCC): ICD-10-CM

## 2021-03-16 LAB — INTERNATIONAL NORMALIZATION RATIO, POC: 3.8

## 2021-03-22 ENCOUNTER — TELEPHONE (OUTPATIENT)
Dept: CARDIOLOGY CLINIC | Age: 86
End: 2021-03-22

## 2021-03-22 ENCOUNTER — ANTI-COAG VISIT (OUTPATIENT)
Dept: PHARMACY | Age: 86
End: 2021-03-22
Payer: MEDICARE

## 2021-03-22 DIAGNOSIS — I48.19 PERSISTENT ATRIAL FIBRILLATION (HCC): ICD-10-CM

## 2021-03-22 LAB — INR BLD: 4.9

## 2021-03-22 NOTE — PROGRESS NOTES
[facility] Visit    Lab Results   Component Value Date    INR 4.90 2021    INR 3.8 2021    INR 3.30 2021       Anticoagulation Summary  As of 3/22/2021    INR goal:  2.0-3.0   TTR:  0.0 % (4 d)   INR used for dosin.90 (3/22/2021)   Warfarin maintenance plan:  2.5 mg (5 mg x 0.5) every day   Weekly warfarin total:  17.5 mg   Plan last modified:  Diane Cuba, [address] (3/22/2021)   Next INR check:  3/29/2021   Target end date:   Indefinite    Indications    Persistent atrial fibrillation (HCC) [I48.19]             Anticoagulation Episode Summary     INR check location:      Preferred lab:      Send INR reminders to:  [facility]    Comments:  EPIC      Anticoagulation Care Providers     Provider Role Specialty Phone number    Jaqueline Pimentel MD Referring Cardiology [phone]              Assessment / Plan:  Patient wanted to know what to drink to bring INR down advised to drink some Boost to help bring down INR    Description    [facility] RN # [phone]  Hold warfarin on 3-22-21 and 3/23  NEW DOSE: Warfarin 2.5mg daily            Warfarin medication reviewed and updated on the patient 's home medication list: Yes     Instructions given to [facility] with Malcom Alfred., Phone [phone]      CLINICAL PHARMACY CONSULT: MED RECONCILIATION/REVIEW [address]. Tracking Only    PHSO: No  Total # of Interventions Recommended: 4  - Decreased Dose #: 0    Total Interventions Accepted: 4  Time Spent (min): 15

## 2021-03-22 NOTE — TELEPHONE ENCOUNTER
Home care states Pt's HR is irregular BP before meds  146/80 HR . No sob chest pain or dizziness. Had some edema and torsemide was increased to 40 mg in am and 20 mg afternoon. Pt has some thrush and hives and nothing new in diet or medications. Pt has recently lost her  last week on Sunday.  Please call to advise

## 2021-03-22 NOTE — TELEPHONE ENCOUNTER
Called [facility] ([phone]), nurse is Willam Tapia ([phone]). PTA Geneva Grady [phone] was there today. Geneva Grady states she did see Zacarias Holt today and reports LE edema. She states she did not appear to be more short of breath and did well with therapy today. She states she is not the one that called the office and to try Willam Tapia. Attempted to contact Willam Tapia, rang busy. Spoke with Faith Barillas (patient's daughter) she states Zacarias Holt is doing well. She states her heart rate will fluctuate but denies any palpitation or sustained episodes of her HR being elevated. She states she was seen by Dr. Susan Parker 3/17/21 and he increased her Torsemide to 40 mg in the am and 20 mg in the evening for the LE edema. She states he stated she could proceed with her testing that was previously scheduled. Instructed to call if she has any worsening symptoms and can move up her appointment. Instructed will call for Dr. Ankit Sanchez note to review. Called Dr. Ankit Sanchez office, will fax over recent office visit note.

## 2021-03-26 ENCOUNTER — TELEPHONE (OUTPATIENT)
Dept: CARDIOLOGY CLINIC | Age: 86
End: 2021-03-26

## 2021-03-26 ENCOUNTER — HOSPITAL ENCOUNTER (INPATIENT)
Age: 86
LOS: 10 days | Discharge: HOME HEALTH | DRG: 291 | End: 2021-04-05
Attending: EMERGENCY MEDICINE | Admitting: INTERNAL MEDICINE
Payer: MEDICARE

## 2021-03-26 ENCOUNTER — APPOINTMENT (OUTPATIENT)
Dept: GENERAL RADIOLOGY | Age: 86
DRG: 291 | End: 2021-03-26
Payer: MEDICARE

## 2021-03-26 DIAGNOSIS — N18.9 CHRONIC KIDNEY DISEASE, UNSPECIFIED CKD STAGE: ICD-10-CM

## 2021-03-26 DIAGNOSIS — I50.9 ACUTE ON CHRONIC CONGESTIVE HEART FAILURE, UNSPECIFIED HEART FAILURE TYPE (HCC): Primary | ICD-10-CM

## 2021-03-26 DIAGNOSIS — I35.0 SEVERE AORTIC STENOSIS: ICD-10-CM

## 2021-03-26 PROBLEM — I50.43 CHF (CONGESTIVE HEART FAILURE), NYHA CLASS I, ACUTE ON CHRONIC, COMBINED (HCC): Status: ACTIVE | Noted: 2021-03-26

## 2021-03-26 LAB
A/G RATIO: 0.9 (ref 1.1–2.2)
ALBUMIN SERPL-MCNC: 3.5 G/DL (ref 3.4–5)
ALP BLD-CCNC: 79 U/L (ref 40–129)
ALT SERPL-CCNC: 29 U/L (ref 10–40)
ANION GAP SERPL CALCULATED.3IONS-SCNC: 10 MMOL/L (ref 3–16)
AST SERPL-CCNC: 21 U/L (ref 15–37)
BASOPHILS ABSOLUTE: 0 K/UL (ref 0–0.2)
BASOPHILS RELATIVE PERCENT: 0.9 %
BILIRUB SERPL-MCNC: 1.2 MG/DL (ref 0–1)
BILIRUBIN URINE: NEGATIVE
BLOOD, URINE: NEGATIVE
BUN BLDV-MCNC: 31 MG/DL (ref 7–20)
CALCIUM SERPL-MCNC: 9.7 MG/DL (ref 8.3–10.6)
CHLORIDE BLD-SCNC: 94 MMOL/L (ref 99–110)
CLARITY: CLEAR
CO2: 30 MMOL/L (ref 21–32)
COLOR: YELLOW
CREAT SERPL-MCNC: 1.8 MG/DL (ref 0.6–1.2)
EOSINOPHILS ABSOLUTE: 0.1 K/UL (ref 0–0.6)
EOSINOPHILS RELATIVE PERCENT: 3.2 %
EPITHELIAL CELLS, UA: 1 /HPF (ref 0–5)
GFR AFRICAN AMERICAN: 32
GFR NON-AFRICAN AMERICAN: 27
GLOBULIN: 3.8 G/DL
GLUCOSE BLD-MCNC: 240 MG/DL (ref 70–99)
GLUCOSE URINE: NEGATIVE MG/DL
HCT VFR BLD CALC: 33.7 % (ref 36–48)
HEMOGLOBIN: 11 G/DL (ref 12–16)
HYALINE CASTS: 3 /LPF (ref 0–8)
INR BLD: 2.78 (ref 0.86–1.14)
KETONES, URINE: NEGATIVE MG/DL
LEUKOCYTE ESTERASE, URINE: NEGATIVE
LYMPHOCYTES ABSOLUTE: 1 K/UL (ref 1–5.1)
LYMPHOCYTES RELATIVE PERCENT: 20.5 %
MCH RBC QN AUTO: 30 PG (ref 26–34)
MCHC RBC AUTO-ENTMCNC: 32.5 G/DL (ref 31–36)
MCV RBC AUTO: 92.1 FL (ref 80–100)
MICROSCOPIC EXAMINATION: YES
MONOCYTES ABSOLUTE: 0.4 K/UL (ref 0–1.3)
MONOCYTES RELATIVE PERCENT: 8 %
NEUTROPHILS ABSOLUTE: 3.1 K/UL (ref 1.7–7.7)
NEUTROPHILS RELATIVE PERCENT: 67.4 %
NITRITE, URINE: NEGATIVE
PDW BLD-RTO: 22.4 % (ref 12.4–15.4)
PH UA: 5.5 (ref 5–8)
PLATELET # BLD: 178 K/UL (ref 135–450)
PMV BLD AUTO: 8 FL (ref 5–10.5)
POTASSIUM SERPL-SCNC: 4.6 MMOL/L (ref 3.5–5.1)
PRO-BNP: ABNORMAL PG/ML (ref 0–449)
PROTEIN UA: ABNORMAL MG/DL
PROTHROMBIN TIME: 32.6 SEC (ref 10–13.2)
RBC # BLD: 3.66 M/UL (ref 4–5.2)
RBC UA: 1 /HPF (ref 0–4)
SODIUM BLD-SCNC: 134 MMOL/L (ref 136–145)
SPECIFIC GRAVITY UA: 1.01 (ref 1–1.03)
TOTAL PROTEIN: 7.3 G/DL (ref 6.4–8.2)
TROPONIN: 0.02 NG/ML
URINE REFLEX TO CULTURE: ABNORMAL
URINE TYPE: ABNORMAL
UROBILINOGEN, URINE: 0.2 E.U./DL
WBC # BLD: 4.7 K/UL (ref 4–11)
WBC UA: 1 /HPF (ref 0–5)

## 2021-03-26 PROCEDURE — 99285 EMERGENCY DEPT VISIT HI MDM: CPT

## 2021-03-26 PROCEDURE — 71045 X-RAY EXAM CHEST 1 VIEW: CPT

## 2021-03-26 PROCEDURE — 93005 ELECTROCARDIOGRAM TRACING: CPT | Performed by: EMERGENCY MEDICINE

## 2021-03-26 PROCEDURE — 85025 COMPLETE CBC W/AUTO DIFF WBC: CPT

## 2021-03-26 PROCEDURE — 84484 ASSAY OF TROPONIN QUANT: CPT

## 2021-03-26 PROCEDURE — 85610 PROTHROMBIN TIME: CPT

## 2021-03-26 PROCEDURE — 2060000000 HC ICU INTERMEDIATE R&B

## 2021-03-26 PROCEDURE — 83880 ASSAY OF NATRIURETIC PEPTIDE: CPT

## 2021-03-26 PROCEDURE — 80053 COMPREHEN METABOLIC PANEL: CPT

## 2021-03-26 PROCEDURE — 81001 URINALYSIS AUTO W/SCOPE: CPT

## 2021-03-26 PROCEDURE — 6360000002 HC RX W HCPCS: Performed by: PHYSICIAN ASSISTANT

## 2021-03-26 RX ORDER — DILTIAZEM HYDROCHLORIDE 120 MG/1
120 CAPSULE, EXTENDED RELEASE ORAL 2 TIMES DAILY
Status: ON HOLD | COMMUNITY
End: 2021-04-05 | Stop reason: HOSPADM

## 2021-03-26 RX ORDER — M-VIT,TX,IRON,MINS/CALC/FOLIC 27MG-0.4MG
1 TABLET ORAL DAILY
COMMUNITY
End: 2021-04-12

## 2021-03-26 RX ORDER — MULTIVIT-MIN/IRON/FOLIC ACID/K 18-600-40
1 CAPSULE ORAL ONCE
COMMUNITY
End: 2021-07-29

## 2021-03-26 RX ORDER — CARVEDILOL 6.25 MG/1
3.12 TABLET ORAL 2 TIMES DAILY WITH MEALS
Status: CANCELLED | OUTPATIENT
Start: 2021-03-27

## 2021-03-26 RX ORDER — DILTIAZEM HYDROCHLORIDE 120 MG/1
120 CAPSULE, COATED, EXTENDED RELEASE ORAL DAILY
COMMUNITY
End: 2021-03-26

## 2021-03-26 RX ORDER — FUROSEMIDE 10 MG/ML
40 INJECTION INTRAMUSCULAR; INTRAVENOUS ONCE
Status: COMPLETED | OUTPATIENT
Start: 2021-03-26 | End: 2021-03-26

## 2021-03-26 RX ADMIN — FUROSEMIDE 40 MG: 10 INJECTION, SOLUTION INTRAMUSCULAR; INTRAVENOUS at 23:08

## 2021-03-26 ASSESSMENT — PAIN SCALES - GENERAL: PAINLEVEL_OUTOF10: 0

## 2021-03-26 NOTE — ED NOTES
Pt to ED with c/o sob and increased ble edema x1 week. Hx CHF.        Saul Raines, RN  03/26/21 1700

## 2021-03-26 NOTE — TELEPHONE ENCOUNTER
Alexy Campbell called back stating she spoke to nephrology and they instructed her to take pt to ER. She is going to transport her now herself due to the pt not wanting to call paramedics.     You can reach Alexy Campbell at [phone]

## 2021-03-26 NOTE — TELEPHONE ENCOUNTER
Cathleen's daughter Jolena Compton called in this afternoons stating that Anali Cao has gained 7 lbs in the last week. Colten Winn said that her legs are more swollen than usual. Denies chest pain, but does get winded easily. Colten Winn said that Anali Cao went and seen her nephrologist who increased her Torsemide from 40 mg to 60 mg daily on 3/17. Cathleen's physical therapist was also at the house today who said her HR was all over the place, 35 to 123. PMH significant for chronic kidney disease, combined systolic and diastolic heart failure. Hypertension, hyperlipidemia, atrial fibrillation and PE and DVT.

## 2021-03-26 NOTE — TELEPHONE ENCOUNTER
Lola Taylor, she states her weight today is 263 with worsening LE edema. Reports chronic HERNANDEZ but denies any acute changes. Dr. Abilio Andrade has been managing her diuretics and torsemide was increased 3/17/21 to 40 in the am and 20 in the evening. Baljeet Shukla has a call out to nephrology about increasing the Torsemide, I instructed her to return call to our office today to make sure it is addressed prior to going into the weekend. She v/u. Will move up her appointment with Dr. Swapnil Moreno to be seen sooner when she returns call.

## 2021-03-26 NOTE — ED NOTES
Report given to receiving LETICIA Martinez, all questions answered.       Sundar Galvez RN  03/26/21 1942

## 2021-03-26 NOTE — ED PROVIDER NOTES
[address]       Pt Name: Francisco Javier Melchor  MRN: [id number]  [place] 1934  Date of evaluation: 3/26/2021  Provider: LUCINA Cintron    This patient was also seen and evaluated by the attending physician Roman Noel. Fidel Jc, [address]       Chief Complaint   Patient presents with    Shortness of Breath     Pt to ED with c/o sob and increased ble edema x1 week. Hx CHF.  Leg Swelling       HISTORY OF PRESENT ILLNESS  (Location/Symptom, Timing/Onset, Context/Setting, Quality, Duration, Modifying Factors, Severity.)   Francisco Javier Melchor is a 80 y.o. female who presents to the emergency department with complaint of shortness of breath and leg swelling. Patient says that over the last week or so she has had increases in both her frequent shortness of breath and and swelling in both legs. She does have a heart failure history, recently had her torsemide dosage increased to 60 mg/day in divided doses. Patient also complains that she has not been urinating normally over the last few days, as she tries to urinate but is difficult and only a little bit comes out. She says she has had a mild nonproductive cough. She is able to lie flat and sleep without breathing problems, but easily and frequently is short of breath throughout the day. She denies any cold symptoms or fever. Denies chest pain. Says she has had a relatively poor appetite recently but no vomiting or diarrhea. Denies numbness. No other complaints. Nursing Notes were reviewed and I agree. REVIEW OF SYSTEMS    (2-9 systems for level 4, 10 or more for level 5)     Constitutional:  Negative for fever, chills, unexpected weight change. HENT:  Negative for congestion, rhinorrhea, sneezing, sore throat, trouble swallowing. Respiratory: Positive for frequent shortness of breath. Negative for cough, wheezing, stridor. Cardiovascular: Positive for bilateral leg swelling.   Negative for chest pain, palpitations. Gastrointestinal:  Negative for nausea, vomiting, abdominal pain, diarrhea, constipation, blood in stool. Genitourinary:  Negative for dysuria, hematuria, flank pain, pelvic pain, abnormal vaginal bleeding. Musculoskeletal:  Negative for myalgias, arthralgias, neck pain or stiffness. Neurological:  Negative for dizziness, seizures, syncope, focal weakness, numbness, headache. Except as noted above the remainder of the review of systems was reviewed and negative. PAST MEDICAL HISTORY         Diagnosis Date    Anemia     Atrial fibrillation (HCC)     Chronic kidney disease     Combined systolic and diastolic congestive heart failure (HCC)     Diabetes mellitus [facility]     DVT (deep venous thrombosis) ([facility])     Hyperlipidemia     Hypertension     Osteoarthritis     PAF (paroxysmal atrial fibrillation) ([facility]     Pulmonary embolism ([facility])     Sarcoidosis     in remission     Thyroid disease     Hypothyroidism    Type II or unspecified type diabetes mellitus without mention of complication, not stated as uncontrolled        SURGICAL HISTORY           Procedure Laterality Date    HYSTERECTOMY      INTRACAPSULAR CATARACT EXTRACTION Left 10/16/2020    PHACOEMULSIFICATION WITH INTRAOCULAR LENS IMPLANT - LEFT EYE performed by Frances Fritz MD at [address] [place] [address] Bilateral     TKT    JOINT REPLACEMENT Bilateral     THR    KNEE SURGERY      bilateral total knees       CURRENT MEDICATIONS       Previous Medications    ALLOPURINOL (ZYLOPRIM) 100 MG TABLET    Take 2 tablets by mouth daily    AMIODARONE (CORDARONE) 200 MG TABLET    Take 200 mg by mouth daily    BLOOD GLUCOSE MONITOR KIT AND SUPPLIES    Dispense sufficient amount for indicated testing frequency plus additional to accommodate PRN testing needs. Dispense all needed supplies to include: monitor, strips, lancing device, lancets, control solutions, alcohol swabs.     CALCIUM Albumin/Globulin Ratio 0.9 (*)     Total Bilirubin 1.2 (*)     All other components within normal limits    Narrative:     Performed at:  [facility]  [address] [facility] [employer] [address]   Phone [phone]   BRAIN NATRIURETIC PEPTIDE - Abnormal; Notable for the following components:    Pro-BNP 16,780 (*)     All other components within normal limits    Narrative:     Performed at:  [address] [employer] [address]   Phone [phone]   TROPONIN - Abnormal; Notable for the following components:    Troponin 0.02 (*)     All other components within normal limits    Narrative:     Performed at:  [address] [employer] [address]   Phone [phone]   PROTIME-INR - Abnormal; Notable for the following components:    Protime 32.6 (*)     INR 2.78 (*)     All other components within normal limits    Narrative:     Performed at:  [address] [employer] [address]   Phone [phone]   URINE RT REFLEX TO CULTURE - Abnormal; Notable for the following components:    Protein, UA TRACE (*)     All other components within normal limits    Narrative:     Performed at:  [address] [employer] [address]   Phone [phone]   MICROSCOPIC URINALYSIS    Narrative:     Performed at:  [address] [employer]   Phone [phone]       All other labs were within normal range or not returned as of this dictation.     EMERGENCY DEPARTMENT COURSE and DIFFERENTIAL DIAGNOSIS/MDM:   Vitals:    Vitals:    03/26/21 1900 03/26/21 1925 03/26/21 1950 03/26/21 2030   BP: 122/89 110/75 (!) 152/74 116/76   Pulse: 102 97 105 100   Resp: 26 24 26 27   Temp:  98 °F (36.7 °C)     TempSrc:  Oral     SpO2: 98% 99% 95% 96% Weight:       Height:           The patient's condition in the ED was good, the patient was afebrile and nontoxic in appearance, and the patient's physical exam was unremarkable other than for notable bilateral leg edema. Laboratory work-up revealed a stable creatinine at 1.8, high BNP at greater than 17,000, and slightly elevated troponin at 0.02. Anitra Lingsara INR was therapeutic. Patient's heart failure is worsening despite recent increases in her torsemide dosage. She is in need of hospital admission for further care. I consulted the patient's primary care physician, who agreed to admit the patient. PROCEDURES:  None    FINAL IMPRESSION      1. Acute on chronic congestive heart failure, unspecified heart failure type ([facility]    2. Chronic kidney disease, unspecified CKD stage          DISPOSITION/PLAN   DISPOSITION        PATIENT REFERRED TO:  No follow-up provider specified.     DISCHARGE MEDICATIONS:  New Prescriptions    No medications on file       (Please note that portions of this note were completed with a voice recognition program.  Efforts were made to edit the dictations but occasionally words are mis-transcribed.)    Maribeth Favre, [address] [place], [place] [address]karina Ospina  03/26/21 2142       Maribeth Favre, [facility] Ajit Avfatemeh  03/26/21 [place] [address] Habkarina Avfatemeh  03/26/21 3571

## 2021-03-26 NOTE — PROGRESS NOTES
Medication Reconciliation     List of medications patient is currently taking is complete. Source of information:   1. Conversation with patient and daughter at bedside  2. EPIC records        Notes regarding home medications:  1. Patient received most of her home medications today. 2. She has not taken any of her afternoon/nighttime doses. 3. Daughter said Warfarin was decreased to 2.5mg once nightly on Monday because her INR was 4.9.  4. Daughter said her mom does have insulin but she does not use it.   Noman Johnson, Pharmacy Intern 3/26/2021 7:18 PM

## 2021-03-27 LAB
ANION GAP SERPL CALCULATED.3IONS-SCNC: 7 MMOL/L (ref 3–16)
BUN BLDV-MCNC: 31 MG/DL (ref 7–20)
CALCIUM SERPL-MCNC: 9.4 MG/DL (ref 8.3–10.6)
CHLORIDE BLD-SCNC: 94 MMOL/L (ref 99–110)
CHOLESTEROL, TOTAL: 117 MG/DL (ref 0–199)
CO2: 32 MMOL/L (ref 21–32)
CREAT SERPL-MCNC: 1.8 MG/DL (ref 0.6–1.2)
EKG ATRIAL RATE: 127 BPM
EKG DIAGNOSIS: NORMAL
EKG Q-T INTERVAL: 402 MS
EKG QRS DURATION: 144 MS
EKG QTC CALCULATION (BAZETT): 518 MS
EKG R AXIS: -44 DEGREES
EKG T AXIS: 122 DEGREES
EKG VENTRICULAR RATE: 100 BPM
GFR AFRICAN AMERICAN: 32
GFR NON-AFRICAN AMERICAN: 27
GLUCOSE BLD-MCNC: 140 MG/DL (ref 70–99)
GLUCOSE BLD-MCNC: 159 MG/DL (ref 70–99)
GLUCOSE BLD-MCNC: 167 MG/DL (ref 70–99)
GLUCOSE BLD-MCNC: 171 MG/DL (ref 70–99)
GLUCOSE BLD-MCNC: 178 MG/DL (ref 70–99)
GLUCOSE BLD-MCNC: 196 MG/DL (ref 70–99)
HDLC SERPL-MCNC: 41 MG/DL (ref 40–60)
INR BLD: 2.51 (ref 0.86–1.14)
LDL CHOLESTEROL CALCULATED: 66 MG/DL
MAGNESIUM: 1.5 MG/DL (ref 1.8–2.4)
PERFORMED ON: ABNORMAL
PHOSPHORUS: 3.2 MG/DL (ref 2.5–4.9)
POTASSIUM SERPL-SCNC: 4.4 MMOL/L (ref 3.5–5.1)
PROTHROMBIN TIME: 29.4 SEC (ref 10–13.2)
SODIUM BLD-SCNC: 133 MMOL/L (ref 136–145)
T4 FREE: 2.1 NG/DL (ref 0.9–1.8)
TRIGL SERPL-MCNC: 52 MG/DL (ref 0–150)
TSH SERPL DL<=0.05 MIU/L-ACNC: 0.68 UIU/ML (ref 0.27–4.2)
VLDLC SERPL CALC-MCNC: 10 MG/DL

## 2021-03-27 PROCEDURE — 80061 LIPID PANEL: CPT

## 2021-03-27 PROCEDURE — 2580000003 HC RX 258: Performed by: INTERNAL MEDICINE

## 2021-03-27 PROCEDURE — 99233 SBSQ HOSP IP/OBS HIGH 50: CPT | Performed by: INTERNAL MEDICINE

## 2021-03-27 PROCEDURE — 84443 ASSAY THYROID STIM HORMONE: CPT

## 2021-03-27 PROCEDURE — 6360000002 HC RX W HCPCS: Performed by: INTERNAL MEDICINE

## 2021-03-27 PROCEDURE — 84439 ASSAY OF FREE THYROXINE: CPT

## 2021-03-27 PROCEDURE — 93010 ELECTROCARDIOGRAM REPORT: CPT | Performed by: INTERNAL MEDICINE

## 2021-03-27 PROCEDURE — 83735 ASSAY OF MAGNESIUM: CPT

## 2021-03-27 PROCEDURE — 85610 PROTHROMBIN TIME: CPT

## 2021-03-27 PROCEDURE — 2060000000 HC ICU INTERMEDIATE R&B

## 2021-03-27 PROCEDURE — 6370000000 HC RX 637 (ALT 250 FOR IP): Performed by: INTERNAL MEDICINE

## 2021-03-27 PROCEDURE — 84100 ASSAY OF PHOSPHORUS: CPT

## 2021-03-27 PROCEDURE — 36415 COLL VENOUS BLD VENIPUNCTURE: CPT

## 2021-03-27 PROCEDURE — 94760 N-INVAS EAR/PLS OXIMETRY 1: CPT

## 2021-03-27 PROCEDURE — 2700000000 HC OXYGEN THERAPY PER DAY

## 2021-03-27 PROCEDURE — 80048 BASIC METABOLIC PNL TOTAL CA: CPT

## 2021-03-27 RX ORDER — DILTIAZEM HYDROCHLORIDE 120 MG/1
120 CAPSULE, COATED, EXTENDED RELEASE ORAL DAILY
Status: DISCONTINUED | OUTPATIENT
Start: 2021-03-27 | End: 2021-03-27

## 2021-03-27 RX ORDER — METOPROLOL SUCCINATE 50 MG/1
100 TABLET, EXTENDED RELEASE ORAL DAILY
Status: DISCONTINUED | OUTPATIENT
Start: 2021-03-27 | End: 2021-04-05 | Stop reason: HOSPADM

## 2021-03-27 RX ORDER — FUROSEMIDE 10 MG/ML
80 INJECTION INTRAMUSCULAR; INTRAVENOUS 2 TIMES DAILY
Status: DISCONTINUED | OUTPATIENT
Start: 2021-03-27 | End: 2021-03-27

## 2021-03-27 RX ORDER — ACETAMINOPHEN 325 MG/1
650 TABLET ORAL EVERY 6 HOURS PRN
Status: DISCONTINUED | OUTPATIENT
Start: 2021-03-27 | End: 2021-04-05 | Stop reason: HOSPADM

## 2021-03-27 RX ORDER — NICOTINE POLACRILEX 4 MG
15 LOZENGE BUCCAL PRN
Status: DISCONTINUED | OUTPATIENT
Start: 2021-03-27 | End: 2021-04-05 | Stop reason: HOSPADM

## 2021-03-27 RX ORDER — LOSARTAN POTASSIUM 25 MG/1
25 TABLET ORAL DAILY
Status: DISCONTINUED | OUTPATIENT
Start: 2021-03-27 | End: 2021-03-27

## 2021-03-27 RX ORDER — ALLOPURINOL 100 MG/1
200 TABLET ORAL DAILY
Status: DISCONTINUED | OUTPATIENT
Start: 2021-03-27 | End: 2021-04-02

## 2021-03-27 RX ORDER — POTASSIUM CHLORIDE 20 MEQ/1
20 TABLET, EXTENDED RELEASE ORAL DAILY
Status: DISCONTINUED | OUTPATIENT
Start: 2021-03-27 | End: 2021-04-02

## 2021-03-27 RX ORDER — WARFARIN SODIUM 2.5 MG/1
2.5 TABLET ORAL
Status: COMPLETED | OUTPATIENT
Start: 2021-03-27 | End: 2021-03-27

## 2021-03-27 RX ORDER — TRAMADOL HYDROCHLORIDE 50 MG/1
50 TABLET ORAL EVERY 6 HOURS PRN
Status: DISCONTINUED | OUTPATIENT
Start: 2021-03-27 | End: 2021-04-05 | Stop reason: HOSPADM

## 2021-03-27 RX ORDER — SODIUM CHLORIDE 0.9 % (FLUSH) 0.9 %
10 SYRINGE (ML) INJECTION PRN
Status: DISCONTINUED | OUTPATIENT
Start: 2021-03-27 | End: 2021-04-05 | Stop reason: HOSPADM

## 2021-03-27 RX ORDER — GABAPENTIN 100 MG/1
100 CAPSULE ORAL NIGHTLY
Status: DISCONTINUED | OUTPATIENT
Start: 2021-03-27 | End: 2021-03-27

## 2021-03-27 RX ORDER — SODIUM CHLORIDE 9 MG/ML
25 INJECTION, SOLUTION INTRAVENOUS PRN
Status: DISCONTINUED | OUTPATIENT
Start: 2021-03-27 | End: 2021-04-05 | Stop reason: HOSPADM

## 2021-03-27 RX ORDER — M-VIT,TX,IRON,MINS/CALC/FOLIC 27MG-0.4MG
1 TABLET ORAL DAILY
Status: DISCONTINUED | OUTPATIENT
Start: 2021-03-27 | End: 2021-04-02

## 2021-03-27 RX ORDER — AMIODARONE HYDROCHLORIDE 200 MG/1
200 TABLET ORAL DAILY
Status: DISCONTINUED | OUTPATIENT
Start: 2021-03-27 | End: 2021-03-31

## 2021-03-27 RX ORDER — DEXTROSE MONOHYDRATE 25 G/50ML
12.5 INJECTION, SOLUTION INTRAVENOUS PRN
Status: DISCONTINUED | OUTPATIENT
Start: 2021-03-27 | End: 2021-04-05 | Stop reason: HOSPADM

## 2021-03-27 RX ORDER — PROCHLORPERAZINE MALEATE 5 MG/1
5 TABLET ORAL EVERY 6 HOURS PRN
Status: DISCONTINUED | OUTPATIENT
Start: 2021-03-27 | End: 2021-04-02

## 2021-03-27 RX ORDER — DEXTROSE MONOHYDRATE 50 MG/ML
100 INJECTION, SOLUTION INTRAVENOUS PRN
Status: DISCONTINUED | OUTPATIENT
Start: 2021-03-27 | End: 2021-04-05 | Stop reason: HOSPADM

## 2021-03-27 RX ORDER — MAGNESIUM SULFATE IN WATER 40 MG/ML
2000 INJECTION, SOLUTION INTRAVENOUS ONCE
Status: COMPLETED | OUTPATIENT
Start: 2021-03-27 | End: 2021-03-27

## 2021-03-27 RX ORDER — INSULIN GLARGINE 100 [IU]/ML
10 INJECTION, SOLUTION SUBCUTANEOUS NIGHTLY
Status: DISCONTINUED | OUTPATIENT
Start: 2021-03-27 | End: 2021-03-30

## 2021-03-27 RX ORDER — MAGNESIUM SULFATE IN WATER 40 MG/ML
2000 INJECTION, SOLUTION INTRAVENOUS PRN
Status: DISCONTINUED | OUTPATIENT
Start: 2021-03-27 | End: 2021-03-29

## 2021-03-27 RX ORDER — SODIUM CHLORIDE 0.9 % (FLUSH) 0.9 %
10 SYRINGE (ML) INJECTION EVERY 12 HOURS SCHEDULED
Status: DISCONTINUED | OUTPATIENT
Start: 2021-03-27 | End: 2021-03-27 | Stop reason: SDUPTHER

## 2021-03-27 RX ORDER — PROMETHAZINE HYDROCHLORIDE 25 MG/1
12.5 TABLET ORAL EVERY 6 HOURS PRN
Status: DISCONTINUED | OUTPATIENT
Start: 2021-03-27 | End: 2021-03-27

## 2021-03-27 RX ORDER — LANOLIN ALCOHOL/MO/W.PET/CERES
3 CREAM (GRAM) TOPICAL NIGHTLY PRN
Status: DISCONTINUED | OUTPATIENT
Start: 2021-03-27 | End: 2021-04-05 | Stop reason: HOSPADM

## 2021-03-27 RX ORDER — ATORVASTATIN CALCIUM 10 MG/1
10 TABLET, FILM COATED ORAL NIGHTLY
Status: DISCONTINUED | OUTPATIENT
Start: 2021-03-27 | End: 2021-03-27

## 2021-03-27 RX ORDER — VITAMIN B COMPLEX
2000 TABLET ORAL DAILY
Status: DISCONTINUED | OUTPATIENT
Start: 2021-03-27 | End: 2021-04-02

## 2021-03-27 RX ORDER — TRIAMCINOLONE ACETONIDE 1 MG/G
CREAM TOPICAL 2 TIMES DAILY PRN
Status: DISCONTINUED | OUTPATIENT
Start: 2021-03-27 | End: 2021-04-05 | Stop reason: HOSPADM

## 2021-03-27 RX ORDER — POTASSIUM CHLORIDE 20 MEQ/1
40 TABLET, EXTENDED RELEASE ORAL PRN
Status: DISCONTINUED | OUTPATIENT
Start: 2021-03-27 | End: 2021-03-29

## 2021-03-27 RX ORDER — CARVEDILOL 6.25 MG/1
6.25 TABLET ORAL 2 TIMES DAILY WITH MEALS
Status: DISCONTINUED | OUTPATIENT
Start: 2021-03-27 | End: 2021-03-27

## 2021-03-27 RX ORDER — PREDNISONE 20 MG/1
20 TABLET ORAL 2 TIMES DAILY
Status: DISCONTINUED | OUTPATIENT
Start: 2021-03-27 | End: 2021-03-28

## 2021-03-27 RX ORDER — SODIUM CHLORIDE 0.9 % (FLUSH) 0.9 %
10 SYRINGE (ML) INJECTION EVERY 12 HOURS SCHEDULED
Status: DISCONTINUED | OUTPATIENT
Start: 2021-03-27 | End: 2021-04-05 | Stop reason: HOSPADM

## 2021-03-27 RX ORDER — LATANOPROST 50 UG/ML
1 SOLUTION/ DROPS OPHTHALMIC NIGHTLY
Status: DISCONTINUED | OUTPATIENT
Start: 2021-03-27 | End: 2021-04-05 | Stop reason: HOSPADM

## 2021-03-27 RX ORDER — LIDOCAINE HYDROCHLORIDE 10 MG/ML
5 INJECTION, SOLUTION EPIDURAL; INFILTRATION; INTRACAUDAL; PERINEURAL ONCE
Status: DISCONTINUED | OUTPATIENT
Start: 2021-03-27 | End: 2021-04-05 | Stop reason: HOSPADM

## 2021-03-27 RX ORDER — PANTOPRAZOLE SODIUM 40 MG/1
40 TABLET, DELAYED RELEASE ORAL DAILY
Status: DISCONTINUED | OUTPATIENT
Start: 2021-03-27 | End: 2021-04-05 | Stop reason: HOSPADM

## 2021-03-27 RX ORDER — ONDANSETRON 2 MG/ML
4 INJECTION INTRAMUSCULAR; INTRAVENOUS EVERY 6 HOURS PRN
Status: DISCONTINUED | OUTPATIENT
Start: 2021-03-27 | End: 2021-04-05 | Stop reason: HOSPADM

## 2021-03-27 RX ORDER — POTASSIUM CHLORIDE 7.45 MG/ML
10 INJECTION INTRAVENOUS PRN
Status: DISCONTINUED | OUTPATIENT
Start: 2021-03-27 | End: 2021-03-29

## 2021-03-27 RX ORDER — WARFARIN SODIUM 2.5 MG/1
2.5 TABLET ORAL EVERY EVENING
Status: DISCONTINUED | OUTPATIENT
Start: 2021-03-27 | End: 2021-03-27

## 2021-03-27 RX ORDER — ACETAMINOPHEN 650 MG/1
650 SUPPOSITORY RECTAL EVERY 6 HOURS PRN
Status: DISCONTINUED | OUTPATIENT
Start: 2021-03-27 | End: 2021-04-05 | Stop reason: HOSPADM

## 2021-03-27 RX ORDER — POLYETHYLENE GLYCOL 3350 17 G/17G
17 POWDER, FOR SOLUTION ORAL DAILY PRN
Status: DISCONTINUED | OUTPATIENT
Start: 2021-03-27 | End: 2021-04-05 | Stop reason: HOSPADM

## 2021-03-27 RX ORDER — GLIPIZIDE 5 MG/1
5 TABLET ORAL
Status: DISCONTINUED | OUTPATIENT
Start: 2021-03-27 | End: 2021-04-02

## 2021-03-27 RX ORDER — CETIRIZINE HYDROCHLORIDE 10 MG/1
10 TABLET ORAL DAILY
Status: DISCONTINUED | OUTPATIENT
Start: 2021-03-27 | End: 2021-04-02

## 2021-03-27 RX ORDER — DILTIAZEM HYDROCHLORIDE 120 MG/1
120 CAPSULE, COATED, EXTENDED RELEASE ORAL 2 TIMES DAILY
Status: DISCONTINUED | OUTPATIENT
Start: 2021-03-27 | End: 2021-03-27

## 2021-03-27 RX ORDER — LORAZEPAM 0.5 MG/1
0.5 TABLET ORAL EVERY 4 HOURS PRN
Status: DISCONTINUED | OUTPATIENT
Start: 2021-03-27 | End: 2021-04-05 | Stop reason: HOSPADM

## 2021-03-27 RX ORDER — SODIUM CHLORIDE 0.9 % (FLUSH) 0.9 %
10 SYRINGE (ML) INJECTION PRN
Status: DISCONTINUED | OUTPATIENT
Start: 2021-03-27 | End: 2021-03-27 | Stop reason: SDUPTHER

## 2021-03-27 RX ORDER — ACETAMINOPHEN 500 MG
1000 TABLET ORAL EVERY 6 HOURS PRN
Status: DISCONTINUED | OUTPATIENT
Start: 2021-03-27 | End: 2021-03-27 | Stop reason: SDUPTHER

## 2021-03-27 RX ORDER — DIPHENHYDRAMINE HCL 25 MG
25 TABLET ORAL EVERY 6 HOURS PRN
Status: DISCONTINUED | OUTPATIENT
Start: 2021-03-27 | End: 2021-04-05 | Stop reason: HOSPADM

## 2021-03-27 RX ADMIN — ALLOPURINOL 200 MG: 100 TABLET ORAL at 10:36

## 2021-03-27 RX ADMIN — DILTIAZEM HYDROCHLORIDE 120 MG: 120 CAPSULE, COATED, EXTENDED RELEASE ORAL at 10:37

## 2021-03-27 RX ADMIN — MAGNESIUM SULFATE HEPTAHYDRATE 2000 MG: 40 INJECTION, SOLUTION INTRAVENOUS at 18:05

## 2021-03-27 RX ADMIN — CETIRIZINE HYDROCHLORIDE 10 MG: 10 TABLET, FILM COATED ORAL at 10:38

## 2021-03-27 RX ADMIN — METOPROLOL SUCCINATE 100 MG: 50 TABLET, EXTENDED RELEASE ORAL at 14:16

## 2021-03-27 RX ADMIN — LOSARTAN POTASSIUM 25 MG: 25 TABLET, FILM COATED ORAL at 10:38

## 2021-03-27 RX ADMIN — PREDNISONE 20 MG: 20 TABLET ORAL at 21:59

## 2021-03-27 RX ADMIN — ATORVASTATIN CALCIUM 10 MG: 10 TABLET, FILM COATED ORAL at 02:15

## 2021-03-27 RX ADMIN — Medication 10 ML: at 10:43

## 2021-03-27 RX ADMIN — FUROSEMIDE 80 MG: 10 INJECTION, SOLUTION INTRAMUSCULAR; INTRAVENOUS at 10:40

## 2021-03-27 RX ADMIN — WARFARIN SODIUM 2.5 MG: 2.5 TABLET ORAL at 18:05

## 2021-03-27 RX ADMIN — INSULIN LISPRO 1 UNITS: 100 INJECTION, SOLUTION INTRAVENOUS; SUBCUTANEOUS at 21:59

## 2021-03-27 RX ADMIN — POTASSIUM CHLORIDE 20 MEQ: 1500 TABLET, EXTENDED RELEASE ORAL at 10:37

## 2021-03-27 RX ADMIN — MAGNESIUM SULFATE HEPTAHYDRATE 2000 MG: 40 INJECTION, SOLUTION INTRAVENOUS at 14:37

## 2021-03-27 RX ADMIN — FUROSEMIDE 5 MG/HR: 10 INJECTION, SOLUTION INTRAMUSCULAR; INTRAVENOUS at 21:59

## 2021-03-27 RX ADMIN — CARVEDILOL 6.25 MG: 6.25 TABLET, FILM COATED ORAL at 10:37

## 2021-03-27 RX ADMIN — PANTOPRAZOLE SODIUM 40 MG: 40 TABLET, DELAYED RELEASE ORAL at 06:23

## 2021-03-27 RX ADMIN — LATANOPROST 1 DROP: 50 SOLUTION OPHTHALMIC at 22:02

## 2021-03-27 RX ADMIN — TRIAMCINOLONE ACETONIDE: 1 CREAM TOPICAL at 14:15

## 2021-03-27 RX ADMIN — GABAPENTIN 100 MG: 100 CAPSULE ORAL at 02:15

## 2021-03-27 RX ADMIN — INSULIN LISPRO 1 UNITS: 100 INJECTION, SOLUTION INTRAVENOUS; SUBCUTANEOUS at 18:47

## 2021-03-27 RX ADMIN — Medication 2000 UNITS: at 10:36

## 2021-03-27 RX ADMIN — INSULIN LISPRO 1 UNITS: 100 INJECTION, SOLUTION INTRAVENOUS; SUBCUTANEOUS at 02:15

## 2021-03-27 RX ADMIN — DIPHENHYDRAMINE HCL 25 MG: 25 TABLET ORAL at 18:05

## 2021-03-27 RX ADMIN — LATANOPROST 1 DROP: 50 SOLUTION OPHTHALMIC at 02:15

## 2021-03-27 RX ADMIN — MULTIPLE VITAMINS W/ MINERALS TAB 1 TABLET: TAB at 10:36

## 2021-03-27 RX ADMIN — AMIODARONE HYDROCHLORIDE 200 MG: 200 TABLET ORAL at 10:36

## 2021-03-27 RX ADMIN — WARFARIN SODIUM 2.5 MG: 2.5 TABLET ORAL at 02:15

## 2021-03-27 RX ADMIN — INSULIN GLARGINE 10 UNITS: 100 INJECTION, SOLUTION SUBCUTANEOUS at 21:59

## 2021-03-27 ASSESSMENT — PAIN - FUNCTIONAL ASSESSMENT: PAIN_FUNCTIONAL_ASSESSMENT: ACTIVITIES ARE NOT PREVENTED

## 2021-03-27 NOTE — PROGRESS NOTES
Dose not need picc line at this time    Peripheral iv  Number 18 placed in right [facility] [place]   With out difficulty ,, tolerated well

## 2021-03-27 NOTE — ED PROVIDER NOTES
Notable for the following components:    Troponin 0.02 (*)     All other components within normal limits    Narrative:     Performed at:  [facility]  [address]  [facility] [place] [address]   Phone [phone]   PROTIME-INR - Abnormal; Notable for the following components:    Protime 32.6 (*)     INR 2.78 (*)     All other components within normal limits    Narrative:     Performed at:  [facility]  [address] [place] [address]   Phone [phone]   URINE RT REFLEX TO CULTURE - Abnormal; Notable for the following components:    Protein, UA TRACE (*)     All other components within normal limits    Narrative:     Performed at:  [facility]  [address] [facility] [address]   Phone [phone]   MICROSCOPIC URINALYSIS    Narrative:     Performed at:  [address] [place] [address]   Phone [phone]       All other labs were within normal range or not returned as of this dictation. EMERGENCY DEPARTMENT COURSE and DIFFERENTIAL DIAGNOSIS/MDM:   Vitals:    Vitals:    03/26/21 1900 03/26/21 1925 03/26/21 1950 03/26/21 2030   BP: 122/89 110/75 (!) 152/74 116/76   Pulse: 102 97 105 100   Resp: 26 24 26 27   Temp:  98 °F (36.7 °C)     TempSrc:  Oral     SpO2: 98% 99% 95% 96%   Weight:       Height:           The patient presents with increasing shortness of breath, weight gain, and increased lower extremity edema over the last week as noted above. Chest x-ray shows pulmonary vascular congestion. Clinical exam is consistent with acute CHF. She does have atrial fibrillation and rate is slightly increased at 100. At time of reexamination her rate is in the 90s. She was given Lasix IV and will be admitted for further treatment and evaluation. Creatinine is 1.8. BNP is 16,780. Troponin is 0.02. EKG shows no ST elevation.   No associated chest pain. All suspicion for acute coronary syndrome is low. MDM      CRITICAL CARE TIME   Total Critical Care time was 0 minutes, excluding separately reportable procedures. There was a high probability of clinically significant/life threatening deterioration in the patient's condition which required my urgent intervention. CONSULTS:  IP CONSULT TO PRIMARY CARE PROVIDER  IP CONSULT TO HEART FAILURE NURSE/COORDINATOR  IP CONSULT TO DIETITIAN  IP CONSULT TO CARDIOLOGY  IP CONSULT TO HOME CARE NEEDS    PROCEDURES:  Unless otherwise noted below, none     Procedures        FINAL IMPRESSION      1. Acute on chronic congestive heart failure, unspecified heart failure type ([facility] [address]    2. Chronic kidney disease, unspecified CKD stage          DISPOSITION/PLAN   DISPOSITION Admitted 03/26/2021 10:32:15 PM      PATIENT REFERRED TO:  No follow-up provider specified. DISCHARGE MEDICATIONS:  New Prescriptions    No medications on file     Controlled Substances Monitoring:     No flowsheet data found. (Please note that portions of this note were completed with a voice recognition program.  Efforts were made to edit the dictations but occasionally words are mis-transcribed. )    Avila Allen DO (electronically signed)  Attending Emergency Physician      Theo Coronel DO  03/26/21 [id number]

## 2021-03-27 NOTE — PLAN OF CARE
Problem: Skin Integrity:  Goal: Will show no infection signs and symptoms  Description: Will show no infection signs and symptoms  Outcome: Ongoing  Goal: Absence of new skin breakdown  Description: Absence of new skin breakdown  Outcome: Ongoing     Problem: Falls - Risk of:  Goal: Will remain free from falls  Description: Will remain free from falls  Outcome: Ongoing  Goal: Absence of physical injury  Description: Absence of physical injury  Outcome: Ongoing     Problem: OXYGENATION/RESPIRATORY FUNCTION  Goal: Patient will maintain patent airway  Outcome: Ongoing  Goal: Patient will achieve/maintain normal respiratory rate/effort  Description: Respiratory rate and effort will be within normal limits for the patient  Outcome: Ongoing     Problem: HEMODYNAMIC STATUS  Goal: Patient has stable vital signs and fluid balance  Outcome: Ongoing     Problem: FLUID AND ELECTROLYTE IMBALANCE  Goal: Fluid and electrolyte balance are achieved/maintained  Outcome: Ongoing     Problem: ACTIVITY INTOLERANCE/IMPAIRED MOBILITY  Goal: Mobility/activity is maintained at optimum level for patient  Outcome: Ongoing

## 2021-03-27 NOTE — CONSULTS
Clinical Pharmacy Note  Warfarin Consult    Rosemary Schmitz is a 80 y.o. female receiving warfarin managed by pharmacy. Patient being bridged with none. Warfarin Indication: Afib  Target INR range: 2-3   Dose prior to admission: 2.5 mg daily    Current warfarin drug-drug interactions: tramadol, allopurinol, amiodarone, acetaminophen    Recent Labs     03/26/21  1726   HGB 11.0*   HCT 33.7*   INR 2.78*       Assessment/Plan:  Will give normal scheduled home dose tonight. Warfarin 2.5 mg tonight. Daily PT/INR until stable within therapeutic range. Thank you for the consult. Will continue to follow.   Jazzmine Kolb, PharmD

## 2021-03-27 NOTE — DISCHARGE INSTR - COC
HEART FAILURE RNs- [facility]  # [phone]. It's a voicemail checked during business hours Mon-Fri. [place] [address] #[phone]. [facility] #[phone]. Continuity of Care Form    Patient Name: Gene Jose   :  1934  MRN:  [id number]    Admit date:  3/26/2021  Discharge date:  2021     Code Status Order: Prior   Advance Directives:     Admitting Physician:  No admitting provider for patient encounter. PCP: Parvin Dutta MD    Discharging Nurse: [facility] Unit/Room#: 023/V-27  Discharging Unit Phone Number: [phone]     Emergency Contact:   Extended Emergency Contact Information  Primary Emergency Contact: [address] Phone: [phone]  Work Phone: [phone]  Relation: Child  Secondary Emergency Contact: [address] [place] [address] Phone: [phone]  Mobile Phone: [phone]  Relation: Child    Past Surgical History:  Past Surgical History:   Procedure Laterality Date    HYSTERECTOMY      INTRACAPSULAR CATARACT EXTRACTION Left 10/16/2020    PHACOEMULSIFICATION WITH INTRAOCULAR LENS IMPLANT - LEFT EYE performed by Misha Galicia [facility] Bilateral     TKT    JOINT REPLACEMENT Bilateral     THR    KNEE SURGERY      bilateral total knees       Immunization History: There is no immunization history on file for this patient.     Active Problems:  Patient Active Problem List   Diagnosis Code    CHF (congestive heart failure), NYHA class III, acute on chronic, combined (HCC) I50.43    Essential hypertension I10    Persistent atrial fibrillation (HCC) I48.19    Pulmonary HTN ([facility] [address] I27.20    Hypercholesteremia E78.00    Primary localized osteoarthrosis of shoulder region M19.019    Nonrheumatic aortic valve stenosis I35.0    Hyperthyroidism E05.90    Acute kidney injury superimposed on chronic kidney disease (HCC) N17.9, N18.9    Hyponatremia E87.1    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    Anemia due to chronic kidney disease N18.9, D63.1    Acute respiratory failure with hypoxia ([facility]) J96.01    DAVION (acute kidney injury) ([facility] [address] N17.9    Uncontrolled type 2 diabetes mellitus with hyperglycemia ([facility]) E11.65    Acute on chronic combined systolic and diastolic congestive heart failure (HCC) I50.43    Acute congestive heart failure (HCC) I50.9    Severe aortic stenosis I35.0    Paroxysmal atrial fibrillation (HCC) I48.0       Isolation/Infection:   Isolation          No Isolation        Patient Infection Status     Infection Onset Added Last Indicated Last Indicated By Review Planned Expiration Resolved Resolved By    None active    Resolved    COVID-19 Rule Out 02/07/21 02/07/21 02/07/21 COVID-19 (Ordered)   02/07/21 Rule-Out Test Resulted    C-diff Rule Out 09/05/20 09/05/20 09/05/20 GI Bacterial Pathogens By PCR (Ordered)   09/06/20 Kevin Desai RN    COVID-19 Rule Out 09/04/20 09/04/20 09/04/20 COVID-19 (Ordered)   09/04/20 Rule-Out Test Resulted    C-diff Rule Out 09/04/20 09/04/20 09/04/20 Clostridium Difficile Toxin/Antigen (Ordered)   09/04/20 Rule-Out Test Resulted          Nurse Assessment:  Last Vital Signs: /76   Pulse 100   Temp 98 °F (36.7 °C) (Oral)   Resp 27   Ht 5' 6\" (1.676 m)   Wt 267 lb 6.4 oz (121.3 kg)   LMP  (LMP Unknown)   SpO2 96%   BMI 43.16 kg/m²     Last documented pain score (0-10 scale): Pain Level: 0  Last Weight:   Wt Readings from Last 1 Encounters:   03/26/21 267 lb 6.4 oz (121.3 kg)     Mental Status:  alert    IV Access:  - None    Nursing Mobility/ADLs:  Walking   Assisted  Transfer  Assisted  Bathing  Assisted  Dressing  Assisted  Toileting  Assisted  Feeding  Assisted  Med Admin  Assisted  Med Delivery   whole    Wound Care Documentation and Therapy:        Elimination:  Continence:   · Bowel: No  · Bladder: No  Urinary Catheter: Removal Date 4/5 Colostomy/Ileostomy/Ileal Conduit: No       Date of Last BM: 4/4  No intake or output data in the 24 hours ending 03/26/21 2204  No intake/output data recorded. Safety Concerns: At Risk for Falls    Impairments/Disabilities:          Nutrition Therapy:  Current Nutrition Therapy:   - Oral Diet:  Low Sodium (2gm)    Routes of Feeding: Oral  Liquids: Thin Liquids  Daily Fluid Restriction: yes - amount 1,500 mL  Last Modified Barium Swallow with Video (Video Swallowing Test):     Treatments at the Time of Hospital Discharge:   Respiratory Treatments:   Oxygen Therapy:  is on oxygen at 2 L/min per nasal cannula. Ventilator:    - No ventilator support    Rehab Therapies: Physical Therapy and Occupational Therapy  Weight Bearing Status/Restrictions: Other Medical Equipment (for information only, NOT a DME order): Other Treatments:     INR to be sent to [address] and collected by home health care RN     Heart Failure Instructions for Daily Management  Patient was treated for acute on chronic combined systolic and diastolic heart failure. she  will require the following:     Please weigh daily on the same scale and approximately the same time of day. Report weight gain of 3 pounds/day or 5 pounds/week to : Eugenio Winter [phone] and [facility] [phone].  Please use hospital discharge weight as baseline reference.  Please monitor for signs and symptoms of and report to MD:  o Worsening Heart Failure: sudden weight gain, shortness of breath, lower extremity or general edema/swelling, abdominal bloating/swelling, inability to lie flat, intolerance to usual activity, or cough (especially at night). Report these finding even if no increase in weight.  o Dehydration:  having difficulty or a decrease in urination, dizziness, worsening fatigue, or new onset/worsening of generalized weakness.       Please continue a LOW SODIUM diet and LIMIT fluid intake to 48 - 64 ounces ( 1.5 - 2 liters) per day.  Call Donna Heath [phone] and [address] [phone] and/or Ankit Ibeth Ospina @ [phone] with any questions or concerns.  Please continue heart failure education to patient and family/support system.  See After Visit Summary for hospital follow up appointment details.  Consider spiritual care referral for support and/or completion of advance directives [phone].  Consider: [place] [address] telehealth program if patient agreeable and able to participate, palliative care consult for ongoing goals of care, end of life, and/or chronic disease management discussions and referral to [facility] [phone]) once SNF/HHC complete. Patient's personal belongings (please select all that are sent with patient):  {CHP DME Belongings:[id number]}    RN SIGNATURE:  {Esignature:[id number]}    CASE MANAGEMENT/SOCIAL WORK SECTION    Inpatient Status Date: ***    Readmission Risk Assessment Score:  Readmission Risk              Risk of Unplanned Readmission:        0           Discharging to Facility/ Agency   · Name:   · Address:  · Phone:  · Fax:    Dialysis Facility (if applicable)   · Name:  · Address:  · Dialysis Schedule:  · Phone:  · Fax:    / signature: {Esignature:[id number]}    PHYSICIAN SECTION    Prognosis: Fair    Condition at Discharge: Stable    Rehab Potential (if transferring to Rehab): Fair    Recommended Labs or Other Treatments After Discharge: CBC  Renal panel in 1 week  Physician Certification: I certify the above information and transfer of Turner Cabrera  is necessary for the continuing treatment of the diagnosis listed and that she requires Home Care for {GREATER/LESS:[id number]} 30 days.      Update Admission H&P: No change in H&P    PHYSICIAN SIGNATURE:  Electronically signed by Donna Heath MD on 3/26/21 at 10:06 PM EDT

## 2021-03-27 NOTE — CONSULTS
Nephrology Consult Note  [phone]   [email or web address].        Reason for Consult:  CKD    HISTORY OF PRESENT ILLNESS:                This is a patient with significant past medical history of CKD stage 4 baseline SCr high 1, low 2 range, CHF, AS, s/p valuloplasty, DM2. A.fib, HTN, DVT who presented with to ER after noted to have bradycardia with HR to 30's. She also reports, SOB, weight gain, increased BLE edema. She was seen by Dr. Rosette Laughlin on 3/17-dose of torsemide increased from 40mg to 40mg qam and 20mg qpm that visit. She is started lasix 80mg IV bid since this am.  UO is good, BP is stable. Scr is stable. Wt is down by 3#. She denies N/V/CP/f/c/LH/dizziness.  -family at bedside-states compliant with diuretics  -had diffuse pruritic rash that is getting better-no new meds added    Past Medical History:        Diagnosis Date    Anemia     Atrial fibrillation (HCC)     Chronic kidney disease     Combined systolic and diastolic congestive heart failure (HCC)     Diabetes mellitus ([facility] [address]     DVT (deep venous thrombosis) (HCC)     Hyperlipidemia     Hypertension     Osteoarthritis     PAF (paroxysmal atrial fibrillation) ([facility] [address]     Pulmonary embolism (HCC)     Sarcoidosis     in remission     Thyroid disease     Hypothyroidism    Type II or unspecified type diabetes mellitus without mention of complication, not stated as uncontrolled        Past Surgical History:        Procedure Laterality Date    HYSTERECTOMY      INTRACAPSULAR CATARACT EXTRACTION Left 10/16/2020    PHACOEMULSIFICATION WITH INTRAOCULAR LENS IMPLANT - LEFT EYE performed by Sierra Borjas MD at [address] [place] [address] Bilateral     TKT    JOINT REPLACEMENT Bilateral     THR    KNEE SURGERY      bilateral total knees       Current Medications:    No current facility-administered medications on file prior to encounter.       Current Outpatient Medications on File Prior to Encounter   Medication Sig Socioeconomic History    Marital status:      Spouse name: Not on file    Number of children: Not on file    Years of education: Not on file    Highest education level: Not on file   Occupational History    Not on file   Social Needs    Financial resource strain: Not on file    Food insecurity     Worry: Not on file     Inability: Not on file    Transportation needs     Medical: Not on file     Non-medical: Not on file   Tobacco Use    Smoking status: Never Smoker    Smokeless tobacco: Never Used    Tobacco comment: Never smoked   Substance and Sexual Activity    Alcohol use: No    Drug use: No    Sexual activity: Yes     Partners: Male   Lifestyle    Physical activity     Days per week: Not on file     Minutes per session: Not on file    Stress: Not on file   Relationships    Social connections     Talks on phone: Not on file     Gets together: Not on file     Attends Yarsani service: Not on file     Active member of club or organization: Not on file     Attends meetings of clubs or organizations: Not on file     Relationship status: Not on file    Intimate partner violence     Fear of current or ex partner: Not on file     Emotionally abused: Not on file     Physically abused: Not on file     Forced sexual activity: Not on file   Other Topics Concern    Not on file   Social History Narrative    Not on file       Family History:       Problem Relation Age of Onset    Heart Failure Mother     Cancer Brother     Asthma Neg Hx     Diabetes Neg Hx     Emphysema Neg Hx     Hypertension Neg Hx          Review of Systems:  a comprehensive Review of systems was negative except for the following: pos SOB/edema    PHYSICAL EXAM:    Vitals:    /76   Pulse 124   Temp 97.8 °F (36.6 °C)   Resp 24   Ht 5' 6\" (1.676 m)   Wt 264 lb 8.8 oz (120 kg)   LMP  (LMP Unknown)   SpO2 100%   BMI 42.70 kg/m²   I/O last 3 completed shifts:   In: 360 [P.O.:360]  Out: 900 [Urine:900]  No intake/output data recorded. Physical Exam:  Gen: Resting in bed, NAD.  obese  HEENT: anicteric, NC/AT  CV: +S1/S2, RRR  Lungs: Good respiratory effort, diminished in bases  Abd: S/NT +BS-obese  Ext: +++edema, no cyanosis, bilateral chronic venous stasis skin changs  Skin: Warm. No rashes appreciated. : No TTP over bladder, nondistended. Neuro: Alert and oriented x 3, nonfocal.  Joints: No erythema noted over joints. DATA:    CBC:   Lab Results   Component Value Date    WBC 4.7 03/26/2021    RBC 3.66 03/26/2021    HGB 11.0 03/26/2021    HCT 33.7 03/26/2021    MCV 92.1 03/26/2021    MCH 30.0 03/26/2021    MCHC 32.5 03/26/2021    RDW 22.4 03/26/2021     03/26/2021    MPV 8.0 03/26/2021     BMP:    Lab Results   Component Value Date     03/27/2021    K 4.4 03/27/2021    K 4.7 02/07/2021    CL 94 03/27/2021    CO2 32 03/27/2021    BUN 31 03/27/2021    LABALBU 3.5 03/26/2021    CREATININE 1.8 03/27/2021    CALCIUM 9.4 03/27/2021    GFRAA 32 03/27/2021    GFRAA 51 12/20/2012    LABGLOM 27 03/27/2021    GLUCOSE 159 03/27/2021       IMPRESSION/RECOMMENDATIONS:      CKD stage 4: baseline SCr now ranging from 1.8-2.2-sees Dr. Jesika Rodríguez, presumed DN/HTN NS/DAVION on CKD/chronic hypoperfusion  -clinically hypervolemic  -will start lasix drip 5mg/hr-titrate as needed for UO  -tolerate some degree of azotemia to achieve diuresis    FEN:   Low Mg: replace-monitor with diuresis    CHF: hypervolemic  -lasix gtt as above    AS:  -considering TAVR in future-h/o valvuloplasty  -per cardiology    HTN: BP relatively low  -monitor on diuresis    H/o A.fib: RVR-on amiodarone, toprol/coumadin    Anemia:  -hgb is 11.0-no ZARINA indicated    CKD-MBD: check phos      Thank you for allowing me to participate in the care of this patient. I will continue to follow along. Please call with questions.     Vanessa Dunne MD

## 2021-03-27 NOTE — CONSULTS
Brief Nutrition Note    Type and Reason for Visit: Consult, Patient Education    Nutrition Assessment:  RD consult for CHF diet education. Unable to perform diet education at this time as pt was sleeping. Will provide nutrition education in discharge instuctions and follow with verbal education as RD schedule allows.     Electronically signed by Gloria Granados RD, LD on 3/27/21 at 12:04 PM EDT    Contact: [phone]

## 2021-03-27 NOTE — DISCHARGE INSTR - DIET
For nutrition questions after discharge please call the Registered Dietitian at [phone]    Heart Failure Nutrition Therapy  This diet will help you feel better and support your heart by reducing symptoms of fluid retention, shortness of breath and swelling. You should focus on:   Limiting sodium in your diet by reading labels and limiting foods high in sodium. o Limit your daily sodium intake to 2,000 mg per day.  o Select foods with 140 mg of sodium or less per serving. o Foods with more than 300 mg of sodium per serving may not fit into a reduced-sodium meal plan.  o Check serving sizes. If you eat more than 1 serving, you will get more sodium than the amount listed.  Limiting fluid in your diet. o Ask your doctor how much fluid you can have per day  o Remember foods that are liquid at room temperature such as popsicles, soup, ice cream and Jell-O are fluids.  Checking your weight to make sure you're not retaining too much fluid.  o Weigh yourself every morning. If you gain 3 or more pounds in one day or 5 pounds within 1 week, call your doctor. Foods to choose and avoid:   Avoid processed foods. Eat more fresh foods. o Fresh and frozen fruits and vegetables are good choices. o Choose fresh meats. Avoid processed meats such as lewis, sausage and hot dogs.  Do not add salt to your food while cooking or at the table. o Try dry or fresh herbs, pepper, lemon juice, or a sodium-free seasoning blend such as Mrs. Dash to add flavor to food. o Do not use a salt substitute.  Use caution at restaurants  o Restaurant foods are high in sodium. Ask for your food to be cooked without salt and request sauces and dressing to come on the side. 

## 2021-03-27 NOTE — CONSULTS
Tobacco comment: Never smoked   Substance Use Topics    Alcohol use: No    Drug use: No     Allergies   Allergen Reactions    Naproxen Itching    Bactrim [Sulfamethoxazole-Trimethoprim]     Levofloxacin     Pcn [Penicillins]     Sulfa Antibiotics       allopurinol  200 mg Oral Daily    amiodarone  200 mg Oral Daily    carvedilol  6.25 mg Oral BID WC    cetirizine  10 mg Oral Daily    Vitamin D  2,000 Units Oral Daily    dilTIAZem  120 mg Oral BID    gabapentin  100 mg Oral Nightly    glipiZIDE  5 mg Oral BID AC    latanoprost  1 drop Right Eye Nightly    atorvastatin  10 mg Oral Nightly    therapeutic multivitamin-minerals  1 tablet Oral Daily    pantoprazole  40 mg Oral Daily    sodium chloride flush  10 mL Intravenous 2 times per day    furosemide  80 mg Intravenous BID    insulin lispro  0-6 Units Subcutaneous TID WC    insulin lispro  0-3 Units Subcutaneous Nightly    losartan  25 mg Oral Daily    potassium chloride  20 mEq Oral Daily    warfarin (COUMADIN) daily dosing (placeholder)   Other RX Placeholder       Review of Systems -   Constitutional: Negative for weight gain/loss; malaise, fever  Respiratory: Negative for Asthma;  cough and hemoptysis  Cardiovascular: Negative for palpitations,dizziness   Gastrointestinal: Negative for abd.pain; constipation/diarrhea;    Genitourinary: Negative for stones; hematuria; frequency hesitancy  Integumentt: Negative for rash or pruritis  Hematologic/lymphatic: Negative for blood dyscrasia; leukemia/lymphoma  Musculoskeletal: Negative for Connective tissue disease  Neurological:  Negative for Seizure   Behavioral/Psych:Negative for Bipolar disorder, Schizophrenia; Dementia  Endocrine: negative for thyroid, parathyroid disease      Intake/Output Summary (Last 24 hours) at 3/27/2021 0850  Last data filed at 3/27/2021 0600  Gross per 24 hour   Intake --   Output 900 ml   Net -900 ml       Physical Examination:    /78   Pulse 104   Temp 97.5 °F (36.4 °C) (Oral)   Resp 16   Ht 5' 6\" (1.676 m)   Wt 264 lb 8.8 oz (120 kg)   LMP  (LMP Unknown)   SpO2 95%   BMI 42.70 kg/m²    HEENT:  Face: Atraumatic, Conjunctiva: Pink; non icteric,  Mucous Memb:  Moist, No thyromegaly or Lymphadenopathy  Respiratory:  Resp Assessment: normal, Resp Auscultation: clear   Cardiovascular: Auscultation: nl S1 & S2, Palpation:  Nl PMI;  No heaves or thrills, JVP:  Normal  Systolic murmur aortic area  Abdomen: Soft, non-tender, Normal bowel sounds,  No organomegaly  Extremities: No Cyanosis or Clubbing; Edema 2+  Neurological: Oriented to time, place, and person, Non-anxious  Psychiatric: Normal mood and affect  Skin: Warm and dry,  No rash seen      Current Facility-Administered Medications: allopurinol (ZYLOPRIM) tablet 200 mg, 200 mg, Oral, Daily  amiodarone (CORDARONE) tablet 200 mg, 200 mg, Oral, Daily  carvedilol (COREG) tablet 6.25 mg, 6.25 mg, Oral, BID WC  cetirizine (ZYRTEC) tablet 10 mg, 10 mg, Oral, Daily  Vitamin D (CHOLECALCIFEROL) tablet 2,000 Units, 2,000 Units, Oral, Daily  dilTIAZem (CARDIZEM CD) extended release capsule 120 mg, 120 mg, Oral, BID  gabapentin (NEURONTIN) capsule 100 mg, 100 mg, Oral, Nightly  glipiZIDE (GLUCOTROL) tablet 5 mg, 5 mg, Oral, BID AC  latanoprost (XALATAN) 0.005 % ophthalmic solution 1 drop, 1 drop, Right Eye, Nightly  atorvastatin (LIPITOR) tablet 10 mg, 10 mg, Oral, Nightly  therapeutic multivitamin-minerals 1 tablet, 1 tablet, Oral, Daily  pantoprazole (PROTONIX) tablet 40 mg, 40 mg, Oral, Daily  sodium chloride flush 0.9 % injection 10 mL, 10 mL, Intravenous, 2 times per day  sodium chloride flush 0.9 % injection 10 mL, 10 mL, Intravenous, PRN  0.9 % sodium chloride infusion, 25 mL, Intravenous, PRN  promethazine (PHENERGAN) tablet 12.5 mg, 12.5 mg, Oral, Q6H PRN **OR** ondansetron (ZOFRAN) injection 4 mg, 4 mg, Intravenous, Q6H PRN  polyethylene glycol (GLYCOLAX) packet 17 g, 17 g, Oral, Daily PRN  acetaminophen (TYLENOL) tablet 650 mg, 650 mg, Oral, Q6H PRN **OR** acetaminophen (TYLENOL) suppository 650 mg, 650 mg, Rectal, Q6H PRN  potassium chloride (KLOR-CON M) extended release tablet 40 mEq, 40 mEq, Oral, PRN **OR** potassium bicarb-citric acid (EFFER-K) effervescent tablet 40 mEq, 40 mEq, Oral, PRN **OR** potassium chloride 10 mEq/100 mL IVPB (Peripheral Line), 10 mEq, Intravenous, PRN  magnesium sulfate 2000 mg in 50 mL IVPB premix, 2,000 mg, Intravenous, PRN  furosemide (LASIX) injection 80 mg, 80 mg, Intravenous, BID  traMADol (ULTRAM) tablet 50 mg, 50 mg, Oral, Q6H PRN  insulin lispro (HUMALOG) injection vial 0-6 Units, 0-6 Units, Subcutaneous, TID WC  insulin lispro (HUMALOG) injection vial 0-3 Units, 0-3 Units, Subcutaneous, Nightly  LORazepam (ATIVAN) tablet 0.5 mg, 0.5 mg, Oral, Q4H PRN  losartan (COZAAR) tablet 25 mg, 25 mg, Oral, Daily  prochlorperazine (COMPAZINE) tablet 5 mg, 5 mg, Oral, Q6H PRN  potassium chloride (KLOR-CON M) extended release tablet 20 mEq, 20 mEq, Oral, Daily  warfarin (COUMADIN) daily dosing (placeholder), , Other, RX Placeholder  glucose (GLUTOSE) 40 % oral gel 15 g, 15 g, Oral, PRN  dextrose 50 % IV solution, 12.5 g, Intravenous, PRN  glucagon (rDNA) injection 1 mg, 1 mg, Intramuscular, PRN  dextrose 5 % solution, 100 mL/hr, Intravenous, PRN      Labs:   Recent Labs     03/26/21  1726   WBC 4.7   HGB 11.0*   HCT 33.7*        Recent Labs     03/26/21  1726 03/27/21 0417   * 133*   K 4.6 4.4   CO2 30 32   BUN 31* 31*   CREATININE 1.8* 1.8*   GLUCOSE 240* 159*     No results for input(s): BNP in the last 72 hours. Recent Labs     03/26/21  1726 03/27/21 0417   PROTIME 32.6* 29.4*   INR 2.78* 2.51*     No results for input(s): APTT in the last 72 hours.   Recent Labs     03/26/21  1726   TROPONINI 0.02*     Lab Results   Component Value Date    HDL 41 03/27/2021    HDL 55 09/09/2011    LDLCALC 66 03/27/2021    TRIG 52 03/27/2021     Recent Labs     03/26/21  1726   AST 21   ALT 29 LABALBU 3.5         EKG:   A. fib with RVR    ECHO:   There is concentric left ventricular hypertrophy. Ejection fraction is visually estimated to be 40-45%. Grade III diastolic dysfunction with elevated LV filling pressures. The left atrium is severely dilated. Severe AAS with  a mean pressure  gradient of 66mmHg. No evidence of significant aortic valve regurgitation. Dilated right ventricle. Right ventricular systolic function is mildly reduced . Estimated pulmonary artery systolic pressure is elevated at 56 mmHg assuming  a right atrial pressure of 15 mmHg. A hyperechoic structure is seen in liver pushing on IVC. Corornary angiogram  & Intervention: 2/21  1. Successful balloon aortic valvuloplasty. 2.  Nonobstructive coronary artery disease. 3.  Aortic valve gradient was 41.1 mmHg. Post valvuloplasty AVG was 30 mmHg.     ASSESSMENT AND PLAN:        Bradycardia  Patient's was getting physical therapy when the therapist noticed the heart rate dropping into the 30s  The patient has chronic A. fib and is on amiodarone Cardizem Coreg  It is uncertain if the heart rate was truly in the 30s or it was machine reading variable heart rate  He does not appear to me that she was symptomatic with dizziness  I am therefore not concerned about bradycardia  Since the patient has chronic A. fib I would like to simplify her medications  I will switch her Toprol  mg daily  I plan to discontinue Coreg and Cardizem Cardizem will exacerbate her lower extremity edema  She is on amiodarone for unclear reasons; should be addressed by primary cardiologist down the road    Lower extremity edema  A number of possible causes including use of gabapentin Cardizem right-sided failure  Nephrology is following and giving the patient high-dose Lasix      The patient does not have obstructive coronary disease  At age 80 does not need atorvastatin  It will increase amiodarone levels            HANNAH Sun M.D  3/27/2021

## 2021-03-28 LAB
ALBUMIN SERPL-MCNC: 2.9 G/DL (ref 3.4–5)
ANION GAP SERPL CALCULATED.3IONS-SCNC: 8 MMOL/L (ref 3–16)
BASE EXCESS VENOUS: 7.7 MMOL/L
BUN BLDV-MCNC: 31 MG/DL (ref 7–20)
CALCIUM SERPL-MCNC: 9.1 MG/DL (ref 8.3–10.6)
CARBOXYHEMOGLOBIN: 1.8 %
CHLORIDE BLD-SCNC: 94 MMOL/L (ref 99–110)
CO2: 30 MMOL/L (ref 21–32)
CREAT SERPL-MCNC: 2 MG/DL (ref 0.6–1.2)
GFR AFRICAN AMERICAN: 29
GFR NON-AFRICAN AMERICAN: 24
GLUCOSE BLD-MCNC: 106 MG/DL (ref 70–99)
GLUCOSE BLD-MCNC: 135 MG/DL (ref 70–99)
GLUCOSE BLD-MCNC: 148 MG/DL (ref 70–99)
GLUCOSE BLD-MCNC: 184 MG/DL (ref 70–99)
GLUCOSE BLD-MCNC: 85 MG/DL (ref 70–99)
HCO3 VENOUS: 35 MMOL/L (ref 23–29)
INR BLD: 2.28 (ref 0.86–1.14)
MAGNESIUM: 2.3 MG/DL (ref 1.8–2.4)
METHEMOGLOBIN VENOUS: 0.7 %
O2 CONTENT, VEN: 4 ML/DL
O2 SAT, VEN: 26 %
O2 THERAPY: ABNORMAL
PCO2, VEN: 59.1 MMHG (ref 40–50)
PERFORMED ON: ABNORMAL
PH VENOUS: 7.38 (ref 7.35–7.45)
PHOSPHORUS: 3.3 MG/DL (ref 2.5–4.9)
PO2, VEN: 20 MMHG
POTASSIUM SERPL-SCNC: 4.8 MMOL/L (ref 3.5–5.1)
PROTHROMBIN TIME: 26.7 SEC (ref 10–13.2)
SODIUM BLD-SCNC: 132 MMOL/L (ref 136–145)
TCO2 CALC VENOUS: 36 MMOL/L

## 2021-03-28 PROCEDURE — 80069 RENAL FUNCTION PANEL: CPT

## 2021-03-28 PROCEDURE — 6360000002 HC RX W HCPCS: Performed by: INTERNAL MEDICINE

## 2021-03-28 PROCEDURE — 94761 N-INVAS EAR/PLS OXIMETRY MLT: CPT

## 2021-03-28 PROCEDURE — 2700000000 HC OXYGEN THERAPY PER DAY

## 2021-03-28 PROCEDURE — 2580000003 HC RX 258: Performed by: INTERNAL MEDICINE

## 2021-03-28 PROCEDURE — 6370000000 HC RX 637 (ALT 250 FOR IP): Performed by: INTERNAL MEDICINE

## 2021-03-28 PROCEDURE — 99233 SBSQ HOSP IP/OBS HIGH 50: CPT | Performed by: INTERNAL MEDICINE

## 2021-03-28 PROCEDURE — 85610 PROTHROMBIN TIME: CPT

## 2021-03-28 PROCEDURE — 83735 ASSAY OF MAGNESIUM: CPT

## 2021-03-28 PROCEDURE — 36415 COLL VENOUS BLD VENIPUNCTURE: CPT

## 2021-03-28 PROCEDURE — 82803 BLOOD GASES ANY COMBINATION: CPT

## 2021-03-28 PROCEDURE — 2060000000 HC ICU INTERMEDIATE R&B

## 2021-03-28 RX ORDER — WARFARIN SODIUM 2.5 MG/1
2.5 TABLET ORAL
Status: COMPLETED | OUTPATIENT
Start: 2021-03-28 | End: 2021-03-28

## 2021-03-28 RX ADMIN — DIPHENHYDRAMINE HCL 25 MG: 25 TABLET ORAL at 22:21

## 2021-03-28 RX ADMIN — PANTOPRAZOLE SODIUM 40 MG: 40 TABLET, DELAYED RELEASE ORAL at 08:59

## 2021-03-28 RX ADMIN — PREDNISONE 15 MG: 5 TABLET ORAL at 22:21

## 2021-03-28 RX ADMIN — POTASSIUM CHLORIDE 20 MEQ: 1500 TABLET, EXTENDED RELEASE ORAL at 08:59

## 2021-03-28 RX ADMIN — GLIPIZIDE 5 MG: 5 TABLET ORAL at 18:28

## 2021-03-28 RX ADMIN — WARFARIN SODIUM 2.5 MG: 2.5 TABLET ORAL at 18:28

## 2021-03-28 RX ADMIN — Medication 2000 UNITS: at 08:59

## 2021-03-28 RX ADMIN — LATANOPROST 1 DROP: 50 SOLUTION OPHTHALMIC at 22:20

## 2021-03-28 RX ADMIN — INSULIN GLARGINE 10 UNITS: 100 INJECTION, SOLUTION SUBCUTANEOUS at 22:21

## 2021-03-28 RX ADMIN — METOPROLOL SUCCINATE 100 MG: 50 TABLET, EXTENDED RELEASE ORAL at 08:59

## 2021-03-28 RX ADMIN — AMIODARONE HYDROCHLORIDE 200 MG: 200 TABLET ORAL at 08:59

## 2021-03-28 RX ADMIN — FUROSEMIDE 5 MG/HR: 10 INJECTION, SOLUTION INTRAMUSCULAR; INTRAVENOUS at 16:33

## 2021-03-28 RX ADMIN — Medication 3 MG: at 22:21

## 2021-03-28 RX ADMIN — MULTIPLE VITAMINS W/ MINERALS TAB 1 TABLET: TAB at 08:59

## 2021-03-28 RX ADMIN — INSULIN LISPRO 1 UNITS: 100 INJECTION, SOLUTION INTRAVENOUS; SUBCUTANEOUS at 12:38

## 2021-03-28 RX ADMIN — INSULIN LISPRO 1 UNITS: 100 INJECTION, SOLUTION INTRAVENOUS; SUBCUTANEOUS at 22:21

## 2021-03-28 RX ADMIN — SODIUM CHLORIDE, PRESERVATIVE FREE 10 ML: 5 INJECTION INTRAVENOUS at 09:00

## 2021-03-28 RX ADMIN — CETIRIZINE HYDROCHLORIDE 10 MG: 10 TABLET, FILM COATED ORAL at 09:00

## 2021-03-28 RX ADMIN — GLIPIZIDE 5 MG: 5 TABLET ORAL at 08:59

## 2021-03-28 RX ADMIN — PREDNISONE 20 MG: 20 TABLET ORAL at 09:00

## 2021-03-28 NOTE — PROGRESS NOTES
Nephrology Progress Note  [phone]   [email or web address].        Reason for Consult:  CKD    HISTORY OF PRESENT ILLNESS:                This is a patient with significant past medical history of CKD stage 4 baseline SCr high 1, low 2 range, CHF, AS, s/p valuloplasty, DM2. A.fib, HTN, DVT who presented with to ER after noted to have bradycardia with HR to 30's. She also reports, SOB, weight gain, increased BLE edema. She was seen by Dr. Anson Lion on 3/17-dose of torsemide increased from 40mg to 40mg qam and 20mg qpm that visit. She is started lasix 80mg IV bid since this am.  UO is good, BP is stable. Scr is stable. Wt is down by 3#. She denies N/V/CP/f/c/LH/dizziness.  -family at bedside-states compliant with diuretics  -had diffuse pruritic rash that is getting better-no new meds added      Subjective:  -pt seen and examined  -PMSHx and meds reviewed  -family at bedside    BP relatively low  UO 825ON  Rash is improving    ROS: neg chest pain/SOB    PHYSICAL EXAM:    Vitals:    BP (!) 98/59   Pulse 85   Temp 97.7 °F (36.5 °C) (Oral)   Resp 18   Ht 5' 6\" (1.676 m)   Wt 265 lb 3.4 oz (120.3 kg)   LMP  (LMP Unknown)   SpO2 99%   BMI 42.81 kg/m²   I/O last 3 completed shifts: In: 600 [P.O.:600]  Out: 825 [Urine:825]  I/O this shift:  In: -   Out: 200 [Urine:200]    Physical Exam:  Gen: Resting in bed, NAD.  obese  HEENT: anicteric, NC/AT  CV: +S1/S2, RRR  Lungs: Good respiratory effort, diminished in bases  Abd: S/NT +BS-obese  Ext: ++edema, no cyanosis, bilateral chronic venous stasis skin changs  Skin: Warm. Diffuse rash clearing  : No TTP over bladder, nondistended. Neuro: Alert and oriented x 3, nonfocal.  Joints: No erythema noted over joints.     DATA:    CBC:   Lab Results   Component Value Date    WBC 4.7 03/26/2021    RBC 3.66 03/26/2021    HGB 11.0 03/26/2021    HCT 33.7 03/26/2021    MCV 92.1 03/26/2021    MCH 30.0 03/26/2021    MCHC 32.5 03/26/2021    RDW 22.4 03/26/2021     03/26/2021    MPV 8.0 03/26/2021     BMP:    Lab Results   Component Value Date     03/28/2021    K 4.8 03/28/2021    K 4.7 02/07/2021    CL 94 03/28/2021    CO2 30 03/28/2021    BUN 31 03/28/2021    LABALBU 2.9 03/28/2021    CREATININE 2.0 03/28/2021    CALCIUM 9.1 03/28/2021    GFRAA 29 03/28/2021    GFRAA 51 12/20/2012    LABGLOM 24 03/28/2021    GLUCOSE 85 03/28/2021       IMPRESSION/RECOMMENDATIONS:      CKD stage 4: baseline SCr now ranging from 1.8-2.2-sees Dr. Marie Lau, presumed DN/HTN NS/DAVION on CKD/chronic hypoperfusion  -SCr is stable and at baseline  -continue IV lasix for now for volume management    FEN:   Low Mg: replaced-monitor with diuresis    CHF: hypervolemic  -lasix gtt as above    AS:  -considering TAVR in future-h/o valvuloplasty  -per cardiology  -continue lasix for now    HTN: BP relatively low  -monitor on diuresis  -on metoprolol for A.fib    H/o A.fib: RVR-on amiodarone, toprol/coumadin    Anemia:  -hgb is 11.0-no ZARINA indicated    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 I will continue to follow along. Please call with questions.     Mary Almonte MD

## 2021-03-28 NOTE — H&P
[address] [address]                              HISTORY AND PHYSICAL    PATIENT NAME: Arslan Mark                    :        1934  MED REC NO:   [id number]                          ROOM:       5108  ACCOUNT NO:   [de-identified]                           ADMIT DATE: 2021  PROVIDER:     Jae Kerns MD    CHIEF COMPLAINT:  Shortness of breath. 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 Unfortunately, her  passed away about a month  ago from heart disease. She has been grieving since then. The patient  is known to have chronic congestive heart failure, chronic atrial  fibrillation, and had significant aortic stenosis treated in 2021  with balloon valvuloplasty. The patient is currently being considered  for TAVR. The patient has previously been hospitalized for increased  congestive heart failure. Present illness began several days ago with  increased shortness of breath at rest as well as increased dependent leg  edema up to her thighs. This was attempted to be managed in the home  setting by increasing her oral torsemide. Unfortunately, the shortness  of breath and edema persisted. The family contacted her nephrologist,  Dr. Doris Ace, and he recommended transportation to the emergency room at  [facility] for further diagnosis and treatment. The patient and family  agreed to this and she was brought to the emergency room at [facility].   Emergency room evaluation included a chemistry profile that showed a  sodium 124, potassium of 4.6, BUN of 31, creatinine of 1.8 which were  fairly baseline values in this patient. GFR was calculated at 32. Random glucose was 240. ProBNP was markedly elevated at 16,780.    Troponin was slightly elevated to 0.02, probably on the scattered ecchymoses and some scattered areas of erythema. HEENT:  Eyes show intraocular lens implants. Mucous membranes are  slightly pale. NECK:  Thyroid was firm and enlarged. This is chronic in her case. CHEST:  Chest wall diameter was increased. Chest wall motion was  decreased due to age. Examination of the lungs revealed some bibasilar  rales. No rhonchi. No wheezes. CARDIAC:  IIR&R  There was a grade 2 aortic valve systolic murmur. ABDOMEN:  No mass, organomegaly or tenderness. Bowel sounds present. PELVIC/RECTAL/BREASTS:  Exams deferred. EXTREMITIES:  Trace bilateral brawny lower leg edema with changes of  stasis dermatitis. There were bilateral total knee replacements. 1+_dorsalis pedis pulses. There were no open areas on her feet. NEUROLOGICAL:  Examination with the patient lying in bed revealed some  generalized weakness without focal weakness. There was decreased  sensation to touch in both feet. Station and gait could not be tested. IMPRESSION:  1. Acute on chronic congestive heart failure. 2.  Ischemic cardiomyopathy. 3.  Chronic atrial fibrillation. 4.  Elevated troponin. 5.  Type 2 diabetes mellitus, uncontrolled. 6.  Hypertension. 7.  Chronic Coumadin therapy. 8.  Generalized osteoarthritis. 9.  Grief. 10.  Chronic kidney disease due to age. T2DM, HTN. 11.  Goiter  12.  AS and AI      Danny Leggett MD    D: 03/27/2021 11:36:43       T: 03/27/2021 15:28:31     [email or web address]  Job#: [id number]     Doc#: [id number]

## 2021-03-28 NOTE — PROGRESS NOTES
Odilia Lebron is a 80 y.o. female patient.     Current Facility-Administered Medications   Medication Dose Route Frequency Provider Last Rate Last Admin    warfarin (COUMADIN) tablet 2.5 mg  2.5 mg Oral Once Fish Quintana MD        [Held by provider] allopurinol (ZYLOPRIM) tablet 200 mg  200 mg Oral Daily Fish Quintana MD   200 mg at 03/27/21 1036    amiodarone (CORDARONE) tablet 200 mg  200 mg Oral Daily Fish Quintana MD   200 mg at 03/28/21 0859    cetirizine (ZYRTEC) tablet 10 mg  10 mg Oral Daily Fish Quintana MD   10 mg at 03/28/21 0900    Vitamin D (CHOLECALCIFEROL) tablet 2,000 Units  2,000 Units Oral Daily Fish Quintana MD   2,000 Units at 03/28/21 0859    glipiZIDE (GLUCOTROL) tablet 5 mg  5 mg Oral BID AC Fish Quintana MD   5 mg at 03/28/21 0859    latanoprost (XALATAN) 0.005 % ophthalmic solution 1 drop  1 drop Right Eye Nightly Fish Quintana MD   1 drop at 03/27/21 2202    therapeutic multivitamin-minerals 1 tablet  1 tablet Oral Daily Fish Quintana MD   1 tablet at 03/28/21 0859    pantoprazole (PROTONIX) tablet 40 mg  40 mg Oral Daily Fish Quintana MD   40 mg at 03/28/21 0859    0.9 % sodium chloride infusion  25 mL Intravenous PRN Fish Quintana MD        ondansetron [facility]) injection 4 mg  4 mg Intravenous Q6H PRN Fish Quintana MD        polyethylene glycol [facility]) packet 17 g  17 g Oral Daily PRN Fish Quintana MD        acetaminophen (TYLENOL) tablet 650 mg  650 mg Oral Q6H PRN Fish Quintana MD        Or   [facility] acetaminophen (TYLENOL) suppository 650 mg  650 mg Rectal Q6H PRN Fish Quintana MD        potassium chloride (KLOR-CON M) extended release tablet 40 mEq  40 mEq Oral PRN Fish Quintana MD        Or    potassium bicarb-citric acid (EFFER-K) effervescent tablet 40 mEq  40 mEq Oral PRN Fish Quintana MD        Or    potassium chloride 10 mEq/100 mL IVPB (Peripheral Line)  10 mEq Intravenous PRN Rubin Topete MD        magnesium sulfate 2000 mg in 50 mL IVPB premix  2,000 mg Intravenous PRN Rubin Topete MD   Stopped at 03/27/21 1659    traMADol (ULTRAM) tablet 50 mg  50 mg Oral Q6H PRN Rubin Topete MD        insulin lispro (HUMALOG) injection vial 0-6 Units  0-6 Units Subcutaneous TID WC Rubin Topete MD   1 Units at 03/28/21 1238    insulin lispro (HUMALOG) injection vial 0-3 Units  0-3 Units Subcutaneous Nightly Rubin Topete MD   1 Units at 03/27/21 2159    LORazepam (ATIVAN) tablet 0.5 mg  0.5 mg Oral Q4H PRN Rubin Topete MD        prochlorperazine (COMPAZINE) tablet 5 mg  5 mg Oral Q6H PRN Rubin Topete MD        potassium chloride (KLOR-CON M) extended release tablet 20 mEq  20 mEq Oral Daily Rubin Topete MD   20 mEq at 03/28/21 9878    warfarin (COUMADIN) daily dosing (placeholder)   Other RX Placeholder Rubin Topete MD   Given at 03/27/21 0113    glucose (GLUTOSE) 40 % oral gel 15 g  15 g Oral PRN Rubin Topete MD        dextrose 50 % IV solution  12.5 g Intravenous PRN Rubin Topete MD        glucagon (rDNA) injection 1 mg  1 mg Intramuscular PRN Rubin Topete MD        dextrose 5 % solution  100 mL/hr Intravenous PRN Rubin Topete MD        lidocaine PF 1 % injection 5 mL  5 mL Intradermal Once Rubin Topete MD        sodium chloride flush 0.9 % injection 10 mL  10 mL Intravenous 2 times per day Rubin Topete MD   10 mL at 03/28/21 0900    sodium chloride flush 0.9 % injection 10 mL  10 mL Intravenous PRN Rbuin Topete MD        melatonin tablet 3 mg  3 mg Oral Nightly PRN Rubin Topete MD        triamcinolone (KENALOG) 0.1 % cream   Topical BID PRN Rubin Topete MD   Given at 03/27/21 1415    metoprolol succinate (TOPROL XL) extended release tablet 100 mg  100 mg Oral Daily Jacey Flores MD   100 mg at 03/28/21 0859    diphenhydrAMINE (BENADRYL) tablet 25 mg  25 mg Oral Q6H PRN Shannan Flores MD   25 mg at 03/27/21 1805    predniSONE (DELTASONE) tablet 20 mg  20 mg Oral BID Shannan Flores MD   20 mg at 03/28/21 0900    insulin glargine (LANTUS) injection vial 10 Units  10 Units Subcutaneous Nightly Shannan Flores MD   10 Units at 03/27/21 2159    furosemide (LASIX) 100 mg in dextrose 5 % 100 mL infusion  5 mg/hr Intravenous Continuous Mudariana Sullivan MD 5 mL/hr at 03/27/21 2159 5 mg/hr at 03/27/21 2159     Allergies   Allergen Reactions    Naproxen Itching    Bactrim [Sulfamethoxazole-Trimethoprim]     Levofloxacin     Pcn [Penicillins]     Sulfa Antibiotics      Active Problems:    Acute on chronic combined systolic and diastolic congestive heart failure (HCC)    CHF (congestive heart failure), NYHA class I, acute on chronic, combined ([address]    Acute on chronic congestive heart failure ([address]  Resolved Problems:    * No resolved hospital problems. *    Blood pressure 113/72, pulse 83, temperature 97.5 °F (36.4 °C), temperature source Oral, resp. rate 18, height 5' 6\" (1.676 m), weight 265 lb 3.4 oz (120.3 kg), SpO2 94 %, not currently breastfeeding. Subjective Rash and itching better with steroids. D/Cd allopurinol D/T rash. Objective  Weight up but looks less efedmatous. CTPA , IIR&R with M, trace brawny edema, generalized red rash fading. Assessment & Plan Some sort or drug rash, less CHF, increased BUN D/T diuresis. Family anticipating D/C to home setting with home care.     Shannan Flores MD  3/28/2021

## 2021-03-28 NOTE — PROGRESS NOTES
[address]  Cardiology Consult Note        CC:     Acute on chronic heart failure            HPI:   Ursula Thomas is a 80 y.o. female who has severe symptomatic aortic stenosis and is status post valvuloplasty was found to have bradycardia while getting physical therapy. The attendant noticed the heart rate was in his 35s and called her nurse. The patient also had marked lower extremity edema and has been short of breath.     She sees Dr. Tiana Flores for chronic renal failure; there was some confusion about the dose of Lasix when she was discharged from the hospital.  The patient was on 40 mg when she should have been on 60 mg this was addressed last week during the visit to the nephrologist.        Past Medical History:   Diagnosis Date    Anemia     Atrial fibrillation ([address]     Chronic kidney disease     Combined systolic and diastolic congestive heart failure ([address]     Diabetes mellitus ([address]     DVT (deep venous thrombosis) ([address]     Hyperlipidemia     Hypertension     Osteoarthritis     PAF (paroxysmal atrial fibrillation) ([address]     Pulmonary embolism ([address]     Sarcoidosis     in remission     Thyroid disease     Hypothyroidism    Type II or unspecified type diabetes mellitus without mention of complication, not stated as uncontrolled       Past Surgical History:   Procedure Laterality Date    HYSTERECTOMY      INTRACAPSULAR CATARACT EXTRACTION Left 10/16/2020    PHACOEMULSIFICATION WITH INTRAOCULAR LENS IMPLANT - LEFT EYE performed by Sara Crespo MD at [facility] Bilateral     TKT    JOINT REPLACEMENT Bilateral     THR    KNEE SURGERY      bilateral total knees      Family History   Problem Relation Age of Onset    Heart Failure Mother     Cancer Brother     Asthma Neg Hx     Diabetes Neg Hx     Emphysema Neg Hx     Hypertension Neg Hx       Social History     Tobacco Use    Smoking status: Never Smoker    Smokeless tobacco: Never Used   [facility] Tobacco comment: Never smoked   Substance Use Topics    Alcohol use: No    Drug use: No     Allergies   Allergen Reactions    Naproxen Itching    Bactrim [Sulfamethoxazole-Trimethoprim]     Levofloxacin     Pcn [Penicillins]     Sulfa Antibiotics       warfarin  2.5 mg Oral Once    [Held by provider] allopurinol  200 mg Oral Daily    amiodarone  200 mg Oral Daily    cetirizine  10 mg Oral Daily    Vitamin D  2,000 Units Oral Daily    glipiZIDE  5 mg Oral BID AC    latanoprost  1 drop Right Eye Nightly    therapeutic multivitamin-minerals  1 tablet Oral Daily    pantoprazole  40 mg Oral Daily    insulin lispro  0-6 Units Subcutaneous TID WC    insulin lispro  0-3 Units Subcutaneous Nightly    potassium chloride  20 mEq Oral Daily    warfarin (COUMADIN) daily dosing (placeholder)   Other RX Placeholder    lidocaine 1 % injection  5 mL Intradermal Once    sodium chloride flush  10 mL Intravenous 2 times per day    metoprolol succinate  100 mg Oral Daily    predniSONE  20 mg Oral BID    insulin glargine  10 Units Subcutaneous Nightly       Review of Systems -   Constitutional: Negative for weight gain/loss; malaise, fever  Respiratory: Negative for Asthma;  cough and hemoptysis  Cardiovascular: Negative for palpitations,dizziness   Gastrointestinal: Negative for abd.pain; constipation/diarrhea;    Genitourinary: Negative for stones; hematuria; frequency hesitancy  Integumentt: Negative for rash or pruritis  Hematologic/lymphatic: Negative for blood dyscrasia; leukemia/lymphoma  Musculoskeletal: Negative for Connective tissue disease  Neurological:  Negative for Seizure   Behavioral/Psych:Negative for Bipolar disorder, Schizophrenia; Dementia  Endocrine: negative for thyroid, parathyroid disease      Intake/Output Summary (Last 24 hours) at 3/28/2021 1216  Last data filed at 3/28/2021 0913  Gross per 24 hour   Intake 240 ml   Output 1025 ml   Net -785 ml       Physical Examination:    /72 Pulse 83   Temp 97.5 °F (36.4 °C) (Oral)   Resp 18   Ht 5' 6\" (1.676 m)   Wt 265 lb 3.4 oz (120.3 kg)   LMP  (LMP Unknown)   SpO2 94%   BMI 42.81 kg/m²    HEENT:  Face: Atraumatic, Conjunctiva: Pink; non icteric,  Mucous Memb:  Moist, No thyromegaly or Lymphadenopathy  Respiratory:  Resp Assessment: normal, Resp Auscultation: clear   Cardiovascular: Auscultation: nl S1 & S2, Palpation:  Nl PMI;  No heaves or thrills, JVP:  Normal  Systolic murmur aortic area  Abdomen: Soft, non-tender, Normal bowel sounds,  No organomegaly  Extremities: No Cyanosis or Clubbing; Edema 2+  Neurological: Oriented to time, place, and person, Non-anxious  Psychiatric: Normal mood and affect  Skin: Warm and dry,  No rash seen      Current Facility-Administered Medications: warfarin (COUMADIN) tablet 2.5 mg, 2.5 mg, Oral, Once  [Held by provider] allopurinol (ZYLOPRIM) tablet 200 mg, 200 mg, Oral, Daily  amiodarone (CORDARONE) tablet 200 mg, 200 mg, Oral, Daily  cetirizine (ZYRTEC) tablet 10 mg, 10 mg, Oral, Daily  Vitamin D (CHOLECALCIFEROL) tablet 2,000 Units, 2,000 Units, Oral, Daily  glipiZIDE (GLUCOTROL) tablet 5 mg, 5 mg, Oral, BID AC  latanoprost (XALATAN) 0.005 % ophthalmic solution 1 drop, 1 drop, Right Eye, Nightly  therapeutic multivitamin-minerals 1 tablet, 1 tablet, Oral, Daily  pantoprazole (PROTONIX) tablet 40 mg, 40 mg, Oral, Daily  0.9 % sodium chloride infusion, 25 mL, Intravenous, PRN  [DISCONTINUED] promethazine (PHENERGAN) tablet 12.5 mg, 12.5 mg, Oral, Q6H PRN **OR** ondansetron (ZOFRAN) injection 4 mg, 4 mg, Intravenous, Q6H PRN  polyethylene glycol (GLYCOLAX) packet 17 g, 17 g, Oral, Daily PRN  acetaminophen (TYLENOL) tablet 650 mg, 650 mg, Oral, Q6H PRN **OR** acetaminophen (TYLENOL) suppository 650 mg, 650 mg, Rectal, Q6H PRN  potassium chloride (KLOR-CON M) extended release tablet 40 mEq, 40 mEq, Oral, PRN **OR** potassium bicarb-citric acid (EFFER-K) effervescent tablet 40 mEq, 40 mEq, Oral, PRN **OR** potassium chloride 10 mEq/100 mL IVPB (Peripheral Line), 10 mEq, Intravenous, PRN  magnesium sulfate 2000 mg in 50 mL IVPB premix, 2,000 mg, Intravenous, PRN  traMADol (ULTRAM) tablet 50 mg, 50 mg, Oral, Q6H PRN  insulin lispro (HUMALOG) injection vial 0-6 Units, 0-6 Units, Subcutaneous, TID WC  insulin lispro (HUMALOG) injection vial 0-3 Units, 0-3 Units, Subcutaneous, Nightly  LORazepam (ATIVAN) tablet 0.5 mg, 0.5 mg, Oral, Q4H PRN  prochlorperazine (COMPAZINE) tablet 5 mg, 5 mg, Oral, Q6H PRN  potassium chloride (KLOR-CON M) extended release tablet 20 mEq, 20 mEq, Oral, Daily  warfarin (COUMADIN) daily dosing (placeholder), , Other, RX Placeholder  glucose (GLUTOSE) 40 % oral gel 15 g, 15 g, Oral, PRN  dextrose 50 % IV solution, 12.5 g, Intravenous, PRN  glucagon (rDNA) injection 1 mg, 1 mg, Intramuscular, PRN  dextrose 5 % solution, 100 mL/hr, Intravenous, PRN  lidocaine PF 1 % injection 5 mL, 5 mL, Intradermal, Once  sodium chloride flush 0.9 % injection 10 mL, 10 mL, Intravenous, 2 times per day  sodium chloride flush 0.9 % injection 10 mL, 10 mL, Intravenous, PRN  melatonin tablet 3 mg, 3 mg, Oral, Nightly PRN  triamcinolone (KENALOG) 0.1 % cream, , Topical, BID PRN  metoprolol succinate (TOPROL XL) extended release tablet 100 mg, 100 mg, Oral, Daily  diphenhydrAMINE (BENADRYL) tablet 25 mg, 25 mg, Oral, Q6H PRN  predniSONE (DELTASONE) tablet 20 mg, 20 mg, Oral, BID  insulin glargine (LANTUS) injection vial 10 Units, 10 Units, Subcutaneous, Nightly  furosemide (LASIX) 100 mg in dextrose 5 % 100 mL infusion, 5 mg/hr, Intravenous, Continuous      Labs:   Recent Labs     03/26/21  1726   WBC 4.7   HGB 11.0*   HCT 33.7*        Recent Labs     03/27/21  0417 03/28/21  0428   * 132*   K 4.4 4.8   CO2 32 30   BUN 31* 31*   CREATININE 1.8* 2.0*   GLUCOSE 159* 85     No results for input(s): BNP in the last 72 hours.   Recent Labs     03/27/21 0417 03/28/21  0428   PROTIME 29.4* 26.7*   INR 2.51* 2.28* No results for input(s): APTT in the last 72 hours. Recent Labs     03/26/21  1726   TROPONINI 0.02*     Lab Results   Component Value Date    HDL 41 03/27/2021    HDL 55 09/09/2011    LDLCALC 66 03/27/2021    TRIG 52 03/27/2021     Recent Labs     03/26/21  1726 03/28/21  0428   AST 21  --    ALT 29  --    LABALBU 3.5 2.9*         EKG:   A. fib with RVR    ECHO:   There is concentric left ventricular hypertrophy. Ejection fraction is visually estimated to be 40-45%. Grade III diastolic dysfunction with elevated LV filling pressures. The left atrium is severely dilated. Severe AAS with  a mean pressure  gradient of 66mmHg. No evidence of significant aortic valve regurgitation. Dilated right ventricle. Right ventricular systolic function is mildly reduced . Estimated pulmonary artery systolic pressure is elevated at 56 mmHg assuming  a right atrial pressure of 15 mmHg. A hyperechoic structure is seen in liver pushing on IVC. Corornary angiogram  & Intervention: 2/21  1. Successful balloon aortic valvuloplasty. 2.  Nonobstructive coronary artery disease. 3.  Aortic valve gradient was 41.1 mmHg. Post valvuloplasty AVG was 30 mmHg.     ASSESSMENT AND PLAN:        Bradycardia  Patient's was getting physical therapy when the therapist noticed the heart rate dropping into the 30s  The patient has chronic A. fib and is on amiodarone Cardizem Coreg  It is uncertain if the heart rate was truly in the 30s or it was machine reading variable heart rate  she was symptomatic with dizziness, I am therefore not concerned about bradycardia  Since the patient has chronic A. fib I would like to simplify her medications  I will switch her Toprol  mg daily  I plan to discontinue Coreg and Cardizem;  Cardizem will exacerbate her lower extremity edema  She is on amiodarone for unclear reasons; should be addressed by primary cardiologist down the road    Lower extremity edema  A number of possible causes including use of gabapentin Cardizem right-sided failure  Nephrology is following and giving the patient high-dose Lasix      The patient does not have obstructive coronary disease  At age 80 does not need atorvastatin  It will increase amiodarone levels      Severe aortic stenosis  Status post balloon aortic valvuloplasty recently  Aortic valve gradient was 41.1 mmHg. Post valvuloplasty AVG was 30 mmHg.       Abebe Wu M.D  3/28/2021                    Abebe Wu M.D  3/28/2021

## 2021-03-28 NOTE — CONSULTS
Clinical Pharmacy Note  Warfarin Consult    Pop Mehta is a 80 y.o. female receiving warfarin managed by pharmacy. Patient being bridged with none. Warfarin Indication: Afib  Target INR range: 2-3   Dose prior to admission: 2.5 mg daily    Current warfarin drug-drug interactions: Prednisone, Amiodarone (home med)    Recent Labs     03/26/21  1726 03/27/21  0417 03/28/21  0428   HGB 11.0*  --   --    HCT 33.7*  --   --    INR 2.78* 2.51* 2.28*       Assessment/Plan:  Will give normal scheduled home dose tonight. Warfarin 2.5 mg tonight. Daily PT/INR until stable within therapeutic range. Thank you for the consult. Will continue to follow.   Hannah Horta, [address] 3/28/2021 8:06 AM

## 2021-03-28 NOTE — PLAN OF CARE
Problem: Falls - Risk of:  Goal: Absence of physical injury  Description: Absence of physical injury  3/28/2021 0458 by Katie Torres RN  Outcome: Ongoing  3/28/2021 0439 by Katie Torres RN  Outcome: Ongoing     Problem: HEMODYNAMIC STATUS  Goal: Patient has stable vital signs and fluid balance  3/28/2021 0458 by Katie Torres RN  Outcome: Ongoing  3/28/2021 0439 by Katie Torres RN  Outcome: Ongoing     Problem: FLUID AND ELECTROLYTE IMBALANCE  Goal: Fluid and electrolyte balance are achieved/maintained  3/28/2021 0458 by Katie Torres RN  Outcome: Ongoing  3/28/2021 0439 by Katie Torres RN  Outcome: Ongoing     Problem: ACTIVITY INTOLERANCE/IMPAIRED MOBILITY  Goal: Mobility/activity is maintained at optimum level for patient  3/28/2021 0458 by Katie Torres RN  Outcome: Ongoing  3/28/2021 0439 by Katie Torres RN  Outcome: Ongoing

## 2021-03-29 LAB
ALBUMIN SERPL-MCNC: 2.6 G/DL (ref 3.4–5)
ANION GAP SERPL CALCULATED.3IONS-SCNC: 9 MMOL/L (ref 3–16)
BUN BLDV-MCNC: 39 MG/DL (ref 7–20)
CALCIUM SERPL-MCNC: 9 MG/DL (ref 8.3–10.6)
CHLORIDE BLD-SCNC: 94 MMOL/L (ref 99–110)
CO2: 27 MMOL/L (ref 21–32)
CREAT SERPL-MCNC: 2.1 MG/DL (ref 0.6–1.2)
CREATININE URINE: 83.8 MG/DL (ref 28–259)
EOSINOPHIL,URINE: NORMAL
GFR AFRICAN AMERICAN: 27
GFR NON-AFRICAN AMERICAN: 22
GLUCOSE BLD-MCNC: 160 MG/DL (ref 70–99)
GLUCOSE BLD-MCNC: 168 MG/DL (ref 70–99)
GLUCOSE BLD-MCNC: 249 MG/DL (ref 70–99)
GLUCOSE BLD-MCNC: 261 MG/DL (ref 70–99)
GLUCOSE BLD-MCNC: 278 MG/DL (ref 70–99)
INR BLD: 2.46 (ref 0.86–1.14)
MAGNESIUM: 2.2 MG/DL (ref 1.8–2.4)
PERFORMED ON: ABNORMAL
PHOSPHORUS: 4.1 MG/DL (ref 2.5–4.9)
POTASSIUM SERPL-SCNC: 4.7 MMOL/L (ref 3.5–5.1)
PRO-BNP: ABNORMAL PG/ML (ref 0–449)
PROTEIN PROTEIN: 45 MG/DL
PROTHROMBIN TIME: 28.8 SEC (ref 10–13.2)
SODIUM BLD-SCNC: 130 MMOL/L (ref 136–145)

## 2021-03-29 PROCEDURE — 6370000000 HC RX 637 (ALT 250 FOR IP): Performed by: INTERNAL MEDICINE

## 2021-03-29 PROCEDURE — 36415 COLL VENOUS BLD VENIPUNCTURE: CPT

## 2021-03-29 PROCEDURE — 97535 SELF CARE MNGMENT TRAINING: CPT

## 2021-03-29 PROCEDURE — 83735 ASSAY OF MAGNESIUM: CPT

## 2021-03-29 PROCEDURE — 83880 ASSAY OF NATRIURETIC PEPTIDE: CPT

## 2021-03-29 PROCEDURE — 84156 ASSAY OF PROTEIN URINE: CPT

## 2021-03-29 PROCEDURE — 97530 THERAPEUTIC ACTIVITIES: CPT | Performed by: PHYSICAL THERAPIST

## 2021-03-29 PROCEDURE — 80069 RENAL FUNCTION PANEL: CPT

## 2021-03-29 PROCEDURE — 94761 N-INVAS EAR/PLS OXIMETRY MLT: CPT

## 2021-03-29 PROCEDURE — 99233 SBSQ HOSP IP/OBS HIGH 50: CPT | Performed by: NURSE PRACTITIONER

## 2021-03-29 PROCEDURE — 6360000002 HC RX W HCPCS: Performed by: INTERNAL MEDICINE

## 2021-03-29 PROCEDURE — 97166 OT EVAL MOD COMPLEX 45 MIN: CPT

## 2021-03-29 PROCEDURE — 82570 ASSAY OF URINE CREATININE: CPT

## 2021-03-29 PROCEDURE — 87205 SMEAR GRAM STAIN: CPT

## 2021-03-29 PROCEDURE — 2580000003 HC RX 258: Performed by: INTERNAL MEDICINE

## 2021-03-29 PROCEDURE — 2060000000 HC ICU INTERMEDIATE R&B

## 2021-03-29 PROCEDURE — 2700000000 HC OXYGEN THERAPY PER DAY

## 2021-03-29 PROCEDURE — 85610 PROTHROMBIN TIME: CPT

## 2021-03-29 PROCEDURE — 97163 PT EVAL HIGH COMPLEX 45 MIN: CPT | Performed by: PHYSICAL THERAPIST

## 2021-03-29 RX ORDER — WARFARIN SODIUM 2.5 MG/1
2.5 TABLET ORAL
Status: COMPLETED | OUTPATIENT
Start: 2021-03-29 | End: 2021-03-29

## 2021-03-29 RX ORDER — PREDNISONE 10 MG/1
10 TABLET ORAL 2 TIMES DAILY
Status: DISCONTINUED | OUTPATIENT
Start: 2021-03-29 | End: 2021-03-31

## 2021-03-29 RX ADMIN — GLIPIZIDE 5 MG: 5 TABLET ORAL at 08:48

## 2021-03-29 RX ADMIN — INSULIN LISPRO 3 UNITS: 100 INJECTION, SOLUTION INTRAVENOUS; SUBCUTANEOUS at 16:47

## 2021-03-29 RX ADMIN — FUROSEMIDE 5 MG/HR: 10 INJECTION, SOLUTION INTRAMUSCULAR; INTRAVENOUS at 11:44

## 2021-03-29 RX ADMIN — GLIPIZIDE 5 MG: 5 TABLET ORAL at 16:39

## 2021-03-29 RX ADMIN — WARFARIN SODIUM 2.5 MG: 2.5 TABLET ORAL at 16:47

## 2021-03-29 RX ADMIN — PREDNISONE 10 MG: 10 TABLET ORAL at 21:29

## 2021-03-29 RX ADMIN — Medication 2000 UNITS: at 08:47

## 2021-03-29 RX ADMIN — PANTOPRAZOLE SODIUM 40 MG: 40 TABLET, DELAYED RELEASE ORAL at 06:19

## 2021-03-29 RX ADMIN — INSULIN LISPRO 1 UNITS: 100 INJECTION, SOLUTION INTRAVENOUS; SUBCUTANEOUS at 08:52

## 2021-03-29 RX ADMIN — INSULIN LISPRO 2 UNITS: 100 INJECTION, SOLUTION INTRAVENOUS; SUBCUTANEOUS at 12:42

## 2021-03-29 RX ADMIN — MULTIPLE VITAMINS W/ MINERALS TAB 1 TABLET: TAB at 08:48

## 2021-03-29 RX ADMIN — LATANOPROST 1 DROP: 50 SOLUTION OPHTHALMIC at 21:29

## 2021-03-29 RX ADMIN — AMIODARONE HYDROCHLORIDE 200 MG: 200 TABLET ORAL at 08:48

## 2021-03-29 RX ADMIN — INSULIN LISPRO 2 UNITS: 100 INJECTION, SOLUTION INTRAVENOUS; SUBCUTANEOUS at 21:30

## 2021-03-29 RX ADMIN — METOPROLOL SUCCINATE 100 MG: 50 TABLET, EXTENDED RELEASE ORAL at 08:48

## 2021-03-29 RX ADMIN — POTASSIUM CHLORIDE 20 MEQ: 1500 TABLET, EXTENDED RELEASE ORAL at 08:48

## 2021-03-29 RX ADMIN — PREDNISONE 15 MG: 5 TABLET ORAL at 08:48

## 2021-03-29 RX ADMIN — INSULIN GLARGINE 10 UNITS: 100 INJECTION, SOLUTION SUBCUTANEOUS at 21:30

## 2021-03-29 RX ADMIN — CETIRIZINE HYDROCHLORIDE 10 MG: 10 TABLET, FILM COATED ORAL at 08:48

## 2021-03-29 ASSESSMENT — PAIN SCALES - GENERAL: PAINLEVEL_OUTOF10: 2

## 2021-03-29 NOTE — PROGRESS NOTES
[place] is a 80 y.o. female patient.     Current Facility-Administered Medications   Medication Dose Route Frequency Provider Last Rate Last Admin    predniSONE (DELTASONE) tablet 15 mg  15 mg Oral BID Gela Kent MD   15 mg at 03/29/21 0848    [Held by provider] allopurinol (ZYLOPRIM) tablet 200 mg  200 mg Oral Daily Gela Kent MD   200 mg at 03/27/21 1036    amiodarone (CORDARONE) tablet 200 mg  200 mg Oral Daily Gela Kent MD   200 mg at 03/29/21 0848    cetirizine (ZYRTEC) tablet 10 mg  10 mg Oral Daily Gela Kent MD   10 mg at 03/29/21 0848    Vitamin D (CHOLECALCIFEROL) tablet 2,000 Units  2,000 Units Oral Daily Gela Kent MD   2,000 Units at 03/29/21 0847    glipiZIDE (GLUCOTROL) tablet 5 mg  5 mg Oral BID AC Gela Kent MD   5 mg at 03/29/21 0848    latanoprost (XALATAN) 0.005 % ophthalmic solution 1 drop  1 drop Right Eye Nightly Gela Kent MD   1 drop at 03/28/21 2220    therapeutic multivitamin-minerals 1 tablet  1 tablet Oral Daily Gela Kent MD   1 tablet at 03/29/21 0848    pantoprazole (PROTONIX) tablet 40 mg  40 mg Oral Daily Gela Kent MD   40 mg at 03/29/21 2722    0.9 % sodium chloride infusion  25 mL Intravenous PRN Gela Kent MD        ondansetron [facility]) injection 4 mg  4 mg Intravenous Q6H PRN Gela Kent MD        polyethylene glycol [facility]) packet 17 g  17 g Oral Daily PRN Gela Kent MD        acetaminophen (TYLENOL) tablet 650 mg  650 mg Oral Q6H PRN Gela Kent MD        Or   [facility] acetaminophen (TYLENOL) suppository 650 mg  650 mg Rectal Q6H PRN Gela Kent MD        potassium chloride (KLOR-CON M) extended release tablet 40 mEq  40 mEq Oral PRN Gela Kent MD        Or    potassium bicarb-citric acid (EFFER-K) effervescent tablet 40 mEq  40 mEq Oral PRN Gela Kent MD        Or    potassium chloride 10 mEq/100 mL IVPB (Peripheral Line)  10 mEq Intravenous PRN Jim Rodriguez MD        magnesium sulfate 2000 mg in 50 mL IVPB premix  2,000 mg Intravenous PRN Jim Rodriguez MD   Stopped at 03/27/21 1659    traMADol (ULTRAM) tablet 50 mg  50 mg Oral Q6H PRN Jim Rodriguez MD        insulin lispro (HUMALOG) injection vial 0-6 Units  0-6 Units Subcutaneous TID WC Jim Rodriguez MD   1 Units at 03/29/21 9947    insulin lispro (HUMALOG) injection vial 0-3 Units  0-3 Units Subcutaneous Nightly Jim Rodriguez MD   1 Units at 03/28/21 2221    LORazepam (ATIVAN) tablet 0.5 mg  0.5 mg Oral Q4H PRN Jim Rodriguez MD        prochlorperazine (COMPAZINE) tablet 5 mg  5 mg Oral Q6H PRN Jim Rodriguez MD        potassium chloride (KLOR-CON M) extended release tablet 20 mEq  20 mEq Oral Daily Jim Rodriguez MD   20 mEq at 03/29/21 0848    warfarin (COUMADIN) daily dosing (placeholder)   Other RX Placeholder Jim Rodriguez MD   Given at 03/27/21 0113    glucose (GLUTOSE) 40 % oral gel 15 g  15 g Oral PRN Jim Rodriguez MD        dextrose 50 % IV solution  12.5 g Intravenous PRN Jim Rodriguez MD        glucagon (rDNA) injection 1 mg  1 mg Intramuscular PRN Jim Rodriguez MD        dextrose 5 % solution  100 mL/hr Intravenous PRN Jim Rodriguez MD        lidocaine PF 1 % injection 5 mL  5 mL Intradermal Once Jim Rodriguez MD        sodium chloride flush 0.9 % injection 10 mL  10 mL Intravenous 2 times per day Jim Rodriguez MD   10 mL at 03/28/21 0900    sodium chloride flush 0.9 % injection 10 mL  10 mL Intravenous PRN Jim Rodriguez MD        melatonin tablet 3 mg  3 mg Oral Nightly PRN Jim Rodriguez MD   3 mg at 03/28/21 2221    triamcinolone (KENALOG) 0.1 % cream   Topical BID PRN Jim Rodriguez MD   Given at 03/27/21 1415    metoprolol succinate (TOPROL XL) extended release tablet 100 mg  100 mg Oral Daily Nuvia Aquino MD   100 mg at 03/29/21 0848    diphenhydrAMINE (BENADRYL) tablet 25 mg  25 mg Oral Q6H PRN Eugenio Winter MD   25 mg at 03/28/21 2221    insulin glargine (LANTUS) injection vial 10 Units  10 Units Subcutaneous Nightly Eugenio Winter MD   10 Units at 03/28/21 2221    furosemide (LASIX) 100 mg in dextrose 5 % 100 mL infusion  5 mg/hr Intravenous Continuous Mukti MD Nate 5 mL/hr at 03/28/21 1633 5 mg/hr at 03/28/21 1633     Allergies   Allergen Reactions    Naproxen Itching    Bactrim [Sulfamethoxazole-Trimethoprim]     Levofloxacin     Pcn [Penicillins]     Sulfa Antibiotics      Active Problems:    Acute on chronic combined systolic and diastolic congestive heart failure (HCC)    CHF (congestive heart failure), NYHA class I, acute on chronic, combined ([address]    Acute on chronic congestive heart failure ([address]  Resolved Problems:    * No resolved hospital problems. *    Blood pressure 104/69, pulse 76, temperature 97.8 °F (36.6 °C), temperature source Oral, resp. rate 16, height 5' 6\" (1.676 m), weight 266 lb 5.1 oz (120.8 kg), SpO2 98 %, not currently breastfeeding. Subjective Feels better. Not SOB at rest, not itching  Objective A&O, CTA, IIR&R with M, nl. Abd. Trace brawney L ankle edema. Fading rash  Assessment & Plan Continue present tx. Maybe D/C to home Wed. D/W patient.     Eugenio Winter MD  3/29/2021

## 2021-03-29 NOTE — PLAN OF CARE
Skin assessment completed every shift. Pt assessed for incontinence, appropriate barrier cream applied prn. Pt encouraged to turn/rotate every 2 hours. Assistance provided if pt unable to do so themselves. Fall risk assessment completed every shift. All precautions in place. Pt has call light within reach at all times. Room clear of clutter. Pt aware to call for assistance when getting up.    Vitals:    03/29/21 1245   BP:    Pulse:    Resp:    Temp:    SpO2: 98%       Intake/Output Summary (Last 24 hours) at 3/29/2021 1407  Last data filed at 3/29/2021 1300  Gross per 24 hour   Intake 460 ml   Output 675 ml   Net -215 ml

## 2021-03-29 NOTE — PROGRESS NOTES
Nephrology Progress Note  [phone]   [email or web address]        Reason for Consult:  CKD    HISTORY OF PRESENT ILLNESS:                This is a patient with significant past medical history of CKD stage 4 baseline SCr high 1, low 2 range, CHF, AS, s/p valuloplasty, DM2. A.fib, HTN, DVT who presented with to ER after noted to have bradycardia with HR to 30's. She also reports, SOB, weight gain, increased BLE edema. She was seen by Dr. Vivi Busch on 3/17-dose of torsemide increased from 40mg to 40mg qam and 20mg qpm that visit. She is started lasix 80mg IV bid since this am.  UO is good, BP is stable. Scr is stable. Wt is down by 3#. She denies N/V/CP/f/c/LH/dizziness.  -family at bedside-states compliant with diuretics  -had diffuse pruritic rash that is getting better-no new meds added      Subjective:  -pt seen and examined  -PMSHx and meds reviewed  -family at bedside    No acute event ON. BP stable  Sitting up in chair  Swelling improved      ROS: neg chest pain/SOB/pos edema    PHYSICAL EXAM:    Vitals:    /69   Pulse 76   Temp 97.8 °F (36.6 °C) (Oral)   Resp 16   Ht 5' 6\" (1.676 m)   Wt 266 lb 5.1 oz (120.8 kg)   LMP  (LMP Unknown)   SpO2 98%   BMI 42.98 kg/m²   I/O last 3 completed shifts:  In: -   Out: 675 [Urine:675]  No intake/output data recorded. Physical Exam:  Gen: Resting in bed, NAD.  obese  HEENT: anicteric, NC/AT  CV: +S1/S2, RRR  Lungs: Good respiratory effort, diminished in bases  Abd: S/NT +BS-obese  Ext: ++edema, no cyanosis, bilateral chronic venous stasis skin changs  Skin: Warm. Diffuse rash clearing  : No TTP over bladder, nondistended. Neuro: Alert and oriented x 3, nonfocal.  Joints: No erythema noted over joints.     DATA:    CBC:   Lab Results   Component Value Date    WBC 4.7 03/26/2021    RBC 3.66 03/26/2021    HGB 11.0 03/26/2021    HCT 33.7 03/26/2021    MCV 92.1 03/26/2021    MCH 30.0 03/26/2021    MCHC 32.5 03/26/2021    RDW 22.4 03/26/2021  03/26/2021    MPV 8.0 03/26/2021     BMP:    Lab Results   Component Value Date     03/29/2021    K 4.7 03/29/2021    K 4.7 02/07/2021    CL 94 03/29/2021    CO2 27 03/29/2021    BUN 39 03/29/2021    LABALBU 2.6 03/29/2021    CREATININE 2.1 03/29/2021    CALCIUM 9.0 03/29/2021    GFRAA 27 03/29/2021    GFRAA 51 12/20/2012    LABGLOM 22 03/29/2021    GLUCOSE 168 03/29/2021       IMPRESSION/RECOMMENDATIONS:      CKD stage 4: baseline SCr now ranging from 1.8-2.2-sees Dr. Joe Marlow, presumed DN/HTN NS/DAVION on CKD/chronic hypoperfusion. She has a diffuse rash, however, does not have significant proteinuria, pyruia or eosinophilia-doubt AIN-she is on prednisone for rash  -SCr is stable and at baseline  -weight is unchanged despite lasix drip  -clinically remains hypervolemic  -will continue lasix drip    FEN:   Low Mg: replaced-monitor with diuresis  Hyponatremia: overall stable, due to CHF  -continue lasix drip through today    ICM: per cardiology    AS:  -considering TAVR in future-h/o valvuloplasty 2/21  -per cardiology    HTN: stable  -monitor on diuresis  -on metoprolol for A.fib    H/o A.fib: RVR-on amiodarone, toprol/coumadin    Anemia:  -hgb is 11.0-no ZARINA indicated    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 I will continue to follow along. Please call with questions.     Jes Soto MD

## 2021-03-29 NOTE — PROGRESS NOTES
Occupational Therapy   Occupational Therapy Initial Assessment  Date: 3/29/2021   Patient Name: Cleone Habermann  MRN: [id number]     : 1934    Date of Service: 3/29/2021    Discharge Recommendations:  3-5 sessions per week, Patient would benefit from continued therapy after discharge  OT Equipment Recommendations  Equipment Needed: (If family wanting to take pt home as pt reports, d/t increased assistance for transfers could benefit from justus lift and/or brittney stedy at home to decrease caregiver burden)    Cleone Habermann scored a 13/24 on the AM-PAC ADL Inpatient form. Current research shows that an AM-PAC score of 17 or less is typically not associated with a discharge to the patient's home setting. Based on the patient's AM-PAC score and their current ADL deficits, it is recommended that the patient have 3-5 sessions per week of Occupational Therapy at d/c to increase the patient's independence. Please see assessment section for further patient specific details. If patient discharges prior to next session this note will serve as a discharge summary. Please see below for the latest assessment towards goals. Assessment   Performance deficits / Impairments: Decreased functional mobility ; Decreased strength;Decreased endurance;Decreased ADL status; Decreased safe awareness;Decreased sensation;Decreased high-level IADLs;Decreased posture;Decreased ROM; Decreased balance  Assessment: Pt is an 79 yo female admitted for CHF exacerbation with BLE edema; pt recently admitted 2021 with d/c home. PTA, pt recieving Mod-TOtal A from children around the clock for ADLs, IADLs, Bed mobility, and stand pivot transfers. Today, pt functioning below baseline requiring Max A x2 for bed mobility, and sit<>stand. Pt Total A for toileting and Max A LB dressing. Pt with posterior lean seated EOB requiring close SBA to Min A. Pt with LUE little to no shoulder ROM and on RUE 90 degrees.  Pt with overall poor activity Yes  Patient assessed for rehabilitation services?: Yes  Additional Pertinent Hx: Per H&P, \" The patientis known to have chronic congestive heart failure, chronic atrialfibrillation, and had significant aortic stenosis treated in 02/2021with balloon valvuloplasty. The patient is currently being consideredfor TAVR. The patient has previously been hospitalized for increasedcongestive heart failure. Present illness began several days ago withincreased shortness of breath at rest as well as increased dependent legedema up to her thighs. This was attempted to be managed in the homesetting by increasing her oral torsemide. Unfortunately, the shortnessof breath and edema persisted.   The family contacted her nephrologist,Dr. Mikaela Walker, and he recommended transportation to the emergency room [facility] for further diagnosis and treatment\"  Family / Caregiver Present: No  Referring Practitioner: So Haas MD  Diagnosis: CHF exacerbation  Subjective  Subjective: Pt resting supine in bed upon arrival. Pt agreeable to OT/PT eval. NO reports of pain  General Comment  Comments: RN ok to see    Social/Functional History  Social/Functional History  Lives With: Alone(children with her at all times)  Type of Home: House  Home Layout: Two level, Able to Live on Main level with bedroom/bathroom  Home Access: Stairs to enter with rails  Entrance Stairs - Number of Steps: 1 + 1  Bathroom Shower/Tub: Walk-in shower, Shower chair with back  [employer]: (RTS over comfort height)  Bathroom Equipment: 3-in-1 commode, Grab bars in shower, Shower chair  Bathroom Accessibility: (leaves walker outside of home)  Home Equipment: Rolling walker, Wheelchair-manual(w/c outside of home)  Receives Help From: Family(Home PT/OT)  ADL Assistance: Independent  Homemaking Assistance: Needs assistance  Ambulation Assistance: Independent(RW at home)  Transfer Assistance: Independent  Active : No  Patient's  Info: daughter drives  Additional Comments: dtr reports someone is with her at all time     Objective   Vision Exceptions: (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)  Hearing: Within functional limits    Orientation  Overall Orientation Status: Impaired  Orientation Level: Oriented to situation;Oriented to person;Oriented to place;Oriented to time     Balance  Sitting Balance: (EOB x ~8 min varied Min to SBA)  Standing Balance: Dependent/Total  Standing Balance  Time: PRN  Activity: transfers Min A x2  Functional Mobility  Functional Mobility Comments: safe pt handling equipment of brittney will for bed>chair- left justus pad in chair for staff  ADL  Feeding: Setup  LE Dressing: Dependent/Total(socks)  Toileting: Dependent/Total(weeks)  Additional Comments: Based on PLOF, balance, endurance, ROM, strength, and safety awareness, anticipate Max-Total A for LB ADLs and Max-Mod A for UB ADLs  Tone RUE  RUE Tone: Normotonic  Tone LUE  LUE Tone: Normotonic  Coordination  Movements Are Fluid And Coordinated: Yes     Bed mobility  Rolling to Right: 2 Person assistance;Maximum assistance  Supine to Sit: Maximum assistance;2 Person assistance;Dependent/Total  Comment: use of rails  Transfers  Sit to stand: Maximum assistance;2 Person assistance  Stand to sit: 2 Person assistance;Maximum assistance  Transfer Comments: to/from stedy. attempted to RW first- stood x10 seconds and needs to sit.      Cognition  Overall Cognitive Status: WNL     Sensation  Overall Sensation Status: (occasional positional/sleep related BUE numbness)      LUE AROM (degrees)  LUE General AROM: ~10 degree shoulder flexion, full elbow ROM  RUE AROM (degrees)  RUE General AROM: Shoulder flexion ~90 degrees, otherwise WFL  LUE Strength  L Hand General: 3+/5  RUE Strength  R Hand General: 3+/5     Plan   Plan  Times per week: 3-5  Current Treatment Recommendations: Strengthening, Functional Mobility Training, Patient/Caregiver Education & Training, Positioning, ROM, Endurance Training, Equipment Evaluation, Education, & procurement, Balance Training, Wheelchair Mobility Training, Safety Education & Training, Self-Care / ADL    AM-PAC Score  AM-PAC Inpatient Daily Activity Raw Score: 13 (03/29/21 0852)  AM-PAC Inpatient ADL T-Scale Score : 32.03 (03/29/21 0852)  ADL Inpatient CMS 0-100% Score: 63.03 (03/29/21 0852)  ADL Inpatient CMS G-Code Modifier : CL (03/29/21 1692)    Goals  Short term goals  Time Frame for Short term goals: prior to d/c  Short term goal 1: supine>sit Max A x1  Short term goal 2: Sitting EOB for dynamic functional task x8 min with close SBA  Short term goal 3: stand pivot with Mod A x2  Short term goal 4: Complete BUE exercises in all planes to improve strength and endurance for ADLs  Short term goal 5: rolling Min A x1  Patient Goals   Patient goals : to go home     Therapy Time   Individual Concurrent Group Co-treatment   Time In 0753         Time Out 0850         Minutes 57         Timed Code Treatment Minutes: 42 Minutes(15 eval)     AYESHA Parr, OTR/L

## 2021-03-29 NOTE — PROGRESS NOTES
nodes.  · Allergic/Immunologic: No nasal congestion or hives. Objective:   /70   Pulse 78   Temp 97.4 °F (36.3 °C) (Oral)   Resp 16   Ht 5' 6\" (1.676 m)   Wt 266 lb 5.1 oz (120.8 kg)   LMP  (LMP Unknown)   SpO2 98%   BMI 42.98 kg/m²       Intake/Output Summary (Last 24 hours) at 3/29/2021 1311  Last data filed at 3/29/2021 1300  Gross per 24 hour   Intake 460 ml   Output 675 ml   Net -215 ml     Wt Readings from Last 3 Encounters:   03/29/21 266 lb 5.1 oz (120.8 kg)   02/25/21 259 lb (117.5 kg)   02/23/21 248 lb 3.2 oz (112.6 kg)       Physical Exam:  General: In no acute distress. Awake, alert, and oriented X4. Up in chair  Skin:  Warm and dry. Neck:  Supple. No JVD or carotid bruit appreciated. Chest: Lungs clear to auscultation. No wheezes/rhonchi/rales  Cardiovascular:  Irreg; normal S1 and S2; I/VI systolic murmur   Abdomen: obese, soft, nontender, nondistended,+bowel sounds. Extremities: 2-3+ bilateral lower leg edema; 1+ bilateral thigh edema.  2+ bilateral radial pulses;  1+ bilateral DP/PT pulses; cap refill brisk    Medications:    predniSONE  10 mg Oral BID    warfarin  2.5 mg Oral Once    [Held by provider] allopurinol  200 mg Oral Daily    amiodarone  200 mg Oral Daily    cetirizine  10 mg Oral Daily    Vitamin D  2,000 Units Oral Daily    glipiZIDE  5 mg Oral BID AC    latanoprost  1 drop Right Eye Nightly    therapeutic multivitamin-minerals  1 tablet Oral Daily    pantoprazole  40 mg Oral Daily    insulin lispro  0-6 Units Subcutaneous TID WC    insulin lispro  0-3 Units Subcutaneous Nightly    potassium chloride  20 mEq Oral Daily    warfarin (COUMADIN) daily dosing (placeholder)   Other RX Placeholder    lidocaine 1 % injection  5 mL Intradermal Once    sodium chloride flush  10 mL Intravenous 2 times per day    metoprolol succinate  100 mg Oral Daily    insulin glargine  10 Units Subcutaneous Nightly      sodium chloride      dextrose      furosemide (LASIX) 1mg/ml infusion 5 mg/hr (03/29/21 1144)     sodium chloride, [DISCONTINUED] promethazine **OR** ondansetron, polyethylene glycol, acetaminophen **OR** acetaminophen, traMADol, LORazepam, prochlorperazine, glucose, dextrose, glucagon (rDNA), dextrose, sodium chloride flush, melatonin, triamcinolone, diphenhydrAMINE    Lab Data:  CBC:   Recent Labs     03/26/21  1726   WBC 4.7   HGB 11.0*        BMP:    Recent Labs     03/27/21  0417 03/28/21  0428 03/29/21  0446   * 132* 130*   K 4.4 4.8 4.7   CO2 32 30 27   BUN 31* 31* 39*   CREATININE 1.8* 2.0* 2.1*     LIVR:   Recent Labs     03/26/21  1726   AST 21   ALT 29     INR:    Recent Labs     03/27/21 0417 03/28/21  0428 03/29/21  0446   INR 2.51* 2.28* 2.46*     Results for Arslan Mark (MRN [id number]) as of 3/29/2021 14:25   Ref. Range 3/26/2021 17:26 3/27/2021 04:17 3/29/2021 04:46   Pro-BNP Latest Ref Range: 0 - 449 pg/mL 16,780 (H)  11,658 (H)   Troponin Latest Ref Range: <0.01 ng/mL 0.02 (H)       Results for Arslan Mark (MRN [id number]) as of 3/29/2021 14:25   Ref. Range 3/27/2021 04:17   Cholesterol, Total Latest Ref Range: 0 - 199 mg/dL 117   HDL Cholesterol Latest Ref Range: 40 - 60 mg/dL 41   LDL Calculated Latest Ref Range: <100 mg/dL 66   Triglycerides Latest Ref Range: 0 - 150 mg/dL 52   VLDL Cholesterol Calculated Latest Ref Range: Not Established mg/dL 10     ECG: Atrial fibrillation, left axis deviation; LVH    2/10/2021 LHC:  ANGIOGRAPHIC FINDINGS:  1. Left main is normal, LORENZO 3 flow, no stenosis. 2.  Left anterior descending artery has LORENZO 3 flow with mild luminal  irregularities. 3.  Left circumflex is patent without significant stenosis. 4.  Right coronary artery is dominant without significant stenosis, LORENZO  3 flow.     HEMODYNAMICS:  Aortic valve gradient was 41.1 mmHg. Post valvuloplasty  aortic valve gradient was 30 mmHg.     SUMMARY:  1. Successful balloon aortic valvuloplasty.   2.  Nonobstructive by GRACIA Pinzon - CNP on 3/29/2021 at 1:11 PM

## 2021-03-29 NOTE — PROGRESS NOTES
Physical Therapy    Facility/Department: [facility] 5 PROGRESSIVE CARE  Initial Assessment    NAME: [place] Mc[place]ur  : 1934  MRN: [id number]    Date of Service: 3/29/2021    Discharge Recommendations:  3-5 sessions per week(if family takes her directly home then would need 24/7 assist and home PT)   PT Equipment Recommendations  Equipment Needed: Yes  Other: pt may benefit from lift equipment for home such as justus lift or brittney stedy for safety  [place] scored a 9/24 on the AM-PAC short mobility form. Current research shows that an AM-PAC score of 17 or less is typically not associated with a discharge to the patient's home setting. Based on the patient's AM-PAC score and their current functional mobility deficits, it is recommended that the patient have 3-5 sessions per week of Physical Therapy at d/c to increase the patient's independence. Please see assessment section for further patient specific details. Per Pt and MD pt's family will take pt home therefore pt would benefit from 24/7 assist and home PT; may also benefit from lift equipment at home. If patient discharges prior to next session this note will serve as a discharge summary. Please see below for the latest assessment towards goals. Assessment   Body structures, Functions, Activity limitations: Decreased functional mobility   Assessment: Pt is an 81 yo female who was adm to hosp with LE swelling and SOB; per pt report family has been providing more assist and she is now needing assist out of bed and transfers with assist of her children; unsure how much assist family is able to provide; pt may benefit from continued therapy at discharge to address deficits. Pt reports her kids will not let her go to a SNF although it may be beneficial to increase her functional mobility. If pt returns home may benefit from lift equipment to assist family for increased safety.   Prognosis: Fair;Good  Decision Making: High Complexity  Clinical Presentation: evolving  PT Education: PT Role;General Safety  Barriers to Learning: none  REQUIRES PT FOLLOW UP: Yes  Activity Tolerance  Activity Tolerance: Patient limited by endurance  Activity Tolerance: pt fatigues quickly limiting her ability to stand long enough to attempt amb       Patient Diagnosis(es): The primary encounter diagnosis was Acute on chronic congestive heart failure, unspecified heart failure type ([facility] [address] A diagnosis of Chronic kidney disease, unspecified CKD stage was also pertinent to this visit. has a past medical history of Anemia, Atrial fibrillation ([address] Chronic kidney disease, Combined systolic and diastolic congestive heart failure ([address] Diabetes mellitus ([facility] [address] DVT (deep venous thrombosis) ([facility] [address] Hyperlipidemia, Hypertension, Osteoarthritis, PAF (paroxysmal atrial fibrillation) ([facility] [address] Pulmonary embolism ([facility] [address] Sarcoidosis, Thyroid disease, and Type II or unspecified type diabetes mellitus without mention of complication, not stated as uncontrolled. has a past surgical history that includes Hysterectomy; knee surgery; joint replacement (Bilateral); joint replacement (Bilateral); and Intracapsular cataract extraction (Left, 10/16/2020). Restrictions  Restrictions/Precautions  Restrictions/Precautions: Fall Risk  Position Activity Restriction  Other position/activity restrictions: 1 L UGZMAN [place]  Vision/Hearing  Vision: Impaired  Hearing: Within functional limits     Subjective  General  Chart Reviewed: Yes  Patient assessed for rehabilitation services?: Yes  Additional Pertinent Hx: The patientis known to have chronic congestive heart failure, chronic atrialfibrillation, and had significant aortic stenosis treated in 02/2021with balloon valvuloplasty. The patient is currently being consideredfor TAVR. The patient has previously been hospitalized for increasedcongestive heart failure.   Present illness began several days ago withincreased shortness of breath at rest as well as increased dependent legedema up to her thighs. This was attempted to be managed in the homesetting by increasing her oral torsemide. Unfortunately, the shortnessof breath and edema persisted. The family contacted her nephrologist,Dr. Shannan Ty, and he recommended transportation to the emergency room [facility] for further diagnosis and treatment.   Response To Previous Treatment: Not applicable  Family / Caregiver Present: No  Referring Practitioner: Dr Marbella Edwardsment: Within Functional Limits  Subjective  Subjective: pt very pleasant and agreeable to working with therapy; no c/o pain          Orientation  Orientation  Overall Orientation Status: Within Functional Limits  Social/Functional History  Social/Functional History  Lives With: Alone(children with her at all times)  Type of Home: House  Home Layout: Two level, Able to Live on Main level with bedroom/bathroom  Home Access: Stairs to enter with rails  Entrance Stairs - Number of Steps: 1 + 1  Bathroom Shower/Tub: Walk-in shower, Shower chair with back  [employer]: (RTS over comfort height)  Bathroom Equipment: 3-in-1 commode, Grab bars in shower, Shower chair  Bathroom Accessibility: (leaves walker outside of home)  Home Equipment: Rolling walker, Wheelchair-manual(w/c outside of home)  Receives Help From: Family(Home PT/OT)  ADL Assistance: Independent  Homemaking Assistance: Needs assistance  Ambulation Assistance: Independent(RW at home)  Transfer Assistance: Independent  Active : No  Patient's  Info: daughter drives  Additional Comments: dtr reports someone is with her at all time  Cognition   Cognition  Overall Cognitive Status: WNL    Objective          AROM RLE (degrees)  RLE General AROM: AAROM slightly limited by soft tissue and swelling but functional  AROM LLE (degrees)  LLE General AROM: AAROM slightly limited by soft tissue and swelling but functional  Strength RLE  Comment: pt has 2->2+/5 for hip flex 9193         Time Out 0851         Minutes 58                 CRIS MAE, PT    Cris VALDERRAMA, PT, [phone]

## 2021-03-30 LAB
ALBUMIN SERPL-MCNC: 2.8 G/DL (ref 3.4–5)
ANION GAP SERPL CALCULATED.3IONS-SCNC: 11 MMOL/L (ref 3–16)
BASOPHILS ABSOLUTE: 0 K/UL (ref 0–0.2)
BASOPHILS RELATIVE PERCENT: 0.1 %
BUN BLDV-MCNC: 45 MG/DL (ref 7–20)
CALCIUM SERPL-MCNC: 8.8 MG/DL (ref 8.3–10.6)
CHLORIDE BLD-SCNC: 93 MMOL/L (ref 99–110)
CO2: 27 MMOL/L (ref 21–32)
CREAT SERPL-MCNC: 2.3 MG/DL (ref 0.6–1.2)
EOSINOPHILS ABSOLUTE: 0 K/UL (ref 0–0.6)
EOSINOPHILS RELATIVE PERCENT: 0.6 %
GFR AFRICAN AMERICAN: 24
GFR NON-AFRICAN AMERICAN: 20
GLUCOSE BLD-MCNC: 170 MG/DL (ref 70–99)
GLUCOSE BLD-MCNC: 185 MG/DL (ref 70–99)
GLUCOSE BLD-MCNC: 202 MG/DL (ref 70–99)
GLUCOSE BLD-MCNC: 204 MG/DL (ref 70–99)
GLUCOSE BLD-MCNC: 249 MG/DL (ref 70–99)
HCT VFR BLD CALC: 32.1 % (ref 36–48)
HEMOGLOBIN: 10.5 G/DL (ref 12–16)
INR BLD: 2.75 (ref 0.86–1.14)
LYMPHOCYTES ABSOLUTE: 0.7 K/UL (ref 1–5.1)
LYMPHOCYTES RELATIVE PERCENT: 9.9 %
MAGNESIUM: 2.1 MG/DL (ref 1.8–2.4)
MCH RBC QN AUTO: 30.3 PG (ref 26–34)
MCHC RBC AUTO-ENTMCNC: 32.7 G/DL (ref 31–36)
MCV RBC AUTO: 92.9 FL (ref 80–100)
MONOCYTES ABSOLUTE: 0.3 K/UL (ref 0–1.3)
MONOCYTES RELATIVE PERCENT: 4.7 %
NEUTROPHILS ABSOLUTE: 5.6 K/UL (ref 1.7–7.7)
NEUTROPHILS RELATIVE PERCENT: 84.7 %
PDW BLD-RTO: 22.4 % (ref 12.4–15.4)
PERFORMED ON: ABNORMAL
PHOSPHORUS: 4.1 MG/DL (ref 2.5–4.9)
PLATELET # BLD: 163 K/UL (ref 135–450)
PMV BLD AUTO: 8.3 FL (ref 5–10.5)
POTASSIUM SERPL-SCNC: 4.6 MMOL/L (ref 3.5–5.1)
PROTHROMBIN TIME: 32.2 SEC (ref 10–13.2)
RBC # BLD: 3.46 M/UL (ref 4–5.2)
SODIUM BLD-SCNC: 131 MMOL/L (ref 136–145)
WBC # BLD: 6.6 K/UL (ref 4–11)

## 2021-03-30 PROCEDURE — 85025 COMPLETE CBC W/AUTO DIFF WBC: CPT

## 2021-03-30 PROCEDURE — 94760 N-INVAS EAR/PLS OXIMETRY 1: CPT

## 2021-03-30 PROCEDURE — 2580000003 HC RX 258: Performed by: INTERNAL MEDICINE

## 2021-03-30 PROCEDURE — 36415 COLL VENOUS BLD VENIPUNCTURE: CPT

## 2021-03-30 PROCEDURE — 83735 ASSAY OF MAGNESIUM: CPT

## 2021-03-30 PROCEDURE — 85610 PROTHROMBIN TIME: CPT

## 2021-03-30 PROCEDURE — 80069 RENAL FUNCTION PANEL: CPT

## 2021-03-30 PROCEDURE — 2060000000 HC ICU INTERMEDIATE R&B

## 2021-03-30 PROCEDURE — 6370000000 HC RX 637 (ALT 250 FOR IP): Performed by: INTERNAL MEDICINE

## 2021-03-30 PROCEDURE — 99232 SBSQ HOSP IP/OBS MODERATE 35: CPT | Performed by: NURSE PRACTITIONER

## 2021-03-30 PROCEDURE — 93880 EXTRACRANIAL BILAT STUDY: CPT

## 2021-03-30 PROCEDURE — 2700000000 HC OXYGEN THERAPY PER DAY

## 2021-03-30 PROCEDURE — 97535 SELF CARE MNGMENT TRAINING: CPT

## 2021-03-30 PROCEDURE — 99222 1ST HOSP IP/OBS MODERATE 55: CPT | Performed by: THORACIC SURGERY (CARDIOTHORACIC VASCULAR SURGERY)

## 2021-03-30 PROCEDURE — 6360000002 HC RX W HCPCS: Performed by: INTERNAL MEDICINE

## 2021-03-30 PROCEDURE — 97530 THERAPEUTIC ACTIVITIES: CPT | Performed by: PHYSICAL THERAPIST

## 2021-03-30 RX ORDER — INSULIN GLARGINE 100 [IU]/ML
15 INJECTION, SOLUTION SUBCUTANEOUS NIGHTLY
Status: DISCONTINUED | OUTPATIENT
Start: 2021-03-30 | End: 2021-03-31

## 2021-03-30 RX ORDER — TORSEMIDE 20 MG/1
60 TABLET ORAL DAILY
Status: DISCONTINUED | OUTPATIENT
Start: 2021-03-30 | End: 2021-04-02

## 2021-03-30 RX ORDER — WARFARIN SODIUM 1 MG/1
1 TABLET ORAL
Status: COMPLETED | OUTPATIENT
Start: 2021-03-30 | End: 2021-03-30

## 2021-03-30 RX ADMIN — INSULIN LISPRO 2 UNITS: 100 INJECTION, SOLUTION INTRAVENOUS; SUBCUTANEOUS at 18:21

## 2021-03-30 RX ADMIN — SODIUM CHLORIDE, PRESERVATIVE FREE 10 ML: 5 INJECTION INTRAVENOUS at 09:19

## 2021-03-30 RX ADMIN — WARFARIN SODIUM 1 MG: 1 TABLET ORAL at 18:21

## 2021-03-30 RX ADMIN — CETIRIZINE HYDROCHLORIDE 10 MG: 10 TABLET, FILM COATED ORAL at 09:15

## 2021-03-30 RX ADMIN — PANTOPRAZOLE SODIUM 40 MG: 40 TABLET, DELAYED RELEASE ORAL at 08:07

## 2021-03-30 RX ADMIN — TORSEMIDE 60 MG: 20 TABLET ORAL at 18:21

## 2021-03-30 RX ADMIN — LATANOPROST 1 DROP: 50 SOLUTION OPHTHALMIC at 21:39

## 2021-03-30 RX ADMIN — POTASSIUM CHLORIDE 20 MEQ: 1500 TABLET, EXTENDED RELEASE ORAL at 09:15

## 2021-03-30 RX ADMIN — PREDNISONE 10 MG: 10 TABLET ORAL at 21:39

## 2021-03-30 RX ADMIN — METOPROLOL SUCCINATE 100 MG: 50 TABLET, EXTENDED RELEASE ORAL at 09:15

## 2021-03-30 RX ADMIN — FUROSEMIDE 5 MG/HR: 10 INJECTION, SOLUTION INTRAMUSCULAR; INTRAVENOUS at 05:55

## 2021-03-30 RX ADMIN — Medication 2000 UNITS: at 09:15

## 2021-03-30 RX ADMIN — AMIODARONE HYDROCHLORIDE 200 MG: 200 TABLET ORAL at 09:15

## 2021-03-30 RX ADMIN — INSULIN LISPRO 2 UNITS: 100 INJECTION, SOLUTION INTRAVENOUS; SUBCUTANEOUS at 14:30

## 2021-03-30 RX ADMIN — GLIPIZIDE 5 MG: 5 TABLET ORAL at 08:07

## 2021-03-30 RX ADMIN — MULTIPLE VITAMINS W/ MINERALS TAB 1 TABLET: TAB at 09:15

## 2021-03-30 RX ADMIN — SODIUM CHLORIDE, PRESERVATIVE FREE 10 ML: 5 INJECTION INTRAVENOUS at 21:43

## 2021-03-30 RX ADMIN — PREDNISONE 10 MG: 10 TABLET ORAL at 09:15

## 2021-03-30 RX ADMIN — INSULIN LISPRO 1 UNITS: 100 INJECTION, SOLUTION INTRAVENOUS; SUBCUTANEOUS at 21:39

## 2021-03-30 RX ADMIN — INSULIN LISPRO 1 UNITS: 100 INJECTION, SOLUTION INTRAVENOUS; SUBCUTANEOUS at 08:07

## 2021-03-30 RX ADMIN — INSULIN GLARGINE 15 UNITS: 100 INJECTION, SOLUTION SUBCUTANEOUS at 21:39

## 2021-03-30 RX ADMIN — GLIPIZIDE 5 MG: 5 TABLET ORAL at 16:14

## 2021-03-30 ASSESSMENT — PAIN SCALES - GENERAL
PAINLEVEL_OUTOF10: 0
PAINLEVEL_OUTOF10: 0

## 2021-03-30 NOTE — PROGRESS NOTES
I spoke with Ruben Louis and Dr. Cat Hadley regarding current pt status. We will plan to get pts carotid US done as IP and I have consulted CVTS for TAVR consideration (both TAVR requirements). I will get her TAVR CTA (can only be done at [place] location) for next week to optimize stable HF/fluid status at DC. Will arrange for close FU with Adriana/nephrology post DC prior to TAVR procedure. Jesse KELLY TAVR Coordinator.

## 2021-03-30 NOTE — PROGRESS NOTES
Physical Therapy  Facility/Department: [facility] 5W PROGRESSIVE CARE  Daily Treatment Note  NAME: Nathaly Landon  : 1934  MRN: [id number]    Date of Service: 3/30/2021    Discharge Recommendations:  3-5 sessions per week(daughter reports they will not allow pt to go to nursing facility and will take her home at discharge; they have 2-3 family members at all times to assist)   PT Equipment Recommendations  Equipment Needed: Yes  Other: pt may benefit from lift equipment for home such as justus lift or brittney stedy for safety  Nathaly Landon scored a  on the AM-PAC short mobility form. Current research shows that an AM-PAC score of 17 or less is typically not associated with a discharge to the patient's home setting. Based on the patient's AM-PAC score and their current functional mobility deficits, it is recommended that the patient have 3-5 sessions per week of Physical Therapy at d/c to increase the patient's independence. Pt and family refusing SNF but agreeable to home PT. Please see assessment section for further patient specific details. If patient discharges prior to next session this note will serve as a discharge summary. Please see below for the latest assessment towards goals.      Assessment   Body structures, Functions, Activity limitations: Decreased functional mobility   Assessment: Pt is an 79 yo female who was adm to hosp with SOB and LE swelling; pt was able to sit better this date and despite needing max A of 2 was able to tolerate therapy a little better this date; daughter in room and reported they will take pt home no matter what; pt would benefit from continued therapy at discharge  Prognosis: Fair;Good  Decision Making: High Complexity  Clinical Presentation: evolving  PT Education: General Safety  Patient Education: discussed that they may benefit from lift equipment and daughter interested  Barriers to Learning: none  REQUIRES PT FOLLOW UP: Yes  Activity Tolerance  Activity no complaints from previous session.   Family / Caregiver Present: Yes(daughter in room)  Subjective  Subjective: pt very pleasant and agreeable to working with therapy; no c/o pain          Orientation  Orientation  Overall Orientation Status: Within Functional Limits  Cognition   Cognition  Overall Cognitive Status: WNL  Objective   Bed mobility  Supine to Sit: Maximum assistance;2 Person assistance;Dependent/Total  Sit to Supine: Unable to assess(chair at EOS)  Transfers  Sit to Stand: Maximum Assistance;2 Person Assistance(with stedy and with walker in front)  Stand to sit: Moderate Assistance;Maximum Assistance  Bed to Chair: Dependent/Total(with brittney will)  Ambulation  Ambulation?: No(attempted to take a step but pt unable to lift her R foot and kept saying her L knee was going to give out)     Balance  Comments: SBA for sitting balance; min A of 1-2 for static standing on stedy and at walker however kept leaning her forearms on stedy and needed cues to stand up straight; pt was mod A for standing with attempts to lift LEs in standing  Exercises  Comments: practiced standing balance and attempts to march in place and to side step but unable to lift RLE and felt like her L knee was buckling         Comment: assisted with positioning in chair for comfort with LEs elevated and all needs in reach; pt sat in chair to complete some grooming tasks              G-Code     OutComes Score                                                     AM-PAC Score  AM-PAC Inpatient Mobility Raw Score : 9 (03/29/21 0852)  AM-PAC Inpatient T-Scale Score : 30.55 (03/29/21 0852)  Mobility Inpatient CMS 0-100% Score: 81.38 (03/29/21 0852)  Mobility Inpatient CMS G-Code Modifier : CM (03/29/21 2084)          Goals  Short term goals  Time Frame for Short term goals: by discharge  Short term goal 1: bed mob mod A  Short term goal 2: transfers with mod A of 2  Short term goal 3: progress to gait as regulo  Patient Goals   Patient goals : to go home and have her children look after her    Plan    Plan  Times per week: 3-5  Current Treatment Recommendations: Functional Mobility Training  Safety Devices  Type of devices: Call light within reach, Chair alarm in place, Left in chair, Nurse notified, All fall risk precautions in place, Gait belt     Therapy Time   Individual Concurrent Group Co-treatment   Time In 1130         Time Out 7984         Minutes 45                 LINDA MAE, [address] [place], [place], 16

## 2021-03-30 NOTE — PROGRESS NOTES
[place] Mc[place] is a 80 y.o. female patient.     Current Facility-Administered Medications   Medication Dose Route Frequency Provider Last Rate Last Admin    predniSONE (DELTASONE) tablet 10 mg  10 mg Oral BID Gela Kent MD   10 mg at 03/29/21 2129    [Held by provider] allopurinol (ZYLOPRIM) tablet 200 mg  200 mg Oral Daily Gela Kent MD   200 mg at 03/27/21 1036    amiodarone (CORDARONE) tablet 200 mg  200 mg Oral Daily Gela Kent MD   200 mg at 03/29/21 0848    cetirizine (ZYRTEC) tablet 10 mg  10 mg Oral Daily Gela Kent MD   10 mg at 03/29/21 0848    Vitamin D (CHOLECALCIFEROL) tablet 2,000 Units  2,000 Units Oral Daily Gela Kent MD   2,000 Units at 03/29/21 0847    glipiZIDE (GLUCOTROL) tablet 5 mg  5 mg Oral BID AC Gela Kent MD   5 mg at 03/30/21 6833    latanoprost (XALATAN) 0.005 % ophthalmic solution 1 drop  1 drop Right Eye Nightly Gela Kent MD   1 drop at 03/29/21 2129    therapeutic multivitamin-minerals 1 tablet  1 tablet Oral Daily Gela Kent MD   1 tablet at 03/29/21 0848    pantoprazole (PROTONIX) tablet 40 mg  40 mg Oral Daily Gela Kent MD   40 mg at 03/30/21 6584    0.9 % sodium chloride infusion  25 mL Intravenous PRN Gela Kent MD        ondansetron [facility]) injection 4 mg  4 mg Intravenous Q6H PRN Gela Kent MD        polyethylene glycol [facility]) packet 17 g  17 g Oral Daily PRN Gela Kent MD        acetaminophen (TYLENOL) tablet 650 mg  650 mg Oral Q6H PRN Gela Kent MD        Or    acetaminophen (TYLENOL) suppository 650 mg  650 mg Rectal Q6H PRN Gela Kent MD        traMADol Josnatasha Woo) tablet 50 mg  50 mg Oral Q6H PRN Gela Kent MD        insulin lispro (HUMALOG) injection vial 0-6 Units  0-6 Units Subcutaneous TID WC Gela Kent MD   1 Units at 03/30/21 0807    insulin lispro (HUMALOG) injection vial 0-3 Units  0-3 Units

## 2021-03-30 NOTE — PROGRESS NOTES
MCHC 32.7 03/30/2021    RDW 22.4 03/30/2021     03/30/2021    MPV 8.3 03/30/2021     BMP:    Lab Results   Component Value Date     03/30/2021    K 4.6 03/30/2021    K 4.7 02/07/2021    CL 93 03/30/2021    CO2 27 03/30/2021    BUN 45 03/30/2021    LABALBU 2.8 03/30/2021    CREATININE 2.3 03/30/2021    CALCIUM 8.8 03/30/2021    GFRAA 24 03/30/2021    GFRAA 51 12/20/2012    LABGLOM 20 03/30/2021    GLUCOSE 185 03/30/2021       IMPRESSION/RECOMMENDATIONS:      DAVION on CKD stage 4: SCr increased to 2.3-  -agree with holding lasix drip, volume status has improved  -will start torsemide 60mg bid      CKD stage 4: baseline SCr now ranging from 1.8-2.2-sees Dr. 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 Optimize volume status prior to contrast exposure-as long as Cr not substantially higher than baseline, should be OK to proceed to CTA. May consider pre-hydration depending on her volume status prior to contrast.  I have discussed at length with daughter at bedside with patient  They understand the risk (> 50% with > 10-15% risk of dialysis requiring DAVION) and are willing to proceed. 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 I will continue to follow along. Please call with questions.     Ki Arguello MD Nate

## 2021-03-30 NOTE — PLAN OF CARE
Problem: Skin Integrity:  Goal: Will show no infection signs and symptoms  Description: Will show no infection signs and symptoms  3/30/2021 1205 by Diane Mendez RN  Outcome: Ongoing    Goal: Absence of new skin breakdown  Description: Absence of new skin breakdown  3/30/2021 1205 by Diane Mendez RN  Outcome: Ongoing       Problem: Falls - Risk of:  Goal: Will remain free from falls  Description: Will remain free from falls  3/30/2021 1205 by Diane Mendez RN  Outcome: Ongoing    Goal: Absence of physical injury  Description: Absence of physical injury  3/30/2021 1205 by Diane Mendez RN  Outcome: Ongoing       Problem: OXYGENATION/RESPIRATORY FUNCTION  Goal: Patient will maintain patent airway  3/30/2021 1205 by Diane Mendez RN  Outcome: Ongoing    Goal: Patient will achieve/maintain normal respiratory rate/effort  Description: Respiratory rate and effort will be within normal limits for the patient  3/30/2021 1205 by Diane Mendez RN  Outcome: Ongoing       Problem: HEMODYNAMIC STATUS  Goal: Patient has stable vital signs and fluid balance  3/30/2021 1205 by Diane Mendez RN  Outcome: Ongoing       Problem: FLUID AND ELECTROLYTE IMBALANCE  Goal: Fluid and electrolyte balance are achieved/maintained  3/30/2021 1205 by Diane Mendez RN  Outcome: Ongoing       Problem: ACTIVITY INTOLERANCE/IMPAIRED MOBILITY  Goal: Mobility/activity is maintained at optimum level for patient  3/30/2021 1205 by Diane Mendez RN  Outcome: Ongoing

## 2021-03-30 NOTE — CONSULTS
Consultation H&P    Date of Admission:  3/26/2021  4:45 PM  Date of Consultation:  3/30/2021    PCP:  Jim Rodriguez MD      Cards: Pita Hearn: Oliver Knee    Chief Complaint: Aortic stenosis    History of Present Illness: We are asked to see this patient in consultation by Dr. Latoya White regarding TAVR candidacy. Arelis Plummer is a 80 y.o. female hx of CKD, CHF, HTN, HLD, atrial fibrillation, PE and DVT admitted through the ED on 3/26/2021 with c/o SOB and lower extremity edema. BAV 2/15/21 with symptomatic relief, partial resolution of LE edema. Recurrent symptoms. Past Medical History:  Past Medical History:   Diagnosis Date    Anemia     Aortic stenosis     BAV 3/2021    Atrial fibrillation (HCC)     Chronic kidney disease     stage III    Combined systolic and diastolic congestive heart failure (HCC)     DVT (deep venous thrombosis) (HCC)     Hyperlipidemia     Hypertension     Osteoarthritis     Pulmonary embolism (HCC)     Sarcoidosis     in remission     Thyroid disease     Hypothyroidism    Type II or unspecified type diabetes mellitus without mention of complication, not stated as uncontrolled        Past Surgical History:  Past Surgical History:   Procedure Laterality Date    HYSTERECTOMY      INTRACAPSULAR CATARACT EXTRACTION Left 10/16/2020    PHACOEMULSIFICATION WITH INTRAOCULAR LENS IMPLANT - LEFT EYE performed by Earl Small MD at [address] [place] [address] Bilateral     TKT    JOINT REPLACEMENT Bilateral     THR    KNEE SURGERY      bilateral total knees    OTHER SURGICAL HISTORY  02/15/2021    BAV       Home Medications:   Prior to Admission medications    Medication Sig Start Date End Date Taking?  Authorizing Provider   Multiple Vitamins-Minerals (THERAPEUTIC MULTIVITAMIN-MINERALS) tablet Take 1 tablet by mouth daily   Yes Historical Provider, MD   Cholecalciferol (VITAMIN D) 50 MCG (2000 UT) CAPS capsule Take 1 capsule by mouth once daily   Yes Historical Provider, MD   dilTIAZem (CARDIZEM 12 HR) 120 MG extended release capsule Take 120 mg by mouth 2 times daily   Yes Historical Provider, MD   amiodarone (CORDARONE) 200 MG tablet Take 200 mg by mouth daily   Yes Historical Provider, MD   torsemide (DEMADEX) 20 MG tablet Take 2 tablets by mouth daily  Patient taking differently: Take 40 mg by mouth daily Take 40mg by mouth every morning and take 20mg by mouth every afternoon. 2/23/21  Yes Osmin Mosley MD   allopurinol (ZYLOPRIM) 100 MG tablet Take 2 tablets by mouth daily 2/14/21  Yes Fish Boyce MD   carvedilol (COREG) 12.5 MG tablet Take 0.5 tablets by mouth 2 times daily (with meals) 2/12/21  Yes GRACIA Groves CNP   latanoprost (XALATAN) 0.005 % ophthalmic solution Place 1 drop into the right eye nightly 7/7/20  Yes Historical Provider, MD   pantoprazole (PROTONIX) 40 MG tablet Take 40 mg by mouth daily 8/23/20  Yes Historical Provider, MD   warfarin (COUMADIN) 5 MG tablet Take 2.5 mg by mouth every evening    Yes Historical Provider, MD   cetirizine (ZYRTEC) 10 MG tablet Take 10 mg by mouth daily   Yes Historical Provider, MD   vitamin C (ASCORBIC ACID) 500 MG tablet Take 500 mg by mouth daily   Yes Historical Provider, MD   Calcium Carb-Cholecalciferol (CALTRATE 600+D) 600-800 MG-UNIT TABS Take 1 tablet by mouth daily    Yes Historical Provider, MD   gabapentin (NEURONTIN) 100 MG capsule Take 100 mg by mouth nightly. Yes Historical Provider, MD   glipiZIDE (GLUCOTROL) 5 MG tablet Take 5 mg by mouth 2 times daily (before meals). Yes Historical Provider, MD   lovastatin (MEVACOR) 10 MG tablet Take 10 mg by mouth nightly. Yes Historical Provider, MD   blood glucose monitor kit and supplies Dispense sufficient amount for indicated testing frequency plus additional to accommodate PRN testing needs. Dispense all needed supplies to include: monitor, strips, lancing device, lancets, control solutions, alcohol swabs. 2/13/21   Jonathon Cabrales MD        Facility Administered Medications:   Nuha Saliva insulin glargine  15 Units Subcutaneous Nightly    warfarin  1 mg Oral Once    torsemide  60 mg Oral Daily    predniSONE  10 mg Oral BID    [Held by provider] allopurinol  200 mg Oral Daily    amiodarone  200 mg Oral Daily    cetirizine  10 mg Oral Daily    Vitamin D  2,000 Units Oral Daily    glipiZIDE  5 mg Oral BID AC    latanoprost  1 drop Right Eye Nightly    therapeutic multivitamin-minerals  1 tablet Oral Daily    pantoprazole  40 mg Oral Daily    insulin lispro  0-6 Units Subcutaneous TID WC    insulin lispro  0-3 Units Subcutaneous Nightly    potassium chloride  20 mEq Oral Daily    warfarin (COUMADIN) daily dosing (placeholder)   Other RX Placeholder    lidocaine 1 % injection  5 mL Intradermal Once    sodium chloride flush  10 mL Intravenous 2 times per day    metoprolol succinate  100 mg Oral Daily       Allergies: Allergies   Allergen Reactions    Naproxen Itching    Bactrim [Sulfamethoxazole-Trimethoprim]     Levofloxacin     Pcn [Penicillins]     Sulfa Antibiotics         Social History:  3/14/21. Son and daughter present.    Social History     Socioeconomic History    Marital status:      Spouse name: Not on file    Number of children: Not on file    Years of education: Not on file    Highest education level: Not on file   Occupational History    Not on file   Social Needs    Financial resource strain: Not on file    Food insecurity     Worry: Not on file     Inability: Not on file    Transportation needs     Medical: Not on file     Non-medical: Not on file   Tobacco Use    Smoking status: Never Smoker    Smokeless tobacco: Never Used    Tobacco comment: Never smoked   Substance and Sexual Activity    Alcohol use: No    Drug use: No    Sexual activity: Yes     Partners: Male   Lifestyle    Physical activity     Days per week: Not on file     Minutes per session: Not on file    Stress: Not on file   Relationships    Social connections     Talks on phone: Not on file     Gets together: Not on file     Attends Tenriism service: Not on file     Active member of club or organization: Not on file     Attends meetings of clubs or organizations: Not on file     Relationship status: Not on file    Intimate partner violence     Fear of current or ex partner: Not on file     Emotionally abused: Not on file     Physically abused: Not on file     Forced sexual activity: Not on file   Other Topics Concern    Not on file   Social History Narrative    Not on file       Family History:      Problem Relation Age of Onset    Heart Failure Mother     Cancer Brother     Asthma Neg Hx     Diabetes Neg Hx     Emphysema Neg Hx     Hypertension Neg Hx        Review of Systems:  Reviewed, negative unless noted  Constitutional: weight change, weakness, impairment of ADLs  Eyes: eyestrain, redness, discharges  ENMT: post nasal drip, sinus pain, discharge   Cardiovascular: faintness, vertigo, color changes in fingers/toes  Respiratory: cough, sputum, hemoptysis  GI: excessive thirst, changes in bowel habits, abdominal swelling  : painful urination, pyuria, incontinence  Musculoskeletal: cramps, swelling, limitation of motor activity  Integumentary: cyanosis, changes in skin, dryness  Neurological: paralysis, tingling, tremors  Psychiatric: restlessness, irritability, mood swings  Endocrine: heat/cold intolerance, excessive sweating, hair loss  Hematologic/lymphatic: swollen glands, anemia, easy bruising/bleeding      Physical Examination:    /86   Pulse 87   Temp 97.4 °F (36.3 °C) (Oral)   Resp 16   Ht 5' 6\" (1.676 m)   Wt 267 lb 3.2 oz (121.2 kg)   LMP  (LMP Unknown)   SpO2 93%   BMI 43.13 kg/m²      Admission Weight: 267 lb 6.4 oz (121.3 kg)     General appearance: NAD, overweight.   Eyes: anicteric, PERRLA  ENMT: no scars or lesions, no nasal deformity, normal dentition, no cyanosis of oral mucosa  Neck: no masses, no thyroid enlargement, no JVD. Respiratory: effort is unlabored, symmetric, no crackles, wheezes or rubs. No palpable/percussable abnormalities. Cardiovascular: regular, + murmur aortic position. PMI normal, no thrill. 3+ edema, no varicosities. Abdominal aorta cannot be appreciated given body habitus. Pulses:    carotid brachial radial femoral popliteal DP PT   RIGHT   2+       LEFT   2+       GI: abdomen soft, nondistended, no organomegaly. No masses. Lymphatic: no cervical/supraclavicular adenopathy  Musculoskeletal: strength and tone normal. Full ROM. No scoliosis. Extremities: warm and pink. No clubbing or petechiae. Skin: no dermatitis or ulceration. No nodularity or induration. Neuro: CN grossly intact. Sensation and motor function grossly intact. Psychiatric: oriented, appropriate mood/affect. MEDICAL DECISION MAKING/TESTING  Studies personally reviewed. Cath:   2/15/2021 no sig CAD    Echo:   2/8/2021  EF 40-45%, left atrium severely dilated, severe aortic stenosis with a peak velocity of 5.23 m/s and a mean pressure gradient of 66 mm Hg, dilated right ventricle      Labs:   CBC:   Recent Labs     03/30/21 0432   WBC 6.6   HGB 10.5*   HCT 32.1*   MCV 92.9        BMP:   Recent Labs     03/28/21 0428 03/29/21 0446 03/30/21 0432   * 130* 131*   K 4.8 4.7 4.6   CL 94* 94* 93*   CO2 30 27 27   PHOS 3.3 4.1 4.1   BUN 31* 39* 45*   CREATININE 2.0* 2.1* 2.3*   CALCIUM 9.1 9.0 8.8   MG 2.30 2.20 2.10     Cardiac Enzymes: No results for input(s): CKTOTAL, CKMB, CKMBINDEX, TROPONINI in the last 72 hours. PT/INR:   Recent Labs     03/28/21 0428 03/29/21 0446 03/30/21 0432   PROTIME 26.7* 28.8* 32.2*   INR 2.28* 2.46* 2.75*     APTT: No results for input(s): APTT in the last 72 hours.   Liver Profile:  Lab Results   Component Value Date    AST 21 03/26/2021    ALT 29 03/26/2021    BILITOT 1.2 03/26/2021    ALKPHOS 79 03/26/2021    LABALBU 2.8 2021     Lab Results   Component Value Date    CHOL 117 2021    HDL 41 2021    HDL 55 2011    TRIG 52 2021     HgbA1c:  Lab Results   Component Value Date    LABA1C 7.5 2021     UA:   Lab Results   Component Value Date    COLORU YELLOW 2021    PHUR 5.5 2021    WBCUA 1 2021    RBCUA 1 2021    MUCUS 2+ 2021    BACTERIA 1+ 2021    CLARITYU Clear 2021    SPECGRAV 1.011 2021    LEUKOCYTESUR Negative 2021    UROBILINOGEN 0.2 2021    BILIRUBINUR Negative 2021    BLOODU Negative 2021    GLUCOSEU Negative 2021       History obtained: chart, pt    Diagnosis: AS     Plan:   - AS  Severe. Symptomatic. Temporized with BAV, confirmed symptomatic improvement. Pt/family familiar with TAVR,  had it at age [de-identified],  earlier this month age 80. Pt is high risk for SAVR. Agree with continued TAVR workup.       Kleber Sotelo MD  3/30/2021  5:44 PM

## 2021-03-30 NOTE — CONSULTS
[facility]  HEART FAILURE PROGRAM      NAME:  [address] RECORD NUMBER:  [id number]  AGE: 80 y.o.    GENDER: female  : 1934  TODAY'S DATE:  3/30/2021    Subjective:     VISIT TYPE: evaluation     ADMITTING PHYSICIAN:  Jonathon Cabrales MD    PAST MEDICAL HISTORY        Diagnosis Date    Anemia     Aortic stenosis     Atrial fibrillation (HCC)     Chronic kidney disease     stage III    Combined systolic and diastolic congestive heart failure (HCC)     DVT (deep venous thrombosis) (HCC)     Hyperlipidemia     Hypertension     Osteoarthritis     Pulmonary embolism (HCC)     Sarcoidosis     in remission     Thyroid disease     Hypothyroidism    Type II or unspecified type diabetes mellitus without mention of complication, not stated as uncontrolled        SOCIAL HISTORY    Social History     Tobacco Use    Smoking status: Never Smoker    Smokeless tobacco: Never Used    Tobacco comment: Never smoked   Substance Use Topics    Alcohol use: No    Drug use: No       ALLERGIES    Allergies   Allergen Reactions    Naproxen Itching    Bactrim [Sulfamethoxazole-Trimethoprim]     Levofloxacin     Pcn [Penicillins]     Sulfa Antibiotics        MEDICATIONS  Scheduled Meds:   insulin glargine  15 Units Subcutaneous Nightly    warfarin  1 mg Oral Once    torsemide  60 mg Oral Daily    predniSONE  10 mg Oral BID    [Held by provider] allopurinol  200 mg Oral Daily    amiodarone  200 mg Oral Daily    cetirizine  10 mg Oral Daily    Vitamin D  2,000 Units Oral Daily    glipiZIDE  5 mg Oral BID AC    latanoprost  1 drop Right Eye Nightly    therapeutic multivitamin-minerals  1 tablet Oral Daily    pantoprazole  40 mg Oral Daily    insulin lispro  0-6 Units Subcutaneous TID WC    insulin lispro  0-3 Units Subcutaneous Nightly    potassium chloride  20 mEq Oral Daily    warfarin (COUMADIN) daily dosing (placeholder)   Other RX Placeholder    lidocaine 1 % WBC 6.6   HGB 10.5*   HCT 32.1*   MCV 92.9        BNP:   Lab Results   Component Value Date    BNP 54 07/31/2013       ECHOCARDIOGRAM:   2/8/21   Summary   There is concentric left ventricular hypertrophy. Ejection fraction is visually estimated to be 40-45%. Grade III diastolic dysfunction with elevated LV filling pressures. The left atrium is severely dilated. Severe aortic stenosis with a peak velocity of 5.23m/s and a mean pressure   gradient of 66mmHg. No evidence of significant aortic valve regurgitation. Dilated right ventricle. Right ventricular systolic function is mildly reduced . Estimated pulmonary artery systolic pressure is elevated at 56 mmHg assuming   a right atrial pressure of 15 mmHg. A hyperechoic structure is seen in liver pushing on IVC. Assessment:     CONSULTS:   IP CONSULT TO PRIMARY CARE PROVIDER  IP CONSULT TO HEART FAILURE NURSE/COORDINATOR  IP CONSULT TO DIETITIAN  IP CONSULT TO CARDIOLOGY  IP CONSULT TO HOME CARE NEEDS  IP CONSULT TO NEPHROLOGY  IP CONSULT TO PHARMACY  IP CONSULT TO CASE MANAGEMENT  IP CONSULT TO HOME CARE NEEDS  IP CONSULT TO [address] [place] [address]    Patient has a CARDIOLOGY CONSULT: Yes      Patient taking an ACEI/ARB:  No: EF > 40 and DAVION       Patient taking a BETA BLOCKER:  Yes: toprol     SCALE AVAILABLE:  Yes     EDUCATION STATUS: Patient (and dtr)  [x]  Provided both written and verbal education on Heart Failure signs/symptoms. [x]  Provided instructions on daily medications. [x]  Provided instructions to monitor and record weight daily. [x]  Provided instructions to call if weight increases 3 lbs in one day or 5 lbs in one week. [x]  Received verbal acknowledgment/understanding of Heart Failure related causes. [x]  Provided instructions on how to maintain a low sodium diet.    []  Provided recommendations for smoking cessation programs  [x]  Provided recommendations on activity and exercise      [x]  Other:      HF RN consulted per attending, chart reviewed. Pt came in with SOB and LE edema and is being treated for acute on chronic combined HF likely due to severe AS. Pt is presently being worked up for a TAVR w/ Dr Madisyn Oliveira at [facility]. His RN, Olena Hamilton, was here today, see note. Pt is in process of getting required testing for TAVR. Pt will get carotid US and CTS consult this admit which are both required for TAVR. Pt's dtr Shreya Granados did call [facility] with increase in wt of 7 lbs. Dtr shared Josr Posadas increased her torsemide at 3/17 OV from 40 mg to 60 mg (40 in am, 20 in pm). They were instructed to call Nephrology w/ wt gain since they are managing her diuretics. Neph rec come to ER for eval. Pt admitted w/ A on C combined HF. Pt was diuresed with IV lasix gtt which has since been transitioned to PO torsemide, 60 QD. Cr at 2.3 (BL 1.8). Neph and Card following. Pt was last admitted here - for HF. I did HF RN consult with both pt and Dtr Joanna Childress. My fellow HF RN has met with pt and family during Sept HF. Pt and family have received education on multiple occassions and actually have a good understanding. Until TAVR can be completed, pt remains a high risk readmit. Pt follows with Dr Shy Ram[facility]rez at [facility] but has been lately seeing Jaclyn/Ngozi to work up for TAVR and Mirian Andersen NP. I will ensure pt gets close card f/u. I called into room and re-introduced myself and my role in her care. Both pt and her dtr Joanna Childress were present. Joanna Childress asked I talk with her as Miguelitotc Edgardo is still grieving and resting. \" Pt's spouse of 79 yrs  on 3/14 and was just buried on Sat 3/20. Condolences given. Family (son/brother of Joanna Childress also present) is familiar with HF management but were agreeable for me to go over the education again. Teaching done on 'What is HF', S&S of HF, HF Zones, and importance of daily wts. Pt still does weigh daily and they could speak to her dry weight of 260 lbs (was 251 prior to last admit).   Praise given for them calling MDs with wt gain. I explained until valve fixed this could be recurrent. I reviewed the HF zones and the '3/5 rule' and strongly encouraged close f/u until all tests can be completed for TAVR and gets scheduled. They both verbalized understanding. Dietician has met with both pt and family on multiple occasions. Pt's meals are prepared by family who have a good understanding of her restrictions. I reviewed the material and they denied any further questions. I briefly reviewed her medications. They state she is compliant with taking them and fill the scripts at [facility]. Therapy is following and are recommending home care vs SNF but family plans to take her home. Pt has 10 children which 7 of them live in town and help care for her. Pt's spouse recently passed away. HF dc instructions have been added to AVS as well as to the [address]. They are aware I will get pt a cardiology hospital follow up appt and this will also be on the AVS. Appreciative of time spent. CURRENT DIET: DIET LOW SODIUM 2 GM; 1500 ml    EDUCATIONAL PACKETS PROVIDED- PRINTED FROM [employer]. Titles and material given:  Yes   []  What is Heart Failure?   []  Heart Failure: Warning Signs of a Flare-Up  []  Heart Failure: Making Changes to Your Diet  []  Heart Failure: Medications to Help Your Heart   [x]  Other: [facility] HF educational material     PATIENT/CAREGIVER TEACHING:    Level of patient/caregiver understanding able to:   [x] Verbalize understanding   [] Demonstrate understanding       [x] Teach back        [] Needs reinforcement     []  Other:      TEACHING TIME:  25 minutes       Plan:       DISCHARGE PLAN:  Placement for patient upon discharge: home with support of family (has 7 children that live in town)   Hospice Care:  no  Code Status: Full Code  Discharge appointment scheduled: HF RN following and will ensure close cardiology f/u appt is in place prior to dc     RECOMMENDATIONS:   [x]  Encourage to

## 2021-03-30 NOTE — PROGRESS NOTES
Occupational Therapy  Facility/Department: [facility] 5 PROGRESSIVE CARE  Daily Treatment Note  NAME: Ursula Thomas  : 1934  MRN: [id number]    Date of Service: 3/30/2021    Discharge Recommendations:  3-5 sessions per week, Patient would benefit from continued therapy after discharge  OT Equipment Recommendations  Equipment Needed: (If family wanting to take pt home as pt reports, d/t increased assistance for transfers could benefit from justus lift and/or brittney stedy at home to decrease caregiver burden)    Ursula Thomas scored a 13/24 on the AM-PAC ADL Inpatient form. Current research shows that an AM-PAC score of 17 or less is typically not associated with a discharge to the patient's home setting. Based on the patient's AM-PAC score and their current ADL deficits, it is recommended that the patient have 3-5 sessions per week of Occupational Therapy at d/c to increase the patient's independence. Please see assessment section for further patient specific details. If patient discharges prior to next session this note will serve as a discharge summary. Please see below for the latest assessment towards goals. Assessment   Performance deficits / Impairments: Decreased functional mobility ; Decreased strength;Decreased endurance;Decreased ADL status; Decreased safe awareness;Decreased sensation;Decreased high-level IADLs;Decreased posture;Decreased ROM; Decreased balance  Assessment: Pt tolerates session well. Pt with improved sitting balance to SBA, and overall improve activity tolerance. Able to complete 2 stands still with Max A x2, however, holds standing longer ~1 min with Min A static and Mod A dynamic stepping. Pt continues to requiring Total A toileting, Max A LB dressing, and set up today for seated oral care. Cont acute OT to address above deficits and rec OT 3-5x/week to decreased caregiver burden.  If family wanting to take pt home as pt reports, Douglas Baumgarten and d/t increased assistance for transfers could benefit from justus lift and/or brittney stedy at home to decrease caregiver burden  Treatment Diagnosis: CHF  OT Education: OT Role;Plan of Care;Transfer Training  Patient Education: Recommending equipment of [address] [place] [address] Street stedy and/or lift at home to pt and dtr- very receptive  REQUIRES OT FOLLOW UP: Yes  Activity Tolerance  Activity Tolerance: Patient limited by fatigue  Safety Devices  Safety Devices in place: Yes  Type of devices: Nurse notified;Gait belt;Patient at risk for falls; Left in chair;Chair alarm in place;Call light within reach       Patient Diagnosis(es): The primary encounter diagnosis was Acute on chronic congestive heart failure, unspecified heart failure type ([facility] [address] A diagnosis of Chronic kidney disease, unspecified CKD stage was also pertinent to this visit. has a past medical history of Anemia, Atrial fibrillation (HCC), Chronic kidney disease, Combined systolic and diastolic congestive heart failure ([address] DVT (deep venous thrombosis) ([facility] [address] Hyperlipidemia, Hypertension, Osteoarthritis, Pulmonary embolism ([facility] [address] Sarcoidosis, Thyroid disease, and Type II or unspecified type diabetes mellitus without mention of complication, not stated as uncontrolled. has a past surgical history that includes Hysterectomy; knee surgery; joint replacement (Bilateral); joint replacement (Bilateral); and Intracapsular cataract extraction (Left, 10/16/2020). Restrictions  Restrictions/Precautions  Restrictions/Precautions: Fall Risk  Position Activity Restriction  Other position/activity restrictions: JARROD Hairston    Subjective   General  Chart Reviewed: Yes  Patient assessed for rehabilitation services?: Yes  Additional Pertinent Hx: Per H&P, \" The patientis known to have chronic congestive heart failure, chronic atrialfibrillation, and had significant aortic stenosis treated in 02/2021with balloon valvuloplasty. The patient is currently being consideredfor TAVR.   The patient has previously been hospitalized Mobility Training, Patient/Caregiver Education & Training, Positioning, ROM, Endurance Training, Equipment Evaluation, Education, & procurement, Balance Training, Wheelchair Mobility Training, Safety Education & Training, Self-Care / ADL    AM-PAC Score  AM-PAC Inpatient Daily Activity Raw Score: 13 (03/30/21 1314)  AM-PAC Inpatient ADL T-Scale Score : 32.03 (03/30/21 1314)  ADL Inpatient CMS 0-100% Score: 63.03 (03/30/21 1314)  ADL Inpatient CMS G-Code Modifier : CL (03/30/21 1314)    Goals  Short term goals  Time Frame for Short term goals: prior to d/c- ongoing  Short term goal 1: supine>sit Max A x1  Short term goal 2: Sitting EOB for dynamic functional task x8 min with close SBA  Short term goal 3: stand pivot with Mod A x2  Short term goal 4: Complete BUE exercises in all planes to improve strength and endurance for ADLs  Short term goal 5: rolling Min A x1  Patient Goals   Patient goals : to go home     Therapy Time   Individual Concurrent Group Co-treatment   Time In 1130         Time Out 1215         Minutes 45         Timed Code Treatment Minutes: [facility], OTD, OTR/L

## 2021-03-30 NOTE — PROGRESS NOTES
Physician Progress Note      Prabhakar Mead  CSN #:                  [id number]  :                       1934  ADMIT DATE:       3/26/2021 4:45 PM  [address] [place] DATE:  RESPONDING  PROVIDER #:        Milan Simmons MD          QUERY TEXT:    Dear Dr Sharita Tillman,    Pt admitted with CHF exacerbation . Pt noted to also have aortic stenosis,   cad, ckd/htn and ischemic cardiomyopathy . If possible, please document in   progress notes and discharge summary the etiology of CHF, if able to be   determined. The medical record reflects the following:  Risk Factors: htn, aortic stenosis, ischemic cardiomyopathy, htn, ckd  Clinical Indicators: Documentation of severe aortic stenosis, ischemic   cardiomyopathy, htn, ckd, cad. IQN-57,420. CXR-Cardiomegaly with pulmonary   vascular congestion. ?Trace effusions are likely  with bibasilar atelectasis. Treatment: Cardiology consult, Lasix gtt, daily wts, I/o    Thank You, James Tran RN CDS CRCR  [email or web address]  [phone]  Options provided:  -- CHF due to Hypertensive Heart Disease  -- CHF due to Hypertensive Heart Disease and CAD  -- CHF not due to Hypertension but due to CAD  -- CHF due to Hypertensive Heart Disease and ICMP  -- CHF not due to Hypertension but due to ICMP  -- CHF due to Hypertensive Heart Disease and Valvular Heart Disease  -- CHF not due to Hypertension but due to valvular heart disease  -- Other - I will add my own diagnosis  -- Disagree - Not applicable / Not valid  -- Disagree - Clinically unable to determine / Unknown  -- Refer to Clinical Documentation Reviewer    PROVIDER RESPONSE TEXT:    This patient has CHF due to hypertensive heart disease and valvular heart   disease. Query created by:  Work, Judah Oppenheim on 3/29/2021 12:47 PM      Electronically signed by:  Milan Simmons MD 3/30/2021 8:56 AM

## 2021-03-30 NOTE — PROGRESS NOTES
[facility]   Daily Progress Note      Admit Date:  3/26/2021    Reason for follow up visit: CHF    CC: \"I'm feeling better. My feet and legs are looking and feeling better. \"    81 y/o female with PMH significant for aortic stenosis/S/P BAV in 2/2021, CKD (Dr. Low Jaffe), chronic systolic and diastolic HF, persistent atrial fib, PE/DVT, HTN, HLP and anemia who was admitted to [facility] after presenting with LE edema and SOB. She was undergoing PT and noted to be bradycardic associated with LE edema and SOB. She was placed on IV lasix infusion for diuresis. Interval History:  Pt. seen and examined; records reviewed  IV lasix placed on hold d/t increasing Cr  Cr now 2.3  LE edema improving; bilateral wraps in place  Denies SOB or chest pain  O2 dc'd this AM: monitor SpO2    Subjective:  Pt with no acute overnight cardiac events.  + SOB and edema continue to improve  Denies chest pain, cough, palpitations or dizziness    Review of Systems:   · Constitutional: no unanticipated weight loss. There's been no change in energy level, sleep pattern, or activity level. No fevers, chills. · Eyes: No visual changes or diplopia. No scleral icterus. · ENT: No Headaches, hearing loss or vertigo. No mouth sores or sore throat. · Cardiovascular: as reviewed in HPI  · Respiratory: No cough or wheezing, no sputum production. No hematemesis. · Gastrointestinal: No abdominal pain, appetite loss, blood in stools. No change in bowel or bladder habits. · Genitourinary: No dysuria, trouble voiding, or hematuria. · Musculoskeletal:  No joint complaints. · Integumentary: No rash or pruritis. · Neurological: No headache, diplopia, change in muscle strength, numbness or tingling. · Psychiatric: No anxiety or depression. · Endocrine: No temperature intolerance. No excessive thirst, fluid intake, or urination. No tremor.   · Hematologic/Lymphatic: No abnormal bruising or bleeding, blood clots or swollen lymph nodes.  · Allergic/Immunologic: No nasal congestion or hives. Objective:   /78   Pulse 84   Temp 97.4 °F (36.3 °C) (Oral)   Resp 18   Ht 5' 6\" (1.676 m)   Wt 267 lb 3.2 oz (121.2 kg)   LMP  (LMP Unknown)   SpO2 98%   BMI 43.13 kg/m²       Intake/Output Summary (Last 24 hours) at 3/30/2021 0937  Last data filed at 3/30/2021 0531  Gross per 24 hour   Intake 1000 ml   Output 715 ml   Net 285 ml     Wt Readings from Last 3 Encounters:   03/30/21 267 lb 3.2 oz (121.2 kg)   02/25/21 259 lb (117.5 kg)   02/23/21 248 lb 3.2 oz (112.6 kg)       Physical Exam:  General: In no acute distress. Awake, alert, and oriented X4. Resting in bed  Skin:  Warm and dry. Neck:  Supple. + mild JVD  Chest: Lungs clear to auscultation. No wheezes/rhonchi/rales  Cardiovascular:  Irreg; I/VI systolic murmur  Abdomen: obese, soft, nontender, nondistended, +bowel sounds.    Extremities:  1-2+ bilateral lower leg edema; trace bilateral pedal edema; 2-3+ bilateral hip/thigh edema ; cap refill brisk    Medications:    insulin glargine  15 Units Subcutaneous Nightly    predniSONE  10 mg Oral BID    [Held by provider] allopurinol  200 mg Oral Daily    amiodarone  200 mg Oral Daily    cetirizine  10 mg Oral Daily    Vitamin D  2,000 Units Oral Daily    glipiZIDE  5 mg Oral BID AC    latanoprost  1 drop Right Eye Nightly    therapeutic multivitamin-minerals  1 tablet Oral Daily    pantoprazole  40 mg Oral Daily    insulin lispro  0-6 Units Subcutaneous TID WC    insulin lispro  0-3 Units Subcutaneous Nightly    potassium chloride  20 mEq Oral Daily    warfarin (COUMADIN) daily dosing (placeholder)   Other RX Placeholder    lidocaine 1 % injection  5 mL Intradermal Once    sodium chloride flush  10 mL Intravenous 2 times per day    metoprolol succinate  100 mg Oral Daily      sodium chloride      dextrose      [Held by provider] furosemide (LASIX) 1mg/ml infusion 5 mg/hr (03/30/21 0555)     sodium chloride, [DISCONTINUED] promethazine **OR** ondansetron, polyethylene glycol, acetaminophen **OR** acetaminophen, traMADol, LORazepam, prochlorperazine, glucose, dextrose, glucagon (rDNA), dextrose, sodium chloride flush, melatonin, triamcinolone, diphenhydrAMINE    Lab Data:  CBC:   Recent Labs     03/30/21 0432   WBC 6.6   HGB 10.5*        BMP:    Recent Labs     03/28/21 0428 03/29/21 0446 03/30/21 0432   * 130* 131*   K 4.8 4.7 4.6   CO2 30 27 27   BUN 31* 39* 45*   CREATININE 2.0* 2.1* 2.3*     LIVR: No results for input(s): AST, ALT in the last 72 hours. INR:    Recent Labs     03/28/21 0428 03/29/21 0446 03/30/21 0432   INR 2.28* 2.46* 2.75*     Results for Sade Shea (MRN [id number]) as of 3/30/2021 09:43   Ref. Range 3/26/2021 17:26 3/27/2021 04:17 3/29/2021 04:46   Pro-BNP Latest Ref Range: 0 - 449 pg/mL 16,780 (H)  11,658 (H)   Troponin Latest Ref Range: <0.01 ng/mL 0.02 (H)     Cholesterol, Total Latest Ref Range: 0 - 199 mg/dL  117    HDL Cholesterol Latest Ref Range: 40 - 60 mg/dL  41    LDL Calculated Latest Ref Range: <100 mg/dL  66    Triglycerides Latest Ref Range: 0 - 150 mg/dL  52    VLDL Cholesterol Calculated Latest Ref Range: Not Established mg/dL  10      ECG: Atrial fibrillation, left axis deviation; LVH     2/10/2021 [facility]:  ANGIOGRAPHIC FINDINGS:  1.  Left main is normal, LORENZO 3 flow, no stenosis. 2.  Left anterior descending artery has LORENZO 3 flow with mild luminal  irregularities. 3.  Left circumflex is patent without significant stenosis. 4.  Right coronary artery is dominant without significant stenosis, LORENZO  3 flow.     HEMODYNAMICS:  Aortic valve gradient was 41.1 mmHg.  Post valvuloplasty  aortic valve gradient was 30 mmHg.     SUMMARY:  1.  Successful balloon aortic valvuloplasty.   2.  Nonobstructive coronary artery disease.     2/7/201 Echo:  There is concentric left ventricular hypertrophy.   Ejection fraction is visually estimated to be 40-45%.  [facility]  -compression wraps in place  -low sodium diet and fluid restriction  -diuretic on hold at present    6. Pulmonary HTN  -avoid hypoxia  -diurese      Cr continues to increase on lasix infusion. Will hold temporarily until reevaluated by nephrology. ? nephrology's recs for precautions prior to CTA to complete TAVR evaluation (as outpt; CTA can only be done at [facility]). D/W Dr. Anastasiia Collazo and LAUREN Bay RN (TAVR coordinator): will proceed with carotid US and obtain 2nd opinion from Dr. Vincent Garcia for anticipated TAVR. Will arrange for CTA as outpatient next week at [facility].     Electronically signed by GRACIA Diehl CNP on 3/30/2021 at 9:37 AM

## 2021-03-31 LAB
ALBUMIN SERPL-MCNC: 3.2 G/DL (ref 3.4–5)
ANION GAP SERPL CALCULATED.3IONS-SCNC: 12 MMOL/L (ref 3–16)
BUN BLDV-MCNC: 50 MG/DL (ref 7–20)
CALCIUM SERPL-MCNC: 9.1 MG/DL (ref 8.3–10.6)
CHLORIDE BLD-SCNC: 93 MMOL/L (ref 99–110)
CO2: 27 MMOL/L (ref 21–32)
CREAT SERPL-MCNC: 2.4 MG/DL (ref 0.6–1.2)
CREATININE URINE: 32.6 MG/DL (ref 28–259)
GFR AFRICAN AMERICAN: 23
GFR NON-AFRICAN AMERICAN: 19
GLUCOSE BLD-MCNC: 109 MG/DL (ref 70–99)
GLUCOSE BLD-MCNC: 110 MG/DL (ref 70–99)
GLUCOSE BLD-MCNC: 166 MG/DL (ref 70–99)
GLUCOSE BLD-MCNC: 177 MG/DL (ref 70–99)
GLUCOSE BLD-MCNC: 183 MG/DL (ref 70–99)
INR BLD: 2.76 (ref 0.86–1.14)
MAGNESIUM: 2.1 MG/DL (ref 1.8–2.4)
OSMOLALITY URINE: 279 MOSM/KG (ref 390–1070)
PERFORMED ON: ABNORMAL
PHOSPHORUS: 3.9 MG/DL (ref 2.5–4.9)
POTASSIUM SERPL-SCNC: 4.6 MMOL/L (ref 3.5–5.1)
PROTHROMBIN TIME: 32.3 SEC (ref 10–13.2)
SODIUM BLD-SCNC: 132 MMOL/L (ref 136–145)
SODIUM URINE: 40 MMOL/L

## 2021-03-31 PROCEDURE — 36415 COLL VENOUS BLD VENIPUNCTURE: CPT

## 2021-03-31 PROCEDURE — 94761 N-INVAS EAR/PLS OXIMETRY MLT: CPT

## 2021-03-31 PROCEDURE — 84300 ASSAY OF URINE SODIUM: CPT

## 2021-03-31 PROCEDURE — 6370000000 HC RX 637 (ALT 250 FOR IP): Performed by: INTERNAL MEDICINE

## 2021-03-31 PROCEDURE — 83935 ASSAY OF URINE OSMOLALITY: CPT

## 2021-03-31 PROCEDURE — 2060000000 HC ICU INTERMEDIATE R&B

## 2021-03-31 PROCEDURE — 99232 SBSQ HOSP IP/OBS MODERATE 35: CPT | Performed by: NURSE PRACTITIONER

## 2021-03-31 PROCEDURE — 2700000000 HC OXYGEN THERAPY PER DAY

## 2021-03-31 PROCEDURE — 97530 THERAPEUTIC ACTIVITIES: CPT

## 2021-03-31 PROCEDURE — 80069 RENAL FUNCTION PANEL: CPT

## 2021-03-31 PROCEDURE — 97530 THERAPEUTIC ACTIVITIES: CPT | Performed by: PHYSICAL THERAPIST

## 2021-03-31 PROCEDURE — 2580000003 HC RX 258: Performed by: INTERNAL MEDICINE

## 2021-03-31 PROCEDURE — 82570 ASSAY OF URINE CREATININE: CPT

## 2021-03-31 PROCEDURE — 85610 PROTHROMBIN TIME: CPT

## 2021-03-31 PROCEDURE — 83735 ASSAY OF MAGNESIUM: CPT

## 2021-03-31 RX ORDER — PREDNISONE 20 MG/1
20 TABLET ORAL DAILY
Qty: 10 TABLET | Refills: 0 | Status: CANCELLED | OUTPATIENT
Start: 2021-04-01 | End: 2021-04-11

## 2021-03-31 RX ORDER — WARFARIN SODIUM 1 MG/1
1 TABLET ORAL
Status: COMPLETED | OUTPATIENT
Start: 2021-03-31 | End: 2021-03-31

## 2021-03-31 RX ORDER — TORSEMIDE 20 MG/1
60 TABLET ORAL DAILY
Qty: 30 TABLET | Refills: 3 | Status: CANCELLED | OUTPATIENT
Start: 2021-04-01

## 2021-03-31 RX ORDER — POTASSIUM CHLORIDE 20 MEQ/1
20 TABLET, EXTENDED RELEASE ORAL DAILY
Qty: 60 TABLET | Refills: 3 | Status: CANCELLED | OUTPATIENT
Start: 2021-04-01

## 2021-03-31 RX ORDER — INSULIN GLARGINE 100 [IU]/ML
20 INJECTION, SOLUTION SUBCUTANEOUS NIGHTLY
Status: DISCONTINUED | OUTPATIENT
Start: 2021-03-31 | End: 2021-04-01

## 2021-03-31 RX ORDER — PREDNISONE 20 MG/1
20 TABLET ORAL DAILY
Status: DISCONTINUED | OUTPATIENT
Start: 2021-04-01 | End: 2021-04-01

## 2021-03-31 RX ADMIN — MULTIPLE VITAMINS W/ MINERALS TAB 1 TABLET: TAB at 09:15

## 2021-03-31 RX ADMIN — TORSEMIDE 60 MG: 20 TABLET ORAL at 09:16

## 2021-03-31 RX ADMIN — CETIRIZINE HYDROCHLORIDE 10 MG: 10 TABLET, FILM COATED ORAL at 09:16

## 2021-03-31 RX ADMIN — ACETAMINOPHEN 650 MG: 325 TABLET ORAL at 18:53

## 2021-03-31 RX ADMIN — INSULIN GLARGINE 20 UNITS: 100 INJECTION, SOLUTION SUBCUTANEOUS at 21:02

## 2021-03-31 RX ADMIN — SODIUM CHLORIDE, PRESERVATIVE FREE 10 ML: 5 INJECTION INTRAVENOUS at 21:03

## 2021-03-31 RX ADMIN — LATANOPROST 1 DROP: 50 SOLUTION OPHTHALMIC at 21:03

## 2021-03-31 RX ADMIN — INSULIN LISPRO 1 UNITS: 100 INJECTION, SOLUTION INTRAVENOUS; SUBCUTANEOUS at 09:14

## 2021-03-31 RX ADMIN — WARFARIN SODIUM 1 MG: 1 TABLET ORAL at 17:38

## 2021-03-31 RX ADMIN — Medication 2000 UNITS: at 09:15

## 2021-03-31 RX ADMIN — GLIPIZIDE 5 MG: 5 TABLET ORAL at 06:58

## 2021-03-31 RX ADMIN — METOPROLOL SUCCINATE 100 MG: 50 TABLET, EXTENDED RELEASE ORAL at 09:15

## 2021-03-31 RX ADMIN — PANTOPRAZOLE SODIUM 40 MG: 40 TABLET, DELAYED RELEASE ORAL at 06:58

## 2021-03-31 RX ADMIN — SODIUM CHLORIDE, PRESERVATIVE FREE 10 ML: 5 INJECTION INTRAVENOUS at 09:15

## 2021-03-31 RX ADMIN — POTASSIUM CHLORIDE 20 MEQ: 1500 TABLET, EXTENDED RELEASE ORAL at 09:16

## 2021-03-31 RX ADMIN — AMIODARONE HYDROCHLORIDE 200 MG: 200 TABLET ORAL at 09:15

## 2021-03-31 RX ADMIN — INSULIN LISPRO 1 UNITS: 100 INJECTION, SOLUTION INTRAVENOUS; SUBCUTANEOUS at 13:19

## 2021-03-31 RX ADMIN — GLIPIZIDE 5 MG: 5 TABLET ORAL at 17:38

## 2021-03-31 ASSESSMENT — PAIN DESCRIPTION - FREQUENCY: FREQUENCY: CONTINUOUS

## 2021-03-31 ASSESSMENT — PAIN - FUNCTIONAL ASSESSMENT: PAIN_FUNCTIONAL_ASSESSMENT: PREVENTS OR INTERFERES SOME ACTIVE ACTIVITIES AND ADLS

## 2021-03-31 ASSESSMENT — PAIN SCALES - GENERAL
PAINLEVEL_OUTOF10: 0
PAINLEVEL_OUTOF10: 0

## 2021-03-31 ASSESSMENT — PAIN DESCRIPTION - ORIENTATION: ORIENTATION: OTHER (COMMENT)

## 2021-03-31 ASSESSMENT — PAIN DESCRIPTION - ONSET: ONSET: ON-GOING

## 2021-03-31 ASSESSMENT — PAIN DESCRIPTION - PROGRESSION: CLINICAL_PROGRESSION: GRADUALLY WORSENING

## 2021-03-31 ASSESSMENT — PAIN DESCRIPTION - DESCRIPTORS: DESCRIPTORS: HEADACHE

## 2021-03-31 NOTE — PLAN OF CARE
Problem: Skin Integrity:  Goal: Will show no infection signs and symptoms  Description: Will show no infection signs and symptoms  Outcome: Ongoing  Note: Skin assessment completed every shift. Pt assessed for incontinence, appropriate barrier cream applied prn. Pt encouraged to turn/rotate every 2 hours. Assistance provided if pt unable to do so themselves. Problem: Skin Integrity:  Goal: Absence of new skin breakdown  Description: Absence of new skin breakdown  Outcome: Ongoing  Note: No signs of new skin breakdown. Problem: Falls - Risk of:  Goal: Will remain free from falls  Description: Will remain free from falls  Outcome: Ongoing  Note: Fall risk assessment completed every shift. All precautions in place. Pt has call light within reach at all times. Room clear of clutter. Pt aware to call for assistance when getting up. Pt is compliant with calling for help prior to getting up. Problem: Falls - Risk of:  Goal: Absence of physical injury  Description: Absence of physical injury  Outcome: Ongoing  Note: No signs of physical injury. Problem: OXYGENATION/RESPIRATORY FUNCTION  Goal: Patient will maintain patent airway  Outcome: Ongoing  Note: Pt's airway is patent. No cough noted. No sputum seen. Problem: OXYGENATION/RESPIRATORY FUNCTION  Goal: Patient will achieve/maintain normal respiratory rate/effort  Description: Respiratory rate and effort will be within normal limits for the patient  Outcome: Ongoing  Note: Pt's respirations are easy even no distress at rest. She does become short of breath with activity. Problem: HEMODYNAMIC STATUS  Goal: Patient has stable vital signs and fluid balance  Outcome: Ongoing  Note: VSS. Labs being monitored. Weights checked daily. Problem: FLUID AND ELECTROLYTE IMBALANCE  Goal: Fluid and electrolyte balance are achieved/maintained  Outcome: Ongoing  Note: Labs checked daily. I/O being monitored. Weights checked daily.      Problem: ACTIVITY INTOLERANCE/IMPAIRED MOBILITY  Goal: Mobility/activity is maintained at optimum level for patient  Outcome: Ongoing  Note: Pt has been up in the chair most of the shift. Staff has used the brittney steemerita to assist with transfers and pt tolerates well.

## 2021-03-31 NOTE — PROGRESS NOTES
Tolerance: pt fatigues quickly     Patient Diagnosis(es): The primary encounter diagnosis was Acute on chronic congestive heart failure, unspecified heart failure type ([address] A diagnosis of Chronic kidney disease, unspecified CKD stage was also pertinent to this visit. has a past medical history of Anemia, Aortic stenosis, Atrial fibrillation (HCC), Chronic kidney disease, Combined systolic and diastolic congestive heart failure ([address] DVT (deep venous thrombosis) ([address] Hyperlipidemia, Hypertension, Osteoarthritis, Pulmonary embolism ([address] Sarcoidosis, Thyroid disease, and Type II or unspecified type diabetes mellitus without mention of complication, not stated as uncontrolled. has a past surgical history that includes Hysterectomy; knee surgery; joint replacement (Bilateral); joint replacement (Bilateral); Intracapsular cataract extraction (Left, 10/16/2020); and other surgical history (02/15/2021). Restrictions  Restrictions/Precautions  Restrictions/Precautions: Fall Risk  Position Activity Restriction  Other position/activity restrictions: JARROD Hairston  Subjective   General  Chart Reviewed: Yes  Additional Pertinent Hx: The patientis known to have chronic congestive heart failure, chronic atrialfibrillation, and had significant aortic stenosis treated in 02/2021with balloon valvuloplasty. The patient is currently being consideredfor TAVR. The patient has previously been hospitalized for increasedcongestive heart failure. Present illness began several days ago withincreased shortness of breath at rest as well as increased dependent legedema up to her thighs. This was attempted to be managed in the homesetting by increasing her oral torsemide. Unfortunately, the shortnessof breath and edema persisted. The family contacted her nephrologist,Dr. Derrek Diez, and he recommended transportation to the emergency room [facility] for further diagnosis and treatment.   Response To Previous Treatment: Patient with no complaints from previous session. Family / Caregiver Present: Yes(2 daughters in room)  Referring Practitioner: Dr Woodrow Medina: nursing requesting assist to get pt up to chair          Orientation  Orientation  Overall Orientation Status: Within Functional Limits  Cognition   Cognition  Overall Cognitive Status: WFL  Objective   Bed mobility  Supine to Sit: Maximum assistance  Scooting: Maximal assistance  Transfers  Sit to Stand:  Moderate Assistance;Maximum Assistance;2 Person Assistance(with brittney stedy)  Stand to sit: Moderate Assistance;2 Person Assistance(from brittney stedy)  Bed to Chair: Dependent/Total(with brittney stedy)  Comment: maxi move pad in chair under pt to assist nursing getting pt back to bed        Balance  Comments: varied sitting balance from SBA to min/mod A (pt leans posteriorly)            Comment: assisted with positioning in chair for comfort with LEs elevated and all needs in reach              G-Code     OutComes Score                                                     AM-PAC Score  AM-PAC Inpatient Mobility Raw Score : 9 (03/29/21 0852)  AM-PAC Inpatient T-Scale Score : 30.55 (03/29/21 0852)  Mobility Inpatient CMS 0-100% Score: 81.38 (03/29/21 0852)  Mobility Inpatient CMS G-Code Modifier : CM (03/29/21 7687)          Goals  Short term goals  Time Frame for Short term goals: by discharge  Short term goal 1: bed mob mod A  Short term goal 2: transfers with mod A of 2  Short term goal 3: progress to gait as regulo  Patient Goals   Patient goals : to go home and have her children look after her    Plan    Plan  Times per week: 3-5  Current Treatment Recommendations: Functional Mobility Training  Safety Devices  Type of devices: Call light within reach, Chair alarm in place, Left in chair, Nurse notified, All fall risk precautions in place, Gait belt     Therapy Time   Individual Concurrent Group Co-treatment   Time In 1130         Time Out 1155         Minutes 25 LINDA MAE, PT    [facility], PT, [phone]

## 2021-03-31 NOTE — PROGRESS NOTES
Hector Wilde is a 80 y.o. female patient.     Current Facility-Administered Medications   Medication Dose Route Frequency Provider Last Rate Last Admin    insulin glargine (LANTUS) injection vial 20 Units  20 Units Subcutaneous Nightly Rubin Topete MD       Rand Carbone ON 4/1/2021] predniSONE (DELTASONE) tablet 20 mg  20 mg Oral Daily Rubin Topete MD        torsemide [facility]) tablet 60 mg  60 mg Oral Daily Perezi MD Nate   60 mg at 03/30/21 1821    [Held by provider] allopurinol (ZYLOPRIM) tablet 200 mg  200 mg Oral Daily Rubin Topete MD   200 mg at 03/27/21 1036    amiodarone (CORDARONE) tablet 200 mg  200 mg Oral Daily Rubin Topete MD   200 mg at 03/30/21 0915    cetirizine (ZYRTEC) tablet 10 mg  10 mg Oral Daily Rubin Topete MD   10 mg at 03/30/21 0915    Vitamin D (CHOLECALCIFEROL) tablet 2,000 Units  2,000 Units Oral Daily Rubin Topete MD   2,000 Units at 03/30/21 0915    glipiZIDE (GLUCOTROL) tablet 5 mg  5 mg Oral BID AC Rubin Topete MD   5 mg at 03/31/21 0658    latanoprost (XALATAN) 0.005 % ophthalmic solution 1 drop  1 drop Right Eye Nightly Rubin Topete MD   1 drop at 03/30/21 2139    therapeutic multivitamin-minerals 1 tablet  1 tablet Oral Daily Rubin Topete MD   1 tablet at 03/30/21 0915    pantoprazole (PROTONIX) tablet 40 mg  40 mg Oral Daily Rubin Topete MD   40 mg at 03/31/21 1812    0.9 % sodium chloride infusion  25 mL Intravenous PRN Rubin Topete MD        [facility]dansetron [facility]) injection 4 mg  4 mg Intravenous Q6H PRN Rubin Topete MD        polyethylene glycol [facility]) packet 17 g  17 g Oral Daily PRN Rubin Topete MD        acetaminophen (TYLENOL) tablet 650 mg  650 mg Oral Q6H PRN Rubin Topete MD        Or    acetaminophen (TYLENOL) suppository 650 mg  650 mg Rectal Q6H PRN Rubin Topete MD        traMADol [facility] Wheat[facility]) tablet 50 mg  50 mg Oral Q6H PRN Angy Ramachandran Imelda Dillon MD        insulin lispro (HUMALOG) injection vial 0-6 Units  0-6 Units Subcutaneous TID WC Em Garrett MD   2 Units at 03/30/21 1821    insulin lispro (HUMALOG) injection vial 0-3 Units  0-3 Units Subcutaneous Nightly Em Garrett MD   1 Units at 03/30/21 2139    LORazepam (ATIVAN) tablet 0.5 mg  0.5 mg Oral Q4H PRN Em Garrett MD        prochlorperazine (COMPAZINE) tablet 5 mg  5 mg Oral Q6H PRN Em Garrett MD        potassium chloride (KLOR-CON M) extended release tablet 20 mEq  20 mEq Oral Daily Em Garrett MD   20 mEq at 03/30/21 0915    warfarin (COUMADIN) daily dosing (placeholder)   Other RX Placegetachew Garrett MD   Given at 03/27/21 0113    glucose (GLUTOSE) 40 % oral gel 15 g  15 g Oral PRN Em Garrett MD        dextrose 50 % IV solution  12.5 g Intravenous PRN Em Garrett MD        glucagon (rDNA) injection 1 mg  1 mg Intramuscular PRN Em Garrett MD        dextrose 5 % solution  100 mL/hr Intravenous PRN Em Garrett MD        lidocaine PF 1 % injection 5 mL  5 mL Intradermal Once Em Garrett MD        sodium chloride flush 0.9 % injection 10 mL  10 mL Intravenous 2 times per day Em Garrett MD   10 mL at 03/30/21 2143    sodium chloride flush 0.9 % injection 10 mL  10 mL Intravenous PRN Em Garrett MD        melatonin tablet 3 mg  3 mg Oral Nightly PRN Em Garrett MD   3 mg at 03/28/21 2221    triamcinolone (KENALOG) 0.1 % cream   Topical BID DAWNN Em Garrett MD   Given at 03/27/21 1415    metoprolol succinate (TOPROL XL) extended release tablet 100 mg  100 mg Oral Daily Prateek Hannon MD   100 mg at 03/30/21 0915    diphenhydrAMINE (BENADRYL) tablet 25 mg  25 mg Oral Q6H PRN Em Garrett MD   25 mg at 03/28/21 2221     Allergies   Allergen Reactions    Naproxen Itching    Bactrim [Sulfamethoxazole-Trimethoprim]     Levofloxacin     Pcn [Penicillins]     Sulfa Antibiotics      Active Problems:    Acute on chronic combined systolic and diastolic congestive heart failure (HCC)    CHF (congestive heart failure), NYHA class I, acute on chronic, combined (HCC)    Acute on chronic congestive heart failure (HCC)    Bilateral leg edema  Resolved Problems:    * No resolved hospital problems. *    Blood pressure 124/89, pulse 78, temperature 97.4 °F (36.3 °C), temperature source Axillary, resp. rate 18, height 5' 6\" (1.676 m), weight 264 lb 1.8 oz (119.8 kg), SpO2 93 %, not currently breastfeeding. Subjective Feels better. Less itching. Objective A&O, on O2, CTA, IIR&R,trace edema. Assessment & Plan  PT/OT, home O2 eval. Possible D/C Thursday.   Donna Heath MD  3/31/2021

## 2021-03-31 NOTE — PROGRESS NOTES
Nephrology Progress Note  [phone]   [email or web address].        Reason for Consult:  CKD    HISTORY OF PRESENT ILLNESS:                This is a patient with significant past medical history of CKD stage 4 baseline SCr high 1, low 2 range, CHF, AS, s/p valuloplasty, DM2. A.fib, HTN, DVT who presented with to ER after noted to have bradycardia with HR to 30's. She also reports, SOB, weight gain, increased BLE edema. She was seen by Dr. Marie Lau on 3/17-dose of torsemide increased from 40mg to 40mg qam and 20mg qpm that visit. She is started lasix 80mg IV bid since this am.  UO is good, BP is stable. Scr is stable. Wt is down by 3#. She denies N/V/CP/f/c/LH/dizziness.  -family at bedside-states compliant with diuretics  -had diffuse pruritic rash that is getting better-no new meds added      Subjective:  -pt seen and examined  -PMSHx and meds reviewed  -family at bedside    No acute events ON  BP stable   Seen by CTS, plan for carotid US   Back on 1L oxygen  Appetite is good      ROS: neg chest pain/SOB    PHYSICAL EXAM:    Vitals:    /89   Pulse 78   Temp 97.4 °F (36.3 °C) (Axillary)   Resp 18   Ht 5' 6\" (1.676 m)   Wt 264 lb 1.8 oz (119.8 kg)   LMP  (LMP Unknown)   SpO2 93%   BMI 42.63 kg/m²   I/O last 3 completed shifts: In: 315 [P.O.:315]  Out: 400 [Urine:400]  I/O this shift:  In: -   Out: 500 [Urine:500]    Physical Exam:  Gen: Resting in bed, NAD.  obese  HEENT: anicteric, NC/AT  CV: +S1/S2, irregular  Lungs: Good respiratory effort, diminished in bases  Abd: S/NT +BS-obese  Ext: ++edema, no cyanosis, bilateral chronic venous stasis skin changs  Skin: Warm. Diffuse rash clearing  : No TTP over bladder, nondistended. Neuro: Alert and oriented x 3, nonfocal.  Joints: No erythema noted over joints.     DATA:    CBC:   Lab Results   Component Value Date    WBC 6.6 03/30/2021    RBC 3.46 03/30/2021    HGB 10.5 03/30/2021    HCT 32.1 03/30/2021    MCV 92.9 03/30/2021    MCH 30.3 03/30/2021    MCHC 32.7 03/30/2021    RDW 22.4 03/30/2021     03/30/2021    MPV 8.3 03/30/2021     BMP:    Lab Results   Component Value Date     03/31/2021    K 4.6 03/31/2021    K 4.7 02/07/2021    CL 93 03/31/2021    CO2 27 03/31/2021    BUN 50 03/31/2021    LABALBU 3.2 03/31/2021    CREATININE 2.4 03/31/2021    CALCIUM 9.1 03/31/2021    GFRAA 23 03/31/2021    GFRAA 51 12/20/2012    LABGLOM 19 03/31/2021    GLUCOSE 177 03/31/2021       IMPRESSION/RECOMMENDATIONS:      DAVION on CKD stage 4: baseline 1.8-2.2  -off lasix drip, started on torsemide 60mg 3/30  -Cr is 2.4 today  -will maintain on current dose of torsemide  -will need to tolerate higher cr level to maintain fluid status      CKD stage 4: baseline SCr now ranging from 1.8-2.2-sees Dr. Yasmine Palacios, presumed DN/HTN NS/DAVION on CKD/chronic hypoperfusion  -Cr slightly above baseline due to CHF/diuresis  -on torsemide daily-continue same  -tolerate some degree of azotemia to achieve diuresis    FEN:   Hyponatremia: hypervolemic-gradually improving  Low Mg: replaced    CHF: hypervolemic  -transition to torsemide, hold metolazone for now given hyponatremia and SCr trend    AS:  -considering TAVR in future-h/o valvuloplasty  -per cardiology  -plan for CTA in future, per discussion, OPHELIA risk high at baseline Cr of 1.9-2.2, age and other co-morbidities. Optimize volume status prior to contrast exposure-as long as Cr not substantially higher than baseline, should be OK to proceed w/ CTA. May consider pre-hydration depending on her volume status prior to contrast.  I have discussed at length with daughter at bedside with patient  They understand the risk (> 50% with > 10-15% risk of dialysis requiring DAVION) and are willing to proceed.   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 I will continue to follow along. Please call with questions.     Kailey Graham MD

## 2021-03-31 NOTE — PROGRESS NOTES
[address] 81   Daily Progress Note      Admit Date:  3/26/2021    Reason for follow up visit: CHF    CC: \"I'm feeling better. \"    79 y/o female with PMH significant for aortic stenosis/S/P BAV in 2/2021, CKD (Dr. Vivi Busch), chronic systolic and diastolic HF, persistent atrial fib, PE/DVT, HTN, HLP and anemia who was admitted to [facility] after presenting with LE edema and SOB. She was undergoing PT and noted to be bradycardic associated with LE edema and SOB. She was placed on IV lasix infusion for diuresis.  She is currently on po diuretic and remains on O2. Plan is for home O2 evaluation    Interval History:  Pt. seen and examined; records reviewed  BP noted. Remains on O2 @ 1L  Wt today 264#. Net diuresis -1.6L  Now on po diuretic  SOB with exertion, but improved from admission  + edema LE improving    Subjective:  Pt with no acute overnight cardiac events. Denies chest pain, cough, palpitations or dizziness  + SOBOE    Review of Systems:   · Constitutional: no unanticipated weight loss. There's been no change in energy level, sleep pattern, or activity level. No fevers, chills. · Eyes: No visual changes or diplopia. No scleral icterus. · ENT: No Headaches, hearing loss or vertigo. No mouth sores or sore throat. · Cardiovascular: as reviewed in HPI  · Respiratory: No cough or wheezing, no sputum production. No hematemesis. · Gastrointestinal: No abdominal pain, appetite loss, blood in stools. No change in bowel or bladder habits. · Genitourinary: No dysuria, trouble voiding, or hematuria. · Musculoskeletal:  No gait disturbance, no joint complaints. · Integumentary: No rash or pruritis. · Neurological: No headache, diplopia, change in muscle strength, numbness or tingling. · Psychiatric: No anxiety or depression. · Endocrine: No temperature intolerance. No excessive thirst, fluid intake, or urination. No tremor.   · Hematologic/Lymphatic: No abnormal bruising or bleeding, blood clots or swollen lymph nodes. · Allergic/Immunologic: No nasal congestion or hives. Objective:   /89   Pulse 78   Temp 97.4 °F (36.3 °C) (Axillary)   Resp 18   Ht 5' 6\" (1.676 m)   Wt 264 lb 1.8 oz (119.8 kg)   LMP  (LMP Unknown)   SpO2 93%   BMI 42.63 kg/m²       Intake/Output Summary (Last 24 hours) at 3/31/2021 1028  Last data filed at 3/31/2021 0921  Gross per 24 hour   Intake 635 ml   Output 900 ml   Net -265 ml     Wt Readings from Last 3 Encounters:   03/31/21 264 lb 1.8 oz (119.8 kg)   02/25/21 259 lb (117.5 kg)   02/23/21 248 lb 3.2 oz (112.6 kg)       Physical Exam:  General: In no acute distress. Awake, alert, and oriented X4. Assisted back to bed  Skin:  Warm and dry. Neck:  Supple. + mild JVD  Chest: Lungs clear to auscultation. No wheezes/rhonchi/rales  Cardiovascular:  Irreg; I/VI systolic murmur   Abdomen: obese, soft, nontender, +bowel sounds.    Extremities:  1+ bilateral pretibial edema; chronic stasis changes noted to bilateral lower legs; 1-2+ hip/upper thigh edema; 2+ bilateral radial pulses    Medications:    insulin glargine  20 Units Subcutaneous Nightly    [START ON 4/1/2021] predniSONE  20 mg Oral Daily    warfarin  1 mg Oral Once    torsemide  60 mg Oral Daily    [Held by provider] allopurinol  200 mg Oral Daily    amiodarone  200 mg Oral Daily    cetirizine  10 mg Oral Daily    Vitamin D  2,000 Units Oral Daily    glipiZIDE  5 mg Oral BID AC    latanoprost  1 drop Right Eye Nightly    therapeutic multivitamin-minerals  1 tablet Oral Daily    pantoprazole  40 mg Oral Daily    insulin lispro  0-6 Units Subcutaneous TID WC    insulin lispro  0-3 Units Subcutaneous Nightly    potassium chloride  20 mEq Oral Daily    warfarin (COUMADIN) daily dosing (placeholder)   Other RX Placeholder    lidocaine 1 % injection  5 mL Intradermal Once    sodium chloride flush  10 mL Intravenous 2 times per day    metoprolol succinate  100 mg Oral Daily      sodium chloride      dextrose       sodium chloride, [DISCONTINUED] promethazine **OR** ondansetron, polyethylene glycol, acetaminophen **OR** acetaminophen, traMADol, LORazepam, prochlorperazine, glucose, dextrose, glucagon (rDNA), dextrose, sodium chloride flush, melatonin, triamcinolone, diphenhydrAMINE    Lab Data:  CBC:   Recent Labs     03/30/21 0432   WBC 6.6   HGB 10.5*        BMP:    Recent Labs     03/29/21 0446 03/30/21 0432 03/31/21 0440   * 131* 132*   K 4.7 4.6 4.6   CO2 27 27 27   BUN 39* 45* 50*   CREATININE 2.1* 2.3* 2.4*     LIVR: No results for input(s): AST, ALT in the last 72 hours. INR:    Recent Labs     03/29/21 0446 03/30/21 0432 03/31/21 0440   INR 2.46* 2.75* 2.76*     Results for Brittney Amezcua (MRN [id number]) as of 3/31/2021 10:33   Ref. Range 3/26/2021 17:26 3/27/2021 04:17 3/29/2021 04:46   Pro-BNP Latest Ref Range: 0 - 449 pg/mL 16,780 (H)  11,658 (H)   Troponin Latest Ref Range: <0.01 ng/mL 0.02 (H)     Cholesterol, Total Latest Ref Range: 0 - 199 mg/dL  117    HDL Cholesterol Latest Ref Range: 40 - 60 mg/dL  41    LDL Calculated Latest Ref Range: <100 mg/dL  66    Triglycerides Latest Ref Range: 0 - 150 mg/dL  52    VLDL Cholesterol Calculated Latest Ref Range: Not Established mg/dL  10      3/30/2021 Carotid US:   No hemodynamically significant carotid stenosis noted on either side.    Normal vertebral artery blood flow bilaterally    Low blood flow velocities which may be cardiac related.    Very irregular cardiac rhythm     2/10/2021 [facility]:  ANGIOGRAPHIC FINDINGS:  1.  Left main is normal, LORENZO 3 flow, no stenosis. 2.  Left anterior descending artery has LORENZO 3 flow with mild luminal  irregularities. 3.  Left circumflex is patent without significant stenosis.   4.  Right coronary artery is dominant without significant stenosis, LORENZO  3 flow.     HEMODYNAMICS:  Aortic valve gradient was 41.1 mmHg.  Post valvuloplasty  aortic valve gradient was 30 mmHg.     SUMMARY:  1.  Successful balloon aortic valvuloplasty. 2.  Nonobstructive coronary artery disease.     2/7/201 Echo:  There is concentric left ventricular hypertrophy.   Ejection fraction is visually estimated to be 40-45%.   Grade III diastolic dysfunction with elevated LV filling pressures.   The left atrium is severely dilated.   Severe aortic stenosis with a peak velocity of 5.23m/s and a mean pressure   gradient of 66mmHg.   No evidence of significant aortic valve regurgitation.   Dilated right ventricle.   Right ventricular systolic function is mildly reduced .   Estimated pulmonary artery systolic pressure is elevated at 56 mmHg assuming   a right atrial pressure of 15 mmHg.   A hyperechoic structure is seen in liver pushing on IVC. Telemetry: Atrial fib with controlled VR    Assessment/Plan:    1. Severe aortic stenosis  -S/P BAV in February 2021  -awaiting TAVR; preop evaluation continuing  -needs CTA chest and abdomen:will be [facility]pt [facility]  -have discussed with TAVR coordinator     2. Acute on chronic combined systolic and diastolic HF  -LVEF 12-10%; NYHA class III  -now on po diuretic per nephrology  -will need continuous O2 (Home eval pending)  -chronically has LE edema: continue ACE wraps  -continue BB  -not on ACE/ARB/aldactone d/t elevated Cr and severe AS     3. Persistent atrial fibrillation  -now only on Toprol and rate controlled  -no longer on Amiodarone given persistent atrial fib  -no bradycardia noted since admission  -continue warfarin     4. CKD, stage IV  -managed by nephrology  -diuretics per nephrology  -nephrology has talked with Irene Mendoza RN (TAVR coordinator) regarding contrast use     5. LE edema  -suspect multifactorial and secondary to CHF+ venous insufficiency+ meds  -slowly improving  -continue ACE wraps  -continue diuretic  -low sodium diet and fluid restriction     6. Pulmonary HTN  -avoid hypoxia  -continue diuretic    Plan is hopefully discharge tomorrow.   Will F/U with TAVR evaluation as outpt. Will see in office next week in follow up to monitor her CHF.      Continue Toprol, KCL, torsemide and warfarin    Electronically signed by GRACIA Haider CNP on 3/31/2021 at 10:28 AM

## 2021-03-31 NOTE — PROGRESS NOTES
Bernadette Santiago is a 80 y.o. female patient.     Current Facility-Administered Medications   Medication Dose Route Frequency Provider Last Rate Last Admin    insulin glargine (LANTUS) injection vial 20 Units  20 Units Subcutaneous Nightly Fish Boyce MD       Nicolette Lozano [facility] ON 4/1/2021] predniSONE (DELTASONE) tablet 20 mg  20 mg Oral Daily Fish Boyce MD        warfarin (COUMADIN) tablet 1 mg  1 mg Oral Once Fish Boyce MD        torsemide [facility]) tablet 60 mg  60 mg Oral Daily Kylee Sullivan MD   60 mg at 03/31/21 0916    [Held by provider] allopurinol (ZYLOPRIM) tablet 200 mg  200 mg Oral Daily Fish Boyce MD   200 mg at 03/27/21 1036    cetirizine (ZYRTEC) tablet 10 mg  10 mg Oral Daily Fish Boyce MD   10 mg at 03/31/21 5608    Vitamin D (CHOLECALCIFEROL) tablet 2,000 Units  2,000 Units Oral Daily Fish Boyce MD   2,000 Units at 03/31/21 0915    glipiZIDE (GLUCOTROL) tablet 5 mg  5 mg Oral BID AC Fish Boyce MD   5 mg at 03/31/21 0658    latanoprost (XALATAN) 0.005 % ophthalmic solution 1 drop  1 drop Right Eye Nightly Fish Boyce MD   1 drop at 03/30/21 2139    therapeutic multivitamin-minerals 1 tablet  1 tablet Oral Daily Fish Boyce MD   1 tablet at 03/31/21 0915    pantoprazole (PROTONIX) tablet 40 mg  40 mg Oral Daily Fish Boyce MD   40 mg at 03/31/21 2843    0.9 % sodium chloride infusion  25 mL Intravenous PRN Fish Boyce MD        ondansetron [facility]) injection 4 mg  4 mg Intravenous Q6H PRN Fish Boyce MD        polyethylene glycol [facility]) packet 17 g  17 g Oral Daily PRN Fish Boyce MD        acetaminophen (TYLENOL) tablet 650 mg  650 mg Oral Q6H PRN Fish Boyce MD        Or    acetaminophen (TYLENOL) suppository 650 mg  650 mg Rectal Q6H PRN Fish Boyce MD        traMADol [facility]) tablet 50 mg  50 mg Oral Q6H PRN Fish Boyce MD        insulin lispro (HUMALOG) injection vial 0-6 Units  0-6 Units Subcutaneous TID WC Em Garrett MD   1 Units at 03/31/21 1319    insulin lispro (HUMALOG) injection vial 0-3 Units  0-3 Units Subcutaneous Nightly Em Garrett MD   1 Units at 03/30/21 2139    LORazepam (ATIVAN) tablet 0.5 mg  0.5 mg Oral Q4H PRN Em Garrett MD        prochlorperazine (COMPAZINE) tablet 5 mg  5 mg Oral Q6H PRN Em Garrett MD        potassium chloride (KLOR-CON M) extended release tablet 20 mEq  20 mEq Oral Daily Em Garrett MD   20 mEq at 03/31/21 0010    warfarin (COUMADIN) daily dosing (placeholder)   Other RX Placegetachew Garrett MD   Given at 03/27/21 0113    glucose (GLUTOSE) 40 % oral gel 15 g  15 g Oral PRN Em Garrett MD        dextrose 50 % IV solution  12.5 g Intravenous PRN Em Garrett MD        glucagon (rDNA) injection 1 mg  1 mg Intramuscular PRN Em Garrett MD        dextrose 5 % solution  100 mL/hr Intravenous FRANCISCO Garrett MD        lidocaine PF 1 % injection 5 mL  5 mL Intradermal Once Em Garrett MD        sodium chloride flush 0.9 % injection 10 mL  10 mL Intravenous 2 times per day Em Garrett MD   10 mL at 03/31/21 0915    sodium chloride flush 0.9 % injection 10 mL  10 mL Intravenous PRN Em Garrett MD        melatonin tablet 3 mg  3 mg Oral Nightly FRANCISCO Garrett MD   3 mg at 03/28/21 2221    triamcinolone (KENALOG) 0.1 % cream   Topical BID PRN Em Garrett MD   Given at 03/27/21 1415    metoprolol succinate (TOPROL XL) extended release tablet 100 mg  100 mg Oral Daily Prateek Hannon MD   100 mg at 03/31/21 0915    diphenhydrAMINE (BENADRYL) tablet 25 mg  25 mg Oral Q6H PRN Em Garrett MD   25 mg at 03/28/21 2221     Allergies   Allergen Reactions    Naproxen Itching    Bactrim [Sulfamethoxazole-Trimethoprim]     Levofloxacin     Pcn [Penicillins]     Sulfa Antibiotics      Active Problems:    Acute on chronic combined systolic and diastolic congestive heart failure (HCC)    CHF (congestive heart failure), NYHA class I, acute on chronic, combined (HCC)    Acute on chronic congestive heart failure (HCC)    Bilateral leg edema  Resolved Problems:    * No resolved hospital problems. *    Blood pressure 112/76, pulse 67, temperature 97.4 °F (36.3 °C), temperature source Oral, resp. rate 18, height 5' 6\" (1.676 m), weight 264 lb 1.8 oz (119.8 kg), SpO2 94 %, not currently breastfeeding. Subjective  Objective  Assessment & Plan Discussed with patients daughter this afternoon. Family does not feel that she is well enough for D/C tomorrow. Will not D/C Thursday. All parties decline SNF placement.   George Andujar MD  3/31/2021

## 2021-03-31 NOTE — PROGRESS NOTES
Occupational Therapy  Facility/Department: [address] PROGRESSIVE CARE  Daily Treatment Note  NAME: Lamin Braraza  : 1934  MRN: [id number]    Date of Service: 3/31/2021    Discharge Recommendations:  3-5 sessions per week, Patient would benefit from continued therapy after discharge  OT Equipment Recommendations  Equipment Needed: (If family wanting to take pt home as pt reports, d/t increased assistance for transfers could benefit from justus lift and/or brittney stedy at home to decrease caregiver burden)     Lamin Barraza scored a  on the AM-PAC ADL Inpatient form. Current research shows that an AM-PAC score of 17 or less is typically not associated with a discharge to the patient's home setting. Based on the patient's AM-PAC score and their current ADL deficits, it is recommended that the patient have 3-5 sessions per week of Occupational Therapy at d/c to increase the patient's independence. Please see assessment section for further patient specific details. If patient discharges prior to next session this note will serve as a discharge summary. Please see below for the latest assessment towards goals. Assessment   Performance deficits / Impairments: Decreased functional mobility ; Decreased strength;Decreased endurance;Decreased ADL status; Decreased safe awareness;Decreased sensation;Decreased high-level IADLs;Decreased posture;Decreased ROM; Decreased balance  Assessment: Pt still requiring assist x2 and use of lift equipment (brittney stedy) for fxl transfers, but less assist than previous session. Pt stood briefly in [address] [facility] stedy x3 trials with CGA, attempted to march in stedy but had difficulty lifting LE's. Pt also has decreased strength dorita shoulders L>R. Anticipate pt would require overall max A for ADLs.  At current status, AM-PAC scores is NOT associated with a homebound d/c and indicates need for ongoing skilled OT at d/c to maximize pt's safety, independence, and to decrease caregiver burden prior to return home. However, family plan to take pt home. If pt returns home, would require 24 hour assist and home OT. Also recommend lift equipment (justus lift vs brittney stedy) for fxl transfers. Prognosis: Fair  OT Education: OT Role;Plan of Care;Transfer Training  REQUIRES OT FOLLOW UP: Yes  Activity Tolerance  Activity Tolerance: Patient limited by fatigue  Safety Devices  Safety Devices in place: Yes  Type of devices: Nurse notified;Gait belt;Patient at risk for falls; Left in chair;Chair alarm in place;Call light within reach         Patient Diagnosis(es): The primary encounter diagnosis was Acute on chronic congestive heart failure, unspecified heart failure type ([address] A diagnosis of Chronic kidney disease, unspecified CKD stage was also pertinent to this visit. has a past medical history of Anemia, Aortic stenosis, Atrial fibrillation (HCC), Chronic kidney disease, Combined systolic and diastolic congestive heart failure ([address] DVT (deep venous thrombosis) ([address] Hyperlipidemia, Hypertension, Osteoarthritis, Pulmonary embolism ([address] Sarcoidosis, Thyroid disease, and Type II or unspecified type diabetes mellitus without mention of complication, not stated as uncontrolled. has a past surgical history that includes Hysterectomy; knee surgery; joint replacement (Bilateral); joint replacement (Bilateral); Intracapsular cataract extraction (Left, 10/16/2020); and other surgical history (02/15/2021). Restrictions  Restrictions/Precautions  Restrictions/Precautions: Fall Risk  Position Activity Restriction  Other position/activity restrictions: JARROD Hairston  Subjective   General  Chart Reviewed: Yes  Patient assessed for rehabilitation services?: Yes  Additional Pertinent Hx: Per H&P, \" The patientis known to have chronic congestive heart failure, chronic atrialfibrillation, and had significant aortic stenosis treated in 02/2021with balloon valvuloplasty.   The patient is currently being consideredfor TAVR. The patient has previously been hospitalized for increasedcongestive heart failure. Present illness began several days ago withincreased shortness of breath at rest as well as increased dependent legedema up to her thighs. This was attempted to be managed in the homesetting by increasing her oral torsemide. Unfortunately, the shortnessof breath and edema persisted. The family contacted her nephrologist,Dr. Marie Lau, and he recommended transportation to the emergency room [facility] for further diagnosis and treatment\"  Family / Caregiver Present: Yes(daughters)  Referring Practitioner: Josefina Dan MD  Diagnosis: CHF exacerbation  Subjective  Subjective: Pt met b/s for OT cotx with PT. Pt seated in recliner on arrival, agreeable to participate in therapy. Pt denies pain. Objective    ADL  Toileting: (catheter)     Balance  Sitting Balance: Stand by assistance(seated forward in recliner)  Standing Balance: Dependent/Total  Standing Balance  Time: ~45 seconds x3 trials  Activity: static standing stance in brittney stedy x2 trials, then marching in place in brittney stedy x1 trial  Comment: pt able to clear L LE but not R LE    Transfers  Sit to stand: Moderate assistance;2 Person assistance(EOB>brittney stedy)  Stand to sit: 2 Person assistance; Moderate assistance(brittney stedy>EOB)     Cognition  Overall Cognitive Status: WFL        Plan   Plan  Times per week: 3-5  Current Treatment Recommendations: Strengthening, Functional Mobility Training, Patient/Caregiver Education & Training, Positioning, ROM, Endurance Training, Equipment Evaluation, Education, & procurement, Balance Training, Wheelchair Mobility Training, Safety Education & Training, Self-Care / ADL             AM-PAC Score        AM-PAC Inpatient Daily Activity Raw Score: 12 (03/31/21 1338)  AM-PAC Inpatient ADL T-Scale Score : 30.6 (03/31/21 1338)  ADL Inpatient CMS 0-100% Score: 66.57 (03/31/21 1338)  ADL Inpatient CMS G-Code Modifier : CL (03/31/21 1338)    Goals  Short term goals  Time Frame for Short term goals: prior to d/c- ongoing  Short term goal 1: supine>sit Max A x1  Short term goal 2: Sitting EOB for dynamic functional task x8 min with close SBA  Short term goal 3: stand pivot with Mod A x2  Short term goal 4: Complete BUE exercises in all planes to improve strength and endurance for ADLs  Short term goal 5: rolling Min A x1  Patient Goals   Patient goals : to go home       Therapy Time   Individual Concurrent Group Co-treatment   Time In [address] [place] [address]         Time Out 1335         Minutes Essie Kaminski [address] OTR/L [id number]

## 2021-04-01 LAB
ALBUMIN SERPL-MCNC: 3.3 G/DL (ref 3.4–5)
ANION GAP SERPL CALCULATED.3IONS-SCNC: 13 MMOL/L (ref 3–16)
BUN BLDV-MCNC: 54 MG/DL (ref 7–20)
CALCIUM SERPL-MCNC: 9.2 MG/DL (ref 8.3–10.6)
CHLORIDE BLD-SCNC: 92 MMOL/L (ref 99–110)
CO2: 27 MMOL/L (ref 21–32)
CREAT SERPL-MCNC: 2.3 MG/DL (ref 0.6–1.2)
GFR AFRICAN AMERICAN: 24
GFR NON-AFRICAN AMERICAN: 20
GLUCOSE BLD-MCNC: 100 MG/DL (ref 70–99)
GLUCOSE BLD-MCNC: 119 MG/DL (ref 70–99)
GLUCOSE BLD-MCNC: 126 MG/DL (ref 70–99)
GLUCOSE BLD-MCNC: 210 MG/DL (ref 70–99)
GLUCOSE BLD-MCNC: 55 MG/DL (ref 70–99)
GLUCOSE BLD-MCNC: 59 MG/DL (ref 70–99)
GLUCOSE BLD-MCNC: 66 MG/DL (ref 70–99)
GLUCOSE BLD-MCNC: 74 MG/DL (ref 70–99)
GLUCOSE BLD-MCNC: 83 MG/DL (ref 70–99)
GLUCOSE BLD-MCNC: 85 MG/DL (ref 70–99)
INR BLD: 2.87 (ref 0.86–1.14)
MAGNESIUM: 2.1 MG/DL (ref 1.8–2.4)
PERFORMED ON: ABNORMAL
PERFORMED ON: NORMAL
PHOSPHORUS: 4 MG/DL (ref 2.5–4.9)
POTASSIUM SERPL-SCNC: 4.2 MMOL/L (ref 3.5–5.1)
PRO-BNP: ABNORMAL PG/ML (ref 0–449)
PROTHROMBIN TIME: 33.7 SEC (ref 10–13.2)
SODIUM BLD-SCNC: 132 MMOL/L (ref 136–145)

## 2021-04-01 PROCEDURE — 83880 ASSAY OF NATRIURETIC PEPTIDE: CPT

## 2021-04-01 PROCEDURE — 6370000000 HC RX 637 (ALT 250 FOR IP): Performed by: INTERNAL MEDICINE

## 2021-04-01 PROCEDURE — 94761 N-INVAS EAR/PLS OXIMETRY MLT: CPT

## 2021-04-01 PROCEDURE — 36415 COLL VENOUS BLD VENIPUNCTURE: CPT

## 2021-04-01 PROCEDURE — 2580000003 HC RX 258: Performed by: INTERNAL MEDICINE

## 2021-04-01 PROCEDURE — 2700000000 HC OXYGEN THERAPY PER DAY

## 2021-04-01 PROCEDURE — 85610 PROTHROMBIN TIME: CPT

## 2021-04-01 PROCEDURE — 99232 SBSQ HOSP IP/OBS MODERATE 35: CPT | Performed by: NURSE PRACTITIONER

## 2021-04-01 PROCEDURE — 80069 RENAL FUNCTION PANEL: CPT

## 2021-04-01 PROCEDURE — 2060000000 HC ICU INTERMEDIATE R&B

## 2021-04-01 PROCEDURE — 83735 ASSAY OF MAGNESIUM: CPT

## 2021-04-01 RX ORDER — WARFARIN SODIUM 1 MG/1
1 TABLET ORAL
Status: COMPLETED | OUTPATIENT
Start: 2021-04-01 | End: 2021-04-01

## 2021-04-01 RX ORDER — INSULIN GLARGINE 100 [IU]/ML
10 INJECTION, SOLUTION SUBCUTANEOUS NIGHTLY
Status: DISCONTINUED | OUTPATIENT
Start: 2021-04-01 | End: 2021-04-02

## 2021-04-01 RX ORDER — PREDNISONE 10 MG/1
10 TABLET ORAL DAILY
Status: DISCONTINUED | OUTPATIENT
Start: 2021-04-02 | End: 2021-04-02

## 2021-04-01 RX ADMIN — GLIPIZIDE 5 MG: 5 TABLET ORAL at 09:41

## 2021-04-01 RX ADMIN — PREDNISONE 20 MG: 20 TABLET ORAL at 09:44

## 2021-04-01 RX ADMIN — PANTOPRAZOLE SODIUM 40 MG: 40 TABLET, DELAYED RELEASE ORAL at 05:59

## 2021-04-01 RX ADMIN — MULTIPLE VITAMINS W/ MINERALS TAB 1 TABLET: TAB at 09:41

## 2021-04-01 RX ADMIN — Medication 2000 UNITS: at 09:40

## 2021-04-01 RX ADMIN — SODIUM CHLORIDE, PRESERVATIVE FREE 10 ML: 5 INJECTION INTRAVENOUS at 09:44

## 2021-04-01 RX ADMIN — INSULIN GLARGINE 10 UNITS: 100 INJECTION, SOLUTION SUBCUTANEOUS at 21:14

## 2021-04-01 RX ADMIN — POTASSIUM CHLORIDE 20 MEQ: 1500 TABLET, EXTENDED RELEASE ORAL at 09:40

## 2021-04-01 RX ADMIN — CETIRIZINE HYDROCHLORIDE 10 MG: 10 TABLET, FILM COATED ORAL at 09:41

## 2021-04-01 RX ADMIN — TORSEMIDE 60 MG: 20 TABLET ORAL at 09:39

## 2021-04-01 RX ADMIN — LATANOPROST 1 DROP: 50 SOLUTION OPHTHALMIC at 21:05

## 2021-04-01 RX ADMIN — WARFARIN SODIUM 1 MG: 1 TABLET ORAL at 18:30

## 2021-04-01 RX ADMIN — SODIUM CHLORIDE, PRESERVATIVE FREE 10 ML: 5 INJECTION INTRAVENOUS at 21:05

## 2021-04-01 RX ADMIN — METOPROLOL SUCCINATE 100 MG: 50 TABLET, EXTENDED RELEASE ORAL at 09:40

## 2021-04-01 ASSESSMENT — PAIN SCALES - GENERAL: PAINLEVEL_OUTOF10: 0

## 2021-04-01 NOTE — PLAN OF CARE
Problem: Skin Integrity:  Goal: Will show no infection signs and symptoms  Description: Will show no infection signs and symptoms  4/1/2021 0054 by Mark Morgan RN  Outcome: Ongoing  3/31/2021 1605 by Alicia Rios RN  Outcome: Ongoing  Note: Skin assessment completed every shift. Pt assessed for incontinence, appropriate barrier cream applied prn. Pt encouraged to turn/rotate every 2 hours. Assistance provided if pt unable to do so themselves. Goal: Absence of new skin breakdown  Description: Absence of new skin breakdown  4/1/2021 0054 by Mark Morgan RN  Outcome: Ongoing  3/31/2021 1605 by Alicia Rios RN  Outcome: Ongoing  Note: No signs of new skin breakdown. Problem: Falls - Risk of:  Goal: Will remain free from falls  Description: Will remain free from falls  4/1/2021 0054 by Mark Morgan RN  Outcome: Ongoing  3/31/2021 1605 by Alicia Rios RN  Outcome: Ongoing  Note: Fall risk assessment completed every shift. All precautions in place. Pt has call light within reach at all times. Room clear of clutter. Pt aware to call for assistance when getting up. Pt is compliant with calling for help prior to getting up. Goal: Absence of physical injury  Description: Absence of physical injury  4/1/2021 0054 by Mark Morgan RN  Outcome: Ongoing  3/31/2021 1605 by Alicia Rios RN  Outcome: Ongoing  Note: No signs of physical injury. Problem: OXYGENATION/RESPIRATORY FUNCTION  Goal: Patient will maintain patent airway  4/1/2021 0054 by Mark Morgan RN  Outcome: Ongoing  3/31/2021 1605 by Alicia Rios RN  Outcome: Ongoing  Note: Pt's airway is patent. No cough noted. No sputum seen.   Goal: Patient will achieve/maintain normal respiratory rate/effort  Description: Respiratory rate and effort will be within normal limits for the patient  4/1/2021 0054 by Mark Morgan RN  Outcome: Ongoing  3/31/2021 1605 by Alicia Rios RN  Outcome: Ongoing  Note: Pt's respirations are easy even no distress at rest. She does become short of breath with activity. Problem: HEMODYNAMIC STATUS  Goal: Patient has stable vital signs and fluid balance  4/1/2021 0054 by Nadia Donovan RN  Outcome: Ongoing  3/31/2021 1605 by Jam Tan RN  Outcome: Ongoing  Note: VSS. Labs being monitored. Weights checked daily. Problem: FLUID AND ELECTROLYTE IMBALANCE  Goal: Fluid and electrolyte balance are achieved/maintained  4/1/2021 0054 by Nadia Donovan RN  Outcome: Ongoing  3/31/2021 1605 by Jam Tan RN  Outcome: Ongoing  Note: Labs checked daily. I/O being monitored. Weights checked daily. Problem: ACTIVITY INTOLERANCE/IMPAIRED MOBILITY  Goal: Mobility/activity is maintained at optimum level for patient  4/1/2021 0054 by Nadia Donovan RN  Outcome: Ongoing  3/31/2021 1605 by Jam Tan RN  Outcome: Ongoing  Note: Pt has been up in the chair most of the shift. Staff has used the brittney stedy to assist with transfers and pt tolerates well.

## 2021-04-01 NOTE — PROGRESS NOTES
Lamin Barraza is a 80 y.o. female patient.     Current Facility-Administered Medications   Medication Dose Route Frequency Provider Last Rate Last Admin    warfarin (COUMADIN) tablet 1 mg  1 mg Oral Once Angel Short MD        insulin glargine (LANTUS) injection vial 10 Units  10 Units Subcutaneous Nightly Angel Short MD       [facility] predniSONE (DELTASONE) tablet 10 mg  10 mg Oral Daily Angel Short MD        torsemide [facility]) tablet 60 mg  60 mg Oral Daily Kylee Sullivan MD   60 mg at 04/01/21 0939    [Held by provider] allopurinol (ZYLOPRIM) tablet 200 mg  200 mg Oral Daily Angel Short MD   Stopped at 04/01/21 0900    cetirizine (ZYRTEC) tablet 10 mg  10 mg Oral Daily Angel Short MD   10 mg at 04/01/21 0941    Vitamin D (CHOLECALCIFEROL) tablet 2,000 Units  2,000 Units Oral Daily Angel Short MD   2,000 Units at 04/01/21 0940    glipiZIDE (GLUCOTROL) tablet 5 mg  5 mg Oral BID AC Angel Short MD   5 mg at 04/01/21 0941    latanoprost (XALATAN) 0.005 % ophthalmic solution 1 drop  1 drop Right Eye Nightly Angel Short MD   1 drop at 03/31/21 2103    therapeutic multivitamin-minerals 1 tablet  1 tablet Oral Daily Angel Short MD   1 tablet at 04/01/21 0941    pantoprazole (PROTONIX) tablet 40 mg  40 mg Oral Daily Angel Short MD   40 mg at 04/01/21 0559    0.9 % sodium chloride infusion  25 mL Intravenous PRN Angel Short MD        ondansetron [facility]) injection 4 mg  4 mg Intravenous Q6H PRN Angel Short MD        polyethylene glycol [facility]) packet 17 g  17 g Oral Daily PRN Angel Short MD        acetaminophen (TYLENOL) tablet 650 mg  650 mg Oral Q6H PRN Angel Short MD   650 mg at 03/31/21 1853    Or    acetaminophen (TYLENOL) suppository 650 mg  650 mg Rectal Q6H PRN Angel Short MD        traMADol [employer]) tablet 50 mg  50 mg Oral Q6H PRN Angel Short MD  insulin lispro (HUMALOG) injection vial 0-6 Units  0-6 Units Subcutaneous TID WC Eugenio Winter MD   1 Units at 03/31/21 1319    insulin lispro (HUMALOG) injection vial 0-3 Units  0-3 Units Subcutaneous Nightly Eugenio Winter MD   1 Units at 03/30/21 2139    LORazepam (ATIVAN) tablet 0.5 mg  0.5 mg Oral Q4H PRN Eugenio Winter MD        prochlorperazine (COMPAZINE) tablet 5 mg  5 mg Oral Q6H PRN Eugenio Winter MD        potassium chloride (KLOR-CON M) extended release tablet 20 mEq  20 mEq Oral Daily Eugenio Winter MD   20 mEq at 04/01/21 0940    warfarin (COUMADIN) daily dosing (placeholder)   Other RX Placeholder Eugenio Winter MD   Given at 03/27/21 0113    glucose (GLUTOSE) 40 % oral gel 15 g  15 g Oral PRN Eugenio Winter MD        dextrose 50 % IV solution  12.5 g Intravenous PRN Eugenio Winter MD        glucagon (rDNA) injection 1 mg  1 mg Intramuscular PRN Eugenio Winter MD        dextrose 5 % solution  100 mL/hr Intravenous PRN Eugenio Winter MD        lidocaine PF 1 % injection 5 mL  5 mL Intradermal Once Eugenio Winter MD        sodium chloride flush 0.9 % injection 10 mL  10 mL Intravenous 2 times per day Eugenio Winter MD   10 mL at 04/01/21 0944    sodium chloride flush 0.9 % injection 10 mL  10 mL Intravenous PRN Eugenio Winter MD        melatonin tablet 3 mg  3 mg Oral Nightly PRN Eugenio Winter MD   3 mg at 03/28/21 2221    triamcinolone (KENALOG) 0.1 % cream   Topical BID PRN Eugenio Winter MD   Given at 03/27/21 1415    metoprolol succinate (TOPROL XL) extended release tablet 100 mg  100 mg Oral Daily Clarence Roe MD   100 mg at 04/01/21 0940    diphenhydrAMINE (BENADRYL) tablet 25 mg  25 mg Oral Q6H PRN Eugenio Winter MD   25 mg at 03/28/21 2221     Allergies   Allergen Reactions    Naproxen Itching    Bactrim [Sulfamethoxazole-Trimethoprim]     Levofloxacin     Pcn [Penicillins]     Sulfa Antibiotics      Active Problems:    Acute on chronic combined systolic and diastolic congestive heart failure (HCC)    CHF (congestive heart failure), NYHA class I, acute on chronic, combined (HCC)    Acute on chronic congestive heart failure (HCC)    Bilateral leg edema  Resolved Problems:    * No resolved hospital problems. *    Blood pressure (!) 139/94, pulse 67, temperature 97.4 °F (36.3 °C), temperature source Oral, resp. rate 16, height 5' 6\" (1.676 m), weight 262 lb 5.6 oz (119 kg), SpO2 96 %, not currently breastfeeding. Subjective  Hypoglycemia this AM, Some GI upset. Objective A&O, CTA, IIR&R, Nl. Abd, trace edema  Assessment & Plan Reduce Lantus, Reduce prednisone, Arrange homecare. D/W patient and her daughter.     Margaretmary Hodgkin, MD  4/1/2021

## 2021-04-01 NOTE — PROGRESS NOTES
Nephrology Progress Note  [phone]   [email or web address].        Reason for Consult:  CKD    HISTORY OF PRESENT ILLNESS:                This is a patient with significant past medical history of CKD stage 4 baseline SCr high 1, low 2 range, CHF, AS, s/p valuloplasty, DM2. A.fib, HTN, DVT who presented with to ER after noted to have bradycardia with HR to 30's. She also reports, SOB, weight gain, increased BLE edema. She was seen by Dr. Mikaela Walker on 3/17-dose of torsemide increased from 40mg to 40mg qam and 20mg qpm that visit. She is started lasix 80mg IV bid since this am.  UO is good, BP is stable. Scr is stable. Wt is down by 3#. She denies N/V/CP/f/c/LH/dizziness.  -family at bedside-states compliant with diuretics  -had diffuse pruritic rash that is getting better-no new meds added      Subjective:  -pt seen and examined  -PMSHx and meds reviewed  -family at bedside    No acute events ON  BP stable   UO is good  Cr is stable  Appetite is good      ROS: neg chest pain/SOB    PHYSICAL EXAM:    Vitals:    BP (!) 139/94   Pulse 67   Temp 97.4 °F (36.3 °C) (Oral)   Resp 16   Ht 5' 6\" (1.676 m)   Wt 262 lb 5.6 oz (119 kg)   LMP  (LMP Unknown)   SpO2 96%   BMI 42.34 kg/m²   I/O last 3 completed shifts: In: 760 [P.O.:760]  Out: 1950 [Urine:1950]  No intake/output data recorded. Physical Exam:  Gen: Resting in bed, NAD.  obese  HEENT: anicteric, NC/AT  CV: +S1/S2, irregular  Lungs: Good respiratory effort, diminished in bases  Abd: S/NT +BS-obese  Ext: ++edema, no cyanosis, bilateral chronic venous stasis skin changs  Skin: Warm. Diffuse rash clearing  : No TTP over bladder, nondistended. Neuro: Alert and oriented x 3, nonfocal.  Joints: No erythema noted over joints.     DATA:    CBC:   Lab Results   Component Value Date    WBC 6.6 03/30/2021    RBC 3.46 03/30/2021    HGB 10.5 03/30/2021    HCT 32.1 03/30/2021    MCV 92.9 03/30/2021    MCH 30.3 03/30/2021    MCHC 32.7 03/30/2021    RDW 22.4 03/30/2021     03/30/2021    MPV 8.3 03/30/2021     BMP:    Lab Results   Component Value Date     04/01/2021    K 4.2 04/01/2021    K 4.7 02/07/2021    CL 92 04/01/2021    CO2 27 04/01/2021    BUN 54 04/01/2021    LABALBU 3.3 04/01/2021    CREATININE 2.3 04/01/2021    CALCIUM 9.2 04/01/2021    GFRAA 24 04/01/2021    GFRAA 51 12/20/2012    LABGLOM 20 04/01/2021    GLUCOSE 55 04/01/2021       IMPRESSION/RECOMMENDATIONS:      DAVION on CKD stage 4: baseline 1.8-2.2  -off lasix drip, started on torsemide 60mg 3/30  -Cr is 2.3 today-stable  -will maintain on current dose of torsemide  -will need to tolerate higher cr level to maintain fluid status      CKD stage 4: baseline SCr now ranging from 1.8-2.2-sees Dr. 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 Optimize volume status prior to contrast exposure-as long as Cr not substantially higher than baseline, should be OK to proceed w/ CTA. May consider pre-hydration depending on her volume status prior to contrast.  I have discussed at length with daughter at bedside with patient  They understand the risk (> 50% with > 10-15% risk of dialysis requiring DAVION) and are willing to proceed.   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 I will continue to follow along. Please call with questions.     Meli Mena MD

## 2021-04-01 NOTE — PROGRESS NOTES
Clinical Pharmacy Note  Warfarin Consult    Jenna Rahman is a 80 y.o. female receiving warfarin managed by pharmacy. Patient being bridged with none. Warfarin Indication: Afib  Target INR range: 2-3   Dose prior to admission: 2.5 mg daily    Current warfarin drug-drug interactions: Prednisone, Amiodarone (home med)    Recent Labs     03/30/21  0432 03/31/21  0440 04/01/21  0423   HGB 10.5*  --   --    HCT 32.1*  --   --    INR 2.75* 2.76* 2.87*       Assessment/Plan:    Warfarin 1 mg tonight. Daily PT/INR until stable within therapeutic range. Thank you for the consult. Will continue to follow.     Maxwell Mauricio, PharmD 4/1/2021 7:33 AM

## 2021-04-01 NOTE — PROGRESS NOTES
[address] 81   Daily Progress Note      Admit Date:  3/26/2021    Reason for follow up visit: CHF    CC: \"I'm doing okay. \"    79 y/o female with PMH significant for aortic stenosis/S/P BAV in 2/2021, CKD (Dr. Omega Bower), chronic systolic and diastolic HF, persistent atrial fib, PE/DVT, HTN, HLP and anemia who was admitted to [facility] after presenting with LE edema and SOB. She was undergoing PT and noted to be bradycardic associated with LE edema and SOB. She was placed on IV lasix infusion for diuresis.  She is currently on po diuretic and remains on O2 intermittently (for pt comfort). She needs maximal assistance to get into chair and OT/PT  has been following. Interval History:  Pt. seen and examined; records reviewed  BP noted. Using O2 @ 1L intermittently  Wt today 262#  Needs maximal assistance with activity (getting out of bed)  LE edema improving. SOB with exertion (better than admission)    Subjective:  Pt with no acute overnight cardiac events. Denies chest pain, palpitations or dizziness  + SOBOE      Review of Systems:   · Constitutional: no unanticipated weight loss. No fevers, chills. + generalized weakness  · Eyes: No visual changes or diplopia. No scleral icterus. · ENT: No Headaches, hearing loss or vertigo. No mouth sores or sore throat. · Cardiovascular: as reviewed in HPI  · Respiratory: No cough or wheezing, no sputum production. No hematemesis. · Gastrointestinal: No abdominal pain, appetite loss, blood in stools. No change in bowel or bladder habits. · Genitourinary: No dysuria, trouble voiding, or hematuria. · Musculoskeletal:  No gait disturbance, no joint complaints. · Integumentary: No rash or pruritis. · Neurological: No headache, diplopia, change in muscle strength, numbness or tingling. · Psychiatric: No anxiety or depression. · Endocrine: No temperature intolerance. No excessive thirst, fluid intake, or urination. No tremor.   · Hematologic/Lymphatic: No promethazine **OR** ondansetron, polyethylene glycol, acetaminophen **OR** acetaminophen, traMADol, LORazepam, prochlorperazine, glucose, dextrose, glucagon (rDNA), dextrose, sodium chloride flush, melatonin, triamcinolone, diphenhydrAMINE    Lab Data:  CBC:   Recent Labs     03/30/21 0432   WBC 6.6   HGB 10.5*        BMP:    Recent Labs     03/30/21 0432 03/31/21 0440 04/01/21 0423   * 132* 132*   K 4.6 4.6 4.2   CO2 27 27 27   BUN 45* 50* 54*   CREATININE 2.3* 2.4* 2.3*     LIVR: No results for input(s): AST, ALT in the last 72 hours. INR:    Recent Labs     03/30/21 0432 03/31/21 0440 04/01/21 0423   INR 2.75* 2.76* 2.87*     Results for Luis Huff (MRN [id number]) as of 4/1/2021 12:35   Ref. Range 3/26/2021 17:26 3/27/2021 04:17 3/29/2021 04:46 4/1/2021 04:23   Pro-BNP Latest Ref Range: 0 - 449 pg/mL 16,780 (H)  11,658 (H) 32,009 (H)   Troponin Latest Ref Range: <0.01 ng/mL 0.02 (H)      Cholesterol, Total Latest Ref Range: 0 - 199 mg/dL  117     HDL Cholesterol Latest Ref Range: 40 - 60 mg/dL  41     LDL Calculated Latest Ref Range: <100 mg/dL  66     Triglycerides Latest Ref Range: 0 - 150 mg/dL  52     VLDL Cholesterol Calculated Latest Ref Range: Not Established mg/dL  10       3/30/2021 Carotid US:   No hemodynamically significant carotid stenosis noted on either side.    Normal vertebral artery blood flow bilaterally    Low blood flow velocities which may be cardiac related.    Very irregular cardiac rhythm      2/10/2021 [facility]:  ANGIOGRAPHIC FINDINGS:  1.  Left main is normal, LORENZO 3 flow, no stenosis. 2.  Left anterior descending artery has LORENZO 3 flow with mild luminal  irregularities. 3.  Left circumflex is patent without significant stenosis.   4.  Right coronary artery is dominant without significant stenosis, LORENZO  3 flow.     HEMODYNAMICS:  Aortic valve gradient was 41.1 mmHg.  Post valvuloplasty  aortic valve gradient was 30 mmHg.     SUMMARY:  1.  Successful balloon aortic valvuloplasty. 2.  Nonobstructive coronary artery disease.     2/7/201 Echo:  There is concentric left ventricular hypertrophy.   Ejection fraction is visually estimated to be 40-45%.   Grade III diastolic dysfunction with elevated LV filling pressures.   The left atrium is severely dilated.   Severe aortic stenosis with a peak velocity of 5.23m/s and a mean pressure   gradient of 66mmHg.   No evidence of significant aortic valve regurgitation.   Dilated right ventricle.   Right ventricular systolic function is mildly reduced .   Estimated pulmonary artery systolic pressure is elevated at 56 mmHg assuming   a right atrial pressure of 15 mmHg.   A hyperechoic structure is seen in liver pushing on IVC. Telemetry: Atrial fib with controlled VR    Assessment/Plan:    1. Severe aortic stenosis  -S/P BAV in February 2021  -awaiting TAVR; preop evaluation continuing  -needs CTA chest and abdomen:will be scheduled as outpt at [facility] next week  -TAVR coordinator aware     2. Acute on chronic combined systolic and diastolic HF  -LVEF 93-86%; NYHA class III  -continue po diuretic  -home O2  -chronically has LE edema: continue ACE wraps (improved from admit)  -continue BB  -not on ACE/ARB/aldactone d/t elevated Cr and severe AS     3. Persistent atrial fibrillation  -rate controlled on Toprol  -Amiodarone dc'd d/t persistent atrial fib  -no bradycardia noted since admission  -continue warfarin     4. CKD, stage IV  -managed by nephrology  -diuretics per nephrology  -nephrology has talked with Sherri Quach RN (TAVR coordinator) regarding contrast use     5. LE edema  -suspect multifactorial and secondary to CHF+ venous insufficiency+ meds  -with continued improvement  -continue ACE wraps  -continue diuretic  -low sodium diet and fluid restriction      6. Pulmonary HTN  -avoid hypoxia  -continue diuretic    Stable from cardiac standpoint. She is generally weak and would benefit from OT/PT (SNF rehab).   Doubt she will tolerate inpatient ARU (will not be able to tolerate 3-4 hours a day of therapy). Discussed with Julia Dobson RN (TAVR coordinator): will schedule CTA for next week at [facility] (she will contact daughter with details)    Follow up with D. Enzweiler, CNP on 4/7/2021 @ 2PM    Discussed and reviewed low-fat/low sodium diet, monitoring of daily weights, fluid restriction, worsening signs and symptoms of heart failure and when to call, and the importance of regular exercise and activity.     OK to discharge or transfer when ok with Primary team    Discharge Cardiac Meds:  Toprol  mg daily  Klor Con 20 mEq daily  Torsemide 60 mg daily  Warfarin as directed    Electronically signed by GRACIA Haider CNP on 4/1/2021 at 11:58 AM

## 2021-04-01 NOTE — PROGRESS NOTES
Pt noted to be diaphoretic, pt BG checked and it was 59. 8 oz of juice provided. Will recheck in 15 minutes  Recheck 0627, BG 66. 4 oz more juice given. Will recheck in 15 min.   Recheck [id number], BG 83    Electronically signed by Ajit Foley RN on 4/1/2021 at 6:53 AM

## 2021-04-01 NOTE — ACP (ADVANCE CARE PLANNING)
Advance Care Planning     Advance Care Planning Activator (Inpatient)  Conversation Note      Date of ACP Conversation: 3/26/2021    Conversation Conducted with:  Traci Whitaker , daughter   ACP Activator: Vickie Izaguirre 89 Decision Maker:     Current Designated Health Care Decision Maker:     Primary Decision Maker: Jossy Morris - [phone]    Secondary Decision Maker: Nury Saunders - [phone]    Ventilation: \"If you were in your present state of health and suddenly became very ill and were unable to breathe on your own, what would your preference be about the use of a ventilator (breathing machine) if it were available to you? \"      Would the patient desire the use of ventilator (breathing machine)?: yes    \"If your health worsens and it becomes clear that your chance of recovery is unlikely, what would your preference be about the use of a ventilator (breathing machine) if it were available to you? \"     Would the patient desire the use of ventilator (breathing machine)?: Yes      Resuscitation  \"CPR works best to restart the heart when there is a sudden event, like a heart attack, in someone who is otherwise healthy. Unfortunately, CPR does not typically restart the heart for people who have serious health conditions or who are very sick. \"    \"In the event your heart stopped as a result of an underlying serious health condition, would you want attempts to be made to restart your heart (answer \"yes\" for attempt to resuscitate) or would you prefer a natural death (answer \"no\" for do not attempt to resuscitate)? \"  Yes    [] Yes   [] No   Educated Patient / Christy Schmitt regarding differences between Advance Directives and portable DNR orders.     Length of ACP Conversation in minutes:      Conversation Outcomes:  [x] ACP discussion completed  [] Existing advance directive reviewed with patient; no changes to patient's previously recorded wishes  [] New Advance Directive completed  [] Portable Do Not Rescitate prepared for Provider review and signature  [] POLST/POST/MOLST/MOST prepared for Provider review and signature      Follow-up plan:    [] Schedule follow-up conversation to continue planning  [] Referred individual to Provider for additional questions/concerns   [] Advised patient/agent/surrogate to review completed ACP document and update if needed with changes in condition, patient preferences or care setting    [x] This note routed to one or more involved healthcare providers    Daughter will address above with rest of family and if any changes they will discuss with Dr. Freddie Villatoro.     Electronically signed by Charo Lara on 4/1/2021 at 3:23 PM

## 2021-04-02 ENCOUNTER — APPOINTMENT (OUTPATIENT)
Dept: GENERAL RADIOLOGY | Age: 86
DRG: 291 | End: 2021-04-02
Payer: MEDICARE

## 2021-04-02 LAB
ALBUMIN SERPL-MCNC: 3.2 G/DL (ref 3.4–5)
AMYLASE: 76 U/L (ref 25–115)
ANION GAP SERPL CALCULATED.3IONS-SCNC: 11 MMOL/L (ref 3–16)
BASOPHILS ABSOLUTE: 0 K/UL (ref 0–0.2)
BASOPHILS RELATIVE PERCENT: 0.6 %
BUN BLDV-MCNC: 52 MG/DL (ref 7–20)
CALCIUM SERPL-MCNC: 8.9 MG/DL (ref 8.3–10.6)
CHLORIDE BLD-SCNC: 92 MMOL/L (ref 99–110)
CO2: 29 MMOL/L (ref 21–32)
CREAT SERPL-MCNC: 2.5 MG/DL (ref 0.6–1.2)
EOSINOPHILS ABSOLUTE: 0 K/UL (ref 0–0.6)
EOSINOPHILS RELATIVE PERCENT: 0.8 %
GFR AFRICAN AMERICAN: 22
GFR NON-AFRICAN AMERICAN: 18
GLUCOSE BLD-MCNC: 130 MG/DL (ref 70–99)
GLUCOSE BLD-MCNC: 142 MG/DL (ref 70–99)
GLUCOSE BLD-MCNC: 44 MG/DL (ref 70–99)
GLUCOSE BLD-MCNC: 51 MG/DL (ref 70–99)
GLUCOSE BLD-MCNC: 55 MG/DL (ref 70–99)
GLUCOSE BLD-MCNC: 68 MG/DL (ref 70–99)
GLUCOSE BLD-MCNC: 69 MG/DL (ref 70–99)
GLUCOSE BLD-MCNC: 70 MG/DL (ref 70–99)
GLUCOSE BLD-MCNC: 76 MG/DL (ref 70–99)
GLUCOSE BLD-MCNC: 83 MG/DL (ref 70–99)
HCT VFR BLD CALC: 35.6 % (ref 36–48)
HEMOGLOBIN: 11.7 G/DL (ref 12–16)
INR BLD: 3.42 (ref 0.86–1.14)
LIPASE: 23 U/L (ref 13–60)
LYMPHOCYTES ABSOLUTE: 1.1 K/UL (ref 1–5.1)
LYMPHOCYTES RELATIVE PERCENT: 17.6 %
MCH RBC QN AUTO: 30.5 PG (ref 26–34)
MCHC RBC AUTO-ENTMCNC: 32.7 G/DL (ref 31–36)
MCV RBC AUTO: 93.2 FL (ref 80–100)
MONOCYTES ABSOLUTE: 0.6 K/UL (ref 0–1.3)
MONOCYTES RELATIVE PERCENT: 10.8 %
NEUTROPHILS ABSOLUTE: 4.2 K/UL (ref 1.7–7.7)
NEUTROPHILS RELATIVE PERCENT: 70.2 %
PDW BLD-RTO: 23 % (ref 12.4–15.4)
PERFORMED ON: ABNORMAL
PERFORMED ON: NORMAL
PHOSPHORUS: 3.9 MG/DL (ref 2.5–4.9)
PLATELET # BLD: 170 K/UL (ref 135–450)
PMV BLD AUTO: 8.8 FL (ref 5–10.5)
POTASSIUM SERPL-SCNC: 4.4 MMOL/L (ref 3.5–5.1)
PROTHROMBIN TIME: 40.2 SEC (ref 10–13.2)
RBC # BLD: 3.82 M/UL (ref 4–5.2)
SODIUM BLD-SCNC: 132 MMOL/L (ref 136–145)
WBC # BLD: 6 K/UL (ref 4–11)

## 2021-04-02 PROCEDURE — 1200000000 HC SEMI PRIVATE

## 2021-04-02 PROCEDURE — 36415 COLL VENOUS BLD VENIPUNCTURE: CPT

## 2021-04-02 PROCEDURE — 71045 X-RAY EXAM CHEST 1 VIEW: CPT

## 2021-04-02 PROCEDURE — 2580000003 HC RX 258: Performed by: INTERNAL MEDICINE

## 2021-04-02 PROCEDURE — 85025 COMPLETE CBC W/AUTO DIFF WBC: CPT

## 2021-04-02 PROCEDURE — 6370000000 HC RX 637 (ALT 250 FOR IP): Performed by: INTERNAL MEDICINE

## 2021-04-02 PROCEDURE — 83690 ASSAY OF LIPASE: CPT

## 2021-04-02 PROCEDURE — 85610 PROTHROMBIN TIME: CPT

## 2021-04-02 PROCEDURE — 2700000000 HC OXYGEN THERAPY PER DAY

## 2021-04-02 PROCEDURE — 6360000002 HC RX W HCPCS: Performed by: INTERNAL MEDICINE

## 2021-04-02 PROCEDURE — 80069 RENAL FUNCTION PANEL: CPT

## 2021-04-02 PROCEDURE — 94761 N-INVAS EAR/PLS OXIMETRY MLT: CPT

## 2021-04-02 PROCEDURE — 82150 ASSAY OF AMYLASE: CPT

## 2021-04-02 RX ORDER — PREDNISONE 1 MG/1
5 TABLET ORAL DAILY
Status: DISCONTINUED | OUTPATIENT
Start: 2021-04-03 | End: 2021-04-03

## 2021-04-02 RX ORDER — PROMETHAZINE HYDROCHLORIDE 25 MG/ML
6.25 INJECTION, SOLUTION INTRAMUSCULAR; INTRAVENOUS EVERY 6 HOURS PRN
Status: DISCONTINUED | OUTPATIENT
Start: 2021-04-02 | End: 2021-04-05 | Stop reason: HOSPADM

## 2021-04-02 RX ORDER — MORPHINE SULFATE 2 MG/ML
2 INJECTION, SOLUTION INTRAMUSCULAR; INTRAVENOUS EVERY 4 HOURS PRN
Status: DISCONTINUED | OUTPATIENT
Start: 2021-04-02 | End: 2021-04-05 | Stop reason: HOSPADM

## 2021-04-02 RX ORDER — SODIUM CHLORIDE 9 MG/ML
INJECTION, SOLUTION INTRAVENOUS CONTINUOUS
Status: DISCONTINUED | OUTPATIENT
Start: 2021-04-02 | End: 2021-04-03

## 2021-04-02 RX ORDER — HALOPERIDOL 5 MG/ML
1 INJECTION INTRAMUSCULAR EVERY 6 HOURS PRN
Status: DISCONTINUED | OUTPATIENT
Start: 2021-04-02 | End: 2021-04-03

## 2021-04-02 RX ORDER — HALOPERIDOL 5 MG/ML
1 INJECTION INTRAMUSCULAR EVERY 6 HOURS PRN
Status: DISCONTINUED | OUTPATIENT
Start: 2021-04-02 | End: 2021-04-05 | Stop reason: HOSPADM

## 2021-04-02 RX ADMIN — LATANOPROST 1 DROP: 50 SOLUTION OPHTHALMIC at 20:37

## 2021-04-02 RX ADMIN — POTASSIUM CHLORIDE 20 MEQ: 1500 TABLET, EXTENDED RELEASE ORAL at 08:26

## 2021-04-02 RX ADMIN — PANTOPRAZOLE SODIUM 40 MG: 40 TABLET, DELAYED RELEASE ORAL at 08:26

## 2021-04-02 RX ADMIN — ONDANSETRON 4 MG: 2 INJECTION INTRAMUSCULAR; INTRAVENOUS at 11:24

## 2021-04-02 RX ADMIN — MULTIPLE VITAMINS W/ MINERALS TAB 1 TABLET: TAB at 08:26

## 2021-04-02 RX ADMIN — METOPROLOL SUCCINATE 100 MG: 50 TABLET, EXTENDED RELEASE ORAL at 08:26

## 2021-04-02 RX ADMIN — Medication 3 MG: at 20:37

## 2021-04-02 RX ADMIN — CETIRIZINE HYDROCHLORIDE 10 MG: 10 TABLET, FILM COATED ORAL at 08:26

## 2021-04-02 RX ADMIN — PREDNISONE 10 MG: 10 TABLET ORAL at 08:26

## 2021-04-02 RX ADMIN — Medication 2000 UNITS: at 08:26

## 2021-04-02 RX ADMIN — DEXTROSE MONOHYDRATE 12.5 G: 25 INJECTION, SOLUTION INTRAVENOUS at 16:43

## 2021-04-02 RX ADMIN — SODIUM CHLORIDE: 9 INJECTION, SOLUTION INTRAVENOUS at 11:23

## 2021-04-02 NOTE — PROGRESS NOTES
Comprehensive Nutrition Assessment    Type and Reason for Visit:  Initial, RD Nutrition Re-Screen/LOS    Nutrition Recommendations/Plan:   Monitor meal and supplement intake  Provide Ensure Clear, Ensure Enlive and Magic Cup, one of these each meal  CHF instruction in dc information  Family seems to know diet well    Nutrition Assessment:  Pt was seen today b/o LOS. Pt admitted d/o acute on chronic CHF with PMH of CKD4, DM, A Fib, HTN. Reported poor po 2/2 nausea, and abdominal pain. Pt's  passed away about 2 weeks ago so also grief may be affecting intake. Pt's son and daughter reported that she normally eats well at home. They have received [facility] diet instructions in the past and seem well versed about that. Due to her current poor intake family agreed to trying Ensure Clear, Ensure Enlive and Magic Cup one per meal.    Malnutrition Assessment:  Malnutrition Status: Moderate malnutrition    Context:  Chronic Illness     Findings of the 6 clinical characteristics of malnutrition:  Energy Intake:   Mild decrease   Weight Loss:      No significant  Body Fat Loss:      Unable to assess  Muscle Mass Loss:     Unable to assess  Fluid Accumulation:     Severe   Strength:   Not done    Estimated Daily Nutrient Needs:  Energy (kcal):  1200 -1573 ( 10 - 15 x CBW); Weight Used for Energy Requirements:        Protein (g):  106-118 1.8 - 2 x IBW);  Weight Used for Protein Requirements:  Ideal        Fluid (ml/day):  per provider; Method Used for Fluid Requirements:         Nutrition Related Findings:  BM 3/29, +2 pitting edma BLE, Na 132, K+ and phos WNL      Wounds:  None       Current Nutrition Therapies:    DIET LOW SODIUM 2 GM; 1500 ml  Dietary Nutrition Supplements: Standard High Calorie Oral Supplement  Dietary Nutrition Supplements: Frozen Oral Supplement  Dietary Nutrition Supplements: Clear Liquid Oral Supplement    Anthropometric Measures:  · Height: 5' 6\" (167.6 cm)  · Current Body Weight: 268 lb 11.9 oz (121.9 kg)   · Admission Body Weight:      · Usual Body Weight:       · Ideal Body Weight: 130 lbs; % Ideal Body Weight 206.7 %   · BMI: 43.4  · Adjusted Body Weight:  ; No Adjustment   · Adjusted BMI:      · BMI Categories: Obese Class 3 (BMI 40.0 or greater)       Nutrition Diagnosis:   · Inadequate oral intake related to psychological cause or life stress, cardiac dysfunction as evidenced by poor intake prior to admission      Nutrition Interventions:   Food and/or Nutrient Delivery:  Continue Current Diet, Start Oral Nutrition Supplement  Nutrition Education/Counseling:  Education completed   Coordination of Nutrition Care:  Continue to monitor while inpatient    Goals:  >50% of meals and supplement consumed       Nutrition Monitoring and Evaluation:   Behavioral-Environmental Outcomes:  None Identified   Food/Nutrient Intake Outcomes:  Food and Nutrient Intake, Supplement Intake  Physical Signs/Symptoms Outcomes:  Biochemical Data, Nausea or Vomiting, Fluid Status or Edema, Nutrition Focused Physical Findings, Skin, Weight     Discharge Planning:    Continue current diet     Electronically signed by Cezar Faria RD, LD on 4/2/21 at 1:55 PM EDT    Contact: [phone]

## 2021-04-02 NOTE — PLAN OF CARE
Problem: Falls - Risk of:  Goal: Will remain free from falls  Description: Will remain free from falls  4/2/2021 1738 by Seema Bose  Outcome: Ongoing  Fall armband. Bed low and locked with alarm on. Call light and bedside table within reach of pt. Family present in room.      Problem: OXYGENATION/RESPIRATORY FUNCTION  Goal: Patient will maintain patent airway  4/2/2021 1738 by Seema Bsoe  Outcome: Ongoing  Pt O2 sat above 95%

## 2021-04-02 NOTE — PROGRESS NOTES
Physical Therapy  Juan Altamirano  [id number]  [id number]  Attempted to see for PT session but nursing requested not to see this date.  Will continue to follow  [facility], [address], [address]

## 2021-04-02 NOTE — PROGRESS NOTES
Nephrology Progress Note  [phone]   [email or web address].        Reason for Consult:  CKD    HISTORY OF PRESENT ILLNESS:                This is a patient with significant past medical history of CKD stage 4 baseline SCr high 1, low 2 range, CHF, AS, s/p valuloplasty, DM2. A.fib, HTN, DVT who presented with to ER after noted to have bradycardia with HR to 30's. She also reports, SOB, weight gain, increased BLE edema. She was seen by Dr. Donald Seth on 3/17-dose of torsemide increased from 40mg to 40mg qam and 20mg qpm that visit. She is started lasix 80mg IV bid since this am.  UO is good, BP is stable. Scr is stable. Wt is down by 3#. She denies N/V/CP/f/c/LH/dizziness.  -family at bedside-states compliant with diuretics  -had diffuse pruritic rash that is getting better-no new meds added      Subjective:  -pt seen and examined  -PMSHx and meds reviewed  -family at bedside    No acute events ON  BP stable   Has N/vomited x 1 today      ROS: neg chest pain/SOB/po nausea    PHYSICAL EXAM:    Vitals:    /87   Pulse 77   Temp 97.4 °F (36.3 °C) (Oral)   Resp 28   Ht 5' 6\" (1.676 m)   Wt 268 lb 11.9 oz (121.9 kg)   LMP  (LMP Unknown)   SpO2 94%   BMI 43.38 kg/m²   I/O last 3 completed shifts: In: 540 [P.O.:540]  Out: 1775 [[facility]:6751]  I/O this shift: In: 500 [P.O.:500]  Out: -     Physical Exam:  Gen: Resting in bed, NAD.  obese  HEENT: anicteric, NC/AT  CV: +S1/S2, irregular  Lungs: Good respiratory effort, diminished in bases  Abd: S/NT +BS-obese  Ext: ++edema, no cyanosis, bilateral chronic venous stasis skin changs  Skin: Warm. Diffuse rash clearing  : No TTP over bladder, nondistended. Neuro: Alert and oriented x 3, nonfocal.  Joints: No erythema noted over joints.     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 I will continue to follow along. Please call with questions.     John Garcia MD

## 2021-04-02 NOTE — PROGRESS NOTES
Discharge medications are ready in inpatient pharmacy for weekend delivery.     Ricarda DuongD, BCACP 04/02/21 3:52 PM

## 2021-04-02 NOTE — PROGRESS NOTES
Clinical Pharmacy Note  Warfarin Consult    Francisco Javier Melchor is a 80 y.o. female receiving warfarin managed by pharmacy. Patient being bridged with none. Warfarin Indication: Afib  Target INR range: 2-3   Dose prior to admission: 2.5 mg daily    Current warfarin drug-drug interactions: Prednisone, Amiodarone (home med)    Recent Labs     03/31/21  0440 04/01/21  0423 04/02/21  0445   HGB  --   --  11.7*   HCT  --   --  35.6*   INR 2.76* 2.87* 3.42*       Assessment/Plan:    Hold Warfarin  tonight. Daily PT/INR until stable within therapeutic range. Thank you for the consult. Will continue to follow.     Loida Staley, PharmD 4/2/2021 10:45 AM

## 2021-04-02 NOTE — PROGRESS NOTES
Pt's BG was 44 and pt was not very alert. I tried giving oral glucose but pt was not swallowing well so I administered 25 mg of D50 IV. Will repeat BG in 15 minutes and reassess.

## 2021-04-02 NOTE — PROGRESS NOTES
[place] is a 80 y.o. female patient.     Current Facility-Administered Medications   Medication Dose Route Frequency Provider Last Rate Last Admin    haloperidol lactate (HALDOL) injection 1 mg  1 mg Intramuscular Q6H PRN Gela Kent MD        haloperidol lactate (HALDOL) injection 1 mg  1 mg Intramuscular Q6H PRN Gela Kent MD        predniSONE (DELTASONE) tablet 10 mg  10 mg Oral Daily Gela Kent MD   10 mg at 04/02/21 0826    [Held by provider] torsemide (DEMADEX) tablet 60 mg  60 mg Oral Daily Kylee Sullivan MD   60 mg at 04/01/21 6988    cetirizine (ZYRTEC) tablet 10 mg  10 mg Oral Daily Gela Kent MD   10 mg at 04/02/21 8717    Vitamin D (CHOLECALCIFEROL) tablet 2,000 Units  2,000 Units Oral Daily Gela Kent MD   2,000 Units at 04/02/21 0260    glipiZIDE (GLUCOTROL) tablet 5 mg  5 mg Oral BID AC Gela Kent MD   Stopped at 04/02/21 0828    latanoprost (XALATAN) 0.005 % ophthalmic solution 1 drop  1 drop Right Eye Nightly Gela Kent MD   1 drop at 04/01/21 2105    therapeutic multivitamin-minerals 1 tablet  1 tablet Oral Daily Gela Kent MD   1 tablet at 04/02/21 0826    pantoprazole (PROTONIX) tablet 40 mg  40 mg Oral Daily Gela Kent MD   40 mg at 04/02/21 0826    0.9 % sodium chloride infusion  25 mL Intravenous PRN Gela Kent MD        ondansetron [facility]) injection 4 mg  4 mg Intravenous Q6H PRN Gela Kent MD        polyethylene glycol [facility]) packet 17 g  17 g Oral Daily PRN Gela Kent MD        acetaminophen (TYLENOL) tablet 650 mg  650 mg Oral Q6H PRN Gela Kent MD   650 mg at 03/31/21 1853    Or    acetaminophen (TYLENOL) suppository 650 mg  650 mg Rectal Q6H PRN Gela Kent MD        traMADol Josnatasha Woo) tablet 50 mg  50 mg Oral Q6H PRN Gela Kent MD        insulin lispro (HUMALOG) injection vial 0-6 Units  0-6 Units Subcutaneous TID WC Juan Johnson MD   1 Units at 03/31/21 1319    insulin lispro (HUMALOG) injection vial 0-3 Units  0-3 Units Subcutaneous Nightly Juan Johnson MD   Stopped at 04/01/21 2105    LORazepam (ATIVAN) tablet 0.5 mg  0.5 mg Oral Q4H PRN Juan Johnson MD        prochlorperazine (COMPAZINE) tablet 5 mg  5 mg Oral Q6H PRN Juan Johnson MD        potassium chloride (KLOR-CON M) extended release tablet 20 mEq  20 mEq Oral Daily Juan Johnson MD   20 mEq at 04/02/21 4206    warfarin (COUMADIN) daily dosing (placeholder)   Other RX Placeholder Juan Johnson MD   Given at 03/27/21 0113    glucose (GLUTOSE) 40 % oral gel 15 g  15 g Oral PRN Juan Johnson MD        dextrose 50 % IV solution  12.5 g Intravenous PRN Juan Johnson MD        glucagon (rDNA) injection 1 mg  1 mg Intramuscular PRN Juan Johnson MD        dextrose 5 % solution  100 mL/hr Intravenous PRN Juan Johnson MD        lidocaine PF 1 % injection 5 mL  5 mL Intradermal Once Juan Johnson MD        sodium chloride flush 0.9 % injection 10 mL  10 mL Intravenous 2 times per day Juan Johnson MD   10 mL at 04/01/21 2105    sodium chloride flush 0.9 % injection 10 mL  10 mL Intravenous PRN Juan Johnson MD        melatonin tablet 3 mg  3 mg Oral Nightly PRN Juan Johnson MD   3 mg at 03/28/21 2221    triamcinolone (KENALOG) 0.1 % cream   Topical BID PRN Juan Johnson MD   Given at 03/27/21 1415    metoprolol succinate (TOPROL XL) extended release tablet 100 mg  100 mg Oral Daily Danny Swann MD   100 mg at 04/02/21 5703    diphenhydrAMINE (BENADRYL) tablet 25 mg  25 mg Oral Q6H PRN Juan Johnson MD   25 mg at 03/28/21 2221     Allergies   Allergen Reactions    Naproxen Itching    Bactrim [Sulfamethoxazole-Trimethoprim]     Levofloxacin     Pcn [Penicillins]     Sulfa Antibiotics      Active Problems:    Acute on chronic combined systolic and diastolic congestive heart failure (HCC)    CHF (congestive heart failure), NYHA class I, acute on chronic, combined (HCC)    Acute on chronic congestive heart failure (HCC)    Bilateral leg edema  Resolved Problems:    * No resolved hospital problems. *    Blood pressure 130/87, pulse 77, temperature 97.4 °F (36.3 °C), temperature source Oral, resp. rate 28, height 5' 6\" (1.676 m), weight 268 lb 11.9 oz (121.9 kg), SpO2 94 %, not currently breastfeeding. Subjective Not doing well. Nocturnal aggitation. Poor PO intake. C/O nausea and abd. Pain.l  Objective Alert, conversant coop. looks uncomfortable, bowel sounds present, nontender abd. CTA, IIR&R, no edema. BUN up to 52. Assessment & Plan See orders.     Pam Teran MD  4/2/2021

## 2021-04-03 LAB
ALBUMIN SERPL-MCNC: 3.2 G/DL (ref 3.4–5)
ANION GAP SERPL CALCULATED.3IONS-SCNC: 11 MMOL/L (ref 3–16)
BUN BLDV-MCNC: 56 MG/DL (ref 7–20)
CALCIUM SERPL-MCNC: 9 MG/DL (ref 8.3–10.6)
CHLORIDE BLD-SCNC: 93 MMOL/L (ref 99–110)
CO2: 28 MMOL/L (ref 21–32)
CREAT SERPL-MCNC: 2.6 MG/DL (ref 0.6–1.2)
GFR AFRICAN AMERICAN: 21
GFR NON-AFRICAN AMERICAN: 17
GLUCOSE BLD-MCNC: 100 MG/DL (ref 70–99)
GLUCOSE BLD-MCNC: 104 MG/DL (ref 70–99)
GLUCOSE BLD-MCNC: 191 MG/DL (ref 70–99)
GLUCOSE BLD-MCNC: 82 MG/DL (ref 70–99)
GLUCOSE BLD-MCNC: 95 MG/DL (ref 70–99)
INR BLD: 3.36 (ref 0.86–1.14)
PERFORMED ON: ABNORMAL
PERFORMED ON: ABNORMAL
PERFORMED ON: NORMAL
PERFORMED ON: NORMAL
PHOSPHORUS: 4.7 MG/DL (ref 2.5–4.9)
POTASSIUM SERPL-SCNC: 4.5 MMOL/L (ref 3.5–5.1)
PROTHROMBIN TIME: 39.5 SEC (ref 10–13.2)
SODIUM BLD-SCNC: 132 MMOL/L (ref 136–145)

## 2021-04-03 PROCEDURE — 85610 PROTHROMBIN TIME: CPT

## 2021-04-03 PROCEDURE — 2580000003 HC RX 258: Performed by: INTERNAL MEDICINE

## 2021-04-03 PROCEDURE — 2700000000 HC OXYGEN THERAPY PER DAY

## 2021-04-03 PROCEDURE — 94761 N-INVAS EAR/PLS OXIMETRY MLT: CPT

## 2021-04-03 PROCEDURE — 80069 RENAL FUNCTION PANEL: CPT

## 2021-04-03 PROCEDURE — 1200000000 HC SEMI PRIVATE

## 2021-04-03 PROCEDURE — 6370000000 HC RX 637 (ALT 250 FOR IP): Performed by: INTERNAL MEDICINE

## 2021-04-03 PROCEDURE — 36415 COLL VENOUS BLD VENIPUNCTURE: CPT

## 2021-04-03 RX ORDER — SODIUM CHLORIDE 9 MG/ML
INJECTION, SOLUTION INTRAVENOUS CONTINUOUS
Status: DISCONTINUED | OUTPATIENT
Start: 2021-04-03 | End: 2021-04-04

## 2021-04-03 RX ADMIN — METOPROLOL SUCCINATE 100 MG: 50 TABLET, EXTENDED RELEASE ORAL at 09:10

## 2021-04-03 RX ADMIN — PREDNISONE 5 MG: 5 TABLET ORAL at 09:10

## 2021-04-03 RX ADMIN — INSULIN LISPRO 1 UNITS: 100 INJECTION, SOLUTION INTRAVENOUS; SUBCUTANEOUS at 20:48

## 2021-04-03 RX ADMIN — PANTOPRAZOLE SODIUM 40 MG: 40 TABLET, DELAYED RELEASE ORAL at 05:22

## 2021-04-03 RX ADMIN — LATANOPROST 1 DROP: 50 SOLUTION OPHTHALMIC at 20:48

## 2021-04-03 RX ADMIN — SODIUM CHLORIDE: 9 INJECTION, SOLUTION INTRAVENOUS at 13:13

## 2021-04-03 RX ADMIN — SODIUM CHLORIDE, PRESERVATIVE FREE 10 ML: 5 INJECTION INTRAVENOUS at 09:10

## 2021-04-03 ASSESSMENT — PAIN SCALES - GENERAL: PAINLEVEL_OUTOF10: 0

## 2021-04-03 NOTE — PROGRESS NOTES
Patient in bed eyes closed, breathing even and easy, no distress noted. No complaints at this time. Shift uneventful. Call light within reach, fall precautions in place. Will continue to monitor. .Electronically signed by Corinne Paredes RN on 4/3/2021 at 6:36 AM

## 2021-04-03 NOTE — PLAN OF CARE
Problem: Skin Integrity:  Goal: Will show no infection signs and symptoms  Description: Will show no infection signs and symptoms  4/2/2021 2201 by Nhi Hernandez RN  Outcome: Ongoing     Problem: Skin Integrity:  Goal: Absence of new skin breakdown  Description: Absence of new skin breakdown  4/2/2021 2201 by Nhi Hernandez RN  Outcome: Ongoing     Problem: Falls - Risk of:  Goal: Will remain free from falls  Description: Will remain free from falls  4/2/2021 2201 by Nhi Hernandez RN  Outcome: Ongoing  Note: Patient educated on fall prevention. Call light is within reach, bed locked in lowest position, personal items within reach, and bed alarm is on. Will round on patient per unit guidelines.]       Problem: Falls - Risk of:  Goal: Absence of physical injury  Description: Absence of physical injury  4/2/2021 2201 by Nhi Hernandez RN  Outcome: Ongoing  Note: Pt is free of injury. No injury noted. Fall precautions in place. Call light within reach. Will monitor.         Problem: OXYGENATION/RESPIRATORY FUNCTION  Goal: Patient will maintain patent airway  4/2/2021 2201 by Nhi Hernandez RN  Outcome: Ongoing     Problem: OXYGENATION/RESPIRATORY FUNCTION  Goal: Patient will achieve/maintain normal respiratory rate/effort  Description: Respiratory rate and effort will be within normal limits for the patient  4/2/2021 2201 by Nhi Hernandez RN  Outcome: Ongoing     Problem: HEMODYNAMIC STATUS  Goal: Patient has stable vital signs and fluid balance  4/2/2021 2201 by Nhi Hernandez RN  Outcome: Ongoing     Problem: FLUID AND ELECTROLYTE IMBALANCE  Goal: Fluid and electrolyte balance are achieved/maintained  4/2/2021 2201 by Nhi Hernandez RN  Outcome: Ongoing     Problem: ACTIVITY INTOLERANCE/IMPAIRED MOBILITY  Goal: Mobility/activity is maintained at optimum level for patient  4/2/2021 2201 by Nhi Hernandez RN  Outcome: Ongoing     Problem: Nutrition  Goal: Optimal nutrition therapy  4/2/2021 2201 by Umesh Loza Bassam Schaffer RN  Outcome: Ongoing

## 2021-04-03 NOTE — PROGRESS NOTES
Patient up in chair eating dinner. Family at bedside. Alert to self. Currently on 1L nasal cannula. IV fluids infusing per order. [place] in place. Blood sugar 82. Will continue to monitor.

## 2021-04-03 NOTE — PROGRESS NOTES
Patient A&O to person and place. Patient tolerating PO intake well. PM medications given without complications. Patient denies pain, nausea or vomiting at this time. Pt turning and repositioning q2hrs. Call light within reach, fall precautions in place. Will monitor. .Electronically signed by Celestino Hernandez RN on 4/2/2021 at 9:52 PM

## 2021-04-03 NOTE — PROGRESS NOTES
Patient resting in bed. Family at beside assisting with eating breakfast. Patient alert to self, place, and situation. Disoriented to time. Denies pain. VSS. On 2L nasal cannula. Morning medications given without difficulty. [place] in place. Bed alarm on. Call light and belongings within reach. Will continue to monitor.      Electronically signed by Junie Kay RN on 4/3/2021 at 2:50 PM

## 2021-04-03 NOTE — PROGRESS NOTES
Nephrology Progress Note  [phone]   [email or web address].        Reason for Consult:  CKD    HISTORY OF PRESENT ILLNESS:                This is a patient with significant past medical history of CKD stage 4 baseline SCr high 1, low 2 range, CHF, AS, s/p valuloplasty, DM2. A.fib, HTN, DVT who presented with to ER after noted to have bradycardia with HR to 30's. She also reports, SOB, weight gain, increased BLE edema. She was seen by Dr. Marcell Zuniga on 3/17-dose of torsemide increased from 40mg to 40mg qam and 20mg qpm that visit. .  She denies N/V/CP/f/c/LH/dizziness.  -family at bedside        Subjective:  -pt seen and examined  -PMSHx and meds reviewed  -family at bedside    No acute events ON  BP stable       ROS: neg chest pain/SOB/po nausea    PHYSICAL EXAM:    Vitals:    /71   Pulse 74   Temp 97.3 °F (36.3 °C) (Oral)   Resp 14   Ht 5' 6\" (1.676 m)   Wt 269 lb 13.5 oz (122.4 kg)   LMP  (LMP Unknown)   SpO2 97%   BMI 43.55 kg/m²   I/O last 3 completed shifts: In: 740 [P.O.:740]  Out: 675 [Urine:675]  No intake/output data recorded. Physical Exam:  Gen: Resting in bed, NAD.  obese  HEENT: anicteric, NC/AT  CV: +S1/S2, irregular  Lungs: Good respiratory effort, diminished in bases  Abd: S/NT +BS-obese  Ext: ++edema, no cyanosis, bilateral chronic venous stasis skin changs  Skin: Warm. Diffuse rash clearing  : No TTP over bladder, nondistended. Neuro: Alert and oriented x 3, nonfocal.  Joints: No erythema noted over joints.     DATA:    CBC:   Lab Results   Component Value Date    WBC 6.0 04/02/2021    RBC 3.82 04/02/2021    HGB 11.7 04/02/2021    HCT 35.6 04/02/2021    MCV 93.2 04/02/2021    MCH 30.5 04/02/2021    MCHC 32.7 04/02/2021    RDW 23.0 04/02/2021     04/02/2021    MPV 8.8 04/02/2021     BMP:    Lab Results   Component Value Date     04/03/2021    K 4.5 04/03/2021    K 4.7 02/07/2021    CL 93 04/03/2021    CO2 28 04/03/2021    BUN 56 04/03/2021    LABALBU 3.2 04/03/2021    CREATININE 2.6 04/03/2021    CALCIUM 9.0 04/03/2021    GFRAA 21 04/03/2021    GFRAA 51 12/20/2012    LABGLOM 17 04/03/2021    GLUCOSE 100 04/03/2021       IMPRESSION/RECOMMENDATIONS:      DAVION on CKD stage 4: baseline 1.8-2.2  -recurrent, Scr 2.6 today, off diuretic       CKD stage 4: baseline SCr now ranging from 1.8-2.2-sees Dr. 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    -tolerate some degree of azotemia to achieve diuresis as above    FEN:   Hyponatremia: hypervolemic-stable  Low Mg: replaced    CHF: off demedex will need to resume at some point soon . AS:  -considering TAVR in future-h/o valvuloplasty  -per cardiology  -plan for CTA in future, per discussion, OPHELIA risk high at baseline Cr of 1.9-2.2, age and other co-morbidities. Optimize volume status prior to contrast exposure-as long as Cr not substantially higher than baseline, should be OK to proceed w/ CTA. May consider pre-hydration depending on her volume status prior to contrast.  I have discussed at length with daughter at bedside with patient  They understand the risk (> 50% with > 10-15% risk of dialysis requiring DAVION) and are willing to proceed. 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 I will continue to follow along. Please call with questions.     Juan Nolen MD

## 2021-04-03 NOTE — PROGRESS NOTES
Juan Altamirano is a 80 y.o. female patient.     Current Facility-Administered Medications   Medication Dose Route Frequency Provider Last Rate Last Admin    0.9 % sodium chloride infusion   Intravenous Continuous Margaretmary Hodgkin, MD        haloperidol lactate (HALDOL) injection 1 mg  1 mg Intramuscular Q6H PRN Margaretmary Hodgkin, MD        predniSONE (DELTASONE) tablet 5 mg  5 mg Oral Daily Margaretmary Hodgkin, MD   5 mg at 04/03/21 0910    morphine (PF) injection 2 mg  2 mg Intravenous Q4H PRN Margaretmary Hodgkin, MD        promethazine (PHENERGAN) injection 6.25 mg  6.25 mg Intramuscular Q6H PRN Margaretmary Hodgkin, MD        latanoprost (XALATAN) 0.005 % ophthalmic solution 1 drop  1 drop Right Eye Nightly Margaretmary Hodgkin, MD   1 drop at 04/02/21 2037    pantoprazole (PROTONIX) tablet 40 mg  40 mg Oral Daily Margaretmary Hodgkin, MD   40 mg at 04/03/21 0522    0.9 % sodium chloride infusion  25 mL Intravenous PRN Margaretmary Hodgkin, MD        [facility]etron [facility]) injection 4 mg  4 mg Intravenous Q6H PRN Margaretmary Hodgkin, MD   4 mg at 04/02/21 1124    polyethylene glycol (GLYCOLAX) packet 17 g  17 g Oral Daily PRN Margaretmary Hodgkin, MD        acetaminophen (TYLENOL) tablet 650 mg  650 mg Oral Q6H PRN Margaretmary Hodgkin, MD   650 mg at 03/31/21 1853    Or    acetaminophen (TYLENOL) suppository 650 mg  650 mg Rectal Q6H PRN Margaretmary Hodgkin, MD        traMADol Algernon Brown) tablet 50 mg  50 mg Oral Q6H PRN Margaretmary Hodgkin, MD        insulin lispro (HUMALOG) injection vial 0-6 Units  0-6 Units Subcutaneous TID WC Margaretmary Hodgkin, MD   1 Units at 03/31/21 1319    insulin lispro (HUMALOG) injection vial 0-3 Units  0-3 Units Subcutaneous Nightly Margaretmary Hodgkin, MD   Stopped at 04/01/21 2105    LORazepam (ATIVAN) tablet 0.5 mg  0.5 mg Oral Q4H PRN Margaretmary Hodgkin, MD        warfarin (COUMADIN) daily dosing (placeholder)   Other RX Placeholder Margaretmary Hodgkin, MD   Given at 03/27/21 1171 control adequate. Assessment & Plan  Will continue gentle hydration. Family still plannibg on D/C to home with homecare. D/W patient. Casimiro Nowak MD  4/3/2021

## 2021-04-03 NOTE — PLAN OF CARE
Problem: Skin Integrity:  Goal: Will show no infection signs and symptoms  Description: Will show no infection signs and symptoms  Outcome: Ongoing  Note: Will monitor skin and mucous membranes. Will turn patient every 2 hours, monitor for friction and sheering, and change dressings as needed. Will preform skin assessment every shift. Electronically signed by Ally Noel RN on 4/3/2021 at 2:47 PM      Problem: Falls - Risk of:  Goal: Will remain free from falls  Description: Will remain free from falls  Outcome: Ongoing  Note: Fall risk assessment completed. Fall precautions in place. Call light within reach. Pt educated on calling for assistance before getting up. Walkway free of clutter. Will continue to monitor. Electronically signed by Ally Noel RN on 4/3/2021 at 2:47 PM      Problem: OXYGENATION/RESPIRATORY FUNCTION  Goal: Patient will maintain patent airway  Outcome: Ongoing  Note: Patient remains free of significant airway secretions. Patient able to effectively cough and clear any secretions that need cleared. Will continue to monitor patient throughout shift. Electronically signed by Ally Noel RN on 4/3/2021 at 2:47 PM      Problem: HEMODYNAMIC STATUS  Goal: Patient has stable vital signs and fluid balance  Outcome: Ongoing  Note: Vitals signs are stable.   Intake and output are being recorded, will continue to monitor Electronically signed by Ally Noel RN on 4/3/2021 at 2:47 PM      Problem: FLUID AND ELECTROLYTE IMBALANCE  Goal: Fluid and electrolyte balance are achieved/maintained  Outcome: Ongoing

## 2021-04-03 NOTE — PROGRESS NOTES
Clinical Pharmacy Note  Warfarin Consult    Rubia Narvaez is a 80 y.o. female receiving warfarin managed by pharmacy. Patient being bridged with none. Warfarin Indication: Afib  Target INR range: 2-3   Dose prior to admission: 2.5 mg daily    Current warfarin drug-drug interactions: Prednisone, Amiodarone (home med)    Recent Labs     04/01/21  0423 04/02/21  0445 04/03/21  0443   HGB  --  11.7*  --    HCT  --  35.6*  --    INR 2.87* 3.42* 3.36*       Assessment/Plan:    Hold warfarin tonight. Daily PT/INR until stable within therapeutic range. Thank you for the consult. Will continue to follow.     Jefferson Sweeney, PharmD 4/3/2021 9:52 AM

## 2021-04-04 LAB
ALBUMIN SERPL-MCNC: 2.9 G/DL (ref 3.4–5)
ANION GAP SERPL CALCULATED.3IONS-SCNC: 11 MMOL/L (ref 3–16)
BUN BLDV-MCNC: 58 MG/DL (ref 7–20)
CALCIUM SERPL-MCNC: 8.6 MG/DL (ref 8.3–10.6)
CHLORIDE BLD-SCNC: 92 MMOL/L (ref 99–110)
CO2: 27 MMOL/L (ref 21–32)
CREAT SERPL-MCNC: 2.6 MG/DL (ref 0.6–1.2)
GFR AFRICAN AMERICAN: 21
GFR NON-AFRICAN AMERICAN: 17
GLUCOSE BLD-MCNC: 148 MG/DL (ref 70–99)
GLUCOSE BLD-MCNC: 148 MG/DL (ref 70–99)
GLUCOSE BLD-MCNC: 179 MG/DL (ref 70–99)
GLUCOSE BLD-MCNC: 208 MG/DL (ref 70–99)
GLUCOSE BLD-MCNC: 249 MG/DL (ref 70–99)
GLUCOSE BLD-MCNC: 275 MG/DL (ref 70–99)
INR BLD: 3.25 (ref 0.86–1.14)
PERFORMED ON: ABNORMAL
PHOSPHORUS: 4.6 MG/DL (ref 2.5–4.9)
POTASSIUM SERPL-SCNC: 4.7 MMOL/L (ref 3.5–5.1)
PRO-BNP: ABNORMAL PG/ML (ref 0–449)
PROTHROMBIN TIME: 38.2 SEC (ref 10–13.2)
SODIUM BLD-SCNC: 130 MMOL/L (ref 136–145)

## 2021-04-04 PROCEDURE — 94761 N-INVAS EAR/PLS OXIMETRY MLT: CPT

## 2021-04-04 PROCEDURE — 6370000000 HC RX 637 (ALT 250 FOR IP): Performed by: INTERNAL MEDICINE

## 2021-04-04 PROCEDURE — 1200000000 HC SEMI PRIVATE

## 2021-04-04 PROCEDURE — 80069 RENAL FUNCTION PANEL: CPT

## 2021-04-04 PROCEDURE — 2580000003 HC RX 258: Performed by: INTERNAL MEDICINE

## 2021-04-04 PROCEDURE — 36415 COLL VENOUS BLD VENIPUNCTURE: CPT

## 2021-04-04 PROCEDURE — 85610 PROTHROMBIN TIME: CPT

## 2021-04-04 PROCEDURE — 83880 ASSAY OF NATRIURETIC PEPTIDE: CPT

## 2021-04-04 PROCEDURE — 2700000000 HC OXYGEN THERAPY PER DAY

## 2021-04-04 RX ORDER — TORSEMIDE 20 MG/1
20 TABLET ORAL DAILY
Status: DISCONTINUED | OUTPATIENT
Start: 2021-04-04 | End: 2021-04-05 | Stop reason: HOSPADM

## 2021-04-04 RX ADMIN — SODIUM CHLORIDE, PRESERVATIVE FREE 10 ML: 5 INJECTION INTRAVENOUS at 08:25

## 2021-04-04 RX ADMIN — LATANOPROST 1 DROP: 50 SOLUTION OPHTHALMIC at 21:38

## 2021-04-04 RX ADMIN — INSULIN LISPRO 2 UNITS: 100 INJECTION, SOLUTION INTRAVENOUS; SUBCUTANEOUS at 18:14

## 2021-04-04 RX ADMIN — METOPROLOL SUCCINATE 100 MG: 50 TABLET, EXTENDED RELEASE ORAL at 08:25

## 2021-04-04 RX ADMIN — INSULIN LISPRO 2 UNITS: 100 INJECTION, SOLUTION INTRAVENOUS; SUBCUTANEOUS at 21:38

## 2021-04-04 RX ADMIN — INSULIN LISPRO 1 UNITS: 100 INJECTION, SOLUTION INTRAVENOUS; SUBCUTANEOUS at 08:36

## 2021-04-04 RX ADMIN — SODIUM CHLORIDE, PRESERVATIVE FREE 10 ML: 5 INJECTION INTRAVENOUS at 21:38

## 2021-04-04 RX ADMIN — TRIAMCINOLONE ACETONIDE: 1 CREAM TOPICAL at 12:22

## 2021-04-04 RX ADMIN — INSULIN LISPRO 1 UNITS: 100 INJECTION, SOLUTION INTRAVENOUS; SUBCUTANEOUS at 13:34

## 2021-04-04 RX ADMIN — PANTOPRAZOLE SODIUM 40 MG: 40 TABLET, DELAYED RELEASE ORAL at 05:09

## 2021-04-04 RX ADMIN — TORSEMIDE 20 MG: 20 TABLET ORAL at 13:34

## 2021-04-04 ASSESSMENT — PAIN SCALES - GENERAL: PAINLEVEL_OUTOF10: 0

## 2021-04-04 NOTE — PROGRESS NOTES
Attempted to wean patient to room air. Patient 75% on room air. 2L placed and up to 93%. Will continue to monitor.

## 2021-04-04 NOTE — PLAN OF CARE
Problem: Skin Integrity:  Goal: Will show no infection signs and symptoms  Description: Will show no infection signs and symptoms  Outcome: Ongoing  Note: Will monitor skin and mucous membranes. Will turn patient every 2 hours, monitor for friction and sheering, and change dressings as needed. Will preform skin assessment every shift. Electronically signed by Sue Pereira RN on 4/4/2021 at 12:11 PM      Problem: Falls - Risk of:  Goal: Will remain free from falls  Description: Will remain free from falls  Outcome: Ongoing  Note: Fall risk assessment completed. Fall precautions in place. Call light within reach. Pt educated on calling for assistance before getting up. Walkway free of clutter. Will continue to monitor. Electronically signed by Sue Pereira RN on 4/4/2021 at 12:12 PM      Problem: OXYGENATION/RESPIRATORY FUNCTION  Goal: Patient will maintain patent airway  Outcome: Ongoing  Note: Patient remains free of significant airway secretions. Patient able to effectively cough and clear any secretions that need cleared. Will continue to monitor patient throughout shift.   Electronically signed by Sue Pereira RN on 4/4/2021 at 12:12 PM      Problem: HEMODYNAMIC STATUS  Goal: Patient has stable vital signs and fluid balance  Outcome: Ongoing     Problem: FLUID AND ELECTROLYTE IMBALANCE  Goal: Fluid and electrolyte balance are achieved/maintained  Outcome: Ongoing     Problem: ACTIVITY INTOLERANCE/IMPAIRED MOBILITY  Goal: Mobility/activity is maintained at optimum level for patient  Outcome: Ongoing     Problem: Nutrition  Goal: Optimal nutrition therapy  Outcome: Ongoing

## 2021-04-04 NOTE — PROGRESS NOTES
Spoke with patient's daughter, Chelsie Hinds, via phone. Daughter voiced concerns regarding communication with cardiology. Family has many questions. Dr. Abdi Niño paged. Deferred to tomorrow so patient can see Cardiologist that was following patient earlier in admission. Family prefers to see Cardiologist versus a nurse practitioner. Patient has an apt with Baljinder Shetty CNP on 4/7 at 2:00p,. Family states that patient is to have some testing done at this appointment and would prefer for this to be done tomorrow prior to discharge. Family made aware that this won't be able to be addressed until tomorrow due to weekend/holiday. Family verbalized understanding. Will pass along to night shift RN to have this addressed tomorrow. Charge RN, Erasmo Akbar, made aware.

## 2021-04-04 NOTE — PROGRESS NOTES
Bernadette Santiago is a 80 y.o. female patient.     Current Facility-Administered Medications   Medication Dose Route Frequency Provider Last Rate Last Admin    haloperidol lactate (HALDOL) injection 1 mg  1 mg Intramuscular Q6H PRLORRAINE Boyce MD        morphine (PF) injection 2 mg  2 mg Intravenous Q4H PRN Fish Boyce MD        promethazine (PHENERGAN) injection 6.25 mg  6.25 mg Intramuscular Q6H PRN Fish Boyce MD        latanoprost (XALATAN) 0.005 % ophthalmic solution 1 drop  1 drop Right Eye Michellely Fish Boyce MD   1 drop at 04/03/21 2048    pantoprazole (PROTONIX) tablet 40 mg  40 mg Oral Daily Fish Boyce MD   40 mg at 04/04/21 0509    0.9 % sodium chloride infusion  25 mL Intravenous PRN Fish Boyce MD        ondansetron [facility]) injection 4 mg  4 mg Intravenous Q6H PRN Fish Boyce MD   4 mg at 04/02/21 1124    polyethylene glycol (GLYCOLAX) packet 17 g  17 g Oral Daily PRN Fish Boyce MD        acetaminophen (TYLENOL) tablet 650 mg  650 mg Oral Q6H PRN Fish Boyce MD   650 mg at 03/31/21 1853    Or    acetaminophen (TYLENOL) suppository 650 mg  650 mg Rectal Q6H PRLORRAINE Boyce MD        traMADol Elaine Hannah) tablet 50 mg  50 mg Oral Q6H PRN Fish Boyce MD        insulin lispro (HUMALOG) injection vial 0-6 Units  0-6 Units Subcutaneous TID  Fish Boyce MD   1 Units at 04/04/21 6403    insulin lispro (HUMALOG) injection vial 0-3 Units  0-3 Units Subcutaneous Nightly Fish Boyce MD   1 Units at 04/03/21 2048    LORazepam (ATIVAN) tablet 0.5 mg  0.5 mg Oral Q4H PRN Fish Boyce MD        warfarin (COUMADIN) daily dosing (placeholder)   Other RX Ashleigh Boyce MD   Given at 03/27/21 0113    glucose (GLUTOSE) 40 % oral gel 15 g  15 g Oral PRN Fish Boyce MD        dextrose 50 % IV solution  12.5 g Intravenous PRN Fish Boyce MD   12.5 g at 04/02/21 1643    glucagon (rDNA) injection 1 mg  1 mg Intramuscular PRN Josefina Dan MD        dextrose 5 % solution  100 mL/hr Intravenous PRN Josefina Dan MD        lidocaine PF 1 % injection 5 mL  5 mL Intradermal Once Josefina Dan MD        sodium chloride flush 0.9 % injection 10 mL  10 mL Intravenous 2 times per day Josefina Dan MD   10 mL at 04/04/21 0825    sodium chloride flush 0.9 % injection 10 mL  10 mL Intravenous PRN Josefina Dan MD        melatonin tablet 3 mg  3 mg Oral Nightly PRN Josefina Dan MD   3 mg at 04/02/21 2037    triamcinolone (KENALOG) 0.1 % cream   Topical BID PRN Josefina Dan MD   Given at 04/04/21 1222    metoprolol succinate (TOPROL XL) extended release tablet 100 mg  100 mg Oral Daily Pb Yin MD   100 mg at 04/04/21 0825    diphenhydrAMINE (BENADRYL) tablet 25 mg  25 mg Oral Q6H PRN Josefina Dan MD   25 mg at 03/28/21 2221     Allergies   Allergen Reactions    Naproxen Itching    Bactrim [Sulfamethoxazole-Trimethoprim]     Levofloxacin     Pcn [Penicillins]     Sulfa Antibiotics      Active Problems:    Acute on chronic combined systolic and diastolic congestive heart failure (HCC)    CHF (congestive heart failure), NYHA class I, acute on chronic, combined ([address]    Acute on chronic congestive heart failure (HCC)    Bilateral leg edema  Resolved Problems:    * No resolved hospital problems. *    Blood pressure 117/82, pulse 84, temperature 97.4 °F (36.3 °C), temperature source Oral, resp. rate 18, height 5' 6\" (1.676 m), weight 265 lb 14 oz (120.6 kg), SpO2 98 %, not currently breastfeeding. Subjective Comfortable at rest. No further N or V,   Objective A&O, CTA, IIR&R, nl. Abd. Trace edema  Assessment & Plan Appears rehydrated. Will resume diuretic. Possible D/C to home with homecare tomorrow. D/W patient and family.     Josefina Dan MD  4/4/2021

## 2021-04-04 NOTE — PROGRESS NOTES
Nephrology Progress Note  [phone]   [email or web address].        Reason for Consult:  CKD    HISTORY OF PRESENT ILLNESS:                This is a patient with significant past medical history of CKD stage 4 baseline SCr high 1, low 2 range, CHF, AS, s/p valuloplasty, DM2. A.fib, HTN, DVT who presented with to ER after noted to have bradycardia with HR to 30's. She also reports, SOB, weight gain, increased BLE edema. She was seen by Dr. Izaiah Marquez on 3/17-dose of torsemide increased from 40mg to 40mg qam and 20mg qpm that visit. .  She denies dyspnea or pain   -family at bedside        Subjective:  -pt seen and examined  -PMSHx and meds reviewed      No acute events ON  BP stable       ROS: neg chest pain/SOB/po nausea    PHYSICAL EXAM:    Vitals:    /81   Pulse 109   Temp 97.4 °F (36.3 °C) (Oral)   Resp 16   Ht 5' 6\" (1.676 m)   Wt 265 lb 14 oz (120.6 kg)   LMP  (LMP Unknown)   SpO2 93%   BMI 42.91 kg/m²   I/O last 3 completed shifts: In: 0 [P.O.:780; I.V.:600]  Out: 550 [Urine:550]  I/O this shift:  In: 240 [P.O.:240]  Out: 125 [Urine:125]    Physical Exam:  Gen: Resting in bed, NAD.  obese  HEENT: anicteric  CV: +S1/S2, irregular  Lungs: Good respiratory effort, diminished in bases  Abd: S/NT +BS-obese  Ext: ++edema, no cyanosis, bilateral chronic venous stasis skin changs  Skin: Warm. Diffuse rash clearing  : No TTP over bladder, nondistended. Neuro: Alert and oriented x 3, nonfocal.  Joints: No erythema noted over joints.     DATA:    CBC:   Lab Results   Component Value Date    WBC 6.0 04/02/2021    RBC 3.82 04/02/2021    HGB 11.7 04/02/2021    HCT 35.6 04/02/2021    MCV 93.2 04/02/2021    MCH 30.5 04/02/2021    MCHC 32.7 04/02/2021    RDW 23.0 04/02/2021     04/02/2021    MPV 8.8 04/02/2021     BMP:    Lab Results   Component Value Date     04/04/2021    K 4.7 04/04/2021    K 4.7 02/07/2021    CL 92 04/04/2021    CO2 27 04/04/2021    BUN 58 04/04/2021    LABALBU 2.9 04/04/2021    CREATININE 2.6 04/04/2021    CALCIUM 8.6 04/04/2021    GFRAA 21 04/04/2021    GFRAA 51 12/20/2012    LABGLOM 17 04/04/2021    GLUCOSE 148 04/04/2021       IMPRESSION/RECOMMENDATIONS:      DAVION on CKD stage 4: baseline 1.8-2.2  -recurrent, Scr 2.6 today, off diuretic       CKD stage 4: baseline SCr now ranging from 1.8-2.2-sees Dr. 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    -tolerate some degree of azotemia to achieve diuresis as above    FEN:   Hyponatremia: hypervolemic-stable  Low Mg: replaced    CHF: off demedex will need to resume at some point soon . AS:  -considering TAVR in future-h/o valvuloplasty  -per cardiology  -plan for CTA in future, per discussion, OPHELIA risk high at baseline Cr of 1.9-2.2, age and other co-morbidities. Optimize volume status prior to contrast exposure-as long as Cr not substantially higher than baseline, should be OK to proceed w/ CTA. May consider pre-hydration depending on her volume status prior to contrast.  I have discussed at length with daughter at bedside with patient  They understand the risk (> 50% with > 10-15% risk of dialysis requiring DAVION) and are willing to proceed. 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 I will continue to follow along. Please call with questions.     Krishna Juárez MD

## 2021-04-04 NOTE — PROGRESS NOTES
Pt in bed eyes closed, breathing even and easy with no distress. Pt turing and repositioning q2hrs. Iv fluids infusing, weeks remain in place. Call light in reach, fall precautions in place. Will continue to monitor. Electronically signed by Jesse Hollis RN on 4/3/2021 at 11:58 PM

## 2021-04-05 ENCOUNTER — TELEPHONE (OUTPATIENT)
Dept: CARDIOLOGY CLINIC | Age: 86
End: 2021-04-05

## 2021-04-05 VITALS
OXYGEN SATURATION: 92 % | DIASTOLIC BLOOD PRESSURE: 74 MMHG | HEART RATE: 89 BPM | HEIGHT: 66 IN | SYSTOLIC BLOOD PRESSURE: 116 MMHG | WEIGHT: 265.65 LBS | TEMPERATURE: 97.3 F | RESPIRATION RATE: 18 BRPM | BODY MASS INDEX: 42.69 KG/M2

## 2021-04-05 DIAGNOSIS — I50.43 CHF (CONGESTIVE HEART FAILURE), NYHA CLASS III, ACUTE ON CHRONIC, COMBINED (HCC): Primary | ICD-10-CM

## 2021-04-05 DIAGNOSIS — N17.9 AKI (ACUTE KIDNEY INJURY) (HCC): ICD-10-CM

## 2021-04-05 DIAGNOSIS — I35.0 NONRHEUMATIC AORTIC VALVE STENOSIS: Primary | ICD-10-CM

## 2021-04-05 LAB
ALBUMIN SERPL-MCNC: 3.1 G/DL (ref 3.4–5)
ANION GAP SERPL CALCULATED.3IONS-SCNC: 14 MMOL/L (ref 3–16)
BUN BLDV-MCNC: 63 MG/DL (ref 7–20)
CALCIUM SERPL-MCNC: 9 MG/DL (ref 8.3–10.6)
CHLORIDE BLD-SCNC: 93 MMOL/L (ref 99–110)
CO2: 24 MMOL/L (ref 21–32)
CREAT SERPL-MCNC: 2.4 MG/DL (ref 0.6–1.2)
GFR AFRICAN AMERICAN: 23
GFR NON-AFRICAN AMERICAN: 19
GLUCOSE BLD-MCNC: 163 MG/DL (ref 70–99)
GLUCOSE BLD-MCNC: 164 MG/DL (ref 70–99)
INR BLD: 2.69 (ref 0.86–1.14)
PERFORMED ON: ABNORMAL
PHOSPHORUS: 3.8 MG/DL (ref 2.5–4.9)
POTASSIUM SERPL-SCNC: 4.7 MMOL/L (ref 3.5–5.1)
PROTHROMBIN TIME: 31.5 SEC (ref 10–13.2)
SODIUM BLD-SCNC: 131 MMOL/L (ref 136–145)

## 2021-04-05 PROCEDURE — 80069 RENAL FUNCTION PANEL: CPT

## 2021-04-05 PROCEDURE — 6370000000 HC RX 637 (ALT 250 FOR IP): Performed by: INTERNAL MEDICINE

## 2021-04-05 PROCEDURE — 94761 N-INVAS EAR/PLS OXIMETRY MLT: CPT

## 2021-04-05 PROCEDURE — 2700000000 HC OXYGEN THERAPY PER DAY

## 2021-04-05 PROCEDURE — 36415 COLL VENOUS BLD VENIPUNCTURE: CPT

## 2021-04-05 PROCEDURE — 85610 PROTHROMBIN TIME: CPT

## 2021-04-05 PROCEDURE — 2580000003 HC RX 258: Performed by: INTERNAL MEDICINE

## 2021-04-05 RX ORDER — TRIAMCINOLONE ACETONIDE 1 MG/G
CREAM TOPICAL
Qty: 100 G | Refills: 5 | Status: SHIPPED | OUTPATIENT
Start: 2021-04-05

## 2021-04-05 RX ORDER — LANOLIN ALCOHOL/MO/W.PET/CERES
3 CREAM (GRAM) TOPICAL NIGHTLY PRN
Qty: 30 TABLET | Refills: 3 | Status: SHIPPED | OUTPATIENT
Start: 2021-04-05

## 2021-04-05 RX ORDER — POLYETHYLENE GLYCOL 3350 17 G/17G
17 POWDER, FOR SOLUTION ORAL DAILY PRN
Qty: 527 G | Refills: 1 | Status: SHIPPED | OUTPATIENT
Start: 2021-04-05 | End: 2021-05-05

## 2021-04-05 RX ORDER — INSULIN GLARGINE 100 [IU]/ML
10 INJECTION, SOLUTION SUBCUTANEOUS NIGHTLY
Qty: 1 VIAL | Refills: 3 | Status: ON HOLD
Start: 2021-04-05 | End: 2021-04-29 | Stop reason: HOSPADM

## 2021-04-05 RX ORDER — TORSEMIDE 20 MG/1
20 TABLET ORAL DAILY
Qty: 30 TABLET | Refills: 3 | Status: ON HOLD
Start: 2021-04-05 | End: 2021-04-29 | Stop reason: HOSPADM

## 2021-04-05 RX ORDER — TRIAMCINOLONE ACETONIDE 1 MG/G
CREAM TOPICAL
Qty: 100 G | Refills: 1 | Status: SHIPPED | OUTPATIENT
Start: 2021-04-05 | End: 2021-04-12 | Stop reason: SDUPTHER

## 2021-04-05 RX ORDER — WARFARIN SODIUM 2.5 MG/1
2.5 TABLET ORAL
Status: DISCONTINUED | OUTPATIENT
Start: 2021-04-05 | End: 2021-04-05 | Stop reason: HOSPADM

## 2021-04-05 RX ORDER — TRAMADOL HYDROCHLORIDE 50 MG/1
50 TABLET ORAL EVERY 6 HOURS PRN
Qty: 42 TABLET | Refills: 2 | Status: SHIPPED | OUTPATIENT
Start: 2021-04-05 | End: 2021-04-10

## 2021-04-05 RX ORDER — METOPROLOL SUCCINATE 100 MG/1
100 TABLET, EXTENDED RELEASE ORAL DAILY
Qty: 30 TABLET | Refills: 3 | Status: SHIPPED
Start: 2021-04-05 | End: 2021-05-27 | Stop reason: ALTCHOICE

## 2021-04-05 RX ORDER — DIPHENHYDRAMINE HCL 25 MG
25 TABLET ORAL EVERY 6 HOURS PRN
Qty: 30 TABLET | Refills: 1 | Status: SHIPPED | OUTPATIENT
Start: 2021-04-05 | End: 2021-05-05

## 2021-04-05 RX ADMIN — METOPROLOL SUCCINATE 100 MG: 50 TABLET, EXTENDED RELEASE ORAL at 07:57

## 2021-04-05 RX ADMIN — SODIUM CHLORIDE, PRESERVATIVE FREE 10 ML: 5 INJECTION INTRAVENOUS at 07:57

## 2021-04-05 RX ADMIN — TORSEMIDE 20 MG: 20 TABLET ORAL at 07:57

## 2021-04-05 RX ADMIN — INSULIN LISPRO 1 UNITS: 100 INJECTION, SOLUTION INTRAVENOUS; SUBCUTANEOUS at 07:57

## 2021-04-05 RX ADMIN — PANTOPRAZOLE SODIUM 40 MG: 40 TABLET, DELAYED RELEASE ORAL at 05:37

## 2021-04-05 NOTE — PROGRESS NOTES
cream  Commonly known as: KENALOG  Apply topically 2 times daily. What changed: You were already taking a medication with the same name, and this prescription was added. Make sure you understand how and when to take each. * triamcinolone 0.1 % cream  Commonly known as: KENALOG  Apply topically 2 times daily. What changed: Another medication with the same name was added. Make sure you understand how and when to take each. * This list has 2 medication(s) that are the same as other medications prescribed for you. Read the directions carefully, and ask your doctor or other care provider to review them with you. CONTINUE taking these medications      blood glucose monitor kit and supplies  Dispense sufficient amount for indicated testing frequency plus additional to accommodate PRN testing needs. Dispense all needed supplies to include: monitor, strips, lancing device, lancets, control solutions, alcohol swabs. Caltrate 600+D 600-800 MG-UNIT Tabs  Generic drug: Calcium Carb-Cholecalciferol     cetirizine 10 MG tablet  Commonly known as: ZYRTEC     gabapentin 100 MG capsule  Commonly known as: NEURONTIN     glipiZIDE 5 MG tablet  Commonly known as: GLUCOTROL     insulin glargine 100 UNIT/ML injection vial  Commonly known as: LANTUS  Inject 10 Units into the skin nightly     latanoprost 0.005 % ophthalmic solution  Commonly known as: XALATAN     pantoprazole 40 MG tablet  Commonly known as: PROTONIX     therapeutic multivitamin-minerals tablet     vitamin C 500 MG tablet  Commonly known as: ASCORBIC ACID     vitamin D 50 MCG (2000 UT) Caps capsule     warfarin 5 MG tablet  Commonly known as: COUMADIN  Take as directed. If you are unsure how to take this medication, talk to your nurse or doctor.             STOP taking these medications      allopurinol 100 MG tablet  Commonly known as: ZYLOPRIM     amiodarone 200 MG tablet  Commonly known as: CORDARONE     carvedilol 12.5 MG tablet  Commonly known as: COREG     dilTIAZem 120 MG extended release capsule  Commonly known as: CARDIZEM 12 HR     lovastatin 10 MG tablet  Commonly known as: MEVACOR               Where to Get Your Medications        These medications were sent to [facility] [address], [address] - F [phone] [address] [address], [address] [facility]      Phone: [phone]   diphenhydrAMINE 25 MG tablet  insulin glargine 100 UNIT/ML injection vial  melatonin 3 MG Tabs tablet  metoprolol succinate 100 MG extended release tablet  polyethylene glycol 17 g packet  torsemide 20 MG tablet  triamcinolone 0.1 % cream  triamcinolone 0.1 % cream       You can get these medications from any pharmacy    Bring a paper prescription for each of these medications  traMADol 50 MG tablet         Primo Wyatt, [address] [facility]  Heart Failure Discharge Medication Reconciliation Program  [phone]

## 2021-04-05 NOTE — PROGRESS NOTES
04/05/2021    GFRAA 23 04/05/2021    GFRAA 51 12/20/2012    LABGLOM 19 04/05/2021    GLUCOSE 164 04/05/2021       IMPRESSION/RECOMMENDATIONS:      DAVION on CKD stage 4: baseline 1.8-2.2  - recurrent  - renal function slightly better, off of diuretics and IVF    CKD stage 4: baseline SCr now ranging from 1.8-2.2-sees Dr. Fan Rg, presumed DN/HTN NS/DAVION on CKD/chronic hypoperfusion  -tolerate some degree of azotemia to achieve diuresis as above    FEN:   Hyponatremia: hypervolemic-stable  Low Mg: replaced    CHF: off demedex will need to resume at some point soon . AS:  -considering TAVR in future-h/o valvuloplasty  -per cardiology  -plan for CTA in future, per discussion, OPHELIA risk high at baseline Cr of 1.9-2.2, age and other co-morbidities. Optimize volume status prior to contrast exposure-as long as Cr not substantially higher than baseline, should be OK to proceed w/ CTA. May consider pre-hydration depending on her volume status prior to contrast.  I have discussed at length with daughter at bedside with patient  They understand the risk (> 50% with > 10-15% risk of dialysis requiring DAVION) and are willing to proceed.   -seen by CTS  -carotid US completed  -plan for CTA next week at [facility]-family is concerned regarding transportation to and from [facility]/follow up appointments-SW consulted to arrange for same-risk of rehospitalization is high if discharged    HTN:   -monitor on diuresis  -on metoprolol for A.fib    Nausea: on zofran  Amylase/lipase normal    H/o A.fib: RVR-on amiodarone, BBcoumadin    Anemia:  -hgb is 11.7-no ZARINA indicated    CKD-MBD: phos is 3.9

## 2021-04-05 NOTE — PROGRESS NOTES
DC order noted. Pharm notified to do dc med rec. Pt has an appt 4/8 to see Dr Deepali Perea / Xena Barbosa. Has a HF appt on 4/12 at 10:20 to see JACY Astudillo NP.     DC wt 265 lbs (DW ~260), negative 3.4 liters.

## 2021-04-05 NOTE — DISCHARGE SUMMARY
[address] [address]                               DISCHARGE SUMMARY    PATIENT NAME: Mesha Navarro                    :        1934  MED REC NO:   [id number]                          ROOM:       5108  ACCOUNT NO:   [de-identified]                           ADMIT DATE: 2021  PROVIDER:     Veronica Gaviria MD                  DISCHARGE DATE:    DATE OF DISCHARGE:  To be determined. FINAL DIAGNOSES:  1. Acute-on-chronic congestive heart failure. 2.  Chronic atrial fibrillation. 3.  Dependent edema. 4.  Type 2 diabetes mellitus, uncontrolled. 5.  Hypertension. 6.  Generalized osteoarthritis. 7.  Acute dermatitis of undetermined etiology, possibly drug rash. 8.  Chronic kidney disease, stage IV. HISTORY:  The patient is a chronically ill 59-year-old, recently  , -American female who lived in her own home and was  looked after by her eight children. The patient was known previously to  have chronic systolic and diastolic congestive heart failure. The  patient was brought to the emergency room on the day of admission  because of increasing edema and shortness of breath in spite of  increasing her dose of home Demadex. Emergency Room evaluation was  consistent with acute-on-chronic congestive heart failure. Admission  was felt necessary. The patient's family agreed to admit the patient. APPROPRIATE STUDIES:  Chest x-ray showed cardiomegaly and pulmonary  vascular congestion. Multiple chemistry profiles are noted on the  chart. Prior to discharge, BUN was 58, creatinine was 2.6. GFR was  calculated at 21. Multiple glucoses related to diabetes care are noted  on the chart. Prior to discharge, ProBNP was elevated to 28,128. Albumin 2.9. Multiple ProTimes related to warfarin therapy are noted on  the chart. Prior to discharge, hemoglobin was 11.7, hematocrit 36.6.    White count 6000, differential was normal.  Serum ferritin was elevated  to 353. EKG showed atrial fibrillation. Left ventricular ejection  fraction on 02/08 was 43. HOSPITAL COURSE:  The patient's hospital course was somewhat prolonged  and complicated. The patient was admitted for worsening congestive  heart failure. She was seen in Nephrology consultation for her chronic  kidney disease, Cardiology consultation for her congestive heart  failure. The patient was treated with intravenous Lasix initially  intermittently and subsequently was began on a Lasix drip. She had good  diuresis and over four days diuresed about 10 pounds in weight. Unfortunately, she developed a generalized erythematous pruritic rash. There were no new medications. I elected to stop her allopurinol cause  of her other medications. The most likely culprit would be allopurinol. She was also given a tapering dose of steroids which resulted in  resolution of her rash and pruritus. Her glucose became elevated  because of the steroids and this was treated with basal and correction  insulin. She did have some hypoglycemia related to tapering the  steroids and consequently, her insulin was tapered and then subsequently  discontinued and her oral agents were discontinued also. She had some  improvement in her shortness of breath and leg swelling. Because of age  and multiple comorbid illnesses, she was quite debilitated, was began on  physical and occupational therapy. Skilled nursing home placement was  discussed with the patient and family. They were adamant in the patient  being able to return to the home setting with home care. At the present  time, the patient appears close to her baseline. It is anticipated she  will be discharged within the next day or two with home care. Discharge meds will be determined per the med rec at the time of  discharge.     The patient's allergies included BACTRIM, LEVAQUIN, PENICILLIN and

## 2021-04-05 NOTE — PROGRESS NOTES
glucose (GLUTOSE) 40 % oral gel 15 g  15 g Oral PRN Eugenio Winter MD        dextrose 50 % IV solution  12.5 g Intravenous PRN Eugenio Winter MD   12.5 g at 04/02/21 1643    glucagon (rDNA) injection 1 mg  1 mg Intramuscular PRN Eugenio Winter MD        dextrose 5 % solution  100 mL/hr Intravenous PRN Eugenio Winter MD        lidocaine PF 1 % injection 5 mL  5 mL Intradermal Once Eugenio Winter MD        sodium chloride flush 0.9 % injection 10 mL  10 mL Intravenous 2 times per day Eugenio Winter MD   10 mL at 04/05/21 0757    sodium chloride flush 0.9 % injection 10 mL  10 mL Intravenous PRN Eugenio Winter MD        melatonin tablet 3 mg  3 mg Oral Nightly PRN Eugenio Winter MD   3 mg at 04/02/21 2037    triamcinolone (KENALOG) 0.1 % cream   Topical BID PRN Eugenio Winter MD   Given at 04/04/21 1222    metoprolol succinate (TOPROL XL) extended release tablet 100 mg  100 mg Oral Daily Clarence Roe MD   100 mg at 04/05/21 0757    diphenhydrAMINE (BENADRYL) tablet 25 mg  25 mg Oral Q6H PRN Eugenio Winter MD   25 mg at 03/28/21 2221     Allergies   Allergen Reactions    Naproxen Itching    Bactrim [Sulfamethoxazole-Trimethoprim]     Levofloxacin     Pcn [Penicillins]     Sulfa Antibiotics      Active Problems:    Acute on chronic combined systolic and diastolic congestive heart failure (HCC)    CHF (congestive heart failure), NYHA class I, acute on chronic, combined ([address]    Acute on chronic congestive heart failure (HCC)    Bilateral leg edema  Resolved Problems:    * No resolved hospital problems. *    Blood pressure 116/74, pulse 89, temperature 97.3 °F (36.3 °C), temperature source Oral, resp. rate 18, height 5' 6\" (1.676 m), weight 265 lb 10.5 oz (120.5 kg), SpO2 92 %, not currently breastfeeding. Subjective Feels well. No CP, SOB, palp. Rash or itching  Objective Weight stable CKD stable.  A&O, CTA, IIR&R with AS M, trace edema  Assessment & Plan [id number] [address] for D/C if ok with cards and neph.     Alphonse Felix MD  4/5/2021

## 2021-04-05 NOTE — PROGRESS NOTES
Occupational Therapy      04/05/21    Lamin Null  1934  [id number]      Patient chart reviewed. OT attempted to see for OT treatment at this time. RN requesting therapy hold for the day. Will attempt again as therapy schedule permits.      Electronically signed by AYESHA Dubon/WANG/L on 4/5/2021 at 11:23 AM

## 2021-04-05 NOTE — PLAN OF CARE
Problem: Skin Integrity:  Goal: Will show no infection signs and symptoms  Description: Will show no infection signs and symptoms  4/4/2021 2249 by Keily Guillen RN  Outcome: Ongoing  Note: J Carlos score assessed. Repositioned patient Q2H and assessed skin. Educated patient on importance of repositioning to prevent skin issues. Problem: Skin Integrity:  Goal: Absence of new skin breakdown  Description: Absence of new skin breakdown  Outcome: Ongoing  Note: J Carlos score assessed. Repositioned patient Q2H and assessed skin. Educated patient on importance of repositioning to prevent skin issues. Problem: Falls - Risk of:  Goal: Will remain free from falls  Description: Will remain free from falls  4/4/2021 2249 by Keily Guillen RN  Outcome: Ongoing  Note: Patient educated on fall prevention. Call light is within reach, bed locked in lowest position, personal items within reach, and bed alarm is on. Will round on patient per unit guidelines. Problem: Falls - Risk of:  Goal: Absence of physical injury  Description: Absence of physical injury  Outcome: Ongoing  Note: Pt is free of injury. No injury noted. Fall precautions in place. Call light within reach. Will monitor. Problem: OXYGENATION/RESPIRATORY FUNCTION  Goal: Patient will maintain patent airway  4/4/2021 2249 by Keily Guillen RN  Outcome: Ongoing  Note: Pt will maintain patent airway  4/4/2021 1211 by Wu Child RN  Outcome: Ongoing  Note: Patient remains free of significant airway secretions. Patient able to effectively cough and clear any secretions that need cleared. Will continue to monitor patient throughout shift.   Electronically signed by Wu Child RN on 4/4/2021 at 12:12 PM      Problem: OXYGENATION/RESPIRATORY FUNCTION  Goal: Patient will achieve/maintain normal respiratory rate/effort  Description: Respiratory rate and effort will be within normal limits for the patient  Outcome: Ongoing  Note: Pt will

## 2021-04-05 NOTE — PROGRESS NOTES
Pt in bed eyes closed, breathing even and easy with no distress. Pt turing and repositioning q2hrs. Ha[place] remain in place. Call light in reach, fall precautions in place. Will continue to monitor. Electronically signed by Tania Zamora RN on 4/4/2021 at 11:38 PM

## 2021-04-05 NOTE — CARE COORDINATION
Case Management:  Follow up with family re pending discharge for tomorrow as noted in doctor note. Daughters present in room called Rayshawn Barnes on phone and she does not think patient is ready for discharge and she is calling Dr. Pedro Pablo Rodriguez to discuss. Discussed IMM with all daughters. Discussed possible for snf instead of going home but declined by patient and sisters in room. Family is working with [facility] Figueroa to get a steady lift for the home. Current plan is to return home with [address] and family assist.  Patient will be evaluated for home oxygen. Family will determine mode of transport home tomorrow. Informed [facility] of potential discharge for tomorrow.   Electronically signed by Johnny Tavarez on 3/31/2021 at 4:16 PM
INITIAL CASE MANAGEMENT ASSESSMENT    Reviewed chart, met with patient to assess possible discharge needs. Explained Case Management role/services. Living Situation:  Lives in a two story home with family ,  recently passed away. Patient has 10 kids with 7 living in town. Family stays with patient. Patient is able to live on the main floor. ADLs:  Assisted per family. DME:  Grab bars in shower, shower chair , rolling walker , wheelchair. PT/OT Recs:  Requested orders. Active Services:  [facility], family does want them to resume at discharge. Transportation:  Daughter Jesu Sorenson and [facility] but other family helps as well. Medications:  No issues getting , taking or affording medication. Uses [facility] in [facility] on [address]. Has [employer]. PCP:  Dr. Sonia Arroyo       HD/PD:  N/A     PLAN/COMMENTS: Goal: return home with family support and home care. Daughter tells cm that her mom is being monitored for possible oxygen needs at discharge. Daughter wants patient to have therapy prior to coming home. SW/CM provided contact information for patient or family to call with any questions. SW/CM will follow and assist as needed.     Electronically signed by Elizabeth Stevenson on 3/30/2021 at 12:23 PM
[facility] is active.     Ines Manzanares LPN    [address] Cell [phone], Fax [phone]
Sw aware of order to assist with dc planning and need for stedy. Per CM note, Family is working with [facility] to get a steady lift for the home. Current plan is to return home with [address] and family assist.     Electronically signed by EZ Talley, GI, Case Management on 4/3/2021 at 2:28 PM  [facility] [id number]    3:19 PM  Sw spoke with patient, daughter and son present in room today and confirmed their plan to take patient home with [address]. Daughter also confirmed that they are working with [facility] for the stedy and will obtain a gait belt there as well.      Electronically signed by EZ Talley, GI, Case Management on 4/3/2021 at 3:21 PM  [facility] [id number]
to start addressing this issue, they have a list of local transport companies to call if private pay is needed.   Electronically signed by Marie Hou on 4/5/2021 at 6:02 PM

## 2021-04-05 NOTE — PROGRESS NOTES
Clinical Pharmacy Note  Warfarin Consult    Lisa Stanley is a 80 y.o. female receiving warfarin managed by pharmacy. Patient being bridged with none. Warfarin Indication: Afib  Target INR range: 2-3   Dose prior to admission: 2.5 mg daily    Current warfarin drug-drug interactions: Amiodarone (home med)    Recent Labs     04/03/21  0443 04/04/21  0425 04/05/21  0517   INR 3.36* 3.25* 2.69*       Assessment/Plan:    Prednisone complete. Likely contributed to her requiring less than normal dosing. She also reportedly takes a MVI at home and this was not being administered during admission. May have had a small amount of vitamin K in it. 13 mg given since admission while on prednisone. Plan to discharge today with home care  D/w daughter and her appetite has still been reduced  Hold dose on Wednesday 4/7 if INR not checked by home care until Thursday. If not checked until Friday, give 2.5 mg Monday, Tues, Thursday. Monitor INR with home care. Thank you for the consult. Will continue to follow.     Minnie Mcgee, PharmD 4/5/2021 8:03 AM

## 2021-04-05 NOTE — PROGRESS NOTES
Pt resting in bed comfortably this morning, am medication given. Pt rested well for night, denies further needs at this time. Call light in reach, fall precautions in place. Will continue to monitor. Electronically signed by Chau Gates RN on 4/5/2021 at 5:54 AM

## 2021-04-05 NOTE — PROGRESS NOTES
[facility]    Respiratory Therapy   Home Oxygen Evaluation        Name: [address] [place] [address] Record Number: [id number]  Age: 80 y.o.   Gender:  female   : 1934  Today's date: 2021  Room: F6H-1075/5108-01      Assessment        /74   Pulse 89   Temp 97.3 °F (36.3 °C) (Oral)   Resp 18   Ht 5' 6\" (1.676 m)   Wt 265 lb 10.5 oz (120.5 kg)   LMP  (LMP Unknown)   SpO2 92%   BMI 42.88 kg/m²     Patient Active Problem List   Diagnosis    CHF (congestive heart failure), NYHA class III, acute on chronic, combined ([address]    Essential hypertension    Persistent atrial fibrillation (HCC)    Pulmonary HTN ([address]    Hypercholesteremia    Primary localized osteoarthrosis of shoulder region    Nonrheumatic aortic valve stenosis    Hyperthyroidism    Acute kidney injury superimposed on chronic kidney disease (HCC)    Hyponatremia    Combined systolic and diastolic congestive heart failure (HCC)    Anemia due to chronic kidney disease    Acute respiratory failure with hypoxia ([address]    DAVION (acute kidney injury) ([address]    Uncontrolled type 2 diabetes mellitus with hyperglycemia (HCC)    Acute on chronic combined systolic and diastolic congestive heart failure (HCC)    Acute congestive heart failure (HCC)    Severe aortic stenosis    Paroxysmal atrial fibrillation ([address]    CHF (congestive heart failure), NYHA class I, acute on chronic, combined ([address]    Acute on chronic congestive heart failure (HCC)    Bilateral leg edema       Social History:  Social History     Tobacco Use    Smoking status: Never Smoker    Smokeless tobacco: Never Used    Tobacco comment: Never smoked   Substance Use Topics    Alcohol use: No    Drug use: No       Patient Room Air saturation at rest 86  %      Oxygen saturations of 88% or less on RA qualifies patient for Home Oxygen    Patient resting on 0  lmp  with an oxygen saturation of  86 %     Patient ambulated on 1 lpm with an oxygen saturation of 87%    Patient ambulated on 2 lpm with an oxygen saturation of 95%      Qualifying patient for home oxygen with ambulation and continuous flow  @ 2 lpm.      In your clinical assessment does the Patient Require Portable Oxygen Tanks?     Yes              [facility] [employer] contacted to follow care of patients home oxygen needs on 4/5/2021 at 10:34 AM    Patient/caregiver was educated on Home Oxygen process:  Yes      Level of patient/caregiver understanding able to:   [x] Verbalize understanding   [] Demonstrate understanding       [] Teach back        [] Needs reinforcement        []  No available caregiver               []  Other:     Response to education:  Good     Time Spent with Home O2 Set Up:  10  minutes     Yahaira Abarca RCP on 4/5/2021 at 10:34 AM

## 2021-04-07 ENCOUNTER — ANTI-COAG VISIT (OUTPATIENT)
Dept: PHARMACY | Age: 86
End: 2021-04-07

## 2021-04-07 DIAGNOSIS — I48.19 PERSISTENT ATRIAL FIBRILLATION (HCC): ICD-10-CM

## 2021-04-07 LAB — INTERNATIONAL NORMALIZATION RATIO, POC: 1.5

## 2021-04-07 NOTE — PROGRESS NOTES
[facility] Visit    Lab Results   Component Value Date    INR 1.5 2021    INR 2.69 (H) 2021    INR 3.25 (H) 2021       Anticoagulation Summary  As of 2021    INR goal:  2.0-3.0   TTR:  5.6 % (1.1 wk)   INR used for dosin.5 (2021)   Warfarin maintenance plan:  2.5 mg (5 mg x 0.5) every day   Weekly warfarin total:  17.5 mg   Plan last modified:  Corina Chin, [facility] (3/22/2021)   Next INR check:  2021   Target end date: Indefinite    Indications    Persistent atrial fibrillation (HCC) [I48.19]             Anticoagulation Episode Summary     INR check location:      Preferred lab:      Send INR reminders to:  [facility]    Comments:  EPIC      Anticoagulation Care Providers     Provider Role Specialty Phone number    Miracle Patel MD Referring Cardiology [phone]              Assessment / Plan:  Lissa Sharp was just discharged from [facility] on 21, after a 10 day stay for acute on chronic heart failure. She developed a rash for which she received several days of steroids. She was off of the steroids by the time of discharge. She required lower doses during her admission, possibly due to the CHF and the prednisone. She did not have any warfarin from  through . The home care nurse does not believe that Lissa Sharp had any warfarin last evening as well. Her INR is very low today at 1.5. She did have a full breakfast but did not eat lunch today. We will need to keep an eye on her appetite as we move forward. We will have her take 5mg of warfarin today and then resume her previous dose of 2.5mg daily. We may need to reduce this dose if she is not eating well. Her next INR is in 5 days.     Description    [facility]-Michelle KELLY # [phone]  Take warfarin 5mg today only  NEW DOSE: Warfarin 2.5mg daily            Warfarin medication reviewed and updated on the patient 's home medication list: Yes Instructions given to Michelle Ng 10, Phone [phone]      CLINICAL PHARMACY CONSULT: MED RECONCILIATION/REVIEW ADDENDUM    For Pharmacy Admin Tracking Only    PHSO: No  Total # of Interventions Recommended: 1  - Increased Dose #: 1  - Maintenance Safety Lab Monitoring #: 1  Total Interventions Accepted: 1  Time Spent (min): 15

## 2021-04-08 ENCOUNTER — FOLLOWUP TELEPHONE ENCOUNTER (OUTPATIENT)
Dept: INPATIENT UNIT | Age: 86
End: 2021-04-08

## 2021-04-08 NOTE — PROGRESS NOTES
HF RN called pt for 72 hour f/u call (# [phone]). Pt's dtr Nusrat Woo answered. This is who I spoke with on 3/30 for HF education while pt was admitted for HF 3/26-4/5 at [facility] remembers me and my role in her mom's care. Pt is being worked up for TAVR. Pt typically follows with Dr Cristofer Heart NP. Marli Ferreira, Dr Melvin's TAVR RN, is following as well. Pt is scheduled on 4/16 for further outpt TAVR testing. Nusrat Woo states \"she isn't doing so well. She is just so weak and has pretty much been bed ridden since she has been home. \"We got an ambulance ride home and wasn't told anything else. \" After review of chart of CM's progress note, it appears they were unable to set up transportation to pt's MD appts for multiple reasons (mostly insurance coverage issues). From what I understand, it will be a $250 transport fee out of pocket. Dtr states they can not do that. Dtr states they are scheduled to see Gabbie Huff HF NP on 4/12 and will be unable to get her there. The only thing I had to offer presently was to make the appt Lakeisha Callahan does have a smart phone and is able to do this. \"It's better than nothing. \" Dtr is aware of the plans for TAVR testing on 4/16 \"but we are taking just one day at a time at this point. \"    They have been unable to weigh pt since essentially bedridden. Dtr states she has spoken with Marli Ferreira and updated her. When asked, dtr states pt is not in any acute stress. Denies SOB or labored breathing. Dtr states [address] RN has been out to the house twice since PR on 4/5. I went over the HF Zones with the dtr. I encouraged them to call if gets in the Yellow Zone and if gets in the Red Zone then to call 911. I called and spoke with Héctor Colbert NP who gave me Alvin's contact info. Dtr is ok with me calling Marli Ferreira in the morning to discuss plan of care and need for close f/u.  Based off of what Marli Ferreira says, I will then call CEASAR Hurley and talk with Cris Xavier Nelson, NP as well to update and get her involved as well since pt scheduled to see her in 4 days. Pito Sesay appreciative of call. I told her I will call her back tomorrow. I again reiterated to call 911 if pt becomes distressed or if they have any concerns. Verbalized understanding.

## 2021-04-09 ENCOUNTER — TELEPHONE (OUTPATIENT)
Dept: CARDIOLOGY CLINIC | Age: 86
End: 2021-04-09

## 2021-04-09 ENCOUNTER — FOLLOWUP TELEPHONE ENCOUNTER (OUTPATIENT)
Dept: INPATIENT UNIT | Age: 86
End: 2021-04-09

## 2021-04-09 NOTE — TELEPHONE ENCOUNTER
Spoke w pts daughter, Patricio Stewart on 4/7/21 and today. On 4/7, daughter let me know that since DC, pt is pretty much bedbound. She is frustrated that she is unable to find transport to 's for HFU and tests for TAVR workup. Pt now on O2 24/7. She states that pt has bedsores from hospital stay. [facility] states that pt did not qualify for ARU at [facility] and did not want to go to [facility] for rehab at [place]. Discussed pt status with Dr. Ricky Rosenberg. I highly encouraged getting home PT back on board to help with rehab prior to considering scheduling for TAVR as pt sounds very deconditioned. Talked to Teeteebishop Stewart today. She states that pt is still pretty much bedbound but they were able to get her in the chair yesterday. She states that PT is coming to the house today. She is working with HF nurse at [facility] to get her HFU with Cris Hooks a VV (due to transportation issues). [facility] states that she is working on getting transport for TAVR CTA scheduled on 4/16/21 at [facility]. States home care RN has been out to assess twice since [place]. Asked that they check her O2 sat on RA. [facility] will keep me informed on pts PT progress and transportation for CTA. Encouraged her to call me with any questions/concerns. Verbalized understanding of all.  Jesse KELLY

## 2021-04-12 ENCOUNTER — VIRTUAL VISIT (OUTPATIENT)
Dept: CARDIOLOGY CLINIC | Age: 86
End: 2021-04-12
Payer: MEDICARE

## 2021-04-12 DIAGNOSIS — I48.19 PERSISTENT ATRIAL FIBRILLATION (HCC): ICD-10-CM

## 2021-04-12 DIAGNOSIS — I35.0 SEVERE AORTIC STENOSIS: Primary | ICD-10-CM

## 2021-04-12 DIAGNOSIS — N18.4 CKD (CHRONIC KIDNEY DISEASE), STAGE IV (HCC): ICD-10-CM

## 2021-04-12 DIAGNOSIS — I50.43 ACUTE ON CHRONIC COMBINED SYSTOLIC AND DIASTOLIC HF (HEART FAILURE) (HCC): ICD-10-CM

## 2021-04-12 PROCEDURE — 99214 OFFICE O/P EST MOD 30 MIN: CPT | Performed by: NURSE PRACTITIONER

## 2021-04-12 NOTE — PROGRESS NOTES
in muscle strength, numbness or tingling. · Psychiatric: No anxiety, or depression. · Endocrine: No temperature intolerance. No excessive thirst, fluid intake, or urination. · Hem/Lymph: No abnormal bruising or bleeding, blood clots or swollen lymph nodes. · Allergic/Immunologic: No nasal congestion or hives. Prior to Visit Medications    Medication Sig Taking? Authorizing Provider   diphenhydrAMINE (BENADRYL) 25 MG tablet Take 1 tablet by mouth every 6 hours as needed for Itching Yes Em Garrett MD   insulin glargine (LANTUS) 100 UNIT/ML injection vial Inject 10 Units into the skin nightly Yes Em Garrett MD   melatonin 3 MG TABS tablet Take 1 tablet by mouth nightly as needed (sleep) Yes Em Garrett MD   metoprolol succinate (TOPROL XL) 100 MG extended release tablet Take 1 tablet by mouth daily Yes Em Garrett MD   polyethylene glycol (GLYCOLAX) 17 g packet Take 17 g by mouth daily as needed for Constipation Yes Em Garrett MD   triamcinolone (KENALOG) 0.1 % cream Apply topically 2 times daily. Yes Em Garrett MD   torsemide (DEMADEX) 20 MG tablet Take 1 tablet by mouth daily Yes Em Garrett MD   Insulin Pen Needle 32G X 4 MM MISC 1 each by Does not apply route daily Yes Em Garrett MD   Cholecalciferol (VITAMIN D) 50 MCG (2000 UT) CAPS capsule Take 1 capsule by mouth once daily Yes Historical Provider, MD   blood glucose monitor kit and supplies Dispense sufficient amount for indicated testing frequency plus additional to accommodate PRN testing needs. Dispense all needed supplies to include: monitor, strips, lancing device, lancets, control solutions, alcohol swabs.  Yes Em Garrett MD   latanoprost (XALATAN) 0.005 % ophthalmic solution Place 1 drop into the right eye nightly Yes Historical Provider, MD   pantoprazole (PROTONIX) 40 MG tablet Take 40 mg by mouth daily Yes Historical Provider, MD   warfarin (COUMADIN) 5 MG tablet Take Abnormal-     Musculoskeletal:   [] Normal gait with no signs of ataxia         [] Normal range of motion of neck        [x] Abnormal- essentially immobile      Neurological:        [x] No Facial Asymmetry (Cranial nerve 7 motor function) (limited exam to video visit)            [] Abnormal-         Skin:        [x] No significant exanthematous lesions or discoloration noted on facial skin         [] Abnormal-            Psychiatric:       [x] Normal Affect [x] No Hallucinations        [] Abnormal-     Other pertinent observable physical exam findings-   Bilateral lower leg edema with chronic stasis changes    Results for Ricco Modi (MRN [id number]) as of 4/1/2021 12:35    Ref. Range 3/26/2021 17:26 3/27/2021 04:17 3/29/2021 04:46 4/1/2021 04:23   Pro-BNP Latest Ref Range: 0 - 449 pg/mL 16,780 (H)   11,658 (H) 32,009 (H)   Troponin Latest Ref Range: <0.01 ng/mL 0.02 (H)         Cholesterol, Total Latest Ref Range: 0 - 199 mg/dL   117       HDL Cholesterol Latest Ref Range: 40 - 60 mg/dL   41       LDL Calculated Latest Ref Range: <100 mg/dL   66       Triglycerides Latest Ref Range: 0 - 150 mg/dL   52       VLDL Cholesterol Calculated Latest Ref Range: Not Established mg/dL   10         3/30/2021 Carotid US:   No hemodynamically significant carotid stenosis noted on either side.    Normal vertebral artery blood flow bilaterally    Low blood flow velocities which may be cardiac related.    Very irregular cardiac rhythm      2/10/2021 [facility]:  ANGIOGRAPHIC FINDINGS:  1.  Left main is normal, LORENZO 3 flow, no stenosis. 2.  Left anterior descending artery has LORENZO 3 flow with mild luminal  irregularities. 3.  Left circumflex is patent without significant stenosis. 4.  Right coronary artery is dominant without significant stenosis, LORENZO  3 flow.     HEMODYNAMICS:  Aortic valve gradient was 41.1 mmHg.  Post valvuloplasty  aortic valve gradient was 30 mmHg.     SUMMARY:  1.  Successful balloon aortic valvuloplasty. 2.  Nonobstructive coronary artery disease.     2/7/201 Echo:  There is concentric left ventricular hypertrophy.   Ejection fraction is visually estimated to be 40-45%.   Grade III diastolic dysfunction with elevated LV filling pressures.   The left atrium is severely dilated.   Severe aortic stenosis with a peak velocity of 5.23m/s and a mean pressure   gradient of 66mmHg.   No evidence of significant aortic valve regurgitation.   Dilated right ventricle.   Right ventricular systolic function is mildly reduced .   Estimated pulmonary artery systolic pressure is elevated at 56 mmHg assuming   a right atrial pressure of 15 mmHg.   A hyperechoic structure is seen in liver pushing on IVC. ASSESSMENT/PLAN:    1. Severe aortic stenosis  -S/P BAV in February 2021  -awaiting TAVR; preop evaluation continuing  -[facility] abdomen and chest [facility]  -discussed with Julia Dobson RN    2. Acute on chronic combined systolic and diastolic HF (heart failure) (HCC)  -appears to be fairly stable; NYHA class III  -LVEF 40-45%  -continue O2  -continue BB, diuretic (Toprol and torsemide)  -not on ACE/ARB/aldactone d/t elevated Cr and severe AS  -low sodium diet and fluid restriction  -check BMP on Wed this week (4/14/2021)    3. Persistent atrial fibrillation (HCC)  -unsure of HR; pulse not obtained d/t virtual visit  -continue BB  -continue Warfarin  -Amiodarone dc'd previously d/t persistent atrial fib    4. CKD (chronic kidney disease), stage IV ([facility] [address]  -follows with Dr. Fatuma Zhong     5. Bilateral LE edema  -secondary to CHF+ venous insufficiency  -continue diuretic and increase by 20 mg for next 2 days and then resume normal dosing  -compression hose    6. Pulmonary HTN  -continue O2 and continue diuretic    7. General debility and weakness  -Home Care Nurse following along with home PT  -again discussed SNF rehab and daughter declined    Called and discussed with Julia Dobson RN (TAVR coordinator) and NICOLE Acosta RN (CHF Nurse). Called and left order for medication change and lab work (BMP) with [address] [place] [address] and d/w Cherelle Mckeon RN. Return for scheduled in May 2021 with Dr. Kumar James. Cari Horne is a 80 y.o. female being evaluated by a Virtual Visit (video visit) encounter to address concerns as mentioned above. A caregiver was present when appropriate. Due to this being a TeleHealth encounter (During MAJOF-45 public health emergency), evaluation of the following organ systems was limited: Vitals/Constitutional/EENT/Resp/CV/GI//MS/Neuro/Skin/Heme-Lymph-Imm. Pursuant to the emergency declaration under the [address] [place] [address], [address] [place] [address] authority and the [employer] Act, this Virtual Visit was conducted with patient's (and/or legal guardian's) consent, to reduce the patient's risk of exposure to COVID-19 and provide necessary medical care. The patient (and/or legal guardian) has also been advised to contact this office for worsening conditions or problems, and seek emergency medical treatment and/or call 911 if deemed necessary. Services were provided through a video synchronous discussion virtually to substitute for in-person clinic visit. Patient and provider were located at their individual homes. --GRACIA Panda CNP on 4/12/2021 at 11:05 AM    An electronic signature was used to authenticate this note.

## 2021-04-15 ENCOUNTER — TELEPHONE (OUTPATIENT)
Dept: CARDIOLOGY CLINIC | Age: 86
End: 2021-04-15

## 2021-04-15 DIAGNOSIS — I35.0 NONRHEUMATIC AORTIC VALVE STENOSIS: ICD-10-CM

## 2021-04-15 DIAGNOSIS — N18.4 CKD (CHRONIC KIDNEY DISEASE), STAGE IV (HCC): ICD-10-CM

## 2021-04-15 DIAGNOSIS — I48.0 PAROXYSMAL ATRIAL FIBRILLATION (HCC): Primary | ICD-10-CM

## 2021-04-15 NOTE — TELEPHONE ENCOUNTER
She is chronic atrial fibrillation and the variation in her heart rate is to be expected. We do not need to monitor as she recently was hospitalized and on telemetry. It sounds like her edema has improved some, so we can increase her torsemide to continue current dose and take additional 20 mg on Tues-Thurs-Sat. Would like to have BMP drawn whenever possible.

## 2021-04-15 NOTE — TELEPHONE ENCOUNTER
I called and spoke with [facility] she states that this was the first time seeing the patient so she is unsure if she had a decrease in her edema after increasing her torsemide by 20 mg on 4/12. She states that patient is bed bound and unable to weight herself daily. I called and spoke with patient daughter Niecy Campos and she stated that she noticed a decrease in her swelling after taking the extra dose of torsemide and confirmed that the patient was NOT short of breath. HHN reported that patient HR is very irregular and will go form 65- to 125 bpm.    Roldan Torres would you recommend ordering a monitor to assess patient average daily heart rate. If so we can mail out biotel monitor to her.     Please advise,  Thanks,  Fidelina Vegas RN

## 2021-04-15 NOTE — TELEPHONE ENCOUNTER
Called and talked with Cathleen's daughter, George Pereira:    She states that Cathleen's bedsore on her buttocks is not getting any worse and is actually looking a little better than when she came home from hospital. Also her breathing and edema have improved somewhat. They are able to take her off O2 during the day and maintain her O2 sat 92-97%. She does not feel she needs increased diuretic at this point. Discussed taking additional 20 mg if increased edema in her legs or worsening SOB. She verbalizes understanding. Plan is for wheelchair access Karla Mendoza to take her to [facility] tomorrow for CT scan for preop evaluation of TAVR. Have discussed with Samuel Stone RN (TAVR coordinator). She will also have BMP drawn while there as N was unable to obtain access today.  She does continue with home PT.

## 2021-04-16 ENCOUNTER — HOSPITAL ENCOUNTER (OUTPATIENT)
Dept: ONCOLOGY | Age: 86
Setting detail: INFUSION SERIES
Discharge: HOME | End: 2021-04-16
Payer: MEDICARE

## 2021-04-16 ENCOUNTER — HOSPITAL ENCOUNTER (OUTPATIENT)
Dept: CT IMAGING | Age: 86
Discharge: HOME | End: 2021-04-16
Payer: MEDICARE

## 2021-04-16 VITALS
RESPIRATION RATE: 16 BRPM | SYSTOLIC BLOOD PRESSURE: 111 MMHG | HEART RATE: 70 BPM | TEMPERATURE: 97.5 F | DIASTOLIC BLOOD PRESSURE: 71 MMHG

## 2021-04-16 DIAGNOSIS — I35.0 NONRHEUMATIC AORTIC VALVE STENOSIS: ICD-10-CM

## 2021-04-16 LAB
BUN BLDV-MCNC: 27 MG/DL (ref 7–20)
CREAT SERPL-MCNC: 1.5 MG/DL (ref 0.6–1.2)
GFR AFRICAN AMERICAN: 40
GFR NON-AFRICAN AMERICAN: 33

## 2021-04-16 PROCEDURE — 84520 ASSAY OF UREA NITROGEN: CPT

## 2021-04-16 PROCEDURE — 2580000003 HC RX 258: Performed by: INTERNAL MEDICINE

## 2021-04-16 PROCEDURE — 99211 OFF/OP EST MAY X REQ PHY/QHP: CPT

## 2021-04-16 PROCEDURE — 96360 HYDRATION IV INFUSION INIT: CPT

## 2021-04-16 PROCEDURE — 74174 CTA ABD&PLVS W/CONTRAST: CPT

## 2021-04-16 PROCEDURE — 6360000004 HC RX CONTRAST MEDICATION: Performed by: INTERNAL MEDICINE

## 2021-04-16 PROCEDURE — 96361 HYDRATE IV INFUSION ADD-ON: CPT

## 2021-04-16 PROCEDURE — 82565 ASSAY OF CREATININE: CPT

## 2021-04-16 RX ORDER — SODIUM CHLORIDE 9 MG/ML
INJECTION, SOLUTION INTRAVENOUS CONTINUOUS
Status: DISCONTINUED | OUTPATIENT
Start: 2021-04-16 | End: 2021-04-17 | Stop reason: HOSPADM

## 2021-04-16 RX ADMIN — SODIUM CHLORIDE: 9 INJECTION, SOLUTION INTRAVENOUS at 09:40

## 2021-04-16 RX ADMIN — IOPAMIDOL 150 ML: 755 INJECTION, SOLUTION INTRAVENOUS at 12:18

## 2021-04-16 NOTE — PROGRESS NOTES
Post hydration completed at this time. Pt regulo with no adverse reaction. Pt to follow up with MD. Discharged home per W/C with all belongings.

## 2021-04-16 NOTE — PROGRESS NOTES
Pt to dept for pre/post hydration. Pt with very limited mobility. Max assist of 3 people to transfer.

## 2021-04-19 ENCOUNTER — TELEPHONE (OUTPATIENT)
Dept: CARDIOLOGY CLINIC | Age: 86
End: 2021-04-19

## 2021-04-19 ENCOUNTER — ANTI-COAG VISIT (OUTPATIENT)
Dept: PHARMACY | Age: 86
End: 2021-04-19
Payer: MEDICARE

## 2021-04-19 DIAGNOSIS — I48.19 PERSISTENT ATRIAL FIBRILLATION (HCC): ICD-10-CM

## 2021-04-19 LAB — INR BLD: 3.5

## 2021-04-19 NOTE — TELEPHONE ENCOUNTER
Spoke to Pretty, pts daughter, re CTA results. Let her know that I sent a message to Dr. Rose Spain regarding the right adrenal mass, any further FU needed. Let her know that I would be I contact as soon as we can schedule pts procedure. Pretty states that pt performing PT, able to gt OOB with help. No increase in SOB. States that [facility] states bedsores are healing.  Jesse KELLY

## 2021-04-25 ENCOUNTER — TELEPHONE (OUTPATIENT)
Dept: OTHER | Facility: CLINIC | Age: 86
End: 2021-04-25

## 2021-04-25 ENCOUNTER — APPOINTMENT (OUTPATIENT)
Dept: GENERAL RADIOLOGY | Age: 86
DRG: 291 | End: 2021-04-25
Payer: MEDICARE

## 2021-04-25 ENCOUNTER — HOSPITAL ENCOUNTER (INPATIENT)
Age: 86
LOS: 4 days | Discharge: ANOTHER HOSPITAL | DRG: 291 | End: 2021-04-29
Attending: EMERGENCY MEDICINE | Admitting: INTERNAL MEDICINE
Payer: MEDICARE

## 2021-04-25 DIAGNOSIS — I50.43 ACUTE ON CHRONIC COMBINED SYSTOLIC AND DIASTOLIC CONGESTIVE HEART FAILURE (HCC): ICD-10-CM

## 2021-04-25 DIAGNOSIS — R60.0 BILATERAL LEG EDEMA: ICD-10-CM

## 2021-04-25 DIAGNOSIS — N28.9 ACUTE RENAL INSUFFICIENCY: Primary | ICD-10-CM

## 2021-04-25 PROBLEM — N18.4 STAGE 4 CHRONIC KIDNEY DISEASE (HCC): Chronic | Status: ACTIVE | Noted: 2021-04-25

## 2021-04-25 LAB
A/G RATIO: 0.9 (ref 1.1–2.2)
ALBUMIN SERPL-MCNC: 3.2 G/DL (ref 3.4–5)
ALP BLD-CCNC: 81 U/L (ref 40–129)
ALT SERPL-CCNC: 27 U/L (ref 10–40)
ANION GAP SERPL CALCULATED.3IONS-SCNC: 12 MMOL/L (ref 3–16)
AST SERPL-CCNC: 21 U/L (ref 15–37)
BACTERIA: ABNORMAL /HPF
BASOPHILS ABSOLUTE: 0 K/UL (ref 0–0.2)
BASOPHILS RELATIVE PERCENT: 0.7 %
BILIRUB SERPL-MCNC: 1.5 MG/DL (ref 0–1)
BILIRUBIN URINE: NEGATIVE
BLOOD, URINE: ABNORMAL
BUN BLDV-MCNC: 42 MG/DL (ref 7–20)
CALCIUM SERPL-MCNC: 8.6 MG/DL (ref 8.3–10.6)
CELLULAR CASTS: ABNORMAL /LPF
CHLORIDE BLD-SCNC: 88 MMOL/L (ref 99–110)
CLARITY: ABNORMAL
CO2: 30 MMOL/L (ref 21–32)
COARSE CASTS, UA: ABNORMAL /LPF (ref 0–2)
COLOR: YELLOW
COMMENT UA: ABNORMAL
CREAT SERPL-MCNC: 4.2 MG/DL (ref 0.6–1.2)
EOSINOPHILS ABSOLUTE: 0.1 K/UL (ref 0–0.6)
EOSINOPHILS RELATIVE PERCENT: 3 %
EPITHELIAL CELLS, UA: 10 /HPF (ref 0–5)
GFR AFRICAN AMERICAN: 12
GFR NON-AFRICAN AMERICAN: 10
GLOBULIN: 3.4 G/DL
GLUCOSE BLD-MCNC: 125 MG/DL (ref 70–99)
GLUCOSE BLD-MCNC: 127 MG/DL (ref 70–99)
GLUCOSE BLD-MCNC: 167 MG/DL (ref 70–99)
GLUCOSE URINE: NEGATIVE MG/DL
HCT VFR BLD CALC: 34.9 % (ref 36–48)
HEMOGLOBIN: 11.5 G/DL (ref 12–16)
INR BLD: 5.03 (ref 0.86–1.14)
KETONES, URINE: NEGATIVE MG/DL
LEUKOCYTE ESTERASE, URINE: ABNORMAL
LYMPHOCYTES ABSOLUTE: 0.8 K/UL (ref 1–5.1)
LYMPHOCYTES RELATIVE PERCENT: 16.5 %
MCH RBC QN AUTO: 30.4 PG (ref 26–34)
MCHC RBC AUTO-ENTMCNC: 33 G/DL (ref 31–36)
MCV RBC AUTO: 92.4 FL (ref 80–100)
MICROSCOPIC EXAMINATION: YES
MONOCYTES ABSOLUTE: 0.7 K/UL (ref 0–1.3)
MONOCYTES RELATIVE PERCENT: 14.8 %
NEUTROPHILS ABSOLUTE: 3.2 K/UL (ref 1.7–7.7)
NEUTROPHILS RELATIVE PERCENT: 65 %
NITRITE, URINE: NEGATIVE
OCCULT BLOOD DIAGNOSTIC: NORMAL
PDW BLD-RTO: 20.3 % (ref 12.4–15.4)
PERFORMED ON: ABNORMAL
PERFORMED ON: ABNORMAL
PH UA: 6 (ref 5–8)
PLATELET # BLD: 231 K/UL (ref 135–450)
PMV BLD AUTO: 8.5 FL (ref 5–10.5)
POTASSIUM REFLEX MAGNESIUM: 5 MMOL/L (ref 3.5–5.1)
PRO-BNP: ABNORMAL PG/ML (ref 0–449)
PROTEIN UA: 100 MG/DL
PROTHROMBIN TIME: 59.3 SEC (ref 10–13.2)
RBC # BLD: 3.78 M/UL (ref 4–5.2)
RBC UA: ABNORMAL /HPF (ref 0–4)
SODIUM BLD-SCNC: 130 MMOL/L (ref 136–145)
SPECIFIC GRAVITY UA: 1.01 (ref 1–1.03)
TOTAL PROTEIN: 6.6 G/DL (ref 6.4–8.2)
TROPONIN: 0.02 NG/ML
URINE REFLEX TO CULTURE: YES
URINE TYPE: ABNORMAL
UROBILINOGEN, URINE: 1 E.U./DL
WBC # BLD: 4.9 K/UL (ref 4–11)
WBC UA: >900 /HPF (ref 0–5)

## 2021-04-25 PROCEDURE — 83880 ASSAY OF NATRIURETIC PEPTIDE: CPT

## 2021-04-25 PROCEDURE — 71045 X-RAY EXAM CHEST 1 VIEW: CPT

## 2021-04-25 PROCEDURE — 93005 ELECTROCARDIOGRAM TRACING: CPT | Performed by: EMERGENCY MEDICINE

## 2021-04-25 PROCEDURE — 87086 URINE CULTURE/COLONY COUNT: CPT

## 2021-04-25 PROCEDURE — 2060000000 HC ICU INTERMEDIATE R&B

## 2021-04-25 PROCEDURE — 99284 EMERGENCY DEPT VISIT MOD MDM: CPT

## 2021-04-25 PROCEDURE — 2500000003 HC RX 250 WO HCPCS: Performed by: INTERNAL MEDICINE

## 2021-04-25 PROCEDURE — 84484 ASSAY OF TROPONIN QUANT: CPT

## 2021-04-25 PROCEDURE — 87088 URINE BACTERIA CULTURE: CPT

## 2021-04-25 PROCEDURE — 81001 URINALYSIS AUTO W/SCOPE: CPT

## 2021-04-25 PROCEDURE — 87186 SC STD MICRODIL/AGAR DIL: CPT

## 2021-04-25 PROCEDURE — 2580000003 HC RX 258: Performed by: INTERNAL MEDICINE

## 2021-04-25 PROCEDURE — G0328 FECAL BLOOD SCRN IMMUNOASSAY: HCPCS

## 2021-04-25 PROCEDURE — 85610 PROTHROMBIN TIME: CPT

## 2021-04-25 PROCEDURE — 80053 COMPREHEN METABOLIC PANEL: CPT

## 2021-04-25 PROCEDURE — 6360000002 HC RX W HCPCS: Performed by: INTERNAL MEDICINE

## 2021-04-25 PROCEDURE — 6370000000 HC RX 637 (ALT 250 FOR IP): Performed by: INTERNAL MEDICINE

## 2021-04-25 PROCEDURE — 85025 COMPLETE CBC W/AUTO DIFF WBC: CPT

## 2021-04-25 RX ORDER — LATANOPROST 50 UG/ML
1 SOLUTION/ DROPS OPHTHALMIC NIGHTLY
Status: DISCONTINUED | OUTPATIENT
Start: 2021-04-25 | End: 2021-04-29 | Stop reason: HOSPADM

## 2021-04-25 RX ORDER — POLYETHYLENE GLYCOL 3350 17 G/17G
17 POWDER, FOR SOLUTION ORAL DAILY PRN
Status: DISCONTINUED | OUTPATIENT
Start: 2021-04-25 | End: 2021-04-25 | Stop reason: SDUPTHER

## 2021-04-25 RX ORDER — POLYETHYLENE GLYCOL 3350 17 G/17G
17 POWDER, FOR SOLUTION ORAL DAILY PRN
Status: DISCONTINUED | OUTPATIENT
Start: 2021-04-25 | End: 2021-04-29 | Stop reason: HOSPADM

## 2021-04-25 RX ORDER — FUROSEMIDE 10 MG/ML
80 INJECTION INTRAMUSCULAR; INTRAVENOUS 3 TIMES DAILY
Status: DISCONTINUED | OUTPATIENT
Start: 2021-04-25 | End: 2021-04-25

## 2021-04-25 RX ORDER — LANOLIN ALCOHOL/MO/W.PET/CERES
3 CREAM (GRAM) TOPICAL NIGHTLY PRN
Status: DISCONTINUED | OUTPATIENT
Start: 2021-04-25 | End: 2021-04-29 | Stop reason: HOSPADM

## 2021-04-25 RX ORDER — SODIUM CHLORIDE 9 MG/ML
25 INJECTION, SOLUTION INTRAVENOUS PRN
Status: DISCONTINUED | OUTPATIENT
Start: 2021-04-25 | End: 2021-04-29 | Stop reason: HOSPADM

## 2021-04-25 RX ORDER — TRIAMCINOLONE ACETONIDE 1 MG/G
CREAM TOPICAL 2 TIMES DAILY
Status: DISCONTINUED | OUTPATIENT
Start: 2021-04-25 | End: 2021-04-29 | Stop reason: HOSPADM

## 2021-04-25 RX ORDER — WARFARIN SODIUM 2.5 MG/1
2.5 TABLET ORAL EVERY EVENING
Status: DISCONTINUED | OUTPATIENT
Start: 2021-04-25 | End: 2021-04-25 | Stop reason: DRUGHIGH

## 2021-04-25 RX ORDER — DEXTROSE MONOHYDRATE 25 G/50ML
12.5 INJECTION, SOLUTION INTRAVENOUS PRN
Status: DISCONTINUED | OUTPATIENT
Start: 2021-04-25 | End: 2021-04-29 | Stop reason: HOSPADM

## 2021-04-25 RX ORDER — ACETAMINOPHEN 650 MG/1
650 SUPPOSITORY RECTAL EVERY 6 HOURS PRN
Status: DISCONTINUED | OUTPATIENT
Start: 2021-04-25 | End: 2021-04-29 | Stop reason: HOSPADM

## 2021-04-25 RX ORDER — SODIUM CHLORIDE 0.9 % (FLUSH) 0.9 %
5-40 SYRINGE (ML) INJECTION EVERY 12 HOURS SCHEDULED
Status: DISCONTINUED | OUTPATIENT
Start: 2021-04-25 | End: 2021-04-29 | Stop reason: HOSPADM

## 2021-04-25 RX ORDER — PANTOPRAZOLE SODIUM 40 MG/1
40 TABLET, DELAYED RELEASE ORAL
Status: DISCONTINUED | OUTPATIENT
Start: 2021-04-26 | End: 2021-04-29 | Stop reason: HOSPADM

## 2021-04-25 RX ORDER — CETIRIZINE HYDROCHLORIDE 10 MG/1
10 TABLET ORAL DAILY
Status: DISCONTINUED | OUTPATIENT
Start: 2021-04-25 | End: 2021-04-25 | Stop reason: DRUGHIGH

## 2021-04-25 RX ORDER — ONDANSETRON 2 MG/ML
4 INJECTION INTRAMUSCULAR; INTRAVENOUS EVERY 6 HOURS PRN
Status: DISCONTINUED | OUTPATIENT
Start: 2021-04-25 | End: 2021-04-29 | Stop reason: HOSPADM

## 2021-04-25 RX ORDER — ACETAMINOPHEN 325 MG/1
650 TABLET ORAL EVERY 6 HOURS PRN
Status: DISCONTINUED | OUTPATIENT
Start: 2021-04-25 | End: 2021-04-29 | Stop reason: HOSPADM

## 2021-04-25 RX ORDER — CETIRIZINE HYDROCHLORIDE 10 MG/1
5 TABLET ORAL DAILY
Status: DISCONTINUED | OUTPATIENT
Start: 2021-04-26 | End: 2021-04-29 | Stop reason: HOSPADM

## 2021-04-25 RX ORDER — NICOTINE POLACRILEX 4 MG
15 LOZENGE BUCCAL PRN
Status: DISCONTINUED | OUTPATIENT
Start: 2021-04-25 | End: 2021-04-29 | Stop reason: HOSPADM

## 2021-04-25 RX ORDER — DEXTROSE MONOHYDRATE 50 MG/ML
100 INJECTION, SOLUTION INTRAVENOUS PRN
Status: DISCONTINUED | OUTPATIENT
Start: 2021-04-25 | End: 2021-04-29 | Stop reason: HOSPADM

## 2021-04-25 RX ORDER — VITAMIN B COMPLEX
2000 TABLET ORAL ONCE
Status: DISCONTINUED | OUTPATIENT
Start: 2021-04-25 | End: 2021-04-29 | Stop reason: HOSPADM

## 2021-04-25 RX ORDER — PROMETHAZINE HYDROCHLORIDE 25 MG/1
12.5 TABLET ORAL EVERY 6 HOURS PRN
Status: DISCONTINUED | OUTPATIENT
Start: 2021-04-25 | End: 2021-04-29 | Stop reason: HOSPADM

## 2021-04-25 RX ORDER — DIPHENHYDRAMINE HCL 25 MG
25 TABLET ORAL EVERY 6 HOURS PRN
Status: DISCONTINUED | OUTPATIENT
Start: 2021-04-25 | End: 2021-04-29 | Stop reason: HOSPADM

## 2021-04-25 RX ORDER — ASCORBIC ACID 500 MG
500 TABLET ORAL DAILY
Status: DISCONTINUED | OUTPATIENT
Start: 2021-04-26 | End: 2021-04-29 | Stop reason: HOSPADM

## 2021-04-25 RX ORDER — GABAPENTIN 100 MG/1
100 CAPSULE ORAL NIGHTLY
Status: DISCONTINUED | OUTPATIENT
Start: 2021-04-25 | End: 2021-04-29 | Stop reason: HOSPADM

## 2021-04-25 RX ORDER — METOPROLOL SUCCINATE 50 MG/1
100 TABLET, EXTENDED RELEASE ORAL DAILY
Status: DISCONTINUED | OUTPATIENT
Start: 2021-04-26 | End: 2021-04-29 | Stop reason: HOSPADM

## 2021-04-25 RX ORDER — SODIUM CHLORIDE 0.9 % (FLUSH) 0.9 %
5-40 SYRINGE (ML) INJECTION PRN
Status: DISCONTINUED | OUTPATIENT
Start: 2021-04-25 | End: 2021-04-29 | Stop reason: HOSPADM

## 2021-04-25 RX ADMIN — LATANOPROST 1 DROP: 50 SOLUTION OPHTHALMIC at 20:50

## 2021-04-25 RX ADMIN — GABAPENTIN 100 MG: 100 CAPSULE ORAL at 20:50

## 2021-04-25 RX ADMIN — INSULIN LISPRO 1 UNITS: 100 INJECTION, SOLUTION INTRAVENOUS; SUBCUTANEOUS at 20:57

## 2021-04-25 RX ADMIN — DOXYCYCLINE 100 MG: 100 INJECTION, POWDER, LYOPHILIZED, FOR SOLUTION INTRAVENOUS at 17:24

## 2021-04-25 RX ADMIN — Medication 10 ML: at 20:50

## 2021-04-25 RX ADMIN — FUROSEMIDE 10 MG/HR: 10 INJECTION, SOLUTION INTRAMUSCULAR; INTRAVENOUS at 18:27

## 2021-04-25 RX ADMIN — TRIAMCINOLONE ACETONIDE: 1 CREAM TOPICAL at 20:50

## 2021-04-25 ASSESSMENT — ENCOUNTER SYMPTOMS
COUGH: 0
BACK PAIN: 0
VOMITING: 0
EYE PAIN: 0
ABDOMINAL PAIN: 0
WHEEZING: 0
EYE DISCHARGE: 0
SORE THROAT: 0
DIARRHEA: 0
RHINORRHEA: 0
SHORTNESS OF BREATH: 1
NAUSEA: 0

## 2021-04-25 NOTE — ED PROVIDER NOTES
[facility]  eMERGENCY dEPARTMENT eNCOUnter        Pt Name: Hector Wilde  MRN: [id number]  [place] 1934  Date of evaluation: 4/25/2021  Provider: Michael Bertrand MD  PCP: Rubin Topete MD      [address] [place] [address]       Chief Complaint   Patient presents with    Leg Swelling     leg swelling hx of afib    Extremity Weakness       HISTORY OFPRESENT ILLNESS   (Location/Symptom, Timing/Onset, Context/Setting, Quality, Duration, Modifying Factors,Severity)  Note limiting factors. Hector Wilde is a 80 y.o. female     Comes in with swelling in her legs. Patient has a history of diabetes aortic stenosis atrial fibrillation and combined systolic and diastolic congestive heart failure. She was recently admitted within the past 30 days for an exacerbation of her congestive heart failure. As far as I can tell they optimized her as best they could in the hospital.  Patient and the family really did not want an extended care facility so she was transported home. According to the family the patient went home on in stretcher and her belly has not been able to walk ever since. They do have a lift device that they have used to get her out of bed periodically. The patient otherwise is unable to walk on her own. She has been for the most part bedridden. There are communications between the heart failure staff and the patient's family. They have had difficulty getting her transportation arranged for further testing. They were attempting to look into the ability of doing a TAVR. She is on Coumadin. Last INR was in the 3.5 range. Basically today upon turning the patient they noted that her legs were seeping fluid. She has gained about 20 pounds since she went home. She now presents to the emergency department for evaluation and reassessment. She is not having any fever chills nausea vomiting and she is not any more short of breath than she always is.   She is on home oxygen. Nursing Noteswere all reviewed and agreed with or any disagreements were addressed  in the HPI. REVIEW OF SYSTEMS    (2-9 systems for level 4, 10 or more for level 5)     Review of Systems   Constitutional: Negative for chills, fatigue and fever. HENT: Negative for ear pain, rhinorrhea and sore throat. Eyes: Negative for pain, discharge and visual disturbance. Respiratory: Positive for shortness of breath. Negative for cough and wheezing. Cardiovascular: Positive for leg swelling. Negative for chest pain and palpitations. Gastrointestinal: Negative for abdominal pain, diarrhea, nausea and vomiting. Genitourinary: Negative for difficulty urinating, dysuria, pelvic pain and vaginal discharge. Musculoskeletal: Positive for gait problem. Negative for arthralgias, back pain, joint swelling and neck pain. Skin: Negative for rash. Allergic/Immunologic: Negative for environmental allergies. Neurological: Negative for dizziness, seizures, syncope and headaches. Hematological: Negative for adenopathy. Psychiatric/Behavioral: Negative for dysphoric mood and suicidal ideas. The patient is not nervous/anxious.           PAST MEDICAL HISTORY     Past Medical History:   Diagnosis Date    Anemia     Aortic stenosis     BAV 3/2021    Atrial fibrillation (HCC)     Chronic kidney disease     stage III    Combined systolic and diastolic congestive heart failure (HCC)     DVT (deep venous thrombosis) (HCC)     Hyperlipidemia     Hypertension     Osteoarthritis     Pulmonary embolism (HCC)     Sarcoidosis     in remission     Thyroid disease     Hypothyroidism    Type II or unspecified type diabetes mellitus without mention of complication, not stated as uncontrolled          SURGICAL HISTORY     Past Surgical History:   Procedure Laterality Date    HYSTERECTOMY      INTRACAPSULAR CATARACT EXTRACTION Left 10/16/2020    PHACOEMULSIFICATION WITH INTRAOCULAR LENS IMPLANT - LEFT EYE 2.5 mg by mouth every evening Except 5 mg on Tuesdays or as directed by the [phone]  Goal INR = 2- 3      cetirizine (ZYRTEC) 10 MG tablet Take 10 mg by mouth daily      vitamin C (ASCORBIC ACID) 500 MG tablet Take 500 mg by mouth daily      Calcium Carb-Cholecalciferol (CALTRATE 600+D) 600-800 MG-UNIT TABS Take 1 tablet by mouth daily       gabapentin (NEURONTIN) 100 MG capsule Take 100 mg by mouth nightly. glipiZIDE (GLUCOTROL) 5 MG tablet Take 5 mg by mouth 2 times daily (before meals).              ALLERGIES     Naproxen, Bactrim [sulfamethoxazole-trimethoprim], Levofloxacin, Pcn [penicillins], and Sulfa antibiotics    FAMILY HISTORY       Family History   Problem Relation Age of Onset    Heart Failure Mother     Cancer Brother     Asthma Neg Hx     Diabetes Neg Hx     Emphysema Neg Hx     Hypertension Neg Hx           SOCIAL HISTORY       Social History     Socioeconomic History    Marital status:      Spouse name: Not on file    Number of children: Not on file    Years of education: Not on file    Highest education level: Not on file   Occupational History    Not on file   Social Needs    Financial resource strain: Not on file    Food insecurity     Worry: Not on file     Inability: Not on file    Transportation needs     Medical: Not on file     Non-medical: Not on file   Tobacco Use    Smoking status: Never Smoker    Smokeless tobacco: Never Used    Tobacco comment: Never smoked   Substance and Sexual Activity    Alcohol use: No    Drug use: No    Sexual activity: Yes     Partners: Male   Lifestyle    Physical activity     Days per week: Not on file     Minutes per session: Not on file    Stress: Not on file   Relationships    Social connections     Talks on phone: Not on file     Gets together: Not on file     Attends Judaism service: Not on file     Active member of club or organization: Not on file     Attends meetings of clubs or organizations: Not on file     Relationship status: Not on file    Intimate partner violence     Fear of current or ex partner: Not on file     Emotionally abused: Not on file     Physically abused: Not on file     Forced sexual activity: Not on file   Other Topics Concern    Not on file   Social History Narrative    Not on file       SCREENINGS             PHYSICAL EXAM    (up to 7 for level 4, 8 or more for level 5)     ED Triage Vitals [04/25/21 1024]   BP Temp Temp Source Pulse Resp SpO2 Height Weight   125/76 98.2 °F (36.8 °C) Oral 92 21 96 % 5' 6\" (1.676 m) 278 lb 10.6 oz (126.4 kg)      height is 5' 6\" (1.676 m) and weight is 264 lb 12.4 oz (120.1 kg). Her oral temperature is 97.6 °F (36.4 °C). Her blood pressure is 105/68 and her pulse is 82. Her respiration is 26 and oxygen saturation is 100%. Physical Exam  Exam conducted with a chaperone present. Constitutional:       Appearance: She is well-developed. She is not diaphoretic. HENT:      Head: Normocephalic and atraumatic. Right Ear: External ear normal.      Left Ear: External ear normal.   Eyes:      General: No scleral icterus. Right eye: No discharge. Left eye: No discharge. Neck:      Musculoskeletal: Normal range of motion. Thyroid: No thyromegaly. Vascular: No JVD. Trachea: No tracheal deviation. Cardiovascular:      Rate and Rhythm: Normal rate. Rhythm irregularly irregular. Heart sounds: Normal heart sounds. No murmur. No friction rub. No gallop. Pulmonary:      Effort: Pulmonary effort is normal. No respiratory distress. Breath sounds: No stridor. Examination of the left-middle field reveals decreased breath sounds. Examination of the right-lower field reveals rales. Examination of the left-lower field reveals decreased breath sounds and rales. Decreased breath sounds and rales present. No wheezing. Abdominal:      General: There is no distension. Palpations: Abdomen is soft.       Tenderness: mmol/L    Anion Gap 12 3 - 16    Glucose 125 (H) 70 - 99 mg/dL    BUN 42 (H) 7 - 20 mg/dL    CREATININE 4.2 (H) 0.6 - 1.2 mg/dL    GFR Non-African American 10 (A) >60    GFR  12 (A) >60    Calcium 8.6 8.3 - 10.6 mg/dL    Total Protein 6.6 6.4 - 8.2 g/dL    Albumin 3.2 (L) 3.4 - 5.0 g/dL    Albumin/Globulin Ratio 0.9 (L) 1.1 - 2.2    Total Bilirubin 1.5 (H) 0.0 - 1.0 mg/dL    Alkaline Phosphatase 81 40 - 129 U/L    ALT 27 10 - 40 U/L    AST 21 15 - 37 U/L    Globulin 3.4 g/dL   Brain Natriuretic Peptide   Result Value Ref Range    Pro-BNP 68,699 (H) 0 - 449 pg/mL   Troponin   Result Value Ref Range    Troponin 0.02 (H) <0.01 ng/mL   Protime-INR   Result Value Ref Range    Protime 59.3 (H) 10.0 - 13.2 sec    INR 5.03 (HH) 0.86 - 1.14   Blood Occult Stool #1   Result Value Ref Range    Occult Blood Diagnostic Result: Negative  Normal range: Negative      Urine, reflex to culture    Specimen: Urine, clean catch   Result Value Ref Range    Color, UA YELLOW Straw/Yellow    Clarity, UA TURBID (A) Clear    Glucose, Ur Negative Negative mg/dL    Bilirubin Urine Negative Negative    Ketones, Urine Negative Negative mg/dL    Specific Gravity, UA 1.011 1.005 - 1.030    Blood, Urine LARGE (A) Negative    pH, UA 6.0 5.0 - 8.0    Protein,  (A) Negative mg/dL    Urobilinogen, Urine 1.0 <2.0 E.U./dL    Nitrite, Urine Negative Negative    Leukocyte Esterase, Urine LARGE (A) Negative    Microscopic Examination YES     Urine Type NotGiven     Urine Reflex to Culture Yes    Microscopic Urinalysis   Result Value Ref Range    Coarse Casts, UA 0-2 0 - 2 /LPF    Cellular Cast, UA 1-3 WBC (A) None Seen /LPF    RBC, UA 5-10 (A) 0 - 4 /HPF    Bacteria, UA 4+ (A) None Seen /HPF    Urinalysis Comments see below     WBC, UA >900 (H) 0 - 5 /HPF    Epithelial Cells, UA 10 (H) 0 - 5 /HPF       All other labs were within normal range or not returned as of this dictation. EKG:  All EKG's are interpreted by the Emergency Department Physician who either signs orCo-signs this chart in the absence of a cardiologist.    EKG visualized preliminarily interpreted by myself shows atrial fibrillation at a rate of 93. Left axis deviation of -40. There is left ventricular atrophy and QRS widening. T waves are inverted in 1 and aVL. Nonspecific widening of the QRS complex. Compared to March 26 of this year no significant changes. RADIOLOGY:   Non-plain film images such as CT, Ultrasound and MRI are read by the radiologist. Amirah Garcia radiographic images are visualized and preliminarily interpreted by the  EDProvider with the below findings:    Xr Chest Portable    Result Date: 4/25/2021  EXAMINATION: ONE XRAY VIEW OF THE CHEST 4/25/2021 10:48 am COMPARISON: April 2, 2021 HISTORY: ORDERING SYSTEM PROVIDED HISTORY: sob TECHNOLOGIST PROVIDED HISTORY: Reason for exam:->sob Reason for Exam: Leg Swelling (leg swelling hx of afib) Acuity: Acute Type of Exam: Initial Relevant Medical/Surgical History: CHF FINDINGS: Vascular congestion with perihilar interstitial opacities are present nearly identical to the comparison. No evidence of pneumothorax or pleural effusion. No focal consolidation. Heart enlargement again noted     Vascular congestion with mild perihilar interstitial edema, nearly identical to the comparison. No pleural effusion.            PROCEDURES   Unless otherwise noted below, none     Procedures    CRITICAL CARE TIME   N/A    CONSULTS:  IP CONSULT TO PRIMARY CARE PROVIDER  IP CONSULT TO CARDIOLOGY  IP CONSULT TO NEPHROLOGY  IP CONSULT TO CASE MANAGEMENT  IP CONSULT TO HEART FAILURE NURSE/COORDINATOR  IP CONSULT TO SOCIAL WORK  IP CONSULT TO PHARMACY  IP CONSULT TO PALLIATIVE CARE    EMERGENCY DEPARTMENT COURSE and DIFFERENTIAL DIAGNOSIS/MDM:   Vitals:    Vitals:    04/25/21 1145 04/25/21 1200 04/25/21 1300 04/25/21 1346   BP: (!) 103/90 125/83  105/68   Pulse: 82 90  82   Resp: 20 26 26   Temp:    97.6 °F (36.4 °C)   TempSrc: Oral   SpO2:    100%   Weight:   264 lb 12.4 oz (120.1 kg)    Height:           Patient was given the following medications:  Medications   Vitamin D (CHOLECALCIFEROL) tablet 2,000 Units (has no administration in time range)   diphenhydrAMINE (BENADRYL) tablet 25 mg (has no administration in time range)   gabapentin (NEURONTIN) capsule 100 mg (has no administration in time range)   latanoprost (XALATAN) 0.005 % ophthalmic solution 1 drop (has no administration in time range)   melatonin tablet 3 mg (has no administration in time range)   metoprolol succinate (TOPROL XL) extended release tablet 100 mg (has no administration in time range)   pantoprazole (PROTONIX) tablet 40 mg (has no administration in time range)   triamcinolone (KENALOG) 0.1 % cream (has no administration in time range)   ascorbic acid (VITAMIN C) tablet 500 mg (has no administration in time range)   sodium chloride flush 0.9 % injection 5-40 mL (has no administration in time range)   sodium chloride flush 0.9 % injection 5-40 mL (has no administration in time range)   0.9 % sodium chloride infusion (has no administration in time range)   promethazine (PHENERGAN) tablet 12.5 mg (has no administration in time range)     Or   ondansetron (ZOFRAN) injection 4 mg (has no administration in time range)   acetaminophen (TYLENOL) tablet 650 mg (has no administration in time range)     Or   acetaminophen (TYLENOL) suppository 650 mg (has no administration in time range)   insulin lispro (HUMALOG) injection vial 0-6 Units (has no administration in time range)   insulin lispro (HUMALOG) injection vial 0-3 Units (has no administration in time range)   furosemide (LASIX) injection 80 mg (has no administration in time range)   polyethylene glycol (GLYCOLAX) packet 17 g (has no administration in time range)   cetirizine (ZYRTEC) tablet 5 mg (has no administration in time range)   warfarin (COUMADIN) daily dosing (abdirahman) (has no administration in time range)

## 2021-04-25 NOTE — FLOWSHEET NOTE
04/25/21 1331   Readmission Assessment   Number of Days since last admission? 8-30 days   Previous Disposition Home with Home Health   Who is being Interviewed Caregiver   What was the patient's/caregiver's perception as to why they think they needed to return back to the hospital? Other (Comment)  (excessive weight gain.)   Did you visit your Primary Care Physician after you left the hospital, before you returned this time? Yes   Did you see a specialist, such as Cardiac, Pulmonary, Orthopedic Physician, etc. after you left the hospital? No   Who advised the patient to return to the hospital? Caregiver;Physician   Does the patient report anything that got in the way of taking their medications? No   In our efforts to provide the best possible care to you and others like you, can you think of anything that we could have done to help you after you left the hospital the first time, so that you might not have needed to return so soon?  Other (Comment)  (not sure.)       Electronically signed by TRACY Sanz on 4/25/2021 at 2:12 PM

## 2021-04-25 NOTE — TELEPHONE ENCOUNTER
Writer contacted ED provider to inform of 30 day readmission risk. ED provider informed writer of readmission.

## 2021-04-25 NOTE — CONSULTS
NEPHROLOGY PROGRESS NOTE    Patient: Arelis Plummer MRN: [id number]     YOB: 1934  Age: 80 y.o. Sex: female    Unit: [address] Room/Bed: M2D-5413/5273-01 Location: [address] [place] [address]     Admitting Physician: Cristina Govea    Primary Care Physician: Jim Rodriguez MD          LOS: 0 days       Reason for evaluation:   DAVION on CKD4      SUBJECTIVE:      The patient was seen and examined. Notes and labs reviewed. Asked to see in consultation by Dr Thelma Wilson for the management of CKD. This is an 79yo AAF with extensive medical h/o including DM2, HTN, AS s/p valvulopasty, sys/greene HF, who is followed by Dr Oliver Mcneill for [address] with a baseline SCr of 1.5-2.0 with a recent DAVION earlier this month with a peak SCr of 2.6. She later underwent a CTA of the chest/abdomen and pelvis on 4/16/21 with IV contrast (as a pre-op evaluation for TAVR). Her Scr was 1.5 on 4/16/21 and is up to 4.2 today with a BUN of 42. She presented with increased LE edema, SOB, poor appetite and 20Lbs weight gain. Her BNP was 68,699! Sueellen Payment CXR showed vascular congestion/edema. She is bed bound and has a lift at home where the family helps her to get in/out the bed. For the PMHx/SHx/FHx, please refer to recent c/s note by Dr Nettie Harrison on 3/27/21. Patient's review of systems: as highlighted above, o/w she denies any CP, no abdominal pain, +nausea, +arthralgia. Family is at bedside. OBJECTIVE:     Vitals:    04/25/21 1145 04/25/21 1200 04/25/21 1300 04/25/21 1346   BP: (!) 103/90 125/83  105/68   Pulse: 82 90  82   Resp: 20 26  26   Temp:    97.6 °F (36.4 °C)   TempSrc:    Oral   SpO2:    100%   Weight:   264 lb 12.4 oz (120.1 kg)    Height:           Intake and Output:    No intake or output data in the 24 hours ending 04/25/21 1622    Continuous Infusions:   sodium chloride      dextrose         Exam:   CONSTITUTIONAL/PSYCHIATRY: awake, alert and oriented x3.  Not in acute distress   EYES: Conjunctivae: normal.   RESPIRATORY: Respiratory effort: normal. Auscultation: ~CTA  CARDIOVASCULAR: Auscultation: irregular, 2/6m. Neck veins: +JVD. Edema: 2-3+ up to waist.  GASTROINTESTINAL: Soft, nontender, nondistended. Obese abdomen, +soft tissue edema. EXTREMITIES:  No cyanosis or clubbing. SKIN: Warm and dry. +chronic discoloration of legs bilaterally      LABS:   RFP:   Recent Labs     04/25/21  1047   *   K 5.0   CL 88*   CO2 30   BUN 42*   CREATININE 4.2*   CALCIUM 8.6   GFRAA 12*       Liver panel:  Recent Labs     04/25/21  1047   AST 21   ALT 27       Endocrine:   @[id number](VitD,PTH,TSH,aldosterone,renin activity,cortisol,metanephrine)@    CBC:   Recent Labs     04/25/21  1047   WBC 4.9   HGB 11.5*   HCT 34.9*   MCV 92.4          Iron Panel:   @[id number](FERA,FE)@    Serology: @[id number](ANAS,ANCA,C3,C4,RNP,AGBM,DNA,SSA,SSB,SPEP,UPEP,ESR,HEPT)@    Urine studies: @[id number](UAPR,UCOL,UNAR,UUNR,UCRR,UCLR,UKR,UUAR,UOSM,EOS)@        ASSESSMENT and PLAN:     1. DAVION on CKD4 (baseline SCr is 1.5-2.0; followed by Dr Derrek Diez): DAVION is likely secondary to recent exposure to IV contrast --> OPHELIA +/- DAVION secondary to ADHF (CRS). She is fluid overloaded with edema up to wist.   - Lasix drip at 10mg/hour and titrate to 20mg/hr if no response. - She is not a great candidate for dialysis in view of her cardiac condition and poor functional status. She is still mentally capable of making decision and was asked along with her family at the bedside to decide if they would like to entertain this option (after discussing the pros/cons) if deemed necessary. She will need HD if further deterioration of renal function and/or no response to Lasix drip. 2. ADHF on chronic systolic/diastolic HF and AS: BNP 60,711! . She gained 20Lbs since last admission.    - Diuresis per above    3. Anemia of CKD: Hb is in acceptable range. 4. Deconditioning: patient is mostly bed-bound now.       Signed By: Ge Min, MD

## 2021-04-25 NOTE — ED NOTES
Bed: B-10  Expected date: 4/25/21  Expected time: 10:09 AM  Means of arrival: [facility]  Comments:  86F leg pain, weakness and swelling, h/o Afib     Rowena Diaz RN  04/25/21 5003

## 2021-04-25 NOTE — CONSULTS
Clinical Pharmacy Note  Warfarin Consult    Hector Wilde is a 80 y.o. female receiving warfarin managed by pharmacy. Patient being bridged with none. Warfarin Indication: atrial fib  Target INR range: 2-3   Dose prior to admission: 2.5mg daily except 5mg on Tuesdays    Current warfarin drug-drug interactions: none    Recent Labs     04/25/21  1047   HGB 11.5*   HCT 34.9*   INR 5.03*       Assessment/Plan:    Hold warfarin tonight. Daily PT/INR until stable within therapeutic range. Thank you for the consult. Will continue to follow.

## 2021-04-25 NOTE — FLOWSHEET NOTE
Pt to room 5273 from ED; pt is awake and alert; denies pain,.  Denies SOB; pt is 4+ pitting edema in BLE from feet to waist; pt has a wound 11 cm long X 2 cm wide in crease/crevice that separates the buttocks (inner gluteal cleft); wound does not appear to be from a pressure injury and does not look like typical MASD; the best I can describe this wound is a \"fissure\"; will consult wound RN to evaluate; oriented pt to room, bed controls, call light etc; A-fib on telemetry; R-AC PIV bleeding, removed IV and held pressure to site to stop bleeding; new PIV placed in L-FA X1 attempt, pt tolerated well; BLE wrapped with non-adherent gauze and kerlex for weeping; I only noted one spot of weeping on R posterior thigh, but daughters stated BLE were weeping at home, so both legs were wrapped below knee

## 2021-04-25 NOTE — CARE COORDINATION
INITIAL CASE MANAGEMENT ASSESSMENT     Reviewed chart, met with patient to assess possible discharge needs. Explained Case Management role/services. SW interviewed daughter Kayli Palacio via telephone today. She stated that she was at bedside with the patient.     Living Situation:  Patient resides in a two story home with family. Per recent notes her  passed away. Patient has 10 kids with 7 living in town. Family stays with patient. Patient is able to live on the main floor.       ADLs:  Prior to medical admission patient received assistance with cooking, cleaning, laundry and bathing. Daughter stated that she doesn't anticipate any needs at discharge.        DME:  Prior to medical admission patient had grab bars in shower, shower chair , rolling walker and a manual wheelchair. Daughter stated that she doesn't anticipate any needs at discharge.        PT/OT Recs:  Ordered. We will follow once recommendations are made.       Active Services: Patient is active with [address]. Daughter stated that she doesn't anticipate any needs at discharge.        Transportation:  Patient is transported to and from medical appointments and errands by family. She does not leave the home unescorted. Family will likely transport the patient home at discharge.      Medications:  Patient receives medications from [facility]. At this time they report no issues with access or affordability.      PCP:  Dr. Magalis Agosto [phone] (phone) and  [phone] (fax number). Patient's last appointment with this provider was less than a month ago    SW consult: Daughter denies DME needs at this time. There was a SW consult placed today.      HD/PD:  N/A      PLAN/COMMENTS:   1) Monitor for therapy needs. 2) Follow palliative care order.      SW provided contact information for patient or family to call with any questions. SW will follow and assist as needed. Respectfully submitted,    Verito ESPINOZA TRACY Farmer  [facility]   [phone]    Electronically signed by TRACY Comer on 4/25/2021 at 2:01 PM

## 2021-04-25 NOTE — PROGRESS NOTES
normal.   RESPIRATORY: Respiratory effort: normal. Auscultation: ~CTA  CARDIOVASCULAR: Auscultation: irregular, 2/6m. Neck veins: +JVD. Edema: 2-3+ up to waist.  GASTROINTESTINAL: Soft, nontender, nondistended. Obese abdomen, +soft tissue edema. EXTREMITIES:  No cyanosis or clubbing. SKIN: Warm and dry. +chronic discoloration of legs bilaterally      LABS:   RFP:   Recent Labs     04/25/21  1047   *   K 5.0   CL 88*   CO2 30   BUN 42*   CREATININE 4.2*   CALCIUM 8.6   GFRAA 12*       Liver panel:  Recent Labs     04/25/21  1047   AST 21   ALT 27       Endocrine:   @[id number](VitD,PTH,TSH,aldosterone,renin activity,cortisol,metanephrine)@    CBC:   Recent Labs     04/25/21  1047   WBC 4.9   HGB 11.5*   HCT 34.9*   MCV 92.4          Iron Panel:   @[id number](FERA,FE)@    Serology: @[id number](ANAS,ANCA,C3,C4,RNP,AGBM,DNA,SSA,SSB,SPEP,UPEP,ESR,HEPT)@    Urine studies: @[id number](UAPR,UCOL,UNAR,UUNR,UCRR,UCLR,UKR,UUAR,UOSM,EOS)@        ASSESSMENT and PLAN:     1. DAVION on CKD4 (baseline SCr is 1.5-2.0; followed by Dr Omega Bower): DAVION is likely secondary to recent exposure to IV contrast --> OPHELIA +/- DAVION secondary to ADHF (CRS). She is fluid overloaded with edema up to wist.   - Lasix drip at 10mg/hour and titrate to 20mg/hr if no response. - She is not a great candidate for dialysis in view of her cardiac condition and poor functional status. She is still mentally capable of making decision and was asked along with her family at the bedside to decide if they would like to entertain this option (after discussing the pros/cons) if deemed necessary. She will need HD if further deterioration of renal function and/or no response to Lasix drip. 2. ADHF on chronic systolic/diastolic HF and AS: BNP 12,845! . She gained 20Lbs since last admission.    - Diuresis per above    3. Anemia of CKD: Hb is in acceptable range. 4. Deconditioning: patient is mostly bed-bound now.       Signed By: Grace Robins MD

## 2021-04-26 LAB
ALBUMIN SERPL-MCNC: 2.7 G/DL (ref 3.4–5)
ANION GAP SERPL CALCULATED.3IONS-SCNC: 12 MMOL/L (ref 3–16)
BUN BLDV-MCNC: 39 MG/DL (ref 7–20)
CALCIUM SERPL-MCNC: 8.4 MG/DL (ref 8.3–10.6)
CHLORIDE BLD-SCNC: 90 MMOL/L (ref 99–110)
CO2: 31 MMOL/L (ref 21–32)
CORTISOL - AM: 12.2 UG/DL (ref 4.3–22.4)
CREAT SERPL-MCNC: 3.8 MG/DL (ref 0.6–1.2)
EKG ATRIAL RATE: 87 BPM
EKG DIAGNOSIS: NORMAL
EKG Q-T INTERVAL: 396 MS
EKG QRS DURATION: 136 MS
EKG QTC CALCULATION (BAZETT): 492 MS
EKG R AXIS: -40 DEGREES
EKG T AXIS: 140 DEGREES
EKG VENTRICULAR RATE: 93 BPM
GFR AFRICAN AMERICAN: 14
GFR NON-AFRICAN AMERICAN: 11
GLUCOSE BLD-MCNC: 136 MG/DL (ref 70–99)
GLUCOSE BLD-MCNC: 165 MG/DL (ref 70–99)
GLUCOSE BLD-MCNC: 169 MG/DL (ref 70–99)
GLUCOSE BLD-MCNC: 85 MG/DL (ref 70–99)
GLUCOSE BLD-MCNC: 91 MG/DL (ref 70–99)
HCT VFR BLD CALC: 30.6 % (ref 36–48)
HEMOGLOBIN: 9.9 G/DL (ref 12–16)
INR BLD: 4.95 (ref 0.86–1.14)
MCH RBC QN AUTO: 30.1 PG (ref 26–34)
MCHC RBC AUTO-ENTMCNC: 32.5 G/DL (ref 31–36)
MCV RBC AUTO: 92.6 FL (ref 80–100)
ORGANISM: ABNORMAL
PDW BLD-RTO: 20.4 % (ref 12.4–15.4)
PERFORMED ON: ABNORMAL
PERFORMED ON: NORMAL
PHOSPHORUS: 3.6 MG/DL (ref 2.5–4.9)
PLATELET # BLD: 206 K/UL (ref 135–450)
PMV BLD AUTO: 8.2 FL (ref 5–10.5)
POTASSIUM SERPL-SCNC: 4.1 MMOL/L (ref 3.5–5.1)
PROTHROMBIN TIME: 58.4 SEC (ref 10–13.2)
RBC # BLD: 3.3 M/UL (ref 4–5.2)
SODIUM BLD-SCNC: 133 MMOL/L (ref 136–145)
T4 FREE: 2 NG/DL (ref 0.9–1.8)
TSH SERPL DL<=0.05 MIU/L-ACNC: 1.16 UIU/ML (ref 0.27–4.2)
URINE CULTURE, ROUTINE: ABNORMAL
WBC # BLD: 4.3 K/UL (ref 4–11)

## 2021-04-26 PROCEDURE — 82533 TOTAL CORTISOL: CPT

## 2021-04-26 PROCEDURE — 93010 ELECTROCARDIOGRAM REPORT: CPT | Performed by: INTERNAL MEDICINE

## 2021-04-26 PROCEDURE — 97166 OT EVAL MOD COMPLEX 45 MIN: CPT

## 2021-04-26 PROCEDURE — 6360000002 HC RX W HCPCS: Performed by: INTERNAL MEDICINE

## 2021-04-26 PROCEDURE — 93321 DOPPLER ECHO F-UP/LMTD STD: CPT

## 2021-04-26 PROCEDURE — 85610 PROTHROMBIN TIME: CPT

## 2021-04-26 PROCEDURE — 2700000000 HC OXYGEN THERAPY PER DAY

## 2021-04-26 PROCEDURE — 84443 ASSAY THYROID STIM HORMONE: CPT

## 2021-04-26 PROCEDURE — 2580000003 HC RX 258: Performed by: INTERNAL MEDICINE

## 2021-04-26 PROCEDURE — 2060000000 HC ICU INTERMEDIATE R&B

## 2021-04-26 PROCEDURE — 36415 COLL VENOUS BLD VENIPUNCTURE: CPT

## 2021-04-26 PROCEDURE — 2500000003 HC RX 250 WO HCPCS: Performed by: INTERNAL MEDICINE

## 2021-04-26 PROCEDURE — 97530 THERAPEUTIC ACTIVITIES: CPT

## 2021-04-26 PROCEDURE — 97110 THERAPEUTIC EXERCISES: CPT

## 2021-04-26 PROCEDURE — 84439 ASSAY OF FREE THYROXINE: CPT

## 2021-04-26 PROCEDURE — 93308 TTE F-UP OR LMTD: CPT

## 2021-04-26 PROCEDURE — 93325 DOPPLER ECHO COLOR FLOW MAPG: CPT

## 2021-04-26 PROCEDURE — 6370000000 HC RX 637 (ALT 250 FOR IP): Performed by: INTERNAL MEDICINE

## 2021-04-26 PROCEDURE — 80069 RENAL FUNCTION PANEL: CPT

## 2021-04-26 PROCEDURE — 94761 N-INVAS EAR/PLS OXIMETRY MLT: CPT

## 2021-04-26 PROCEDURE — 97162 PT EVAL MOD COMPLEX 30 MIN: CPT

## 2021-04-26 PROCEDURE — 85027 COMPLETE CBC AUTOMATED: CPT

## 2021-04-26 RX ADMIN — GABAPENTIN 100 MG: 100 CAPSULE ORAL at 21:05

## 2021-04-26 RX ADMIN — PANTOPRAZOLE SODIUM 40 MG: 40 TABLET, DELAYED RELEASE ORAL at 06:13

## 2021-04-26 RX ADMIN — METOPROLOL SUCCINATE 100 MG: 50 TABLET, EXTENDED RELEASE ORAL at 09:03

## 2021-04-26 RX ADMIN — Medication 10 ML: at 21:06

## 2021-04-26 RX ADMIN — INSULIN LISPRO 1 UNITS: 100 INJECTION, SOLUTION INTRAVENOUS; SUBCUTANEOUS at 17:41

## 2021-04-26 RX ADMIN — TRIAMCINOLONE ACETONIDE: 1 CREAM TOPICAL at 17:11

## 2021-04-26 RX ADMIN — OXYCODONE HYDROCHLORIDE AND ACETAMINOPHEN 500 MG: 500 TABLET ORAL at 09:03

## 2021-04-26 RX ADMIN — CETIRIZINE HYDROCHLORIDE 5 MG: 10 TABLET, FILM COATED ORAL at 09:03

## 2021-04-26 RX ADMIN — DOXYCYCLINE 100 MG: 100 INJECTION, POWDER, LYOPHILIZED, FOR SOLUTION INTRAVENOUS at 03:13

## 2021-04-26 RX ADMIN — DOXYCYCLINE 100 MG: 100 INJECTION, POWDER, LYOPHILIZED, FOR SOLUTION INTRAVENOUS at 15:33

## 2021-04-26 RX ADMIN — TRIAMCINOLONE ACETONIDE: 1 CREAM TOPICAL at 21:06

## 2021-04-26 RX ADMIN — INSULIN LISPRO 1 UNITS: 100 INJECTION, SOLUTION INTRAVENOUS; SUBCUTANEOUS at 21:08

## 2021-04-26 RX ADMIN — LATANOPROST 1 DROP: 50 SOLUTION OPHTHALMIC at 21:06

## 2021-04-26 RX ADMIN — FUROSEMIDE 10 MG/HR: 10 INJECTION, SOLUTION INTRAMUSCULAR; INTRAVENOUS at 16:44

## 2021-04-26 ASSESSMENT — PAIN SCALES - GENERAL
PAINLEVEL_OUTOF10: 0
PAINLEVEL_OUTOF10: 0

## 2021-04-26 NOTE — PROGRESS NOTES
Occupational Therapy   Occupational Therapy Initial Assessment and Tentative D/C    Date: 2021   Patient Name: Carlos Harrison  MRN: [id number]     : 1934    Date of Service: 2021    Discharge Recommendations: Carlos Harrison scored a  on the AM-PAC ADL Inpatient form. Current research shows that an AM-PAC score of 18 or greater is typically associated with a discharge to the patient's home setting. Based on the patient's AM-PAC score, and their current ADL deficits, it is recommended that the patient have 2-3 sessions per week of Occupational Therapy at d/c to increase the patient's independence. At this time, this patient demonstrates the endurance and safety to discharge home with [facility] and a follow up treatment frequency of 2-3x/wk. Please see assessment section for further patient specific details. If patient discharges prior to next session this note will serve as a discharge summary. Please see below for the latest assessment towards goals. Continue to assess pending progress, 2-3 sessions per week, 24 hour supervision or assist  OT Equipment Recommendations  Other: possible maxi move    Assessment   Performance deficits / Impairments: Decreased functional mobility ; Decreased strength;Decreased endurance;Decreased ADL status; Decreased balance;Decreased high-level IADLs  Assessment: Carlos Harrison is a 80 y.o. female   Comes in with swelling in her legs. Patient has a history of diabetes aortic stenosis atrial fibrillation and combined systolic and diastolic congestive heart failure. She was recently admitted within the past 30 days for an exacerbation of her congestive heart failure. As far as I can tell they optimized her as best they could in the hospital.  Patient and the family really did not want an extended care facility so she was transported home. According to the family the patient went home on in [facility] and her belly has not been able to walk ever since. They do have a lift device that they have used to get her out of bed periodically. The patient otherwise is unable to walk on her own. She has been for the most part bedridden. There are communications between the heart failure staff and the patient's family. They have had difficulty getting her transportation arranged for further testing. They were attempting to look into the ability of doing a TAVR. She is on Coumadin. Last INR was in the 3.5 range. Basically today upon turning the patient they noted that her legs were seeping fluid. She has gained about 20 pounds since she went home. She now presents to the emergency department for evaluation and reassessment. She is not having any fever chills nausea vomiting and she is not any more short of breath than she always is. She is on home oxygen. Nursing Noteswere all reviewed and agreed with or any disagreements were addressed  in the HPI. PTA pt from home with assist from family where pt was completing transfers with use of stedy and needing significant assist for ADLs. Pt currently requires Max Ax2 and use of stedy for transfers. Pt completes bed mobility with Max Ax. Anticipate pt needing up to Max/Total A for ADLs. Pt reports good support from family. Pt reports family renting stedy for use. Pt with needed AD although may benefit from justus lift pending pt progress. Pt will benefit from skilled OT services at this time. Anticipate pt with need for ongoing OT at time of D/C. Prognosis: Fair  Decision Making: Medium Complexity  Exam: see above  Assistance / Modification: stedy  OT Education: OT Role;Plan of Care;Transfer Training  REQUIRES OT FOLLOW UP: Yes  Activity Tolerance  Activity Tolerance: Patient limited by fatigue  Safety Devices  Safety Devices in place: Yes  Type of devices: Left in chair;Chair alarm in place;Call light within reach;Nurse notified           Patient Diagnosis(es): The primary encounter diagnosis was Acute renal insufficiency. Diagnoses of Acute on chronic combined systolic and diastolic congestive heart failure (HCC) and Bilateral leg edema were also pertinent to this visit. has a past medical history of Anemia, Aortic stenosis, Atrial fibrillation (HCC), Chronic kidney disease, Combined systolic and diastolic congestive heart failure ([address] DVT (deep venous thrombosis) ([address] Hyperlipidemia, Hypertension, Osteoarthritis, Pulmonary embolism ([address] Sarcoidosis, Thyroid disease, and Type II or unspecified type diabetes mellitus without mention of complication, not stated as uncontrolled. has a past surgical history that includes Hysterectomy; knee surgery; joint replacement (Bilateral); joint replacement (Bilateral); Intracapsular cataract extraction (Left, 10/16/2020); and other surgical history (02/15/2021). Restrictions  Restrictions/Precautions  Restrictions/Precautions: Fall Risk    Subjective   General  Chart Reviewed: Yes  Patient assessed for rehabilitation services?: Yes  Additional Pertinent Hx: per ED note, Carlos Harrison is a 80 y.o. female   Comes in with swelling in her legs. Patient has a history of diabetes aortic stenosis atrial fibrillation and combined systolic and diastolic congestive heart failure. She was recently admitted within the past 30 days for an exacerbation of her congestive heart failure. As far as I can tell they optimized her as best they could in the hospital.  Patient and the family really did not want an extended care facility so she was transported home. According to the family the patient went home on in stretcher and her belly has not been able to walk ever since. They do have a lift device that they have used to get her out of bed periodically. The patient otherwise is unable to walk on her own. She has been for the most part bedridden. There are communications between the heart failure staff and the patient's family.   They have had difficulty getting her transportation quickly  Wheelchair Bed Transfers  Wheelchair/Bed - Technique: (stedy)  Equipment Used: Bed;Other(bed to chair)  Level of Asssistance: Dependent/Total  ADL  LE Dressing: Dependent/Total(to don/doff bilateral socks)  Toileting: Dependent/Total(weeks)  Additional Comments: Anticipate pt needing up to Max/Total A for ADLs including dressing, bathing, and toileting based on ROM, strength, and balance  Tone RUE  RUE Tone: Normotonic  Tone LUE  LUE Tone: Normotonic  Coordination  Movements Are Fluid And Coordinated: Yes     Bed mobility  Supine to Sit: 2 Person assistance;Maximum assistance  Sit to Supine: 2 Person assistance;Maximum assistance  Scooting: Maximal assistance  Transfers  Sit to stand: Maximum assistance;2 Person assistance  Stand to sit: 2 Person assistance;Maximum assistance  Transfer Comments: to/from stedy; from elevated EOB; pt unable to complete sit/stand from recliner chair; anticipate pt needing up to Ax2 and use of stedy at home     Cognition  Overall Cognitive Status: Exceptions  Arousal/Alertness: Appropriate responses to stimuli  Following Commands:  Follows all commands without difficulty  Initiation: Requires cues for some  Sequencing: Requires cues for some        Sensation  Overall Sensation Status: WFL        LUE AROM (degrees)  LUE AROM : Exceptions  LUE General AROM: decreased shoulder flexion; able to use with stedy  RUE AROM (degrees)  RUE AROM : Exceptions  RUE General AROM: decreased shoulder flexion; able to use with stedy  LUE Strength  Gross LUE Strength: WFL  LUE Strength Comment: generally 4-/5  RUE Strength  Gross RUE Strength: WFL  RUE Strength Comment: generally 4-/5                   Plan   Plan  Times per week: 3-5x  Current Treatment Recommendations: Strengthening, Endurance Training, Balance Training, Functional Mobility Training, Safety Education & Training, Self-Care / ADL, Patient/Caregiver Education & Training      AM-PAC Score        AM-PAC Inpatient Daily Activity Raw Score: 12 (04/26/21 1026)  AM-PAC Inpatient ADL T-Scale Score : 30.6 (04/26/21 1026)  ADL Inpatient CMS 0-100% Score: 66.57 (04/26/21 1026)  ADL Inpatient CMS G-Code Modifier : CL (04/26/21 1026)    Goals  Short term goals  Time Frame for Short term goals: prior to D/C  Short term goal 1: complete functional transfers with Min A and use of stedy  Short term goal 2: complete bed mobility with Mod A  Short term goal 3: complete toileting with Mod A and use of stedy  Short term goal 4: tolerate B UE exercises x10 reps for increased strength and endurance with ADLs  Long term goals  Time Frame for Long term goals : STG=LTG  Patient Goals   Patient goals : return home       Therapy Time   Individual Concurrent Group Co-treatment   Time In 0946         Time Out 1025         Minutes 39         Timed Code Treatment Minutes: 24 Minutes(15 minute eval)       WANG Harris/L

## 2021-04-26 NOTE — PROGRESS NOTES
Pop Mehta is a 80 y.o. female patient.     Current Facility-Administered Medications   Medication Dose Route Frequency Provider Last Rate Last Admin    Vitamin D (CHOLECALCIFEROL) tablet 2,000 Units  2,000 Units Oral Once Agustin Harley MD        diphenhydrAMINE (BENADRYL) tablet 25 mg  25 mg Oral Q6H PRN Agustin Harley MD        gabapentin (NEURONTIN) capsule 100 mg  100 mg Oral Nightly Agustin Harley MD   100 mg at 04/25/21 2050    latanoprost (XALATAN) 0.005 % ophthalmic solution 1 drop  1 drop Right Eye Nightly Agustin Harley MD   1 drop at 04/25/21 2050    melatonin tablet 3 mg  3 mg Oral Nightly PRN Agustin Harley MD        metoprolol succinate (TOPROL XL) extended release tablet 100 mg  100 mg Oral Daily Agustin Harley MD   100 mg at 04/26/21 7254    pantoprazole (PROTONIX) tablet 40 mg  40 mg Oral QAM AC Agustin Harley MD   40 mg at 04/26/21 1844    triamcinolone (KENALOG) 0.1 % cream   Topical BID Agustin Harley MD   Given at 04/25/21 2050    ascorbic acid (VITAMIN C) tablet 500 mg  500 mg Oral Daily Agustin Harley MD   500 mg at 04/26/21 5215    sodium chloride flush 0.9 % injection 5-40 mL  5-40 mL Intravenous 2 times per day Agustin Harley MD   10 mL at 04/25/21 2050    sodium chloride flush 0.9 % injection 5-40 mL  5-40 mL Intravenous PRN Agustin Harley MD        0.9 % sodium chloride infusion  25 mL Intravenous PRN Agustin Harley MD        promethazine (PHENERGAN) tablet 12.5 mg  12.5 mg Oral Q6H PRN Agustin Harley MD        Or    ondansetron [facility]) injection 4 mg  4 mg Intravenous Q6H PRN Agustin Harley MD        acetaminophen (TYLENOL) tablet 650 mg  650 mg Oral Q6H PRN Agustin Harley MD        Or   [facility] acetaminophen (TYLENOL) suppository 650 mg  650 mg Rectal Q6H PRN Agustin Harley MD        insulin lispro (HUMALOG) injection vial 0-6 Units  0-6 Units Subcutaneous TID WC Agustin Harley, MD  insulin lispro (HUMALOG) injection vial 0-3 Units  0-3 Units Subcutaneous Nightly Zaina Wheatley MD   1 Units at 04/25/21 2057    polyethylene glycol (GLYCOLAX) packet 17 g  17 g Oral Daily PRN Zaina Wheatley MD        cetirizine (ZYRTEC) tablet 5 mg  5 mg Oral Daily Zaina Wheatley MD   5 mg at 04/26/21 6607    warfarin (COUMADIN) daily dosing (placeholder)   Other RX Placeholder Zaina Wheatley MD        glucose (GLUTOSE) 40 % oral gel 15 g  15 g Oral PRN Zaina Wheatley MD        dextrose 50 % IV solution  12.5 g Intravenous PRN Zaina Wheatley MD        glucagon (rDNA) injection 1 mg  1 mg Intramuscular PRN Zaina Wheatley MD        dextrose 5 % solution  100 mL/hr Intravenous PRN Zaina Wheatley MD        doxycycline (VIBRAMYCIN) 100 mg in dextrose 5 % 100 mL IVPB  100 mg Intravenous Q12H Zaina Wheatley MD   Stopped at 04/26/21 0413    furosemide (LASIX) 100 mg in dextrose 5 % 100 mL infusion  10 mg/hr Intravenous Continuous Ave Streeter MD 10 mL/hr at 04/25/21 1827 10 mg/hr at 04/25/21 1827     Allergies   Allergen Reactions    Naproxen Itching    Bactrim [Sulfamethoxazole-Trimethoprim]     Levofloxacin     Pcn [Penicillins]     Sulfa Antibiotics      Active Problems:    CHF (congestive heart failure), NYHA class I, acute on chronic, combined ([facility]    Stage 4 chronic kidney disease ([facility]  Resolved Problems:    * No resolved hospital problems. *    Blood pressure 119/75, pulse 86, temperature 97.9 °F (36.6 °C), temperature source Oral, resp. rate 16, height 5' 6\" (1.676 m), weight 263 lb 14.3 oz (119.7 kg), SpO2 100 %, not currently breastfeeding. Subjective Feels better. No CP or SOB at rest.  Objective A&O, CTA, IIR&R, trace edema. O>!, Cr. Down to 3.8  Assessment & Plan CHF and renal function improving. Probably renal function improving as effect of dye is wearing off. D/W patient and family. Continue present treatment.  Will resume homecare at D/C.    Albaro Negrete MD  4/26/2021

## 2021-04-26 NOTE — PLAN OF CARE
Problem: Falls - Risk of:  Goal: Will remain free from falls  Description: Will remain free from falls  Outcome: Ongoing  Goal: Absence of physical injury  Description: Absence of physical injury  Outcome: Ongoing     Problem: OXYGENATION/RESPIRATORY FUNCTION  Goal: Patient will maintain patent airway  Outcome: Ongoing  Goal: Patient will achieve/maintain normal respiratory rate/effort  Description: Respiratory rate and effort will be within normal limits for the patient  Outcome: Ongoing     Problem: HEMODYNAMIC STATUS  Goal: Patient has stable vital signs and fluid balance  Outcome: Ongoing     Problem: FLUID AND ELECTROLYTE IMBALANCE  Goal: Fluid and electrolyte balance are achieved/maintained  Outcome: Ongoing     Problem: ACTIVITY INTOLERANCE/IMPAIRED MOBILITY  Goal: Mobility/activity is maintained at optimum level for patient  Outcome: Ongoing     Problem: Skin Integrity:  Goal: Will show no infection signs and symptoms  Description: Will show no infection signs and symptoms  Outcome: Ongoing  Goal: Absence of new skin breakdown  Description: Absence of new skin breakdown  Outcome: Ongoing

## 2021-04-26 NOTE — PROGRESS NOTES
Patient complains of left knee pain with any movement. Patient family states this started about Friday 4/23/21. Sent Dr. Marcos Espitia a message via [employer] to inform of the pain. Awaiting response.   Electronically signed by Marika Peoples RN on 4/26/2021 at 3:45 PM

## 2021-04-26 NOTE — DISCHARGE INSTR - COC
Continuity of Care Form    Patient Name: Hector Wilde   :  1934  MRN:  [id number]    Admit date:  2021  Discharge date:  21    Code Status Order: Full Code   Advance Directives:   Advance Care Flowsheet Documentation       Date/Time Healthcare Directive Type of Healthcare Directive Copy in [address] Agent's Name Healthcare Agent's Phone Number    21 1400  No, patient does not have an advance directive for healthcare treatment -- -- -- -- --            Admitting Physician:  Rubin Topete MD  PCP: Rubin Topete MD    Discharging Nurse: Anusha Michelle [facility] Unit/Room#: R6R-6642/7307-73  Discharging Unit Phone Number: [phone]    Emergency Contact:   Extended Emergency Contact Information  Primary Emergency Contact: Victor Hugo Andersen [address] Phone: [phone]  Work Phone: [phone]  Relation: Child  Secondary Emergency Contact: [address] [place] [address] Phone: [phone]  Mobile Phone: [phone]  Relation: Child    Past Surgical History:  Past Surgical History:   Procedure Laterality Date    HYSTERECTOMY      INTRACAPSULAR CATARACT EXTRACTION Left 10/16/2020    PHACOEMULSIFICATION WITH INTRAOCULAR LENS IMPLANT - LEFT EYE performed by Damaris Alcaraz MD at [address] [place] [address] Bilateral     TKT    JOINT REPLACEMENT Bilateral     THR    KNEE SURGERY      bilateral total knees    OTHER SURGICAL HISTORY  02/15/2021    BAV       Immunization History: There is no immunization history on file for this patient.     Active Problems:  Patient Active Problem List   Diagnosis Code    CHF (congestive heart failure), NYHA class III, acute on chronic, combined ([facility]) I50.43    Essential hypertension I10    Persistent atrial fibrillation ([facility]) I48.19    Pulmonary HTN ([facility]) I27.20    Hypercholesteremia E78.00    Primary localized osteoarthrosis of shoulder region M19.019    Nonrheumatic aortic valve stenosis I35.0    Hyperthyroidism E05.90    Acute kidney injury superimposed on chronic kidney disease (HCC) N17.9, N18.9    Hyponatremia E87.1    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    Anemia due to chronic kidney disease N18.9, D63.1    Acute respiratory failure with hypoxia (HCC) J96.01    DAVION (acute kidney injury) ([facility] N17.9    Uncontrolled type 2 diabetes mellitus with hyperglycemia (HCC) E11.65    Acute on chronic combined systolic and diastolic congestive heart failure (HCC) I50.43    Acute congestive heart failure (HCC) I50.9    Severe aortic stenosis I35.0    Paroxysmal atrial fibrillation (HCC) I48.0    CHF (congestive heart failure), NYHA class I, acute on chronic, combined (HCC) I50.43    Acute on chronic congestive heart failure (HCC) I50.9    Bilateral leg edema R60.0    Stage 4 chronic kidney disease (HCC) N18.4       Isolation/Infection:   Isolation            No Isolation          Patient Infection Status       Infection Onset Added Last Indicated Last Indicated By Review Planned Expiration Resolved Resolved By    None active    Resolved    COVID-19 Rule Out 02/07/21 02/07/21 02/07/21 COVID-19 (Ordered)   02/07/21 Rule-Out Test Resulted    C-diff Rule Out 09/05/20 09/05/20 09/05/20 GI Bacterial Pathogens By PCR (Ordered)   09/06/20 Loraine Lopez RN    COVID-19 Rule Out 09/04/20 09/04/20 09/04/20 COVID-19 (Ordered)   09/04/20 Rule-Out Test Resulted    C-diff Rule Out 09/04/20 09/04/20 09/04/20 Clostridium Difficile Toxin/Antigen (Ordered)   09/04/20 Rule-Out Test Resulted            Nurse Assessment:  Last Vital Signs: /79   Pulse 73   Temp 97.6 °F (36.4 °C) (Oral)   Resp 16   Ht 5' 6\" (1.676 m)   Wt 252 lb (114.3 kg)   LMP  (LMP Unknown)   SpO2 99%   BMI 40.67 kg/m²     Last documented pain score (0-10 scale): Pain Level: 0  Last Weight:   Wt Readings from Last 1 Encounters:   04/26/21 252 lb (114.3 kg)     Mental Status: oriented and alert    IV Access:  - Peripheral IV - site  L Antecubital, insertion date: 4/27/21    Nursing Mobility/ADLs:  Walking   Dependent  Transfer  Dependent  Bathing  Dependent  Dressing  Dependent  Toileting  Dependent  Feeding  Assisted  Med Admin  Assisted  Med Delivery   whole    Wound Care Documentation and Therapy:  Wound 04/25/21 Buttocks Mid;Inner (Active)   Wound Etiology Other 04/25/21 1945   Wound Cleansed Wound cleanser 04/25/21 1400   Dressing/Treatment Zinc paste 04/25/21 1400   Wound Length (cm) 11 cm 04/25/21 1400   Wound Width (cm) 2 cm 04/25/21 1400   Wound Surface Area (cm^2) 22 cm^2 04/25/21 1400   Wound Assessment Pink/red 04/25/21 1945   Drainage Amount Scant 04/25/21 1945   Drainage Description Yellow; Thin 04/25/21 1945   Fay-wound Assessment Intact; Blanchable erythema 04/25/21 1945   Margins Defined edges 04/25/21 1945   Number of days: 1        Elimination:  Continence:   · Bowel: Yes  · Bladder: Yes  Urinary Catheter: Insertion Date: 4/24/21   Colostomy/Ileostomy/Ileal Conduit: NO       Date of Last BM: UNK    Intake/Output Summary (Last 24 hours) at 4/26/2021 1657  Last data filed at 4/26/2021 1355  Gross per 24 hour   Intake 480 ml   Output 1425 ml   Net -945 ml     I/O last 3 completed shifts: In: 480 [P.O.:480]  Out: 2[facility] [Urine:1675]    Safety Concerns: At Risk for Falls    Impairments/Disabilities:      AMBULATING    Nutrition Therapy:  Current Nutrition Therapy:   - Oral Diet:  Cardiac and Low Sodium (2gm)    Routes of Feeding: Oral  Liquids: Thin Liquids  Daily Fluid Restriction: yes - amount 1500mL  Last Modified Barium Swallow with Video (Video Swallowing Test): not done    Treatments at the Time of Hospital Discharge:   Respiratory Treatments: N/A  Oxygen Therapy:  is on oxygen at 1 L/min per nasal cannula.   Ventilator:    - No ventilator support    Rehab Therapies: Physical Therapy and Occupational Therapy  Weight Bearing Status/Restrictions: No weight bearing restirctions  Other Medical Equipment (for information only, NOT a DME order): SOTO  Other Treatments: pt/ot    Heart Failure Instructions for Daily Management  Patient was treated for acute on chronic combined systolic and diastolic heart failure. she  will require the following:     Please weigh daily on the same scale and approximately the same time of day. Report weight gain of 3 pounds/day or 5 pounds/week to : Parvin Dutta [phone] and [facility] [address] [phone].  Please use hospital discharge weight as baseline reference.  Please monitor for signs and symptoms of and report to MD:  o Worsening Heart Failure: sudden weight gain, shortness of breath, lower extremity or general edema/swelling, abdominal bloating/swelling, inability to lie flat, intolerance to usual activity, or cough (especially at night). Report these finding even if no increase in weight.  o Dehydration:  having difficulty or a decrease in urination, dizziness, worsening fatigue, or new onset/worsening of generalized weakness.  Please continue a LOW SODIUM diet and LIMIT fluid intake to 48 - 64 ounces ( 1.5 - 2 liters) per day.  Call Parvin Dutta [phone] and [facility] [address] [phone] and/or Ankit Ospina @ [phone] with any questions or concerns.  Please continue heart failure education to patient and family/support system.  See After Visit Summary for hospital follow up appointment details.  Consider spiritual care referral for support and/or completion of advance directives [phone].    Consider: [place] [address] telehealth program if patient agreeable and able to participate, palliative care consult for ongoing goals of care, end of life, and/or chronic disease management discussions and referral to [facility] [phone]) once SNF/C complete      Patient's personal belongings (please select all that are sent with patient):  None     RN SIGNATURE:  Electronically signed by Alfonso Moody RN on 4/29/21 at 2:06 PM EDT    CASE MANAGEMENT/SOCIAL WORK SECTION    Inpatient Status Date: ***    Readmission Risk Assessment Score:  Readmission Risk              Risk of Unplanned Readmission:        40           Discharging to Facility/ Agency   · Name:   · Address:  · Phone:  · Fax:    Dialysis Facility (if applicable)   · Name:  · Address:  · Dialysis Schedule:  · Phone:  · Fax:    / signature: {Esignature:[id number]}    PHYSICIAN SECTION    Prognosis: Fair    Condition at Discharge: Stable    Rehab Potential (if transferring to Rehab): Fair    Recommended Labs or Other Treatments After Discharge:  PT/OT/ST. Protime , renal panel, CBC. weekly, Nursing care    Physician Certification: I certify the above information and transfer of Cari Horne  is necessary for the continuing treatment of the diagnosis listed and that she requires Home Care for {GREATER/LESS:[id number]} 30 days.      Update Admission H&P: No change in H&P        Followup Dr Vamshi Brown in 2-3 weeks regarding knee pain    [facility], [address] [place] [address], [address] ,   [address]:  Electronically signed by Albaro Negrete MD on 4/27/21 at 9:19 AM EDT

## 2021-04-26 NOTE — CONSULTS
MEDICATIONS  No current facility-administered medications on file prior to encounter. Current Outpatient Medications on File Prior to Encounter   Medication Sig Dispense Refill    diphenhydrAMINE (BENADRYL) 25 MG tablet Take 1 tablet by mouth every 6 hours as needed for Itching 30 tablet 1    insulin glargine (LANTUS) 100 UNIT/ML injection vial Inject 10 Units into the skin nightly 1 vial 3    melatonin 3 MG TABS tablet Take 1 tablet by mouth nightly as needed (sleep) 30 tablet 3    metoprolol succinate (TOPROL XL) 100 MG extended release tablet Take 1 tablet by mouth daily 30 tablet 3    polyethylene glycol (GLYCOLAX) 17 g packet Take 17 g by mouth daily as needed for Constipation 527 g 1    triamcinolone (KENALOG) 0.1 % cream Apply topically 2 times daily. 100 g 5    torsemide (DEMADEX) 20 MG tablet Take 1 tablet by mouth daily 30 tablet 3    Insulin Pen Needle 32G X 4 MM MISC 1 each by Does not apply route daily 100 each 3    Cholecalciferol (VITAMIN D) 50 MCG (2000 UT) CAPS capsule Take 1 capsule by mouth once daily      blood glucose monitor kit and supplies Dispense sufficient amount for indicated testing frequency plus additional to accommodate PRN testing needs. Dispense all needed supplies to include: monitor, strips, lancing device, lancets, control solutions, alcohol swabs.  1 kit 0    latanoprost (XALATAN) 0.005 % ophthalmic solution Place 1 drop into the right eye nightly      pantoprazole (PROTONIX) 40 MG tablet Take 40 mg by mouth daily      warfarin (COUMADIN) 5 MG tablet Take 2.5 mg by mouth every evening Except 5 mg on Tuesdays or as directed by the [facility] [phone]  Goal INR = 2- 3      cetirizine (ZYRTEC) 10 MG tablet Take 10 mg by mouth daily      vitamin C (ASCORBIC ACID) 500 MG tablet Take 500 mg by mouth daily      Calcium Carb-Cholecalciferol (CALTRATE 600+D) 600-800 MG-UNIT TABS Take 1 tablet by mouth daily       gabapentin (NEURONTIN) 100 MG capsule Take 100 mg by mouth nightly.  glipiZIDE (GLUCOTROL) 5 MG tablet Take 5 mg by mouth 2 times daily (before meals). Objective         /68   Pulse 85   Temp 97.7 °F (36.5 °C) (Oral)   Resp 16   Ht 5' 6\" (1.676 m)   Wt 263 lb 14.3 oz (119.7 kg)   LMP  (LMP Unknown)   SpO2 95%   BMI 42.59 kg/m²     Code Status: Full Code    Advanced Directives:  Not completed has 8 children per her daughter and patient     Assessment        Management and Education    Persons available for education:     [x] Self     [] Caregiver       [] Spouse       [x] Other Family Member   []  Other    Spiritual History:  notified: Yes,     Does the patient have a Primary Care Physician? Yes    Level of patient/caregiver understanding able to:       [x] Verbalize Understanding   [] Demonstrate Understanding       [] Teach Back       [] Needs Reinforcement     []  Other:      Teaching Time:  1hours  0 minutes     Plan        Social Service Consult Made:  Yes  Assistance filling out Living Will/HPOA:  No      Discharge Plan:  Education/support to family  Education/support to patient  Clarification of medical condition to patient and family  Code status clarified: Full Code  Palliative care orders introduced    Discharge Environment:  [] Hospice Consult Agency:  [] Inpatient Hospice    [] Home with Hospice Care   [] ECF with Hospice  [] ECF skilled care with Hospice to follow   [] Other:    Discussion: Patient admitted from home for increased leg swelling 4 plus edema up to waist. She has history of CKD, CHF and aortic stenosis. She also has had a 20 pound weight gain. Her daughter is at bedside and gives a history of her having around the clock care from family and aides. Mrs Manuel Sue is alert and oriented to person, place, time and situation. We have discussed code status and at this time she wants to remain full code.  We did discuss possible palliative care referral for at home, her daughter will discuss with rest of family to see if they would like this extra service. I will continue to follow Ms. Mills's care as needed. Thank you for allowing me to participate in the care of Ms. Erickson Carbajal .      Electronically signed by Cezar Groves RN, [address] [place] [address] on 4/26/2021 at 1:05 PM  [address] [place] [address]  Office: [phone]

## 2021-04-26 NOTE — PROGRESS NOTES
1mg/ml infusion 10 mg/hr (04/25/21 1827)       Exam:   CONSTITUTIONAL/PSYCHIATRY: awake, alert and oriented x3. Not in acute distress   EYES: Conjunctivae: normal.   RESPIRATORY: Respiratory effort: normal. Auscultation: ~CTA  CARDIOVASCULAR: Auscultation: irregular, 2/6m. Neck veins: +JVD. Edema: 2-3+ up to waist.  GASTROINTESTINAL: Soft, nontender, nondistended. Obese abdomen, +soft tissue edema. EXTREMITIES:  No cyanosis or clubbing. SKIN: Warm and dry. +chronic discoloration of legs bilaterally      LABS:   RFP:   Recent Labs     04/25/21  1047 04/26/21  0533   * 133*   K 5.0 4.1   CL 88* 90*   CO2 30 31   BUN 42* 39*   CREATININE 4.2* 3.8*   CALCIUM 8.6 8.4   PHOS  --  3.6   GFRAA 12* 14*       Liver panel:  Recent Labs     04/25/21  1047   AST 21   ALT 27       Endocrine:   @[id number](VitD,PTH,TSH,aldosterone,renin activity,cortisol,metanephrine)@    CBC:   Recent Labs     04/25/21  1047 04/26/21  0533   WBC 4.9 4.3   HGB 11.5* 9.9*   HCT 34.9* 30.6*   MCV 92.4 92.6    206       Iron Panel:   @[id number](FERA,FE)@    Serology: @[id number](ANAS,ANCA,C3,C4,RNP,AGBM,DNA,SSA,SSB,SPEP,UPEP,ESR,HEPT)@    Urine studies: @[id number](UAPR,UCOL,UNAR,UUNR,UCRR,UCLR,UKR,UUAR,UOSM,EOS)@        ASSESSMENT and PLAN:     1. DAVION on CKD4 (baseline SCr is 1.5-2.0; followed by me): DAVION is likely secondary to recent exposure to IV contrast --> OPHELIA +/- DAVION secondary to ADHF (CRS). She is fluid overloaded with edema up to wist.UOP improved and Cr has decreased to 3.8 mg today    -continue  Lasix drip at 10mg/hour    - She is not a great candidate for dialysis in view of her cardiac condition and poor functional status. Cr improving Monitor No indication to start RRT D/W pt and daughter at bedside     2. ADHF on chronic systolic/diastolic HF and AS: BNP 75,170! . She gained 20Lbs since last admission.    - Diuresis per above Check daily weights    3. Anemia of CKD: Hb is in acceptable range.        4. Deconditioning: patient is mostly bed-bound now.     5 Severe AS      Signed By: Narayan Wall MD

## 2021-04-26 NOTE — PROGRESS NOTES
Physical Therapy    Facility/Department: [address] PROGRESSIVE CARE  Initial Assessment    NAME: Lisa Stanley  : 1934  MRN: [id number]    Date of Service: 2021    Discharge Recommendations:  24 hour supervision or assist, Home with Home health PT   PT Equipment Recommendations  Other: Will monitor for potential equipt needs. Lisa Stanley scored a / on the AM-PAC short mobility form. Current research shows that an AM-PAC score of 18 or greater is typically associated with a discharge to the patient's home setting. Based on the patient's AM-PAC score and their current functional mobility deficits, it is recommended that the patient have 2-3 sessions per week of Physical Therapy at d/c to increase the patient's independence. At this time, this patient demonstrates the endurance and safety to discharge home with Home PT Evaluation and a follow up treatment frequency of 2-3x/wk. Please see assessment section for further patient specific details. If patient discharges prior to next session this note will serve as a discharge summary. Please see below for the latest assessment towards goals. Assessment   Body structures, Functions, Activity limitations: Decreased functional mobility ; Decreased strength;Decreased endurance;Decreased balance  Assessment: 81 y/o female admit 2021 with Acute Renal Insufficiency, CHF, B LE Edema. PMH as noted including CKD, CHF, Aortic Stenosis, A-Fib, PE, DVT, B THR, B TKR. PTA pt living in own home with large family providing 24/7 assist.  PTA pt requiring assist for oob via [employer] (x2 family members) which they are currently renting on trial bases. Family reports plan for d/c home with cont assist/support. Will recommend at least brief Home PT to ensure safe transition home and to cont recovery. Prognosis: Fair  Decision Making: Medium Complexity  History: 81 y/o female admit 2021 with Acute Renal Insufficiency, CHF, B LE Edema.   PMH as noted including CKD, CHF, Aortic Stenosis, A-Fib, PE, DVT, B THR, B TKR. Exam: See above. Clinical Presentation: See above. Patient Education: Role of PT, POC, Need to call for assist, Safe use of Stedy. Barriers to Learning: None. REQUIRES PT FOLLOW UP: Yes  Activity Tolerance  Activity Tolerance: Patient limited by endurance       Patient Diagnosis(es): The primary encounter diagnosis was Acute renal insufficiency. Diagnoses of Acute on chronic combined systolic and diastolic congestive heart failure (HCC) and Bilateral leg edema were also pertinent to this visit. has a past medical history of Anemia, Aortic stenosis, Atrial fibrillation (HCC), Chronic kidney disease, Combined systolic and diastolic congestive heart failure ([address] DVT (deep venous thrombosis) ([address] Hyperlipidemia, Hypertension, Osteoarthritis, Pulmonary embolism ([address] Sarcoidosis, Thyroid disease, and Type II or unspecified type diabetes mellitus without mention of complication, not stated as uncontrolled. has a past surgical history that includes Hysterectomy; knee surgery; joint replacement (Bilateral); joint replacement (Bilateral); Intracapsular cataract extraction (Left, 10/16/2020); and other surgical history (02/15/2021). Restrictions  Restrictions/Precautions  Restrictions/Precautions: Fall Risk  Position Activity Restriction  Other position/activity restrictions: Nonamb pta. Vision/Hearing  Vision: Within Functional Limits  Hearing: Within functional limits     Subjective  General  Chart Reviewed: Yes  Patient assessed for rehabilitation services?: Yes  Additional Pertinent Hx: 81 y/o female admit 4/25/2021 with Acute Renal Insufficiency, CHF, B LE Edema. PMH as noted including CKD, CHF, Aortic Stenosis, A-Fib, PE, DVT, B THR, B TKR. Family / Caregiver Present: Yes(2 Dtrs.)  Referring Practitioner: Dr. Pedro Pablo Rodriguez  Diagnosis: Acute Renal Insufficiency, CHF, B LE Edema.   Follows Commands: Within Functional Limits  Subjective  Subjective: Pt/family agreeable to PT Eval/Rx. Orientation  Orientation  Overall Orientation Status: Within Functional Limits  Social/Functional History  Social/Functional History  Lives With: Family(Family provides 24/7 assist/support.)  Type of Home: House  Home Layout: Two level, Able to Live on Main level with bedroom/bathroom  Home Access: Stairs to enter with rails(1+1 step to enter.)  Bathroom Shower/Tub: Walk-in shower, Shower chair with back(Pt sponge bathes with assist.)  Bathroom Toilet: Bedside commode  Bathroom Accessibility: Not accessible  Home Equipment: Rolling walker, Wheelchair-manual(Adjustable bed. Family renting Stedy.)  Receives Help From: Family  ADL Assistance: Needs assistance  Homemaking Assistance: (Family takes care of homemaking needs.)  Ambulation Assistance: (does not ambulate)  Transfer Assistance: Needs assistance(Assist x 2 family (with Stedy). )  Additional Comments: Large supportive family provide ongoing 24/7 assist/support. Cognition   Cognition  Arousal/Alertness: Appropriate responses to stimuli  Following Commands: Follows all commands without difficulty  Initiation: Requires cues for some  Sequencing: Requires cues for some    Objective     Observation/Palpation  Observation: Light dressings over B distal LEs. AROM RLE (degrees)  RLE General AROM: Limited due to swelling/soft tissue; adequate for functional activities. AROM LLE (degrees)  LLE General AROM: Limited due to swelling/soft tissue; adequate for functional activities. AROM RUE (degrees)  RUE General AROM: Limited mid/endrange overhead. AROM LUE (degrees)  LUE General AROM: Limited mid/endrange overhead. Strength RLE  Comment: Grossly 2 2+/5. Strength LLE  Comment: Grossly 2 2+/5. Bed mobility  Supine to Sit: Maximum assistance;2 Person assistance(HOB elevated. Use of bedrail.)  Transfers  Sit to Stand: Maximum Assistance;2 Person Assistance(Via Stedy.   From

## 2021-04-26 NOTE — PLAN OF CARE
Problem: Falls - Risk of:  Goal: Will remain free from falls  Description: Will remain free from falls  4/26/2021 1201 by Preeti Caceres RN  Outcome: Ongoing     Problem: Falls - Risk of:  Goal: Absence of physical injury  Description: Absence of physical injury  4/26/2021 1201 by Preeti Caceres RN  Outcome: Ongoing     Problem: OXYGENATION/RESPIRATORY FUNCTION  Goal: Patient will maintain patent airway  4/26/2021 1201 by Preeti Caceres RN  Outcome: Ongoing     Problem: OXYGENATION/RESPIRATORY FUNCTION  Goal: Patient will achieve/maintain normal respiratory rate/effort  Description: Respiratory rate and effort will be within normal limits for the patient  4/26/2021 1201 by Preeti Caceres RN  Outcome: Ongoing     Problem: HEMODYNAMIC STATUS  Goal: Patient has stable vital signs and fluid balance  4/26/2021 1201 by Preeti Caceres RN  Outcome: Ongoing     Problem: FLUID AND ELECTROLYTE IMBALANCE  Goal: Fluid and electrolyte balance are achieved/maintained  4/26/2021 1201 by Preeti Caceres RN  Outcome: Ongoing     Problem: ACTIVITY INTOLERANCE/IMPAIRED MOBILITY  Goal: Mobility/activity is maintained at optimum level for patient  4/26/2021 1201 by Preeti Caceres RN  Outcome: Ongoing     Problem: Skin Integrity:  Goal: Will show no infection signs and symptoms  Description: Will show no infection signs and symptoms  4/26/2021 1201 by Preeti Caceres RN  Outcome: Ongoing     Problem: Skin Integrity:  Goal: Absence of new skin breakdown  Description: Absence of new skin breakdown  4/26/2021 1201 by Preeti Caceres RN  Outcome: Ongoing   Electronically signed by Preeti Caceres RN on 4/26/2021 at 12:01 PM

## 2021-04-26 NOTE — PLAN OF CARE
Nutrition Problem #1: Inadequate oral intake  Intervention: Food and/or Nutrient Delivery: Continue Current Diet, Start Oral Nutrition Supplement  Nutritional Goals: PO intake greater than 50% at all meals.

## 2021-04-26 NOTE — H&P
[address] [facility]                              HISTORY AND PHYSICAL    PATIENT NAME: Elyse Jacob                    :        1934  MED REC NO:   [id number]                          ROOM:       5273  ACCOUNT NO:   [de-identified]                           ADMIT DATE: 2021  PROVIDER:     Abe Marley MD    CHIEF COMPLAINT:  Leg swelling. HISTORY OF PRESENT ILLNESS:  The patient is a chronically ill  80year-old  -American female who lives in her own home  and is looked after by her eight children. The patient suffers with  end-stage renal disease, end-stage congestive heart failure and aortic  stenosis, treated previously with balloon valvuloplasty. The patient  has a long history of type 2 diabetes mellitus and hypertension. The  patient has been home from the hospital for about three weeks now  following an admission for congestive heart failure and renal  insufficiency. The patient had been getting along fairly well except  for increasing debility, no longer able to walk or rise without  assistance. She had been taking her medicines as prescribed. Her  regular insulin had been discontinued because of hypoglycemia. She had IV contrast last week at [facility] while being evaluated for a TAVR. The  patient's family called me this morning because of increased leg edema  in spite of increased dose of oral Demadex. I recommended  transportation to the emergency room at [facility].    Emergency room evaluation included a chemistry profile which showed  sodium 130, potassium of 5, random glucose was good at 125. ProBNP was  markedly elevated to 68,699. Troponin was slightly elevated to 0.02,  probably on the basis of chronic kidney disease. Albumin was 3.2. Liver enzymes were normal.  Unfortunately, her creatinine has risen to  4.2 with a BUN of 42.   The patient's baseline creatinine is normal.  Eyes were normal.  Mucous membranes were  slightly pale. She had dentures. NECK:  Revealed arthritic stiffness. Thyroid was slightly enlarged with  no discrete mass. LUNGS:  Revealed some bibasilar rales. CARDIAC:  Examination revealed an irregularly irregular rate and rhythm  with a grade 2 aortic systolic murmur. ABDOMEN:  Rotund. Bowel sounds were present. There is no abdominal  mass or megaly or tenderness. PELVIC/RECTAL:  Deferred. BREASTS:  Deferred. EXTREMITIES:  Revealed 1+ brawny edema of both lower legs  It was  reported there was an open area on her posterior thigh but this was not  discerned upon physical examination. I was unable to feel pedal pulses. NEUROLOGICAL:  Examination with the patient lying in bed revealed she  was alert and oriented, appeared competent to make decisions. She was  somewhat generally weak but no obvious focal neurological signs. Station and gait could not be tested. The patient required assistance  to sit up and turn in bed. IMPRESSION ON ADMISSION:  1. End-stage renal disease with recent IV contrast exposure  2. End-stage congestive heart failure. 3.  Aortic stenosis, previously treated with balloon valvuloplasty. The  patient is currently being considered for TAVR. 4.  Type 2 diabetes mellitus, controlled. 5.  Hypertension. 6.  Chronic atrial fibrillation. 7.  General debility. PLAN:  For an attempted intravenous diuretics, generic Vibramycin for  possible urinary tract infection. Cardiology consultation, Nephrology  Consultation. Palliative care consultation. I discussed with the patient about the need for dialysis  and she and her family are going to consider it. At the present time,  it appears that they would be agreeable to at least short-term dialysis,  if this was recommended by Nephrology. Plan was discussed with the  patient and family. They are in agreement.   Anticipated length of stay  will be greater than two midnights.           Opal Osullivan MD    D: 04/25/2021 14:44:59       T: 04/25/2021 14:51:17     [id number]  Job#: [id number]     Doc#: [id number]    CC:

## 2021-04-26 NOTE — PROGRESS NOTES
Comprehensive Nutrition Assessment    Type and Reason for Visit:  Initial, Positive Nutrition Screen, Wound, Patient Education    Nutrition Recommendations/Plan:   - Continue current diet  - Begin Glucerna once daily  - Begin Magic Cup once daily    Nutrition Assessment:  Positive nutrition screen. No significant wt loss per EMR. Pt states PO intakes are good at breakfast but worsen throughout the day with decreasing appetite. Family at bedside state pt likes glucerna. Will send once daily along with magic cup once daily to improve intakes at lunch and dinner. RD provided CHF education to pt and family. Education went over not adding salt to foods, avoiding canned items, limiting high salt meats, and limiting fluid intake. Pt with no questions at this time. Will continue to monitor. Malnutrition Assessment:  Malnutrition Status:  No malnutrition      Estimated Daily Nutrient Needs:  Energy (kcal):  960-1320 (8-11kcal/120kg); Weight Used for Energy Requirements:  Current     Protein (g):  118 (2g/59kg); Weight Used for Protein Requirements:  Ideal        Fluid (ml/day):  1500ml fluid restriction; Method Used for Fluid Requirements:  Other (Comment)      Nutrition Related Findings:  Active BS. +3 pitting generalized edema. +4 pitting BLE edema. Glucose 136. Wounds:  Pressure Injury, Wound Consult Pending       Current Nutrition Therapies:    DIET CARDIAC; Low Sodium (2 GM);  Daily Fluid Restriction: 1500 ml    Anthropometric Measures:  · Height: 5' 6\" (167.6 cm)  · Current Body Weight: 263 lb (119.3 kg)   · Admission Body Weight: 278 lb (126.1 kg)    · Ideal Body Weight: 130 lbs; % Ideal Body Weight 202.3 %   · BMI: 42.5  · Adjusted Body Weight:  ; No Adjustment    · BMI Categories: Obese Class 3 (BMI 40.0 or greater)       Nutrition Diagnosis:   · Inadequate oral intake related to inadequate protein-energy intake as evidenced by (Pt report of poor appetite and intake)    Nutrition Interventions:   Food and/or Nutrient Delivery:  Continue Current Diet, Start Oral Nutrition Supplement  Nutrition Education/Counseling:  Education completed(CHF 10 min)   Coordination of Nutrition Care:  Continue to monitor while inpatient    Goals:  PO intake greater than 50% at all meals. Nutrition Monitoring and Evaluation:   Behavioral-Environmental Outcomes:  None Identified   Food/Nutrient Intake Outcomes:  Food and Nutrient Intake, Supplement Intake  Physical Signs/Symptoms Outcomes:  Biochemical Data, Weight, Skin     Discharge Planning:     Too soon to determine     Electronically signed by Gisselle Gruber RD, LD on 4/26/21 at 2:27 PM EDT    Contact: [phone]

## 2021-04-27 ENCOUNTER — APPOINTMENT (OUTPATIENT)
Dept: GENERAL RADIOLOGY | Age: 86
DRG: 291 | End: 2021-04-27
Payer: MEDICARE

## 2021-04-27 LAB
ALBUMIN SERPL-MCNC: 3 G/DL (ref 3.4–5)
ANION GAP SERPL CALCULATED.3IONS-SCNC: 9 MMOL/L (ref 3–16)
BUN BLDV-MCNC: 40 MG/DL (ref 7–20)
CALCIUM SERPL-MCNC: 8.6 MG/DL (ref 8.3–10.6)
CHLORIDE BLD-SCNC: 90 MMOL/L (ref 99–110)
CO2: 36 MMOL/L (ref 21–32)
CREAT SERPL-MCNC: 3.5 MG/DL (ref 0.6–1.2)
GFR AFRICAN AMERICAN: 15
GFR NON-AFRICAN AMERICAN: 12
GLUCOSE BLD-MCNC: 110 MG/DL (ref 70–99)
GLUCOSE BLD-MCNC: 151 MG/DL (ref 70–99)
GLUCOSE BLD-MCNC: 174 MG/DL (ref 70–99)
GLUCOSE BLD-MCNC: 76 MG/DL (ref 70–99)
GLUCOSE BLD-MCNC: 91 MG/DL (ref 70–99)
INR BLD: 3.81 (ref 0.86–1.14)
PERFORMED ON: ABNORMAL
PERFORMED ON: NORMAL
PHOSPHORUS: 3.2 MG/DL (ref 2.5–4.9)
POTASSIUM SERPL-SCNC: 3.7 MMOL/L (ref 3.5–5.1)
PROTHROMBIN TIME: 44.8 SEC (ref 10–13.2)
SODIUM BLD-SCNC: 135 MMOL/L (ref 136–145)

## 2021-04-27 PROCEDURE — 97530 THERAPEUTIC ACTIVITIES: CPT

## 2021-04-27 PROCEDURE — 99223 1ST HOSP IP/OBS HIGH 75: CPT | Performed by: INTERNAL MEDICINE

## 2021-04-27 PROCEDURE — 80069 RENAL FUNCTION PANEL: CPT

## 2021-04-27 PROCEDURE — 6360000002 HC RX W HCPCS: Performed by: INTERNAL MEDICINE

## 2021-04-27 PROCEDURE — 2580000003 HC RX 258: Performed by: INTERNAL MEDICINE

## 2021-04-27 PROCEDURE — 2700000000 HC OXYGEN THERAPY PER DAY

## 2021-04-27 PROCEDURE — 2060000000 HC ICU INTERMEDIATE R&B

## 2021-04-27 PROCEDURE — 6370000000 HC RX 637 (ALT 250 FOR IP): Performed by: INTERNAL MEDICINE

## 2021-04-27 PROCEDURE — 2500000003 HC RX 250 WO HCPCS: Performed by: INTERNAL MEDICINE

## 2021-04-27 PROCEDURE — 73560 X-RAY EXAM OF KNEE 1 OR 2: CPT

## 2021-04-27 PROCEDURE — 85610 PROTHROMBIN TIME: CPT

## 2021-04-27 PROCEDURE — 94761 N-INVAS EAR/PLS OXIMETRY MLT: CPT

## 2021-04-27 PROCEDURE — 36415 COLL VENOUS BLD VENIPUNCTURE: CPT

## 2021-04-27 RX ORDER — WARFARIN SODIUM 1 MG/1
1 TABLET ORAL
Status: DISCONTINUED | OUTPATIENT
Start: 2021-04-27 | End: 2021-04-27

## 2021-04-27 RX ADMIN — Medication 10 ML: at 09:21

## 2021-04-27 RX ADMIN — DOXYCYCLINE 100 MG: 100 INJECTION, POWDER, LYOPHILIZED, FOR SOLUTION INTRAVENOUS at 03:21

## 2021-04-27 RX ADMIN — PANTOPRAZOLE SODIUM 40 MG: 40 TABLET, DELAYED RELEASE ORAL at 06:30

## 2021-04-27 RX ADMIN — LATANOPROST 1 DROP: 50 SOLUTION OPHTHALMIC at 20:59

## 2021-04-27 RX ADMIN — FUROSEMIDE 10 MG/HR: 10 INJECTION, SOLUTION INTRAMUSCULAR; INTRAVENOUS at 13:20

## 2021-04-27 RX ADMIN — CEFEPIME HYDROCHLORIDE 1000 MG: 1 INJECTION, POWDER, FOR SOLUTION INTRAMUSCULAR; INTRAVENOUS at 09:21

## 2021-04-27 RX ADMIN — GABAPENTIN 100 MG: 100 CAPSULE ORAL at 20:47

## 2021-04-27 RX ADMIN — TRIAMCINOLONE ACETONIDE: 1 CREAM TOPICAL at 10:16

## 2021-04-27 RX ADMIN — INSULIN LISPRO 1 UNITS: 100 INJECTION, SOLUTION INTRAVENOUS; SUBCUTANEOUS at 17:46

## 2021-04-27 RX ADMIN — OXYCODONE HYDROCHLORIDE AND ACETAMINOPHEN 500 MG: 500 TABLET ORAL at 09:21

## 2021-04-27 RX ADMIN — Medication 10 ML: at 20:26

## 2021-04-27 RX ADMIN — TRIAMCINOLONE ACETONIDE: 1 CREAM TOPICAL at 20:27

## 2021-04-27 RX ADMIN — CEFEPIME HYDROCHLORIDE 1000 MG: 1 INJECTION, POWDER, FOR SOLUTION INTRAMUSCULAR; INTRAVENOUS at 20:46

## 2021-04-27 RX ADMIN — FUROSEMIDE 10 MG/HR: 10 INJECTION, SOLUTION INTRAMUSCULAR; INTRAVENOUS at 01:53

## 2021-04-27 RX ADMIN — METOPROLOL SUCCINATE 100 MG: 50 TABLET, EXTENDED RELEASE ORAL at 09:21

## 2021-04-27 RX ADMIN — Medication 3 MG: at 20:47

## 2021-04-27 RX ADMIN — INSULIN LISPRO 1 UNITS: 100 INJECTION, SOLUTION INTRAVENOUS; SUBCUTANEOUS at 20:46

## 2021-04-27 RX ADMIN — CETIRIZINE HYDROCHLORIDE 5 MG: 10 TABLET, FILM COATED ORAL at 09:21

## 2021-04-27 ASSESSMENT — PAIN SCALES - GENERAL: PAINLEVEL_OUTOF10: 0

## 2021-04-27 ASSESSMENT — PAIN SCALES - WONG BAKER: WONGBAKER_NUMERICALRESPONSE: 0

## 2021-04-27 NOTE — PLAN OF CARE
Problem: Falls - Risk of:  Goal: Will remain free from falls  Description: Will remain free from falls  4/27/2021 1240 by Eriberto Castillo RN  Outcome: Ongoing    Goal: Absence of physical injury  Description: Absence of physical injury  4/27/2021 1240 by Eriberto Castillo RN  Outcome: Ongoing     Problem: OXYGENATION/RESPIRATORY FUNCTION  Goal: Patient will maintain patent airway  4/27/2021 1240 by Eriberto Castillo RN  Outcome: Ongoing    Goal: Patient will achieve/maintain normal respiratory rate/effort  Description: Respiratory rate and effort will be within normal limits for the patient  4/27/2021 1240 by Eriberto Castillo RN  Outcome: Ongoing     Problem: HEMODYNAMIC STATUS  Goal: Patient has stable vital signs and fluid balance  4/27/2021 1240 by Eriberto Castillo RN  Outcome: Ongoing     Problem: FLUID AND ELECTROLYTE IMBALANCE  Goal: Fluid and electrolyte balance are achieved/maintained  4/27/2021 1240 by Eriberto Castillo RN  Outcome: Ongoing    Problem: ACTIVITY INTOLERANCE/IMPAIRED MOBILITY  Goal: Mobility/activity is maintained at optimum level for patient  4/27/2021 1240 by Eriberto Castillo RN  Outcome: Ongoing     Problem: Skin Integrity:  Goal: Will show no infection signs and symptoms  Description: Will show no infection signs and symptoms  4/27/2021 1240 by Eriberto Castillo RN  Outcome: Ongoing    Goal: Absence of new skin breakdown  Description: Absence of new skin breakdown  4/27/2021 1240 by Eriberto Castillo RN  Outcome: Ongoing     Problem: Nutrition  Goal: Optimal nutrition therapy  4/27/2021 1240 by Eriberto Castillo RN  Outcome: Ongoing

## 2021-04-27 NOTE — PROGRESS NOTES
Clinical Pharmacy Note  Renal Dose Adjustment    Cathleen Maldonado is receiving Cefepime. This medication is renally eliminated. Based on the patient's Estimated Creatinine Clearance: 15 mL/min (A) (based on SCr of 3.5 mg/dL (H)). and urine output, the dose has been adjusted to 1 gm q12h per protocol. Pharmacy will continue to monitor and adjust dose as needed for changes in renal function.     Heather Temple, [facility],4/27/2021,8:08 AM

## 2021-04-27 NOTE — CONSULTS
[address]  Cardiology Consult    Deanna Edwards  1934 April 27, 2021      Reason for Referral: Severe aortic valve stenosis    CC: Increasing edema and shortness of breath. Subjective:     History of Present Illness:    Deanna Edwards is a 80 y.o. patient with a PMH significant for chronic kidney disease, severe aortic valve stenosis status post balloon aortic valvoplasty, atrial fibrillation, congestive heart failure presented with complaints of increasing edema and shortness of breath. Patient had done well after valvuloplasty but then got readmitted recently. She was supposed to get rehab but never completely got complete rehab done. She has remained weak. Is not been able to walk since being discharged from the hospital last time. She has been bedridden. She she has continued to get short of breath with lower extremity edema. Past Medical History:   has a past medical history of Anemia, Aortic stenosis, Atrial fibrillation ([address] Chronic kidney disease, Combined systolic and diastolic congestive heart failure ([address] DVT (deep venous thrombosis) ([address] Hyperlipidemia, Hypertension, Osteoarthritis, Pulmonary embolism ([address] Sarcoidosis, Thyroid disease, and Type II or unspecified type diabetes mellitus without mention of complication, not stated as uncontrolled. Surgical History:   has a past surgical history that includes Hysterectomy; knee surgery; joint replacement (Bilateral); joint replacement (Bilateral); Intracapsular cataract extraction (Left, 10/16/2020); and other surgical history (02/15/2021). Social History:   reports that she has never smoked. She has never used smokeless tobacco. She reports that she does not drink alcohol or use drugs. Family History:  family history includes Cancer in her brother; Heart Failure in her mother.     Home Medications:  Were reviewed and are listed in nursing record and/or below  Prior to Admission medications Medication Sig Start Date End Date Taking? Authorizing Provider   diphenhydrAMINE (BENADRYL) 25 MG tablet Take 1 tablet by mouth every 6 hours as needed for Itching 4/5/21 5/5/21  Donna Heath MD   insulin glargine (LANTUS) 100 UNIT/ML injection vial Inject 10 Units into the skin nightly 4/5/21   Donna Heath MD   melatonin 3 MG TABS tablet Take 1 tablet by mouth nightly as needed (sleep) 4/5/21   Donna Heath MD   metoprolol succinate (TOPROL XL) 100 MG extended release tablet Take 1 tablet by mouth daily 4/5/21   Donna Heath MD   polyethylene glycol (GLYCOLAX) 17 g packet Take 17 g by mouth daily as needed for Constipation 4/5/21 5/5/21  Donna Heath MD   triamcinolone (KENALOG) 0.1 % cream Apply topically 2 times daily. 4/5/21   Donna Heath MD   torsemide (DEMADEX) 20 MG tablet Take 1 tablet by mouth daily 4/5/21   Donna Heath MD   Insulin Pen Needle 32G X 4 MM MISC 1 each by Does not apply route daily 4/5/21   Donna Heath MD   Cholecalciferol (VITAMIN D) 50 MCG (2000 UT) CAPS capsule Take 1 capsule by mouth once daily    Historical Provider, MD   blood glucose monitor kit and supplies Dispense sufficient amount for indicated testing frequency plus additional to accommodate PRN testing needs. Dispense all needed supplies to include: monitor, strips, lancing device, lancets, control solutions, alcohol swabs.  2/13/21   Donna Heath MD   latanoprost (XALATAN) 0.005 % ophthalmic solution Place 1 drop into the right eye nightly 7/7/20   Historical Provider, MD   pantoprazole (PROTONIX) 40 MG tablet Take 40 mg by mouth daily 8/23/20   Historical Provider, MD   warfarin (COUMADIN) 5 MG tablet Take 2.5 mg by mouth every evening Except 5 mg on Tuesdays or as directed by the [id number]  Goal INR = 2- 3    Historical Provider, MD   cetirizine (ZYRTEC) 10 MG tablet Take 10 mg by mouth daily    Historical Provider, MD   vitamin C (ASCORBIC ACID) 500 MG tablet Take 500 mg by mouth daily    Historical Provider, MD   Calcium Carb-Cholecalciferol (CALTRATE 600+D) 600-800 MG-UNIT TABS Take 1 tablet by mouth daily     Historical Provider, MD   gabapentin (NEURONTIN) 100 MG capsule Take 100 mg by mouth nightly. Historical Provider, MD   glipiZIDE (GLUCOTROL) 5 MG tablet Take 5 mg by mouth 2 times daily (before meals).     Historical Provider, MD        CURRENT Medications:  cefepime (MAXIPIME) 1000 mg IVPB minibag, Q12H  warfarin (COUMADIN) tablet 1 mg, Once  perflutren lipid microspheres (DEFINITY) injection 1.65 mg, ONCE PRN  Vitamin D (CHOLECALCIFEROL) tablet 2,000 Units, Once  diphenhydrAMINE (BENADRYL) tablet 25 mg, Q6H PRN  gabapentin (NEURONTIN) capsule 100 mg, Nightly  latanoprost (XALATAN) 0.005 % ophthalmic solution 1 drop, Nightly  melatonin tablet 3 mg, Nightly PRN  metoprolol succinate (TOPROL XL) extended release tablet 100 mg, Daily  pantoprazole (PROTONIX) tablet 40 mg, QAM AC  triamcinolone (KENALOG) 0.1 % cream, BID  ascorbic acid (VITAMIN C) tablet 500 mg, Daily  sodium chloride flush 0.9 % injection 5-40 mL, 2 times per day  sodium chloride flush 0.9 % injection 5-40 mL, PRN  0.9 % sodium chloride infusion, PRN  promethazine (PHENERGAN) tablet 12.5 mg, Q6H PRN    Or  ondansetron (ZOFRAN) injection 4 mg, Q6H PRN  acetaminophen (TYLENOL) tablet 650 mg, Q6H PRN    Or  acetaminophen (TYLENOL) suppository 650 mg, Q6H PRN  insulin lispro (HUMALOG) injection vial 0-6 Units, TID WC  insulin lispro (HUMALOG) injection vial 0-3 Units, Nightly  polyethylene glycol (GLYCOLAX) packet 17 g, Daily PRN  cetirizine (ZYRTEC) tablet 5 mg, Daily  warfarin (COUMADIN) daily dosing (placeholder), RX Placeholder  glucose (GLUTOSE) 40 % oral gel 15 g, PRN  dextrose 50 % IV solution, PRN  glucagon (rDNA) injection 1 mg, PRN  dextrose 5 % solution, PRN  furosemide (LASIX) 100 mg in dextrose 5 % 100 mL infusion, Continuous        Allergies:  Naproxen, Bactrim tenderness/mass/nodules. No carotid bruit or elevated JVD. Lungs:   Respiratory Effort: Normal   Auscultation: Clear to auscultation bilaterally, respirations unlabored. No wheeze, rales   Chest Wall:  No tenderness or deformity. Cardiovascular:    Pulses  Palpation: normal   Ascultation: Regular rate, S1/ soft S2  3/6 systolic murmur, rub, or gallop. 2+ radial and pedal pulses, symmetric  Carotid  Femoral   Abdomen and Gastrointestinal:   Soft, non-tender, bowel sounds active. Liver and Spleen  Masses   Musculoskeletal: No muscle wasting  Back  Gait   Extremities: Leg edema       Skin: Inspection and palpation performed, no rashes or lesions. Neurologic: Normal gross motor and sensory exam.       Labs     All labs have been reviewed    Lab Results   Component Value Date    WBC 4.3 04/26/2021    RBC 3.30 04/26/2021    HGB 9.9 04/26/2021    HCT 30.6 04/26/2021    MCV 92.6 04/26/2021    RDW 20.4 04/26/2021     04/26/2021     Lab Results   Component Value Date     04/27/2021    K 3.7 04/27/2021    K 5.0 04/25/2021    CL 90 04/27/2021    CO2 36 04/27/2021    BUN 40 04/27/2021    CREATININE 3.5 04/27/2021    GFRAA 15 04/27/2021    GFRAA 51 12/20/2012    AGRATIO 0.9 04/25/2021    LABGLOM 12 04/27/2021    GLUCOSE 76 04/27/2021    PROT 6.6 04/25/2021    PROT 7.5 12/20/2012    CALCIUM 8.6 04/27/2021    BILITOT 1.5 04/25/2021    ALKPHOS 81 04/25/2021    AST 21 04/25/2021    ALT 27 04/25/2021     No results found for: PTINR  Lab Results   Component Value Date    LABA1C 7.5 02/12/2021     Lab Results   Component Value Date    TROPONINI 0.02 (H) 04/25/2021       Cardiac, Vascular and Imaging Data all Personally Reviewed in Detail by Myself      EKG:     Echocardiogram: Patient appears to be in atrial fibrillation. Ejection fraction is visually estimated to be 25%. There is severe diffuse hypokinesis.    The aortic valve is thickened/calcified with decreased leaflet mobility   consistent with aortic stenosis. Severe aortic stenosis with a peak velocity of 3.9 m/s and a mean pressure   gradient of 34 mmHg, and a valve area of 0.44 cm2 by VTI. Consider low flow aortic stenosis in the presence of severely reduced left   ventricular function. The right ventricle is dilated. Right ventricular systolic function is moderate reduced. Other imaging:     cxr      Vascular congestion with mild perihilar interstitial edema, nearly identical   to the comparison.  No pleural effusion. Assessment and Plan     -Severe aortic stenosis. Patient has had balloon aortic valvoplasty but now her valve has restenosed. Ejection fraction has reduced to 25%. Developed valvular cardiomyopathy. Progressive renal  failure with decline in renal function creatinine increase. Acute on chronic kidney disease. Bilateral lower extremity edema. On torsemide. We will discuss with TAVR team.  May require repeat aortic valvuloplasty but risk remains high. Coagulopathy. Stop Coumadin. Thank you for allowing us to participate in the care of Jenna Rahman. Please do not hesitate to contact me if you have any questions.     Mohamud Schaefer MD, MPH    [facility], [address] [place] [address] [address]  Ph: [phone]  Fax: [phone]    4/27/2021 1:34 PM

## 2021-04-27 NOTE — PROGRESS NOTES
Physical Therapy  Facility/Department: [address] PROGRESSIVE CARE  Daily Treatment Note  NAME: Jenna Rahman  : 1934  MRN: [id number]    Date of Service: 2021    Discharge Recommendations:  24 hour supervision or assist, Home with Home health PT   PT Equipment Recommendations  Other: Will monitor for potential equipt needs. Assessment   Body structures, Functions, Activity limitations: Decreased functional mobility ; Decreased strength;Decreased endurance;Decreased balance  Assessment: Pt continues to require max A x2 for bed mobility and transfers with use of stedy. Pt able to stand from recliner this date to stedy. Pt has assist from family members at home who can provide 24/7 assist which they were providing prior to hospitalization. Will recommend home PT at discharge to ensure safe transition to home. Prognosis: Fair  PT Education: Goals;PT Role;General Safety;Transfer Training  REQUIRES PT FOLLOW UP: Yes  Activity Tolerance  Activity Tolerance: Patient limited by endurance     Patient Diagnosis(es): The primary encounter diagnosis was Acute renal insufficiency. Diagnoses of Acute on chronic combined systolic and diastolic congestive heart failure (HCC) and Bilateral leg edema were also pertinent to this visit. has a past medical history of Anemia, Aortic stenosis, Atrial fibrillation (HCC), Chronic kidney disease, Combined systolic and diastolic congestive heart failure ([address] DVT (deep venous thrombosis) ([address] Hyperlipidemia, Hypertension, Osteoarthritis, Pulmonary embolism ([address] Sarcoidosis, Thyroid disease, and Type II or unspecified type diabetes mellitus without mention of complication, not stated as uncontrolled. has a past surgical history that includes Hysterectomy; knee surgery; joint replacement (Bilateral); joint replacement (Bilateral);  Intracapsular cataract extraction (Left, 10/16/2020); and other surgical history within reach, Chair alarm in place, Left in chair, Nurse notified, All fall risk precautions in place, Patient at risk for falls, Gait belt     Therapy Time   Individual Concurrent Group Co-treatment   Time In 1305         Time Out 1330         Minutes 25         Timed Code Treatment Minutes: [address],   [facility]

## 2021-04-27 NOTE — PROGRESS NOTES
consult, change ATB D/T C&S results. 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 D/W patient and family.   Fish Qunitana MD  4/27/2021

## 2021-04-27 NOTE — PROGRESS NOTES
NEPHROLOGY PROGRESS NOTE    Patient: Deepak Jacobo MRN: [id number]     YOB: 1934  Age: 80 y.o. Sex: female    Unit: [address] Room/Bed: Z6H-9612/5273-01 Location: [address] [place] [address]     Admitting Physician: Noel Freeman    Primary Care Physician: George Andujar MD          LOS: 2 days       Reason for evaluation:   DAVION on CKD4      SUBJECTIVE:      The patient was seen and examined. Notes and labs reviewed. Asked to see in consultation by Dr Marie Bentley for the management of CKD. This is an 81yo AAF with extensive medical h/o including DM2, HTN, AS s/p valvulopasty, sys/greene HF, who is followed by Dr Jerardo Ribera for [facility] with a baseline SCr of 1.5-2.0 with a recent DAVION earlier this month with a peak SCr of 2.6. She later underwent a CTA of the chest/abdomen and pelvis on 4/16/21 with IV contrast (as a pre-op evaluation for TAVR). Her Scr was 1.5 on 4/16/21 and is up to 4.2 today with a BUN of 42. She presented with increased LE edema, SOB, poor appetite and 20Lbs weight gain. Her BNP was 68,699! [place] [facility] CXR showed vascular congestion/edema. She is bed bound and has a lift at home where the family helps her to get in/out the bed. For the PMHx/SHx/FHx, please refer to recent c/s note by Dr Cecille Sheppard on 3/27/21. Patient's review of systems  she denies any CP SOB C/O Knee pains  On Lasix gtt at 10 mg/hr  UOP 2850/24   Family is at bedside.       OBJECTIVE:     Vitals:    04/26/21 2331 04/27/21 0338 04/27/21 0832 04/27/21 1115   BP: 103/65 109/77 105/68 111/63   Pulse: 68 77 86 74   Resp: 16 16 18 16   Temp: 97.9 °F (36.6 °C) 98 °F (36.7 °C) 97.5 °F (36.4 °C) 98.1 °F (36.7 °C)   TempSrc: Oral Oral Oral Oral   SpO2: 100% 99% 95% 99%   Weight:  261 lb 0.4 oz (118.4 kg)     Height:           Intake and Output:      Intake/Output Summary (Last 24 hours) at 4/27/2021 1231  Last data filed at 4/27/2021 0935  Gross per 24 hour   Intake 118 ml   Output 3300 ml   Net -3182 ml Continuous Infusions:   sodium chloride      dextrose      furosemide (LASIX) 1mg/ml infusion 10 mg/hr (04/27/21 0153)       Exam:   CONSTITUTIONAL/PSYCHIATRY: awake, alert and oriented x3. Not in acute distress   EYES: Conjunctivae: normal.   RESPIRATORY: Respiratory effort: normal. Auscultation: ~CTA  CARDIOVASCULAR: Auscultation: irregular, 2/6m. Neck veins: +JVD. Edema: 2-3+ up to waist.  GASTROINTESTINAL: Soft, nontender, nondistended. Obese abdomen, +soft tissue edema. EXTREMITIES:  No cyanosis or clubbing. SKIN: Warm and dry. +chronic discoloration of legs bilaterally      LABS:   RFP:   Recent Labs     04/25/21  1047 04/26/21  0533 04/27/21  0521   * 133* 135*   K 5.0 4.1 3.7   CL 88* 90* 90*   CO2 30 31 36*   BUN 42* 39* 40*   CREATININE 4.2* 3.8* 3.5*   CALCIUM 8.6 8.4 8.6   PHOS  --  3.6 3.2   GFRAA 12* 14* 15*       Liver panel:  Recent Labs     04/25/21  1047   AST 21   ALT 27       Endocrine:   @[id number](VitD,PTH,TSH,aldosterone,renin activity,cortisol,metanephrine)@    CBC:   Recent Labs     04/25/21  1047 04/26/21  0533   WBC 4.9 4.3   HGB 11.5* 9.9*   HCT 34.9* 30.6*   MCV 92.4 92.6    206       Iron Panel:   @[id number](FERA,FE)@    Serology: @[id number](ANAS,ANCA,C3,C4,RNP,AGBM,DNA,SSA,SSB,SPEP,UPEP,ESR,HEPT)@    Urine studies: @[id number](UAPR,UCOL,UNAR,UUNR,UCRR,UCLR,UKR,UUAR,UOSM,EOS)@        ASSESSMENT and PLAN:     1. DAVION on CKD4 (baseline SCr is 1.5-2.0; followed by me): DAVION is likely secondary to recent exposure to IV contrast --> OPHELIA +/- DAVION secondary to ADHF (CRS). She is fluid overloaded with edema up to wist.UOP improved and Cr has decreased to 3.5 mg today    -continue  Lasix drip at 10mg/hour    - She is not a great candidate for dialysis in view of her cardiac condition and poor functional status. Cr improving Monitor No indication to start RRT D/W pt and daughters at bedside     2. ADHF on chronic systolic/diastolic HF and AS: BNP 50,849! . She gained

## 2021-04-27 NOTE — PROGRESS NOTES
Occupational Therapy  Facility/Department: [address] PROGRESSIVE CARE  Daily Treatment Note and Tentative D/C    NAME: Bernadette Santiago  : 1934  MRN: [id number]    Date of Service: 2021    Discharge Recommendations: Bernadette Santiago scored a 12/24 on the AM-PAC ADL Inpatient form. Current research shows that an AM-PAC score of 18 or greater is typically associated with a discharge to the patient's home setting. Based on the patient's AM-PAC score, and their current ADL deficits, it is recommended that the patient have 2-3 sessions per week of Occupational Therapy at d/c to increase the patient's independence. At this time, this patient demonstrates the endurance and safety to discharge home with [facility] and a follow up treatment frequency of 2-3x/wk. Please see assessment section for further patient specific details. If patient discharges prior to next session this note will serve as a discharge summary. Please see below for the latest assessment towards goals. Continue to assess pending progress, 2-3 sessions per week, 24 hour supervision or assist  OT Equipment Recommendations  Other: possible maxi move    Assessment   Performance deficits / Impairments: Decreased functional mobility ; Decreased strength;Decreased endurance;Decreased ADL status; Decreased balance;Decreased high-level IADLs  Assessment: Pt continues to be limited due to decreased overall strength and endurance. Pt completes bed mobility with Max Ax2. Pt completes transfers with Max Ax2 and use of stedy. Pt able to tolerate ~20 seconds in stance prior to needing seated rest break. Pt and family report having lift chair at home to assist with sit/stand from stedy. Continue with POC.   Prognosis: Fair  OT Education: OT Role;Plan of Care;Transfer Training  REQUIRES OT FOLLOW UP: Yes  Activity Tolerance  Activity Tolerance: Patient limited by fatigue  Safety Devices  Safety Devices in place: Yes  Type of devices: Left in chair;Chair alarm in place;Call light within reach;Nurse notified;Gait belt         Patient Diagnosis(es): The primary encounter diagnosis was Acute renal insufficiency. Diagnoses of Acute on chronic combined systolic and diastolic congestive heart failure (HCC) and Bilateral leg edema were also pertinent to this visit. has a past medical history of Anemia, Aortic stenosis, Atrial fibrillation (HCC), Chronic kidney disease, Combined systolic and diastolic congestive heart failure ([address] DVT (deep venous thrombosis) ([address] Hyperlipidemia, Hypertension, Osteoarthritis, Pulmonary embolism ([address] Sarcoidosis, Thyroid disease, and Type II or unspecified type diabetes mellitus without mention of complication, not stated as uncontrolled. has a past surgical history that includes Hysterectomy; knee surgery; joint replacement (Bilateral); joint replacement (Bilateral); Intracapsular cataract extraction (Left, 10/16/2020); and other surgical history (02/15/2021). Restrictions  Restrictions/Precautions  Restrictions/Precautions: Fall Risk  Position Activity Restriction  Other position/activity restrictions: Nonamb pta. Subjective   General  Chart Reviewed: Yes  Patient assessed for rehabilitation services?: Yes  Additional Pertinent Hx: per ED note, Nathaly Landon is a 80 y.o. female   Comes in with swelling in her legs. Patient has a history of diabetes aortic stenosis atrial fibrillation and combined systolic and diastolic congestive heart failure. She was recently admitted within the past 30 days for an exacerbation of her congestive heart failure. As far as I can tell they optimized her as best they could in the hospital.  Patient and the family really did not want an extended care facility so she was transported home. According to the family the patient went home on in [facility] and her belly has not been able to walk ever since. They do have a lift device that they have used to get her out of bed periodically.   The patient otherwise is unable to walk on her own. She has been for the most part bedridden. There are communications between the heart failure staff and the patient's family. They have had difficulty getting her transportation arranged for further testing. They were attempting to look into the ability of doing a TAVR. She is on Coumadin. Last INR was in the 3.5 range. Basically today upon turning the patient they noted that her legs were seeping fluid. She has gained about 20 pounds since she went home. She now presents to the emergency department for evaluation and reassessment. She is not having any fever chills nausea vomiting and she is not any more short of breath than she always is. She is on home oxygen. Nursing Noteswere all reviewed and agreed with or any disagreements were addressed  in the HPI. Family / Caregiver Present: Yes(daughters)  Referring Practitioner: Alphonse Felix MD  Subjective  Subjective: Pt agreeable to OT treatment. Pt reports no pain. General Comment  Comments: okay for therapy per RN. Orientation  Orientation  Overall Orientation Status: Within Functional Limits  Objective    ADL  LE Dressing: Dependent/Total(assist to don bilateral socks)  Toileting: Dependent/Total(weeks)        Balance  Sitting Balance: Stand by assistance(seated EOB)  Standing Balance: Minimal assistance(in stance in stedy)  Standing Balance  Time: ~20 seconds x2 trials  Activity: static standing in stedy  Comment: pt fatigues quickly  Wheelchair Bed Transfers  Wheelchair/Bed - Technique: (stedy)  Equipment Used: Bed;Other  Level of Asssistance: Dependent/Total     Transfers  Sit to stand: Maximum assistance;2 Person assistance  Stand to sit: 2 Person assistance;Maximum assistance  Transfer Comments: to/from stedy; cues for initiation              Cognition  Arousal/Alertness: Appropriate responses to stimuli  Following Commands:  Follows all commands without difficulty  Initiation: Requires cues for some  Sequencing: Requires cues for some                 Plan   Plan  Times per week: 3-5x  Current Treatment Recommendations: Strengthening, Endurance Training, Balance Training, Functional Mobility Training, Safety Education & Training, Self-Care / ADL, Patient/Caregiver Education & Training    AM-PAC Score        AM-PAC Inpatient Daily Activity Raw Score: 12 (04/27/21 1336)  AM-PAC Inpatient ADL T-Scale Score : 30.6 (04/27/21 1336)  ADL Inpatient CMS 0-100% Score: 66.57 (04/27/21 1336)  ADL Inpatient CMS G-Code Modifier : CL (04/27/21 1336)    Goals  Short term goals  Time Frame for Short term goals: prior to D/C; ongoing 4/27  Short term goal 1: complete functional transfers with Min A and use of stedy  Short term goal 2: complete bed mobility with Mod A  Short term goal 3: complete toileting with Mod A and use of stedy  Short term goal 4: tolerate B UE exercises x10 reps for increased strength and endurance with ADLs  Long term goals  Time Frame for Long term goals : STG=LTG  Patient Goals   Patient goals : return home       Therapy Time   Individual Concurrent Group Co-treatment   Time In 9448         Time Out 1335         Minutes 28         Timed Code Treatment Minutes: Coleen 74, OTR/L

## 2021-04-27 NOTE — CONSULTS
[facility] Orthopedic Surgery  Consult Note    This patient is seen in consultation at the request of Dr Elena Mason    Reason for Consult:  Left knee pain    CHIEF COMPLAINT:  Bilateral knee pain    History Obtained From:  patient, electronic medical record    HISTORY OF PRESENT ILLNESS:    The patient is a 80 y.o. female who presents with bilateral knee pain , left worse than right. She has hx left TKA many years ago and states was doing well. She has been not feeling well with \"myheart\" and not walking much so using a [employer] steady. She reports last week she was on the [employer] steady and she slid forward onto the knee rest and then to the base. She was able to stand with help but then developed bilateral bruised and tender knees, left worse than right. She was brought to ER and noted with INR 5.03 , she is on Coumadin. . Pain is described in left knee and with the intensity of mild at rest and moderate to severe with bending or standing. Right knee is mild at rest and mild to moderate with bending or standing. Pain is described as tender, pressure. Discomfort is intermittent. The pt is alert and oriented. Family at bedside confirms pt reports. No other complaints at this time.      Past Medical History:        Diagnosis Date    Anemia     Aortic stenosis     BAV 3/2021    Atrial fibrillation (HCC)     Chronic kidney disease     stage III    Combined systolic and diastolic congestive heart failure (HCC)     DVT (deep venous thrombosis) (HCC)     Hyperlipidemia     Hypertension     Osteoarthritis     Pulmonary embolism (HCC)     Sarcoidosis     in remission     Thyroid disease     Hypothyroidism    Type II or unspecified type diabetes mellitus without mention of complication, not stated as uncontrolled        Past Surgical History:        Procedure Laterality Date    HYSTERECTOMY      INTRACAPSULAR CATARACT EXTRACTION Left 10/16/2020    PHACOEMULSIFICATION WITH INTRAOCULAR LENS IMPLANT - LEFT EYE performed by Yobani Ramos MD at [facility] Bilateral     TKT    JOINT REPLACEMENT Bilateral     THR    KNEE SURGERY      bilateral total knees    OTHER SURGICAL HISTORY  02/15/2021    BAV       Social History     Tobacco Use    Smoking status: Never Smoker    Smokeless tobacco: Never Used    Tobacco comment: Never smoked   Substance Use Topics    Alcohol use: No       Family History   Problem Relation Age of Onset    Heart Failure Mother     Cancer Brother     Asthma Neg Hx     Diabetes Neg Hx     Emphysema Neg Hx     Hypertension Neg Hx            Current Medications:   Current Facility-Administered Medications: cefepime (MAXIPIME) 1000 mg IVPB minibag, 1,000 mg, Intravenous, Q12H  warfarin (COUMADIN) tablet 1 mg, 1 mg, Oral, Once  perflutren lipid microspheres (DEFINITY) injection 1.65 mg, 1.5 mL, Intravenous, ONCE PRN  Vitamin D (CHOLECALCIFEROL) tablet 2,000 Units, 2,000 Units, Oral, Once  diphenhydrAMINE (BENADRYL) tablet 25 mg, 25 mg, Oral, Q6H PRN  gabapentin (NEURONTIN) capsule 100 mg, 100 mg, Oral, Nightly  latanoprost (XALATAN) 0.005 % ophthalmic solution 1 drop, 1 drop, Right Eye, Nightly  melatonin tablet 3 mg, 3 mg, Oral, Nightly PRN  metoprolol succinate (TOPROL XL) extended release tablet 100 mg, 100 mg, Oral, Daily  pantoprazole (PROTONIX) tablet 40 mg, 40 mg, Oral, QAM AC  triamcinolone (KENALOG) 0.1 % cream, , Topical, BID  ascorbic acid (VITAMIN C) tablet 500 mg, 500 mg, Oral, Daily  sodium chloride flush 0.9 % injection 5-40 mL, 5-40 mL, Intravenous, 2 times per day  sodium chloride flush 0.9 % injection 5-40 mL, 5-40 mL, Intravenous, PRN  0.9 % sodium chloride infusion, 25 mL, Intravenous, PRN  promethazine (PHENERGAN) tablet 12.5 mg, 12.5 mg, Oral, Q6H PRN **OR** ondansetron (ZOFRAN) injection 4 mg, 4 mg, Intravenous, Q6H PRN  acetaminophen (TYLENOL) tablet 650 mg, 650 mg, Oral, Q6H PRN **OR** acetaminophen (TYLENOL) suppository 650 mg, 650 mg, Rectal, Q6H PRN  insulin lispro (HUMALOG) injection vial 0-6 Units, 0-6 Units, Subcutaneous, TID WC  insulin lispro (HUMALOG) injection vial 0-3 Units, 0-3 Units, Subcutaneous, Nightly  polyethylene glycol (GLYCOLAX) packet 17 g, 17 g, Oral, Daily PRN  cetirizine (ZYRTEC) tablet 5 mg, 5 mg, Oral, Daily  warfarin (COUMADIN) daily dosing (placeholder), , Other, RX Placeholder  glucose (GLUTOSE) 40 % oral gel 15 g, 15 g, Oral, PRN  dextrose 50 % IV solution, 12.5 g, Intravenous, PRN  glucagon (rDNA) injection 1 mg, 1 mg, Intramuscular, PRN  dextrose 5 % solution, 100 mL/hr, Intravenous, PRN  furosemide (LASIX) 100 mg in dextrose 5 % 100 mL infusion, 10 mg/hr, Intravenous, Continuous  Allergies:  @    REVIEW OF SYSTEMS:    CONSTITUTIONAL:  negative for  fevers, chills and malaise  MUSCULOSKELETAL:  positive for  myalgias, arthralgias and pain  All other ROS reviewed in chart or with patient or family and are grossly negative. PHYSICAL EXAM:    VITALS:  /63   Pulse 74   Temp 98.1 °F (36.7 °C) (Oral)   Resp 16   Ht 5' 6\" (1.676 m)   Wt 261 lb 0.4 oz (118.4 kg)   LMP  (LMP Unknown)   SpO2 99%   BMI 42.13 kg/m²     MUSCULOSKELETAL:  Bilateral anterior knees with bruising, deep purple. Right knee flexion ROM to 60 degrees then limited by mild pain and obesity of limb. Left knee flexion ROM to 45 degrees then limited by moderate pain and obesity of limb. Able to extend both knees fully Bilateral ankles with mild edema and advanced hemosiderin.    NEUROLOGIC:   Sensory:    Touch:                                     Right Lower Extremity:  normal                  Left Lower Extremity:  normal  Skin warm and dry  Resp deep and easy  Abdomen soft and round  Pulse is with regular rate and rhythm    DATA:    CBC:   Lab Results   Component Value Date    WBC 4.3 04/26/2021    RBC 3.30 04/26/2021    HGB 9.9 04/26/2021    HCT 30.6 04/26/2021    MCV 92.6 04/26/2021    MCH 30.1 04/26/2021    MCHC 32.5 04/26/2021    RDW 20.4 04/26/2021     04/26/2021    MPV 8.2 04/26/2021     WBC:    Lab Results   Component Value Date    WBC 4.3 04/26/2021     PT/INR:    Lab Results   Component Value Date    PROTIME 44.8 04/27/2021    PROTIME 26.4 12/14/2009    INR 3.81 04/27/2021     PTT:    Lab Results   Component Value Date    APTT 32.3 07/31/2013   [APTT    Radiology Review:    Narrative   EXAMINATION:   XRAY VIEWS OF THE LEFT KNEE       4/27/2021 10:00 am       COMPARISON:   None.       HISTORY:   ORDERING SYSTEM PROVIDED HISTORY: knee pain   TECHNOLOGIST PROVIDED HISTORY:   Reason for exam:->knee pain   Reason for Exam: knee pain   Acuity: Unknown   Type of Exam: Initial       FINDINGS:   Status post right knee arthroplasty.  Good alignment of the prosthetic   components.  No abnormal periprosthetic lucency.  No fracture or destructive   bone lesion.  Fluid in the suprapatellar recess           Impression   1. No evidence of fracture or prosthetic loosening   2. Small joint effusion           IMPRESSION/RECOMMENDATIONS:    Fall  Bilateral knee pain, left > right  Bilateral knee contusions in setting of Coumadin use  Plan WBAT bilateral knees with PT OT. Needs up to chair daily to prevent hazards of immobility  Discussed with DR Corrales. He asked that Dr Nicole Felix review TKA xrays for advice. Await review. No plan for surgical intervention intervention bilateral knees. Kandy Alison  4/27/2021  11:42 AM     Addendum: Discussed with Dr Nicole Felix. No evidence of fractures left knee. 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 WBAT. Up to chair daily.  Can FU Dr Nicole Felix in office in 2-3 weeks for further eval

## 2021-04-27 NOTE — PLAN OF CARE
Problem: Falls - Risk of:  Goal: Will remain free from falls  Description: Will remain free from falls  Outcome: Ongoing  Goal: Absence of physical injury  Description: Absence of physical injury  Outcome: Ongoing     Problem: OXYGENATION/RESPIRATORY FUNCTION  Goal: Patient will maintain patent airway  Outcome: Ongoing  Goal: Patient will achieve/maintain normal respiratory rate/effort  Description: Respiratory rate and effort will be within normal limits for the patient  Outcome: Ongoing     Problem: HEMODYNAMIC STATUS  Goal: Patient has stable vital signs and fluid balance  Outcome: Ongoing     Problem: FLUID AND ELECTROLYTE IMBALANCE  Goal: Fluid and electrolyte balance are achieved/maintained  Outcome: Ongoing     Problem: ACTIVITY INTOLERANCE/IMPAIRED MOBILITY  Goal: Mobility/activity is maintained at optimum level for patient  Outcome: Ongoing     Problem: Skin Integrity:  Goal: Will show no infection signs and symptoms  Description: Will show no infection signs and symptoms  Outcome: Ongoing  Goal: Absence of new skin breakdown  Description: Absence of new skin breakdown  Outcome: Ongoing     Problem: Nutrition  Goal: Optimal nutrition therapy  Outcome: Ongoing

## 2021-04-27 NOTE — CONSULTS
HF RN consult noted and following. I am familiar with pt and her family. I have had close communication with dtr Felicita Muse (# [phone]) and Chilo Jauregui (# [phone]) as well as with Chuy STACKR RN from [facility] and our own [facility] team about Ms Maldonado. Family is very active in her care and someone is with her 24/7. Pt has 10 children, which 7 of them live local. I met 2 different dtrs yesterday in pt's room. Her mobility is on a downward decline. They did not want a SNF at last dc. They did have therapy coming into the house. At one point on 4/9, I spoke with Felicita Muse who was agreeing that pt may need SNF/rehab as so weak. [facility] therapist was going to re-evaluate that Monday. Family has a lift and can get her up into a chair, etc. They are not able to weigh her however. Pt was here 3/26-4/5 with HF due to severe AS. She is presently in the throws of doing the outpt testing required for the TAVR referral. I did a full HF RN consult to include extensive education last admit with the x2 dtrs and Cathleen, see note. They are aware this will be an on-going issue unless valve is repaired, etc. She has had a BAV with Dr Dolores Saldana in the past but it has since restenosed. Pt was readmitted (< 30 day) on 4/25. Cardiology consulted and following. She had a repeat echo this admit which also now shows a further reduced EF from 40-45% to 25%. Dr Dolores Saldana saw pt today, see consult note. Dr Dolores Saldana did reach out to Dr Yolette Arreguin [employer] to ask him to look at her most recent echo and to report back his input (Dr Karen Osorio is presently doing TAVR cases all day today so Chuy Rogers said this won't likely occur until tomorrow). Palliative Care is also involved. At this time, pt remains a Full code. Of note, during last admit, dtr shared with me pt's spouse of 79 yrs/their father, passed away on 3/14 and buried him on 3/20. Pt and family are in in grieving stage. Appropriate HF orders are in place: daily wts, I&Os, low Na diet with 1.5 FR.  HF instructions have been added to AVS. I have also added to [address] since pt is active with [facility]. I will continue to follow and assist as needed and answer calls from family and help funnel to the appropriate provider. Of note, there is a future appt scheduled with Dr Shyla Díaz who is with [facility]. I am unclear if this is for a second opinion or what. When I am more aware of the plan of care, I will talk with dtrs again and help facilitate the appropriate hospital f/u appt that they will desire. Will continue to follow and continue to assist in pt's care, thank you.

## 2021-04-27 NOTE — CARE COORDINATION
[facility] Continence Nurse  Consult Note       NAME:  [address] RECORD NUMBER:  [id number]  AGE: 80 y.o.    GENDER: female  : 1934  TODAY'S DATE:  2021    Subjective   Reason for WOCN Evaluation and Assessment: [place]      Sameer Tello is a 80 y.o. female referred by:   [] Physician  [x] Nursing  [] Other:     Wound Identification:  Wound Type: skin tear  Contributing Factors: decreased mobility and moisture    Wound History: per family wounds were present [facility]  Current Wound Care Treatment:  zinc    Patient Goal of Care:  [x] Wound Healing  [] Odor Control  [] Palliative Care  [] Pain Control   [] Other:         PAST MEDICAL HISTORY        Diagnosis Date    Anemia     Aortic stenosis     BAV 3/2021    Atrial fibrillation (HCC)     Chronic kidney disease     stage III    Combined systolic and diastolic congestive heart failure (HCC)     DVT [facility] venous thrombosis) [facility]     Hyperlipidemia     Hypertension     Osteoarthritis     Pulmonary embolism ([facility]     Sarcoidosis     in remission     Thyroid disease     Hypothyroidism    Type II or unspecified type diabetes mellitus without mention of complication, not stated as uncontrolled        PAST SURGICAL HISTORY    Past Surgical History:   Procedure Laterality Date    HYSTERECTOMY      INTRACAPSULAR CATARACT EXTRACTION Left 10/16/2020    PHACOEMULSIFICATION WITH INTRAOCULAR LENS IMPLANT - LEFT EYE performed by Johanne Schmitt MD at [facility] Bilateral     TKT    JOINT REPLACEMENT Bilateral     THR    KNEE SURGERY      bilateral total knees    OTHER SURGICAL HISTORY  02/15/2021    BAV       FAMILY HISTORY    Family History   Problem Relation Age of Onset    Heart Failure Mother     Cancer Brother     Asthma Neg Hx     Diabetes Neg Hx     Emphysema Neg Hx     Hypertension Neg Hx        SOCIAL HISTORY    Social History Tobacco Use    Smoking status: Never Smoker    Smokeless tobacco: Never Used    Tobacco comment: Never smoked   Substance Use Topics    Alcohol use: No    Drug use: No       ALLERGIES    Allergies   Allergen Reactions    Naproxen Itching    Bactrim [Sulfamethoxazole-Trimethoprim]     Levofloxacin     Pcn [Penicillins]     Sulfa Antibiotics        MEDICATIONS    No current facility-administered medications on file prior to encounter. Current Outpatient Medications on File Prior to Encounter   Medication Sig Dispense Refill    diphenhydrAMINE (BENADRYL) 25 MG tablet Take 1 tablet by mouth every 6 hours as needed for Itching 30 tablet 1    insulin glargine (LANTUS) 100 UNIT/ML injection vial Inject 10 Units into the skin nightly 1 vial 3    melatonin 3 MG TABS tablet Take 1 tablet by mouth nightly as needed (sleep) 30 tablet 3    metoprolol succinate (TOPROL XL) 100 MG extended release tablet Take 1 tablet by mouth daily 30 tablet 3    polyethylene glycol (GLYCOLAX) 17 g packet Take 17 g by mouth daily as needed for Constipation 527 g 1    triamcinolone (KENALOG) 0.1 % cream Apply topically 2 times daily. 100 g 5    torsemide (DEMADEX) 20 MG tablet Take 1 tablet by mouth daily 30 tablet 3    Insulin Pen Needle 32G X 4 MM MISC 1 each by Does not apply route daily 100 each 3    Cholecalciferol (VITAMIN D) 50 MCG (2000 UT) CAPS capsule Take 1 capsule by mouth once daily      blood glucose monitor kit and supplies Dispense sufficient amount for indicated testing frequency plus additional to accommodate PRN testing needs. Dispense all needed supplies to include: monitor, strips, lancing device, lancets, control solutions, alcohol swabs.  1 kit 0    latanoprost (XALATAN) 0.005 % ophthalmic solution Place 1 drop into the right eye nightly      pantoprazole (PROTONIX) 40 MG tablet Take 40 mg by mouth daily      warfarin (COUMADIN) 5 MG tablet Take 2.5 mg by mouth every evening Except 5 mg on 2 diabetes mellitus with hyperglycemia ([facility]) E11.65    Acute on chronic combined systolic and diastolic congestive heart failure ([facility]) I50.43    Acute congestive heart failure ([facility]) I50.9    Severe aortic stenosis I35.0    Paroxysmal atrial fibrillation ([facility]) I48.0    CHF (congestive heart failure), NYHA class I, acute on chronic, combined ([facility]) I50.43    Acute on chronic congestive heart failure (HCC) I50.9    Bilateral leg edema R60.0    Stage 4 chronic kidney disease ([facility]) N18.4       Measurements:        Wound 04/25/21 Buttocks Mid;Inner (Active)   Wound Image   04/27/21 1230   Wound Etiology Skin Tear 04/27/21 1230   Wound Cleansed Wound cleanser 04/26/21 2019   Dressing/Treatment Zinc paste 04/26/21 2019   Wound Length (cm) 7 cm 04/27/21 1230   Wound Width (cm) 3 cm 04/27/21 1230   Wound Depth (cm) 0.2 cm 04/27/21 1230   Wound Surface Area (cm^2) 21 cm^2 04/27/21 1230   Change in Wound Size % (l*w) 4.55 04/27/21 1230   Wound Volume (cm^3) 4.2 cm^3 04/27/21 1230   Wound Assessment Pink/red;Slough 04/27/21 1230   Drainage Amount Scant 04/27/21 1230   Drainage Description Yellow 04/27/21 1230   Odor None 04/27/21 1230   Fay-wound Assessment Intact 04/27/21 1230   Margins Defined edges 04/27/21 1230   Wound Thickness Description not for Pressure Injury Full thickness 04/27/21 1230   Number of days: 1      Pt seen. Has wound to gluteal cleft, suspect skin tear with moisture as etiology. Per family, wound is getting better with zinc. Pt had pain to area at first, but now resolved. Response to treatment:  Well tolerated by patient.        Plan   Plan of Care: Wound 04/25/21 Buttocks Mid;Inner-Dressing/Treatment: Zinc paste  --zinc barrier to gluteal cleft  -turn and reposition every 2 hours  -elevate heels      Specialty Bed Required : Yes   [x] Low Air Loss   [] Pressure Redistribution  [] Fluid Immersion  [] Bariatric  [] Total Pressure Relief  [] Other:     Current Diet: DIET CARDIAC; Low Sodium (2 GM); Daily Fluid Restriction: 1500 ml  Dietary Nutrition Supplements: Frozen Oral Supplement  Dietary Nutrition Supplements: Diabetic Oral Supplement  Dietician consult:  Yes    Discharge Plan:  Placement for patient upon discharge: home with support    Patient appropriate for Outpatient [address]: No    Referrals:  [x]   [x] [facility]  [] Supplies  [] Other    Patient/Caregiver Teaching:  Level of patient/caregiver understanding able to:   [] Indicates understanding       [] Needs reinforcement  [] Unsuccessful      [x] Verbal Understanding  [] Demonstrated understanding       [] No evidence of learning  [] Refused teaching         [] N/A       Electronically signed by Leny Pemberton on 4/27/2021 at 1:14 PM

## 2021-04-28 LAB
ALBUMIN SERPL-MCNC: 2.6 G/DL (ref 3.4–5)
ANION GAP SERPL CALCULATED.3IONS-SCNC: 8 MMOL/L (ref 3–16)
BUN BLDV-MCNC: 35 MG/DL (ref 7–20)
CALCIUM SERPL-MCNC: 8.3 MG/DL (ref 8.3–10.6)
CHLORIDE BLD-SCNC: 90 MMOL/L (ref 99–110)
CO2: 37 MMOL/L (ref 21–32)
CREAT SERPL-MCNC: 3 MG/DL (ref 0.6–1.2)
GFR AFRICAN AMERICAN: 18
GFR NON-AFRICAN AMERICAN: 15
GLUCOSE BLD-MCNC: 125 MG/DL (ref 70–99)
GLUCOSE BLD-MCNC: 196 MG/DL (ref 70–99)
GLUCOSE BLD-MCNC: 223 MG/DL (ref 70–99)
GLUCOSE BLD-MCNC: 70 MG/DL (ref 70–99)
GLUCOSE BLD-MCNC: 90 MG/DL (ref 70–99)
INR BLD: 2.62 (ref 0.86–1.14)
PERFORMED ON: ABNORMAL
PERFORMED ON: NORMAL
PHOSPHORUS: 2.9 MG/DL (ref 2.5–4.9)
POTASSIUM SERPL-SCNC: 3.6 MMOL/L (ref 3.5–5.1)
PROTHROMBIN TIME: 30.7 SEC (ref 10–13.2)
SODIUM BLD-SCNC: 135 MMOL/L (ref 136–145)

## 2021-04-28 PROCEDURE — 2580000003 HC RX 258: Performed by: INTERNAL MEDICINE

## 2021-04-28 PROCEDURE — 6360000002 HC RX W HCPCS: Performed by: INTERNAL MEDICINE

## 2021-04-28 PROCEDURE — 99233 SBSQ HOSP IP/OBS HIGH 50: CPT | Performed by: INTERNAL MEDICINE

## 2021-04-28 PROCEDURE — 97535 SELF CARE MNGMENT TRAINING: CPT

## 2021-04-28 PROCEDURE — 36415 COLL VENOUS BLD VENIPUNCTURE: CPT

## 2021-04-28 PROCEDURE — 97530 THERAPEUTIC ACTIVITIES: CPT

## 2021-04-28 PROCEDURE — 6370000000 HC RX 637 (ALT 250 FOR IP): Performed by: INTERNAL MEDICINE

## 2021-04-28 PROCEDURE — 80069 RENAL FUNCTION PANEL: CPT

## 2021-04-28 PROCEDURE — 2060000000 HC ICU INTERMEDIATE R&B

## 2021-04-28 PROCEDURE — 85610 PROTHROMBIN TIME: CPT

## 2021-04-28 RX ADMIN — LATANOPROST 1 DROP: 50 SOLUTION OPHTHALMIC at 21:44

## 2021-04-28 RX ADMIN — TRIAMCINOLONE ACETONIDE: 1 CREAM TOPICAL at 21:27

## 2021-04-28 RX ADMIN — FUROSEMIDE 15 MG/HR: 10 INJECTION, SOLUTION INTRAMUSCULAR; INTRAVENOUS at 20:15

## 2021-04-28 RX ADMIN — INSULIN LISPRO 1 UNITS: 100 INJECTION, SOLUTION INTRAVENOUS; SUBCUTANEOUS at 18:00

## 2021-04-28 RX ADMIN — PANTOPRAZOLE SODIUM 40 MG: 40 TABLET, DELAYED RELEASE ORAL at 05:17

## 2021-04-28 RX ADMIN — GABAPENTIN 100 MG: 100 CAPSULE ORAL at 21:27

## 2021-04-28 RX ADMIN — OXYCODONE HYDROCHLORIDE AND ACETAMINOPHEN 500 MG: 500 TABLET ORAL at 09:45

## 2021-04-28 RX ADMIN — CETIRIZINE HYDROCHLORIDE 5 MG: 10 TABLET, FILM COATED ORAL at 09:45

## 2021-04-28 RX ADMIN — METOPROLOL SUCCINATE 100 MG: 50 TABLET, EXTENDED RELEASE ORAL at 09:45

## 2021-04-28 RX ADMIN — FUROSEMIDE 10 MG/HR: 10 INJECTION, SOLUTION INTRAMUSCULAR; INTRAVENOUS at 12:01

## 2021-04-28 RX ADMIN — INSULIN LISPRO 1 UNITS: 100 INJECTION, SOLUTION INTRAVENOUS; SUBCUTANEOUS at 21:27

## 2021-04-28 RX ADMIN — CEFEPIME HYDROCHLORIDE 1000 MG: 1 INJECTION, POWDER, FOR SOLUTION INTRAMUSCULAR; INTRAVENOUS at 09:45

## 2021-04-28 RX ADMIN — TRIAMCINOLONE ACETONIDE: 1 CREAM TOPICAL at 09:45

## 2021-04-28 RX ADMIN — CEFEPIME HYDROCHLORIDE 1000 MG: 1 INJECTION, POWDER, FOR SOLUTION INTRAMUSCULAR; INTRAVENOUS at 21:27

## 2021-04-28 RX ADMIN — BISACODYL 5 MG: 5 TABLET, COATED ORAL at 09:45

## 2021-04-28 RX ADMIN — FUROSEMIDE 10 MG/HR: 10 INJECTION, SOLUTION INTRAMUSCULAR; INTRAVENOUS at 00:29

## 2021-04-28 ASSESSMENT — PAIN SCALES - GENERAL
PAINLEVEL_OUTOF10: 0

## 2021-04-28 NOTE — PLAN OF CARE
Pt is a high fall risk. Socks tied to foot of bed. Fall risk bracelet applied and fall dome light on outside pt's room. Bed kept in locked, low position with side rails up X 3 and an active bed alarm. Pt is on 1 L NC and her VS have been stable. Pt is on strict I/O. She is on a lasix drip @ 10 ml/hr and has a weeks in place. Pt VU that she need to report any intake. She is on a fluid restriction and is compliant. Fluid restriction sign placed outside her door. Pt repositioned Q2H while awake. She is on a low air loss specialty bed. B heels elevated using pillows. Pt with a poor PO intake-see flow sheets.

## 2021-04-28 NOTE — CARE COORDINATION
Spoke with RN and Charge RN patient's plan is to transfer to [facility].   Called [phone])   Initiated referral. Provided RN Kyaw Betancourt ([phone]).     EZ Reynoso LSW, Social Work/Case Management   [phone]  Electronically signed by EZ Reynoso LSW on 4/28/2021 at 4:28 PM

## 2021-04-28 NOTE — PROGRESS NOTES
NEPHROLOGY PROGRESS NOTE    Patient: Ursula Thomas MRN: [id number]     YOB: 1934  Age: 80 y.o. Sex: female    Unit: [address] Room/Bed: R5U-1904/5273-01 Location: [address] [place] [address]     Admitting Physician: Niecy Jo    Primary Care Physician: Galen Cope MD          LOS: 3 days       Reason for evaluation:   DAVION on CKD4      SUBJECTIVE:      The patient was seen and examined. Notes and labs reviewed. Asked to see in consultation by Dr Sunitha Puente for the management of CKD. This is an 81yo AAF with extensive medical h/o including DM2, HTN, AS s/p valvulopasty, sys/greene HF, who is followed by Dr Tiana Flores for [facility] with a baseline SCr of 1.5-2.0 with a recent DAVION earlier this month with a peak SCr of 2.6. She later underwent a CTA of the chest/abdomen and pelvis on 4/16/21 with IV contrast (as a pre-op evaluation for TAVR). Her Scr was 1.5 on 4/16/21 and is up to 4.2 today with a BUN of 42. She presented with increased LE edema, SOB, poor appetite and 20Lbs weight gain. Her BNP was 68,699! Marko Marin CXR showed vascular congestion/edema. She is bed bound and has a lift at home where the family helps her to get in/out the bed. For the PMHx/SHx/FHx, please refer to recent c/s note by Dr Prince Cunningham on 3/27/21. Patient's review of systems  she denies any CP SOB  On Lasix gtt at 10 mg/hr  UOP 1550/24 Wt 264 lbs today    Family is at bedside.       OBJECTIVE:     Vitals:    04/28/21 0436 04/28/21 0451 04/28/21 0801 04/28/21 1205   BP: 116/83  120/70 118/79   Pulse: 84  86 91   Resp: 18  14 14   Temp: 97.5 °F (36.4 °C)  97.7 °F (36.5 °C) 97.9 °F (36.6 °C)   TempSrc: Oral  Oral Oral   SpO2: 98%  98% 97%   Weight:  264 lb 5.3 oz (119.9 kg)     Height:           Intake and Output:      Intake/Output Summary (Last 24 hours) at 4/28/2021 1250  Last data filed at 4/28/2021 1201  Gross per 24 hour   Intake 476.3 ml   Output 1700 ml   Net -1223.7 ml       Continuous Infusions:   sodium Wt increased today ? ?    - Modest diuresis in last 24 hours Will increase Lasix gtt to 15 mg/hr    3. Anemia of CKD: Hb is in acceptable range. Monitor      4. Deconditioning: patient is mostly bed-bound now.     5 Severe AS    6 Hyperdense nodule R kidney, likely hemorrhagic or proteinaceous cyst       Signed By: Nahed Blood MD

## 2021-04-28 NOTE — PROGRESS NOTES
I was in room with Dr Deedee Hoang while he discussed at length 3 options (see Dr Marcelle Martinez note). 2 dtrs were present along with pt who is alert and oriented. I was called back to room to explain these options again to more family members. They asked appropriate questions and were understanding these options and what goes along with them. I was then called back to room a third time to be informed they have opted to transfer to [facility] (option 3 that Dr Deedee Hoang gave them). I informed pt's bedside RN Kyaw Betancourt of this as well as called [facility] hospital RN Ishmael Galindo who will let Dr Deedee Hoang know. Kyaw Betancourt will notify Dr Nikos Andres. Pt and family appreciative of time spent.

## 2021-04-28 NOTE — PROGRESS NOTES
Juan Altamirano is a 80 y.o. female patient.     Current Facility-Administered Medications   Medication Dose Route Frequency Provider Last Rate Last Admin    cefepime (MAXIPIME) 1000 mg IVPB minibag  1,000 mg Intravenous Q12H Margaretmary Hodgkin, MD   Stopped at 04/27/21 2238    perflutren lipid microspheres (DEFINITY) injection 1.65 mg  1.5 mL Intravenous ONCE PRN Margaretmary Hodgkin, MD        Vitamin D (CHOLECALCIFEROL) tablet 2,000 Units  2,000 Units Oral Once Margaretmary Hodgkin, MD        diphenhydrAMINE (BENADRYL) tablet 25 mg  25 mg Oral Q6H PRN Margaretmary Hodgkin, MD        gabapentin (NEURONTIN) capsule 100 mg  100 mg Oral Nightly Margaretmary Hodgkin, MD   100 mg at 04/27/21 2047    latanoprost (XALATAN) 0.005 % ophthalmic solution 1 drop  1 drop Right Eye Nightly Margaretmary Hodgkin, MD   1 drop at 04/27/21 2059    melatonin tablet 3 mg  3 mg Oral Nightly PRN Margaretmary Hodgkin, MD   3 mg at 04/27/21 2047    metoprolol succinate (TOPROL XL) extended release tablet 100 mg  100 mg Oral Daily Margaretmary Hodgkin, MD   100 mg at 04/27/21 6352    pantoprazole (PROTONIX) tablet 40 mg  40 mg Oral QAM AC Margaretmary Hodgkin, MD   40 mg at 04/28/21 0517    triamcinolone (KENALOG) 0.1 % cream   Topical BID Margaretmary Hodgkin, MD   Given at 04/27/21 2027    ascorbic acid (VITAMIN C) tablet 500 mg  500 mg Oral Daily Margaretmary Hodgkin, MD   500 mg at 04/27/21 1705    sodium chloride flush 0.9 % injection 5-40 mL  5-40 mL Intravenous 2 times per day Margaretmary Hodgkin, MD   10 mL at 04/27/21 2026    sodium chloride flush 0.9 % injection 5-40 mL  5-40 mL Intravenous PRN Margaretmary Hodgkin, MD        0.9 % sodium chloride infusion  25 mL Intravenous PRN Margaretmary Hodgkin, MD        promethazine (PHENERGAN) tablet 12.5 mg  12.5 mg Oral Q6H PRN Margaretmary Hodgkin, MD        Or    ondansetron [facility]) injection 4 mg  4 mg Intravenous Q6H PRN Margaretmary Hodgkin, MD        acetaminophen (TYLENOL) tablet 650 mg  650 mg Oral Q6H PRN Fidel Chand MD        Or    acetaminophen (TYLENOL) suppository 650 mg  650 mg Rectal Q6H PRN Fidel Chand MD        insulin lispro (HUMALOG) injection vial 0-6 Units  0-6 Units Subcutaneous TID WC Fidel Chand MD   1 Units at 04/27/21 1746    insulin lispro (HUMALOG) injection vial 0-3 Units  0-3 Units Subcutaneous Nightly Fidel Chand MD   1 Units at 04/27/21 2046    polyethylene glycol (GLYCOLAX) packet 17 g  17 g Oral Daily PRN Fidel Chand MD        cetirizine (ZYRTEC) tablet 5 mg  5 mg Oral Daily Fidel Chand MD   5 mg at 04/27/21 5908    glucose (GLUTOSE) 40 % oral gel 15 g  15 g Oral PRN Fidel Chand MD        dextrose 50 % IV solution  12.5 g Intravenous PRN Fidel Chand MD        glucagon (rDNA) injection 1 mg  1 mg Intramuscular PRN Fidel Chand MD        dextrose 5 % solution  100 mL/hr Intravenous PRN Fidel Chand MD        furosemide (LASIX) 100 mg in dextrose 5 % 100 mL infusion  10 mg/hr Intravenous Continuous Colonel Osito MD 10 mL/hr at 04/28/21 0029 10 mg/hr at 04/28/21 0029     Allergies   Allergen Reactions    Naproxen Itching    Bactrim [Sulfamethoxazole-Trimethoprim]     Levofloxacin     Pcn [Penicillins]     Sulfa Antibiotics      Active Problems:    CHF (congestive heart failure), NYHA class I, acute on chronic, combined ([facility]    Stage 4 chronic kidney disease ([facility]    Severe aortic valve stenosis    Acute renal insufficiency  Resolved Problems:    * No resolved hospital problems. *    Blood pressure 120/70, pulse 86, temperature 97.7 °F (36.5 °C), temperature source Oral, resp. rate 14, height 5' 6\" (1.676 m), weight 264 lb 5.3 oz (119.9 kg), SpO2 98 %, not currently breastfeeding. SubjectiveFeels better  Objective A&O, VSS, CTA, IIR&R, nl. Abd. Trace edema. INR therapeutic, Cr.  Down to 3, A fib with controlled rate,  Assessment & Plan Being eval. For TAVR as Aortic valve narrowing again. D/W patient.     Lesia Buckner MD  4/28/2021

## 2021-04-28 NOTE — CARE COORDINATION
Per chart review, patient was active with [facility] and would like to resume at discharge. Patient currently on 1 liter of oxygen. Palliative care referral was made. SW to follow up with patient and family to discuss options. NEED: Resumption home care orders, palliative care referral, TALAT to be completed, and verify oxygen needs at discharge. SW to follow and assist as needed.    EZ Pablo, JERED, Social Work/Case Management   [phone]  Electronically signed by EZ Pablo, JERED on 4/28/2021 at 9:29 AM

## 2021-04-28 NOTE — PROGRESS NOTES
[facility]  Cardiology Consult    Cari Horne  1934 April 28, 2021      Reason for Referral: Severe aortic valve stenosis    CC: Increasing edema and shortness of breath. Subjective:     History of Present Illness:    Cari Horne is a 80 y.o. patient with a PMH significant for chronic kidney disease, severe aortic valve stenosis status post balloon aortic valvoplasty, atrial fibrillation, congestive heart failure presented with complaints of increasing edema and shortness of breath. Patient had done well after valvuloplasty but then got readmitted recently. She was supposed to get rehab but never completely got complete rehab done. She has remained weak. Is not been able to walk since being discharged from the hospital last time. She has been bedridden. She she has continued to get short of breath with lower extremity edema. Past Medical History:   has a past medical history of Anemia, Aortic stenosis, Atrial fibrillation ([address] Chronic kidney disease, Combined systolic and diastolic congestive heart failure ([address] DVT (deep venous thrombosis) ([address] Hyperlipidemia, Hypertension, Osteoarthritis, Pulmonary embolism ([address] Sarcoidosis, Thyroid disease, and Type II or unspecified type diabetes mellitus without mention of complication, not stated as uncontrolled. Surgical History:   has a past surgical history that includes Hysterectomy; knee surgery; joint replacement (Bilateral); joint replacement (Bilateral); Intracapsular cataract extraction (Left, 10/16/2020); and other surgical history (02/15/2021). Social History:   reports that she has never smoked. She has never used smokeless tobacco. She reports that she does not drink alcohol or use drugs. Family History:  family history includes Cancer in her brother; Heart Failure in her mother.     Home Medications:  Were reviewed and are listed in nursing record and/or below  Prior to Admission medications Medication Sig Start Date End Date Taking? Authorizing Provider   diphenhydrAMINE (BENADRYL) 25 MG tablet Take 1 tablet by mouth every 6 hours as needed for Itching 4/5/21 5/5/21  Fish Boyce MD   insulin glargine (LANTUS) 100 UNIT/ML injection vial Inject 10 Units into the skin nightly 4/5/21   Fish Boyce MD   melatonin 3 MG TABS tablet Take 1 tablet by mouth nightly as needed (sleep) 4/5/21   Fish Boyce MD   metoprolol succinate (TOPROL XL) 100 MG extended release tablet Take 1 tablet by mouth daily 4/5/21   Fish Boyce MD   polyethylene glycol (GLYCOLAX) 17 g packet Take 17 g by mouth daily as needed for Constipation 4/5/21 5/5/21  Fish Boyce MD   triamcinolone (KENALOG) 0.1 % cream Apply topically 2 times daily. 4/5/21   Fish Boyce MD   torsemide (DEMADEX) 20 MG tablet Take 1 tablet by mouth daily 4/5/21   Fish Boyce MD   Insulin Pen Needle 32G X 4 MM MISC 1 each by Does not apply route daily 4/5/21   Fish Boyce MD   Cholecalciferol (VITAMIN D) 50 MCG (2000 UT) CAPS capsule Take 1 capsule by mouth once daily    Historical Provider, MD   blood glucose monitor kit and supplies Dispense sufficient amount for indicated testing frequency plus additional to accommodate PRN testing needs. Dispense all needed supplies to include: monitor, strips, lancing device, lancets, control solutions, alcohol swabs.  2/13/21   Fish Boyce MD   latanoprost (XALATAN) 0.005 % ophthalmic solution Place 1 drop into the right eye nightly 7/7/20   Historical Provider, MD   pantoprazole (PROTONIX) 40 MG tablet Take 40 mg by mouth daily 8/23/20   Historical Provider, MD   warfarin (COUMADIN) 5 MG tablet Take 2.5 mg by mouth every evening Except 5 mg on Tuesdays or as directed by the [facility] [phone]  Goal INR = 2- 3    Historical Provider, MD   cetirizine (ZYRTEC) 10 MG tablet Take 10 mg by mouth daily    Historical Provider, MD   vitamin C (ASCORBIC of Systems: SEE HPI   · Constitutional: no unanticipated weight loss. There's been no change in energy level, sleep pattern, or activity level. No fevers, chills. · Eyes: No visual changes or diplopia. No scleral icterus. · ENT: No Headaches, hearing loss or vertigo. No mouth sores or sore throat. · Cardiovascular: No Chest pain, tightness or discomfort.   Shortness of breath. No Dyspnea on exertion, Orthopnea, Paroxysmal nocturnal dyspnea or breathlessness at rest.   No Palpitations.  No Syncope ('blackouts', 'faints', 'collapse') or dizziness. · Respiratory: No cough or wheezing, no sputum production. No hematemesis. · Gastrointestinal: No abdominal pain, appetite loss, blood in stools. No change in bowel or bladder habits. · Genitourinary: No dysuria, trouble voiding, or hematuria. · Musculoskeletal:  No gait disturbance, no joint complaints. · Integumentary: No rash or pruritis. · Neurological: No headache, diplopia, change in muscle strength, numbness or tingling. · Psychiatric: No anxiety or depression. · Endocrine: No temperature intolerance. No excessive thirst, fluid intake, or urination. No tremor. · Hematologic/Lymphatic: No abnormal bruising or bleeding, blood clots or swollen lymph nodes. · Allergic/Immunologic: No nasal congestion or hives. Objective:     PHYSICAL EXAM:      Vitals:    04/28/21 1205   BP: 118/79   Pulse: 91   Resp: 14   Temp: 97.9 °F (36.6 °C)   SpO2: 97%    Weight: 264 lb 5.3 oz (119.9 kg)       General Appearance:  Alert, cooperative, no distress, appears stated age. Head:  Normocephalic, without obvious abnormality, atraumatic. Eyes:  Pupils equal and round. No scleral icterus. Mouth: Moist mucosa, no pharyngeal erythema. Nose: Nares normal. No drainage or sinus tenderness. Neck: Supple, symmetrical, trachea midline. No adenopathy. No tenderness/mass/nodules. No carotid bruit or elevated JVD.    Lungs:   Respiratory Effort: Normal Auscultation: Clear to auscultation bilaterally, respirations unlabored. No wheeze, rales   Chest Wall:  No tenderness or deformity. Cardiovascular:    Pulses  Palpation: normal   Ascultation: Regular rate, S1/ soft S2  3/6 systolic murmur, rub, or gallop. 2+ radial and pedal pulses, symmetric  Carotid  Femoral   Abdomen and Gastrointestinal:   Soft, non-tender, bowel sounds active. Liver and Spleen  Masses   Musculoskeletal: No muscle wasting  Back  Gait   Extremities: Leg edema       Skin: Inspection and palpation performed, no rashes or lesions. Neurologic: Normal gross motor and sensory exam.       Labs     All labs have been reviewed    Lab Results   Component Value Date    WBC 4.3 04/26/2021    RBC 3.30 04/26/2021    HGB 9.9 04/26/2021    HCT 30.6 04/26/2021    MCV 92.6 04/26/2021    RDW 20.4 04/26/2021     04/26/2021     Lab Results   Component Value Date     04/28/2021    K 3.6 04/28/2021    K 5.0 04/25/2021    CL 90 04/28/2021    CO2 37 04/28/2021    BUN 35 04/28/2021    CREATININE 3.0 04/28/2021    GFRAA 18 04/28/2021    GFRAA 51 12/20/2012    AGRATIO 0.9 04/25/2021    LABGLOM 15 04/28/2021    GLUCOSE 70 04/28/2021    PROT 6.6 04/25/2021    PROT 7.5 12/20/2012    CALCIUM 8.3 04/28/2021    BILITOT 1.5 04/25/2021    ALKPHOS 81 04/25/2021    AST 21 04/25/2021    ALT 27 04/25/2021     No results found for: PTINR  Lab Results   Component Value Date    LABA1C 7.5 02/12/2021     Lab Results   Component Value Date    TROPONINI 0.02 (H) 04/25/2021       Cardiac, Vascular and Imaging Data all Personally Reviewed in Detail by Myself      EKG:     Echocardiogram: Patient appears to be in atrial fibrillation. Ejection fraction is visually estimated to be 25%. There is severe diffuse hypokinesis. The aortic valve is thickened/calcified with decreased leaflet mobility   consistent with aortic stenosis.    Severe aortic stenosis with a peak velocity of 3.9 m/s and a mean pressure   gradient

## 2021-04-28 NOTE — PROGRESS NOTES
Occupational Therapy  Facility/Department: [address] PROGRESSIVE CARE  Daily Treatment Note  NAME: Renna Osgood  : 1934  MRN: [id number]    Date of Service: 2021    Discharge Recommendations: Renna Osgood scored a 12/ on the AM-PAC ADL Inpatient form. Current research shows that an AM-PAC score of 18 or greater is typically associated with a discharge to the patient's home setting. Based on the patient's AM-PAC score, and their current ADL deficits, it is recommended that the patient have 2-3 sessions per week of Occupational Therapy at d/c to increase the patient's independence. At this time, this patient demonstrates the endurance and safety to discharge home with home OT and a follow up treatment frequency of 2-3x/wk. Please see assessment section for further patient specific details. Despite low AMPAC score, Pt's family is able to provide signifigant care that was available at d/c and therefore recommending pt d/c home with home OT . Pt's ADLs and transfers assisted heavily by children. If patient discharges prior to next session this note will serve as a discharge summary. Please see below for the latest assessment towards goals. Continue to assess pending progress, 2-3 sessions per week, 24 hour supervision or assist  OT Equipment Recommendations  Equipment Needed: Yes  Other: possible maxi move, gait belt    Assessment   Performance deficits / Impairments: Decreased functional mobility ; Decreased strength;Decreased endurance;Decreased ADL status; Decreased balance;Decreased high-level IADLs  Assessment: Pt continues to demonstrate poor tolerance due to decreased strength, edurance and poor PLOF. Pt continues to require Max A x2  for bed mobility and sit to stand. Pt able to tolerate approx. 20 seconds of static stance to place and remove steady seat. Pt family reporting unsure what they want for the d/c plan. Continue with POC.   Assistance / Modification: nicolette  OT Education: OT Role;Plan of Care;Transfer Training;Family Education  Patient Education: pt and family verbalized understanding  REQUIRES OT FOLLOW UP: Yes  Activity Tolerance  Activity Tolerance: Patient limited by fatigue  Safety Devices  Safety Devices in place: Yes  Type of devices: Left in chair;Chair alarm in place;Call light within reach;Nurse notified;Gait belt         Patient Diagnosis(es): The primary encounter diagnosis was Acute renal insufficiency. Diagnoses of Acute on chronic combined systolic and diastolic congestive heart failure (HCC) and Bilateral leg edema were also pertinent to this visit. has a past medical history of Anemia, Aortic stenosis, Atrial fibrillation (HCC), Chronic kidney disease, Combined systolic and diastolic congestive heart failure ([address] DVT (deep venous thrombosis) ([facility] Hyperlipidemia, Hypertension, Osteoarthritis, Pulmonary embolism ([facility] Sarcoidosis, Thyroid disease, and Type II or unspecified type diabetes mellitus without mention of complication, not stated as uncontrolled. has a past surgical history that includes Hysterectomy; knee surgery; joint replacement (Bilateral); joint replacement (Bilateral); Intracapsular cataract extraction (Left, 10/16/2020); and other surgical history (02/15/2021). Restrictions  Restrictions/Precautions  Restrictions/Precautions: Fall Risk  Position Activity Restriction  Other position/activity restrictions: Nonamb pta-over past ~1 month/March 2020? Subjective   General  Chart Reviewed: Yes  Patient assessed for rehabilitation services?: Yes  Additional Pertinent Hx: per ED note, Nathaly Landon is a 80 y.o. female   Comes in with swelling in her legs. Patient has a history of diabetes aortic stenosis atrial fibrillation and combined systolic and diastolic congestive heart failure. She was recently admitted within the past 30 days for an exacerbation of her congestive heart failure.   As far as I can tell they optimized her as best they could in the hospital.  Patient and the family really did not want an extended care facility so she was transported home. According to the family the patient went home on in [facility] and her belly has not been able to walk ever since. They do have a lift device that they have used to get her out of bed periodically. The patient otherwise is unable to walk on her own. She has been for the most part bedridden. There are communications between the heart failure staff and the patient's family. They have had difficulty getting her transportation arranged for further testing. They were attempting to look into the ability of doing a TAVR. She is on Coumadin. Last INR was in the 3.5 range. Basically today upon turning the patient they noted that her legs were seeping fluid. She has gained about 20 pounds since she went home. She now presents to the emergency department for evaluation and reassessment. She is not having any fever chills nausea vomiting and she is not any more short of breath than she always is. She is on home oxygen. Nursing Noteswere all reviewed and agreed with or any disagreements were addressed  in the HPI. Response to previous treatment: Patient with no complaints from previous session  Family / Caregiver Present: Yes(daughter x2 present at EOS.)  Referring Practitioner: Juan Johnson MD  Subjective  Subjective: Pt met bedside, agreeable to treat. General Comment  Comments: okay for therapy per RN.   Vital Signs  Patient Currently in Pain: Denies   Orientation  Orientation  Overall Orientation Status: Within Functional Limits  Objective    ADL  LE Dressing: Dependent/Total  Toileting: Dependent/Total(weeks.)        Balance  Sitting Balance: Stand by assistance  Standing Balance: Minimal assistance  Standing Balance  Time: ~20 seconds x2 trials  Activity: static standing in stedy  Functional Mobility  Functional - Mobility Device: Other  Activity: Other  Assist Level: Dependent/Total  Functional Mobility Comments: utulized brittney steady. Bed mobility  Supine to Sit: Maximum assistance;2 Person assistance  Sit to Supine: Unable to assess  Scootin Person assistance  Transfers  Sit to stand: Maximum assistance;2 Person assistance  Stand to sit: 2 Person assistance;Maximum assistance  Transfer Comments: to/from stedy; cues for initiation                       Cognition  Overall Cognitive Status: Exceptions  Arousal/Alertness: Appropriate responses to stimuli  Following Commands:  Follows all commands without difficulty  Attention Span: Appears intact  Memory: Decreased short term memory  Safety Judgement: Decreased awareness of need for assistance  Problem Solving: Assistance required to generate solutions  Insights: Decreased awareness of deficits  Initiation: Requires cues for some  Sequencing: Requires cues for some                                         Plan   Plan  Times per week: 3-5x  Current Treatment Recommendations: Strengthening, Endurance Training, Balance Training, Functional Mobility Training, Safety Education & Training, Self-Care / ADL, Patient/Caregiver Education & Training     -[facility] Inpatient Daily Activity Raw Score: 12 (21)  AM-PAC Inpatient ADL T-Scale Score : 30.6 (21)  ADL Inpatient CMS 0-100% Score: 66.57 (21)  ADL Inpatient CMS G-Code Modifier : CL (21)    Goals  Short term goals  Time Frame for Short term goals: prior to D/C; ongoing   Short term goal 1: complete functional transfers with Min A and use of stedy  Short term goal 2: complete bed mobility with Mod A  Short term goal 3: complete toileting with Mod A and use of stedy  Short term goal 4: tolerate B UE exercises x10 reps for increased strength and endurance with ADLs  Long term goals  Time Frame for Long term goals : STG=LTG  Patient Goals   Patient goals : return home       Therapy Time   Individual Concurrent Group Co-treatment   Time In 6106 Time Out 0930         Minutes [place] [address], [place]

## 2021-04-28 NOTE — PROGRESS NOTES
Physical Therapy  Facility/Department: [address] PROGRESSIVE CARE  Daily Treatment Note/Cotx with OT   NAME: Naga Purdy  : 1934  MRN: [id number]    Date of Service: 2021  Discharge Recommendations:  24 hour supervision or assist, Home with Home health PT   PT Equipment Recommendations  Other: Will monitor for potential equipt needs. Assessment   Body structures, Functions, Activity limitations: Decreased functional mobility ; Decreased strength;Decreased endurance;Decreased balance  Assessment: Pt continues to require max A x2 for bed mobility and transfers with use of Stedy. Pt's family in at end of session and verified they have a SaraStedy for home use. Did sncourage them to purchase a gait belt for home as well, to help with trasnfers. Family VU. Pt has assist from family members at home who can provide 24/7 assist which they were providing prior to hospitalization. Will recommend home PT at discharge to ensure safe transition to home. Will follow as tolerated. Naga Client scored a 9/ on the AM-PAC short mobility form. Current research shows that an AM-PAC score of 18 or greater is typically associated with a discharge to the patient's home setting. Based on the patient's AM-PAC score and their current functional mobility deficits, it is recommended that the patient have 2-3 sessions per week of Physical Therapy at d/c to increase the patient's independence. At this time, this patient demonstrates the endurance and safety to discharge home with 24 hr Assistance, which pt had prior to hospitalization, and HOME PT follow up. Pt utilizes lift equiipment in the home setting. Please see assessment section for further patient specific details. If patient discharges prior to next session this note will serve as a discharge summary. Please see below for the latest assessment towards goals.      Prognosis: Fair  PT Education: Goals;PT Role;General Safety;Transfer Training  Patient Education: Role of PT, POC, Need to call for assist, Safe use of Stedy. Barriers to Learning: None. REQUIRES PT FOLLOW UP: Yes  Activity Tolerance  Activity Tolerance: Patient limited by endurance     Patient Diagnosis(es): The primary encounter diagnosis was Acute renal insufficiency. Diagnoses of Acute on chronic combined systolic and diastolic congestive heart failure (HCC) and Bilateral leg edema were also pertinent to this visit. has a past medical history of Anemia, Aortic stenosis, Atrial fibrillation (HCC), Chronic kidney disease, Combined systolic and diastolic congestive heart failure ([address] DVT (deep venous thrombosis) ([address] Hyperlipidemia, Hypertension, Osteoarthritis, Pulmonary embolism ([address] Sarcoidosis, Thyroid disease, and Type II or unspecified type diabetes mellitus without mention of complication, not stated as uncontrolled. has a past surgical history that includes Hysterectomy; knee surgery; joint replacement (Bilateral); joint replacement (Bilateral); Intracapsular cataract extraction (Left, 10/16/2020); and other surgical history (02/15/2021). Restrictions  Restrictions/Precautions  Restrictions/Precautions: Fall Risk  Position Activity Restriction  Other position/activity restrictions: Nonamb pta-over past ~1 month/March 2020? Subjective   General  Chart Reviewed: Yes  Additional Pertinent Hx: 79 y/o female admit 4/25/2021 with Acute Renal Insufficiency, CHF, B LE Edema. PMH as noted including CKD, CHF, Aortic Stenosis, A-Fib, PE, DVT, B THR, B TKR. Family / Caregiver Present: Yes(2 daughters in at end of session.)  Referring Practitioner: Dr. Chaparro Odonnell: Pt in supine and agreed to getting up with therapy assist to eat breakfast.  Pt reported no pain in knees today.        Orientation  Orientation  Overall Orientation Status: Within Functional Limits(?forgetful at times.)     Objective   Bed mobility  Supine to Sit: Maximum assistance;2 Person assistance  Sit to Supine: Unable to assess(nt--pt up in recliner at end of session.)  Scootin Person assistance(cues to scoot LEs onto floor/SaraStedy surface.)  Transfers  Sit to Stand: Maximum Assistance;2 Person Assistance(from eob, to/from [place] lift equipment. Pt able to assist some with counting/momentum, however, still requires assist of 2 persons.)  Stand to sit: Maximum Assistance;2 Person Assistance  Bed to Chair: Dependent/Total(via Stedy bed to recliner,)  Ambulation  Ambulation?: No(unsafe to attempt, opted for [place] lift for bed to recliner. Family in at end of session, verified they have Stedy at home, with 2 assist for pt. Enc purchasing gait belt as well to assist with trasnfers.)     Balance  Comments: Sitting EOB with SBA. Standing balance/trasnfers with Max A x 2 with stedy. AM-PAC Score  -PAC Inpatient Mobility Raw Score : 9 (21)  -PAC Inpatient T-Scale Score : 30.55 (21)  Mobility Inpatient CMS 0-100% Score: 81.38 (2135)  Mobility Inpatient CMS G-Code Modifier : CM (21)     Goals  Short term goals  Time Frame for Short term goals: Upon d/c acute care setting.--goals ongoing  with limited progress  Short term goal 1: Bed Mob Mod assist.x 2  Short term goal 2: Transfers via Stedy Mod assist x 2. Short term goal 3: Pt regulo approp ROM/Strength Exs. Patient Goals   Patient goals : Return home with family. Plan    Plan  Times per week: 3-5x week while in acute care setting.   Current Treatment Recommendations: Strengthening, Functional Mobility Training, Transfer Training, Safety Education & Training, Patient/Caregiver Education & Training  Safety Devices  Type of devices: Call light within reach, Chair alarm in place, Left in chair, Nurse notified, All fall risk precautions in place, Patient at risk for falls, Gait belt   Therapy Time   Individual Concurrent Group Co-treatment   Time In 0850         Time Out 0930         Minutes [address] Regino Mcclellan PT Electronically signed by Regino Mcclellan PT on 4/28/2021 at 9:37 AM

## 2021-04-29 VITALS
TEMPERATURE: 98.2 F | HEART RATE: 61 BPM | SYSTOLIC BLOOD PRESSURE: 127 MMHG | BODY MASS INDEX: 42.98 KG/M2 | RESPIRATION RATE: 18 BRPM | DIASTOLIC BLOOD PRESSURE: 92 MMHG | OXYGEN SATURATION: 96 % | HEIGHT: 66 IN | WEIGHT: 267.42 LBS

## 2021-04-29 LAB
ALBUMIN SERPL-MCNC: 2.9 G/DL (ref 3.4–5)
ANION GAP SERPL CALCULATED.3IONS-SCNC: 7 MMOL/L (ref 3–16)
BUN BLDV-MCNC: 39 MG/DL (ref 7–20)
CALCIUM SERPL-MCNC: 8.2 MG/DL (ref 8.3–10.6)
CHLORIDE BLD-SCNC: 90 MMOL/L (ref 99–110)
CO2: 40 MMOL/L (ref 21–32)
CREAT SERPL-MCNC: 2.8 MG/DL (ref 0.6–1.2)
GFR AFRICAN AMERICAN: 19
GFR NON-AFRICAN AMERICAN: 16
GLUCOSE BLD-MCNC: 123 MG/DL (ref 70–99)
GLUCOSE BLD-MCNC: 123 MG/DL (ref 70–99)
GLUCOSE BLD-MCNC: 136 MG/DL (ref 70–99)
INR BLD: 1.88 (ref 0.86–1.14)
PERFORMED ON: ABNORMAL
PERFORMED ON: ABNORMAL
PHOSPHORUS: 2.5 MG/DL (ref 2.5–4.9)
POTASSIUM SERPL-SCNC: 2.9 MMOL/L (ref 3.5–5.1)
POTASSIUM SERPL-SCNC: 3.1 MMOL/L (ref 3.5–5.1)
PROTHROMBIN TIME: 21.9 SEC (ref 10–13.2)
SODIUM BLD-SCNC: 137 MMOL/L (ref 136–145)

## 2021-04-29 PROCEDURE — 6360000002 HC RX W HCPCS: Performed by: INTERNAL MEDICINE

## 2021-04-29 PROCEDURE — 80069 RENAL FUNCTION PANEL: CPT

## 2021-04-29 PROCEDURE — 36415 COLL VENOUS BLD VENIPUNCTURE: CPT

## 2021-04-29 PROCEDURE — 97530 THERAPEUTIC ACTIVITIES: CPT

## 2021-04-29 PROCEDURE — 84132 ASSAY OF SERUM POTASSIUM: CPT

## 2021-04-29 PROCEDURE — 6360000002 HC RX W HCPCS

## 2021-04-29 PROCEDURE — 2580000003 HC RX 258: Performed by: INTERNAL MEDICINE

## 2021-04-29 PROCEDURE — 94761 N-INVAS EAR/PLS OXIMETRY MLT: CPT

## 2021-04-29 PROCEDURE — 85610 PROTHROMBIN TIME: CPT

## 2021-04-29 PROCEDURE — 6370000000 HC RX 637 (ALT 250 FOR IP): Performed by: INTERNAL MEDICINE

## 2021-04-29 PROCEDURE — 2700000000 HC OXYGEN THERAPY PER DAY

## 2021-04-29 PROCEDURE — 97535 SELF CARE MNGMENT TRAINING: CPT

## 2021-04-29 RX ORDER — POTASSIUM CHLORIDE 7.45 MG/ML
10 INJECTION INTRAVENOUS
Status: DISCONTINUED | OUTPATIENT
Start: 2021-04-29 | End: 2021-04-29 | Stop reason: HOSPADM

## 2021-04-29 RX ORDER — CETIRIZINE HYDROCHLORIDE 5 MG/1
5 TABLET ORAL DAILY
Qty: 30 TABLET | Refills: 1 | Status: SHIPPED | OUTPATIENT
Start: 2021-04-30

## 2021-04-29 RX ORDER — FUROSEMIDE 40 MG/1
80 TABLET ORAL 2 TIMES DAILY
Qty: 60 TABLET | Refills: 3 | Status: SHIPPED | OUTPATIENT
Start: 2021-04-29 | End: 2021-07-29

## 2021-04-29 RX ORDER — POTASSIUM CHLORIDE 7.45 MG/ML
10 INJECTION INTRAVENOUS
Status: COMPLETED | OUTPATIENT
Start: 2021-04-29 | End: 2021-04-29

## 2021-04-29 RX ORDER — POTASSIUM CHLORIDE 7.45 MG/ML
20 INJECTION INTRAVENOUS ONCE
Status: DISCONTINUED | OUTPATIENT
Start: 2021-04-29 | End: 2021-04-29

## 2021-04-29 RX ORDER — CEFUROXIME AXETIL 500 MG/1
500 TABLET ORAL 2 TIMES DAILY
Qty: 20 TABLET | Refills: 0 | Status: SHIPPED | OUTPATIENT
Start: 2021-04-29 | End: 2021-05-09

## 2021-04-29 RX ADMIN — POTASSIUM CHLORIDE 10 MEQ: 7.46 INJECTION, SOLUTION INTRAVENOUS at 14:28

## 2021-04-29 RX ADMIN — CETIRIZINE HYDROCHLORIDE 5 MG: 10 TABLET, FILM COATED ORAL at 07:52

## 2021-04-29 RX ADMIN — POTASSIUM CHLORIDE 10 MEQ: 7.46 INJECTION, SOLUTION INTRAVENOUS at 09:46

## 2021-04-29 RX ADMIN — TRIAMCINOLONE ACETONIDE: 1 CREAM TOPICAL at 08:45

## 2021-04-29 RX ADMIN — Medication 10 ML: at 07:52

## 2021-04-29 RX ADMIN — OXYCODONE HYDROCHLORIDE AND ACETAMINOPHEN 500 MG: 500 TABLET ORAL at 07:51

## 2021-04-29 RX ADMIN — POTASSIUM CHLORIDE 10 MEQ: 7.46 INJECTION, SOLUTION INTRAVENOUS at 08:42

## 2021-04-29 RX ADMIN — PANTOPRAZOLE SODIUM 40 MG: 40 TABLET, DELAYED RELEASE ORAL at 06:24

## 2021-04-29 RX ADMIN — CEFEPIME HYDROCHLORIDE 1000 MG: 1 INJECTION, POWDER, FOR SOLUTION INTRAMUSCULAR; INTRAVENOUS at 07:52

## 2021-04-29 RX ADMIN — FUROSEMIDE 15 MG/HR: 10 INJECTION, SOLUTION INTRAMUSCULAR; INTRAVENOUS at 04:59

## 2021-04-29 RX ADMIN — FUROSEMIDE 15 MG/HR: 10 INJECTION, SOLUTION INTRAMUSCULAR; INTRAVENOUS at 12:39

## 2021-04-29 RX ADMIN — METOPROLOL SUCCINATE 100 MG: 50 TABLET, EXTENDED RELEASE ORAL at 07:52

## 2021-04-29 NOTE — PROGRESS NOTES
NEPHROLOGY PROGRESS NOTE    Patient: Gaby Dennison MRN: [id number]     YOB: 1934  Age: 80 y.o. Sex: female    Unit: [address] Room/Bed: U3S-0497/5273-01 Location: [address] [place] [address]     Admitting Physician: Babs Simmons    Primary Care Physician: Shannan Flores MD          LOS: 4 days       Reason for evaluation:   DAVION on CKD4      SUBJECTIVE:      The patient was seen and examined. Notes and labs reviewed. Asked to see in consultation by Dr Linda Parish for the management of CKD. This is an 81yo AAF with extensive medical h/o including DM2, HTN, AS s/p valvulopasty, sys/greene HF, who is followed by Dr Shannan Ty for [facility] with a baseline SCr of 1.5-2.0 with a recent DAVION earlier this month with a peak SCr of 2.6. She later underwent a CTA of the chest/abdomen and pelvis on 4/16/21 with IV contrast (as a pre-op evaluation for TAVR). Her Scr was 1.5 on 4/16/21 and is up to 4.2 today with a BUN of 42. She presented with increased LE edema, SOB, poor appetite and 20Lbs weight gain. Her BNP was 68,699! Lynsey Robert CXR showed vascular congestion/edema. She is bed bound and has a lift at home where the family helps her to get in/out the bed. For the PMHx/SHx/FHx, please refer to recent c/s note by Dr Hayde Prasad on 3/27/21. Patient's review of systems  she denies any SOB  On Lasix gtt at 15 mg/hr  UOP 1550/24   Family is at bedside.       OBJECTIVE:     Vitals:    04/29/21 0350 04/29/21 0616 04/29/21 0730 04/29/21 1100   BP: 97/67  104/71 (!) 127/92   Pulse: 67  83 61   Resp: 16  18 18   Temp: 97.9 °F (36.6 °C)  98.2 °F (36.8 °C)    TempSrc: Oral  Oral    SpO2: 98%  95% 96%   Weight:  267 lb 6.7 oz (121.3 kg)     Height:           Intake and Output:      Intake/Output Summary (Last 24 hours) at 4/29/2021 1218  Last data filed at 4/29/2021 0939  Gross per 24 hour   Intake 240 ml   Output 900 ml   Net -660 ml       Continuous Infusions:   sodium chloride      dextrose      furosemide (LASIX) 1mg/ml infusion 15 mg/hr (04/29/21 0459)       Exam:   CONSTITUTIONAL/PSYCHIATRY: awake, alert and oriented x3. Not in acute distress   EYES: Conjunctivae: normal.   RESPIRATORY: Respiratory effort: normal. Auscultation: ~CTA  CARDIOVASCULAR: Auscultation: irregular, 2/6m. . Edema: 2+  GASTROINTESTINAL: Soft, nontender, nondistended. Obese abdomen, +soft tissue edema. EXTREMITIES:  No cyanosis or clubbing. SKIN: Warm and dry. +chronic discoloration of legs bilaterally      LABS:   RFP:   Recent Labs     04/27/21  0521 04/28/21  0521 04/29/21  0538   * 135* 137   K 3.7 3.6 2.9*   CL 90* 90* 90*   CO2 36* 37* 40*   BUN 40* 35* 39*   CREATININE 3.5* 3.0* 2.8*   CALCIUM 8.6 8.3 8.2*   PHOS 3.2 2.9 2.5   GFRAA 15* 18* 19*       Liver panel:  No results for input(s): ALB, TP, AST, ALT in the last 72 hours. Invalid input(s): TBIL    Endocrine:   @IJIUHNCP41(VitD,PTH,TSH,aldosterone,renin activity,cortisol,metanephrine)@    CBC:   No results for input(s): WBC, HGB, HCT, MCV, PLT in the last 72 hours. Iron Panel:   @XULQEPAV99(FERA,FE)@    Serology: @DPNINWCJ78(ANAS,ANCA,C3,C4,RNP,AGBM,DNA,SSA,SSB,SPEP,UPEP,ESR,HEPT)@    Urine studies: @QONREKNT29(UAPR,UCOL,UNAR,UUNR,UCRR,UCLR,UKR,UUAR,UOSM,EOS)@        ASSESSMENT and PLAN:     1. DAVION on CKD4 (baseline SCr is 1.5-2.0; followed by me): DAVION is likely secondary to recent exposure to IV contrast --> OPHELIA +/- DAVION secondary to ADHF (CRS). Cr has decreased to 2.8 mg today    -continue  Lasix drip at 15 mg/hour    - She is not a great candidate for dialysis in view of her cardiac condition and poor functional status. Cr improving Monitor No indication to start RRT D/W pt and daughters at bedside     2. ADHF on chronic systolic/diastolic HF and AS: BNP 31,575! . She gained 20Lbs since last admission. Wt increased today ? ?    - Modest diuresis in last 24 hours Will continue Lasix gtt  15 mg/hr    3. Anemia of CKD: Hb is in acceptable range. Monitor    4. Deconditioning: patient is mostly bed-bound now.     5 Severe AS plans for transfer to [place] [address] for further cardiac evaluation for TAVR     6 Hyperdense nodule R kidney, likely hemorrhagic or proteinaceous cyst     7 Alkalosis Developing alakalosis in the setting of ongoing diuresis Will add Diamox if worsens      Signed By: Anna Holt MD

## 2021-04-29 NOTE — CARE COORDINATION
Discharge order acknowledged. Confirmed with RN that plan is for patient to transfer to [facility] this afternoon when bed is available.    EZ Rogers, MIKAW, Social Work/Case Management   [phone]  Electronically signed by EZ Rogers, JERED on 4/29/2021 at 10:53 AM

## 2021-04-29 NOTE — PLAN OF CARE
Problem: Falls - Risk of:  Goal: Will remain free from falls  Description: Will remain free from falls  4/29/2021 1401 by Ashley Weller RN  Outcome: Completed     Problem: Falls - Risk of:  Goal: Absence of physical injury  Description: Absence of physical injury  4/29/2021 1401 by Ashley Weller RN  Outcome: Completed     Problem: OXYGENATION/RESPIRATORY FUNCTION  Goal: Patient will maintain patent airway  4/29/2021 1401 by Ashley Weller RN  Outcome: Completed     Problem: OXYGENATION/RESPIRATORY FUNCTION  Goal: Patient will achieve/maintain normal respiratory rate/effort  Description: Respiratory rate and effort will be within normal limits for the patient  4/29/2021 1401 by Ashley Weller RN  Outcome: Completed     Problem: HEMODYNAMIC STATUS  Goal: Patient has stable vital signs and fluid balance  4/29/2021 1401 by Ashley Weller RN  Outcome: Completed     Problem: FLUID AND ELECTROLYTE IMBALANCE  Goal: Fluid and electrolyte balance are achieved/maintained  4/29/2021 1401 by Ashley Weller RN  Outcome: Completed     Problem: ACTIVITY INTOLERANCE/IMPAIRED MOBILITY  Goal: Mobility/activity is maintained at optimum level for patient  4/29/2021 1401 by Ashley Weller RN  Outcome: Completed     Problem: Skin Integrity:  Goal: Will show no infection signs and symptoms  Description: Will show no infection signs and symptoms  4/29/2021 1401 by Ashley Weller RN  Outcome: Completed     Problem: Skin Integrity:  Goal: Absence of new skin breakdown  Description: Absence of new skin breakdown  4/29/2021 1401 by Ashley Weller RN  Outcome: Completed     Problem: Nutrition  Goal: Optimal nutrition therapy  4/29/2021 1401 by Ashley Weller RN  Outcome: Completed

## 2021-04-29 NOTE — DISCHARGE SUMMARY
[address] [place] [address]                               DISCHARGE SUMMARY    PATIENT NAME: Luis Huff                    :        1934  MED REC NO:   [id number]                          ROOM:       5273  ACCOUNT NO:   [de-identified]                           ADMIT DATE: 2021  PROVIDER:     Raven Johnson MD                  DISCHARGE DATE:  2021    DATE OF ADMISSION:  2021    FINAL DIAGNOSES:  1. Acute-on-chronic systolic and diastolic congestive heart failure. 2.  Aortic stenosis. 3.  Acute-on-chronic kidney disease stage IV. 4.  Type 2 diabetes mellitus, controlled. 5.  Hypothyroidism. 6.  Debility, unspecified. HISTORY:  The patient was a chronically ill 63-year-old -American  recently  female. Patient had a long history of hypertension,  hypothyroidism, type 2 diabetes mellitus, ischemic cardiomyopathy and  aortic stenosis. The patient had undergone aortic valve valvuloplasty  at [facility] about two months ago. The patient's family brought her to the  emergency room this time because of increasing swelling. Of note, about  a week prior to this admission, the patient had intravenous contrast as  part of the evaluation for a TAVR. Emergency room evaluation revealed  increasing edema. Creatinine was elevated to 4.5. Her usual creatinine  was about 1.5 to 1.8. She had more edema than usual and urinalysis was  consistent with urinary tract infection. Consequently, admission was  felt necessary and the patient was admitted to PCU for appropriate care. Appropriate studies prior to discharge, creatinine had fallen to 2.8,  potassium was low at 2.9, calculated GFR was 19. Albumin was 2.9. Multiple glucoses related to diabetes control and noted on the chart. Initial INR was prolonged. INR prior to discharge was 1.8. Her  warfarin had been held.   Chest x-ray showed cardiomegaly and pulmonary  vascular congestion. Electrocardiogram showed chronic atrial  fibrillation. Troponin was slightly elevated to 0.2 probably due to  chronic kidney disease. HOSPITAL COURSE:  The patient's hospital course was characterized by  improvement. She required nasal oxygen. She was seen in Cardiology  consultation and Nephrology consultation. It was felt that most likely  her acute decline in kidney function was secondary to contrast received  during evaluation for TAVR. She was begun on intravenous Lasix and she  had good diuresis with reduction in her pulmonary vascular congestion as  well as edema. Her creatinine fell to 2.8 prior to this discharge. After much discussion with Cardiology and the family as well as can  heart failure nurse, the patient's family elected to have her  transferred to [facility] to get a second opinion concerning  placement of the TAVR. The patient and family were agreeable to this  and the patient's son works at the [facility].  [address] [place] was completed. Med rec was completed. I discontinued the patient's intravenous Lasix  and switched to oral Lasix and also discontinued her intravenous  cefepime and switched her to oral Ceftin as treatment for urinary tract  infection. She will continue on insulin for diabetes control. She will  be followed by the hospitalist, Cardiology and Nephrology Department at  the [facility].  Patient's allergies were known to be NAPROXEN,  BACTRIM, LEVAQUIN, PENICILLIN, and SULFA antibiotics. She is able to  take cephalosporins. The patient was discharged in improved condition.         Little Pan MD    D: 04/29/2021 11:01:52       T: 04/29/2021 11:11:23     [email or web address]  Job#: [id number]     Doc#: [id number]    CC:

## 2021-04-29 NOTE — PROGRESS NOTES
Physical Therapy  Facility/Department: [address] PROGRESSIVE CARE  Daily Treatment Note  NAME: Francisco Javier Melchor  : 1934  MRN: [id number]    Date of Service: 2021    Discharge Recommendations:  24 hour supervision or assist, Home with Home health PT        Assessment   Body structures, Functions, Activity limitations: Decreased functional mobility ; Decreased strength;Decreased endurance;Decreased balance  Assessment: Pt continues to require Max A x 2 for bed mobility, and to stand from elevated EOB to stedy. She is pleasant and agreeable to activity, but is essentially dependent for mobility tasks. Continue to recommend home PT upon D/C to assist with transition. Francisco Javier Melchor scored a  on the AM-PAC short mobility form. If patient discharges prior to next session this note will serve as a discharge summary. Please see below for the latest assessment towards goals. PT Education: Goals;PT Role;General Safety;Transfer Training  Activity Tolerance  Activity Tolerance: Patient limited by endurance     Patient Diagnosis(es): The primary encounter diagnosis was Acute renal insufficiency. Diagnoses of Acute on chronic combined systolic and diastolic congestive heart failure (HCC) and Bilateral leg edema were also pertinent to this visit. has a past medical history of Anemia, Aortic stenosis, Atrial fibrillation (HCC), Chronic kidney disease, Combined systolic and diastolic congestive heart failure ([address] DVT (deep venous thrombosis) ([address] Hyperlipidemia, Hypertension, Osteoarthritis, Pulmonary embolism ([address] Sarcoidosis, Thyroid disease, and Type II or unspecified type diabetes mellitus without mention of complication, not stated as uncontrolled. has a past surgical history that includes Hysterectomy; knee surgery; joint replacement (Bilateral); joint replacement (Bilateral);  Intracapsular cataract extraction (Left, 10/16/2020); and other surgical history (02/15/2021). Restrictions  Restrictions/Precautions  Restrictions/Precautions: Fall Risk  Position Activity Restriction  Other position/activity restrictions: Nonamb pta-over past ~1 month/March 2020? Subjective   General  Chart Reviewed: Yes  Subjective  Subjective: Pt agreeable to activity with encouragement. Orientation  Orientation  Overall Orientation Status: Within Functional Limits    Objective      Bed mobility  Supine to Sit: Maximum assistance;2 Person assistance(HOB elevated)     Transfers  Sit to Stand: Maximum Assistance;2 Person Assistance(Bed elevated)  Stand to sit: 2 Person Assistance;Maximum Assistance  Bed to Chair: Dependent/Total;2 Person Assistance(Using stedy)     Balance  Comments: Mod A x 1-2 to maintain static standing balance at stedy x 2 min. Other Activities: Other (see comment)  Comment: Pt positioned for comfort in bedside chair. *Session required increased time to complete d/t need for multiple line control, significant weakness requiring 2-person assist for all tasks. AM-PAC Score  AM-PAC Inpatient Mobility Raw Score : 9 (04/29/21 0900)  AM-PAC Inpatient T-Scale Score : 30.55 (04/29/21 0900)  Mobility Inpatient CMS 0-100% Score: 81.38 (04/29/21 0900)  Mobility Inpatient CMS G-Code Modifier : CM (04/29/21 0900)          Goals  Short term goals  Time Frame for Short term goals: Upon d/c acute care setting.--goals ongoing 4/28 with limited progress  Short term goal 1: Bed Mob Mod assist.x 2  Short term goal 2: Transfers via [employer] assist x 2. Short term goal 3: Pt regulo approp ROM/Strength Exs. Patient Goals   Patient goals : Return home with family. Plan    Plan  Times per week: 3-5x week while in acute care setting. Current Treatment Recommendations: Strengthening, Functional Mobility Training, Transfer Training, Safety Education & Training, Patient/Caregiver Education & Training  Safety Devices  Type of devices:  All fall risk precautions in place, Call light within reach, Chair alarm in place, Gait belt, Left in chair, Nurse notified  Restraints  Initially in place: No     Therapy Time   Individual Concurrent Group Co-treatment   Time In       [address]   Time Out       0903   Minutes       25   Timed Code Treatment Minutes: 25 Minutes       Dorinda Hammer, PT    Electronically signed by Dorinda Hammer, PT [id number] on 4/29/2021 at 9:06 AM

## 2021-04-29 NOTE — PROGRESS NOTES
Occupational Therapy  Facility/Department: [address] PROGRESSIVE CARE  Daily Treatment Note  NAME: Diana Lopez  : 1934  MRN: [id number]    Date of Service: 2021    Discharge Recommendations:  Continue to assess pending progress, 2-3 sessions per week, 24 hour supervision or assist  OT Equipment Recommendations  Equipment Needed: Yes  Other: possible maxi move, gait belt    Diana Lopez scored a  on the AM-PAC ADL Inpatient form. Current research shows that an AM-PAC score of 18 or greater is typically associated with a discharge to the patient's home setting. Based on the patient's AM-PAC score, and their current ADL deficits, it is recommended that the patient have 2-3 sessions per week of Occupational Therapy at d/c to increase the patient's independence. At this time, this patient demonstrates the endurance and safety to discharge home with [address] [place] [address] (home vs OP services) and a follow up treatment frequency of 2-3x/wk. Please see assessment section for further patient specific details. Despite low AMPAC score, Pt's family is able to provide signifigant care that was available at d/c and therefore recommending pt d/c home with home OT . Pt's ADLs and transfers assisted heavily by children. If patient discharges prior to next session this note will serve as a discharge summary. Please see below for the latest assessment towards goals. Assessment   Performance deficits / Impairments: Decreased functional mobility ; Decreased strength;Decreased endurance;Decreased ADL status; Decreased balance;Decreased high-level IADLs  Assessment: Pt continues to demonstrate poor tolerance and fatigue easily due to decreased strength, edurance and poor PLOF. Pt continues to require Max A x2  for bed mobility and sit to stand from elevated bed. Pt able to tolerate approx. 20-25 seconds x2 of static stance to place, remove steady seat, and RN to complete peribowel hygiene.  Pt requiring Total A socks and Min A feeding to cut up food d/t fatigue. Continue with POC. Per notes, family unsure what they want for d/c plan, however, have equipment and assist available if they decide d/c to return home  Assistance / Modification: nicolette  OT Education: OT Role;Plan of Care;Transfer Training  REQUIRES OT FOLLOW UP: Yes  Activity Tolerance  Activity Tolerance: Patient limited by fatigue  Safety Devices  Safety Devices in place: Yes  Type of devices: Left in chair;Chair alarm in place;Call light within reach;Nurse notified;Gait belt(lift pad under pt)       Patient Diagnosis(es): The primary encounter diagnosis was Acute renal insufficiency. Diagnoses of Acute on chronic combined systolic and diastolic congestive heart failure (HCC) and Bilateral leg edema were also pertinent to this visit. has a past medical history of Anemia, Aortic stenosis, Atrial fibrillation (HCC), Chronic kidney disease, Combined systolic and diastolic congestive heart failure ([address] DVT (deep venous thrombosis) ([address] Hyperlipidemia, Hypertension, Osteoarthritis, Pulmonary embolism ([address] Sarcoidosis, Thyroid disease, and Type II or unspecified type diabetes mellitus without mention of complication, not stated as uncontrolled. has a past surgical history that includes Hysterectomy; knee surgery; joint replacement (Bilateral); joint replacement (Bilateral); Intracapsular cataract extraction (Left, 10/16/2020); and other surgical history (02/15/2021). Restrictions  Restrictions/Precautions  Restrictions/Precautions: Fall Risk  Position Activity Restriction  Other position/activity restrictions: Nonamb pta-over past ~1 month/March 2020? Subjective   General  Chart Reviewed: Yes  Patient assessed for rehabilitation services?: Yes  Additional Pertinent Hx: per ED note, Carlos Harrison is a 80 y.o. female   Comes in with swelling in her legs.   Patient has a history of diabetes aortic stenosis atrial fibrillation and combined systolic and diastolic Minimal assistance(EOB in prep for transfer)  Standing Balance: Moderate assistance(Mod 1-2 in stedy)  Standing Balance  Time: ~20 seconds x2 trials  Activity: static standing in stedy, able to come to partial upright stand  initially  Comment: pt fatigues quickly  Functional Mobility  Functional - Mobility Device: Other  Activity: Other  Assist Level: Dependent/Total  Functional Mobility Comments: utulized brittney steady. Bed mobility  Supine to Sit: Maximum assistance;2 Person assistance(HOB elevated)  Sit to Supine: Unable to assess  Scootin Person assistance  Comment: Chair EOS  Transfers  Sit to stand: Maximum assistance;2 Person assistance  Stand to sit: 2 Person assistance;Maximum assistance  Transfer Comments: to/from stedy; cues for hand placement. Hand over hand assist for LUE on purple bar, proximal support for RUE                       Cognition  Overall Cognitive Status: Exceptions  Arousal/Alertness: Appropriate responses to stimuli  Following Commands:  Follows all commands without difficulty  Attention Span: Appears intact  Memory: Decreased short term memory  Safety Judgement: Decreased awareness of need for assistance  Problem Solving: Assistance required to generate solutions  Insights: Decreased awareness of deficits  Initiation: Requires cues for some  Sequencing: Requires cues for some                                         Plan   Plan  Times per week: 3-5x  Current Treatment Recommendations: Strengthening, Endurance Training, Balance Training, Functional Mobility Training, Safety Education & Training, Self-Care / ADL, Patient/Caregiver Education & Training    AM-PAC Score        AM-[facility] Inpatient Daily Activity Raw Score: 11 (21 09)  AM-PAC Inpatient ADL T-Scale Score : 29.04 (21 09)  ADL Inpatient CMS 0-100% Score: 70.42 (21 09)  ADL Inpatient CMS G-Code Modifier : CL (21 6492)    Goals  Short term goals  Time Frame for Short term goals: prior to D/C; ongoing 4/29  Short term goal 1: complete functional transfers with Min A and use of stedy  Short term goal 2: complete bed mobility with Mod A  Short term goal 3: complete toileting with Mod A and use of stedy  Short term goal 4: tolerate B UE exercises x10 reps for increased strength and endurance with ADLs  Long term goals  Time Frame for Long term goals : STG=LTG  Patient Goals   Patient goals : return home       Therapy Time   Individual Concurrent Group Co-treatment   Time In 0838         Time Out 0903         Minutes 25         Timed Code Treatment Minutes: [address] [place] [address], OTD, OTR/L

## 2021-04-29 NOTE — PROGRESS NOTES
care. D/W patient and family who requested transfer. D/W CHF nurse, also.     Gela Kent MD  4/29/2021

## 2021-04-29 NOTE — PROGRESS NOTES
Dr. Elena Mason MD on call office paged. Voicemail left regarding critical potassium level. Orders placed.

## 2021-05-11 ENCOUNTER — ANTI-COAG VISIT (OUTPATIENT)
Dept: PHARMACY | Age: 86
End: 2021-05-11

## 2021-05-11 ENCOUNTER — TELEPHONE (OUTPATIENT)
Dept: PHARMACY | Age: 86
End: 2021-05-11

## 2021-05-11 NOTE — TELEPHONE ENCOUNTER
Ms. Nu Garcia was recently hospitalized at [facility] and then transferred to [facility] for a TAVR. She was discharged 5/10/21. I spoke with her daughter Deforest Hashimoto. She was discharged to home. Her warfarin was changed to Eliquis. I advised I would discharge her from our services. I advised that should they switch her back to warfarin for whatever reason, she could return to our center and to please call me.      Roberto Cedeño, PharmD, [address] [place] [address]  Anticoagulation Service  [phone]

## 2021-05-27 ENCOUNTER — VIRTUAL VISIT (OUTPATIENT)
Dept: CARDIOLOGY CLINIC | Age: 86
End: 2021-05-27

## 2021-05-27 DIAGNOSIS — I50.42 CHRONIC COMBINED SYSTOLIC AND DIASTOLIC HEART FAILURE (HCC): Primary | ICD-10-CM

## 2021-05-27 DIAGNOSIS — Z95.2 S/P TAVR (TRANSCATHETER AORTIC VALVE REPLACEMENT): ICD-10-CM

## 2021-05-27 DIAGNOSIS — I35.0 SEVERE AORTIC STENOSIS: ICD-10-CM

## 2021-05-27 DIAGNOSIS — I48.0 PAROXYSMAL ATRIAL FIBRILLATION (HCC): ICD-10-CM

## 2021-05-27 RX ORDER — ASPIRIN 81 MG/1
81 TABLET ORAL DAILY
COMMUNITY
End: 2021-07-29

## 2021-05-27 RX ORDER — POTASSIUM CHLORIDE 1.5 G/1.77G
20 POWDER, FOR SOLUTION ORAL 2 TIMES DAILY
COMMUNITY
End: 2021-07-29

## 2021-05-27 RX ORDER — CARVEDILOL 12.5 MG/1
25 TABLET ORAL 2 TIMES DAILY WITH MEALS
COMMUNITY

## 2021-05-27 RX ORDER — TORSEMIDE 20 MG/1
20 TABLET ORAL DAILY
COMMUNITY

## 2021-05-27 RX ORDER — QUETIAPINE FUMARATE 25 MG/1
25 TABLET, FILM COATED ORAL NIGHTLY
COMMUNITY

## 2021-05-27 NOTE — PROGRESS NOTES
5/27/2021    TELEHEALTH EVALUATION -- Audio/Visual (During [id number] public health emergency)    HPI: Juanita Gasca 80 y.o. patient with a PMH significant for chronic kidney disease, combined systolic and diastolic heart failure. Hypertension, hyperlipidemia, atrial fibrillation and PE and DVT. She was admitted to [facility] on 2/7/2021 with increased shortness of breath. Today, she is being evaluated via virtual visit s/p TAVR. She has not been able to do much since she has been home. Her physical therapy and occupational therapy recently started. Patient denies exertional chest pain/pressure, dyspnea at rest, HERNANDEZ, PND, orthopnea, palpitations, lightheadedness, weight changes, changes in LE edema, and syncope. Patient reports compliance to her medications. Review of Systems    Prior to Visit Medications    Medication Sig Taking? Authorizing Provider   torsemide (DEMADEX) 20 MG tablet Take 20 mg by mouth daily 2 tabs in the AM, 1 tab in the evening. Yes Historical Provider, MD   carvedilol (COREG) 12.5 MG tablet Take 12.5 mg by mouth 2 times daily (with meals) Yes Historical Provider, MD   aspirin 81 MG EC tablet Take 81 mg by mouth daily Yes Historical Provider, MD   QUEtiapine (SEROQUEL) 25 MG tablet Take 25 mg by mouth daily Yes Historical Provider, MD   apixaban (ELIQUIS) 2.5 MG TABS tablet Take by mouth 2 times daily Yes Historical Provider, MD   potassium chloride (KLOR-CON) 20 MEQ packet Take 20 mEq by mouth 2 times daily Yes Historical Provider, MD   cetirizine (ZYRTEC) 5 MG tablet Take 1 tablet by mouth daily Yes Zaina Wheatley MD   insulin lispro (HUMALOG) 100 UNIT/ML injection vial Inject 0-3 Units into the skin nightly Yes Zaina Wheatley MD   melatonin 3 MG TABS tablet Take 1 tablet by mouth nightly as needed (sleep) Yes Zaina Wheatley MD   triamcinolone (KENALOG) 0.1 % cream Apply topically 2 times daily.  Yes Zaina Wheatley MD   latanoprost (XALATAN) 0.005 % ophthalmic solution Place 1 drop into the right eye nightly Yes Historical Provider, MD   pantoprazole (PROTONIX) 40 MG tablet Take 40 mg by mouth daily Yes Historical Provider, MD   furosemide (LASIX) 40 MG tablet Take 2 tablets by mouth 2 times daily  Patient not taking: Reported on 5/27/2021  Gela Kent MD   Cholecalciferol (VITAMIN D) 50 MCG (2000 UT) CAPS capsule Take 1 capsule by mouth once daily  Patient not taking: Reported on 5/27/2021  Historical Provider, MD   vitamin C (ASCORBIC ACID) 500 MG tablet Take 500 mg by mouth daily  Patient not taking: Reported on 5/27/2021  Historical Provider, MD   Calcium Carb-Cholecalciferol (CALTRATE 600+D) 600-800 MG-UNIT TABS Take 1 tablet by mouth daily   Patient not taking: Reported on 5/27/2021  Historical Provider, MD   gabapentin (NEURONTIN) 100 MG capsule Take 100 mg by mouth nightly. Patient not taking: Reported on 5/27/2021  Historical Provider, MD   glipiZIDE (GLUCOTROL) 5 MG tablet Take 5 mg by mouth 2 times daily (before meals).   Patient not taking: Reported on 5/27/2021  Historical Provider, MD       Social History     Tobacco Use    Smoking status: Never Smoker    Smokeless tobacco: Never Used    Tobacco comment: Never smoked   Vaping Use    Vaping Use: Never used   Substance Use Topics    Alcohol use: No    Drug use: No          PHYSICAL EXAMINATION:  [ INSTRUCTIONS:  \"[x]\" Indicates a positive item  \"[]\" Indicates a negative item  -- DELETE ALL ITEMS NOT EXAMINED]  Vital Signs: (As obtained by patient/caregiver or practitioner observation)    Blood pressure-  Heart rate-    Respiratory rate-    Temperature-  Pulse oximetry-     Constitutional: [x] Appears well-developed and well-nourished [] No apparent distress      [] Abnormal-   Mental status  [x] Alert and awake  [] Oriented to person/place/time []Able to follow commands      Eyes:  EOM    [x]  Normal  [] Abnormal-  Sclera  [x]  Normal  [] Abnormal -         Discharge []  None visible [] Abnormal -    HENT:   [x] Normocephalic, atraumatic. [] Abnormal   [] Mouth/Throat: Mucous membranes are moist.     External Ears [] Normal  [] Abnormal-     Neck: [] No visualized mass     Pulmonary/Chest: [] Respiratory effort normal.  [x] No visualized signs of difficulty breathing or respiratory distress        [] Abnormal-      Musculoskeletal:   [] Normal gait with no signs of ataxia         [] Normal range of motion of neck        [] Abnormal-       Neurological:        [] No Facial Asymmetry (Cranial nerve 7 motor function) (limited exam to video visit)          [] No gaze palsy        [] Abnormal-         Skin:        [] No significant exanthematous lesions or discoloration noted on facial skin         [] Abnormal-            Psychiatric:       [] Normal Affect [] No Hallucinations        [] Abnormal-     Other pertinent observable physical exam findings-       Imaging  ECHO 4/26/2021  Patient appears to be in atrial fibrillation. Ejection fraction is visually estimated to be 25%. There is severe diffuse hypokinesis. The aortic valve is thickened/calcified with decreased leaflet mobility  consistent with aortic stenosis. Severe aortic stenosis with a peak velocity of 3.9 m/s and a mean pressure  gradient of 34 mmHg, and a valve area of 0.44 cm2 by VTI. Consider low flow aortic stenosis in the presence of severely reduced left  ventricular function. The right ventricle is dilated. Right ventricular systolic function is moderate reduced. ASSESSMENT/PLAN:    Severe aortic stenosis  S/p BAV 2/2021. S/p TAVR 5/3/2021. Encouraged her to participate in PT/OT at home. Chronic combined systolic and diastolic heart failure  No symptoms reported. Continue B-blocker and diuretic. Persistent atrial fibrillation  Asymptomatic. Continue B-blocker and Coumadin.      Bilateral lower extremity edema  Continue diuretic therapy. Pulmonary hypertension   Continue O2 and diuretic therapy.      Follow up

## 2021-05-27 NOTE — PATIENT INSTRUCTIONS
Patient Education        Low Sodium Diet (2,000 Milligram): Care Instructions  Overview     Limiting sodium can be an important part of managing some health problems. The most common source of sodium is salt. People get most of the salt in their diet from canned, prepared, and packaged foods. Fast food and restaurant meals also are very high in sodium. Your doctor will probably limit your sodium to less than 2,000 milligrams (mg) a day. This limit counts all the sodium in prepared and packaged foods and any salt you add to your food. Follow-up care is a key part of your treatment and safety. Be sure to make and go to all appointments, and call your doctor if you are having problems. It's also a good idea to know your test results and keep a list of the medicines you take. How can you care for yourself at home? Read food labels  · Read labels on cans and food packages. The labels tell you how much sodium is in each serving. Make sure that you look at the serving size. If you eat more than the serving size, you have eaten more sodium. · Food labels also tell you the Percent Daily Value for sodium. Choose products with low Percent Daily Values for sodium. · Be aware that sodium can come in forms other than salt, including monosodium glutamate (MSG), sodium citrate, and sodium bicarbonate (baking soda). MSG is often added to Asian food. When you eat out, you can sometimes ask for food without MSG or added salt. Buy low-sodium foods  · Buy foods that are labeled \"unsalted\" (no salt added), \"sodium-free\" (less than 5 mg of sodium per serving), or \"low-sodium\" (140 mg or less of sodium per serving). Foods labeled \"reduced-sodium\" and \"light sodium\" may still have too much sodium. Be sure to read the label to see how much sodium you are getting. · Buy fresh vegetables, or frozen vegetables without added sauces. Buy low-sodium versions of canned vegetables, soups, and other canned goods.   Prepare low-sodium meals you do not have an account, please click on the \"Sign Up Now\" link. Current as of: December 17, 2020               Content Version: 12.8  © 2006-2021 [employer]. Care instructions adapted under license by [facility] ([facility]). If you have questions about a medical condition or this instruction, always ask your healthcare professional. [address] any warranty or liability for your use of this information.

## 2021-06-01 ENCOUNTER — HOSPITAL ENCOUNTER (OUTPATIENT)
Age: 86
Setting detail: SPECIMEN
Discharge: HOME | End: 2021-06-01
Payer: MEDICARE

## 2021-06-01 LAB
ANION GAP SERPL CALCULATED.3IONS-SCNC: 8 MMOL/L (ref 3–16)
BACTERIA: ABNORMAL /HPF
BILIRUBIN URINE: NEGATIVE
BLOOD, URINE: NEGATIVE
BUN BLDV-MCNC: 30 MG/DL (ref 7–20)
CALCIUM SERPL-MCNC: 8.8 MG/DL (ref 8.3–10.6)
CHLORIDE BLD-SCNC: 95 MMOL/L (ref 99–110)
CLARITY: ABNORMAL
CO2: 31 MMOL/L (ref 21–32)
COLOR: YELLOW
CREAT SERPL-MCNC: 1.7 MG/DL (ref 0.6–1.2)
EPITHELIAL CELLS, UA: 1 /HPF (ref 0–5)
GFR AFRICAN AMERICAN: 34
GFR NON-AFRICAN AMERICAN: 28
GLUCOSE BLD-MCNC: 120 MG/DL (ref 70–99)
GLUCOSE URINE: NEGATIVE MG/DL
HYALINE CASTS: 2 /LPF (ref 0–8)
KETONES, URINE: NEGATIVE MG/DL
LEUKOCYTE ESTERASE, URINE: ABNORMAL
MICROSCOPIC EXAMINATION: YES
NITRITE, URINE: NEGATIVE
PH UA: 7 (ref 5–8)
POTASSIUM SERPL-SCNC: 3.6 MMOL/L (ref 3.5–5.1)
PROTEIN UA: NEGATIVE MG/DL
RBC UA: 5 /HPF (ref 0–4)
SODIUM BLD-SCNC: 134 MMOL/L (ref 136–145)
SPECIFIC GRAVITY UA: 1.01 (ref 1–1.03)
URINE TYPE: ABNORMAL
UROBILINOGEN, URINE: 1 E.U./DL
WBC UA: 40 /HPF (ref 0–5)

## 2021-06-01 PROCEDURE — 80048 BASIC METABOLIC PNL TOTAL CA: CPT

## 2021-06-01 PROCEDURE — 36415 COLL VENOUS BLD VENIPUNCTURE: CPT

## 2021-06-01 PROCEDURE — 81001 URINALYSIS AUTO W/SCOPE: CPT

## 2021-07-16 DIAGNOSIS — N18.30 STAGE 3 CHRONIC KIDNEY DISEASE, UNSPECIFIED WHETHER STAGE 3A OR 3B CKD (HCC): ICD-10-CM

## 2021-07-16 DIAGNOSIS — D63.1 ANEMIA IN STAGE 3 CHRONIC KIDNEY DISEASE, UNSPECIFIED WHETHER STAGE 3A OR 3B CKD (HCC): ICD-10-CM

## 2021-07-16 DIAGNOSIS — N18.30 ANEMIA IN STAGE 3 CHRONIC KIDNEY DISEASE, UNSPECIFIED WHETHER STAGE 3A OR 3B CKD (HCC): ICD-10-CM

## 2021-07-29 ENCOUNTER — HOSPITAL ENCOUNTER (OUTPATIENT)
Dept: INFUSION THERAPY | Age: 86
Setting detail: INFUSION SERIES
Discharge: HOME | End: 2021-07-29
Payer: MEDICARE

## 2021-07-29 VITALS
TEMPERATURE: 98.6 F | OXYGEN SATURATION: 95 % | SYSTOLIC BLOOD PRESSURE: 118 MMHG | DIASTOLIC BLOOD PRESSURE: 73 MMHG | HEART RATE: 88 BPM | RESPIRATION RATE: 16 BRPM

## 2021-07-29 DIAGNOSIS — N18.30 ANEMIA IN STAGE 3 CHRONIC KIDNEY DISEASE, UNSPECIFIED WHETHER STAGE 3A OR 3B CKD (HCC): Primary | ICD-10-CM

## 2021-07-29 DIAGNOSIS — N18.30 STAGE 3 CHRONIC KIDNEY DISEASE, UNSPECIFIED WHETHER STAGE 3A OR 3B CKD (HCC): ICD-10-CM

## 2021-07-29 DIAGNOSIS — D63.1 ANEMIA IN STAGE 3 CHRONIC KIDNEY DISEASE, UNSPECIFIED WHETHER STAGE 3A OR 3B CKD (HCC): Primary | ICD-10-CM

## 2021-07-29 LAB
FERRITIN: 578.6 NG/ML (ref 15–150)
FOLATE: 16.77 NG/ML (ref 4.78–24.2)
HCT VFR BLD CALC: 28.7 % (ref 36–48)
HEMOGLOBIN: 9.4 G/DL (ref 12–16)
IRON SATURATION: 28 % (ref 15–50)
IRON: 47 UG/DL (ref 37–145)
MCH RBC QN AUTO: 28.9 PG (ref 26–34)
MCHC RBC AUTO-ENTMCNC: 32.6 G/DL (ref 31–36)
MCV RBC AUTO: 88.7 FL (ref 80–100)
PDW BLD-RTO: 17.6 % (ref 12.4–15.4)
PLATELET # BLD: 155 K/UL (ref 135–450)
PMV BLD AUTO: 7.1 FL (ref 5–10.5)
RBC # BLD: 3.24 M/UL (ref 4–5.2)
TOTAL IRON BINDING CAPACITY: 167 UG/DL (ref 260–445)
VITAMIN B-12: 387 PG/ML (ref 211–911)
WBC # BLD: 6.2 K/UL (ref 4–11)

## 2021-07-29 PROCEDURE — 82746 ASSAY OF FOLIC ACID SERUM: CPT

## 2021-07-29 PROCEDURE — 83550 IRON BINDING TEST: CPT

## 2021-07-29 PROCEDURE — 96372 THER/PROPH/DIAG INJ SC/IM: CPT

## 2021-07-29 PROCEDURE — 83540 ASSAY OF IRON: CPT

## 2021-07-29 PROCEDURE — 85027 COMPLETE CBC AUTOMATED: CPT

## 2021-07-29 PROCEDURE — 6360000002 HC RX W HCPCS: Performed by: INTERNAL MEDICINE

## 2021-07-29 PROCEDURE — 82607 VITAMIN B-12: CPT

## 2021-07-29 PROCEDURE — 82728 ASSAY OF FERRITIN: CPT

## 2021-07-29 RX ORDER — M-VIT,TX,IRON,MINS/CALC/FOLIC 27MG-0.4MG
1 TABLET ORAL DAILY
COMMUNITY

## 2021-07-29 RX ORDER — ACETAMINOPHEN 500 MG
1000 TABLET ORAL EVERY 6 HOURS PRN
COMMUNITY

## 2021-07-29 RX ORDER — AMIODARONE HYDROCHLORIDE 200 MG/1
200 TABLET ORAL DAILY
COMMUNITY

## 2021-07-29 RX ORDER — SPIRONOLACTONE 25 MG/1
25 TABLET ORAL DAILY
COMMUNITY

## 2021-07-29 RX ORDER — LOVASTATIN 10 MG/1
10 TABLET ORAL NIGHTLY
COMMUNITY

## 2021-07-29 RX ADMIN — EPOETIN ALFA-EPBX 20000 UNITS: 20000 INJECTION, SOLUTION INTRAVENOUS; SUBCUTANEOUS at 13:33

## 2021-07-29 NOTE — PROGRESS NOTES
[address] [facility]     Epogen Visit with Peripheral Lab Draw    NAME:  Matt Blcakwell OF BIRTH:  1934  MEDICAL RECORD NUMBER:  [id number]  Episode Date:  7/29/2021    Patient arrived to [facility]   [x] per wheelchair   [] ambulatory    Peripheral Lab Draw    Blood Draw Site: Location:left arm  Site cleansed with Chloroprep Scrub for 30 seconds: Yes  Site cleansed with Alcohol pads: Yes  Labs drawn with: 21 gauge             [x] Butterfly    [] Needle  Labs Obtained: Yes  Number of attempts: 1    Lab Test(s) Ordered: CBC,FE,TTIBC, Ferritin, Vitamin B12, Folate    Lab Draw Site:  Redness: No  Bruising: No   Edema: No  Pain: No       Epogen Visit     Is the patient experiencing any:  Fatigue:   []   None  [x]   Increase over baseline but not altering normal activities  []   Moderate of causing difficulty performing some activities  []   Severe or loss of ability to perform some activities  []   Bedridden or disabling    Dizziness or Lightheadedness:   [x]   None  []   No Interference  []   Interferes with functioning but not activities of daily living  []   Interferes with daily activies  []   Bedridden or disabling    Shortness of Breath:   [x]   None   []   Dyspneic on exertion   []   Dyspnea with normal activities  []   Dyspnea at rest    Edema: 3+ Location of edema: left leg and wears an ace wrap, 1+ in right ankle    Tachycardia: No    Heart Palpitations: No      Chest Pain: No      Hold ZARINA dose if B/P is greater than: 180 mm/Hg     If Hgb remains less than 10 Increase dose by 25%. Do not increase dose more frequently than once every 4 weeks. If Hgb 10-10.5 g/dl  Continue same dose  If Hgb 10.6-11 g/dl Decrease dose by 25%. A decrease in dose can be done as frequently as every week. If Hgb is greater than 11 g/dl Hold Epogen.   May resume ZARINA when Hgb is less than 10 with a 25% reduction in the dose from the last administered dose or decrease with a 25% reduction in the dose from the last administered dose or decrease  frequency. This is patient's initial visit. Current Lab Data:    Recent Labs     07/29/21  1300   WBC 6.2   HGB 9.4*   HCT 28.7*   MCV 88.7        Dose will be 27073 Units    Epogen dosage: 07614 units was administered slowly subcutaneously into the right upper arm. Dose verified by: Litzy Estrella RN    Response to treatment:  Well tolerated by patient. Education:    Verbalized understanding    Scheduled to return for next dose of Epogen on August 12, 2021 .      Electronically signed by Nora Scott RN on 7/29/2021 at 1:51 PM

## 2021-08-12 ENCOUNTER — HOSPITAL ENCOUNTER (OUTPATIENT)
Age: 86
Setting detail: SPECIMEN
Discharge: HOME | End: 2021-08-12
Payer: MEDICARE

## 2021-08-12 ENCOUNTER — HOSPITAL ENCOUNTER (OUTPATIENT)
Dept: INFUSION THERAPY | Age: 86
Setting detail: INFUSION SERIES
Discharge: HOME | End: 2021-08-12
Payer: MEDICARE

## 2021-08-12 VITALS
DIASTOLIC BLOOD PRESSURE: 71 MMHG | SYSTOLIC BLOOD PRESSURE: 122 MMHG | RESPIRATION RATE: 16 BRPM | HEART RATE: 73 BPM | OXYGEN SATURATION: 98 % | TEMPERATURE: 98 F

## 2021-08-12 DIAGNOSIS — N18.30 ANEMIA IN STAGE 3 CHRONIC KIDNEY DISEASE, UNSPECIFIED WHETHER STAGE 3A OR 3B CKD (HCC): Primary | ICD-10-CM

## 2021-08-12 DIAGNOSIS — D63.1 ANEMIA IN STAGE 3 CHRONIC KIDNEY DISEASE, UNSPECIFIED WHETHER STAGE 3A OR 3B CKD (HCC): Primary | ICD-10-CM

## 2021-08-12 DIAGNOSIS — N18.30 STAGE 3 CHRONIC KIDNEY DISEASE, UNSPECIFIED WHETHER STAGE 3A OR 3B CKD (HCC): ICD-10-CM

## 2021-08-12 LAB
ANION GAP SERPL CALCULATED.3IONS-SCNC: 11 MMOL/L (ref 3–16)
BUN BLDV-MCNC: 42 MG/DL (ref 7–20)
CALCIUM SERPL-MCNC: 9.1 MG/DL (ref 8.3–10.6)
CHLORIDE BLD-SCNC: 99 MMOL/L (ref 99–110)
CO2: 29 MMOL/L (ref 21–32)
CREAT SERPL-MCNC: 2.5 MG/DL (ref 0.6–1.2)
GFR AFRICAN AMERICAN: 22
GFR NON-AFRICAN AMERICAN: 18
GLUCOSE BLD-MCNC: 148 MG/DL (ref 70–99)
HEMOGLOBIN: 9.4 G/DL (ref 12–16)
POTASSIUM SERPL-SCNC: 3.6 MMOL/L (ref 3.5–5.1)
SODIUM BLD-SCNC: 139 MMOL/L (ref 136–145)

## 2021-08-12 PROCEDURE — 96372 THER/PROPH/DIAG INJ SC/IM: CPT

## 2021-08-12 PROCEDURE — 6360000002 HC RX W HCPCS: Performed by: INTERNAL MEDICINE

## 2021-08-12 PROCEDURE — 87086 URINE CULTURE/COLONY COUNT: CPT

## 2021-08-12 PROCEDURE — 36415 COLL VENOUS BLD VENIPUNCTURE: CPT

## 2021-08-12 PROCEDURE — 80048 BASIC METABOLIC PNL TOTAL CA: CPT

## 2021-08-12 PROCEDURE — 85018 HEMOGLOBIN: CPT

## 2021-08-12 PROCEDURE — 87088 URINE BACTERIA CULTURE: CPT

## 2021-08-12 PROCEDURE — 81001 URINALYSIS AUTO W/SCOPE: CPT

## 2021-08-12 PROCEDURE — 87186 SC STD MICRODIL/AGAR DIL: CPT

## 2021-08-12 RX ADMIN — EPOETIN ALFA-EPBX 20000 UNITS: 20000 INJECTION, SOLUTION INTRAVENOUS; SUBCUTANEOUS at 13:47

## 2021-08-12 NOTE — PROGRESS NOTES
[address] [facility]     Epogen Visit with Peripheral Lab Draw    NAME:  Chuy Resides OF BIRTH:  1934  MEDICAL RECORD NUMBER:  [id number]  Episode Date:  8/12/2021    Patient arrived to [facility] 58   [x] per wheelchair   [] ambulatory    Peripheral Lab Draw    Blood Draw Site: Location:right arm  Site cleansed with Chloroprep Scrub for 30 seconds: Yes  Site cleansed with Alcohol pads: Yes  Labs drawn with: 21 gauge             [x] Butterfly    [] Needle  Labs Obtained: Yes  Number of attempts: 1    Lab Test(s) Ordered: HGB    Lab Draw Site:  Redness: No  Bruising: No   Edema: No  Pain: No       Epogen Visit     Is the patient experiencing any:  Fatigue:   []   None  []   Increase over baseline but not altering normal activities  [x]   Moderate of causing difficulty performing some activities  []   Severe or loss of ability to perform some activities  []   Bedridden or disabling    Dizziness or Lightheadedness:   [x]   None  []   No Interference  []   Interferes with functioning but not activities of daily living  []   Interferes with daily activies  []   Bedridden or disabling    Shortness of Breath:   [x]   None   []   Dyspneic on exertion   []   Dyspnea with normal activities  []   Dyspnea at rest    Edema: 4+ Location of edema: left leg    Tachycardia: No    Heart Palpitations: No      Chest Pain: No     /71   Pulse 73   Temp 98 °F (36.7 °C) (Oral)   Resp 16   LMP  (LMP Unknown)   SpO2 98%     Hold ZARINA dose if B/P is greater than: 180 mm/Hg     If Hgb remains less than 10 Increase dose by 25%. Do not increase dose more frequently than once every 4 weeks. If Hgb 10-10.5 g/dl  Continue same dose  If Hgb 10.6-11 g/dl Decrease dose by 25%. A decrease in dose can be done as frequently as every week. If Hgb is greater than 11 g/dl Hold Epogen.   May resume ZARINA when Hgb is less than 10 with a 25% reduction in the dose from the last administered dose or decrease with a 25% reduction in the dose from the last administered dose or decrease  frequency. Last visit date: 7/9/21    Last Hgb: 9.4 g/dl   Last dose: 56705 units    Current Lab Data:    Recent Labs     08/12/21  1258   HGB 9.4*     Dose will be the same    Epogen dosage: 43087 units was administered slowly subcutaneously into the right upper arm. Dose verified by:  Tanvir Caro RN    Response to treatment:  Well tolerated by patient. Education:    Verbalized understanding and printed AVS given to patient    Scheduled to return for next dose of Epogen on August 26, 2021 .      Electronically signed by Paul Odom RN on 8/12/2021 at 1:55 PM

## 2021-08-14 LAB
BACTERIA: ABNORMAL /HPF
BILIRUBIN URINE: NEGATIVE
BLOOD, URINE: NEGATIVE
CLARITY: ABNORMAL
COLOR: YELLOW
EPITHELIAL CELLS, UA: 2 /HPF (ref 0–5)
GLUCOSE URINE: NEGATIVE MG/DL
HYALINE CASTS: 3 /LPF (ref 0–8)
KETONES, URINE: NEGATIVE MG/DL
LEUKOCYTE ESTERASE, URINE: ABNORMAL
MICROSCOPIC EXAMINATION: YES
NITRITE, URINE: NEGATIVE
PH UA: 6 (ref 5–8)
PROTEIN UA: NEGATIVE MG/DL
RBC UA: 2 /HPF (ref 0–4)
SPECIFIC GRAVITY UA: 1.01 (ref 1–1.03)
URINE TYPE: ABNORMAL
UROBILINOGEN, URINE: 0.2 E.U./DL
WBC UA: 30 /HPF (ref 0–5)

## 2021-08-16 LAB
ORGANISM: ABNORMAL
URINE CULTURE, ROUTINE: ABNORMAL

## 2021-08-26 ENCOUNTER — HOSPITAL ENCOUNTER (OUTPATIENT)
Dept: INFUSION THERAPY | Age: 86
Setting detail: INFUSION SERIES
Discharge: HOME | End: 2021-08-26
Payer: MEDICARE

## 2021-08-26 VITALS
TEMPERATURE: 97.8 F | DIASTOLIC BLOOD PRESSURE: 72 MMHG | HEART RATE: 80 BPM | OXYGEN SATURATION: 98 % | RESPIRATION RATE: 18 BRPM | SYSTOLIC BLOOD PRESSURE: 131 MMHG

## 2021-08-26 DIAGNOSIS — N18.30 STAGE 3 CHRONIC KIDNEY DISEASE, UNSPECIFIED WHETHER STAGE 3A OR 3B CKD (HCC): ICD-10-CM

## 2021-08-26 DIAGNOSIS — N18.30 ANEMIA IN STAGE 3 CHRONIC KIDNEY DISEASE, UNSPECIFIED WHETHER STAGE 3A OR 3B CKD (HCC): Primary | ICD-10-CM

## 2021-08-26 DIAGNOSIS — D63.1 ANEMIA IN STAGE 3 CHRONIC KIDNEY DISEASE, UNSPECIFIED WHETHER STAGE 3A OR 3B CKD (HCC): Primary | ICD-10-CM

## 2021-08-26 LAB — HEMOGLOBIN: 9.8 G/DL (ref 12–16)

## 2021-08-26 PROCEDURE — 6360000002 HC RX W HCPCS: Performed by: INTERNAL MEDICINE

## 2021-08-26 PROCEDURE — 85018 HEMOGLOBIN: CPT

## 2021-08-26 PROCEDURE — 96372 THER/PROPH/DIAG INJ SC/IM: CPT

## 2021-08-26 RX ORDER — WARFARIN SODIUM 5 MG/1
5 TABLET ORAL
COMMUNITY

## 2021-08-26 RX ADMIN — EPOETIN ALFA-EPBX 5000 UNITS: 10000 INJECTION, SOLUTION INTRAVENOUS; SUBCUTANEOUS at 13:38

## 2021-08-26 RX ADMIN — EPOETIN ALFA-EPBX 20000 UNITS: 20000 INJECTION, SOLUTION INTRAVENOUS; SUBCUTANEOUS at 13:38

## 2021-08-26 NOTE — PROGRESS NOTES
[facility] [facility]     Epogen Visit with Peripheral Lab Draw    NAME:  Shirley Roberts OF BIRTH:  1934  MEDICAL RECORD NUMBER:  [id number]  Episode Date:  8/26/2021    Patient arrived to [facility] 58   [] per wheelchair   [] ambulatory    Peripheral Lab Draw    Blood Draw Site: Location:right arm  Site cleansed with Chloroprep Scrub for 30 seconds: Yes  Site cleansed with Alcohol pads: No:   Labs drawn with: 23 gauge             [x] Butterfly    [] Needle  Labs Obtained: Yes  Number of attempts: 1    Lab Test(s) Ordered: HGB    Lab Draw Site:  Redness: No  Bruising: No   Edema: No  Pain: No       Epogen Visit     Is the patient experiencing any:  Fatigue:   []   None  [x]   Increase over baseline but not altering normal activities  []   Moderate of causing difficulty performing some activities  []   Severe or loss of ability to perform some activities  []   Bedridden or disabling    Dizziness or Lightheadedness:   [x]   None  []   No Interference  []   Interferes with functioning but not activities of daily living  []   Interferes with daily activies  []   Bedridden or disabling    Shortness of Breath:   [x]   None   []   Dyspneic on exertion   []   Dyspnea with normal activities  []   Dyspnea at rest    Edema: 2+ Location of edema: left leg    Tachycardia: No    Heart Palpitations: No      Chest Pain: No     /72   Pulse 80   Temp 97.8 °F (36.6 °C) (Oral)   Resp 18   LMP  (LMP Unknown)   SpO2 98%     Hold ZARINA dose if B/P is greater than: 180 mm/Hg     If Hgb remains less than 10 Increase dose by 25%. Do not increase dose more frequently than once every 4 weeks. If Hgb 10-10.5 g/dl  Continue same dose  If Hgb 10.6-11 g/dl Decrease dose by 25%. A decrease in dose can be done as frequently as every week. If Hgb is greater than 11 g/dl Hold Epogen.   May resume ZARINA when Hgb is less than 10 with a 25% reduction in the dose from the last administered dose or decrease with a 25% reduction in the dose from the last administered dose or decrease  frequency. Patient has Cardiomyopathy and had an Aortic Valve Replacement, TAVR, done in May 2021. 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 She is switching from Eliquis to Coumadin, today took last Eliquis and started Coumadin 5 mg 2 days ago. Patient states she is on an antibiotic for a UTI which was prescribed by Dr. Uma Bower. Last visit date: 8/12/21     Last Hgb: 9.4 g/dl   Last dose: 20,000 units    Current Lab Data:    Recent Labs     08/26/21  1250   HGB 9.8*     Dose will be increased    Epogen dosage: 25,000 units was administered slowly subcutaneously into the right upper arm. Dose verified by:  Em Munguia RN    Response to treatment:  Well tolerated by patient. Education:    Indicates understanding    Scheduled to return for next dose of Epogen on September 9, 2021 .     Scheduled to see Dr. Diana Campos on 10/12/2021     Electronically signed by Frances Baumann RN on 8/26/2021 at 2:06 PM

## 2021-09-09 ENCOUNTER — HOSPITAL ENCOUNTER (OUTPATIENT)
Dept: INFUSION THERAPY | Age: 86
Setting detail: INFUSION SERIES
Discharge: HOME | End: 2021-09-09
Payer: MEDICARE

## 2021-09-09 DIAGNOSIS — D63.1 ANEMIA IN STAGE 3 CHRONIC KIDNEY DISEASE, UNSPECIFIED WHETHER STAGE 3A OR 3B CKD (HCC): Primary | ICD-10-CM

## 2021-09-09 DIAGNOSIS — N18.30 ANEMIA IN STAGE 3 CHRONIC KIDNEY DISEASE, UNSPECIFIED WHETHER STAGE 3A OR 3B CKD (HCC): Primary | ICD-10-CM

## 2021-09-09 DIAGNOSIS — N18.30 STAGE 3 CHRONIC KIDNEY DISEASE, UNSPECIFIED WHETHER STAGE 3A OR 3B CKD (HCC): ICD-10-CM

## 2021-09-09 LAB — HEMOGLOBIN: 10.7 G/DL (ref 12–16)

## 2021-09-09 PROCEDURE — 85018 HEMOGLOBIN: CPT

## 2021-09-09 PROCEDURE — 6360000002 HC RX W HCPCS: Performed by: INTERNAL MEDICINE

## 2021-09-09 PROCEDURE — 96372 THER/PROPH/DIAG INJ SC/IM: CPT

## 2021-09-09 RX ADMIN — EPOETIN ALFA-EPBX 19000 UNITS: 20000 INJECTION, SOLUTION INTRAVENOUS; SUBCUTANEOUS at 14:16

## 2021-09-23 ENCOUNTER — HOSPITAL ENCOUNTER (OUTPATIENT)
Dept: INFUSION THERAPY | Age: 86
Setting detail: INFUSION SERIES
Discharge: HOME | End: 2021-09-23
Payer: MEDICARE

## 2021-09-23 VITALS
TEMPERATURE: 97.6 F | RESPIRATION RATE: 16 BRPM | HEART RATE: 68 BPM | OXYGEN SATURATION: 100 % | SYSTOLIC BLOOD PRESSURE: 141 MMHG | DIASTOLIC BLOOD PRESSURE: 84 MMHG

## 2021-09-23 DIAGNOSIS — N18.30 ANEMIA IN STAGE 3 CHRONIC KIDNEY DISEASE, UNSPECIFIED WHETHER STAGE 3A OR 3B CKD (HCC): Primary | ICD-10-CM

## 2021-09-23 DIAGNOSIS — D63.1 ANEMIA IN STAGE 3 CHRONIC KIDNEY DISEASE, UNSPECIFIED WHETHER STAGE 3A OR 3B CKD (HCC): Primary | ICD-10-CM

## 2021-09-23 DIAGNOSIS — N18.30 STAGE 3 CHRONIC KIDNEY DISEASE, UNSPECIFIED WHETHER STAGE 3A OR 3B CKD (HCC): ICD-10-CM

## 2021-09-23 LAB — HEMOGLOBIN: 11.2 G/DL (ref 12–16)

## 2021-09-23 PROCEDURE — 99212 OFFICE O/P EST SF 10 MIN: CPT

## 2021-09-23 PROCEDURE — 85018 HEMOGLOBIN: CPT

## 2021-09-23 NOTE — PROGRESS NOTES
[address] [facility]     Epogen Visit with Peripheral Lab Draw    NAME:  Michelle Weston OF BIRTH:  1934  MEDICAL RECORD NUMBER:  [id number]  Episode Date:  9/23/2021    Patient arrived to [facility] 58   [x] per wheelchair   [] ambulatory    Peripheral Lab Draw    Blood Draw Site: Location:right arm  Site cleansed with Chloroprep Scrub for 30 seconds: Yes  Site cleansed with Alcohol pads: Yes  Labs drawn with: 23 gauge             [x] Butterfly    [] Needle  Labs Obtained: Yes  Number of attempts: 1    Lab Test(s) Ordered: HGB    Lab Draw Site:  Redness: No  Bruising: No   Edema: No  Pain: No       Epogen Visit     Is the patient experiencing any:  Fatigue:   []   None  [x]   Increase over baseline but not altering normal activities  []   Moderate of causing difficulty performing some activities  []   Severe or loss of ability to perform some activities  []   Bedridden or disabling    Dizziness or Lightheadedness:   [x]   None  []   No Interference  []   Interferes with functioning but not activities of daily living  []   Interferes with daily activies  []   Bedridden or disabling    Shortness of Breath:   [x]   None   []   Dyspneic on exertion   []   Dyspnea with normal activities  []   Dyspnea at rest    Edema: 3+ Location of edema: left leg    Tachycardia: No    Heart Palpitations: No      Chest Pain: No     BP (!) 141/84   Pulse 68   Temp 97.6 °F (36.4 °C) (Oral)   Resp 16   LMP  (LMP Unknown)   SpO2 100%     Hold ZARINA dose if B/P is greater than: 180 mm/Hg     If Hgb remains less than 10 Increase dose by 25%. Do not increase dose more frequently than once every 4 weeks. If Hgb 10-10.5 g/dl  Continue same dose  If Hgb 10.6-11 g/dl Decrease dose by 25%. A decrease in dose can be done as frequently as every week. If Hgb is greater than 11 g/dl Hold Epogen.   May resume ZARINA when Hgb is less than 10 with a 25% reduction in the dose from the last administered dose or decrease with a 25% reduction in the dose from the last administered dose or decrease  frequency. Last visit date: 9/9/21     Last Hgb: 10.7 g/dl   Last dose: 98485 units    Current Lab Data:    Recent Labs     09/23/21  1252   HGB 11.2*     Dose will be held    Dose verified by:  Rocky Salgado RN    Response to treatment:  Well tolerated by patient. Education:    Verbalized understanding    Scheduled to return for next dose of Epogen on October 7, 2021 .      Electronically signed by Paul Odom RN on 9/23/2021 at 1:12 PM

## 2021-09-23 NOTE — PROGRESS NOTES
Clinic Level of Care Assessment  [facility]fusion [facility]      NAME:  [address] [place] [address] Dr:  1934 GENDER: female  MEDICAL RECORD NUMBER:  [id number]   DATE:  9/23/2021     Ambulation Status Document in Daily Care/Safety Tab   Status Definition Points   Independent Independently able to ambulate. Fully able (without any assistance) to get on/off exam table/chair. []   0   Minimal Physical Assistance Requires physical assistance of one person to ambulate and/or position patient to be examined. Includes necessary physical assistance to position lower extremities on/off stool. [x]   1   Moderate Physical Assistance Requires at least one staff member to physically assist patient in ambulating into treatment room, and/or on off chair/bed. Requires assistance to bathroom. []   2   Full Assistance Requires assistance of at least two staff members to transfer patient into treatment room and/or on/off bed/chair. \"Total Transfer\". Unable to use bathroom requires bedside commode and/or bedpan []   3       Dressing Complexity Document in LDA Navigator  Complexity Definition Points   No Dressing  []   0   Simple Minimal, simple dressing. i.e. bandaid, gauze, simple wrap. Peripheral IV dressing. [x]   1   Intermediate Moderately complicated PICC dressing change requiring sterile procedure and site assessment. []   2   Complex Complicated dressing change port access requiring sterile procedure and site assessment. []   3       Teaching Effort Document in AVS and/or Education Navigator    Effort Definition Points   No Teaching  []   0   Simple Teach two or less topics. Teaching documented in discharge instructions in AVS and copy given to patient. [x]   1   Intermediate Teach three to four topics. Teaching documented in Discharge Instructions in AVS using References and Attachments. Copy given to patient. []   2   Complex Teach five to six topics.  Teaching documented in Discharge Instructions in AVS using References

## 2021-10-07 ENCOUNTER — HOSPITAL ENCOUNTER (OUTPATIENT)
Dept: INFUSION THERAPY | Age: 86
Setting detail: INFUSION SERIES
Discharge: HOME | End: 2021-10-07
Payer: MEDICARE

## 2021-10-07 VITALS
DIASTOLIC BLOOD PRESSURE: 65 MMHG | RESPIRATION RATE: 16 BRPM | SYSTOLIC BLOOD PRESSURE: 134 MMHG | OXYGEN SATURATION: 99 % | TEMPERATURE: 97.2 F | HEART RATE: 68 BPM

## 2021-10-07 DIAGNOSIS — N18.30 ANEMIA IN STAGE 3 CHRONIC KIDNEY DISEASE, UNSPECIFIED WHETHER STAGE 3A OR 3B CKD (HCC): Primary | ICD-10-CM

## 2021-10-07 DIAGNOSIS — N18.30 STAGE 3 CHRONIC KIDNEY DISEASE, UNSPECIFIED WHETHER STAGE 3A OR 3B CKD (HCC): ICD-10-CM

## 2021-10-07 DIAGNOSIS — D63.1 ANEMIA IN STAGE 3 CHRONIC KIDNEY DISEASE, UNSPECIFIED WHETHER STAGE 3A OR 3B CKD (HCC): Primary | ICD-10-CM

## 2021-10-07 LAB
ALBUMIN SERPL-MCNC: 3.4 G/DL (ref 3.4–5)
ANION GAP SERPL CALCULATED.3IONS-SCNC: 13 MMOL/L (ref 3–16)
BUN BLDV-MCNC: 70 MG/DL (ref 7–20)
CALCIUM SERPL-MCNC: 9.3 MG/DL (ref 8.3–10.6)
CHLORIDE BLD-SCNC: 100 MMOL/L (ref 99–110)
CO2: 27 MMOL/L (ref 21–32)
CREAT SERPL-MCNC: 3.2 MG/DL (ref 0.6–1.2)
CREATININE URINE: 40.7 MG/DL (ref 28–259)
FERRITIN: 466.4 NG/ML (ref 15–150)
GFR AFRICAN AMERICAN: 17
GFR NON-AFRICAN AMERICAN: 14
GLUCOSE BLD-MCNC: 150 MG/DL (ref 70–99)
HCT VFR BLD CALC: 32.3 % (ref 36–48)
HEMOGLOBIN: 10.2 G/DL (ref 12–16)
IRON SATURATION: 44 % (ref 15–50)
IRON: 74 UG/DL (ref 37–145)
MCH RBC QN AUTO: 27.9 PG (ref 26–34)
MCHC RBC AUTO-ENTMCNC: 31.7 G/DL (ref 31–36)
MCV RBC AUTO: 88 FL (ref 80–100)
MICROALBUMIN UR-MCNC: <1.2 MG/DL
MICROALBUMIN/CREAT UR-RTO: NORMAL MG/G (ref 0–30)
PDW BLD-RTO: 16.7 % (ref 12.4–15.4)
PHOSPHORUS: 3.7 MG/DL (ref 2.5–4.9)
PLATELET # BLD: 107 K/UL (ref 135–450)
PMV BLD AUTO: 7.3 FL (ref 5–10.5)
POTASSIUM SERPL-SCNC: 3.6 MMOL/L (ref 3.5–5.1)
RBC # BLD: 3.67 M/UL (ref 4–5.2)
SODIUM BLD-SCNC: 140 MMOL/L (ref 136–145)
TOTAL IRON BINDING CAPACITY: 170 UG/DL (ref 260–445)
WBC # BLD: 3.7 K/UL (ref 4–11)

## 2021-10-07 PROCEDURE — 80069 RENAL FUNCTION PANEL: CPT

## 2021-10-07 PROCEDURE — 82570 ASSAY OF URINE CREATININE: CPT

## 2021-10-07 PROCEDURE — 83540 ASSAY OF IRON: CPT

## 2021-10-07 PROCEDURE — 82043 UR ALBUMIN QUANTITATIVE: CPT

## 2021-10-07 PROCEDURE — 99212 OFFICE O/P EST SF 10 MIN: CPT

## 2021-10-07 PROCEDURE — 83550 IRON BINDING TEST: CPT

## 2021-10-07 PROCEDURE — 82728 ASSAY OF FERRITIN: CPT

## 2021-10-07 PROCEDURE — 85027 COMPLETE CBC AUTOMATED: CPT

## 2021-10-07 NOTE — PROGRESS NOTES
and Attachments. May also be documentation in Education Navigator. Copy given to patient. []   3           Patient Assessment  Planning Definition Points   Simple Complete all tabs in patient assessment navigator. Review or complete patient'sTherapy Plan. [x]   1   Intermediate Complete all tabs in patient assessment navigator. Review or completed patient'sTherapy Plan. Contact doctor based on nursing assessment. []   2   Complex Complete all tabs in patient assessment navigator. Completed patient's Therapy Plan. Contact doctor based on nursing assessment and write order related to needed care. []   3     Patient Planning   Planning Definition Points   Simple Discharged, instructions and After Visit Summary given to patient/caregiver and instructions completed. Simple follow-up with routine assessment and planning. [x]   1   Intermediate Discharged, instructions and After Visit Summary given to patient/caregiver and instructions completed. Contact with outside resources; i.e. Telephone calls to PCP, home health, ECF and/or arranging transportation. []   2   Complex Discharged, instructions and After Visit Summary given to patient/caregiver and instructions completed. Contact with outside resources; i.e. Telephone calls to PCP, home health, ECF and/or arranging transportation. May include filling out forms and/or writing letters, communication with insurance , preauthorization for treatment.    []   3       Is this the Patient's First Visit to the [facility]  No    Is this Patient Established @ this [facility]  Yes              Clinical Level of Care      Points  0-2  Level 1 []     Points  3-5  Level 2 [x]     Points  6-9  Level 3 []     Points  10-12  Level 4 []     Points  13-15  Level 5 []       Electronically signed by Quentin Vera RN on 10/7/2021 at 1:33 PM

## 2021-10-07 NOTE — PROGRESS NOTES
[address] [facility]     Epogen Visit with Peripheral Lab Draw    NAME:  Wali Kennedy OF BIRTH:  1934  MEDICAL RECORD NUMBER:  [id number]  Episode Date:  10/7/2021    Patient arrived to [facility] 58   [x] per wheelchair   [] ambulatory    Peripheral Lab Draw    Blood Draw Site: Location:right arm  Site cleansed with Chloroprep Scrub for 30 seconds: Yes  Site cleansed with Alcohol pads: Yes  Labs drawn with: 21 gauge             [x] Butterfly    [] Needle  Labs Obtained: Yes  Number of attempts: 1    Lab Test(s) Ordered: Renal, CBC, Ferritin, Iron, Microalbumin creatinine ratio urine    Lab Draw Site:  Redness: No  Bruising: No   Edema: No  Pain: No       Epogen Visit     Is the patient experiencing any:  Fatigue:   []   None  []   Increase over baseline but not altering normal activities  [x]   Moderate of causing difficulty performing some activities  []   Severe or loss of ability to perform some activities  []   Bedridden or disabling    Dizziness or Lightheadedness:   [x]   None  []   No Interference  []   Interferes with functioning but not activities of daily living  []   Interferes with daily activies  []   Bedridden or disabling    Shortness of Breath:   []   None   [x]   Dyspneic on exertion   []   Dyspnea with normal activities  []   Dyspnea at rest    Edema: 1+ Location of edema: dorita. ankles    Tachycardia: No    Heart Palpitations: No      Chest Pain: No     /65   Pulse 68   Temp 97.2 °F (36.2 °C) (Infrared)   Resp 16   LMP  (LMP Unknown)   SpO2 99%     Hold ZARINA dose if B/P is greater than: 180 mm/Hg     If Hgb remains less than 10 Increase dose by 25%. Do not increase dose more frequently than once every 4 weeks. If Hgb 10-10.5 g/dl  Continue same dose  If Hgb 10.6-11 g/dl Decrease dose by 25%. A decrease in dose can be done as frequently as every week. If Hgb is greater than 11 g/dl Hold Epogen.   May resume ZARINA when Hgb is less than 10 with a 25% reduction in the dose from the last administered dose or decrease with a 25% reduction in the dose from the last administered dose or decrease  frequency. Last visit date: 9/23/21   Last Hgb: 11.2 g/dl   Last dose:  held    Current Lab Data:    Recent Labs     10/07/21  1314   WBC 3.7*   HGB 10.2*   HCT 32.3*   MCV 88.0   *     Dose will be held until hgb drops below 10    Dose verified by: Rina Silva RN    Response to treatment:  Well tolerated by patient. Education:    Verbalized understanding    Scheduled to return for next dose of Epogen on October 21, 2021 .      Electronically signed by Chesley Fabry, RN on 10/7/2021 at 1:32 PM

## 2021-10-21 ENCOUNTER — HOSPITAL ENCOUNTER (OUTPATIENT)
Dept: INFUSION THERAPY | Age: 86
Setting detail: INFUSION SERIES
Discharge: HOME | End: 2021-10-21
Payer: MEDICARE

## 2021-10-21 VITALS
HEART RATE: 73 BPM | TEMPERATURE: 97.9 F | OXYGEN SATURATION: 92 % | RESPIRATION RATE: 18 BRPM | DIASTOLIC BLOOD PRESSURE: 75 MMHG | SYSTOLIC BLOOD PRESSURE: 112 MMHG

## 2021-10-21 DIAGNOSIS — N18.30 STAGE 3 CHRONIC KIDNEY DISEASE, UNSPECIFIED WHETHER STAGE 3A OR 3B CKD (HCC): ICD-10-CM

## 2021-10-21 DIAGNOSIS — N18.30 ANEMIA IN STAGE 3 CHRONIC KIDNEY DISEASE, UNSPECIFIED WHETHER STAGE 3A OR 3B CKD (HCC): Primary | ICD-10-CM

## 2021-10-21 DIAGNOSIS — D63.1 ANEMIA IN STAGE 3 CHRONIC KIDNEY DISEASE, UNSPECIFIED WHETHER STAGE 3A OR 3B CKD (HCC): Primary | ICD-10-CM

## 2021-10-21 LAB
HEMOGLOBIN: 9.7 G/DL (ref 12–16)
INR BLD: 2.11 (ref 0.88–1.12)
PROTHROMBIN TIME: 24.6 SEC (ref 9.9–12.7)

## 2021-10-21 PROCEDURE — 6360000002 HC RX W HCPCS: Performed by: INTERNAL MEDICINE

## 2021-10-21 PROCEDURE — 96372 THER/PROPH/DIAG INJ SC/IM: CPT

## 2021-10-21 PROCEDURE — 85018 HEMOGLOBIN: CPT

## 2021-10-21 PROCEDURE — 85610 PROTHROMBIN TIME: CPT

## 2021-10-21 RX ADMIN — EPOETIN ALFA-EPBX 10000 UNITS: 10000 INJECTION, SOLUTION INTRAVENOUS; SUBCUTANEOUS at 14:04

## 2021-10-21 RX ADMIN — EPOETIN ALFA-EPBX 4000 UNITS: 4000 INJECTION, SOLUTION INTRAVENOUS; SUBCUTANEOUS at 14:03

## 2021-10-21 NOTE — PROGRESS NOTES
[address] [facility]     Epogen Visit with Peripheral Lab Draw    NAME:  Tena Degree OF BIRTH:  1934  MEDICAL RECORD NUMBER:  [id number]  Episode Date:  10/21/2021    Patient arrived to [facility] 58   [x] per wheelchair   [] ambulatory    Peripheral Lab Draw    Blood Draw Site: Location:right arm  Site cleansed with Chloroprep Scrub for 30 seconds: Yes  Site cleansed with Alcohol pads: No:   Labs drawn with: 23 gauge             [x] Butterfly    [] Needle  Labs Obtained: Yes  Number of attempts: 1    Lab Test(s) Ordered: HGB, PT, INR    Lab Draw Site:  Redness: No  Bruising: No   Edema: No  Pain: No       Epogen Visit     Is the patient experiencing any: Patient states she is feeling stronger  Fatigue:   []   None  [x]   Increase over baseline but not altering normal activities  []   Moderate of causing difficulty performing some activities  []   Severe or loss of ability to perform some activities  []   Bedridden or disabling    Dizziness or Lightheadedness:   [x]   None  []   No Interference  []   Interferes with functioning but not activities of daily living  []   Interferes with daily activies  []   Bedridden or disabling    Shortness of Breath:   [x]   None   []   Dyspneic on exertion   []   Dyspnea with normal activities  []   Dyspnea at rest    Edema: 1+ Location of edema: left leg    Tachycardia: No    Heart Palpitations: No      Chest Pain: No     Patient saw Dr. Bartolome Anderson last week and creatinine level up to 3.2. Torsemide dose decreased. Hold ZARINA dose if B/P is greater than: 180 mm/Hg     If Hgb remains less than 10 Increase dose by 25%. Do not increase dose more frequently than once every 4 weeks. If Hgb 10-10.5 g/dl  Continue same dose  If Hgb 10.6-11 g/dl Decrease dose by 25%. A decrease in dose can be done as frequently as every week. If Hgb is greater than 11 g/dl Hold Epogen.   May resume ZARINA when Hgb is less than 10 with a 25% reduction in the dose from the last administered dose or decrease with a 25% reduction in the dose from the last administered dose or decrease  frequency. Last visit date: 10/7/21    Last Hgb: 10.2 g/dl   Last dose: HELD units    Current Lab Data:    Recent Labs     10/21/21  1318   HGB 9.7*     Dose will be decreased    Epogen dosage: 14,000 units was administered slowly subcutaneously into the right upper arm. Dose verified by:  Preet Farooq RN    Response to treatment:  Well tolerated by patient. Education:    Indicates understanding    Scheduled to return for next dose of Epogen on November 5, 2021 .      Electronically signed by Maximiliano Green RN on 10/21/2021 at 1:44 PM

## 2021-11-05 ENCOUNTER — HOSPITAL ENCOUNTER (OUTPATIENT)
Dept: INFUSION THERAPY | Age: 86
Setting detail: INFUSION SERIES
Discharge: HOME | End: 2021-11-05
Payer: MEDICARE

## 2021-11-05 VITALS
TEMPERATURE: 97.6 F | HEART RATE: 70 BPM | RESPIRATION RATE: 16 BRPM | OXYGEN SATURATION: 99 % | DIASTOLIC BLOOD PRESSURE: 80 MMHG | SYSTOLIC BLOOD PRESSURE: 153 MMHG

## 2021-11-05 DIAGNOSIS — N18.30 ANEMIA IN STAGE 3 CHRONIC KIDNEY DISEASE, UNSPECIFIED WHETHER STAGE 3A OR 3B CKD (HCC): Primary | ICD-10-CM

## 2021-11-05 DIAGNOSIS — N18.30 STAGE 3 CHRONIC KIDNEY DISEASE, UNSPECIFIED WHETHER STAGE 3A OR 3B CKD (HCC): ICD-10-CM

## 2021-11-05 DIAGNOSIS — D63.1 ANEMIA IN STAGE 3 CHRONIC KIDNEY DISEASE, UNSPECIFIED WHETHER STAGE 3A OR 3B CKD (HCC): Primary | ICD-10-CM

## 2021-11-05 LAB
HCT VFR BLD CALC: 31.2 % (ref 36–48)
HEMOGLOBIN: 10 G/DL (ref 12–16)
INR BLD: 2.26 (ref 0.88–1.12)
MCH RBC QN AUTO: 28.2 PG (ref 26–34)
MCHC RBC AUTO-ENTMCNC: 32.2 G/DL (ref 31–36)
MCV RBC AUTO: 87.5 FL (ref 80–100)
PDW BLD-RTO: 17.1 % (ref 12.4–15.4)
PLATELET # BLD: 130 K/UL (ref 135–450)
PMV BLD AUTO: 7.4 FL (ref 5–10.5)
PROTHROMBIN TIME: 26.4 SEC (ref 9.9–12.7)
RBC # BLD: 3.56 M/UL (ref 4–5.2)
WBC # BLD: 4.6 K/UL (ref 4–11)

## 2021-11-05 PROCEDURE — 85027 COMPLETE CBC AUTOMATED: CPT

## 2021-11-05 PROCEDURE — 6360000002 HC RX W HCPCS: Performed by: INTERNAL MEDICINE

## 2021-11-05 PROCEDURE — 85610 PROTHROMBIN TIME: CPT

## 2021-11-05 PROCEDURE — 96372 THER/PROPH/DIAG INJ SC/IM: CPT

## 2021-11-05 NOTE — PROGRESS NOTES
[facility]     Epogen Visit with Peripheral Lab Draw    NAME:  Lalo Cannon OF BIRTH:  1934  MEDICAL RECORD NUMBER:  [id number]  Episode Date:  11/5/2021    Patient arrived to [facility] 58   [x] per wheelchair   [] ambulatory    Alert and oriented X 4. Accompanied by her daughter. Denies any side effects from previos injections. Feels the epogen has helped. Instructed today I need to draw a CBC for insurance verification. Daughter has new order for PT/ INR for Dr. Kareen Murrieta. The order was signed per Dr. Margarita Ortega. Denies new s/s of infection or increase in respiratory distress. Afebrile.  Wearing face mask to prevent the spread of COVID-19     Peripheral Lab Draw    Blood Draw Site: Location:right arm  Site cleansed with Chloroprep Scrub for 30 seconds: Yes  Site cleansed with Alcohol pads: Yes  Labs drawn with: 25 gauge             [x] Butterfly    [] Needle  Labs Obtained: Yes  Number of attempts: 1    Lab Test(s) Ordered: stat CBC and PT/ INR    Lab Draw Site:  Redness: No  Bruising: No   Edema: No  Pain: No       Epogen Visit     Is the patient experiencing any:  Fatigue:   []   None  [x]   Increase over baseline but not altering normal activities  []   Moderate of causing difficulty performing some activities  []   Severe or loss of ability to perform some activities  []   Bedridden or disabling    Dizziness or Lightheadedness:   [x]   None  []   No Interference  []   Interferes with functioning but not activities of daily living  []   Interferes with daily activies  []   Bedridden or disabling    Shortness of Breath:   []   None   [x]   Dyspneic on exertion   []   Dyspnea with normal activities  []   Dyspnea at rest    Edema: none Location of edema: nothing    Tachycardia: No    Heart Palpitations: No      Chest Pain: No     BP (!) 153/80   Pulse 70   Temp 97.6 °F (36.4 °C) (Oral)   Resp 16   LMP  (LMP Unknown)   SpO2 99%     Hold ZARINA dose if B/P is greater than: 180 mm/Hg     If Hgb remains less than 10 Increase dose by 25%. Do not increase dose more frequently than once every 4 weeks. If Hgb 10-10.5 g/dl  Continue same dose  If Hgb 10.6-11 g/dl Decrease dose by 25%. A decrease in dose can be done as frequently as every week. If Hgb is greater than 11 g/dl Hold Epogen. May resume ZARINA when Hgb is less than 10 with a 25% reduction in the dose from the last administered dose or decrease with a 25% reduction in the dose from the last administered dose or decrease  frequency. Last visit date: 10/21/21     Last Hgb: 9.7 g/dl   Last dose: 14,000 units    Current Lab Data:    Recent Labs     11/05/21  1257   WBC 4.6   HGB 10.0*   HCT 31.2*   MCV 87.5   *     Dose will be the same    Epogen dosage: 42186 units was administered slowly subcutaneously into the right upper arm. Dose verified by:  NICOLE Roche RN    Response to treatment:  Well tolerated by patient. Education:    Verbalized understanding    Scheduled to return for next dose of Epogen on November 19, 2021 .      Electronically signed by Marleen Mohamud RN on 11/5/2021 at 3:54 PM    PT/INR results faxed to Dr. Cindy Rowan

## 2021-11-19 ENCOUNTER — HOSPITAL ENCOUNTER (OUTPATIENT)
Dept: INFUSION THERAPY | Age: 86
Setting detail: INFUSION SERIES
Discharge: HOME | End: 2021-11-19
Payer: MEDICARE

## 2021-11-19 VITALS
TEMPERATURE: 97.6 F | DIASTOLIC BLOOD PRESSURE: 67 MMHG | RESPIRATION RATE: 16 BRPM | OXYGEN SATURATION: 97 % | SYSTOLIC BLOOD PRESSURE: 125 MMHG | HEART RATE: 59 BPM

## 2021-11-19 DIAGNOSIS — N18.30 STAGE 3 CHRONIC KIDNEY DISEASE, UNSPECIFIED WHETHER STAGE 3A OR 3B CKD (HCC): ICD-10-CM

## 2021-11-19 DIAGNOSIS — N18.30 ANEMIA IN STAGE 3 CHRONIC KIDNEY DISEASE, UNSPECIFIED WHETHER STAGE 3A OR 3B CKD (HCC): Primary | ICD-10-CM

## 2021-11-19 DIAGNOSIS — D63.1 ANEMIA IN STAGE 3 CHRONIC KIDNEY DISEASE, UNSPECIFIED WHETHER STAGE 3A OR 3B CKD (HCC): Primary | ICD-10-CM

## 2021-11-19 LAB
HEMOGLOBIN: 10.2 G/DL (ref 12–16)
INR BLD: 1.95 (ref 0.88–1.12)
PROTHROMBIN TIME: 22.7 SEC (ref 9.9–12.7)

## 2021-11-19 PROCEDURE — 6360000002 HC RX W HCPCS: Performed by: INTERNAL MEDICINE

## 2021-11-19 PROCEDURE — 85018 HEMOGLOBIN: CPT

## 2021-11-19 PROCEDURE — 85610 PROTHROMBIN TIME: CPT

## 2021-11-19 PROCEDURE — 96372 THER/PROPH/DIAG INJ SC/IM: CPT

## 2021-11-19 RX ADMIN — EPOETIN ALFA-EPBX 4000 UNITS: 4000 INJECTION, SOLUTION INTRAVENOUS; SUBCUTANEOUS at 13:47

## 2021-11-19 RX ADMIN — EPOETIN ALFA-EPBX 10000 UNITS: 10000 INJECTION, SOLUTION INTRAVENOUS; SUBCUTANEOUS at 13:47

## 2021-11-19 NOTE — PROGRESS NOTES
[facility] [facility]    Peripheral Lab Draw    NAME:  Edmund Samano OF BIRTH:  1934  MEDICAL RECORD NUMBER:  [id number]  Episode Date:  11/19/2021    Blood Draw Site: Location:right arm  Site cleansed with Chloroprep Scrub for 30 seconds: Yes  Site cleansed with Alcohol pads: Yes  Labs drawn with: 21 gauge             [x] Butterfly    [] Needle  Labs Obtained: Yes  Number of attempts: 1    Lab Test(s) Ordered: PT / INR    Lab Draw Site:  Redness: No  Bruising: No   Edema: No  Pain: No     Response to treatment:  Well tolerated by patient.       Electronically signed by Christine Modi RN on 11/19/2021 at 1:59 PM

## 2021-12-03 ENCOUNTER — HOSPITAL ENCOUNTER (OUTPATIENT)
Dept: INFUSION THERAPY | Age: 86
Setting detail: INFUSION SERIES
Discharge: HOME | End: 2021-12-03
Payer: MEDICARE

## 2021-12-03 VITALS
TEMPERATURE: 97.9 F | HEART RATE: 65 BPM | RESPIRATION RATE: 18 BRPM | DIASTOLIC BLOOD PRESSURE: 58 MMHG | SYSTOLIC BLOOD PRESSURE: 120 MMHG | OXYGEN SATURATION: 97 %

## 2021-12-03 DIAGNOSIS — D63.1 ANEMIA IN STAGE 3 CHRONIC KIDNEY DISEASE, UNSPECIFIED WHETHER STAGE 3A OR 3B CKD (HCC): Primary | ICD-10-CM

## 2021-12-03 DIAGNOSIS — Z79.01 LONG TERM (CURRENT) USE OF ANTICOAGULANTS: ICD-10-CM

## 2021-12-03 DIAGNOSIS — N18.30 ANEMIA IN STAGE 3 CHRONIC KIDNEY DISEASE, UNSPECIFIED WHETHER STAGE 3A OR 3B CKD (HCC): Primary | ICD-10-CM

## 2021-12-03 DIAGNOSIS — N18.30 STAGE 3 CHRONIC KIDNEY DISEASE, UNSPECIFIED WHETHER STAGE 3A OR 3B CKD (HCC): ICD-10-CM

## 2021-12-03 LAB
HEMOGLOBIN: 10.1 G/DL (ref 12–16)
INR BLD: 2.03 (ref 0.88–1.12)
PROTHROMBIN TIME: 23.6 SEC (ref 9.9–12.7)

## 2021-12-03 PROCEDURE — 6360000002 HC RX W HCPCS: Performed by: INTERNAL MEDICINE

## 2021-12-03 PROCEDURE — 96372 THER/PROPH/DIAG INJ SC/IM: CPT

## 2021-12-03 PROCEDURE — 85018 HEMOGLOBIN: CPT

## 2021-12-03 PROCEDURE — 85610 PROTHROMBIN TIME: CPT

## 2021-12-03 RX ADMIN — EPOETIN ALFA-EPBX 10000 UNITS: 10000 INJECTION, SOLUTION INTRAVENOUS; SUBCUTANEOUS at 13:48

## 2021-12-03 RX ADMIN — EPOETIN ALFA-EPBX 4000 UNITS: 4000 INJECTION, SOLUTION INTRAVENOUS; SUBCUTANEOUS at 13:48

## 2021-12-03 NOTE — PROGRESS NOTES
[facility]     Epogen Visit with Peripheral Lab Draw    NAME:  Elena Black OF BIRTH:  1934  MEDICAL RECORD NUMBER:  [id number]  Episode Date:  12/3/2021    Patient arrived to [facility] 58   [] per wheelchair   [] ambulatory    Peripheral Lab Draw    Blood Draw Site: Location:right arm  Site cleansed with Chloroprep Scrub for 30 seconds: Yes  Site cleansed with Alcohol pads: No:   Labs drawn with: 21 gauge             [x] Butterfly    [] Needle  Labs Obtained: Yes  Number of attempts: 1    Lab Test(s) Ordered: HGB ans PT & INR    Lab Draw Site:  Redness: No  Bruising: No   Edema: No  Pain: No       Epogen Visit     Is the patient experiencing any:  Fatigue:   []   None  [x]   Increase over baseline but not altering normal activities  []   Moderate of causing difficulty performing some activities  []   Severe or loss of ability to perform some activities  []   Bedridden or disabling    Dizziness or Lightheadedness:   [x]   None  []   No Interference  []   Interferes with functioning but not activities of daily living  []   Interferes with daily activies  []   Bedridden or disabling    Shortness of Breath:   [x]   None   []   Dyspneic on exertion   []   Dyspnea with normal activities  []   Dyspnea at rest    Edema: 1+ Location of edema: left leg and right leg    Tachycardia: No    Heart Palpitations: No      Chest Pain: No     BP (!) 120/58   Pulse 65   Temp 97.9 °F (36.6 °C) (Oral)   Resp 18   LMP  (LMP Unknown)   SpO2 97%     Hold ZARINA dose if B/P is greater than: 180 mm/Hg     If Hgb remains less than 10 Increase dose by 25%. Do not increase dose more frequently than once every 4 weeks. If Hgb 10-10.5 g/dl  Continue same dose  If Hgb 10.6-11 g/dl Decrease dose by 25%. A decrease in dose can be done as frequently as every week. If Hgb is greater than 11 g/dl Hold Epogen.   May resume ZARINA when Hgb is less than 10 with a 25% reduction in the dose from the last administered dose or decrease with a 25% reduction in the dose from the last administered dose or decrease  frequency. Last visit date: 11/19/21     Last Hgb: 10.2 g/dl   Last dose: 14,000 units    Current Lab Data:    Recent Labs     12/03/21  1318   HGB 10.1*     Dose will remain the same    Epogen dosage: 14,000 units was administered slowly subcutaneously into the right upper arm. Dose verified by:  Bijan Perry RN    Response to treatment:  Well tolerated by patient. Education:    Indicates understanding    Scheduled to return for next dose of Epogen on December 17, 2021 . PT & INR faxed to Dr. Gorge Vela. PT 23.6 and INR 2.03. Patient is scheduled to see Dr. Gorge Vela on 12/27/21    Scheduled to see Dr. Miriam Chavez on 1/24/22.      Electronically signed by Twila Espinoza RN on 12/3/2021 at 1:54 PM.

## 2021-12-17 ENCOUNTER — HOSPITAL ENCOUNTER (OUTPATIENT)
Dept: INFUSION THERAPY | Age: 86
Setting detail: INFUSION SERIES
Discharge: HOME | End: 2021-12-17
Payer: MEDICARE

## 2021-12-17 VITALS
TEMPERATURE: 97.8 F | DIASTOLIC BLOOD PRESSURE: 74 MMHG | OXYGEN SATURATION: 98 % | SYSTOLIC BLOOD PRESSURE: 136 MMHG | RESPIRATION RATE: 18 BRPM | HEART RATE: 73 BPM

## 2021-12-17 DIAGNOSIS — I48.0 PAROXYSMAL ATRIAL FIBRILLATION (HCC): ICD-10-CM

## 2021-12-17 DIAGNOSIS — Z79.01 LONG TERM (CURRENT) USE OF ANTICOAGULANTS: Primary | ICD-10-CM

## 2021-12-17 DIAGNOSIS — D63.1 ANEMIA IN STAGE 3 CHRONIC KIDNEY DISEASE, UNSPECIFIED WHETHER STAGE 3A OR 3B CKD (HCC): Primary | ICD-10-CM

## 2021-12-17 DIAGNOSIS — N18.30 ANEMIA IN STAGE 3 CHRONIC KIDNEY DISEASE, UNSPECIFIED WHETHER STAGE 3A OR 3B CKD (HCC): Primary | ICD-10-CM

## 2021-12-17 DIAGNOSIS — N18.30 STAGE 3 CHRONIC KIDNEY DISEASE, UNSPECIFIED WHETHER STAGE 3A OR 3B CKD (HCC): ICD-10-CM

## 2021-12-17 LAB
HEMOGLOBIN: 10.6 G/DL (ref 12–16)
INR BLD: 1.74 (ref 0.88–1.12)
PROTHROMBIN TIME: 20.1 SEC (ref 9.9–12.7)

## 2021-12-17 PROCEDURE — 6360000002 HC RX W HCPCS

## 2021-12-17 PROCEDURE — 85018 HEMOGLOBIN: CPT

## 2021-12-17 PROCEDURE — 96372 THER/PROPH/DIAG INJ SC/IM: CPT

## 2021-12-17 PROCEDURE — 6360000002 HC RX W HCPCS: Performed by: INTERNAL MEDICINE

## 2021-12-17 PROCEDURE — 85610 PROTHROMBIN TIME: CPT

## 2021-12-17 RX ADMIN — EPOETIN ALFA-EPBX 11000 UNITS: 10000 INJECTION, SOLUTION INTRAVENOUS; SUBCUTANEOUS at 14:02

## 2021-12-17 NOTE — PROGRESS NOTES
[address] [facility]    Peripheral Lab Draw    NAME:  Satnam Dempsey OF BIRTH:  1934  MEDICAL RECORD NUMBER:  [id number]  Episode Date:  12/17/2021    Blood Draw Site: Location: right proximal forearm  Site cleansed with Chloroprep Scrub for 30 seconds: Yes  Labs drawn with: 23 gauge             [x] Butterfly    [] Needle  Labs Obtained: Yes  Number of attempts: 1    Lab Test(s) Ordered: PT/INR (STAT Hemoglobin drawn on another account)    **Results of PT/INR sent to Dr Sharita Tillman and Dr Bill Stringer. Dr Bill Stringer will call patient with adjustments to Coumadin dose**    Lab Draw Site:  Redness: No  Bruising: No   Edema: No  Pain: No     Response to treatment:  Well tolerated by patient.       Electronically signed by Martin Rowan RN on 12/17/2021 at 2:40 PM

## 2021-12-17 NOTE — PROGRESS NOTES
[facility]     Epogen Visit with Peripheral Lab Draw    NAME:  Lalo Cannon OF BIRTH:  1934  MEDICAL RECORD NUMBER:  [id number]  Episode Date:  12/17/2021    Patient arrived to [facility] 58   [x] per wheelchair   [] ambulatory    Alert and oriented to person, place and time. Reports feeling very well and denies complaints. Patient denies signs/symptoms of respiratory infection, has received Covid-19 vaccination and is wearing a mask to prevent the spread of Covid-19.     Peripheral Lab Draw    Blood Draw Site: Location: right proximal forearm  Site cleansed with Chloroprep Scrub for 30 seconds: Yes  Labs drawn with: 23 gauge             [x] Butterfly    [] Needle  Labs Obtained: Yes  Number of attempts: 1    Lab Test(s) Ordered: STAT Hemoglobin (PT/INR drawn on another account)    Lab Draw Site:  Redness: No  Bruising: No   Edema: No  Pain: No       Epogen Visit     Is the patient experiencing any:  Fatigue:   [x]   None - states that she doesn't do much activity - her family helps to take care of her  []   Increase over baseline but not altering normal activities  []   Moderate of causing difficulty performing some activities  []   Severe or loss of ability to perform some activities  []   Bedridden or disabling    Dizziness or Lightheadedness:   [x]   None  []   No Interference  []   Interferes with functioning but not activities of daily living  []   Interferes with daily activies  []   Bedridden or disabling    Shortness of Breath:   [x]   None   []   Dyspneic on exertion    []   Dyspnea with normal activities  []   Dyspnea at rest    Edema: +1 non-pitting edema right foot and ankle; edema noted in left foot and ankle but unable to assess due to leg being wrapped with ACE wrap from foot to knee    Tachycardia: No    Heart Palpitations: No      Chest Pain: No     /74   Pulse 73   Temp 97.8 °F (36.6 °C) (Oral)   Resp 18   LMP  (LMP Unknown)   SpO2 98%     Hold ZARINA dose if B/P is greater than: 180 mm/Hg     If Hgb remains less than 10 Increase dose by 25%. Do not increase dose more frequently than once every 4 weeks. If Hgb 10-10.5 g/dl  Continue same dose  If Hgb 10.6-11 g/dl Decrease dose by 25%. A decrease in dose can be done as frequently as every week. If Hgb is greater than 11 g/dl Hold Epogen. May resume ZARINA when Hgb is less than 10 with a 25% reduction in the dose from the last administered dose or decrease with a 25% reduction in the dose from the last administered dose or decrease  frequency. Last visit date: 12/3/2021     Last Hgb: 10.1 g/dl   Last dose: 14,000 units    Current Lab Data:    Recent Labs     12/17/21  1334   HGB 10.6*     Dose will be decreased by 25% per therapy plan order. Epogen dosage: 11,000 units was administered slowly subcutaneously into the right upper arm. Dose verified by:  Sagar Monahan RN    Response to treatment:  Well tolerated by patient. Education: Verbal and written discharge instructions reviewed with patient and daughter. Both verbalized understanding. Written copy given via AVS     Scheduled to return for next dose of Epogen on December 30, 2021 .      Electronically signed by Selene Bower RN on 12/17/2021 at 2:32 PM

## 2021-12-30 ENCOUNTER — HOSPITAL ENCOUNTER (OUTPATIENT)
Dept: INFUSION THERAPY | Age: 86
Setting detail: INFUSION SERIES
Discharge: HOME | End: 2021-12-30
Payer: MEDICARE

## 2021-12-30 VITALS
DIASTOLIC BLOOD PRESSURE: 60 MMHG | TEMPERATURE: 98 F | RESPIRATION RATE: 18 BRPM | OXYGEN SATURATION: 97 % | HEART RATE: 82 BPM | SYSTOLIC BLOOD PRESSURE: 119 MMHG

## 2021-12-30 DIAGNOSIS — N18.30 ANEMIA IN STAGE 3 CHRONIC KIDNEY DISEASE, UNSPECIFIED WHETHER STAGE 3A OR 3B CKD (HCC): Primary | ICD-10-CM

## 2021-12-30 DIAGNOSIS — Z79.01 LONG TERM (CURRENT) USE OF ANTICOAGULANTS: Primary | ICD-10-CM

## 2021-12-30 DIAGNOSIS — D63.1 ANEMIA IN STAGE 3 CHRONIC KIDNEY DISEASE, UNSPECIFIED WHETHER STAGE 3A OR 3B CKD (HCC): Primary | ICD-10-CM

## 2021-12-30 DIAGNOSIS — N18.30 STAGE 3 CHRONIC KIDNEY DISEASE, UNSPECIFIED WHETHER STAGE 3A OR 3B CKD (HCC): ICD-10-CM

## 2021-12-30 DIAGNOSIS — I48.0 PAROXYSMAL ATRIAL FIBRILLATION (HCC): ICD-10-CM

## 2021-12-30 LAB
HEMOGLOBIN: 10.7 G/DL (ref 12–16)
INR BLD: 2.18 (ref 0.88–1.12)
PROTHROMBIN TIME: 25.4 SEC (ref 9.9–12.7)

## 2021-12-30 PROCEDURE — 96372 THER/PROPH/DIAG INJ SC/IM: CPT

## 2021-12-30 PROCEDURE — 85018 HEMOGLOBIN: CPT

## 2021-12-30 PROCEDURE — 85610 PROTHROMBIN TIME: CPT

## 2021-12-30 PROCEDURE — 6360000002 HC RX W HCPCS: Performed by: INTERNAL MEDICINE

## 2021-12-30 RX ADMIN — EPOETIN ALFA-EPBX 8000 UNITS: 4000 INJECTION, SOLUTION INTRAVENOUS; SUBCUTANEOUS at 14:09

## 2021-12-31 NOTE — PROGRESS NOTES
[facility] Visit with Peripheral Lab Draw    NAME:  Ladan Purcell OF BIRTH:  1934  MEDICAL RECORD NUMBER:  [id number]  Episode Date:  12/30/2021    Patient arrived to [facility] 58   [x] per wheelchair   [] ambulatory    Alert and orient to person, place and time. Reports feeling well but a little more tired than usual.  Patient denies signs/symptoms of respiratory infection, has received Covid-19 vaccination and is wearing a mask to prevent the spread of Covid-19. Peripheral Lab Draw    Blood Draw Site: Location: left proximal lateral forearm  Site cleansed with Chloroprep Scrub for 30 seconds: Yes  Labs drawn with: 23 gauge             [x] Butterfly    [] Needle  Labs Obtained: Yes  Number of attempts: 1    Lab Test(s) Ordered: STAT Hemoglobin (PT/INR drawn on another account)    Lab Draw Site:  Redness: No  Bruising: No   Edema: No  Pain: No       Epogen Visit     Is the patient experiencing any:  Fatigue:   []   None  [x]   Increase over baseline but not altering normal activities - states she has been getting a little more tired when she tries to walk. []   Moderate of causing difficulty performing some activities  []   Severe or loss of ability to perform some activities  []   Bedridden or disabling    Dizziness or Lightheadedness:   [x]   None  []   No Interference  []   Interferes with functioning but not activities of daily living  []   Interferes with daily activies  []   Bedridden or disabling    Shortness of Breath:   [x]   None   []   Dyspneic on exertion   []   Dyspnea with normal activities  []   Dyspnea at rest    Edema: +1 non-pitting edema in right foot and ankle. Left leg has some edema but unable to fully assess due to left leg being wrapped with ACE wrap from toes to knee.      Tachycardia: No    Heart Palpitations: No      Chest Pain: No     /60   Pulse 82   Temp 98 °F (36.7 °C) (Oral)   Resp 18   LMP (LMP Unknown)   SpO2 97%     Hold ZARINA dose if B/P is greater than: 180 mm/Hg     If Hgb remains less than 10 Increase dose by 25%. Do not increase dose more frequently than once every 4 weeks. If Hgb 10-10.5 g/dl  Continue same dose  If Hgb 10.6-11 g/dl Decrease dose by 25%. A decrease in dose can be done as frequently as every week. If Hgb is greater than 11 g/dl Hold Epogen. May resume ZARINA when Hgb is less than 10 with a 25% reduction in the dose from the last administered dose or decrease with a 25% reduction in the dose from the last administered dose or decrease  frequency. Last visit date: 12/17/2021     Last Hgb: 10.6 g/dl   Last dose: 11,000 units    Current Lab Data:    Recent Labs     12/30/21  1320   HGB 10.7*     Dose will be decreased by 25% per therapy plan order    Epogen dosage: 8,000 units was administered slowly subcutaneously into the right upper arm. Dose verified by:  Vargas Floyd RN    Response to treatment:  Well tolerated by patient. Education: Verbal and written discharge instructions reviewed with patient and daughter. Both verbalized understanding. Written copy given via AVS    Scheduled to return for next dose of Epogen on January 14, 2022 .      Electronically signed by Stephanie Bridges RN on 12/30/2021 at 8:43 PM

## 2021-12-31 NOTE — PROGRESS NOTES
[address] [facility]    Peripheral Lab Draw    NAME:  Joann Glez OF BIRTH:  1934  MEDICAL RECORD NUMBER:  [id number]  Episode Date:  12/30/2021    Blood Draw Site: Location: left proximal lateral forearm  Site cleansed with Chloroprep Scrub for 30 seconds: Yes  Labs drawn with: 23 gauge             [x] Butterfly    [] Needle  Labs Obtained: Yes  Number of attempts: 1    Lab Test(s) Ordered: PT/INR (STAT Hemoglobin drawn on another account)  **Patient's daughter stating that PT/INR needs to be drawn today because when it was drawn on 12/17/2021, INR was too low so her Coumadin dose was adjusted. Results sent to Dr Manuel Gordillo and Dr Liss Edmonds**    Lab Draw Site:  Redness: No  Bruising: No   Edema: No  Pain: No     Response to treatment:  Well tolerated by patient.       Electronically signed by Duane Ngo RN on 12/30/2021 at 8:57 PM

## 2022-01-14 ENCOUNTER — HOSPITAL ENCOUNTER (OUTPATIENT)
Dept: INFUSION THERAPY | Age: 87
Setting detail: INFUSION SERIES
Discharge: HOME | End: 2022-01-14
Payer: MEDICARE

## 2022-01-14 VITALS
SYSTOLIC BLOOD PRESSURE: 126 MMHG | OXYGEN SATURATION: 99 % | DIASTOLIC BLOOD PRESSURE: 78 MMHG | HEART RATE: 61 BPM | TEMPERATURE: 97.7 F | RESPIRATION RATE: 18 BRPM

## 2022-01-14 DIAGNOSIS — Z79.01 LONG TERM (CURRENT) USE OF ANTICOAGULANTS: Primary | ICD-10-CM

## 2022-01-14 DIAGNOSIS — N18.30 ANEMIA IN STAGE 3 CHRONIC KIDNEY DISEASE, UNSPECIFIED WHETHER STAGE 3A OR 3B CKD (HCC): Primary | ICD-10-CM

## 2022-01-14 DIAGNOSIS — D63.1 ANEMIA IN STAGE 3 CHRONIC KIDNEY DISEASE, UNSPECIFIED WHETHER STAGE 3A OR 3B CKD (HCC): Primary | ICD-10-CM

## 2022-01-14 DIAGNOSIS — I48.0 PAROXYSMAL ATRIAL FIBRILLATION (HCC): ICD-10-CM

## 2022-01-14 DIAGNOSIS — N18.30 STAGE 3 CHRONIC KIDNEY DISEASE, UNSPECIFIED WHETHER STAGE 3A OR 3B CKD (HCC): ICD-10-CM

## 2022-01-14 LAB
ALBUMIN SERPL-MCNC: 3.4 G/DL (ref 3.4–5)
ANION GAP SERPL CALCULATED.3IONS-SCNC: 12 MMOL/L (ref 3–16)
BUN BLDV-MCNC: 55 MG/DL (ref 7–20)
CALCIUM SERPL-MCNC: 9.2 MG/DL (ref 8.3–10.6)
CHLORIDE BLD-SCNC: 97 MMOL/L (ref 99–110)
CO2: 28 MMOL/L (ref 21–32)
CREAT SERPL-MCNC: 2.9 MG/DL (ref 0.6–1.2)
FERRITIN: 661.2 NG/ML (ref 15–150)
GFR AFRICAN AMERICAN: 19
GFR NON-AFRICAN AMERICAN: 15
GLUCOSE BLD-MCNC: 123 MG/DL (ref 70–99)
HCT VFR BLD CALC: 31.6 % (ref 36–48)
HEMOGLOBIN: 10.1 G/DL (ref 12–16)
INR BLD: 2.1 (ref 0.88–1.12)
IRON SATURATION: 49 % (ref 15–50)
IRON: 87 UG/DL (ref 37–145)
MCH RBC QN AUTO: 28.7 PG (ref 26–34)
MCHC RBC AUTO-ENTMCNC: 32.1 G/DL (ref 31–36)
MCV RBC AUTO: 89.5 FL (ref 80–100)
PDW BLD-RTO: 17.5 % (ref 12.4–15.4)
PHOSPHORUS: 3.4 MG/DL (ref 2.5–4.9)
PLATELET # BLD: 134 K/UL (ref 135–450)
PMV BLD AUTO: 6.8 FL (ref 5–10.5)
POTASSIUM SERPL-SCNC: 3.8 MMOL/L (ref 3.5–5.1)
PROTHROMBIN TIME: 24.5 SEC (ref 9.9–12.7)
RBC # BLD: 3.53 M/UL (ref 4–5.2)
SODIUM BLD-SCNC: 137 MMOL/L (ref 136–145)
TOTAL IRON BINDING CAPACITY: 177 UG/DL (ref 260–445)
WBC # BLD: 4.6 K/UL (ref 4–11)

## 2022-01-14 PROCEDURE — 96372 THER/PROPH/DIAG INJ SC/IM: CPT

## 2022-01-14 PROCEDURE — 6360000002 HC RX W HCPCS: Performed by: INTERNAL MEDICINE

## 2022-01-14 PROCEDURE — 82728 ASSAY OF FERRITIN: CPT

## 2022-01-14 PROCEDURE — 85610 PROTHROMBIN TIME: CPT

## 2022-01-14 PROCEDURE — 80069 RENAL FUNCTION PANEL: CPT

## 2022-01-14 PROCEDURE — 83550 IRON BINDING TEST: CPT

## 2022-01-14 PROCEDURE — 83540 ASSAY OF IRON: CPT

## 2022-01-14 PROCEDURE — 85027 COMPLETE CBC AUTOMATED: CPT

## 2022-01-14 RX ADMIN — EPOETIN ALFA-EPBX 8000 UNITS: 4000 INJECTION, SOLUTION INTRAVENOUS; SUBCUTANEOUS at 14:07

## 2022-01-14 NOTE — PROGRESS NOTES
[address] [facility]     Epogen Visit with Peripheral Lab Draw    NAME:  Elena Black OF BIRTH:  1934  MEDICAL RECORD NUMBER:  [id number]  Episode Date:  1/14/2022    Patient arrived to [facility] 58   [] per wheelchair   [] ambulatory    Peripheral Lab Draw    Blood Draw Site: Location:left arm  Site cleansed with Chloroprep Scrub for 30 seconds: Yes  Site cleansed with Alcohol pads: No:   Labs drawn with: 23 gauge             [x] Butterfly    [] Needle  Labs Obtained: Yes  Number of attempts: 1    Lab Test(s) Ordered: CBC, RENAL, IRON STUDIES, PT, INR    Lab Draw Site:  Redness: No  Bruising: No   Edema: No  Pain: No       Epogen Visit     Is the patient experiencing any:  Fatigue:   [x]   None  []   Increase over baseline but not altering normal activities  []   Moderate of causing difficulty performing some activities  []   Severe or loss of ability to perform some activities  []   Bedridden or disabling    Dizziness or Lightheadedness:   [x]   None  []   No Interference  []   Interferes with functioning but not activities of daily living  []   Interferes with daily activies  []   Bedridden or disabling    Shortness of Breath:   [x]   None   []   Dyspneic on exertion   []   Dyspnea with normal activities  []   Dyspnea at rest    Edema: 1+ Location of edema: left leg and right leg    Tachycardia: No    Heart Palpitations: No      Chest Pain: No     /78   Pulse 61   Temp 97.7 °F (36.5 °C) (Oral)   Resp 18   LMP  (LMP Unknown)   SpO2 99%     Hold ZARINA dose if B/P is greater than: 180   mm/Hg     If Hgb remains less than 10 Increase dose by 25%. Do not increase dose more frequently than once every 4 weeks. If Hgb 10-10.5 g/dl  Continue same dose  If Hgb 10.6-11 g/dl Decrease dose by 25%. A decrease in dose can be done as frequently as every week. If Hgb is greater than 11 g/dl Hold Epogen.   May resume ZARINA when Hgb is less than 10 with a 25% reduction in the dose from the last administered dose or decrease with a 25% reduction in the dose from the last administered dose or decrease  frequency. Last visit date: 12/30/21     Last Hgb: 10.7 g/dl   Last dose: 8,000 units    Current Lab Data:    Recent Labs     01/14/22  1318   WBC 4.6   HGB 10.1*   HCT 31.6*   MCV 89.5   *     Dose will be the same    Epogen dosage: 8,000 units was administered slowly subcutaneously into the left upper arm. Dose verified by: Adele Randhawa RN    Response to treatment:  Well tolerated by patient. Education:    Indicates understanding    Scheduled to return for next dose of Epogen on January 28, 2022 .      Electronically signed by Lissette Mclean RN on 1/14/2022 at 1:43 PM

## 2022-01-28 ENCOUNTER — HOSPITAL ENCOUNTER (OUTPATIENT)
Dept: INFUSION THERAPY | Age: 87
Setting detail: INFUSION SERIES
Discharge: HOME | End: 2022-01-28
Payer: MEDICARE

## 2022-01-28 VITALS
HEART RATE: 71 BPM | OXYGEN SATURATION: 94 % | SYSTOLIC BLOOD PRESSURE: 97 MMHG | DIASTOLIC BLOOD PRESSURE: 61 MMHG | TEMPERATURE: 97.3 F | RESPIRATION RATE: 17 BRPM

## 2022-01-28 DIAGNOSIS — N18.30 STAGE 3 CHRONIC KIDNEY DISEASE, UNSPECIFIED WHETHER STAGE 3A OR 3B CKD (HCC): ICD-10-CM

## 2022-01-28 DIAGNOSIS — N18.30 ANEMIA IN STAGE 3 CHRONIC KIDNEY DISEASE, UNSPECIFIED WHETHER STAGE 3A OR 3B CKD (HCC): Primary | ICD-10-CM

## 2022-01-28 DIAGNOSIS — D63.1 ANEMIA IN STAGE 3 CHRONIC KIDNEY DISEASE, UNSPECIFIED WHETHER STAGE 3A OR 3B CKD (HCC): Primary | ICD-10-CM

## 2022-01-28 LAB — HEMOGLOBIN: 10.1 G/DL (ref 12–16)

## 2022-01-28 PROCEDURE — 6360000002 HC RX W HCPCS: Performed by: INTERNAL MEDICINE

## 2022-01-28 PROCEDURE — 85018 HEMOGLOBIN: CPT

## 2022-01-28 PROCEDURE — 96372 THER/PROPH/DIAG INJ SC/IM: CPT

## 2022-01-28 RX ADMIN — EPOETIN ALFA-EPBX 8000 UNITS: 4000 INJECTION, SOLUTION INTRAVENOUS; SUBCUTANEOUS at 13:47

## 2022-01-28 NOTE — PROGRESS NOTES
[address] [facility]     Epogen Visit with Peripheral Lab Draw    NAME:  Bobby Dobson OF BIRTH:  1934  MEDICAL RECORD NUMBER:  [id number]  Episode Date:  1/28/2022    Patient arrived to [facility] 58   [x] per wheelchair   [] ambulatory    Peripheral Lab Draw    Blood Draw Site: Location:right arm  Site cleansed with Chloroprep Scrub for 30 seconds: Yes  Site cleansed with Alcohol pads: Yes  Labs drawn with: 21 gauge             [x] Butterfly    [] Needle  Labs Obtained: Yes  Number of attempts: 1    Lab Test(s) Ordered: HGB    Lab Draw Site:  Redness: No  Bruising: No   Edema: No  Pain: No       Epogen Visit     Is the patient experiencing any:  Fatigue:   []   None  []   Increase over baseline but not altering normal activities  [x]   Moderate of causing difficulty performing some activities  []   Severe or loss of ability to perform some activities  []   Bedridden or disabling    Dizziness or Lightheadedness:   [x]   None  []   No Interference  []   Interferes with functioning but not activities of daily living  []   Interferes with daily activies  []   Bedridden or disabling    Shortness of Breath:   [x]   None   []   Dyspneic on exertion   []   Dyspnea with normal activities  []   Dyspnea at rest    Edema: 1+ Location of edema: left leg    Tachycardia: No    Heart Palpitations: No      Chest Pain: No     BP 97/61   Pulse 71   Temp 97.3 °F (36.3 °C) (Infrared)   Resp 17   LMP  (LMP Unknown)   SpO2 94%     Hold ZARINA dose if B/P is greater than: 180 mm/Hg     If Hgb remains less than 10 Increase dose by 25%. Do not increase dose more frequently than once every 4 weeks. If Hgb 10-10.5 g/dl  Continue same dose  If Hgb 10.6-11 g/dl Decrease dose by 25%. A decrease in dose can be done as frequently as every week. If Hgb is greater than 11 g/dl Hold Epogen.   May resume AZRINA when Hgb is less than 10 with a 25% reduction in the dose from the last administered dose or decrease with a 25% reduction in the dose from the last administered dose or decrease  frequency. Last visit date: 1/14/22     Last Hgb: 10.1 g/dl   Last dose: 8000 units    Current Lab Data:    Recent Labs     01/28/22  1328   HGB 10.1*     Dose will be the same    Epogen dosage: 8000 units was administered slowly subcutaneously into the right upper arm. Dose verified by:  Micaela Shetty RN    Response to treatment:  Well tolerated by patient. Education:    Verbalized understanding    Scheduled to return for next dose of Epogen on February 11, 2022 .      Electronically signed by Chesley Fabry, RN on 1/28/2022 at 1:40 PM

## 2022-02-11 ENCOUNTER — HOSPITAL ENCOUNTER (OUTPATIENT)
Dept: INFUSION THERAPY | Age: 87
Setting detail: INFUSION SERIES
Discharge: HOME | End: 2022-02-11
Payer: MEDICARE

## 2022-02-11 VITALS
TEMPERATURE: 97.7 F | HEART RATE: 69 BPM | SYSTOLIC BLOOD PRESSURE: 110 MMHG | DIASTOLIC BLOOD PRESSURE: 65 MMHG | OXYGEN SATURATION: 97 % | RESPIRATION RATE: 16 BRPM

## 2022-02-11 DIAGNOSIS — N18.30 STAGE 3 CHRONIC KIDNEY DISEASE, UNSPECIFIED WHETHER STAGE 3A OR 3B CKD (HCC): ICD-10-CM

## 2022-02-11 DIAGNOSIS — I48.0 PAROXYSMAL ATRIAL FIBRILLATION (HCC): ICD-10-CM

## 2022-02-11 DIAGNOSIS — D63.1 ANEMIA IN STAGE 3 CHRONIC KIDNEY DISEASE, UNSPECIFIED WHETHER STAGE 3A OR 3B CKD (HCC): Primary | ICD-10-CM

## 2022-02-11 DIAGNOSIS — Z79.01 LONG TERM (CURRENT) USE OF ANTICOAGULANTS: ICD-10-CM

## 2022-02-11 DIAGNOSIS — N18.30 ANEMIA IN STAGE 3 CHRONIC KIDNEY DISEASE, UNSPECIFIED WHETHER STAGE 3A OR 3B CKD (HCC): Primary | ICD-10-CM

## 2022-02-11 LAB
HEMOGLOBIN: 10.7 G/DL (ref 12–16)
INR BLD: 2.35 (ref 0.88–1.12)
PROTHROMBIN TIME: 27.5 SEC (ref 9.9–12.7)

## 2022-02-11 PROCEDURE — 85610 PROTHROMBIN TIME: CPT

## 2022-02-11 PROCEDURE — 85018 HEMOGLOBIN: CPT

## 2022-02-11 PROCEDURE — 6360000002 HC RX W HCPCS: Performed by: INTERNAL MEDICINE

## 2022-02-11 PROCEDURE — 96372 THER/PROPH/DIAG INJ SC/IM: CPT

## 2022-02-11 RX ADMIN — EPOETIN ALFA-EPBX 6000 UNITS: 3000 INJECTION, SOLUTION INTRAVENOUS; SUBCUTANEOUS at 13:29

## 2022-02-11 NOTE — PROGRESS NOTES
[address] [facility]     Epogen Visit with Peripheral Lab Draw    NAME:  Latoya Foreman OF BIRTH:  1934  MEDICAL RECORD NUMBER:  [id number]  Episode Date:  2/11/2022    Patient arrived to [facility] 58   [x] per wheelchair   [] ambulatory    Alert and oriented X 4. Accompanied per daughter. Patient denies any side effects from last injection. No new increase in s/s of anemia. Also due for PT INR today for Dr. Tara Castillo. Denies new s/s of infection or increase in respiratory distress. Afebrile. Wearing face mask to prevent the spread of COVID-19    Peripheral Lab Draw    Blood Draw Site: Location:left antecubital  Site cleansed with Chloroprep Scrub for 30 seconds: Yes  Site cleansed with Alcohol pads: Yes  Labs drawn with: 25 gauge             [x] Butterfly    [] Needle  Labs Obtained: Yes  Number of attempts: 1    Lab Test(s) Ordered: stat HGB and PT/INR    Lab Draw Site:  Redness: No  Bruising: No   Edema: No  Pain: No       Epogen Visit     Is the patient experiencing any:  Fatigue:   []   None  []   Increase over baseline but not altering normal activities  [x]   Moderate of causing difficulty performing some activities  []   Severe or loss of ability to perform some activities  []   Bedridden or disabling    Dizziness or Lightheadedness:   [x]   None  []   No Interference  []   Interferes with functioning but not activities of daily living  []   Interferes with daily activies  []   Bedridden or disabling    Shortness of Breath:   [x]   None   []   Dyspneic on exertion   []   Dyspnea with normal activities  []   Dyspnea at rest    Edema: 1+ Location of edema: left leg and right leg states much better than it used to be.      Tachycardia: No    Heart Palpitations: No      Chest Pain: No     /65   Pulse 69   Temp 97.7 °F (36.5 °C) (Oral)   Resp 16   LMP  (LMP Unknown)   SpO2 97%     Hold ZARINA dose if B/P is greater than: 180 mm/Hg     If Hgb remains less than 10 Increase dose by 25%. Do not increase dose more frequently than once every 4 weeks. If Hgb 10-10.5 g/dl  Continue same dose  If Hgb 10.6-11 g/dl Decrease dose by 25%. A decrease in dose can be done as frequently as every week. If Hgb is greater than 11 g/dl Hold Epogen. May resume ZARINA when Hgb is less than 10 with a 25% reduction in the dose from the last administered dose or decrease with a 25% reduction in the dose from the last administered dose or decrease  frequency. Last visit date: 1/28/22     Last Hgb: 10.1 g/dl   Last dose: 8,000 units    Current Lab Data:    Recent Labs     02/11/22  1255   HGB 10.7*     Dose will be decreased    Epogen dosage: 6000 units was administered slowly subcutaneously into the left upper arm. Dose verified by:  ZAIDA Putnam RN    Response to treatment:  Well tolerated by patient. Education:    Verbalized understanding    Scheduled to return for next dose of Epogen on February 25, 2022 .      Electronically signed by Tessa Schulte RN on 2/11/2022 at 1:09 PM    *PT/ INR results faxed to Dr. Arsenio Devi

## 2022-02-25 ENCOUNTER — HOSPITAL ENCOUNTER (OUTPATIENT)
Dept: INFUSION THERAPY | Age: 87
Setting detail: INFUSION SERIES
Discharge: HOME | End: 2022-02-25
Payer: MEDICARE

## 2022-02-25 VITALS
RESPIRATION RATE: 16 BRPM | SYSTOLIC BLOOD PRESSURE: 104 MMHG | DIASTOLIC BLOOD PRESSURE: 43 MMHG | HEART RATE: 60 BPM | TEMPERATURE: 98.1 F | OXYGEN SATURATION: 98 %

## 2022-02-25 DIAGNOSIS — D63.1 ANEMIA IN STAGE 3 CHRONIC KIDNEY DISEASE, UNSPECIFIED WHETHER STAGE 3A OR 3B CKD (HCC): Primary | ICD-10-CM

## 2022-02-25 DIAGNOSIS — N18.30 STAGE 3 CHRONIC KIDNEY DISEASE, UNSPECIFIED WHETHER STAGE 3A OR 3B CKD (HCC): ICD-10-CM

## 2022-02-25 DIAGNOSIS — N18.30 ANEMIA IN STAGE 3 CHRONIC KIDNEY DISEASE, UNSPECIFIED WHETHER STAGE 3A OR 3B CKD (HCC): Primary | ICD-10-CM

## 2022-02-25 LAB — HEMOGLOBIN: 10.3 G/DL (ref 12–16)

## 2022-02-25 PROCEDURE — 96372 THER/PROPH/DIAG INJ SC/IM: CPT

## 2022-02-25 PROCEDURE — 85018 HEMOGLOBIN: CPT

## 2022-02-25 PROCEDURE — 6360000002 HC RX W HCPCS: Performed by: INTERNAL MEDICINE

## 2022-02-25 RX ADMIN — EPOETIN ALFA-EPBX 6000 UNITS: 3000 INJECTION, SOLUTION INTRAVENOUS; SUBCUTANEOUS at 13:09

## 2022-02-25 NOTE — PROGRESS NOTES
[address] [facility]     Epogen Visit with Peripheral Lab Draw    NAME:  Madisyn Fortune OF BIRTH:  1934  MEDICAL RECORD NUMBER:  [id number]  Episode Date:  2/25/2022    Patient arrived to [facility] 58   [x] per wheelchair   [] ambulatory    Peripheral Lab Draw    Blood Draw Site: Location:right arm  Site cleansed with Chloroprep Scrub for 30 seconds: Yes  Site cleansed with Alcohol pads: Yes  Labs drawn with: 21 gauge             [x] Butterfly    [] Needle  Labs Obtained: Yes  Number of attempts: 1    Lab Test(s) Ordered: HGB    Lab Draw Site:  Redness: No  Bruising: No   Edema: No  Pain: No       Epogen Visit     Is the patient experiencing any:  Fatigue:   []   None  []   Increase over baseline but not altering normal activities  [x]   Moderate of causing difficulty performing some activities  []   Severe or loss of ability to perform some activities  []   Bedridden or disabling    Dizziness or Lightheadedness:   [x]   None  []   No Interference  []   Interferes with functioning but not activities of daily living  []   Interferes with daily activies  []   Bedridden or disabling    Shortness of Breath:   []   None   [x]   Dyspneic on exertion   []   Dyspnea with normal activities  []   Dyspnea at rest    Edema: 2+ Location of edema: left leg    Tachycardia: No    Heart Palpitations: No      Chest Pain: No     BP (!) 104/43   Pulse 60   Temp 98.1 °F (36.7 °C) (Oral)   Resp 16   LMP  (LMP Unknown)   SpO2 98%     Hold ZARINA dose if B/P is greater than: 180 mm/Hg     If Hgb remains less than 10 Increase dose by 25%. Do not increase dose more frequently than once every 4 weeks. If Hgb 10-10.5 g/dl  Continue same dose  If Hgb 10.6-11 g/dl Decrease dose by 25%. A decrease in dose can be done as frequently as every week. If Hgb is greater than 11 g/dl Hold Epogen.   May resume ZARINA when Hgb is less than 10 with a 25% reduction in the dose from the last administered dose or decrease with a 25% reduction in the dose from the last administered dose or decrease  frequency. Last visit date: 2/11/22     Last Hgb: 10.7 g/dl   Last dose: 6000 units    Current Lab Data:    Recent Labs     02/25/22  1248   HGB 10.3*     Dose will be the same    Epogen dosage: 6000 units was administered slowly subcutaneously into the right upper arm. Dose verified by:  Angel Cortez RN    Response to treatment:  Well tolerated by patient. Education:    Verbalized understanding    Scheduled to return for next dose of Epogen on March 11, 2022 .      Electronically signed by Jeffery Flores RN on 2/25/2022 at 1:16 PM

## 2022-02-25 NOTE — DISCHARGE INSTR - COC
Continuity of Care Form    Patient Name: Vanda Marino   :  1934  MRN:  [id number]    Admit date:  2022  Discharge date:  ***    Code Status Order: Prior   Advance Directives:      Admitting Physician:  No admitting provider for patient encounter. PCP: Rebecca Humphrey MD    Discharging Nurse: [facility] Unit/Room#: No information available for this encounter.   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    HYSTERECTOMY      INTRACAPSULAR CATARACT EXTRACTION Left 10/16/2020    PHACOEMULSIFICATION WITH INTRAOCULAR LENS IMPLANT - LEFT EYE performed by Twila Corado MD at [address] Bilateral     TKT    JOINT REPLACEMENT Bilateral     THR    KNEE SURGERY      bilateral total knees    OTHER SURGICAL HISTORY  02/15/2021    BAV       Immunization History:   Immunization History   Administered Date(s) Administered    COVID-19, Pfizer Purple top, DILUTE for use, 12+ yrs, 30mcg/0.3mL dose 2021, 2021, 2022       Active Problems:  Patient Active Problem List   Diagnosis Code    CHF (congestive heart failure), NYHA class III, acute on chronic, combined ([address] I50.43    Essential hypertension I10    Pulmonary HTN ([address] I27.20    Hypercholesteremia E78.00    Primary localized osteoarthrosis of shoulder region M19.019    Nonrheumatic aortic valve stenosis I35.0    Hyperthyroidism E05.90    Acute kidney injury superimposed on chronic kidney disease ([address] N17.9, N18.9    Hyponatremia E87.1    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    Anemia due to chronic kidney disease N18.9, D63.1    Acute respiratory failure with hypoxia (HCC) J96.01    DAVION (acute kidney injury) ([facility] N17.9    Uncontrolled type 2 diabetes mellitus with hyperglycemia (HCC) E11.65    Acute on chronic combined systolic and diastolic congestive heart failure (HCC) I50.43    Acute congestive heart failure (HCC) I50.9    Severe aortic stenosis I35.0    Paroxysmal atrial fibrillation (HCC) I48.0    CHF (congestive heart failure), NYHA class I, acute on chronic, combined (HCC) I50.43    Acute on chronic congestive heart failure (HCC) I50.9    Bilateral leg edema R60.0    Stage 4 chronic kidney disease (HCC) N18.4    Severe aortic valve stenosis I35.0    Acute renal insufficiency N28.9    Anemia in stage 3 chronic kidney disease (HCC) N18.30, D63.1    Chronic kidney disease, stage III (moderate) (HCC) N18.30    Long term (current) use of anticoagulants Z79.01       Isolation/Infection:   Isolation            No Isolation          Patient Infection Status       Infection Onset Added Last Indicated Last Indicated By Review Planned Expiration Resolved Resolved By    None active    Resolved    COVID-19 (Rule Out) 02/07/21 02/07/21 02/07/21 COVID-19 (Ordered)   02/07/21 Rule-Out Test Resulted    C-diff Rule Out 09/05/20 09/05/20 09/05/20 GI Bacterial Pathogens By PCR (Ordered)   09/06/20 Radha Goodwin RN    COVID-19 (Rule Out) 09/04/20 09/04/20 09/04/20 COVID-19 (Ordered)   09/04/20 Rule-Out Test Resulted    C-diff Rule Out 09/04/20 09/04/20 09/04/20 Clostridium Difficile Toxin/Antigen (Ordered)   09/04/20 Rule-Out Test Resulted            Nurse Assessment:  Last Vital Signs: BP (!) 104/43   Pulse 60   Temp 98.1 °F (36.7 °C) (Oral)   Resp 16   LMP  (LMP Unknown)   SpO2 98%     Last documented pain score (0-10 scale):    Last Weight:   Wt Readings from Last 1 Encounters:   04/29/21 267 lb 6.7 oz (121.3 kg)     Mental Status:  {IP PT MENTAL STATUS:13338}    IV Access:  { TALAT IV ACCESS:104203482}    Nursing Mobility/ADLs:  Walking   {P DME LNYL:832026162}  Transfer  {P DME ZYR}  Bathing  {CHP DME TCEU:930594313}  Dressing  {CHP DME CXYY:403273753}  Toileting  {CHP DME PJTI:518643071}  Feeding  {[facility] DME ROYR:885897945}  Med Admin  {P DME EKEP:590167099}  Med Delivery   { TALAT MED Delivery:334028640}    Wound Care Documentation and Therapy:  Wound 21 Buttocks Mid;Inner (Active)   Number of days: 306        Elimination:  Continence: Bowel: {YES / AB:04797}  Bladder: {YES / AB:67717}  Urinary Catheter: {Urinary Catheter:926273630}   Colostomy/Ileostomy/Ileal Conduit: {YES / SX:72886}       Date of Last BM: ***  No intake or output data in the 24 hours ending 22 1317  No intake/output data recorded.     Safety Concerns:     [employer] Safety Concerns:208677101}    Impairments/Disabilities:      [employer] Impairments/Disabilities:233812491}    Nutrition Therapy:  Current Nutrition Therapy:   [employer] Diet List:454351824}    Routes of Feeding: {[facility] DME Other Feedings:144321968}  Liquids: {Slp liquid thickness:13613}  Daily Fluid Restriction: {P DME Yes amt example:834297839}  Last Modified Barium Swallow with Video (Video Swallowing Test): {Done Not Done QOMJ:623549970}    Treatments at the Time of Hospital Discharge:   Respiratory Treatments: ***  Oxygen Therapy:  {Therapy; copd oxygen:64464}  Ventilator:    {[facility] Vent QNUH:212547916}    Rehab Therapies: {THERAPEUTIC INTERVENTION:4141146616}  Weight Bearing Status/Restrictions: [employer] Weight Bearin}  Other Medical Equipment (for information only, NOT a DME order):  {EQUIPMENT:713598876}  Other Treatments: ***    Patient's personal belongings (please select all that are sent with patient):  {[facility] DME Belongings:253331525}    RN SIGNATURE:  {Esignature:607070037}    CASE MANAGEMENT/SOCIAL WORK SECTION    Inpatient Status Date: ***    Readmission Risk Assessment Score:  Readmission Risk              Risk of Unplanned Readmission:  0           Discharging to Facility/ Agency   Name:   Address:  Phone:  Fax:    Dialysis Facility (if applicable)   Name:  Address:  Dialysis Schedule:  Phone:  Fax:    / signature: {Esignature:588556396}    PHYSICIAN SECTION    Prognosis: {Prognosis:5433739316}    Condition at Discharge: Miki Stanton Patient Condition:384928034}    Rehab Potential (if transferring to Rehab): {Prognosis:7539101450}    Recommended Labs or Other Treatments After Discharge: ***    Physician Certification: I certify the above information and transfer of Francisco Javier Melchor  is necessary for the continuing treatment of the diagnosis listed and that she requires {Admit to Appropriate Level of Care:05338} for {GREATER/LESS:152254285} 30 days.      Update Admission H&P: {CHP DME Changes in TFPAK:288727954}    PHYSICIAN SIGNATURE:  {Esignature:563542673}

## 2022-03-11 ENCOUNTER — HOSPITAL ENCOUNTER (OUTPATIENT)
Dept: INFUSION THERAPY | Age: 87
Setting detail: INFUSION SERIES
Discharge: HOME | End: 2022-03-11
Payer: MEDICARE

## 2022-03-11 VITALS
SYSTOLIC BLOOD PRESSURE: 126 MMHG | DIASTOLIC BLOOD PRESSURE: 73 MMHG | OXYGEN SATURATION: 98 % | TEMPERATURE: 97.8 F | HEART RATE: 72 BPM | RESPIRATION RATE: 20 BRPM

## 2022-03-11 DIAGNOSIS — Z79.01 LONG TERM (CURRENT) USE OF ANTICOAGULANTS: Primary | ICD-10-CM

## 2022-03-11 DIAGNOSIS — D63.1 ANEMIA IN STAGE 3 CHRONIC KIDNEY DISEASE, UNSPECIFIED WHETHER STAGE 3A OR 3B CKD (HCC): Primary | ICD-10-CM

## 2022-03-11 DIAGNOSIS — N18.30 STAGE 3 CHRONIC KIDNEY DISEASE, UNSPECIFIED WHETHER STAGE 3A OR 3B CKD (HCC): ICD-10-CM

## 2022-03-11 DIAGNOSIS — I48.0 PAROXYSMAL ATRIAL FIBRILLATION (HCC): ICD-10-CM

## 2022-03-11 DIAGNOSIS — N18.30 ANEMIA IN STAGE 3 CHRONIC KIDNEY DISEASE, UNSPECIFIED WHETHER STAGE 3A OR 3B CKD (HCC): Primary | ICD-10-CM

## 2022-03-11 LAB
HEMOGLOBIN: 10.3 G/DL (ref 12–16)
INR BLD: 2.09 (ref 0.88–1.12)
PROTHROMBIN TIME: 24.4 SEC (ref 9.9–12.7)

## 2022-03-11 PROCEDURE — 96372 THER/PROPH/DIAG INJ SC/IM: CPT

## 2022-03-11 PROCEDURE — 6360000002 HC RX W HCPCS: Performed by: INTERNAL MEDICINE

## 2022-03-11 PROCEDURE — 85610 PROTHROMBIN TIME: CPT

## 2022-03-11 PROCEDURE — 85018 HEMOGLOBIN: CPT

## 2022-03-11 RX ADMIN — EPOETIN ALFA-EPBX 6000 UNITS: 3000 INJECTION, SOLUTION INTRAVENOUS; SUBCUTANEOUS at 13:18

## 2022-03-11 NOTE — PROGRESS NOTES
[facility]     Epogen Visit with Peripheral Lab Draw    NAME:  Dev Guevara OF BIRTH:  1934  MEDICAL RECORD NUMBER:  [id number]  Episode Date:  3/11/2022    Patient arrived to [facility]   [x] per wheelchair   [] ambulatory    Peripheral Lab Draw    Blood Draw Site: Location:right arm  Site cleansed with Chloroprep Scrub for 30 seconds: No:  Site cleansed with Alcohol pads: Yes  Labs drawn with: 21 gauge             [x] Butterfly    [] Needle  Labs Obtained: Yes  Number of attempts: 1    Lab Test(s) Ordered: hgb, PT and INR    Lab Draw Site:  Redness: No  Bruising: No   Edema: No  Pain: No       Epogen Visit     Is the patient experiencing any:  Fatigue:   [x]   None  []   Increase over baseline but not altering normal activities  []   Moderate of causing difficulty performing some activities  []   Severe or loss of ability to perform some activities  []   Bedridden or disabling    Dizziness or Lightheadedness:   [x]   None  []   No Interference  []   Interferes with functioning but not activities of daily living  []   Interferes with daily activies  []   Bedridden or disabling    Shortness of Breath:   [x]   None   []   Dyspneic on exertion   []   Dyspnea with normal activities  []   Dyspnea at rest     Edema: 2+ Location of edema: left leg    Tachycardia: No    Heart Palpitations: No      Chest Pain: No     /73   Pulse 72   Temp 97.8 °F (36.6 °C) (Oral)   Resp 20   LMP  (LMP Unknown)   SpO2 98%     Hold ZARINA dose if B/P is greater than: 184 mm/Hg     If Hgb remains less than 10 Increase dose by 25%. Do not increase dose more frequently than once every 4 weeks. If Hgb 10-10.5 g/dl  Continue same dose  If Hgb 10.6-11 g/dl Decrease dose by 25%. A decrease in dose can be done as frequently as every week. If Hgb is greater than 11 g/dl Hold Epogen.   May resume ZARINA when Hgb is less than 10 with a 25% reduction in the dose from the last administered dose or decrease with a 25% reduction in the dose from the last administered dose or decrease  frequency. Last visit date: 2/25/2022     Last Hgb: 10.3 g/dl   Last dose: 6000 units    Current Lab Data:    Recent Labs     03/11/22  1240   HGB 10.3*     Dose will be remains the same    Epogen dosage: 6000 units was administered slowly subcutaneously into the right upper arm. Dose verified by: NICOLE Hurtado RN    Response to treatment:  Well tolerated by patient. Education:    Indicates understanding    Scheduled to return for next dose of Epogen on March 25, 2022 .      Electronically signed by Noble Wu RN on 3/11/2022 at 1:08 PM

## 2022-03-25 ENCOUNTER — HOSPITAL ENCOUNTER (OUTPATIENT)
Dept: INFUSION THERAPY | Age: 87
Setting detail: INFUSION SERIES
Discharge: HOME | End: 2022-03-25
Payer: MEDICARE

## 2022-03-25 VITALS
SYSTOLIC BLOOD PRESSURE: 127 MMHG | HEART RATE: 70 BPM | RESPIRATION RATE: 17 BRPM | TEMPERATURE: 97.6 F | OXYGEN SATURATION: 98 % | DIASTOLIC BLOOD PRESSURE: 78 MMHG

## 2022-03-25 DIAGNOSIS — D63.1 ANEMIA IN STAGE 3 CHRONIC KIDNEY DISEASE, UNSPECIFIED WHETHER STAGE 3A OR 3B CKD (HCC): Primary | ICD-10-CM

## 2022-03-25 DIAGNOSIS — N18.30 STAGE 3 CHRONIC KIDNEY DISEASE, UNSPECIFIED WHETHER STAGE 3A OR 3B CKD (HCC): ICD-10-CM

## 2022-03-25 DIAGNOSIS — N18.30 ANEMIA IN STAGE 3 CHRONIC KIDNEY DISEASE, UNSPECIFIED WHETHER STAGE 3A OR 3B CKD (HCC): Primary | ICD-10-CM

## 2022-03-25 LAB — HEMOGLOBIN: 10.4 G/DL (ref 12–16)

## 2022-03-25 PROCEDURE — 6360000002 HC RX W HCPCS: Performed by: INTERNAL MEDICINE

## 2022-03-25 PROCEDURE — 85018 HEMOGLOBIN: CPT

## 2022-03-25 PROCEDURE — 96372 THER/PROPH/DIAG INJ SC/IM: CPT

## 2022-03-25 RX ADMIN — EPOETIN ALFA-EPBX 6000 UNITS: 3000 INJECTION, SOLUTION INTRAVENOUS; SUBCUTANEOUS at 13:43

## 2022-03-25 NOTE — PROGRESS NOTES
administered dose or decrease with a 25% reduction in the dose from the last administered dose or decrease  frequency. Last visit date: 3/11/22     Last Hgb: 10.3 g/dl   Last dose: 6000 units    Current Lab Data:    Recent Labs     03/25/22  1310   HGB 10.4*     Dose will be the same    Epogen dosage: 6000 units was administered slowly subcutaneously into the left upper arm. Dose verified by:  Martha Rushing RN    Response to treatment:  Well tolerated by patient. Education:    Verbalized understanding    Scheduled to return for next dose of Epogen on April 8, 2022 .      Electronically signed by Demetria Rincon RN on 3/25/2022 at 1:46 PM

## 2022-04-08 ENCOUNTER — HOSPITAL ENCOUNTER (OUTPATIENT)
Dept: INFUSION THERAPY | Age: 87
Setting detail: INFUSION SERIES
Discharge: HOME | End: 2022-04-08
Payer: MEDICARE

## 2022-04-08 VITALS
HEART RATE: 67 BPM | OXYGEN SATURATION: 97 % | DIASTOLIC BLOOD PRESSURE: 65 MMHG | RESPIRATION RATE: 16 BRPM | SYSTOLIC BLOOD PRESSURE: 116 MMHG | TEMPERATURE: 97.2 F

## 2022-04-08 DIAGNOSIS — N18.30 ANEMIA IN STAGE 3 CHRONIC KIDNEY DISEASE, UNSPECIFIED WHETHER STAGE 3A OR 3B CKD (HCC): Primary | ICD-10-CM

## 2022-04-08 DIAGNOSIS — Z79.01 LONG TERM (CURRENT) USE OF ANTICOAGULANTS: ICD-10-CM

## 2022-04-08 DIAGNOSIS — N18.30 STAGE 3 CHRONIC KIDNEY DISEASE, UNSPECIFIED WHETHER STAGE 3A OR 3B CKD (HCC): ICD-10-CM

## 2022-04-08 DIAGNOSIS — D63.1 ANEMIA IN STAGE 3 CHRONIC KIDNEY DISEASE, UNSPECIFIED WHETHER STAGE 3A OR 3B CKD (HCC): Primary | ICD-10-CM

## 2022-04-08 DIAGNOSIS — I48.0 PAROXYSMAL ATRIAL FIBRILLATION (HCC): ICD-10-CM

## 2022-04-08 LAB
FERRITIN: 697.4 NG/ML (ref 15–150)
HEMOGLOBIN: 10.1 G/DL (ref 12–16)
INR BLD: 2.43 (ref 0.88–1.12)
IRON SATURATION: 54 % (ref 15–50)
IRON: 84 UG/DL (ref 37–145)
PROTHROMBIN TIME: 28.5 SEC (ref 9.9–12.7)
TOTAL IRON BINDING CAPACITY: 156 UG/DL (ref 260–445)

## 2022-04-08 PROCEDURE — 82728 ASSAY OF FERRITIN: CPT

## 2022-04-08 PROCEDURE — 83540 ASSAY OF IRON: CPT

## 2022-04-08 PROCEDURE — 85018 HEMOGLOBIN: CPT

## 2022-04-08 PROCEDURE — 83550 IRON BINDING TEST: CPT

## 2022-04-08 PROCEDURE — 85610 PROTHROMBIN TIME: CPT

## 2022-04-08 PROCEDURE — 6360000002 HC RX W HCPCS: Performed by: INTERNAL MEDICINE

## 2022-04-08 PROCEDURE — 96372 THER/PROPH/DIAG INJ SC/IM: CPT

## 2022-04-08 RX ADMIN — EPOETIN ALFA-EPBX 6000 UNITS: 3000 INJECTION, SOLUTION INTRAVENOUS; SUBCUTANEOUS at 13:24

## 2022-04-08 NOTE — PROGRESS NOTES
[facility]     Epogen Visit with Peripheral Lab Draw    NAME:  Robbie Escamilla OF BIRTH:  1934  MEDICAL RECORD NUMBER:  [id number]  Episode Date:  4/8/2022    Patient arrived to [facility]   [x] per wheelchair   [] ambulatory    Alert and oriented X 4. Accompanied per daughter. Denies any side effects from last injection. Denies any new s/s of anemia    Denies new s/s of infection or increase in respiratory distress. Afebrile. Wearing face mask to prevent the spread of COVID-19    Peripheral Lab Draw    Blood Draw Site: Location:left arm antecubital  Site cleansed with Chloroprep Scrub for 30 seconds: Yes  Site cleansed with Alcohol pads: Yes  Labs drawn with: 21 gauge             [x] Butterfly    [] Needle  Labs Obtained: Yes  Number of attempts: 1    Lab Test(s) Ordered: Stat HGb and PT/ INR, routine iron TIBC, Ferritin    Lab Draw Site:  Redness: No  Bruising: No   Edema: No  Pain: No       Epogen Visit     Is the patient experiencing any:  Fatigue:   []   None  [x]   Increase over baseline but not altering normal activities  []   Moderate of causing difficulty performing some activities  []   Severe or loss of ability to perform some activities  []   Bedridden or disabling    Dizziness or Lightheadedness:   [x]   None  []   No Interference  []   Interferes with functioning but not activities of daily living  []   Interferes with daily activies  []   Bedridden or disabling    Shortness of Breath:   []   None   [x]   Dyspneic on exertion   []   Dyspnea with normal activities  []   Dyspnea at rest    Edema: none Location of edema: nothing    Tachycardia: No    Heart Palpitations: No      Chest Pain: No     /65   Pulse 67   Temp 97.2 °F (36.2 °C) (Infrared)   Resp 16   LMP  (LMP Unknown)   SpO2 97%     Hold ZARINA dose if B/P is greater than: 180 mm/Hg     If Hgb remains less than 10 Increase dose by 25%.   Do not increase dose more frequently than once every 4 weeks. If Hgb 10-10.5 g/dl  Continue same dose  If Hgb 10.6-11 g/dl Decrease dose by 25%. A decrease in dose can be done as frequently as every week. If Hgb is greater than 11 g/dl Hold Epogen. May resume ZARINA when Hgb is less than 10 with a 25% reduction in the dose from the last administered dose or decrease with a 25% reduction in the dose from the last administered dose or decrease  frequency. Last visit date: 3/25/22     Last Hgb: 10.4 g/dl   Last dose: 6,000 units    Current Lab Data:    No results for input(s): WBC, HGB, HCT, MCV, PLT in the last 72 hours. Dose will be the same    Epogen dosage: 6,000 units was administered slowly subcutaneously into the left upper arm. Dose verified by:  ZAIDA Putnam RN    Response to treatment:  Well tolerated by patient. Education:    Verbalized understanding    Scheduled to return for next dose of Epogen on April 22, 2022 .      Electronically signed by Candie Hodges RN on 4/8/2022 at 1:06 PM

## 2022-04-11 NOTE — PROGRESS NOTES
Occupational Therapy  Facility/Department: [address] CVICU  Daily Treatment Note  NAME: Kiesha Feng  : 1934  MRN: [id number]    Date of Service: 2021    Discharge Recommendations:  24 hour supervision or assist, Home with Home health OT  OT Equipment Recommendations  Other: pt with needed Radha Boyle scored a 15/24 on the AM-PAC ADL Inpatient form. Current research shows that an AM-PAC score of 18 or greater is typically associated with a discharge to the patient's home setting. Based on the patient's AM-PAC score, and their current ADL deficits, it is recommended that the patient have 2-3 sessions per week of Occupational Therapy at d/c to increase the patient's independence. At this time, this patient demonstrates the endurance and safety to discharge home with home therapy and a follow up treatment frequency of 2-3x/wk. Please see assessment section for further patient specific details. If patient discharges prior to next session this note will serve as a discharge summary. Please see below for the latest assessment towards goals. Assessment   Performance deficits / Impairments: Decreased functional mobility ; Decreased strength;Decreased endurance;Decreased ADL status; Decreased safe awareness;Decreased high-level IADLs;Decreased balance  Assessment: 79 y/o female admitted 2021 with CHF exacerbation. Rapid COVID test negative. Pt s/p heart cath 2/10 and transferred to ICU. PTA pt lives at home with spouse. Pt reports she is independent with ADLs and functional mobility with RW. Pt has 10 children who are very involved in care and someone is always with patient at home to assist if needed. Today, pt required min A to stand- requires increase time and effort to transition hands to RW d/t fear of falling and generalized weakness. Pt progressing with functional mobility with short distance in room and RW.   Pt progressing with standing tolerance for ADLs and stood ~ 3 min to Subjective   General  Chart Reviewed: Yes  Patient assessed for rehabilitation services?: Yes  Additional Pertinent Hx: 79 y/o female admitted 2/7/2021 with CHF exacerbation. Rapid COVID test negative. Pt s/p L heart cath 2/10 and transferred to ICU. Family / Caregiver Present: Yes(son)  Referring Practitioner: Genna Angeles MD and Kristen Fierro MD  Diagnosis: CHF  Subjective  Subjective: Pt seen bedside and agreeable to therapy. Pt reports L knee instability with ambulation- PT ace wrapped. HR 90s-100s with activity  General Comment  Comments: Per RN ok for therapy. Objective    ADL  Grooming: Minimal assistance(standing at sink to brush teeth- dorita forearm support)  Additional Comments: Anticipate pt will require max A for LB bathing/dressing, mod A for UB bathing/dressing based on balance, ROM and endurance observed- discussed with pt and son family should provide hands on assist initially at home for safety        Balance  Standing Balance: Minimal assistance  Standing Balance  Time: stood ~ 3 min to brush teeth  Functional Mobility  Functional Mobility Comments: chair > doorway > chair with RW and min A.  Bed mobility  Comment: Pt in chair at start/end of session     Transfers  Sit to stand: Minimal assistance  Stand to sit: Minimal assistance  Transfer Comments: to/from RW- stood from recliner 1x and bedside chair 3x.  Pt requires increase time and effort to transition hands to RW d/t fear of falling         Cognition  Overall Cognitive Status: Andry 89  Times per week: 3-5  Current Treatment Recommendations: Strengthening, Endurance Training, ROM, Balance Training, Gait Training, Functional Mobility Training, Positioning, Self-Care / ADL, Patient/Caregiver Education & Training    AM-PAC Score     AM-PAC Inpatient Daily Activity Raw Score: 15 (02/12/21 1024)  AM-PAC Inpatient ADL T-Scale Score : 34.69 (02/12/21 1024)  ADL Inpatient CMS 0-100% Score: 56.46 (02/12/21 1024)  ADL Inpatient CMS G-Code Modifier : CK (02/12/21 1024)    Goals  Short term goals  Time Frame for Short term goals: Prior to DC: goals ongoing  Short term goal 1: Pt will complete ADL transfers with supervision  Short term goal 2: Pt will complete functional mobility with supervision  Short term goal 3: Pt will tolerate standing > 3 min for functional task with supervision  Short term goal 4: Pt will complete toileting with supervision  Short term goal 5: Pt will complete LB dressing with supervision  Patient Goals   Patient goals : to return home       Therapy Time   Individual Concurrent Group Co-treatment   Time In 0930         Time Out 1010         Minutes 40         Timed Code Treatment Minutes: 40 Minutes     This note to serve as OT d/c summary if pt is d/c-ed prior to next therapy session.     Stephanie Knight, OTR/L No

## 2022-04-22 ENCOUNTER — HOSPITAL ENCOUNTER (OUTPATIENT)
Dept: INFUSION THERAPY | Age: 87
Setting detail: INFUSION SERIES
Discharge: HOME | End: 2022-04-22
Payer: MEDICARE

## 2022-04-22 VITALS
DIASTOLIC BLOOD PRESSURE: 60 MMHG | TEMPERATURE: 97.9 F | SYSTOLIC BLOOD PRESSURE: 118 MMHG | HEART RATE: 64 BPM | RESPIRATION RATE: 16 BRPM | OXYGEN SATURATION: 98 %

## 2022-04-22 DIAGNOSIS — D63.1 ANEMIA IN STAGE 3 CHRONIC KIDNEY DISEASE, UNSPECIFIED WHETHER STAGE 3A OR 3B CKD (HCC): Primary | ICD-10-CM

## 2022-04-22 DIAGNOSIS — N18.30 STAGE 3 CHRONIC KIDNEY DISEASE, UNSPECIFIED WHETHER STAGE 3A OR 3B CKD (HCC): ICD-10-CM

## 2022-04-22 DIAGNOSIS — N18.30 ANEMIA IN STAGE 3 CHRONIC KIDNEY DISEASE, UNSPECIFIED WHETHER STAGE 3A OR 3B CKD (HCC): Primary | ICD-10-CM

## 2022-04-22 LAB — HEMOGLOBIN: 10 G/DL (ref 12–16)

## 2022-04-22 PROCEDURE — 96372 THER/PROPH/DIAG INJ SC/IM: CPT

## 2022-04-22 PROCEDURE — 6360000002 HC RX W HCPCS: Performed by: INTERNAL MEDICINE

## 2022-04-22 PROCEDURE — 85018 HEMOGLOBIN: CPT

## 2022-04-22 RX ADMIN — EPOETIN ALFA-EPBX 6000 UNITS: 3000 INJECTION, SOLUTION INTRAVENOUS; SUBCUTANEOUS at 13:10

## 2022-04-22 NOTE — PROGRESS NOTES
[facility]     Epogen Visit with Peripheral Lab Draw    NAME:  Taylor Messenger OF BIRTH:  1934  MEDICAL RECORD NUMBER:  [id number]  Episode Date:  4/22/2022    Patient arrived to [facility]   [x] per wheelchair   [] ambulatory    Peripheral Lab Draw    Blood Draw Site: Location:left arm  Site cleansed with Chloroprep Scrub for 30 seconds: Yes  Site cleansed with Alcohol pads: Yes  Labs drawn with: 21 gauge             [x] Butterfly    [] Needle  Labs Obtained: Yes  Number of attempts: 1    Lab Test(s) Ordered: HGB    Lab Draw Site:  Redness: No  Bruising: No   Edema: No  Pain: No       Epogen Visit     Is the patient experiencing any:  Fatigue:   []   None  []   Increase over baseline but not altering normal activities  [x]   Moderate of causing difficulty performing some activities  []   Severe or loss of ability to perform some activities  []   Bedridden or disabling    Dizziness or Lightheadedness:   []   None  []   No Interference  []   Interferes with functioning but not activities of daily living  []   Interferes with daily activies  []   Bedridden or disabling    Shortness of Breath:   [x]   None   []   Dyspneic on exertion   []   Dyspnea with normal activities  []   Dyspnea at rest    Edema: 3+ Location of edema: left leg and 2+ in right leg    Tachycardia: No    Heart Palpitations: No      Chest Pain: No     /60   Pulse 64   Temp 97.9 °F (36.6 °C) (Oral)   Resp 16   LMP  (LMP Unknown)   SpO2 98%     Hold ZARINA dose if B/P is greater than: 180 mm/Hg     If Hgb remains less than 10 Increase dose by 25%. Do not increase dose more frequently than once every 4 weeks. If Hgb 10-10.5 g/dl  Continue same dose  If Hgb 10.6-11 g/dl Decrease dose by 25%. A decrease in dose can be done as frequently as every week. If Hgb is greater than 11 g/dl Hold Epogen.   May resume ZARINA when Hgb is less than 10 with a 25% reduction in the dose from the last administered dose or decrease with a 25% reduction in the dose from the last administered dose or decrease  frequency. Last visit date: 4/8/22     Last Hgb: 10.1 g/dl   Last dose: 10.1 units    Current Lab Data:    Recent Labs     04/22/22  1247   HGB 10.0*     Dose will be the same    Epogen dosage: 6000 units was administered slowly subcutaneously into the left upper arm. Dose verified by: Luana Najjar RN    Response to treatment:  Well tolerated by patient. Education:    Verbalized understanding    Scheduled to return for next dose of Epogen on May 9, 2022 .      Electronically signed by Ally Corley RN on 4/22/2022 at 1:18 PM

## 2022-05-09 ENCOUNTER — HOSPITAL ENCOUNTER (OUTPATIENT)
Dept: INFUSION THERAPY | Age: 87
Setting detail: INFUSION SERIES
Discharge: HOME | End: 2022-05-09
Payer: MEDICARE

## 2022-05-09 VITALS
OXYGEN SATURATION: 96 % | RESPIRATION RATE: 16 BRPM | TEMPERATURE: 97.7 F | HEART RATE: 60 BPM | SYSTOLIC BLOOD PRESSURE: 113 MMHG | DIASTOLIC BLOOD PRESSURE: 69 MMHG

## 2022-05-09 DIAGNOSIS — N18.30 ANEMIA IN STAGE 3 CHRONIC KIDNEY DISEASE, UNSPECIFIED WHETHER STAGE 3A OR 3B CKD (HCC): Primary | ICD-10-CM

## 2022-05-09 DIAGNOSIS — D63.1 ANEMIA IN STAGE 3 CHRONIC KIDNEY DISEASE, UNSPECIFIED WHETHER STAGE 3A OR 3B CKD (HCC): Primary | ICD-10-CM

## 2022-05-09 DIAGNOSIS — I48.0 PAROXYSMAL ATRIAL FIBRILLATION (HCC): ICD-10-CM

## 2022-05-09 DIAGNOSIS — N18.30 STAGE 3 CHRONIC KIDNEY DISEASE, UNSPECIFIED WHETHER STAGE 3A OR 3B CKD (HCC): ICD-10-CM

## 2022-05-09 DIAGNOSIS — Z79.01 LONG TERM (CURRENT) USE OF ANTICOAGULANTS: Primary | ICD-10-CM

## 2022-05-09 LAB
ALBUMIN SERPL-MCNC: 3.4 G/DL (ref 3.4–5)
ANION GAP SERPL CALCULATED.3IONS-SCNC: 12 MMOL/L (ref 3–16)
BUN BLDV-MCNC: 63 MG/DL (ref 7–20)
CALCIUM SERPL-MCNC: 9.4 MG/DL (ref 8.3–10.6)
CHLORIDE BLD-SCNC: 100 MMOL/L (ref 99–110)
CO2: 27 MMOL/L (ref 21–32)
CREAT SERPL-MCNC: 3.7 MG/DL (ref 0.6–1.2)
FERRITIN: 719.4 NG/ML (ref 15–150)
GFR AFRICAN AMERICAN: 14
GFR NON-AFRICAN AMERICAN: 12
GLUCOSE BLD-MCNC: 136 MG/DL (ref 70–99)
HCT VFR BLD CALC: 30.8 % (ref 36–48)
HEMOGLOBIN: 10 G/DL (ref 12–16)
INR BLD: 2.56 (ref 0.88–1.12)
MCH RBC QN AUTO: 29 PG (ref 26–34)
MCHC RBC AUTO-ENTMCNC: 32.5 G/DL (ref 31–36)
MCV RBC AUTO: 89.1 FL (ref 80–100)
PDW BLD-RTO: 17.2 % (ref 12.4–15.4)
PHOSPHORUS: 3.9 MG/DL (ref 2.5–4.9)
PLATELET # BLD: 131 K/UL (ref 135–450)
PMV BLD AUTO: 7.7 FL (ref 5–10.5)
POTASSIUM SERPL-SCNC: 3.7 MMOL/L (ref 3.5–5.1)
PROTHROMBIN TIME: 30.1 SEC (ref 9.9–12.7)
RBC # BLD: 3.46 M/UL (ref 4–5.2)
SODIUM BLD-SCNC: 139 MMOL/L (ref 136–145)
WBC # BLD: 4.8 K/UL (ref 4–11)

## 2022-05-09 PROCEDURE — 96372 THER/PROPH/DIAG INJ SC/IM: CPT

## 2022-05-09 PROCEDURE — 99212 OFFICE O/P EST SF 10 MIN: CPT

## 2022-05-09 PROCEDURE — 82728 ASSAY OF FERRITIN: CPT

## 2022-05-09 PROCEDURE — 6360000002 HC RX W HCPCS: Performed by: INTERNAL MEDICINE

## 2022-05-09 PROCEDURE — 85027 COMPLETE CBC AUTOMATED: CPT

## 2022-05-09 PROCEDURE — 85610 PROTHROMBIN TIME: CPT

## 2022-05-09 PROCEDURE — 80069 RENAL FUNCTION PANEL: CPT

## 2022-05-09 RX ADMIN — EPOETIN ALFA-EPBX 6000 UNITS: 3000 INJECTION, SOLUTION INTRAVENOUS; SUBCUTANEOUS at 13:37

## 2022-05-09 NOTE — PROGRESS NOTES
[facility]    Peripheral Lab Draw    NAME:  Puja Espinoza OF BIRTH:  1934  MEDICAL RECORD NUMBER:  [id number]  Episode Date:  5/9/2022    Blood Draw Site: Location:right arm  Site cleansed with Chloroprep Scrub for 30 seconds: Yes   Site cleansed with Alcohol pads: Yes  Labs drawn with: 21 gauge             [x] Butterfly    [] Needle  Labs Obtained: Yes  Number of attempts: 1    Lab Test(s) Ordered: PT & INR    PT 30.1 INR 2.56 and faxed results to Dr Tono Morley      Lab Draw Site:  Redness: No  Bruising: No   Edema: No  Pain: No     Response to treatment:  Well tolerated by patient.       Electronically signed by Zuhair Schultz RN on 5/9/2022 at 1:22 PM

## 2022-05-09 NOTE — PROGRESS NOTES
Clinic Level of Care Assessment  [facility]      NAME:  [address] [place] [address] Dr:  1934 GENDER: female  MEDICAL RECORD NUMBER:  [id number]   DATE:  5/9/2022     Ambulation Status Document in Daily Care/Safety Tab   Status Definition Points   Independent Independently able to ambulate. Fully able (without any assistance) to get on/off exam table/chair. []   0   Minimal Physical Assistance Requires physical assistance of one person to ambulate and/or position patient to be examined. Includes necessary physical assistance to position lower extremities on/off stool. [x]   1   Moderate Physical Assistance Requires at least one staff member to physically assist patient in ambulating into treatment room, and/or on off chair/bed. Requires assistance to bathroom. []   2   Full Assistance Requires assistance of at least two staff members to transfer patient into treatment room and/or on/off bed/chair. \"Total Transfer\". Unable to use bathroom requires bedside commode and/or bedpan []   3       Dressing Complexity Document in LDA Navigator  Complexity Definition Points   No Dressing  []   0   Simple Minimal, simple dressing. i.e. bandaid, gauze, simple wrap. Peripheral IV dressing. [x]   1   Intermediate Moderately complicated PICC dressing change requiring sterile procedure and site assessment. []   2   Complex Complicated dressing change port access requiring sterile procedure and site assessment. []   3       Teaching Effort Document in AVS and/or Education Navigator    Effort Definition Points   No Teaching  []   0   Simple Teach two or less topics. Teaching documented in discharge instructions in AVS and copy given to patient. [x]   1   Intermediate Teach three to four topics. Teaching documented in Discharge Instructions in AVS using References and Attachments. Copy given to patient. []   2   Complex Teach five to six topics.  Teaching documented in Discharge Instructions in AVS using References and Attachments. May also be documentation in Education Navigator. Copy given to patient. []   3           Patient Assessment  Planning Definition Points   Simple Complete all tabs in patient assessment navigator. Review or complete patient'sTherapy Plan. [x]   1   Intermediate Complete all tabs in patient assessment navigator. Review or completed patient'sTherapy Plan. Contact doctor based on nursing assessment. []   2   Complex Complete all tabs in patient assessment navigator. Completed patient's Therapy Plan. Contact doctor based on nursing assessment and write order related to needed care. []   3     Patient Planning   Planning Definition Points   Simple Discharged, instructions and After Visit Summary given to patient/caregiver and instructions completed. Simple follow-up with routine assessment and planning.      []   1   Intermediate Discharged, instructions and After Visit Summary given to patient/caregiver and instructions completed. Contact with outside resources; i.e. Telephone calls to PCP, home health, ECF and/or arranging transportation. []   2   Complex Discharged, instructions and After Visit Summary given to patient/caregiver and instructions completed. Contact with outside resources; i.e. Telephone calls to PCP, home health, ECF and/or arranging transportation. May include filling out forms and/or writing letters, communication with insurance , preauthorization for treatment.    []   3       Is this the Patient's First Visit to the [facility]  No    Is this Patient Established @ this [facility]  Yes              Clinical Level of Care      Points  0-2  Level 1 []     Points  3-5  Level 2 [x]     Points  6-9  Level 3 []     Points  10-12  Level 4 []     Points  13-15  Level 5 []       Electronically signed by Santana Reinoso RN on 5/9/2022 at 1:46 PM

## 2022-05-09 NOTE — PROGRESS NOTES
[facility]     Epogen Visit with Peripheral Lab Draw    NAME:  Concepcion Nieto OF BIRTH:  1934  MEDICAL RECORD NUMBER:  [id number]  Episode Date:  5/9/2022    Patient arrived to [facility]   [x] per wheelchair   [] ambulatory    Peripheral Lab Draw    Blood Draw Site: Location:right arm  Site cleansed with Chloroprep Scrub for 30 seconds: Yes  Site cleansed with Alcohol pads: Yes  Labs drawn with: 21 gauge             [x] Butterfly    [] Needle  Labs Obtained: Yes  Number of attempts: 1    Lab Test(s) Ordered: CBC, Renal and ferritin    Lab Draw Site:  Redness: No  Bruising: No   Edema: No  Pain: No       Epogen Visit     Is the patient experiencing any:  Fatigue:   []   None  [x]   Increase over baseline but not altering normal activities  []   Moderate of causing difficulty performing some activities  []   Severe or loss of ability to perform some activities  []   Bedridden or disabling    Dizziness or Lightheadedness:   [x]   None  []   No Interference  []   Interferes with functioning but not activities of daily living  []   Interferes with daily activies  []   Bedridden or disabling    Shortness of Breath:   [x]   None   []   Dyspneic on exertion   []   Dyspnea with normal activities  []   Dyspnea at rest     Edema: 2+ Location of edema: left leg and 1+ right leg    Tachycardia: No    Heart Palpitations: No      Chest Pain: No     /69   Pulse 60   Temp 97.7 °F (36.5 °C) (Oral)   Resp 16   LMP  (LMP Unknown)   SpO2 96%     Hold ZARINA dose if B/P is greater than: 180 mm/Hg     If Hgb remains less than 10 Increase dose by 25%. Do not increase dose more frequently than once every 4 weeks. If Hgb 10-10.5 g/dl  Continue same dose  If Hgb 10.6-11 g/dl Decrease dose by 25%. A decrease in dose can be done as frequently as every week. If Hgb is greater than 11 g/dl Hold Epogen.   May resume ZARINA when Hgb is less than 10 with a 25% reduction in the dose from the last administered dose or decrease with a 25% reduction in the dose from the last administered dose or decrease  frequency. Last visit date: 4/2/22     Last Hgb: 10.0 g/dl   Last dose: 6000 units    Current Lab Data:    Recent Labs     05/09/22  1300   WBC 4.8   HGB 10.0*   HCT 30.8*   MCV 89.1   *     Dose will be the same    Epogen dosage: 6000 units was administered slowly subcutaneously into the right upper arm. Dose verified by:  Allison Mora RN    Response to treatment:  Well tolerated by patient. Education:    Verbalized understanding    Scheduled to return for next dose of Epogen on May 23, 2022 .      Electronically signed by Desi Torres RN on 5/9/2022 at 1:44 PM

## 2022-05-23 ENCOUNTER — HOSPITAL ENCOUNTER (OUTPATIENT)
Dept: INFUSION THERAPY | Age: 87
Setting detail: INFUSION SERIES
Discharge: HOME | End: 2022-05-23
Payer: MEDICARE

## 2022-05-23 VITALS
RESPIRATION RATE: 16 BRPM | TEMPERATURE: 97.8 F | HEART RATE: 61 BPM | SYSTOLIC BLOOD PRESSURE: 124 MMHG | OXYGEN SATURATION: 97 % | DIASTOLIC BLOOD PRESSURE: 66 MMHG

## 2022-05-23 DIAGNOSIS — D63.1 ANEMIA IN STAGE 3 CHRONIC KIDNEY DISEASE, UNSPECIFIED WHETHER STAGE 3A OR 3B CKD (HCC): Primary | ICD-10-CM

## 2022-05-23 DIAGNOSIS — N18.30 ANEMIA IN STAGE 3 CHRONIC KIDNEY DISEASE, UNSPECIFIED WHETHER STAGE 3A OR 3B CKD (HCC): Primary | ICD-10-CM

## 2022-05-23 DIAGNOSIS — N18.30 STAGE 3 CHRONIC KIDNEY DISEASE, UNSPECIFIED WHETHER STAGE 3A OR 3B CKD (HCC): ICD-10-CM

## 2022-05-23 LAB — HEMOGLOBIN: 10.1 G/DL (ref 12–16)

## 2022-05-23 PROCEDURE — 85018 HEMOGLOBIN: CPT

## 2022-05-23 PROCEDURE — 96372 THER/PROPH/DIAG INJ SC/IM: CPT

## 2022-05-23 PROCEDURE — 6360000002 HC RX W HCPCS: Performed by: INTERNAL MEDICINE

## 2022-05-23 RX ADMIN — EPOETIN ALFA-EPBX 6000 UNITS: 3000 INJECTION, SOLUTION INTRAVENOUS; SUBCUTANEOUS at 14:18

## 2022-05-23 NOTE — PROGRESS NOTES
[facility] \[facility]\""     Epogen Visit with Peripheral Lab Draw    NAME:  Susana Gallagher OF BIRTH:  1934  MEDICAL RECORD NUMBER:  [id number]  Episode Date:  5/23/2022    Patient arrived to [facility]   [x] per wheelchair   [] ambulatory    Alert and oriented x 4. Daughter with patient for visit today. Patient reports feeling well and states that she has no new symptoms of anemia and no side effects from her Epogen injections. Patient denies signs/symptoms of respiratory infection and is wearing a mask to prevent the spread of Covid-19. Peripheral Lab Draw    Blood Draw Site: Location: right proximal anterior forearm  Site cleansed with Chloroprep Scrub for 30 seconds: Yes  Labs drawn with: 23 gauge             [x] Butterfly    [] Needle  Labs Obtained: Yes  Number of attempts: 1    Lab Test(s) Ordered: STAT Hemoglobin    Lab Draw Site:  Redness: No  Bruising: No   Edema: No  Pain: No       Epogen Visit     Is the patient experiencing any:  Fatigue:   []   None  [x]   Increase over baseline but not altering normal activities - reports \"status quo\"  []   Moderate of causing difficulty performing some activities  []   Severe or loss of ability to perform some activities  []   Bedridden or disabling    Dizziness or Lightheadedness:   [x]   None  []   No Interference  []   Interferes with functioning but not activities of daily living  []   Interferes with daily activies  []   Bedridden or disabling    Shortness of Breath:    [x]   None   []   Dyspneic on exertion   []   Dyspnea with normal activities  []   Dyspnea at rest    Edema: +2 pitting edema bilateral ankles to mid calf    Tachycardia: No    Heart Palpitations: No      Chest Pain: No     /66   Pulse 61   Temp 97.8 °F (36.6 °C) (Oral)   Resp 16   LMP  (LMP Unknown)   SpO2 97%     Hold ZARINA dose if B/P is greater than: 180 mm/Hg     If Hgb remains less than 10 Increase dose by 25%.   Do not increase dose more frequently than once every 4 weeks. If Hgb 10-10.5 g/dl  Continue same dose  If Hgb 10.6-11 g/dl Decrease dose by 25%. A decrease in dose can be done as frequently as every week. If Hgb is greater than 11 g/dl Hold Epogen. May resume ZARINA when Hgb is less than 10 with a 25% reduction in the dose from the last administered dose or decrease with a 25% reduction in the dose from the last administered dose or decrease  frequency. Last visit date: May 9, 2022     Last Hgb: 10 g/dl   Last dose: 6,000 units    Current Lab Data:    Recent Labs     05/23/22  1322   HGB 10.1*     Dose will remain the same. Epogen dosage: 6,000 units was administered slowly subcutaneously into the right upper arm. Dose verified by:  Angela Willams RN    Response to treatment:  Well tolerated by patient. Education: Verbal and written discharge instructions reviewed with patient and daughter. Both verbalized understanding. Written copy given via AVS      Scheduled to return for next dose of Epogen on June 9, 2022 .      Electronically signed by Hugh Winters RN on 5/23/2022 at 4:06 PM

## 2022-06-09 ENCOUNTER — HOSPITAL ENCOUNTER (OUTPATIENT)
Dept: INFUSION THERAPY | Age: 87
Setting detail: INFUSION SERIES
Discharge: HOME | End: 2022-06-09
Payer: MEDICARE

## 2022-06-09 VITALS
OXYGEN SATURATION: 100 % | DIASTOLIC BLOOD PRESSURE: 68 MMHG | RESPIRATION RATE: 16 BRPM | TEMPERATURE: 97.9 F | SYSTOLIC BLOOD PRESSURE: 126 MMHG | HEART RATE: 56 BPM

## 2022-06-09 DIAGNOSIS — N18.30 ANEMIA IN STAGE 3 CHRONIC KIDNEY DISEASE, UNSPECIFIED WHETHER STAGE 3A OR 3B CKD (HCC): Primary | ICD-10-CM

## 2022-06-09 DIAGNOSIS — D63.1 ANEMIA IN STAGE 3 CHRONIC KIDNEY DISEASE, UNSPECIFIED WHETHER STAGE 3A OR 3B CKD (HCC): Primary | ICD-10-CM

## 2022-06-09 DIAGNOSIS — I48.0 PAROXYSMAL ATRIAL FIBRILLATION (HCC): ICD-10-CM

## 2022-06-09 DIAGNOSIS — N18.30 STAGE 3 CHRONIC KIDNEY DISEASE, UNSPECIFIED WHETHER STAGE 3A OR 3B CKD (HCC): ICD-10-CM

## 2022-06-09 DIAGNOSIS — Z79.01 LONG TERM (CURRENT) USE OF ANTICOAGULANTS: ICD-10-CM

## 2022-06-09 LAB
ALBUMIN SERPL-MCNC: 3.5 G/DL (ref 3.4–5)
ANION GAP SERPL CALCULATED.3IONS-SCNC: 12 MMOL/L (ref 3–16)
BUN BLDV-MCNC: 65 MG/DL (ref 7–20)
CALCIUM SERPL-MCNC: 9 MG/DL (ref 8.3–10.6)
CHLORIDE BLD-SCNC: 102 MMOL/L (ref 99–110)
CO2: 28 MMOL/L (ref 21–32)
CREAT SERPL-MCNC: 3.4 MG/DL (ref 0.6–1.2)
GFR AFRICAN AMERICAN: 15
GFR NON-AFRICAN AMERICAN: 13
GLUCOSE BLD-MCNC: 157 MG/DL (ref 70–99)
HEMOGLOBIN: 9.8 G/DL (ref 12–16)
INR BLD: 2.85 (ref 0.87–1.14)
PHOSPHORUS: 3.5 MG/DL (ref 2.5–4.9)
POTASSIUM SERPL-SCNC: 4.3 MMOL/L (ref 3.5–5.1)
PROTHROMBIN TIME: 30.1 SEC (ref 11.7–14.5)
SODIUM BLD-SCNC: 142 MMOL/L (ref 136–145)

## 2022-06-09 PROCEDURE — 80069 RENAL FUNCTION PANEL: CPT

## 2022-06-09 PROCEDURE — 85018 HEMOGLOBIN: CPT

## 2022-06-09 PROCEDURE — 6360000002 HC RX W HCPCS: Performed by: STUDENT IN AN ORGANIZED HEALTH CARE EDUCATION/TRAINING PROGRAM

## 2022-06-09 PROCEDURE — 85610 PROTHROMBIN TIME: CPT

## 2022-06-09 PROCEDURE — 96372 THER/PROPH/DIAG INJ SC/IM: CPT

## 2022-06-09 RX ADMIN — EPOETIN ALFA-EPBX 7500 UNITS: 10000 INJECTION, SOLUTION INTRAVENOUS; SUBCUTANEOUS at 13:56

## 2022-06-09 NOTE — PROGRESS NOTES
[facility] [facility]     Epogen Visit with Peripheral Lab Draw    NAME:  Ami Morgan OF BIRTH:  1934  MEDICAL RECORD NUMBER:  [id number]  Episode Date:  6/9/2022    Patient arrived to [facility] 58   [x] per wheelchair   [] ambulatory    Peripheral Lab Draw    Blood Draw Site: Location: left anterior proximal forearm  Site cleansed with Chloroprep Scrub for 30 seconds: Yes  Labs drawn with: 23 gauge             [x] Butterfly    [] Needle  Labs Obtained: Yes  Number of attempts: 1    Lab Test(s) Ordered: PT/INR (for Dr Albert Marino), Renal panel and STAT Hemoglobin    **Results of PT/INR faxed to Dr Albert Marino - Attention Avis**     Lab Draw Site:  Redness: No  Bruising: No   Edema: No  Pain: No       Epogen Visit     Is the patient experiencing any:  Fatigue:   []   None  [x]   Increase over baseline but not altering normal activities - doesn't do much activity but states that she does get a little more tired than usual  []   Moderate of causing difficulty performing some activities  []   Severe or loss of ability to perform some activities  []   Bedridden or disabling    Dizziness or Lightheadedness:   [x]   None  []   No Interference  []   Interferes with functioning but not activities of daily living  []   Interferes with daily activies  []   Bedridden or disabling    Shortness of Breath:   [x]   None   []   Dyspneic on exertion   []   Dyspnea with normal activities  []   Dyspnea at rest    Edema: +3 pitting edema left foot to mid calf; +2 pitting edema right ankle    Tachycardia: No    Heart Palpitations: No      Chest Pain: No     Hold ZARINA dose if B/P is greater than: 180 mm/Hg     If Hgb remains less than 10 Increase dose by 25%. Do not increase dose more frequently than once every 4 weeks. If Hgb 10-10.5 g/dl  Continue same dose  If Hgb 10.6-11 g/dl Decrease dose by 25%. A decrease in dose can be done as frequently as every week.   If Hgb is greater than 11 g/dl Hold Epogen. May resume ZARINA when Hgb is less than 10 with a 25% reduction in the dose from the last administered dose or decrease with a 25% reduction in the dose from the last administered dose or decrease  frequency. Last visit date: May 23, 2022     Last Hgb: 10.1 g/dl   Last dose: 6,000 units    Current Lab Data:    Recent Labs     06/09/22  1312   HGB 9.8*     Dose will be increased by 25% per therapy plan order. Epogen dosage: 7,500 units was administered slowly subcutaneously into the left upper arm. Dose verified by:  Magalys Valadez RN    Response to treatment:  Well tolerated by patient. Education: Verbal and written discharge instructions reviewed with patient and daughter. Both verbalized understanding. Written copy given via AVS    Scheduled to return for next dose of Epogen on June 23, 2022 .      Electronically signed by Trent Burgos RN on 6/9/2022 at 7:08 PM

## 2022-06-23 ENCOUNTER — HOSPITAL ENCOUNTER (OUTPATIENT)
Dept: INFUSION THERAPY | Age: 87
Setting detail: INFUSION SERIES
Discharge: HOME | End: 2022-06-23
Payer: MEDICARE

## 2022-06-23 VITALS
SYSTOLIC BLOOD PRESSURE: 119 MMHG | OXYGEN SATURATION: 99 % | DIASTOLIC BLOOD PRESSURE: 67 MMHG | TEMPERATURE: 97.7 F | RESPIRATION RATE: 16 BRPM | HEART RATE: 64 BPM

## 2022-06-23 DIAGNOSIS — N18.30 ANEMIA IN STAGE 3 CHRONIC KIDNEY DISEASE, UNSPECIFIED WHETHER STAGE 3A OR 3B CKD (HCC): Primary | ICD-10-CM

## 2022-06-23 DIAGNOSIS — D63.1 ANEMIA IN STAGE 3 CHRONIC KIDNEY DISEASE, UNSPECIFIED WHETHER STAGE 3A OR 3B CKD (HCC): Primary | ICD-10-CM

## 2022-06-23 DIAGNOSIS — N18.30 STAGE 3 CHRONIC KIDNEY DISEASE, UNSPECIFIED WHETHER STAGE 3A OR 3B CKD (HCC): ICD-10-CM

## 2022-06-23 LAB — HEMOGLOBIN: 10.2 G/DL (ref 12–16)

## 2022-06-23 PROCEDURE — 6360000002 HC RX W HCPCS: Performed by: INTERNAL MEDICINE

## 2022-06-23 PROCEDURE — 96372 THER/PROPH/DIAG INJ SC/IM: CPT

## 2022-06-23 PROCEDURE — 85018 HEMOGLOBIN: CPT

## 2022-06-23 RX ADMIN — EPOETIN ALFA-EPBX 7500 UNITS: 4000 INJECTION, SOLUTION INTRAVENOUS; SUBCUTANEOUS at 13:38

## 2022-06-23 NOTE — PROGRESS NOTES
[facility]     Retacrit Visit with Peripheral Lab Draw    NAME:  Taylor Messenger OF BIRTH:  1934  MEDICAL RECORD NUMBER:  [id number]  Episode Date:  6/23/2022    Patient arrived to [facility] 58   [x] per wheelchair   [] ambulatory    Peripheral Lab Draw    Blood Draw Site: Location:left arm  Site cleansed with Chloroprep Scrub for 30 seconds: Yes  Site cleansed with Alcohol pads: Yes  Labs drawn with: 21 gauge             [x] Butterfly    [] Needle  Labs Obtained: Yes  Number of attempts: 1    Lab Test(s) Ordered: HGB    Lab Draw Site:  Redness: No  Bruising: No   Edema: No  Pain: No       Retacrit Visit     Is the patient experiencing any:  Fatigue:   []   None  [x]   Increase over baseline but not altering normal activities  []   Moderate of causing difficulty performing some activities  []   Severe or loss of ability to perform some activities  []   Bedridden or disabling    Dizziness or Lightheadedness:   [x]   None  []   No Interference  []   Interferes with functioning but not activities of daily living  []   Interferes with daily activies  []   Bedridden or disabling    Shortness of Breath:   [x]   None   []   Dyspneic on exertion   []   Dyspnea with normal activities  []   Dyspnea at rest    Edema: 3+ Location of edema: left leg and ankle and 2+ right leg and ankle    Tachycardia:  No    Heart Palpitations: No      Chest Pain: No     /67   Pulse 64   Temp 97.7 °F (36.5 °C) (Oral)   Resp 16   LMP  (LMP Unknown)   SpO2 99%     Hold ZARINA dose if B/P is greater than: 180 mm/Hg     If Hgb remains less than 10 Increase dose by 25%. Do not increase dose more frequently than once every 4 weeks. If Hgb 10-10.5 g/dl  Continue same dose  If Hgb 10.6-11 g/dl Decrease dose by 25%. A decrease in dose can be done as frequently as every week. If Hgb is greater than 11 g/dl Hold Epogen.   May resume ZARINA when Hgb is less than 10 with a 25% reduction in the dose from the last administered dose or decrease with a 25% reduction in the dose from the last administered dose or decrease  frequency. Last visit date: 6/9/22     Last Hgb: 9.8 g/dl   Last dose: 7500 units    Current Lab Data:    Recent Labs     06/23/22  1308   HGB 10.2*     Dose will be the same    Epogen dosage: 7500 units was administered slowly subcutaneously into the left upper arm. Dose verified by:  Sandra Up RN    Response to treatment:  Well tolerated by patient. Education:    Verbalized understanding    Scheduled to return for next dose of Epogen on July 7, 2022 .      Electronically signed by Mookie Faust RN on 6/23/2022 at 1:49 PM

## 2022-07-07 ENCOUNTER — HOSPITAL ENCOUNTER (OUTPATIENT)
Dept: INFUSION THERAPY | Age: 87
Setting detail: INFUSION SERIES
Discharge: HOME | End: 2022-07-07
Payer: MEDICARE

## 2022-07-07 VITALS
SYSTOLIC BLOOD PRESSURE: 131 MMHG | RESPIRATION RATE: 20 BRPM | DIASTOLIC BLOOD PRESSURE: 59 MMHG | TEMPERATURE: 97.8 F | HEART RATE: 73 BPM

## 2022-07-07 DIAGNOSIS — N18.30 ANEMIA IN STAGE 3 CHRONIC KIDNEY DISEASE, UNSPECIFIED WHETHER STAGE 3A OR 3B CKD (HCC): Primary | ICD-10-CM

## 2022-07-07 DIAGNOSIS — I48.0 PAROXYSMAL ATRIAL FIBRILLATION (HCC): ICD-10-CM

## 2022-07-07 DIAGNOSIS — N18.30 STAGE 3 CHRONIC KIDNEY DISEASE, UNSPECIFIED WHETHER STAGE 3A OR 3B CKD (HCC): ICD-10-CM

## 2022-07-07 DIAGNOSIS — D63.1 ANEMIA IN STAGE 3 CHRONIC KIDNEY DISEASE, UNSPECIFIED WHETHER STAGE 3A OR 3B CKD (HCC): Primary | ICD-10-CM

## 2022-07-07 DIAGNOSIS — Z79.01 LONG TERM (CURRENT) USE OF ANTICOAGULANTS: Primary | ICD-10-CM

## 2022-07-07 LAB
FERRITIN: 802.3 NG/ML (ref 15–150)
HEMOGLOBIN: 10 G/DL (ref 12–16)
INR BLD: 3.3 (ref 0.87–1.14)
IRON SATURATION: 45 % (ref 15–50)
IRON: 79 UG/DL (ref 37–145)
PROTHROMBIN TIME: 33.8 SEC (ref 11.7–14.5)
TOTAL IRON BINDING CAPACITY: 177 UG/DL (ref 260–445)

## 2022-07-07 PROCEDURE — 83540 ASSAY OF IRON: CPT

## 2022-07-07 PROCEDURE — 85018 HEMOGLOBIN: CPT

## 2022-07-07 PROCEDURE — 6360000002 HC RX W HCPCS: Performed by: INTERNAL MEDICINE

## 2022-07-07 PROCEDURE — 82728 ASSAY OF FERRITIN: CPT

## 2022-07-07 PROCEDURE — 96372 THER/PROPH/DIAG INJ SC/IM: CPT

## 2022-07-07 PROCEDURE — 83550 IRON BINDING TEST: CPT

## 2022-07-07 PROCEDURE — 85610 PROTHROMBIN TIME: CPT

## 2022-07-07 RX ADMIN — EPOETIN ALFA-EPBX 7500 UNITS: 4000 INJECTION, SOLUTION INTRAVENOUS; SUBCUTANEOUS at 14:16

## 2022-07-07 NOTE — PROGRESS NOTES
[facility]     Epogen Visit with Peripheral Lab Draw    NAME:  Deb Shaffer OF BIRTH:  1934  MEDICAL RECORD NUMBER:  [id number]  Episode Date:  7/7/2022    Patient arrived to [facility] 58   [] per wheelchair   [x] ambulatory    Peripheral Lab Draw    Blood Draw Site: Location:left arm  Site cleansed with Chloroprep Scrub for 30 seconds: Yes  Site cleansed with Alcohol pads: Yes  Labs drawn with: 21 gauge             [x] Butterfly    [] Needle  Labs Obtained: Yes  Number of attempts: 2    Lab Test(s) Ordered: HGB    Lab Draw Site:  Redness: No  Bruising: No   Edema: No  Pain: No       Epogen Visit     Is the patient experiencing any:  Fatigue:   []   None  [x]   Increase over baseline but not altering normal activities  []   Moderate of causing difficulty performing some activities  []   Severe or loss of ability to perform some activities  []   Bedridden or disabling    Dizziness or Lightheadedness:   [x]   None  []   No Interference  []   Interferes with functioning but not activities of daily living  []   Interferes with daily activies  []   Bedridden or disabling    Shortness of Breath:   [x]   None   []   Dyspneic on exertion   []   Dyspnea with normal activities  []   Dyspnea at rest    Edema: none Location of edema: Tachycardia: No    Heart Palpitations: No      Chest Pain: No     BP (!) 131/59   Pulse 73   Temp 97.8 °F (36.6 °C) (Oral)   Resp 20   LMP  (LMP Unknown)     Hold ZARINA dose if B/P is greater than: 184 mm/Hg     If Hgb remains less than 10 Increase dose by 25%. Do not increase dose more frequently than once every 4 weeks. If Hgb 10-10.5 g/dl  Continue same dose  If Hgb 10.6-11 g/dl Decrease dose by 25%. A decrease in dose can be done as frequently as every week. If Hgb is greater than 11 g/dl Hold Epogen.   May resume ZARINA when Hgb is less than 10 with a 25% reduction in the dose from the last administered dose or decrease with a 25% reduction in the dose from the last administered dose or decrease  frequency. Last visit date: 6/23/22    Last Hgb: 10.2 g/dl   Last dose: 7500 units    Current Lab Data:    Recent Labs     07/07/22  1315   HGB 10.0*     Dose will be     Epogen dosage: 7500 units was administered slowly subcutaneously into the left upper arm. Dose verified by:  MYNOR Parada    Response to treatment:  Well tolerated by patient. Education:    Indicates understanding    Scheduled to return for next dose of Epogen on July 21, 2022 .      Electronically signed by Caro Desir RN on 7/7/2022 at 3:58 PM

## 2022-07-21 ENCOUNTER — HOSPITAL ENCOUNTER (OUTPATIENT)
Dept: INFUSION THERAPY | Age: 87
Setting detail: INFUSION SERIES
Discharge: HOME | End: 2022-07-21
Payer: MEDICARE

## 2022-07-21 VITALS
HEART RATE: 63 BPM | DIASTOLIC BLOOD PRESSURE: 85 MMHG | TEMPERATURE: 97.9 F | OXYGEN SATURATION: 99 % | RESPIRATION RATE: 17 BRPM | SYSTOLIC BLOOD PRESSURE: 138 MMHG

## 2022-07-21 DIAGNOSIS — N18.30 STAGE 3 CHRONIC KIDNEY DISEASE, UNSPECIFIED WHETHER STAGE 3A OR 3B CKD (HCC): ICD-10-CM

## 2022-07-21 DIAGNOSIS — N18.30 ANEMIA IN STAGE 3 CHRONIC KIDNEY DISEASE, UNSPECIFIED WHETHER STAGE 3A OR 3B CKD (HCC): Primary | ICD-10-CM

## 2022-07-21 DIAGNOSIS — D63.1 ANEMIA IN STAGE 3 CHRONIC KIDNEY DISEASE, UNSPECIFIED WHETHER STAGE 3A OR 3B CKD (HCC): Primary | ICD-10-CM

## 2022-07-21 DIAGNOSIS — I48.0 PAROXYSMAL ATRIAL FIBRILLATION (HCC): ICD-10-CM

## 2022-07-21 DIAGNOSIS — Z79.01 LONG TERM (CURRENT) USE OF ANTICOAGULANTS: Primary | ICD-10-CM

## 2022-07-21 LAB
HEMOGLOBIN: 10 G/DL (ref 12–16)
INR BLD: 2.61 (ref 0.87–1.14)
PROTHROMBIN TIME: 28.1 SEC (ref 11.7–14.5)

## 2022-07-21 PROCEDURE — 85610 PROTHROMBIN TIME: CPT

## 2022-07-21 PROCEDURE — 6360000002 HC RX W HCPCS: Performed by: INTERNAL MEDICINE

## 2022-07-21 PROCEDURE — 96372 THER/PROPH/DIAG INJ SC/IM: CPT

## 2022-07-21 PROCEDURE — 85018 HEMOGLOBIN: CPT

## 2022-07-21 RX ADMIN — EPOETIN ALFA-EPBX 7500 UNITS: 4000 INJECTION, SOLUTION INTRAVENOUS; SUBCUTANEOUS at 14:09

## 2022-07-21 NOTE — PROGRESS NOTES
[facility]     Retacrit Visit with Peripheral Lab Draw    NAME:  Moo Serrano OF BIRTH:  1934  MEDICAL RECORD NUMBER:  [id number]  Episode Date:  7/21/2022    Patient arrived to [facility] 58   [x] per wheelchair   [] ambulatory    Peripheral Lab Draw    Blood Draw Site: Location:left arm  Site cleansed with Chloroprep Scrub for 30 seconds: Yes  Site cleansed with Alcohol pads: Yes  Labs drawn with: 21 gauge             [] Butterfly    [x] Needle  Labs Obtained: Yes  Number of attempts: 1    Lab Test(s) Ordered: HGB    Lab Draw Site:  Redness: No  Bruising: No   Edema: No  Pain: No       Epogen Visit     Is the patient experiencing any:  Fatigue:   []   None  []   Increase over baseline but not altering normal activities  [x]   Moderate of causing difficulty performing some activities  []   Severe or loss of ability to perform some activities  []   Bedridden or disabling    Dizziness or Lightheadedness:   [x]   None  []   No Interference  []   Interferes with functioning but not activities of daily living  []   Interferes with daily activies  []   Bedridden or disabling    Shortness of Breath:   [x]   None   []   Dyspneic on exertion   []   Dyspnea with normal activities  []   Dyspnea at rest    Edema: 3+ Location of edema: left leg    Tachycardia: No    Heart Palpitations: No      Chest Pain: No     /85   Pulse 63   Temp 97.9 °F (36.6 °C) (Oral)   Resp 17   LMP  (LMP Unknown)   SpO2 99%     Hold ZARINA dose if B/P is greater than: 180 mm/Hg     If Hgb remains less than 10 Increase dose by 25%. Do not increase dose more frequently than once every 4 weeks. If Hgb 10-10.5 g/dl  Continue same dose  If Hgb 10.6-11 g/dl Decrease dose by 25%. A decrease in dose can be done as frequently as every week. If Hgb is greater than 11 g/dl Hold Epogen.   May resume ZARINA when Hgb is less than 10 with a 25% reduction in the dose from the last administered dose or decrease with a 25% reduction in the dose from the last administered dose or decrease  frequency. Last visit date: 7/7/22     Last Hgb: 10 g/dl   Last dose: 95086 units    Current Lab Data:    Recent Labs     07/21/22  1306   HGB 10.0*     Dose will be the same    Epogen dosage: 45742 units was administered slowly subcutaneously into the right upper arm. Dose verified by: Amisha Salazar RN    Response to treatment:  Well tolerated by patient. Education:    Verbalized understanding    Scheduled to return for next dose of Epogen on August 4, 2022 .      Electronically signed by Sherri Winston RN on 7/21/2022 at 1:59 PM

## 2022-07-21 NOTE — PROGRESS NOTES
[facility]    Peripheral Lab Draw    NAME:  Lindsay Andrade OF BIRTH:  1934  MEDICAL RECORD NUMBER:  [id number]  Episode Date:  7/21/2022    Blood Draw Site: Location:left arm  Site cleansed with Chloroprep Scrub for 30 seconds: Yes  Site cleansed with Alcohol pads: Yes  Labs drawn with: 21 gauge             [] Butterfly    [x] Needle  Labs Obtained: Yes  Number of attempts: 1    Lab Test(s) Ordered: PT/INR    PT 28.1 and INR 2.61    Lab Draw Site:  Redness: No  Bruising: No   Edema: No  Pain: No     Response to treatment:  Well tolerated by patient.       Electronically signed by Christie Pittman RN on 7/21/2022 at 1:20 PM

## 2022-07-21 NOTE — DISCHARGE INSTRUCTIONS
Outpatient Infusion Discharge Instructions  [facility]  [address] [facility] [address] [address], [address]  Telephone: [phone]    NAME:  Jose Luis Glass OF BIRTH:  1934  MEDICAL RECORD NUMBER:  [id number]  DATE:  7/21/2022    Reason for Outpatient Infusion Visit: Retacrit and labs    If you develop any these symptoms please contact you Doctor    [x] Nausea and/or vomiting not relieved with medication   [x] Swelling, redness, and/or bleeding at injection or IV site    [x] Fever or chills  [x] Rash or itching   [x] Shortness of breath       epoetin monica  Pronunciation: e ROSSY e tin AL fa  Brand: Epogen, Procrit, Retacrit  What is the most important information I should know about epoetin monica? This medicine can cause serious side effects, including heart attack or stroke. Epoetin monica may also speed up tumor growth, or shorten remission or survival time in some people. Talk with your doctor about the risks and benefits of using epoetin monica. You should not use this medicine if you have uncontrolled high blood pressure, or if you have ever had pure red cell aplasia (PRCA, a type of anemia) causedby using epoetin monica or darbepoetin monica. Call your doctor at once if you have signs of a blood clot: sudden numbness or weakness, problems with vision or speech, chest pain,trouble breathing, pain or cold feeling in an arm or leg. What is epoetin monica? Epoetin monica is a man-made form of a protein that helps your body produce red blood cells. This protein may be reduced when you have kidney failure or use certain medications. When fewer red blood cells are produced, you can develop acondition called anemia. Epoetin monica is used to treat anemia caused by chemotherapy in adults andchildren at least 11years old. Epoetin monica is also used to treat anemia caused by chronic kidney disease inadults and children at least 2 month old.   Epoetin monica is also used to treat anemia in adults taking zidovudine to treatHIV (human immunodeficiency virus). Epoetin monica is also used to reduce the need for red blood cell transfusions inadults having certain types of surgery. Epoetin monica may also be used for purposes not listed in this medication guide. What should I discuss with my healthcare provider before using epoetin monica? You should not use this medication if you are allergic to epoetin monica ordarbepoetin monica, or if:  you have untreated or uncontrolled high blood pressure;  you have had pure red cell aplasia (PRCA, a type of anemia) after using darbepoetin monica or epoetin monica; or  you use an epoetin monica multi-dose vial and you are pregnant or breastfeeding. Do not use epoetin monica from a multi-dose vial when giving medicine to a baby. The multi-dose vial contains an ingredient that can cause serious sideeffects or death in very young infants or premature babies. Epoetin monica may speed up tumor growth, or shorten remission or survival time in some people with certain types of cancer. Talk with your doctor about the risks and benefits of using epoetin monica. Tell your doctor if you have ever had:  heart disease, high blood pressure;  a heart attack, stroke, or blood clot;  a seizure disorder;  phenylketonuria (PKU); or  kidney disease (or if you are on dialysis). It is not known whether this medicine will harm an unborn baby. Tell yourdoctor if you are pregnant or plan to become pregnant. Do not breastfeed while using this medicine, and for at least 2 months after your last dose. Do not use epoetin monica from a multi-dose vial if you are pregnant or breastfeeding. Epoetin monica is made from donated human plasma and may contain viruses or other infectious agents. Donated plasma is tested and treated to reduce the risk of contamination, but there is still a small possibility it could transmitdisease. Ask your doctor about any possible risk.   How should I use epoetin monica?  Follow all directions on your prescription label and read all medication guides or instruction sheets. Your doctor may occasionally change your dose. Use themedicine exactly as directed. Epoetin monica is injected under the skin, or as an infusion into a vein. A healthcare provider may teach you how to properly use the medication byyourself. Read and carefully follow any Instructions for Use provided with your medicine. Do not use epoetin monica if you don't understand all instructions for properuse. Ask your doctor or pharmacist if you have questions. Prepare your injection only when you are ready to give it. Do not use if the medicine has changed colors or has particles in it. Call your pharmacist for new medicine. Do not shake this medicine or you may ruin it. Call your doctor if you feel weak, tired, or light-headed. These may be signsthat your body has stopped responding to epoetin monica. You may need frequent medical tests to be sure this medicine is not causingharmful effects. Your injections may be delayed based on the results. You may be given other medications to help prevent serious side effects. Estelita Caul these medicines for as long as your doctor has prescribed. If you need surgery, tell the surgeon ahead of time that you are using epoetinalfa. You may need to use a medicine to prevent blood clots. Epoetin monica is only part of a complete treatment program that may also includea special diet. Follow your doctor's instructions very closely. Store in the refrigerator and protect from light. Do not freeze epoetin monica, and throw away the medication if it has become frozen. Each single-use vial (bottle) of this medicine is for one use only. Throw it away after one use, even if there is still medicine left inside. Throw away any leftover medicine in a multi-dose vial 21 days after the first use. Use a needle and syringe only once and then place them in a puncture-proof \"sharps\" container.  Follow state or local laws about how to dispose of thiscontainer. Keep it out of the reach of children and pets. What happens if I miss a dose? Call your doctor for instructions if you miss a dose of epoetin monica. What happens if I overdose? Seek emergency medical attention or call the Poison Help line at 1-180.745.4385. What should I avoid while using epoetin monica? Avoid driving or hazardous activity until you know how this medicine willaffect you. Your reactions could be impaired. What are the possible side effects of epoetin monica? Get emergency medical help if you have signs of an allergic reaction (hives, sweating, rapid pulse, wheezing, trouble breathing, severe dizziness or fainting, swelling in your face or throat) or a severe skin reaction (fever, sore throat, burning eyes, skin pain, red or purple skin rash withblistering and peeling). Epoetin monica can cause serious side effects, including heart attack or stroke. Seek emergency medical help if you have:  heart attack symptoms --chest pain or pressure, pain spreading to your jaw or shoulder, nausea, sweating;  signs of a blood clot --pain, swelling, warmth, redness, cold feeling, or pale appearance of an arm or leg; or  signs of a stroke --sudden numbness or weakness (especially on one side of the body), sudden severe headache, slurred speech, problems with vision or balance. Call your doctor at once if you have:  unusual tiredness;  a seizure (convulsions);  high blood sugar --increased thirst, increased urination, dry mouth, fruity breath odor;  low potassium --leg cramps, constipation, irregular heartbeats, fluttering in your chest, increased thirst or urination, numbness or tingling, muscle weakness or limp feeling; or  increased blood pressure --severe headache, blurred vision, pounding in your neck or ears, anxiety, nosebleed.   Common side effects may include:  fever, chills, cough, feeling short of breath;  low potassium, low white blood cells;  blood vessel blockage;  high blood sugar;  joint pain, bone pain, muscle pain or spasm;  itching or rash;  mouth pain, trouble swallowing;  nausea, vomiting;  headache, dizziness;  trouble sleeping;  depressed mood;  weight loss; or  pain or redness where the medicine was injected. This is not a complete list of side effects and others may occur. Call your doctor for medical advice about side effects. You may report side effects toFDA at 8-685-WZN-7477. What other drugs will affect epoetin monica? Other drugs may affect epoetin monica, including prescription and over-the-counter medicines, vitamins, and herbal products. Tell your doctorabout all your current medicines and any medicine you start or stop using. Where can I get more information? Your pharmacist can provide more information about epoetin monica. Remember, keep this and all other medicines out of the reach of children, never share your medicines with others, and use this medication only for the indication prescribed. Every effort has been made to ensure that the information provided by [address] is accurate, up-to-date, and complete, but no guarantee is made to that effect. Drug information contained herein may be time sensitive. [employer] information has been compiled for use by healthcare practitioners and consumers in the [place] and therefore [employer] does not warrant that uses outside of the [place] are appropriate, unless specifically indicated otherwise. [facility] drug information does not endorse drugs, diagnose patients or recommend therapy. [facility] drug information is an informational resource designed to assist licensed healthcare practitioners in caring for their patients and/or to serve consumers viewing this service as a supplement to, and not a substitute for, the expertise, skill, knowledge and judgment of healthcare practitioners.  The absence of a warning for a given drug or drug combination in no way should be construed to indicate that the drug or drug combination is safe, effective or appropriate for any given patient. [facility] does not assume any responsibility for any aspect of healthcare administered with the aid of information [facility] provides. The information contained herein is not intended to cover all possible uses, directions, precautions, warnings, drug interactions, allergic reactions, or adverse effects. If you have questions about the drugs you are taking, check with yourdoctor, nurse or pharmacist.  Copyright 1623-5711 [address]: 12.01. Revision date:11/23/2020. Care instructions adapted under license by [facility] ([facility]). If you have questions about a medical condition or this instruction, always ask your healthcare professional. [place] [address] any warranty or liability for your use of this information. Please review After Visit Summary (AVS) information on    [] Other      Outpatient [address]: Should you experience any significant changes in your health or have questions about your care please contact the [address] at [address] 8:00 am - 4:00 pm.  If you need help outside these hours and cannot wait until we are again available, contact your Primary Care Physician or go to the hospital emergency room.        Electronically signed by Hector Spence RN on 7/21/2022 at 1:33 PM

## 2022-08-04 ENCOUNTER — HOSPITAL ENCOUNTER (OUTPATIENT)
Dept: INFUSION THERAPY | Age: 87
Setting detail: INFUSION SERIES
Discharge: HOME | End: 2022-08-04
Payer: MEDICARE

## 2022-08-04 VITALS
SYSTOLIC BLOOD PRESSURE: 106 MMHG | OXYGEN SATURATION: 99 % | HEART RATE: 103 BPM | RESPIRATION RATE: 17 BRPM | DIASTOLIC BLOOD PRESSURE: 69 MMHG | TEMPERATURE: 97.8 F

## 2022-08-04 DIAGNOSIS — N18.30 STAGE 3 CHRONIC KIDNEY DISEASE, UNSPECIFIED WHETHER STAGE 3A OR 3B CKD (HCC): ICD-10-CM

## 2022-08-04 DIAGNOSIS — N18.30 ANEMIA IN STAGE 3 CHRONIC KIDNEY DISEASE, UNSPECIFIED WHETHER STAGE 3A OR 3B CKD (HCC): Primary | ICD-10-CM

## 2022-08-04 DIAGNOSIS — D63.1 ANEMIA IN STAGE 3 CHRONIC KIDNEY DISEASE, UNSPECIFIED WHETHER STAGE 3A OR 3B CKD (HCC): Primary | ICD-10-CM

## 2022-08-04 LAB — HEMOGLOBIN: 9.9 G/DL (ref 12–16)

## 2022-08-04 PROCEDURE — 96372 THER/PROPH/DIAG INJ SC/IM: CPT

## 2022-08-04 PROCEDURE — 85018 HEMOGLOBIN: CPT

## 2022-08-04 PROCEDURE — 6360000002 HC RX W HCPCS: Performed by: INTERNAL MEDICINE

## 2022-08-04 RX ADMIN — EPOETIN ALFA-EPBX 9500 UNITS: 10000 INJECTION, SOLUTION INTRAVENOUS; SUBCUTANEOUS at 13:42

## 2022-08-04 NOTE — PROGRESS NOTES
[facility]     Epogen Visit with Peripheral Lab Draw    NAME:  Kong Oneill OF BIRTH:  1934  MEDICAL RECORD NUMBER:  [id number]  Episode Date:  8/4/2022    Patient arrived to [facility]   [x] per wheelchair   [] ambulatory    Peripheral Lab Draw    Blood Draw Site: Location:left arm  Site cleansed with Chloroprep Scrub for 30 seconds: Yes  Site cleansed with Alcohol pads: Yes  Labs drawn with: 21 gauge             [x] Butterfly    [] Needle  Labs Obtained: Yes  Number of attempts: 1    Lab Test(s) Ordered: HGB    Lab Draw Site:  Redness: No  Bruising: No   Edema: No  Pain: No       Epogen Visit     Is the patient experiencing any:  Fatigue:   []   None  [x]   Increase over baseline but not altering normal activities  []   Moderate of causing difficulty performing some activities  []   Severe or loss of ability to perform some activities  []   Bedridden or disabling    Dizziness or Lightheadedness:   [x]   None  []   No Interference  []   Interferes with functioning but not activities of daily living  []   Interferes with daily activies  []   Bedridden or disabling    Shortness of Breath:   [x]   None   []   Dyspneic on exertion   []   Dyspnea with normal activities  []   Dyspnea at rest    Edema: 2+ Location of edema: both legs and ankles from knees down    Tachycardia: No    Heart Palpitations: No      Chest Pain: No        Hold ZARINA dose if B/P is greater than: 180 mm/Hg     If Hgb remains less than 10 Increase dose by 25%. Do not increase dose more frequently than once every 4 weeks. If Hgb 10-10.5 g/dl  Continue same dose  If Hgb 10.6-11 g/dl Decrease dose by 25%. A decrease in dose can be done as frequently as every week. If Hgb is greater than 11 g/dl Hold Epogen.   May resume ZARINA when Hgb is less than 10 with a 25% reduction in the dose from the last administered dose or decrease with a 25% reduction in the dose from the last

## 2022-08-04 NOTE — DISCHARGE INSTRUCTIONS
Outpatient Infusion Discharge Instructions  [place] [address] [facility] [address] [address], [address]  Telephone: [phone]    NAME:  Cori Pichardo OF BIRTH:  1934  MEDICAL RECORD NUMBER:  [id number]  DATE:  8/4/22    Reason for Outpatient Infusion Visit: retacrit    If you develop any these symptoms please contact you Doctor    [x] Nausea and/or vomiting not relieved with medication   [x] Swelling, redness, and/or bleeding at injection or IV site    [x] Fever or chills  [x] Rash or itching   [x] Shortness of breath  [x] Please review After Visit Summary (AVS) information on    [] Other      Outpatient [address]: Should you experience any significant changes in your health or have questions about your care please contact the [address] at [address] 8:00 am - 4:00 pm.  If you need help outside these hours and cannot wait until we are again available, contact your Primary Care Physician or go to the hospital emergency room.        Electronically signed by Dorene Cullen RN on 8/4/2022 at 1:38 PM

## 2022-08-18 ENCOUNTER — HOSPITAL ENCOUNTER (OUTPATIENT)
Dept: INFUSION THERAPY | Age: 87
Setting detail: INFUSION SERIES
Discharge: HOME | End: 2022-08-18
Payer: MEDICARE

## 2022-08-18 VITALS
DIASTOLIC BLOOD PRESSURE: 64 MMHG | RESPIRATION RATE: 16 BRPM | SYSTOLIC BLOOD PRESSURE: 112 MMHG | OXYGEN SATURATION: 98 % | TEMPERATURE: 97.4 F | HEART RATE: 64 BPM

## 2022-08-18 DIAGNOSIS — Z79.01 LONG TERM (CURRENT) USE OF ANTICOAGULANTS: Primary | ICD-10-CM

## 2022-08-18 DIAGNOSIS — I48.0 PAROXYSMAL ATRIAL FIBRILLATION (HCC): ICD-10-CM

## 2022-08-18 DIAGNOSIS — D63.1 ANEMIA IN STAGE 3 CHRONIC KIDNEY DISEASE, UNSPECIFIED WHETHER STAGE 3A OR 3B CKD (HCC): Primary | ICD-10-CM

## 2022-08-18 DIAGNOSIS — N18.30 ANEMIA IN STAGE 3 CHRONIC KIDNEY DISEASE, UNSPECIFIED WHETHER STAGE 3A OR 3B CKD (HCC): Primary | ICD-10-CM

## 2022-08-18 DIAGNOSIS — N18.30 STAGE 3 CHRONIC KIDNEY DISEASE, UNSPECIFIED WHETHER STAGE 3A OR 3B CKD (HCC): ICD-10-CM

## 2022-08-18 LAB
HEMOGLOBIN: 9.9 G/DL (ref 12–16)
INR BLD: 2.92 (ref 0.87–1.14)
PROTHROMBIN TIME: 30.6 SEC (ref 11.7–14.5)

## 2022-08-18 PROCEDURE — 96372 THER/PROPH/DIAG INJ SC/IM: CPT

## 2022-08-18 PROCEDURE — 85610 PROTHROMBIN TIME: CPT

## 2022-08-18 PROCEDURE — 6360000002 HC RX W HCPCS: Performed by: INTERNAL MEDICINE

## 2022-08-18 PROCEDURE — 85018 HEMOGLOBIN: CPT

## 2022-08-18 RX ADMIN — EPOETIN ALFA-EPBX 9500 UNITS: 10000 INJECTION, SOLUTION INTRAVENOUS; SUBCUTANEOUS at 13:16

## 2022-08-18 NOTE — DISCHARGE INSTRUCTIONS
Outpatient Infusion Discharge Instructions  [place] [address] [facility] [address] [address], [address]  Telephone: [phone]    NAME:  Abraham Macias OF BIRTH:  1934  MEDICAL RECORD NUMBER:  [id number]  DATE:  8/18/22    Reason for Outpatient Infusion Visit: Epogen and labs    If you develop any these symptoms please contact you Doctor    [x] Nausea and/or vomiting not relieved with medication   [x] Swelling, redness, and/or bleeding at injection or IV site    [x] Fever or chills  [x] Rash or itching   [x] Shortness of breath  [x] Please review After Visit Summary (AVS) information on    [] Other      Outpatient [address]: Should you experience any significant changes in your health or have questions about your care please contact the [address] at [address] 8:00 am - 4:00 pm.  If you need help outside these hours and cannot wait until we are again available, contact your Primary Care Physician or go to the hospital emergency room.        Electronically signed by Angel Cortez RN on 8/18/2022 at 1:17 PM

## 2022-08-18 NOTE — DISCHARGE INSTR - COC
Continuity of Care Form    Patient Name: Jensen Francis   :  1934  MRN:  [id number]    Admit date:  2022  Discharge date:  ***    Code Status Order: Prior   Advance Directives:     Admitting Physician:  No admitting provider for patient encounter. PCP: Oren Chester MD    Discharging Nurse: [facility] Unit/Room#: No information available for this encounter.   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    HYSTERECTOMY      INTRACAPSULAR CATARACT EXTRACTION Left 10/16/2020    PHACOEMULSIFICATION WITH INTRAOCULAR LENS IMPLANT - LEFT EYE performed by Antoni Washington MD at [address] Bilateral     TKT    JOINT REPLACEMENT Bilateral     THR    KNEE SURGERY      bilateral total knees    OTHER SURGICAL HISTORY  02/15/2021    BAV       Immunization History:   Immunization History   Administered Date(s) Administered    COVID-19, PFIZER PURPLE top, DILUTE for use, (age 15 y+), 30mcg/0.3mL 2021, 2021, 2022       Active Problems:  Patient Active Problem List   Diagnosis Code    CHF (congestive heart failure), NYHA class III, acute on chronic, combined ([address] I50.43    Essential hypertension I10    Pulmonary HTN ([address] I27.20    Hypercholesteremia E78.00    Primary localized osteoarthrosis of shoulder region M19.019    Nonrheumatic aortic valve stenosis I35.0    Hyperthyroidism E05.90    Acute kidney injury superimposed on chronic kidney disease ([address] N17.9, N18.9    Hyponatremia E87.1    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    Anemia due to chronic kidney disease N18.9, D63.1    Acute respiratory failure with hypoxia (HCC) J96.01    DAVION (acute kidney injury) ([facility] N17.9    Uncontrolled type 2 diabetes mellitus with hyperglycemia (HCC) E11.65    Acute on chronic combined systolic and diastolic congestive heart failure (HCC) I50.43    Acute congestive heart failure (HCC) I50.9    Severe aortic stenosis I35.0    Paroxysmal atrial fibrillation (HCC) I48.0    CHF (congestive heart failure), NYHA class I, acute on chronic, combined (HCC) I50.43    Acute on chronic congestive heart failure (HCC) I50.9    Bilateral leg edema R60.0    Stage 4 chronic kidney disease (HCC) N18.4    Severe aortic valve stenosis I35.0    Acute renal insufficiency N28.9    Anemia in stage 3 chronic kidney disease (HCC) N18.30, D63.1    Chronic kidney disease, stage III (moderate) (HCC) N18.30    Long term (current) use of anticoagulants Z79.01       Isolation/Infection:   Isolation            No Isolation          Patient Infection Status       Infection Onset Added Last Indicated Last Indicated By Review Planned Expiration Resolved Resolved By    None active    Resolved    COVID-19 (Rule Out) 02/07/21 02/07/21 02/07/21 COVID-19 (Ordered)   02/07/21 Rule-Out Test Resulted    C-diff Rule Out 09/05/20 09/05/20 09/05/20 GI Bacterial Pathogens By PCR (Ordered)   09/06/20 Sarah Garcia RN    COVID-19 (Rule Out) 09/04/20 09/04/20 09/04/20 COVID-19 (Ordered)   09/04/20 Rule-Out Test Resulted    C-diff Rule Out 09/04/20 09/04/20 09/04/20 Clostridium Difficile Toxin/Antigen (Ordered)   09/04/20 Rule-Out Test Resulted            Nurse Assessment:  Last Vital Signs: /64   Pulse 64   Temp 97.4 °F (36.3 °C) (Oral)   Resp 16   LMP  (LMP Unknown)   SpO2 98%     Last documented pain score (0-10 scale):    Last Weight:   Wt Readings from Last 1 Encounters:   04/29/21 267 lb 6.7 oz (121.3 kg)     Mental Status:  {IP PT MENTAL STATUS:77722}    IV Access:  { TALAT IV ACCESS:909057369}    Nursing Mobility/ADLs:  Walking   {CHP DME NJCC:309998142}  Transfer  {CHP DME GQSF:051797037}  Bathing  {CHP DME WKTR:766595142}  Dressing  {CHP DME PVYU:892438353}  Toileting  {CHP DME SWFK:709325799}  Feeding  {[facility] DME JQNH:990294724}  Med Admin  {P DME ZSSJ:857337472}  Med Delivery   { TALAT MED Delivery:621113501}    Wound Care Documentation and Therapy:  Wound 21 Buttocks Mid;Inner (Active)   Number of days: 479        Elimination:  Continence: Bowel: {YES / MB:11506}  Bladder: {YES / NS:19998}  Urinary Catheter: {Urinary Catheter:428600381}   Colostomy/Ileostomy/Ileal Conduit: {YES / IW:55256}       Date of Last BM: ***  No intake or output data in the 24 hours ending 22 1323  No intake/output data recorded.     Safety Concerns:     [facility] Safety Concerns:492436983}    Impairments/Disabilities:      [facility] Impairments/Disabilities:473643179}    Nutrition Therapy:  Current Nutrition Therapy:   [facility] Diet List:923716949}    Routes of Feeding: {[facility] DME Other Feedings:323496711}  Liquids: {Slp liquid thickness:59476}  Daily Fluid Restriction: {P DME Yes amt example:333592981}  Last Modified Barium Swallow with Video (Video Swallowing Test): {Done Not Done OSSR:999189636}    Treatments at the Time of Hospital Discharge:   Respiratory Treatments: ***  Oxygen Therapy:  {Therapy; copd oxygen:67942}  Ventilator:    {[facility] Vent USMT:213160509}    Rehab Therapies: {THERAPEUTIC INTERVENTION:3419048133}  Weight Bearing Status/Restrictions: [facility] Weight Bearin}  Other Medical Equipment (for information only, NOT a DME order):  {EQUIPMENT:577019561}  Other Treatments: ***    Patient's personal belongings (please select all that are sent with patient):  {[facility] DME Belongings:685108629}    RN SIGNATURE:  {Esignature:264682139}    CASE MANAGEMENT/SOCIAL WORK SECTION    Inpatient Status Date: ***    Readmission Risk Assessment Score:  Readmission Risk              Risk of Unplanned Readmission:  0           Discharging to Facility/ Agency   Name:   Address:  Phone:  Fax:    Dialysis Facility (if applicable)   Name:  Address:  Dialysis Schedule:  Phone:  Fax:    / signature: {Esignature:568575460}    PHYSICIAN SECTION    Prognosis: {Prognosis:0997489691}    Condition at Discharge: Miki Stanton Patient Condition:356626423}    Rehab Potential (if transferring to Rehab): {Prognosis:3002439499}    Recommended Labs or Other Treatments After Discharge: ***    Physician Certification: I certify the above information and transfer of Megan Curiel  is necessary for the continuing treatment of the diagnosis listed and that she requires {Admit to Appropriate Level of Care:73167} for {GREATER/LESS:125894529} 30 days.      Update Admission H&P: {CHP DME Changes in ARAZT:229354934}    PHYSICIAN SIGNATURE:  {Esignature:685820068}

## 2022-08-18 NOTE — PROGRESS NOTES
Clinic Level of Care Assessment  Infusion Center      NAME:  [address] [place] [address] Dr:  1934 GENDER: female  MEDICAL RECORD NUMBER:  [id number]   DATE:  8/18/2022     Ambulation Status Document in Daily Care/Safety Tab   Status Definition Points   Independent Independently able to ambulate. Fully able (without any assistance) to get on/off exam table/chair. []   0   Minimal Physical Assistance Requires physical assistance of one person to ambulate and/or position patient to be examined. Includes necessary physical assistance to position lower extremities on/off stool. [x]   1   Moderate Physical Assistance Requires at least one staff member to physically assist patient in ambulating into treatment room, and/or on off chair/bed. Requires assistance to bathroom. []   2   Full Assistance Requires assistance of at least two staff members to transfer patient into treatment room and/or on/off bed/chair. \"Total Transfer\". Unable to use bathroom requires bedside commode and/or bedpan []   3       Dressing Complexity Document in LDA Navigator  Complexity Definition Points   No Dressing  []   0   Simple Minimal, simple dressing. i.e. bandaid, gauze, simple wrap. Peripheral IV dressing. [x]   1   Intermediate Moderately complicated PICC dressing change requiring sterile procedure and site assessment. []   2   Complex Complicated dressing change port access requiring sterile procedure and site assessment. []   3       Teaching Effort Document in AVS and/or Education Navigator    Effort Definition Points   No Teaching  []   0   Simple Teach two or less topics. Teaching documented in discharge instructions in AVS and copy given to patient. [x]   1   Intermediate Teach three to four topics. Teaching documented in Discharge Instructions in AVS using References and Attachments. Copy given to patient. []   2   Complex Teach five to six topics.  Teaching documented in Discharge Instructions in AVS using References

## 2022-08-18 NOTE — PROGRESS NOTES
[facility]     Epogen Visit with Peripheral Lab Draw    NAME:  Jose Luis Glass OF BIRTH:  1934  MEDICAL RECORD NUMBER:  [id number]  Episode Date:  8/18/2022    Patient arrived to [facility]   [x] per wheelchair   [] ambulatory    Peripheral Lab Draw    Blood Draw Site: Location:right arm  Site cleansed with Chloroprep Scrub for 30 seconds: Yes  Site cleansed with Alcohol pads: Yes  Labs drawn with: 21 gauge             [x] Butterfly    [] Needle  Labs Obtained: Yes  Number of attempts: 1    Lab Test(s) Ordered: HGB    Lab Draw Site:  Redness: No  Bruising: No   Edema: No  Pain: No       Epogen Visit     Is the patient experiencing any:  Fatigue:   []   None  [x]   Increase over baseline but not altering normal activities  []   Moderate of causing difficulty performing some activities  []   Severe or loss of ability to perform some activities  []   Bedridden or disabling    Dizziness or Lightheadedness:   [x]   None  []   No Interference  []   Interferes with functioning but not activities of daily living  []   Interferes with daily activies  []   Bedridden or disabling    Shortness of Breath:   [x]   None   []   Dyspneic on exertion   []   Dyspnea with normal activities  []   Dyspnea at rest    Edema: 2+ Location of edema: left leg    Tachycardia: No    Heart Palpitations: No      Chest Pain: No     /64   Pulse 64   Temp 97.4 °F (36.3 °C) (Oral)   Resp 16   LMP  (LMP Unknown)   SpO2 98%     Hold ZARINA dose if B/P is greater than: 180 mm/Hg     If Hgb remains less than 10 Increase dose by 25%. Do not increase dose more frequently than once every 4 weeks. If Hgb 10-10.5 g/dl  Continue same dose  If Hgb 10.6-11 g/dl Decrease dose by 25%. A decrease in dose can be done as frequently as every week. If Hgb is greater than 11 g/dl Hold Epogen.   May resume ZARINA when Hgb is less than 10 with a 25% reduction in the dose from the last administered dose or decrease with a 25% reduction in the dose from the last administered dose or decrease  frequency. Last visit date: 8/4/22     Last Hgb: 9.9 g/dl   Last dose: 9,500 units    Current Lab Data:    Recent Labs     08/18/22  1249   HGB 9.9*     Dose will be the same    Epogen dosage: 9500 units was administered slowly subcutaneously into the right upper arm. Dose verified by:  Avis Mendez RN    Response to treatment:  Well tolerated by patient. Education:    Verbalized understanding    Scheduled to return for next dose of Epogen on September 1, 2022 .      Electronically signed by Hector Spence RN on 8/18/2022 at 1:28 PM

## 2022-08-18 NOTE — PROGRESS NOTES
[address] [facility]    Peripheral Lab Draw    NAME:  Clarke Jacobsen OF BIRTH:  1934  MEDICAL RECORD NUMBER:  [id number]  Episode Date:  8/18/2022    Blood Draw Site: Location:right arm  Site cleansed with Chloroprep Scrub for 30 seconds: Yes  Site cleansed with Alcohol pads: Yes  Labs drawn with: 21 gauge             [] Butterfly    [x] Needle  Labs Obtained: Yes  Number of attempts: 1    Lab Test(s) Ordered: PT/INR    Lab Draw Site:  Redness: No  Bruising: No   Edema: No  Pain: No     Response to treatment:  Well tolerated by patient.       Electronically signed by Milagros Yeager RN on 8/18/2022 at 1:08 PM

## 2022-09-01 ENCOUNTER — HOSPITAL ENCOUNTER (OUTPATIENT)
Dept: INFUSION THERAPY | Age: 87
Setting detail: INFUSION SERIES
Discharge: HOME | End: 2022-09-01
Payer: MEDICARE

## 2022-09-01 VITALS
TEMPERATURE: 97.5 F | SYSTOLIC BLOOD PRESSURE: 112 MMHG | HEART RATE: 70 BPM | DIASTOLIC BLOOD PRESSURE: 59 MMHG | OXYGEN SATURATION: 96 % | RESPIRATION RATE: 16 BRPM

## 2022-09-01 DIAGNOSIS — N18.30 ANEMIA IN STAGE 3 CHRONIC KIDNEY DISEASE, UNSPECIFIED WHETHER STAGE 3A OR 3B CKD (HCC): Primary | ICD-10-CM

## 2022-09-01 DIAGNOSIS — D63.1 ANEMIA IN STAGE 3 CHRONIC KIDNEY DISEASE, UNSPECIFIED WHETHER STAGE 3A OR 3B CKD (HCC): Primary | ICD-10-CM

## 2022-09-01 DIAGNOSIS — I48.0 PAROXYSMAL ATRIAL FIBRILLATION (HCC): ICD-10-CM

## 2022-09-01 DIAGNOSIS — N18.30 STAGE 3 CHRONIC KIDNEY DISEASE, UNSPECIFIED WHETHER STAGE 3A OR 3B CKD (HCC): ICD-10-CM

## 2022-09-01 DIAGNOSIS — Z79.01 LONG TERM (CURRENT) USE OF ANTICOAGULANTS: Primary | ICD-10-CM

## 2022-09-01 LAB — HEMOGLOBIN: 10.2 G/DL (ref 12–16)

## 2022-09-01 PROCEDURE — 85018 HEMOGLOBIN: CPT

## 2022-09-01 PROCEDURE — 6360000002 HC RX W HCPCS: Performed by: INTERNAL MEDICINE

## 2022-09-01 PROCEDURE — 96372 THER/PROPH/DIAG INJ SC/IM: CPT

## 2022-09-01 RX ADMIN — EPOETIN ALFA-EPBX 9500 UNITS: 10000 INJECTION, SOLUTION INTRAVENOUS; SUBCUTANEOUS at 13:44

## 2022-09-01 NOTE — DISCHARGE INSTRUCTIONS
Outpatient Infusion Discharge Instructions  [place] [address] [facility] [address]  Telephone: [phone]    NAME:  Junior Thornton OF BIRTH:  1934  MEDICAL RECORD NUMBER:  [id number]  DATE:  September 1, 2022    Reason for Outpatient Infusion Visit: Epogen injection    If you develop any these symptoms please contact you Doctor    [x] Nausea and/or vomiting not relieved with medication   [x] Swelling, redness, and/or bleeding at injection or IV site    [x] Fever or chills  [x] Rash or itching   [x] Shortness of breath  [x] Please review After Visit Summary (AVS) information on: Epogen and anemia    [x] Other: Next visit - September 14, 2022      Outpatient [facility] Information: Should you experience any significant changes in your health or have questions about your care please contact the [address] at [address] 8:00 am - 4:00 pm.  If you need help outside these hours and cannot wait until we are again available, contact your Primary Care Physician or go to the hospital emergency room.        Electronically signed by Maria Elena Ochoa RN on 9/1/2022 at 1:29 PM

## 2022-09-01 NOTE — PROGRESS NOTES
[facility]     Epogen Visit with Peripheral Lab Draw    NAME:  Nelson Gottron OF BIRTH:  1934  MEDICAL RECORD NUMBER:  [id number]  Episode Date:  9/1/2022    Patient arrived to [facility]   [x] per wheelchair   [] ambulatory    Alert and oriented x 4. Daughter with patient for visit today. Patient reports feeling well and states that she has no new symptoms of anemia and no side effects from her Epogen injections. Patient denies signs/symptoms of respiratory infection and is wearing a mask to prevent the spread of Covid-19. Peripheral Lab Draw    Blood Draw Site: Location: left antecubital  Site cleansed with Chloroprep Scrub for 30 seconds: Yes  Labs drawn with: 23 gauge             [x] Butterfly    [] Needle  Labs Obtained: Yes  Number of attempts: 1    Lab Test(s) Ordered: STAT Hemoglobin    Lab Draw Site:  Redness: No  Bruising: No   Edema: No  Pain: No       Epogen Visit     Is the patient experiencing any:  Fatigue:   []   None  [x]   Increase over baseline but not altering normal activities - reports that her tiredness is \"my normal\" as long as I nap every day  []   Moderate of causing difficulty performing some activities  []   Severe or loss of ability to perform some activities  []   Bedridden or disabling    Dizziness or Lightheadedness:   [x]   None  []   No Interference  []   Interferes with functioning but not activities of daily living  []   Interferes with daily activies  []   Bedridden or disabling    Shortness of Breath:   [x]   None   []   Dyspneic on exertion   []   Dyspnea with normal activities  []   Dyspnea at rest    Edema:  +2 pitting edema right foot to knee; +3 pitting edema left foot to knee. Spandagrip in place to bilateral lower legs. Tachycardia: No    Heart Palpitations: No      Chest Pain: No    Hold ZARINA dose if B/P is greater than: 180 mm/Hg     If Hgb remains less than 10 Increase dose by 25%.   Do not increase dose more frequently than once every 4 weeks. If Hgb 10-10.5 g/dl  Continue same dose  If Hgb 10.6-11 g/dl Decrease dose by 25%. A decrease in dose can be done as frequently as every week. If Hgb is greater than 11 g/dl Hold Epogen. May resume ZARINA when Hgb is less than 10 with a 25% reduction in the dose from the last administered dose or decrease with a 25% reduction in the dose from the last administered dose or decrease  frequency. Last visit date: August 18, 2022     Last Hgb: 9.9 g/dl   Last dose: 9,500 units    Current Lab Data:    Recent Labs     09/01/22  1306   HGB 10.2*     Dose will remain the same per therapy plan order. Epogen dosage: 9,500 units was administered slowly subcutaneously into the left upper arm. Dose verified by:  Jordan Encarnacion RN    Response to treatment:  Well tolerated by patient. Education: Verbal and written discharge instructions reviewed with patient and daughter. Both verbalized understanding. Written copy given via AVS      Scheduled to return for next dose of Epogen on September 14, 2022 .      Electronically signed by [place] Ochoa RN on 9/1/2022 at 6:19 PM

## 2022-09-14 ENCOUNTER — HOSPITAL ENCOUNTER (OUTPATIENT)
Dept: INFUSION THERAPY | Age: 87
Setting detail: INFUSION SERIES
Discharge: HOME | End: 2022-09-14
Payer: MEDICARE

## 2022-09-14 VITALS
OXYGEN SATURATION: 99 % | HEART RATE: 67 BPM | SYSTOLIC BLOOD PRESSURE: 124 MMHG | TEMPERATURE: 97.4 F | DIASTOLIC BLOOD PRESSURE: 73 MMHG | RESPIRATION RATE: 16 BRPM

## 2022-09-14 DIAGNOSIS — I48.0 PAROXYSMAL ATRIAL FIBRILLATION (HCC): ICD-10-CM

## 2022-09-14 DIAGNOSIS — N18.30 STAGE 3 CHRONIC KIDNEY DISEASE, UNSPECIFIED WHETHER STAGE 3A OR 3B CKD (HCC): ICD-10-CM

## 2022-09-14 DIAGNOSIS — D63.1 ANEMIA IN STAGE 3 CHRONIC KIDNEY DISEASE, UNSPECIFIED WHETHER STAGE 3A OR 3B CKD (HCC): Primary | ICD-10-CM

## 2022-09-14 DIAGNOSIS — Z79.01 LONG TERM (CURRENT) USE OF ANTICOAGULANTS: ICD-10-CM

## 2022-09-14 DIAGNOSIS — N18.30 ANEMIA IN STAGE 3 CHRONIC KIDNEY DISEASE, UNSPECIFIED WHETHER STAGE 3A OR 3B CKD (HCC): Primary | ICD-10-CM

## 2022-09-14 LAB
ALBUMIN SERPL-MCNC: 3.6 G/DL (ref 3.4–5)
ANION GAP SERPL CALCULATED.3IONS-SCNC: 8 MMOL/L (ref 3–16)
BUN BLDV-MCNC: 79 MG/DL (ref 7–20)
CALCIUM SERPL-MCNC: 9.1 MG/DL (ref 8.3–10.6)
CHLORIDE BLD-SCNC: 101 MMOL/L (ref 99–110)
CO2: 30 MMOL/L (ref 21–32)
CREAT SERPL-MCNC: 3.4 MG/DL (ref 0.6–1.2)
FERRITIN: 628.7 NG/ML (ref 15–150)
GFR AFRICAN AMERICAN: 15
GFR NON-AFRICAN AMERICAN: 13
GLUCOSE BLD-MCNC: 176 MG/DL (ref 70–99)
HCT VFR BLD CALC: 30.9 % (ref 36–48)
HEMOGLOBIN: 10 G/DL (ref 12–16)
INR BLD: 3.26 (ref 0.87–1.14)
MCH RBC QN AUTO: 29 PG (ref 26–34)
MCHC RBC AUTO-ENTMCNC: 32.3 G/DL (ref 31–36)
MCV RBC AUTO: 89.6 FL (ref 80–100)
PARATHYROID HORMONE INTACT: 326.2 PG/ML (ref 14–72)
PDW BLD-RTO: 17.2 % (ref 12.4–15.4)
PHOSPHORUS: 4 MG/DL (ref 2.5–4.9)
PLATELET # BLD: 136 K/UL (ref 135–450)
PMV BLD AUTO: 7.3 FL (ref 5–10.5)
POTASSIUM SERPL-SCNC: 4.1 MMOL/L (ref 3.5–5.1)
PROTHROMBIN TIME: 33.5 SEC (ref 11.7–14.5)
RBC # BLD: 3.44 M/UL (ref 4–5.2)
SODIUM BLD-SCNC: 139 MMOL/L (ref 136–145)
VITAMIN D 25-HYDROXY: 68.3 NG/ML
WBC # BLD: 4.2 K/UL (ref 4–11)

## 2022-09-14 PROCEDURE — 85610 PROTHROMBIN TIME: CPT

## 2022-09-14 PROCEDURE — 80069 RENAL FUNCTION PANEL: CPT

## 2022-09-14 PROCEDURE — 96372 THER/PROPH/DIAG INJ SC/IM: CPT

## 2022-09-14 PROCEDURE — 82306 VITAMIN D 25 HYDROXY: CPT

## 2022-09-14 PROCEDURE — 85027 COMPLETE CBC AUTOMATED: CPT

## 2022-09-14 PROCEDURE — 6360000002 HC RX W HCPCS: Performed by: INTERNAL MEDICINE

## 2022-09-14 PROCEDURE — 83970 ASSAY OF PARATHORMONE: CPT

## 2022-09-14 PROCEDURE — 82728 ASSAY OF FERRITIN: CPT

## 2022-09-14 RX ADMIN — EPOETIN ALFA-EPBX 9500 UNITS: 10000 INJECTION, SOLUTION INTRAVENOUS; SUBCUTANEOUS at 14:10

## 2022-09-14 NOTE — PROGRESS NOTES
[facility]     Epogen Visit with Peripheral Lab Draw    NAME:  Moo Serrano OF BIRTH:  1934  MEDICAL RECORD NUMBER:  [id number]  Episode Date:  9/14/2022    Patient arrived to [facility]   [x] per wheelchair   [] ambulatory    Alert and oriented x 4. Patient reports that she has been feeling well since her last visit other than in the last several days, she has been a little more tired. Patient denies any side effects from previous Retacrit injections. Patient denies signs/symptoms of respiratory infection, has received Covid-19 vaccination and is wearing a mask to prevent the spread of Covid-19. Peripheral Lab Draw    Blood Draw Site: Location: left proximal anterior forearm  Site cleansed with Chloroprep Scrub for 30 seconds: Yes  Labs drawn with: 23 gauge [x] Butterfly    [] Needle  Labs Obtained: Yes  Number of attempts: 1    Lab Test(s) Ordered: CBC, Renal function panel, Vitamin D Hydroxy, PTH and Ferritin - Patient has follow up appointment with Dr Ye Andersen tomorrow, 9/15/2022. **PT/INR drawn on another encounter for Dr Calderon/Dr Vidhya Schaefer**    Lab Draw Site:  Redness: No  Bruising: No   Edema: No  Pain: No       Epogen Visit     Is the patient experiencing any:  Fatigue:   []   None  [x]   Increase over baseline but not altering normal activities - states she has felt a little more tired than usual in the last week.   []   Moderate of causing difficulty performing some activities  []   Severe or loss of ability to perform some activities  []   Bedridden or disabling    Dizziness or Lightheadedness:   [x]   None  []   No Interference  []   Interferes with functioning but not activities of daily living  []   Interferes with daily activies  []   Bedridden or disabling    Shortness of Breath:   [x]   None   []   Dyspneic on exertion   []   Dyspnea with normal activities  []   Dyspnea at rest    Edema: + 2 pitting edema bilateral feet to mid calf. Left leg wrapped with ACE wraps due to wound on lower calf. Tachycardia: No    Heart Palpitations: No      Chest Pain: No     /73   Pulse 67   Temp 97.4 °F (36.3 °C) (Oral)   Resp 16   LMP  (LMP Unknown)   SpO2 99%     Hold ZARINA dose if B/P is greater than: 180 mm/Hg     If Hgb remains less than 10 Increase dose by 25%. Do not increase dose more frequently than once every 4 weeks. If Hgb 10-10.5 g/dl  Continue same dose  If Hgb 10.6-11 g/dl Decrease dose by 25%. A decrease in dose can be done as frequently as every week. If Hgb is greater than 11 g/dl Hold Epogen. May resume ZARINA when Hgb is less than 10 with a 25% reduction in the dose from the last administered dose or decrease with a 25% reduction in the dose from the last administered dose or decrease  frequency. Last visit date: Sept. 1, 2022     Last Hgb: 10.2 g/dl   Last dose: 9,500 units    Current Lab Data:    Recent Labs     09/14/22  1306   WBC 4.2   HGB 10.0*   HCT 30.9*   MCV 89.6        Dose will remain the same. Epogen dosage: 9,500 units was administered slowly subcutaneously into the right upper arm. Dose verified by:  Kai Gates RN    Response to treatment:  Well tolerated by patient. Education: Verbal and written discharge instructions reviewed with patient. Verbalized understanding. Written copy given via AVS      Scheduled to return for next dose of Epogen on September 29, 2022 .      Electronically signed by Erlinda Kwan RN on 9/14/2022 at 2:33 PM

## 2022-09-14 NOTE — DISCHARGE INSTRUCTIONS
Outpatient Infusion Discharge Instructions  [place] [address] [facility] [address], [address]  Telephone: [phone]    NAME:  Brian Balderas OF BIRTH:  1934  MEDICAL RECORD NUMBER:  [id number]  DATE:  Sept. 14, 2022    Reason for Outpatient Infusion Visit: Retacrit injection and PT/INR    If you develop any these symptoms please contact you Doctor    [] Nausea and/or vomiting not relieved with medication   [x] Swelling, redness, and/or bleeding at injection or IV site    [x] Fever or chills  [x] Rash or itching   [x] Shortness of breath  [x] Please review After Visit Summary (AVS) information on: Epogen (Retacrit) and anemia    [x] Other: Next appt for Retacrit - Sept. 29, 2022      Outpatient [address]: Should you experience any significant changes in your health or have questions about your care please contact the [address] 8:00 am - 4:00 pm.  If you need help outside these hours and cannot wait until we are again available, contact your Primary Care Physician or go to the hospital emergency room.        Electronically signed by Hans Patel RN on 9/14/2022 at 1:32 PM

## 2022-09-14 NOTE — PROGRESS NOTES
[address]    Peripheral Lab Draw    NAME:  Clarke Jacobsen OF BIRTH:  1934  MEDICAL RECORD NUMBER:  [id number]  Episode Date:  9/14/2022    Blood Draw Site: Location: left proximal anterior forearm  Site cleansed with Chloroprep Scrub for 30 seconds: Yes  Labs drawn with: 23 gauge  [x] Butterfly    [] Needle  Labs Obtained: Yes  Number of attempts: 1    Lab Test(s) Ordered: PT/INR (CBC, Renal function panel, Vitamin D Hydroxy, PTH and Ferritin drawn on another account for Dr Hayden Trinidad)    Lab Draw Site:  Redness: No  Bruising: No   Edema: No  Pain: No     Response to treatment:  Well tolerated by patient.       Electronically signed by Gini Councilman, RN on 9/14/2022 at 1:37 PM

## 2022-09-29 ENCOUNTER — HOSPITAL ENCOUNTER (OUTPATIENT)
Dept: INFUSION THERAPY | Age: 87
Setting detail: INFUSION SERIES
Discharge: HOME | End: 2022-09-29
Payer: MEDICARE

## 2022-09-29 VITALS
HEART RATE: 70 BPM | DIASTOLIC BLOOD PRESSURE: 65 MMHG | SYSTOLIC BLOOD PRESSURE: 136 MMHG | OXYGEN SATURATION: 97 % | TEMPERATURE: 98.2 F | RESPIRATION RATE: 17 BRPM

## 2022-09-29 DIAGNOSIS — N18.30 ANEMIA IN STAGE 3 CHRONIC KIDNEY DISEASE, UNSPECIFIED WHETHER STAGE 3A OR 3B CKD (HCC): Primary | ICD-10-CM

## 2022-09-29 DIAGNOSIS — Z79.01 LONG TERM (CURRENT) USE OF ANTICOAGULANTS: ICD-10-CM

## 2022-09-29 DIAGNOSIS — D63.1 ANEMIA IN STAGE 3 CHRONIC KIDNEY DISEASE, UNSPECIFIED WHETHER STAGE 3A OR 3B CKD (HCC): Primary | ICD-10-CM

## 2022-09-29 DIAGNOSIS — I48.0 PAROXYSMAL ATRIAL FIBRILLATION (HCC): ICD-10-CM

## 2022-09-29 DIAGNOSIS — N18.30 STAGE 3 CHRONIC KIDNEY DISEASE, UNSPECIFIED WHETHER STAGE 3A OR 3B CKD (HCC): ICD-10-CM

## 2022-09-29 LAB
HEMATOLOGY PATH CONSULT: NO
HEMOGLOBIN: 10.6 G/DL (ref 12–16)
INR BLD: 4.38 (ref 0.87–1.14)
PROTHROMBIN TIME: 42.3 SEC (ref 11.7–14.5)

## 2022-09-29 PROCEDURE — 85610 PROTHROMBIN TIME: CPT

## 2022-09-29 PROCEDURE — 96372 THER/PROPH/DIAG INJ SC/IM: CPT

## 2022-09-29 PROCEDURE — 6360000002 HC RX W HCPCS: Performed by: INTERNAL MEDICINE

## 2022-09-29 PROCEDURE — 85018 HEMOGLOBIN: CPT

## 2022-09-29 RX ORDER — TRAMADOL HYDROCHLORIDE 50 MG/1
50 TABLET ORAL EVERY 6 HOURS PRN
COMMUNITY

## 2022-09-29 RX ADMIN — EPOETIN ALFA-EPBX 4000 UNITS: 4000 INJECTION, SOLUTION INTRAVENOUS; SUBCUTANEOUS at 14:02

## 2022-09-29 RX ADMIN — EPOETIN ALFA-EPBX 3000 UNITS: 3000 INJECTION, SOLUTION INTRAVENOUS; SUBCUTANEOUS at 14:03

## 2022-09-29 ASSESSMENT — PAIN DESCRIPTION - PAIN TYPE: TYPE: ACUTE PAIN

## 2022-09-29 ASSESSMENT — PAIN - FUNCTIONAL ASSESSMENT: PAIN_FUNCTIONAL_ASSESSMENT: PREVENTS OR INTERFERES SOME ACTIVE ACTIVITIES AND ADLS

## 2022-09-29 ASSESSMENT — PAIN DESCRIPTION - ONSET: ONSET: ON-GOING

## 2022-09-29 ASSESSMENT — PAIN DESCRIPTION - LOCATION: LOCATION: BACK;BREAST;RIB CAGE

## 2022-09-29 ASSESSMENT — PAIN DESCRIPTION - DESCRIPTORS: DESCRIPTORS: BURNING

## 2022-09-29 ASSESSMENT — PAIN DESCRIPTION - ORIENTATION: ORIENTATION: RIGHT

## 2022-09-29 ASSESSMENT — PAIN DESCRIPTION - FREQUENCY: FREQUENCY: INTERMITTENT

## 2022-09-29 ASSESSMENT — PAIN SCALES - GENERAL: PAINLEVEL_OUTOF10: 4

## 2022-09-29 NOTE — DISCHARGE INSTRUCTIONS
Outpatient Infusion Discharge Instructions  [place] [address] [facility] [address] [address], [address]  Telephone: [phone]    NAME:  Cha Laurent OF BIRTH:  1934  MEDICAL RECORD NUMBER:  [id number]  DATE: 9/29/22    Reason for Outpatient Infusion Visit: Epogen    If you develop any these symptoms please contact you Doctor    [x] Nausea and/or vomiting not relieved with medication   [x] Swelling, redness, and/or bleeding at injection or IV site    [x] Fever or chills  [x] Rash or itching   [x] Shortness of breath  [x] Please review After Visit Summary (AVS) information on    [] Other      Outpatient [address]: Should you experience any significant changes in your health or have questions about your care please contact the [address] 8:00 am - 4:00 pm.  If you need help outside these hours and cannot wait until we are again available, contact your Primary Care Physician or go to the hospital emergency room.        Electronically signed by Abby Elizabeth RN on 9/29/2022 at 1:58 PM

## 2022-09-29 NOTE — PROGRESS NOTES
[facility]     Epogen Visit with Peripheral Lab Draw    NAME:  Haleigh Lee OF BIRTH:  1934  MEDICAL RECORD NUMBER:  [id number]  Episode Date:  9/29/2022    Patient arrived to [facility]   [x] per wheelchair   [] ambulatory    Peripheral Lab Draw  Blood Draw Site: Location:right arm  Site cleansed with Chloroprep Scrub for 30 seconds: Yes  Site cleansed with Alcohol pads: Yes  Labs drawn with: 21 gauge             [x] Butterfly    [] Needle  Labs Obtained: Yes  Number of attempts: 1    Lab Test(s) Ordered: HGB and PT/INR (for Dr Pradip Spencer)      Lab Draw Site:  Redness: No  Bruising: No   Edema: No  Pain: No       Epogen Visit     Is the patient experiencing any:  Fatigue:   []   None  []   Increase over baseline but not altering normal activities  [x]   Moderate of causing difficulty performing some activities  []   Severe or loss of ability to perform some activities  []   Bedridden or disabling    Dizziness or Lightheadedness:   [x]   None  []   No Interference  []   Interferes with functioning but not activities of daily living  []   Interferes with daily activies  []   Bedridden or disabling    Shortness of Breath:   [x]   None   []   Dyspneic on exertion   []   Dyspnea with normal activities  []   Dyspnea at rest    Edema: 2+ Location of edema: left leg    Tachycardia: No    Heart Palpitations: No      Chest Pain: No     /65   Pulse 70   Temp 98.2 °F (36.8 °C) (Oral)   Resp 17   LMP  (LMP Unknown)   SpO2 97%     Hold ZARINA dose if B/P is greater than: 180 mm/Hg     If Hgb remains less than 10 Increase dose by 25%. Do not increase dose more frequently than once every 4 weeks. If Hgb 10-10.5 g/dl  Continue same dose  If Hgb 10.6-11 g/dl Decrease dose by 25%. A decrease in dose can be done as frequently as every week. If Hgb is greater than 11 g/dl Hold Epogen.   May resume ZARINA when Hgb is less than 10 with a 25% reduction in the dose from the last administered dose or decrease with a 25% reduction in the dose from the last administered dose or decrease  frequency. Last visit date: 9/14/22     Last Hgb: 10.0 g/dl   Last dose: 9500 units    Current Lab Data:    Recent Labs     09/29/22  1300   HGB 10.6*     Dose will be decreased    Epogen dosage: 7000 units was administered slowly subcutaneously into the right upper arm. Dose verified by: Anh Meza RN    Response to treatment:  Well tolerated by patient. Education:    Verbalized understanding    Scheduled to return for next dose of Epogen on October 13, 2022 .      Electronically signed by Sean Clay RN on 9/29/2022 at 2:09 PM

## 2022-10-03 NOTE — PROGRESS NOTES
Physical Therapy  Facility/Department: [facility] PROGRESSIVE CARE  Daily Treatment Note  NAME: Fabrizio Lizarraga  : 1934  MRN: [id number]    Date of Service: 3/31/2021    Discharge Recommendations:  24 hour supervision or assist, 2-3 sessions per week(family provides 24/7 assist)   PT Equipment Recommendations  Equipment Needed: Yes  Other: family is looking into buying a brittney stedy [address] scored a 9/24 on the AM-PAC short mobility form. Current research shows that an AM-PAC score of 18 or greater is typically associated with a discharge to the patient's home setting. Despite the patient's AM-PAC score and their current functional mobility deficits, it is recommended that the patient have 2-3 sessions per week of Physical Therapy at d/c to increase the patient's independence. At this time, family reports they have adequate assist at discharge. Please see assessment section for further patient specific details. If patient discharges prior to next session this note will serve as a discharge summary. Please see below for the latest assessment towards goals. Assessment   Body structures, Functions, Activity limitations: Decreased functional mobility   Assessment: pt continues to need assist for bed mob and transfers; family report they are looking into getting a brittney stedy for home; family reports they have assist of 2-3 at home to assist pt.   She will benefit from continued therapy at discharge to address deficits to assist pt in achieving her max potenital  Prognosis: Fair;Good  Decision Making: High Complexity  Clinical Presentation: evolving  PT Education: General Safety  Patient Education: discussed lift equipment with daughters  Barriers to Learning: none  REQUIRES PT FOLLOW UP: Yes  Activity Tolerance  Activity Tolerance: Patient limited by endurance  Activity Tolerance: pt fatigues quickly     Patient Diagnosis(es): The primary encounter diagnosis was Acute on chronic congestive heart failure, unspecified heart failure type ([address] A diagnosis of Chronic kidney disease, unspecified CKD stage was also pertinent to this visit. has a past medical history of Anemia, Aortic stenosis, Atrial fibrillation (HCC), Chronic kidney disease, Combined systolic and diastolic congestive heart failure ([address] DVT (deep venous thrombosis) ([address] Hyperlipidemia, Hypertension, Osteoarthritis, Pulmonary embolism ([address] Sarcoidosis, Thyroid disease, and Type II or unspecified type diabetes mellitus without mention of complication, not stated as uncontrolled. has a past surgical history that includes Hysterectomy; knee surgery; joint replacement (Bilateral); joint replacement (Bilateral); Intracapsular cataract extraction (Left, 10/16/2020); and other surgical history (02/15/2021). Restrictions  Restrictions/Precautions  Restrictions/Precautions: Fall Risk  Position Activity Restriction  Other position/activity restrictions: JARROD Hairston  Subjective   General  Chart Reviewed: Yes  Additional Pertinent Hx: The patientis known to have chronic congestive heart failure, chronic atrialfibrillation, and had significant aortic stenosis treated in 02/2021with balloon valvuloplasty. The patient is currently being consideredfor TAVR. The patient has previously been hospitalized for increasedcongestive heart failure. Present illness began several days ago withincreased shortness of breath at rest as well as increased dependent legedema up to her thighs. This was attempted to be managed in the homesetting by increasing her oral torsemide. Unfortunately, the shortnessof breath and edema persisted. The family contacted her nephrologist,Dr. Rohith Murphy, and he recommended transportation to the emergency room [facility] for further diagnosis and treatment. Response To Previous Treatment: Patient with no complaints from previous session.   Family / Caregiver Present: Yes(2 daughters in room)  Referring Practitioner: Dr Teresa Pruett  Subjective  Subjective: pt very pleasant and agreeable to working with therapy  Pain Screening  Patient Currently in Pain: Denies  Vital Signs  Patient Currently in Pain: Denies       Orientation  Orientation  Overall Orientation Status: Within Functional Limits  Cognition   Cognition  Overall Cognitive Status: WFL  Objective   Bed mobility  Supine to Sit: Maximum assistance  Scooting: Maximal assistance  Comment: pt up in chair at beginning and end of session  Transfers  Sit to Stand:  Moderate Assistance;2 Person Assistance(with brittney stedy)  Stand to sit: Moderate Assistance;2 Person Assistance(from brittney stedy)  Bed to Chair: Dependent/Total(with brittney stedy)  Comment: maxi move pad in chair under pt to assist nursing getting pt back to bed        Balance  Comments: practiced standing on stedy; pt able to stand approx 45 sec each stand but unable to un-wt R foot and just minimally un-wt L foot            Comment: assisted with positioning in chair for comfort with LEs elevated and all needs in reach              G-Code     OutComes Score                                                     AM-PAC Score  AM-PAC Inpatient Mobility Raw Score : 9 (03/29/21 0852)  AM-PAC Inpatient T-Scale Score : 30.55 (03/29/21 0852)  Mobility Inpatient CMS 0-100% Score: 81.38 (03/29/21 0852)  Mobility Inpatient CMS G-Code Modifier : CM (03/29/21 3334)          Goals  Short term goals  Time Frame for Short term goals: by discharge  Short term goal 1: bed mob mod A  Short term goal 2: transfers with mod A of 2  Short term goal 3: progress to gait as regulo  Patient Goals   Patient goals : to go home and have her children look after her    Plan    Plan  Times per week: 3-5  Current Treatment Recommendations: Functional Mobility Training  Safety Devices  Type of devices: Call light within reach, Chair alarm in place, Left in chair, Nurse notified, All fall risk precautions in place, Gait belt     Therapy Time   Individual Carac Counseling:  I discussed with the patient the risks of Carac including but not limited to erythema, scaling, itching, weeping, crusting, and pain.

## 2022-10-11 LAB
BACTERIA: ABNORMAL /HPF
BILIRUBIN URINE: NEGATIVE
BLOOD, URINE: NEGATIVE
CLARITY: ABNORMAL
COLOR: YELLOW
EPITHELIAL CELLS, UA: 0 /HPF (ref 0–5)
GLUCOSE URINE: NEGATIVE MG/DL
HYALINE CASTS: 0 /LPF (ref 0–8)
KETONES, URINE: NEGATIVE MG/DL
LEUKOCYTE ESTERASE, URINE: ABNORMAL
MICROSCOPIC EXAMINATION: YES
NITRITE, URINE: NEGATIVE
PH UA: >=9 (ref 5–8)
PROTEIN UA: NEGATIVE MG/DL
RBC UA: 1 /HPF (ref 0–4)
SPECIFIC GRAVITY UA: 1.01 (ref 1–1.03)
URINE TYPE: ABNORMAL
UROBILINOGEN, URINE: 0.2 E.U./DL
WBC UA: 21 /HPF (ref 0–5)

## 2022-10-13 ENCOUNTER — HOSPITAL ENCOUNTER (OUTPATIENT)
Dept: INFUSION THERAPY | Age: 87
Setting detail: INFUSION SERIES
Discharge: HOME | End: 2022-10-13
Payer: MEDICARE

## 2022-10-13 VITALS
DIASTOLIC BLOOD PRESSURE: 75 MMHG | OXYGEN SATURATION: 98 % | HEART RATE: 74 BPM | RESPIRATION RATE: 16 BRPM | SYSTOLIC BLOOD PRESSURE: 122 MMHG | TEMPERATURE: 97.5 F

## 2022-10-13 DIAGNOSIS — Z79.01 LONG TERM (CURRENT) USE OF ANTICOAGULANTS: ICD-10-CM

## 2022-10-13 DIAGNOSIS — N18.30 ANEMIA IN STAGE 3 CHRONIC KIDNEY DISEASE, UNSPECIFIED WHETHER STAGE 3A OR 3B CKD (HCC): Primary | ICD-10-CM

## 2022-10-13 DIAGNOSIS — D63.1 ANEMIA IN STAGE 3 CHRONIC KIDNEY DISEASE, UNSPECIFIED WHETHER STAGE 3A OR 3B CKD (HCC): Primary | ICD-10-CM

## 2022-10-13 DIAGNOSIS — I48.0 PAROXYSMAL ATRIAL FIBRILLATION (HCC): ICD-10-CM

## 2022-10-13 DIAGNOSIS — N18.30 STAGE 3 CHRONIC KIDNEY DISEASE, UNSPECIFIED WHETHER STAGE 3A OR 3B CKD (HCC): ICD-10-CM

## 2022-10-13 LAB
HEMOGLOBIN: 10.3 G/DL (ref 12–16)
INR BLD: 3.15 (ref 0.87–1.14)
PROTHROMBIN TIME: 32.6 SEC (ref 11.7–14.5)

## 2022-10-13 PROCEDURE — 6360000002 HC RX W HCPCS: Performed by: INTERNAL MEDICINE

## 2022-10-13 PROCEDURE — 85610 PROTHROMBIN TIME: CPT

## 2022-10-13 PROCEDURE — 85018 HEMOGLOBIN: CPT

## 2022-10-13 PROCEDURE — 96372 THER/PROPH/DIAG INJ SC/IM: CPT

## 2022-10-13 RX ORDER — NITROFURANTOIN 25; 75 MG/1; MG/1
1 CAPSULE ORAL 2 TIMES DAILY
COMMUNITY
Start: 2022-10-12 | End: 2022-10-27 | Stop reason: ALTCHOICE

## 2022-10-13 RX ADMIN — EPOETIN ALFA-EPBX 4000 UNITS: 4000 INJECTION, SOLUTION INTRAVENOUS; SUBCUTANEOUS at 13:34

## 2022-10-13 RX ADMIN — EPOETIN ALFA-EPBX 3000 UNITS: 3000 INJECTION, SOLUTION INTRAVENOUS; SUBCUTANEOUS at 13:34

## 2022-10-13 NOTE — PROGRESS NOTES
Outpatient [facility]    Peripheral Lab Draw    NAME:  Leeanna Diaz OF BIRTH:  1934  MEDICAL RECORD NUMBER:  [id number]  Episode Date:  10/13/2022      Patient's daughter states that she needs to have a PT/INR drawn today because the antibiotics she has been on for shingles and now a UTI have cause her INR to be elevated and her Coumadin dose had to be adjusted. Peripheral Lab Draw     Blood Draw Site: Location: right antecubital  Site cleansed with Chloroprep Scrub for 30 seconds: Yes  Labs drawn with: 23 gauge [x] Butterfly    [] Needle  Labs Obtained: Yes  Number of attempts: 1    Lab Test(s) Ordered: PT/INR   (STAT Hemoglobin on Dr Lynda Whiting account)     Lab Draw Site:  Redness: No  Bruising: No   Edema: No  Pain: No     Response to treatment:  Well tolerated by patient.       Electronically signed by Pieter Rodríguez RN on 10/13/2022 at 6:01 PM

## 2022-10-13 NOTE — DISCHARGE INSTRUCTIONS
Outpatient Infusion Discharge Instructions  [place] [address] [facility] [address] [address]  Telephone: [phone]    NAME:  Cait Womack OF BIRTH:  1934  MEDICAL RECORD NUMBER:  [id number]  DATE:  October 13, 2022    Reason for Outpatient Infusion Visit: Epogen and PT/INR lab draw    If you develop any these symptoms please contact you Doctor    [x] Nausea and/or vomiting not relieved with medication   [x] Swelling, redness, and/or bleeding at injection or IV site    [x] Fever or chills  [x] Rash or itching   [x] Shortness of breath  [x] Please review After Visit Summary (AVS) information on: Epogen, anemia and elevated INR    [x] Other: Next visit - October 27, 2022      Outpatient [facility] Information: Should you experience any significant changes in your health or have questions about your care please contact the [address] at [address] 8:00 am - 4:00 pm.  If you need help outside these hours and cannot wait until we are again available, contact your Primary Care Physician or go to the hospital emergency room.        Electronically signed by Jaaj Nascimento RN on 10/13/2022 at 1:37 PM

## 2022-10-13 NOTE — PROGRESS NOTES
[facility]     Epogen Visit with Peripheral Lab Draw    NAME:  Vamshi Lucia OF BIRTH:  1934  MEDICAL RECORD NUMBER:  [id number]  Episode Date:  10/13/2022    Patient arrived to [facility]   [x] per wheelchair   [] ambulatory    Alert and oriented x 4. Patient reports that she has been feeling a little better since her last visit. Reports that she still has some pain in ribcage/hips from having shingles but that it is improving. Patient's daughter reports that she completed the antibiotics that she was taking for the shingles but started Macrobid yesterday for a UTI. Patient's daughter states that she needs to have a PT/INR drawn today because the antibiotics have cause her INR to be elevated and her Coumadin dose had to be adjusted. Peripheral Lab Draw    Blood Draw Site: Location: right antecubital  Site cleansed with Chloroprep Scrub for 30 seconds: Yes  Labs drawn with: 23 gauge [x] Butterfly    [] Needle  Labs Obtained: Yes  Number of attempts: 1    Lab Test(s) Ordered: STAT Hermoglobin (and PT/INR on Dr Lj Cardona account)    Lab Draw Site:  Redness: No  Bruising: No   Edema: No  Pain: No       Epogen Visit     Is the patient experiencing any:  Fatigue:   [x]   None  []   Increase over baseline but not altering normal activities  []   Moderate of causing difficulty performing some activities  []   Severe or loss of ability to perform some activities  []   Bedridden or disabling    Dizziness or Lightheadedness:   []   None  [x]   No Interference  []   Interferes with functioning but not activities of daily living  []   Interferes with daily activies  []   Bedridden or disabling    Shortness of Breath:   [x]   None   []   Dyspneic on exertion   []   Dyspnea with normal activities  []   Dyspnea at rest    Edema: +2 pitting edema bilateral feet to upper calf with left leg being more swollen than right.  Left leg wrapped with gauze and ACE bandages due to wound. Tachycardia: No    Heart Palpitations: No      Chest Pain: No     /75   Pulse 74   Temp 97.5 °F (36.4 °C) (Oral)   Resp 16   LMP  (LMP Unknown)   SpO2 98%     Hold ZARINA dose if B/P is greater than: 180 mm/Hg     If Hgb remains less than 10 Increase dose by 25%. Do not increase dose more frequently than once every 4 weeks. If Hgb 10-10.5 g/dl  Continue same dose  If Hgb 10.6-11 g/dl Decrease dose by 25%. A decrease in dose can be done as frequently as every week. If Hgb is greater than 11 g/dl Hold Epogen. May resume ZARINA when Hgb is less than 10 with a 25% reduction in the dose from the last administered dose or decrease with a 25% reduction in the dose from the last administered dose or decrease  frequency. Last visit date: Sept. 29, 2022     Last Hgb: 10.6 g/dl   Last dose: 7,000 units    Current Lab Data:    Recent Labs     10/13/22  1252   HGB 10.3*     Dose will remain the same    Epogen dosage: 7,000 units was administered slowly subcutaneously into the right upper arm. Dose verified by:  Micaela Brooks RN    Response to treatment:  Well tolerated by patient. Education:  Verbal and written discharge instructions reviewed with patient. Verbalized understanding. Written copy given via AVS     Scheduled to return for next dose of Epogen on October 27, 2022 .      Electronically signed by Del Grady RN on 10/13/2022 at 5:55 PM

## 2022-10-27 ENCOUNTER — HOSPITAL ENCOUNTER (OUTPATIENT)
Dept: INFUSION THERAPY | Age: 87
Setting detail: INFUSION SERIES
Discharge: HOME | End: 2022-10-27
Payer: MEDICARE

## 2022-10-27 VITALS
TEMPERATURE: 98 F | OXYGEN SATURATION: 98 % | HEART RATE: 82 BPM | SYSTOLIC BLOOD PRESSURE: 138 MMHG | DIASTOLIC BLOOD PRESSURE: 69 MMHG

## 2022-10-27 DIAGNOSIS — D63.1 ANEMIA IN STAGE 3 CHRONIC KIDNEY DISEASE, UNSPECIFIED WHETHER STAGE 3A OR 3B CKD (HCC): Primary | ICD-10-CM

## 2022-10-27 DIAGNOSIS — N18.30 ANEMIA IN STAGE 3 CHRONIC KIDNEY DISEASE, UNSPECIFIED WHETHER STAGE 3A OR 3B CKD (HCC): Primary | ICD-10-CM

## 2022-10-27 DIAGNOSIS — N18.30 STAGE 3 CHRONIC KIDNEY DISEASE, UNSPECIFIED WHETHER STAGE 3A OR 3B CKD (HCC): ICD-10-CM

## 2022-10-27 LAB
HEMOGLOBIN: 10.8 G/DL (ref 12–16)
IRON SATURATION: 47 % (ref 15–50)
IRON: 84 UG/DL (ref 37–145)
TOTAL IRON BINDING CAPACITY: 180 UG/DL (ref 260–445)

## 2022-10-27 PROCEDURE — 96372 THER/PROPH/DIAG INJ SC/IM: CPT

## 2022-10-27 PROCEDURE — 83540 ASSAY OF IRON: CPT

## 2022-10-27 PROCEDURE — 85018 HEMOGLOBIN: CPT

## 2022-10-27 PROCEDURE — 83550 IRON BINDING TEST: CPT

## 2022-10-27 PROCEDURE — 6360000002 HC RX W HCPCS: Performed by: INTERNAL MEDICINE

## 2022-10-27 RX ADMIN — EPOETIN ALFA-EPBX 2000 UNITS: 2000 INJECTION, SOLUTION INTRAVENOUS; SUBCUTANEOUS at 14:04

## 2022-10-27 RX ADMIN — EPOETIN ALFA-EPBX 3000 UNITS: 3000 INJECTION, SOLUTION INTRAVENOUS; SUBCUTANEOUS at 14:04

## 2022-10-27 ASSESSMENT — PAIN SCALES - GENERAL: PAINLEVEL_OUTOF10: 0

## 2022-10-27 NOTE — PROGRESS NOTES
[facility]""     Epogen Visit with Peripheral Lab Draw    NAME:  Grzegorz Levels OF BIRTH:  1934  MEDICAL RECORD NUMBER:  [id number]  Episode Date:  10/27/2022    Patient arrived to [facility]   [x] per wheelchair   [] ambulatory    Alert and oriented x 4. Patient daughter accompanies patient for visits. Patient reports that she is feeling well since her last visit. States that the pain she is having from her previous shingles is improving and has been much more tolerable. Patient's daughter reports that patient has completed her antibiotics (Macrobid) that she was taking for a UTI and is feeling much better since then. Patient denies side effects/adverse effects from previous Epogen injections. Peripheral Lab Draw    Blood Draw Site: Location: right proximal anterior forearm  Site cleansed with Chloroprep Scrub for 30 seconds: Yes  Labs drawn with: 23 gauge [] Butterfly    [] Needle  Labs Obtained: Yes  Number of attempts: 1    Lab Test(s) Ordered: STAT Hemoglobin    Lab Draw Site:  Redness: No  Bruising: No   Edema: No  Pain: No       Epogen Visit     Is the patient experiencing any:  Fatigue:   [x]   None  []   Increase over baseline but not altering normal activities  []   Moderate of causing difficulty performing some activities  []   Severe or loss of ability to perform some activities  []   Bedridden or disabling    Dizziness or Lightheadedness:   []   None  [x]   No Interference  []   Interferes with functioning but not activities of daily living  []   Interferes with daily activies  []   Bedridden or disabling    Shortness of Breath:   [x]   None   []   Dyspneic on exertion   []   Dyspnea with normal activities  []   Dyspnea at rest    Edema: +2 pitting edema bilateral feet to upper calf with left leg being more swollen than right.  Left leg wrapped with gauze and ACE bandages due to wound on left lower leg    Tachycardia: No    Heart Palpitations: No      Chest Pain: No     /69   Pulse 82   Temp 98 °F (36.7 °C) (Oral)   LMP  (LMP Unknown)   SpO2 98%     Hold ZARINA dose if B/P is greater than: 180 mm/Hg     If Hgb remains less than 10 Increase dose by 25%. Do not increase dose more frequently than once every 4 weeks. If Hgb 10-10.5 g/dl  Continue same dose  If Hgb 10.6-11 g/dl Decrease dose by 25%. A decrease in dose can be done as frequently as every week. If Hgb is greater than 11 g/dl Hold Epogen. May resume ZARINA when Hgb is less than 10 with a 25% reduction in the dose from the last administered dose or decrease with a 25% reduction in the dose from the last administered dose or decrease  frequency. Last visit date: October 13, 2022     Last Hgb: 10.3 g/dl   Last dose: 7,000 units    Current Lab Data:    Recent Labs     10/27/22  1322   HGB 10.8*     Dose will be decreased by 25% per therapy plan    Epogen dosage: 5,000 units was administered slowly subcutaneously into the right upper arm. Dose verified by:  Deidra Shelton RN    Response to treatment:  Well tolerated by patient. Education: Verbal and written discharge instructions reviewed with patient. Verbalized understanding. Written copy given via AVS      Scheduled to return for next dose of Epogen on November 10, 2022 .      Electronically signed by Sher Whaley RN on 10/27/2022 at 4:46 PM

## 2022-10-27 NOTE — DISCHARGE INSTRUCTIONS
Outpatient Infusion Discharge Instructions  [facility]  [address] [facility] [address] [address], [address]  Telephone: [phone]    NAME:  Florence Robert OF BIRTH:  1934  MEDICAL RECORD NUMBER:  [id number]  DATE:  October 27, 2022    Reason for Outpatient Infusion Visit: Epogen injection    If you develop any these symptoms please contact you Doctor    [] Nausea and/or vomiting not relieved with medication   [x] Swelling, redness, and/or bleeding at injection or IV site    [x] Fever or chills  [x] Rash or itching   [x] Shortness of breath  [x] Please review After Visit Summary (AVS) information on: Anemia and Epogen    [x] Other: Next appt - Novemer 10, 2022      Outpatient [address]: Should you experience any significant changes in your health or have questions about your care please contact the [address] at [address] 8:00 am - 4:00 pm.  If you need help outside these hours and cannot wait until we are again available, contact your Primary Care Physician or go to the hospital emergency room.        Electronically signed by Del Grady RN on 10/27/2022 at 2:12 PM

## 2022-11-08 LAB
BACTERIA: ABNORMAL /HPF
BILIRUBIN URINE: NEGATIVE
BLOOD, URINE: NEGATIVE
CLARITY: CLEAR
COLOR: YELLOW
EPITHELIAL CELLS, UA: 1 /HPF (ref 0–5)
GLUCOSE URINE: NEGATIVE MG/DL
HYALINE CASTS: 2 /LPF (ref 0–8)
KETONES, URINE: NEGATIVE MG/DL
LEUKOCYTE ESTERASE, URINE: ABNORMAL
MICROSCOPIC EXAMINATION: YES
MUCUS: PRESENT
NITRITE, URINE: NEGATIVE
PH UA: >=9 (ref 5–8)
PROTEIN UA: ABNORMAL MG/DL
RBC UA: 0 /HPF (ref 0–4)
SPECIFIC GRAVITY UA: 1.01 (ref 1–1.03)
URINE TYPE: ABNORMAL
UROBILINOGEN, URINE: 0.2 E.U./DL
WBC UA: 1 /HPF (ref 0–5)

## 2022-11-10 ENCOUNTER — HOSPITAL ENCOUNTER (OUTPATIENT)
Dept: INFUSION THERAPY | Age: 87
Setting detail: INFUSION SERIES
Discharge: HOME | End: 2022-11-10
Payer: MEDICARE

## 2022-11-10 VITALS
DIASTOLIC BLOOD PRESSURE: 73 MMHG | OXYGEN SATURATION: 98 % | TEMPERATURE: 98 F | HEART RATE: 71 BPM | SYSTOLIC BLOOD PRESSURE: 143 MMHG | RESPIRATION RATE: 16 BRPM

## 2022-11-10 DIAGNOSIS — I48.0 PAROXYSMAL ATRIAL FIBRILLATION (HCC): ICD-10-CM

## 2022-11-10 DIAGNOSIS — D63.1 ANEMIA IN STAGE 3 CHRONIC KIDNEY DISEASE, UNSPECIFIED WHETHER STAGE 3A OR 3B CKD (HCC): Primary | ICD-10-CM

## 2022-11-10 DIAGNOSIS — N18.30 STAGE 3 CHRONIC KIDNEY DISEASE, UNSPECIFIED WHETHER STAGE 3A OR 3B CKD (HCC): ICD-10-CM

## 2022-11-10 DIAGNOSIS — N18.30 ANEMIA IN STAGE 3 CHRONIC KIDNEY DISEASE, UNSPECIFIED WHETHER STAGE 3A OR 3B CKD (HCC): Primary | ICD-10-CM

## 2022-11-10 DIAGNOSIS — Z79.01 LONG TERM (CURRENT) USE OF ANTICOAGULANTS: Primary | ICD-10-CM

## 2022-11-10 LAB — HEMOGLOBIN: 10.7 G/DL (ref 12–16)

## 2022-11-10 PROCEDURE — 6360000002 HC RX W HCPCS: Performed by: INTERNAL MEDICINE

## 2022-11-10 PROCEDURE — 96372 THER/PROPH/DIAG INJ SC/IM: CPT

## 2022-11-10 PROCEDURE — 85018 HEMOGLOBIN: CPT

## 2022-11-10 RX ADMIN — EPOETIN ALFA-EPBX 4000 UNITS: 4000 INJECTION, SOLUTION INTRAVENOUS; SUBCUTANEOUS at 14:09

## 2022-11-10 NOTE — DISCHARGE INSTRUCTIONS
Outpatient Infusion Discharge Instructions  [place] [address] [facility] [address] [address], [address]  Telephone: [phone]    NAME:  Ana Luisa Root OF BIRTH:  1934  MEDICAL RECORD NUMBER:  [id number]  DATE:  11/10/22    Reason for Outpatient Infusion Visit: Epogen    If you develop any these symptoms please contact you Doctor    [x] Nausea and/or vomiting not relieved with medication   [x] Swelling, redness, and/or bleeding at injection or IV site    [x] Fever or chills  [x] Rash or itching   [x] Shortness of breath  [x] Please review After Visit Summary (AVS) information on    [] Other      Outpatient [address]: Should you experience any significant changes in your health or have questions about your care please contact the [address] at [address] 8:00 am - 4:00 pm.  If you need help outside these hours and cannot wait until we are again available, contact your Primary Care Physician or go to the hospital emergency room.        Electronically signed by Campbell Carmona RN on 11/10/2022 at 2:03 PM

## 2022-11-10 NOTE — PROGRESS NOTES
[facility]     Epogen Visit with Peripheral Lab Draw    NAME:  Lawson Rachel OF BIRTH:  1934  MEDICAL RECORD NUMBER:  [id number]  Episode Date:  11/10/2022    Patient arrived to [facility]   [x] per wheelchair   [] ambulatory    Peripheral Lab Draw    Blood Draw Site: Location:right arm  Site cleansed with Chloroprep Scrub for 30 seconds: Yes  Site cleansed with Alcohol pads: Yes  Labs drawn with: 23 gauge             [x] Butterfly    [] Needle  Labs Obtained: Yes  Number of attempts: 1    Lab Test(s) Ordered: HGB  No PT/INR drawn because it was drawn this Tuesday by her homecare nurse. It was  2.0 per the patient  Lab Draw Site:  Redness: No  Bruising: No   Edema: No  Pain: No       Epogen Visit     Is the patient experiencing any:  Fatigue:   []   None  []   Increase over baseline but not altering normal activities  [x]   Moderate of causing difficulty performing some activities  []   Severe or loss of ability to perform some activities  []   Bedridden or disabling    Dizziness or Lightheadedness:   [x]   None  []   No Interference  []   Interferes with functioning but not activities of daily living  []   Interferes with daily activies  []   Bedridden or disabling    Shortness of Breath:   [x]   None   []   Dyspneic on exertion   []   Dyspnea with normal activities  []   Dyspnea at rest    Edema: 4+ Location of edema: both legs, ankles and feet    Tachycardia: No    Heart Palpitations: No      Chest Pain: No     BP (!) 143/73   Pulse 71   Temp 98 °F (36.7 °C) (Oral)   Resp 16   LMP  (LMP Unknown)   SpO2 98%     Hold ZARINA dose if B/P is greater than: 180 mm/Hg     If Hgb remains less than 10 Increase dose by 25%. Do not increase dose more frequently than once every 4 weeks. If Hgb 10-10.5 g/dl  Continue same dose  If Hgb 10.6-11 g/dl Decrease dose by 25%. A decrease in dose can be done as frequently as every week.   If Hgb is greater than 11 g/dl Hold Epogen. May resume ZARINA when Hgb is less than 10 with a 25% reduction in the dose from the last administered dose or decrease with a 25% reduction in the dose from the last administered dose or decrease  frequency. Last visit date: 10/27/22     Last Hgb: 10.8 g/dl   Last dose: 5000 units    Current Lab Data:    Recent Labs     11/10/22  1340   HGB 10.7*     Dose will be decreased    Epogen dosage: 4000 units was administered slowly subcutaneously into the right upper arm. Dose verified by:  Tommy Zhang RN    Response to treatment:  Well tolerated by patient. Education:    Verbalized understanding    Scheduled to return for next dose of Epogen on November 23, 2022 .      Electronically signed by Taisha Murphy RN on 11/10/2022 at 2:19 PM

## 2022-11-23 ENCOUNTER — HOSPITAL ENCOUNTER (OUTPATIENT)
Dept: INFUSION THERAPY | Age: 87
Setting detail: INFUSION SERIES
Discharge: HOME | End: 2022-11-23
Payer: MEDICARE

## 2022-11-23 VITALS
RESPIRATION RATE: 16 BRPM | SYSTOLIC BLOOD PRESSURE: 112 MMHG | DIASTOLIC BLOOD PRESSURE: 69 MMHG | HEART RATE: 77 BPM | OXYGEN SATURATION: 99 % | TEMPERATURE: 97 F

## 2022-11-23 DIAGNOSIS — N18.30 ANEMIA IN STAGE 3 CHRONIC KIDNEY DISEASE, UNSPECIFIED WHETHER STAGE 3A OR 3B CKD (HCC): Primary | ICD-10-CM

## 2022-11-23 DIAGNOSIS — I48.0 PAROXYSMAL ATRIAL FIBRILLATION (HCC): ICD-10-CM

## 2022-11-23 DIAGNOSIS — Z79.01 LONG TERM (CURRENT) USE OF ANTICOAGULANTS: Primary | ICD-10-CM

## 2022-11-23 DIAGNOSIS — N18.30 STAGE 3 CHRONIC KIDNEY DISEASE, UNSPECIFIED WHETHER STAGE 3A OR 3B CKD (HCC): ICD-10-CM

## 2022-11-23 DIAGNOSIS — D63.1 ANEMIA IN STAGE 3 CHRONIC KIDNEY DISEASE, UNSPECIFIED WHETHER STAGE 3A OR 3B CKD (HCC): Primary | ICD-10-CM

## 2022-11-23 LAB
HEMOGLOBIN: 9.7 G/DL (ref 12–16)
INR BLD: 1.69 (ref 0.87–1.14)
PROTHROMBIN TIME: 19.9 SEC (ref 11.7–14.5)

## 2022-11-23 PROCEDURE — 85610 PROTHROMBIN TIME: CPT

## 2022-11-23 PROCEDURE — 96372 THER/PROPH/DIAG INJ SC/IM: CPT

## 2022-11-23 PROCEDURE — 6360000002 HC RX W HCPCS: Performed by: INTERNAL MEDICINE

## 2022-11-23 PROCEDURE — 85018 HEMOGLOBIN: CPT

## 2022-11-23 RX ORDER — CIPROFLOXACIN 250 MG/1
250 TABLET, FILM COATED ORAL 2 TIMES DAILY
COMMUNITY
Start: 2022-11-16

## 2022-11-23 RX ADMIN — EPOETIN ALFA-EPBX 2000 UNITS: 2000 INJECTION, SOLUTION INTRAVENOUS; SUBCUTANEOUS at 13:46

## 2022-11-23 RX ADMIN — EPOETIN ALFA-EPBX 3000 UNITS: 3000 INJECTION, SOLUTION INTRAVENOUS; SUBCUTANEOUS at 13:47

## 2022-11-23 NOTE — DISCHARGE INSTRUCTIONS
Outpatient Infusion Discharge Instructions  [place] [address] [facility] [address] [address]  Telephone: [phone]    NAME:  Lawson Rachel OF BIRTH:  1934  MEDICAL RECORD NUMBER:  [id number]  DATE:  11/23/22    Reason for Outpatient Infusion Visit: Epogen    If you develop any these symptoms please contact you Doctor    [] Nausea and/or vomiting not relieved with medication   [] Swelling, redness, and/or bleeding at injection or IV site    [] Fever or chills  [] Rash or itching   [] Shortness of breath  [x] Please review After Visit Summary (AVS) information on  Epogen  [] Other      Outpatient [address]: Should you experience any significant changes in your health or have questions about your care please contact the [address] at [address] 8:00 am - 4:00 pm.  If you need help outside these hours and cannot wait until we are again available, contact your Primary Care Physician or go to the hospital emergency room.        Electronically signed by Yuridia Chin RN on 11/23/2022 at 5:02 PM

## 2022-11-23 NOTE — PROGRESS NOTES
[address]     Epogen Visit with Peripheral Lab Draw    NAME:  Vamshi Lucia OF BIRTH:  1934  MEDICAL RECORD NUMBER:  [id number]  Episode Date:  11/23/2022    Patient arrived to [facility] 58   [x] per wheelchair   [] Ambulatory    Alert and oriented X 4. Denies any side effects from last injection. Denies any new s/s of anemia. Left forearm bandaged due to a skin tear daughter denies any s/s of infection. Peripheral Lab Draw    Blood Draw Site: Location:right arm  Site cleansed with Chloroprep Scrub for 30 seconds: Yes  Site cleansed with Alcohol pads: Yes  Labs drawn with: 25 gauge             [x] Butterfly    [] Needle  Labs Obtained: Yes  Number of attempts: 1    Lab Test(s) Ordered: Stat HGB and PT/ INR for Dr. Nael Rivera    Lab Draw Site:  Redness: No  Bruising: No   Edema: No  Pain: No       Epogen Visit     Is the patient experiencing any:  Fatigue:   []   None  [x]   Increase over baseline but not altering normal activities  []   Moderate of causing difficulty performing some activities  []   Severe or loss of ability to perform some activities  []   Bedridden or disabling    Dizziness or Lightheadedness:   [x]   None  []   No Interference  []   Interferes with functioning but not activities of daily living  []   Interferes with daily activies  []   Bedridden or disabling    Shortness of Breath:   []   None   [x]   Dyspneic on exertion   []   Dyspnea with normal activities  []   Dyspnea at rest    Edema: 2+ Location of edema: left leg    Tachycardia: No    Heart Palpitations: No      Chest Pain: No     /69   Pulse 77   Resp 16   LMP  (LMP Unknown)   SpO2 99%     Hold ZARINA dose if B/P is greater than: 180 mm/Hg     If Hgb remains less than 10 Increase dose by 25%. Do not increase dose more frequently than once every 4 weeks. If Hgb 10-10.5 g/dl  Continue same dose  If Hgb 10.6-11 g/dl Decrease dose by 25%.   A decrease in dose can be done as frequently as every week. If Hgb is greater than 11 g/dl Hold Epogen. May resume ZARINA when Hgb is less than 10 with a 25% reduction in the dose from the last administered dose or decrease with a 25% reduction in the dose from the last administered dose or decrease  frequency. Last visit date: 11/10/22     Last Hgb: 10.7 g/dl   Last dose: 4,000 units    Current Lab Data:    Recent Labs     11/23/22  1309   HGB 9.7*     Dose will be increased. Patient currently on 2nd antibiotic, Cipro, for persistent UTI    Epogen dosage: 4000 units was administered slowly subcutaneously into the right upper arm. Dose verified by:  Ko Perez RN    Response to treatment:  Well tolerated by patient. Education:    Verbalized understanding    Scheduled to return for next dose of Epogen on December 7, 2022 .      Electronically signed by Shamar Ellis RN on 11/23/2022 at 1:30 PM

## 2022-12-07 ENCOUNTER — HOSPITAL ENCOUNTER (OUTPATIENT)
Dept: INFUSION THERAPY | Age: 87
Setting detail: INFUSION SERIES
Discharge: HOME | End: 2022-12-07
Payer: MEDICARE

## 2022-12-07 VITALS
SYSTOLIC BLOOD PRESSURE: 134 MMHG | HEART RATE: 76 BPM | TEMPERATURE: 97.4 F | HEIGHT: 66 IN | WEIGHT: 223 LBS | OXYGEN SATURATION: 99 % | DIASTOLIC BLOOD PRESSURE: 71 MMHG | BODY MASS INDEX: 35.84 KG/M2 | RESPIRATION RATE: 17 BRPM

## 2022-12-07 DIAGNOSIS — N18.30 STAGE 3 CHRONIC KIDNEY DISEASE, UNSPECIFIED WHETHER STAGE 3A OR 3B CKD (HCC): ICD-10-CM

## 2022-12-07 DIAGNOSIS — Z79.01 LONG TERM (CURRENT) USE OF ANTICOAGULANTS: Primary | ICD-10-CM

## 2022-12-07 DIAGNOSIS — N18.30 ANEMIA IN STAGE 3 CHRONIC KIDNEY DISEASE, UNSPECIFIED WHETHER STAGE 3A OR 3B CKD (HCC): Primary | ICD-10-CM

## 2022-12-07 DIAGNOSIS — D63.1 ANEMIA IN STAGE 3 CHRONIC KIDNEY DISEASE, UNSPECIFIED WHETHER STAGE 3A OR 3B CKD (HCC): Primary | ICD-10-CM

## 2022-12-07 DIAGNOSIS — I48.0 PAROXYSMAL ATRIAL FIBRILLATION (HCC): ICD-10-CM

## 2022-12-07 LAB
HEMOGLOBIN: 10 G/DL (ref 12–16)
INR BLD: 1.88 (ref 0.87–1.14)
PROTHROMBIN TIME: 21.6 SEC (ref 11.7–14.5)

## 2022-12-07 PROCEDURE — 85610 PROTHROMBIN TIME: CPT

## 2022-12-07 PROCEDURE — 96372 THER/PROPH/DIAG INJ SC/IM: CPT

## 2022-12-07 PROCEDURE — 85018 HEMOGLOBIN: CPT

## 2022-12-07 PROCEDURE — 6360000002 HC RX W HCPCS: Performed by: INTERNAL MEDICINE

## 2022-12-07 RX ADMIN — EPOETIN ALFA-EPBX 3000 UNITS: 3000 INJECTION, SOLUTION INTRAVENOUS; SUBCUTANEOUS at 13:36

## 2022-12-07 RX ADMIN — EPOETIN ALFA-EPBX 2000 UNITS: 2000 INJECTION, SOLUTION INTRAVENOUS; SUBCUTANEOUS at 13:36

## 2022-12-07 NOTE — PROGRESS NOTES
[facility]     Epogen Visit with Peripheral Lab Draw    NAME:  Maria Esther Anna OF BIRTH:  1934  MEDICAL RECORD NUMBER:  [id number]  Episode Date:  12/7/2022    Patient arrived to [facility] 58   [x] per wheelchair   [] ambulatory    Peripheral Lab Draw    Blood Draw Site: Location:left arm  Site cleansed with Chloroprep Scrub for 30 seconds: Yes  Site cleansed with Alcohol pads: Yes  Labs drawn with: 25 gauge             [x] Butterfly    [] Needle  Labs Obtained: Yes  Number of attempts: 1    Lab Test(s) Ordered: PT and HGB    Lab Draw Site:  Redness: No  Bruising: No   Edema: No  Pain: No       Epogen Visit     Is the patient experiencing any:  Fatigue: yea  []   None  []   Increase over baseline but not altering normal activities  [x]   Moderate of causing difficulty performing some activities  []   Severe or loss of ability to perform some activities  []   Bedridden or disabling    Dizziness or Lightheadedness: yes  []   None  [x]   No Interference  []   Interferes with functioning but not activities of daily living  []   Interferes with daily activies  []   Bedridden or disabling    Shortness of Breath: yes  []   None   [x]   Dyspneic on exertion   []   Dyspnea with normal activities  []   Dyspnea at rest    Edema: 3+ Location of edema: left leg and right leg    Tachycardia: No    Heart Palpitations: No      Chest Pain: No     /71   Pulse 76   Temp 97.4 °F (36.3 °C) (Oral)   Resp 17   Ht 5' 6\" (1.676 m)   Wt 223 lb (101.2 kg)   LMP  (LMP Unknown)   SpO2 99%   BMI 35.99 kg/m²     Hold ZARINA dose if B/P is greater than: 180 mm/Hg     If Hgb remains less than 10 Increase dose by 25%. Do not increase dose more frequently than once every 4 weeks. If Hgb 10-10.5 g/dl  Continue same dose  If Hgb 10.6-11 g/dl Decrease dose by 25%. A decrease in dose can be done as frequently as every week. If Hgb is greater than 11 g/dl Hold Epogen.   May resume ZARINA when Hgb is less than 10 with a 25% reduction in the dose from the last administered dose or decrease with a 25% reduction in the dose from the last administered dose or decrease  frequency. Last visit date: 11/23/22     Last Hgb: 9.7 g/dl   Last dose: 5000 units    Current Lab Data:    No results for input(s): WBC, HGB, HCT, MCV, PLT in the last 72 hours. Dose will be same    Epogen dosage: 5000 units was administered slowly subcutaneously into the left upper arm. Dose verified by:  Emery Maldonado RN    Response to treatment:  Well tolerated by patient. Education:    Indicates understanding    Scheduled to return for next dose of Epogen on December 22, 2022 .      Electronically signed by Matt Mendoza RN on 12/7/2022 at 1:18 PM

## 2022-12-22 ENCOUNTER — HOSPITAL ENCOUNTER (OUTPATIENT)
Dept: INFUSION THERAPY | Age: 87
Setting detail: INFUSION SERIES
Discharge: HOME | End: 2022-12-22
Payer: MEDICARE

## 2022-12-22 VITALS
SYSTOLIC BLOOD PRESSURE: 112 MMHG | HEART RATE: 56 BPM | TEMPERATURE: 97.1 F | RESPIRATION RATE: 16 BRPM | DIASTOLIC BLOOD PRESSURE: 65 MMHG

## 2022-12-22 DIAGNOSIS — I48.0 PAROXYSMAL ATRIAL FIBRILLATION (HCC): ICD-10-CM

## 2022-12-22 DIAGNOSIS — D63.1 ANEMIA IN STAGE 3 CHRONIC KIDNEY DISEASE, UNSPECIFIED WHETHER STAGE 3A OR 3B CKD (HCC): Primary | ICD-10-CM

## 2022-12-22 DIAGNOSIS — Z79.01 LONG TERM (CURRENT) USE OF ANTICOAGULANTS: ICD-10-CM

## 2022-12-22 DIAGNOSIS — N18.30 ANEMIA IN STAGE 3 CHRONIC KIDNEY DISEASE, UNSPECIFIED WHETHER STAGE 3A OR 3B CKD (HCC): Primary | ICD-10-CM

## 2022-12-22 DIAGNOSIS — N18.30 STAGE 3 CHRONIC KIDNEY DISEASE, UNSPECIFIED WHETHER STAGE 3A OR 3B CKD (HCC): ICD-10-CM

## 2022-12-22 LAB
HEMOGLOBIN: 9.5 G/DL (ref 12–16)
INR BLD: 2.58 (ref 0.87–1.14)
PROTHROMBIN TIME: 27.8 SEC (ref 11.7–14.5)

## 2022-12-22 PROCEDURE — 6360000002 HC RX W HCPCS: Performed by: INTERNAL MEDICINE

## 2022-12-22 PROCEDURE — 96372 THER/PROPH/DIAG INJ SC/IM: CPT

## 2022-12-22 PROCEDURE — 85018 HEMOGLOBIN: CPT

## 2022-12-22 PROCEDURE — 85610 PROTHROMBIN TIME: CPT

## 2022-12-22 RX ADMIN — EPOETIN ALFA-EPBX 6000 UNITS: 3000 INJECTION, SOLUTION INTRAVENOUS; SUBCUTANEOUS at 13:47

## 2022-12-22 NOTE — PROGRESS NOTES
[facility] \[facility]\""     Epogen Visit with Peripheral Lab Draw    NAME:  Vamshi Lucia OF BIRTH:  8/19/1934  MEDICAL RECORD NUMBER:  [id number]  Episode Date:  12/22/2022    Patient arrived to [facility] 58   [x] per wheelchair   [] ambulatory    Peripheral Lab Draw    Blood Draw Site: Location:right arm  Site cleansed with Chloroprep Scrub for 30 seconds: Yes  Site cleansed with Alcohol pads: Yes  Labs drawn with: 21 gauge             [] Butterfly    [x] Needle  Labs Obtained: Yes  Number of attempts: 1    Lab Test(s) Ordered: HGB, PTT    Lab Draw Site:  Redness: No  Bruising: No   Edema: No  Pain: No       Epogen Visit     Is the patient experiencing any:    []   None  []   Increase over baseline but not altering normal activities  [x]   Moderate of causing difficulty performing some activities  []   Severe or loss of ability to perform some activities  []   Bedridden or disabling    Dizziness or Lightheadedness:   []   None  [x]   No Interference  []   Interferes with functioning but not activities of daily living  []   Interferes with daily activies  []   Bedridden or disabling    Shortness of Breath:   []   None   [x]   Dyspneic on exertion   []   Dyspnea with normal activities  []   Dyspnea at rest    Edema: 3+ Location of edema: left leg and right    Tachycardia: No    Heart Palpitations: No      Chest Pain: No     /65   Pulse 56   Temp 97.1 °F (36.2 °C)   Resp 16   LMP  (LMP Unknown)     Hold ZARINA dose if B/P is greater than: 180 mm/Hg     If Hgb remains less than 10 Increase dose by 25%. Do not increase dose more frequently than once every 4 weeks. If Hgb 10-10.5 g/dl  Continue same dose  If Hgb 10.6-11 g/dl Decrease dose by 25%. A decrease in dose can be done as frequently as every week. If Hgb is greater than 11 g/dl Hold Epogen.   May resume ZARINA when Hgb is less than 10 with a 25% reduction in the dose from the last administered dose or decrease with a 25% reduction in the dose from the last administered dose or decrease  frequency. Last visit date: 12/7     Last Hgb: 10 g/dl   Last dose: 5000 units    Current Lab Data:    No results for input(s): WBC, HGB, HCT, MCV, PLT in the last 72 hours. Dose will be increased    Epogen dosage: 6000 units was administered slowly subcutaneously into the left upper arm. Dose verified by:  Sara Truong RN RN    Response to treatment:  Well tolerated by patient. Education:    Indicates understanding    Scheduled to return for next dose of Epogen on January 5, 2023 .      Electronically signed by Reshma Carr RN on 12/22/2022 at 1:21 PM

## 2022-12-22 NOTE — DISCHARGE INSTRUCTIONS
Outpatient Infusion Discharge Instructions  [place] [address] [facility] [address] [address]  Telephone: [phone]    NAME:  Aimee Munoz OF BIRTH:  8/19/1934  MEDICAL RECORD NUMBER:  [id number]  DATE:  @ED@    Reason for Outpatient Infusion Visit: ***    If you develop any these symptoms please contact you Doctor    [] Nausea and/or vomiting not relieved with medication   [] Swelling, redness, and/or bleeding at injection or IV site    [] Fever or chills  [] Rash or itching   [] Shortness of breath  [] Please review After Visit Summary (AVS) information on    [] Other      Outpatient [address]: Should you experience any significant changes in your health or have questions about your care please contact the [address] at [address] 8:00 am - 4:00 pm.  If you need help outside these hours and cannot wait until we are again available, contact your Primary Care Physician or go to the hospital emergency room.        Electronically signed by Howard Gaviria RN on 12/22/2022 at 1:32 PM

## 2022-12-22 NOTE — PROGRESS NOTES
[address]    Peripheral Lab Draw    NAME:  Deborah Layton OF BIRTH:  8/19/1934  MEDICAL RECORD NUMBER:  [id number]  Episode Date:  12/22/2022    Blood Draw Site: Location:right arm  Site cleansed with Chloroprep Scrub for 30 seconds: Yes  Site cleansed with Alcohol pads: Yes  Labs drawn with: 21 gauge             [x] Butterfly    [] Needle  Labs Obtained: Yes  Number of attempts: 1    Lab Test(s) Ordered: PTT    Lab Draw Site:  Redness: No  Bruising: No   Edema: No  Pain: No     Response to treatment:  Well tolerated by patient.       Electronically signed by Meaghan High RN on 12/22/2022 at 4:01 PM

## 2023-01-05 ENCOUNTER — HOSPITAL ENCOUNTER (OUTPATIENT)
Dept: INFUSION THERAPY | Age: 88
Setting detail: INFUSION SERIES
Discharge: HOME | End: 2023-01-05
Payer: MEDICARE

## 2023-01-05 VITALS
SYSTOLIC BLOOD PRESSURE: 114 MMHG | RESPIRATION RATE: 16 BRPM | OXYGEN SATURATION: 98 % | DIASTOLIC BLOOD PRESSURE: 70 MMHG | TEMPERATURE: 97.8 F | HEART RATE: 73 BPM

## 2023-01-05 DIAGNOSIS — N18.30 STAGE 3 CHRONIC KIDNEY DISEASE, UNSPECIFIED WHETHER STAGE 3A OR 3B CKD (HCC): ICD-10-CM

## 2023-01-05 DIAGNOSIS — D63.1 ANEMIA IN STAGE 3 CHRONIC KIDNEY DISEASE, UNSPECIFIED WHETHER STAGE 3A OR 3B CKD (HCC): Primary | ICD-10-CM

## 2023-01-05 DIAGNOSIS — Z79.01 LONG TERM (CURRENT) USE OF ANTICOAGULANTS: Primary | ICD-10-CM

## 2023-01-05 DIAGNOSIS — I48.0 PAROXYSMAL ATRIAL FIBRILLATION (HCC): ICD-10-CM

## 2023-01-05 DIAGNOSIS — N18.30 ANEMIA IN STAGE 3 CHRONIC KIDNEY DISEASE, UNSPECIFIED WHETHER STAGE 3A OR 3B CKD (HCC): Primary | ICD-10-CM

## 2023-01-05 LAB — HEMOGLOBIN: 8.9 G/DL (ref 12–16)

## 2023-01-05 PROCEDURE — 6360000002 HC RX W HCPCS: Performed by: INTERNAL MEDICINE

## 2023-01-05 PROCEDURE — 85018 HEMOGLOBIN: CPT

## 2023-01-05 PROCEDURE — 96372 THER/PROPH/DIAG INJ SC/IM: CPT

## 2023-01-05 RX ADMIN — EPOETIN ALFA-EPBX 6000 UNITS: 3000 INJECTION, SOLUTION INTRAVENOUS; SUBCUTANEOUS at 14:12

## 2023-01-05 ASSESSMENT — PAIN SCALES - GENERAL: PAINLEVEL_OUTOF10: 0

## 2023-01-05 NOTE — PROGRESS NOTES
[facility]     Epogen Visit with Peripheral Lab Draw    NAME:  Mela Cote OF BIRTH:  8/19/1934  MEDICAL RECORD NUMBER:  [id number]  Episode Date:  1/5/2023    Patient arrived to [facility] 58   [x] per wheelchair   [] ambulatory    Peripheral Lab Draw    Blood Draw Site: Location: right antecubital fossa  Site cleansed with Chloroprep Scrub for 30 seconds: Yes  Labs drawn with: 23 gauge             [x] Butterfly    [] Needle  Labs Obtained: Yes  Number of attempts: 1    Lab Test(s) Ordered: STAT Hemoglobin    Lab Draw Site:  Redness: No  Bruising: No   Edema: No  Pain: No       Epogen Visit     Is the patient experiencing any:  Fatigue:   []   None  [x]   Increase over baseline but not altering normal activities - states that she a little tired but it is because she did not sleep well last night  []   Moderate of causing difficulty performing some activities  []   Severe or loss of ability to perform some activities  []   Bedridden or disabling    Dizziness or Lightheadedness:   []   None  [x]   No Interference - only when standing up quickly  []   Interferes with functioning but not activities of daily living  []   Interferes with daily activies  []   Bedridden or disabling    Shortness of Breath:   [x]   None   []   Dyspneic on exertion   []   Dyspnea with normal activities  []   Dyspnea at rest    Edema: +2 pitting edema bilateral feet to upper calf with left leg being more swollen than right. Left leg wrapped with gauze and ACE bandages due to wound on left lower leg    Tachycardia: No    Heart Palpitations: No      Chest Pain: No     /70   Pulse 73   Temp 97.8 °F (36.6 °C) (Oral)   Resp 16   LMP  (LMP Unknown)   SpO2 98%     Hold ZARINA dose if B/P is greater than: 180 mm/Hg     If Hgb remains less than 10 Increase dose by 25%. Do not increase dose more frequently than once every 4 weeks.   If Hgb 10-10.5 g/dl  Continue same dose  If Hgb 10.6-11 g/dl Decrease dose by 25%. A decrease in dose can be done as frequently as every week. If Hgb is greater than 11 g/dl Hold Epogen. May resume ZARINA when Hgb is less than 10 with a 25% reduction in the dose from the last administered dose or decrease with a 25% reduction in the dose from the last administered dose or decrease  frequency. Last visit date: December 22, 2022     Last Hgb: 9.5 g/dl   Last dose: 6,000 units    Current Lab Data:    Recent Labs     01/05/23  1319   HGB 8.9*     Dose will remain the same due to being increased 2 weeks ago. Epogen dosage: 6,000 units was administered slowly subcutaneously into the right upper arm. Dose verified by:  Isabel Reese RN    Response to treatment:  Well tolerated by patient. Education: Verbal and written discharge instructions reviewed with patient. Verbalized understanding. Written copy given via AVS      Scheduled to return for next dose of Epogen on January 19, 2023 .      Electronically signed by Adore Sheth RN on 1/5/2023 at 5:14 PM

## 2023-01-05 NOTE — DISCHARGE INSTRUCTIONS
Outpatient Infusion Discharge Instructions  [facility]  [address] [address] [address]  Telephone: [phone]    NAME:  Jovon Sharma OF BIRTH:  8/19/1934  MEDICAL RECORD NUMBER:  [id number]  DATE:  January 5, 2023    Reason for Outpatient Infusion Visit: Epogen injection    If you develop any these symptoms please contact you Doctor    [x] Nausea and/or vomiting not relieved with medication   [x] Swelling, redness, and/or bleeding at injection or IV site    [x] Fever or chills  [x] Rash or itching   [x] Shortness of breath  [x] Please review After Visit Summary (AVS) information on: Anemia and Epogen    [x] Other: Next appt - January 19, 2023      Outpatient [facility] Information: Should you experience any significant changes in your health or have questions about your care please contact the [address] at [address] 8:00 am - 4:00 pm.  If you need help outside these hours and cannot wait until we are again available, contact your Primary Care Physician or go to the hospital emergency room.        Electronically signed by Mango Yin RN on 1/5/2023 at 2:13 PM

## 2023-01-19 ENCOUNTER — HOSPITAL ENCOUNTER (OUTPATIENT)
Dept: INFUSION THERAPY | Age: 88
Setting detail: INFUSION SERIES
Discharge: HOME | End: 2023-01-19
Payer: MEDICARE

## 2023-01-19 VITALS
RESPIRATION RATE: 16 BRPM | DIASTOLIC BLOOD PRESSURE: 57 MMHG | SYSTOLIC BLOOD PRESSURE: 113 MMHG | HEART RATE: 50 BPM | OXYGEN SATURATION: 97 % | TEMPERATURE: 97.7 F

## 2023-01-19 DIAGNOSIS — I48.0 PAROXYSMAL ATRIAL FIBRILLATION (HCC): ICD-10-CM

## 2023-01-19 DIAGNOSIS — N18.30 ANEMIA IN STAGE 3 CHRONIC KIDNEY DISEASE, UNSPECIFIED WHETHER STAGE 3A OR 3B CKD (HCC): Primary | ICD-10-CM

## 2023-01-19 DIAGNOSIS — N18.30 STAGE 3 CHRONIC KIDNEY DISEASE, UNSPECIFIED WHETHER STAGE 3A OR 3B CKD (HCC): ICD-10-CM

## 2023-01-19 DIAGNOSIS — Z79.01 LONG TERM (CURRENT) USE OF ANTICOAGULANTS: Primary | ICD-10-CM

## 2023-01-19 DIAGNOSIS — D63.1 ANEMIA IN STAGE 3 CHRONIC KIDNEY DISEASE, UNSPECIFIED WHETHER STAGE 3A OR 3B CKD (HCC): Primary | ICD-10-CM

## 2023-01-19 LAB
A/G RATIO: 0.9 (ref 1.1–2.2)
ALBUMIN SERPL-MCNC: 3.3 G/DL (ref 3.4–5)
ALP BLD-CCNC: 156 U/L (ref 40–129)
ALT SERPL-CCNC: 21 U/L (ref 10–40)
ANION GAP SERPL CALCULATED.3IONS-SCNC: 10 MMOL/L (ref 3–16)
AST SERPL-CCNC: 18 U/L (ref 15–37)
BILIRUB SERPL-MCNC: 0.4 MG/DL (ref 0–1)
BUN BLDV-MCNC: 77 MG/DL (ref 7–20)
CALCIUM SERPL-MCNC: 9.5 MG/DL (ref 8.3–10.6)
CHLORIDE BLD-SCNC: 100 MMOL/L (ref 99–110)
CHOLESTEROL, TOTAL: 142 MG/DL (ref 0–199)
CO2: 27 MMOL/L (ref 21–32)
CREAT SERPL-MCNC: 3.8 MG/DL (ref 0.6–1.2)
CREATININE URINE: 34.2 MG/DL (ref 28–259)
ESTIMATED AVERAGE GLUCOSE: 148.5 MG/DL
FERRITIN: 717.3 NG/ML (ref 15–150)
GFR SERPL CREATININE-BSD FRML MDRD: 11 ML/MIN/{1.73_M2}
GLUCOSE BLD-MCNC: 159 MG/DL (ref 70–99)
HBA1C MFR BLD: 6.8 %
HCT VFR BLD CALC: 25.3 % (ref 36–48)
HDLC SERPL-MCNC: 73 MG/DL (ref 40–60)
HEMOGLOBIN: 8.1 G/DL (ref 12–16)
INR BLD: 3.07 (ref 0.87–1.14)
IRON SATURATION: 42 % (ref 15–50)
IRON: 74 UG/DL (ref 37–145)
LDL CHOLESTEROL CALCULATED: 63 MG/DL
MCH RBC QN AUTO: 29.7 PG (ref 26–34)
MCHC RBC AUTO-ENTMCNC: 32.2 G/DL (ref 31–36)
MCV RBC AUTO: 92.4 FL (ref 80–100)
MICROALBUMIN UR-MCNC: <1.2 MG/DL
MICROALBUMIN/CREAT UR-RTO: NORMAL MG/G (ref 0–30)
PARATHYROID HORMONE INTACT: 200.5 PG/ML (ref 14–72)
PDW BLD-RTO: 18 % (ref 12.4–15.4)
PLATELET # BLD: 126 K/UL (ref 135–450)
PMV BLD AUTO: 7 FL (ref 5–10.5)
POTASSIUM SERPL-SCNC: 4.6 MMOL/L (ref 3.5–5.1)
PROTHROMBIN TIME: 31.9 SEC (ref 11.7–14.5)
RBC # BLD: 2.74 M/UL (ref 4–5.2)
SODIUM BLD-SCNC: 137 MMOL/L (ref 136–145)
T4 FREE: 2.1 NG/DL (ref 0.9–1.8)
TOTAL IRON BINDING CAPACITY: 176 UG/DL (ref 260–445)
TOTAL PROTEIN: 7 G/DL (ref 6.4–8.2)
TRIGL SERPL-MCNC: 31 MG/DL (ref 0–150)
TSH SERPL DL<=0.05 MIU/L-ACNC: 0.03 UIU/ML (ref 0.27–4.2)
VLDLC SERPL CALC-MCNC: 6 MG/DL
WBC # BLD: 4.8 K/UL (ref 4–11)

## 2023-01-19 PROCEDURE — 84439 ASSAY OF FREE THYROXINE: CPT

## 2023-01-19 PROCEDURE — 80053 COMPREHEN METABOLIC PANEL: CPT

## 2023-01-19 PROCEDURE — 85610 PROTHROMBIN TIME: CPT

## 2023-01-19 PROCEDURE — 99212 OFFICE O/P EST SF 10 MIN: CPT

## 2023-01-19 PROCEDURE — 82570 ASSAY OF URINE CREATININE: CPT

## 2023-01-19 PROCEDURE — 96372 THER/PROPH/DIAG INJ SC/IM: CPT

## 2023-01-19 PROCEDURE — 83550 IRON BINDING TEST: CPT

## 2023-01-19 PROCEDURE — 83540 ASSAY OF IRON: CPT

## 2023-01-19 PROCEDURE — 82728 ASSAY OF FERRITIN: CPT

## 2023-01-19 PROCEDURE — 6360000002 HC RX W HCPCS: Performed by: INTERNAL MEDICINE

## 2023-01-19 PROCEDURE — 84443 ASSAY THYROID STIM HORMONE: CPT

## 2023-01-19 PROCEDURE — 83970 ASSAY OF PARATHORMONE: CPT

## 2023-01-19 PROCEDURE — 80061 LIPID PANEL: CPT

## 2023-01-19 PROCEDURE — 82043 UR ALBUMIN QUANTITATIVE: CPT

## 2023-01-19 PROCEDURE — 83036 HEMOGLOBIN GLYCOSYLATED A1C: CPT

## 2023-01-19 PROCEDURE — 85027 COMPLETE CBC AUTOMATED: CPT

## 2023-01-19 RX ADMIN — EPOETIN ALFA-EPBX 7500 UNITS: 4000 INJECTION, SOLUTION INTRAVENOUS; SUBCUTANEOUS at 14:11

## 2023-01-19 NOTE — PROGRESS NOTES
[facility]     Epogen Visit with Peripheral Lab Draw    NAME:  Carlie Macedo OF BIRTH:  8/19/1934  MEDICAL RECORD NUMBER:  [id number]  Episode Date:  1/19/2023    Patient arrived to [facility] 58   [x] per wheelchair   [] ambulatory    Peripheral Lab Draw    Blood Draw Site: Location:right arm  Site cleansed with Chloroprep Scrub for 30 seconds: Yes  Site cleansed with Alcohol pads: Yes  Labs drawn with: 21 gauge             [x] Butterfly    [] Needle  Labs Obtained: Yes  Number of attempts: 1    Lab Test(s) Ordered: renal, cbc, ferritin, pth    Lab Draw Site:  Redness: No  Bruising: No   Edema: No  Pain: No       Epogen Visit     Is the patient experiencing any:  Fatigue:   []   None  []   Increase over baseline but not altering normal activities  [x]   Moderate of causing difficulty performing some activities  []   Severe or loss of ability to perform some activities  []   Bedridden or disabling    Dizziness or Lightheadedness:   [x]   None  []   No Interference  []   Interferes with functioning but not activities of daily living  []   Interferes with daily activies  []   Bedridden or disabling    Shortness of Breath:   [x]   None   []   Dyspneic on exertion   []   Dyspnea with normal activities  []   Dyspnea at rest    Edema: 4+ Location of edema: both lower legs, ankles and feet    Tachycardia: No    Heart Palpitations: No      Chest Pain: No     BP (!) 113/57   Pulse 50   Temp 97.7 °F (36.5 °C) (Infrared)   Resp 16   LMP  (LMP Unknown)   SpO2 97%     Hold ZARINA dose if B/P is greater than: 180 mm/Hg     If Hgb remains less than 10 Increase dose by 25%. Do not increase dose more frequently than once every 4 weeks. If Hgb 10-10.5 g/dl  Continue same dose  If Hgb 10.6-11 g/dl Decrease dose by 25%. A decrease in dose can be done as frequently as every week. If Hgb is greater than 11 g/dl Hold Epogen.   May resume ZARINA when Hgb is less than 10 with a 25% reduction in the dose from the last administered dose or decrease with a 25% reduction in the dose from the last administered dose or decrease  frequency. Last visit date: 1/5/23     Last Hgb: 8.9 g/dl   Last dose: 6000 units    Current Lab Data:    Recent Labs     01/19/23  1315   WBC 4.8   HGB 8.1*   HCT 25.3*   MCV 92.4   *     Dose will be increased    Epogen dosage: 04278 units was administered slowly subcutaneously into the right upper arm. Dose verified by:  Kym Armando RN    Response to treatment:  Well tolerated by patient. Education:    Verbalized understanding    Scheduled to return for next dose of Epogen on February 2, 2023 .      Electronically signed by Beverley Contreras RN on 1/19/2023 at 2:20 PM

## 2023-01-19 NOTE — PROGRESS NOTES
Outpatient [place] [address]    Peripheral Lab Draw    NAME:  Lenny Boas OF BIRTH:  8/19/1934  MEDICAL RECORD NUMBER:  [id number]  Episode Date:  1/19/2023    Blood Draw Site: Location:right arm  Site cleansed with Chloroprep Scrub for 30 seconds: Yes  Site cleansed with Alcohol pads: Yes  Labs drawn with: 21 gauge             [x] Butterfly    [] Needle  Labs Obtained: Yes  Number of attempts: 1    Lab Test(s) Ordered: These labs ordered by Dr Mateo Gandhi: lipid, panel, cmp,hgb A1C, tsh, urine/microalbumin, t4 free    PT/INR- 31.9/3.07 drawn for DR Soumya Anderson. Faxed results to Dr. Soumya Anderson    Lab Draw Site:  Redness: No  Bruising: No   Edema: No  Pain: No     Response to treatment:  Well tolerated by patient.       Electronically signed by Magdi Rios RN on 1/19/2023 at 1:33 PM

## 2023-01-19 NOTE — DISCHARGE INSTRUCTIONS
Outpatient Infusion Discharge Instructions  [place] [address] [facility] [address] [address], [address]  Telephone: [phone]    NAME:  Son Ellis OF BIRTH:  8/19/1934  MEDICAL RECORD NUMBER:  [id number]  DATE:  1/19/23    Reason for Outpatient Infusion Visit: epogen    If you develop any these symptoms please contact you Doctor    [x] Nausea and/or vomiting not relieved with medication   [x] Swelling, redness, and/or bleeding at injection or IV site    [x] Fever or chills  [x] Rash or itching   [x] Shortness of breath  [x] Please review After Visit Summary (AVS) information on    [] Other      Outpatient [address]: Should you experience any significant changes in your health or have questions about your care please contact the [address] at [address] 8:00 am - 4:00 pm.  If you need help outside these hours and cannot wait until we are again available, contact your Primary Care Physician or go to the hospital emergency room.        Electronically signed by Rigo Patiño RN on 1/19/2023 at 2:15 PM

## 2023-02-01 ENCOUNTER — TELEPHONE (OUTPATIENT)
Dept: INFUSION THERAPY | Age: 88
End: 2023-02-01

## 2023-02-02 ENCOUNTER — HOSPITAL ENCOUNTER (OUTPATIENT)
Dept: INFUSION THERAPY | Age: 88
Setting detail: INFUSION SERIES
Discharge: HOME | End: 2023-02-02

## 2023-02-02 NOTE — TELEPHONE ENCOUNTER
Patient's daughter called to cancel patient's appointment for tomorrow. States patient is in hospital at [facility] since 4 days ago. Diagnosed with metabolic encephalopathy. Daughter states she will call us when she is home.

## 2023-02-16 ENCOUNTER — HOSPITAL ENCOUNTER (OUTPATIENT)
Dept: INFUSION THERAPY | Age: 88
Setting detail: INFUSION SERIES
Discharge: HOME | End: 2023-02-16

## 2025-03-11 NOTE — PROGRESS NOTES
[facility]     Retacrit Visit with Peripheral Lab Draw    NAME:  Lalo Cannon OF BIRTH:  1934  MEDICAL RECORD NUMBER:  [id number]  Episode Date:  11/19/2021    Patient arrived to [facility] 58   [x] per wheelchair   [] ambulatory    Peripheral Lab Draw    Blood Draw Site: Location:right arm  Site cleansed with Chloroprep Scrub for 30 seconds: Yes  Site cleansed with Alcohol pads: Yes  Labs drawn with: 21 gauge             [x] Butterfly    [] Needle  Labs Obtained: Yes  Number of attempts: 1    Lab Test(s) Ordered: HGB     Lab Draw Site:  Redness: No  Bruising: No   Edema: No  Pain: No       Epogen Visit     Is the patient experiencing any:  Fatigue:   []   None  []   Increase over baseline but not altering normal activities  [x]   Moderate of causing difficulty performing some activities  []   Severe or loss of ability to perform some activities  []   Bedridden or disabling    Dizziness or Lightheadedness:   [x]   None  []   No Interference  []   Interferes with functioning but not activities of daily living  []   Interferes with daily activies  []   Bedridden or disabling    Shortness of Breath:   [x]   None   []   Dyspneic on exertion   []   Dyspnea with normal activities  []   Dyspnea at rest    Edema:  Location of edema: left leg 2+ edema    Tachycardia: No    Heart Palpitations: No      Chest Pain: No     /67   Pulse 59   Temp 97.6 °F (36.4 °C) (Oral)   Resp 16   LMP  (LMP Unknown)   SpO2 97%     Hold ZARINA dose if B/P is greater than: 180 mm/Hg     If Hgb remains less than 10 Increase dose by 25%. Do not increase dose more frequently than once every 4 weeks. If Hgb 10-10.5 g/dl  Continue same dose  If Hgb 10.6-11 g/dl Decrease dose by 25%. A decrease in dose can be done as frequently as every week. If Hgb is greater than 11 g/dl Hold Epogen.   May resume ZARINA when Hgb is less than 10 with a 25% reduction in the dose from the last administered dose or decrease with a 25% reduction in the dose from the last administered dose or decrease  frequency. Last visit date: 11/5/21     Last Hgb: 10.0 g/dl   Last dose: 34342 units    Current Lab Data:    Recent Labs     11/19/21  1322   HGB 10.2*     Dose will be the same    Epogen dosage: 83518 units was administered slowly subcutaneously into the right upper arm. Dose verified by:  Kody Case RN    Response to treatment:  Well tolerated by patient. Education:    Verbalized understanding    Scheduled to return for next dose of Epogen on December 3, 2021 .      Electronically signed by Rudolph Burgess RN on 11/19/2021 at 1:57 PM [Initial Consultation] : an initial consultation

## (undated) DEVICE — SYRINGE MED 30ML STD CLR PLAS LUERLOCK TIP N CTRL DISP

## (undated) DEVICE — SOLUTION IRRIG BSS ST 500ML

## (undated) DEVICE — Device: Brand: MEDEX

## (undated) DEVICE — SOLUTION IV IRRIG WATER 500ML POUR BRL ST 2F7113

## (undated) DEVICE — Device

## (undated) DEVICE — SURGICAL PROC PACK SHT WEST V4

## (undated) DEVICE — SYRINGE TB 1ML NDL 25GA L0.625IN PLAS SLIP TIP CONVENTIONAL

## (undated) DEVICE — GLOVE,SURG,TRIUMPH MICRO,LTX,PF,7.5: Brand: MEDLINE

## (undated) DEVICE — SYRINGE MED 3ML CLR PLAS STD N CTRL LUERLOCK TIP DISP